# Patient Record
Sex: MALE | Race: WHITE | NOT HISPANIC OR LATINO | Employment: OTHER | URBAN - METROPOLITAN AREA
[De-identification: names, ages, dates, MRNs, and addresses within clinical notes are randomized per-mention and may not be internally consistent; named-entity substitution may affect disease eponyms.]

---

## 2017-01-03 ENCOUNTER — ALLSCRIPTS OFFICE VISIT (OUTPATIENT)
Dept: WOUND CARE | Facility: HOSPITAL | Age: 62
End: 2017-01-03
Payer: COMMERCIAL

## 2017-01-03 PROCEDURE — 99212 OFFICE O/P EST SF 10 MIN: CPT | Performed by: PODIATRIST

## 2017-01-17 ENCOUNTER — GENERIC CONVERSION - ENCOUNTER (OUTPATIENT)
Dept: OTHER | Facility: OTHER | Age: 62
End: 2017-01-17

## 2017-01-24 ENCOUNTER — ALLSCRIPTS OFFICE VISIT (OUTPATIENT)
Dept: WOUND CARE | Facility: HOSPITAL | Age: 62
End: 2017-01-24
Payer: COMMERCIAL

## 2017-01-24 PROCEDURE — 99212 OFFICE O/P EST SF 10 MIN: CPT | Performed by: PODIATRIST

## 2017-08-18 ENCOUNTER — APPOINTMENT (EMERGENCY)
Dept: ULTRASOUND IMAGING | Facility: HOSPITAL | Age: 62
End: 2017-08-18
Payer: COMMERCIAL

## 2017-08-18 ENCOUNTER — HOSPITAL ENCOUNTER (OUTPATIENT)
Facility: HOSPITAL | Age: 62
Setting detail: OBSERVATION
Discharge: LEFT BEFORE DISCHARGE | End: 2017-08-19
Attending: EMERGENCY MEDICINE | Admitting: HOSPITALIST
Payer: COMMERCIAL

## 2017-08-18 ENCOUNTER — APPOINTMENT (EMERGENCY)
Dept: RADIOLOGY | Facility: HOSPITAL | Age: 62
End: 2017-08-18
Payer: COMMERCIAL

## 2017-08-18 VITALS
RESPIRATION RATE: 22 BRPM | TEMPERATURE: 97.8 F | HEART RATE: 71 BPM | SYSTOLIC BLOOD PRESSURE: 161 MMHG | OXYGEN SATURATION: 97 % | DIASTOLIC BLOOD PRESSURE: 76 MMHG

## 2017-08-18 DIAGNOSIS — N17.9 AKI (ACUTE KIDNEY INJURY) (HCC): Primary | ICD-10-CM

## 2017-08-18 DIAGNOSIS — L03.90 CELLULITIS: ICD-10-CM

## 2017-08-18 LAB
ANION GAP SERPL CALCULATED.3IONS-SCNC: 9 MMOL/L (ref 4–13)
APTT PPP: 33 SECONDS (ref 23–35)
BASOPHILS # BLD AUTO: 0.03 THOUSANDS/ΜL (ref 0–0.1)
BASOPHILS NFR BLD AUTO: 1 % (ref 0–1)
BUN SERPL-MCNC: 35 MG/DL (ref 5–25)
CALCIUM SERPL-MCNC: 9.2 MG/DL (ref 8.3–10.1)
CHLORIDE SERPL-SCNC: 106 MMOL/L (ref 100–108)
CO2 SERPL-SCNC: 26 MMOL/L (ref 21–32)
CREAT SERPL-MCNC: 2.07 MG/DL (ref 0.6–1.3)
EOSINOPHIL # BLD AUTO: 0.15 THOUSAND/ΜL (ref 0–0.61)
EOSINOPHIL NFR BLD AUTO: 3 % (ref 0–6)
ERYTHROCYTE [DISTWIDTH] IN BLOOD BY AUTOMATED COUNT: 18.1 % (ref 11.6–15.1)
GFR SERPL CREATININE-BSD FRML MDRD: 33 ML/MIN/1.73SQ M
GLUCOSE SERPL-MCNC: 315 MG/DL (ref 65–140)
HCT VFR BLD AUTO: 35.4 % (ref 36.5–49.3)
HGB BLD-MCNC: 10.1 G/DL (ref 12–17)
INR PPP: 2.22 (ref 0.86–1.16)
LACTATE SERPL-SCNC: 1.3 MMOL/L (ref 0.5–2)
LYMPHOCYTES # BLD AUTO: 1.3 THOUSANDS/ΜL (ref 0.6–4.47)
LYMPHOCYTES NFR BLD AUTO: 25 % (ref 14–44)
MCH RBC QN AUTO: 22.6 PG (ref 26.8–34.3)
MCHC RBC AUTO-ENTMCNC: 28.5 G/DL (ref 31.4–37.4)
MCV RBC AUTO: 79 FL (ref 82–98)
MONOCYTES # BLD AUTO: 0.39 THOUSAND/ΜL (ref 0.17–1.22)
MONOCYTES NFR BLD AUTO: 7 % (ref 4–12)
NEUTROPHILS # BLD AUTO: 3.38 THOUSANDS/ΜL (ref 1.85–7.62)
NEUTS SEG NFR BLD AUTO: 64 % (ref 43–75)
PLATELET # BLD AUTO: 261 THOUSANDS/UL (ref 149–390)
PMV BLD AUTO: 9.2 FL (ref 8.9–12.7)
POTASSIUM SERPL-SCNC: 5.3 MMOL/L (ref 3.5–5.3)
PROTHROMBIN TIME: 25.4 SECONDS (ref 12.1–14.4)
RBC # BLD AUTO: 4.47 MILLION/UL (ref 3.88–5.62)
SODIUM SERPL-SCNC: 141 MMOL/L (ref 136–145)
WBC # BLD AUTO: 5.25 THOUSAND/UL (ref 4.31–10.16)

## 2017-08-18 PROCEDURE — 85025 COMPLETE CBC W/AUTO DIFF WBC: CPT | Performed by: EMERGENCY MEDICINE

## 2017-08-18 PROCEDURE — 36415 COLL VENOUS BLD VENIPUNCTURE: CPT | Performed by: EMERGENCY MEDICINE

## 2017-08-18 PROCEDURE — 73590 X-RAY EXAM OF LOWER LEG: CPT

## 2017-08-18 PROCEDURE — 83605 ASSAY OF LACTIC ACID: CPT | Performed by: EMERGENCY MEDICINE

## 2017-08-18 PROCEDURE — 93971 EXTREMITY STUDY: CPT

## 2017-08-18 PROCEDURE — 85730 THROMBOPLASTIN TIME PARTIAL: CPT | Performed by: EMERGENCY MEDICINE

## 2017-08-18 PROCEDURE — 80048 BASIC METABOLIC PNL TOTAL CA: CPT | Performed by: EMERGENCY MEDICINE

## 2017-08-18 PROCEDURE — 85610 PROTHROMBIN TIME: CPT | Performed by: EMERGENCY MEDICINE

## 2017-08-18 RX ORDER — PANTOPRAZOLE SODIUM 40 MG/1
40 TABLET, DELAYED RELEASE ORAL DAILY
COMMUNITY
End: 2022-01-01 | Stop reason: HOSPADM

## 2017-08-18 RX ORDER — CLINDAMYCIN HYDROCHLORIDE 150 MG/1
450 CAPSULE ORAL ONCE
Status: COMPLETED | OUTPATIENT
Start: 2017-08-18 | End: 2017-08-18

## 2017-08-18 RX ADMIN — CLINDAMYCIN HYDROCHLORIDE 450 MG: 150 CAPSULE ORAL at 22:08

## 2017-08-19 PROCEDURE — 99284 EMERGENCY DEPT VISIT MOD MDM: CPT

## 2017-08-19 RX ORDER — CLINDAMYCIN HYDROCHLORIDE 150 MG/1
300 CAPSULE ORAL 4 TIMES DAILY
Qty: 56 CAPSULE | Refills: 0 | Status: SHIPPED | OUTPATIENT
Start: 2017-08-19 | End: 2017-08-26

## 2017-08-19 RX ORDER — CLINDAMYCIN HYDROCHLORIDE 150 MG/1
300 CAPSULE ORAL ONCE
Status: DISCONTINUED | OUTPATIENT
Start: 2017-08-19 | End: 2017-08-19 | Stop reason: HOSPADM

## 2018-01-09 NOTE — MISCELLANEOUS
Physical Exam    Wound #1 Assessment wound #1 Location:, R medial Ankle, Care for this wound started on 10/25/16  Wound Status: not healed  Length: 3cm x Width: 1cm x Depth: 0 2cm   Total: 3sq cm   Wound Volume: 0 6cm3           Tissue type: Subcutaneous, Granulation and Slough   Color of Wound: Red - ~U%, Yellow - 5% and Pink - 95%   Exudate Amount: Minimal   Exudate Type: Serosangiunous   Odor: None   Exudate Color: Yellow and pink   Wound Edges: Intact, Rolled (epibolized) and Epidermal Migration   Periwound Skin Condition: Intact           Wound Drsg  Orders/Instructions  Wound Identification Dressing Orders--Instructions:   Wound Identification and Instructions   Wound #1: R medial Ankle    Wound Care Instructions  Discussed with Patient/Caregiver  Taryn Donohue  Dressing Type: Silver Alginate (Maxsorb AG, Silvercell, Aquacel Silver)  Wash with mild soap and water, normal saline, wound cleanser  As specified, use: Do Not Get Wet  Apply 4% Topical Lidocaine anesthetic solution PRN to wound/ulcer prior to debridement for pain control  Apply specified dressing to wound base/bed  To periwound apply: zinc oxide  Secondary dressing apply: 4x4, econopaste, ABD bandage  Secure with: coban  Dressing change frequency: Twice per week, F & tues  Unna boot -- Change every 3 days and as needed for increased drainage, discomfort or excessive swelling of toes  Comments/Other:   Alginate Ag on wound bed, Unna's Boot to be changed Tuesday 12/6/16   Apply compression using: Tubifast blue  Future Appointments    Date/Time Provider Specialty Site   12/06/2016 09:30 AM Rod Arevalo:    Wound Nursing Care Plan   Problem: open wound L medial ankle   Impaired Tissue Integrity related to: open wound   Risk for Infection related to open wound:   Pain related to disease process and/or wound treatment:   Imbalanced Nutrition, less than body requirements related to inadequate intake and/or disease process:   Impaired Mobility related to: tired when walking and needs walker   Risk for Unstable Blood Glucose related to increased metabolic demand required for wound healing and/or immune response to wound infection:   Risk for Fall related to criteria noted on Fall Risk Assessment:   Disturbed Sensory Perception related to neuropathy:   Goals   Patient will achieve 100% epithelialization:  Patient will maintain skin integrity:  Patient will verbalize signs and symptoms of infection and when to notify physician or [facility]:    Patient will verbalize knowledge of and demonstrate adherence to interventions to achieve optimal nutrition required for wound healing:  Patient will not experience a fall:  Patient will display appropriate coping mechanisms:  Wound Nursing Care Interventions:   Provide moist wound healing:  Evaluate current medication regime for medications which may slow/impede wound healing:  Assess pain and patients pain tolerance level:  Evaluate effectiveness of pain management interventions:  Assess patient's nutrition on each wound care visit (including protein and fluid intake): Gisele Lynn Educate on diet and hydration required to enhance wound healing:  Assess mobility and how it may affect wound healing:  Assess patient's knowledge of diabetes management:  Complete Fall Risk Assessment upon admission to wound program and with change in condition/implement identified interventions:  Teach patient on edema reducing measures:     Assess patient's coping skills (is the wound interrupting their lifestyle, social interactions, etc ):    Assess for patient compliance to established plan of care:  care plan initiated 10/25/16, updated 11/15/16      Signatures   Electronically signed by : Diana Lopez RN; Dec  2 2016  2:08PM EST                       (Author)

## 2018-01-10 NOTE — RESULT NOTES
Verified Results  (1) TISSUE EXAM 25Oct2016 11:50AM Freddy Del Valle Order Number: [id number]     Test Name Result Flag Reference   LAB AP CASE REPORT (Report)     Surgical Pathology Report             Case: [id number]                   Authorizing Provider: Alec Nayg DPM  Collected:      10/25/2016 1150        Ordering Location:   [facility]    Received:      10/25/2016 [facility]                              Pathologist:      Ghazal Alston MD                                Specimen:  Ulcer, Right medial ankle ulcer   LAB AP FINAL DIAGNOSIS (Report)     A  Skin Ulcer, Right medial ankle ulcer, punch biopsy:  - Ulcer with granulation tissue/superficial small capillary proliferation,   regenerative    squamous atypia and scar  See Note  - Special stains for bacteria (B&H) and fungus (PAS) negative  - No vasculitis identified    - Negative for malignancy on multiple examined levels  Interpretation performed at [facility], [address] [place] [address]  Electronically signed by Ghazal Alston MD on 10/28/2016 at 10:47 PM   LAB AP NOTE (Report)     Multiple levels are examined revealing a fragmented biopsy suboptimal for   evaluation  Ulceration with fibrinopurulent material, granulation tissue   and necrosis is present  The granulation tissue is focally associated with   scar, regenerative squamous atypia and marked superficial small capillary   proliferation suggestive of stasis dermatitis/acroangiodermatitis,   although prominent siderosis is not appreciated  Epidermis is detached   from the specimen; the undermined epidermal borders by neutrophils,   typical of pyoderma gangrenosum, cannot be determined  Special stains for   organisms are negative, correlating with prior negative culture results  No vasculitis is appreciated  No malignancy is identified   Given the   fragmented biopsy and clinical history, additional biopsy from the edge of   the ulcer may be helpful, if clinically indicated for more definitive   diagnosis  LAB AP SURGICAL ADDITIONAL INFORMATION (Report)     These tests were developed and their performance characteristics   determined by [facility] or [facility]  They may not be cleared or approved by the [place]  Food and   Drug Administration  The FDA has determined that such clearance or   approval is not necessary  These tests are used for clinical purposes  They should not be regarded as investigational or for research  This   laboratory has been approved by [place] [id number], designated as a high-complexity   laboratory and is qualified to perform these tests  LAB AP GROSS DESCRIPTION (Report)     A  The specimen is received in formalin, labeled with the patient's name   and hospital number, and is designated right medial ankle, are five   irregularly shaped fragments of tan-brown soft tissue measuring 0 9 x 0 7   x 0 1 cm in aggregate  Entirely submitted  One cassette  Note: The estimated total formalin fixation time based upon information   provided by the submitting clinician and the standard processing schedule   is 46 75 hours   RLR   LAB AP CLINICAL INFORMATION       Order Number: [id number]  Chronic right medial ankle ulcer with rolled borders not responding to standard care

## 2018-01-10 NOTE — PROGRESS NOTES
Chief Complaint  Chief Complaint Free Text Note Form: Nurse visit to [facility] for R Medial Ankle  History of Present Illness  Wound Identification HPI:   Wound Identification HPI   Wound #1: R Medial ankle        Visit Information:   The patient came to Wound Care and was ambulatory  The patient is being seen for follow-up with RN at the [address] [place] [address]  The patient's identification was verified  A secondary verification process was completed  Orientation: oriented to person, oriented to place and oriented to time  Blood Glucose:   89 mg/dL as reported by patient  10/25/16 Pt arrived ambulatory for R Medial Ankle  Pt also has a wound on Left pre tib, measures 1 5 cm x 1 cm x 0 1 cm  and states he aquired it from scratching, but does not want it treated  Pt states he had a pocket of fluid/blood blister removed 9/22/16 from R Medial Ankle and the wound has not healed since the removal Pt has visiting nurses from [facility] that currently visit him for dressing changes  The visiting nurses apply santyl to the wound  Pt was prescribed Cipro on 10/21/16 and started his prescription on Saturday 10/22/16  Pt stated that Cipro gave him a bloody nose, upset stomach, heart palpitations, pain knees and diarrhea and has stopped taking it  Pt has bilateral pedal pulses palpable  Pt states his last doppler test was two months ago  Pt tires when walking long distances and uses a walker  Pt did not bring his walker for [facility] visit  11/1/16 Pt arrived ambulatory wearing sneakers bilaterally  Dressing intact to R medial ankle  Pt states that his wife changes his dressing when the visiting nurse doesn't come and the dressing was changed yesterday  11/4/16 Pt arrived ambulatroy wearing sneakers bilaterally with R unna boot intact  Strike through drainage on [employer]   Pt reports he " pushed the back on the unna boot down slightly on the back of his leg, behind his knee " Pt also reports he's been having R leg twitching, and gets worse when he elevates his leg  Pt takes a pain reliever before bed to help him sleep with the leg twitching  Mcdowell Fall Scale:     History of Falling: Yes = 25   Secondary Diagnosis: Yes = 15   Ambulatory Aid None, Bedrest, Nurse Assist = 0   No = 0   Gait: Weak = 10 -- Short steps (may shuffle), stooped but able to lift head while walking, may seek support from furniture while walking, but with light touch (for reassurance)  Mental Status: Oriented to own ability = 0   Total Score:    25 - 45 = Moderate Risk        Erasto Scale:   Erasto Scale:   Sensory Perception: Slightly limited = 3   Moisture: Rarely moist = 4   Activity: Walks occasionally = 3   Mobility: Slightly limited = 3   Nutrition: Adequate = 3   Friction and Shear: No apparent problem = 3   Total Erasto Score: 19       Fall, Nutrition, Mobility, Neuropsychological Assessment: The most recent fall occurred 3/16 and Tripped on steps in back of home, no injury  Denies falls since last visit 11/4/16  Number of falls in the last year were 1  The fall resulted from tripping  The fall was preceded by no known event  The fall resulted in reports none  Nutrition Assessment Screening: Food intake over the last 3 months due to the loss of appetite, digestive problems, chewing or swallowing difficulties is graded as: 2 = no decrease in food intake   Weight loss during the last 3 months: 3 = no weight loss   Mobility scored as: 2 = goes out  Psychological Stress and Acute Disease Scored as: 2 = The patient has not experienced psychological stress or acute disease in the last 3 months  Neuropsychological problems scored as: 2 = no psychological problems  Body Mass Index (BMI) scored as: 3 = BMI 23 or greater  Nutritional Assessment Screening Score: 12 - 14 points - Normal nutritional status  Hospital Based Practices Required Assessment:   Pain Assessment   the patient states they have pain   (on a scale of 0 to 10, the patient rates the pain at 4 )   Abuse And Domestic Violence Screen   Domestic violence screen not done today  Reason DV Screen not done: Spouse in room    Depression And Suicide Screen  Suicide screen not done today  Reason suicide screen not done: Spouse in room  Prefered Language is  [place]  Primary Language is  English  Readiness To Learn: Receptive  Barriers To Learning: none  Preferred Learning: verbal   Education Completed: further treatment/follow-up and treatment/procedure   Teaching Method: verbal   Person Taught: patient and family member    Evaluation Of Learning: verbalized/demonstrated understanding      Active Problems    1  Abnormality of gait (781 2) (R26 9)   2  Chronic kidney disease (585 9) (N18 9)   3  Chronic kidney disease, stage 3 (585 3) (N18 3)   4  Chronic ulcer of ankle, right, with fat layer exposed (707 13) (L97 312)   5  Coronary artery disease (414 00) (I25 10)   6  Diabetes mellitus (250 00) (E11 9)   7  Diabetes mellitus type 2 with neurological manifestations (250 60) (E11 49)   8  Diabetes mellitus with ulcer of ankle (250 80,707 13) (E11 622,L97 309)   9  History of cellulitis (V13 3) (Z87 2)   10  Hypercholesteremia (272 0) (E78 00)   11  Hypertension (401 9) (I10)   12  Nephrolithiasis (592 0) (N20 0)   13  Sleep apnea (780 57) (G47 30)   14  Status post incision and drainage (V45 89) (J06 476)    Past Medical History    1  History of Bell's palsy (V12 49) (Z86 69)   2  History of chest pain (V13 89) (Z87 898)   3  History of deep venous thrombosis (V12 51) (Z86 718)   4  History of stroke (V12 54) (Z86 73)    Surgical History    1  History of Cath Stent Placement Number Of Stents Placed:   2  History of Complete Colonoscopy    Family History    1  Family history of CABG   2  Family history of cerebrovascular accident (V17 1) (Z82 3)   3  Family history of coronary artery disease (V17 3) (Z82 49)   4  Family history of diabetes mellitus (V18 0) (Z83 3)   5   Family history of hypercholesterolemia (V18 19) (Z83 42)   6  Family history of hypertension (V17 49) (Z82 49)    7  Family history of hypertension (V17 49) (Z82 49)    8  Family history of No Significant Family History    Social History    · Former smoker (W03 66) (Q16 002)   ·    · No alcohol use   · No drug use    Current Meds   1  Aspirin 81 MG TABS; Take 1 tablet daily Recorded   2  Fenofibrate 145 MG Oral Tablet; Take 1 tablet daily Recorded   3  HumuLIN R U-500 (CONCENTRATED) 500 UNIT/ML Subcutaneous Solution; Therapy: (Recorded:25Oct2016) to Recorded   4  Lidocaine HCl - 4 % External Solution; Apply to wound/s prior to debridement at the   wound management for pain; Therapy: (Recorded:25Oct2016) to Recorded   5  Lidocaine HCl - 4 % External Solution; Therapy: (Recorded:25Oct2016) to Recorded   6  Metoprolol Tartrate 50 MG Oral Tablet; Take 1 tablet twice daily Recorded   7  Oxycodone-Acetaminophen 7 5-325 MG Oral Tablet; Therapy: (Recorded:25Oct2016) to Recorded   8  Pantoprazole Sodium 40 MG Oral Tablet Delayed Release; Take 1 tablet daily Recorded   9  Santyl 250 UNIT/GM External Ointment; APPLY TO AFFECTED AREA(S) ONCE DAILY AS   DIRECTED; Therapy: 10OTM3255 to (Fabio See)  Requested for: [id number]; Last   Rx:06Oct2016 Ordered   10  Vytorin 10-40 MG Oral Tablet; Take 1 tablet daily Recorded   11  Warfarin Sodium TABS; Take as directed Recorded    Allergies    1  Ciprofloxacin HCl TABS   2  Morphine Derivatives   3  Penicillins    Vitals  Signs   Recorded: 21EXG6285 21:82AD   Systolic: 854  Diastolic: 80  Heart Rate: 80  Respiration: 18  Temperature: 98 4 F  Pain Scale: 4  Height: 5 ft 7 in  Weight: 255 lb   BMI Calculated: 39 94  BSA Calculated: 2 24    Physical Exam    Wound #1 Assessment wound #1 Location:, R medial Ankle, Care for this wound started on 10/25/16  Wound Status: not healed     Length: 3 5cm x Width: 1 5cm x Depth: 0 3cm   Total: 5 25sq cm   Wound Volume: 1 575cm3 Tissue type: Granulation and Slough   Color of Wound: Red - 80%, Yellow - 20% and Pink - 25%   Exudate Amount: Moderate   Exudate Type: Serosangiunous   Odor: None   Exudate Color: Tan   Wound Edges: Intact, Rolled (epibolized), Epidermal Migration and epitheilium   Periwound Skin Condition: Intact, Macerated           Procedure      Wound #1: R med ankle     Nurse Dressing Change:   Wound #1 The wound located on the R med ankle  Wound care rendered as per Physician/Advanced Practitioner order/plan  Return to [address] [place] [address] 11/8/16  Comments:,    Procedure: Unna Boot Application (Two or Three Layers):   Prior to the procedure, the patient was identified using two identifiers, the general consent was signed, the proper site of procedure was identified, and a time out was taken  Unna boot application for the compression of right leg was performed  The wound was cleansed and dressed with alginate Ag  With the foot in a dorsiflexed position, a zinc oxide paste bandage was applied from the base of the toes to the patellar notch  A second layer of econopaste was applied in a similar fashion  A final layer of coban was applied  The patient was instructed to notify the [facility] for strike-through drainage, pain, edema or change in color of toes not relieved with routine elevation  CPT Code(s) [id number] [facility]  Wound Drsg  Orders/Instructions  Wound Identification Dressing Orders--Instructions:   Wound Identification and Instructions   Wound #1: R medial Ankle    Wound Care Instructions  Discussed with Patient/Caregiver  Dressing Type: Alginate  Wash with mild soap and water, normal saline, wound cleanser  Apply specified dressing to wound base/bed  Dressing change frequency: Twice per week  Unna boot -- Change every 3 days and as needed for increased drainage, discomfort or excessive swelling of toes  Comments/Other:   silver alginate on wound base and Unna boot applied   Pt directed to remove if unna boot becomes too tight, tingling or redness and go to emergency room  Wound Goals  Wound Goals:   Healing Goals:   Fair healing potential secondary to moderate comorbid conditions  Wound base will be 25%    Patient will achieve full wound closure and edema management for wound prevention       Impression  Wound [address]: Wound Nursing Care Plan   Problem: open wound L medial ankle   Impaired Tissue Integrity related to: open wound   Risk for Infection related to open wound:   Pain related to disease process and/or wound treatment:   Imbalanced Nutrition, less than body requirements related to inadequate intake and/or disease process:   Impaired Mobility related to: tired when walking and needs walker   Risk for Unstable Blood Glucose related to increased metabolic demand required for wound healing and/or immune response to wound infection:   Risk for Fall related to criteria noted on Fall Risk Assessment:   Disturbed Sensory Perception related to neuropathy:   Goals   Patient will achieve 100% epithelialization:  Patient will maintain skin integrity:  Patient will verbalize signs and symptoms of infection and when to notify physician or [facility]:    Patient will verbalize knowledge of and demonstrate adherence to interventions to achieve optimal nutrition required for wound healing:  Patient will not experience a fall:  Wound Nursing Care Interventions:   Provide moist wound healing:  Evaluate current medication regime for medications which may slow/impede wound healing:  Assess pain and patients pain tolerance level:  Evaluate effectiveness of pain management interventions:  Assess patient's nutrition on each wound care visit (including protein and fluid intake): Elton Ryder Educate on diet and hydration required to enhance wound healing:  Assess mobility and how it may affect wound healing:  Assess patient's knowledge of diabetes management:     Complete Fall Risk Assessment upon admission to wound program and with change in condition/implement identified interventions:  Teach patient on edema reducing measures:     Assess for patient compliance to established plan of care:  care plan initiated 10/25/16      Future Appointments    Date/Time Provider Specialty Site   11/08/2016 09:00 AM Aneta Monge DPM Podiatry [facility]   Electronically signed by : Sue Turpin RN; Nov 4 2016  1:16PM EST                       (Author)

## 2018-01-10 NOTE — MISCELLANEOUS
Message  Nurse telephone call providing doctor ill  Voice mail left to reschedule next week appointment to call Wound management center with any problems for an earlier appointment  Active Problems    1  Abnormality of gait (781 2) (R26 9)   2  Chronic kidney disease (585 9) (N18 9)   3  Chronic kidney disease, stage 3 (585 3) (N18 3)   4  Chronic ulcer of ankle, right, with fat layer exposed (707 13) (L97 312)   5  Chronic venous hypertension (idiopathic) with ulcer of right lower extremity (459 31)   (I87 311)   6  Coronary artery disease (414 00) (I25 10)   7  Diabetes mellitus (250 00) (E11 9)   8  Diabetes mellitus type 2 with neurological manifestations (250 60) (E11 49)   9  Diabetes mellitus with ulcer of ankle (250 80,707 13) (E11 622,L97 309)   10  History of cellulitis (V13 3) (Z87 2)   11  Hypercholesteremia (272 0) (E78 00)   12  Hypertension (401 9) (I10)   13  Nephrolithiasis (592 0) (N20 0)   14  Plantar fasciitis of right foot (728 71) (M72 2)   15  Sleep apnea (780 57) (G47 30)   16  Status post incision and drainage (V45 89) (Z98 890)    Current Meds   1  Aspirin 81 MG TABS; Take 1 tablet daily Recorded   2  Fenofibrate 145 MG Oral Tablet; Take 1 tablet daily Recorded   3  HumuLIN R U-500 (CONCENTRATED) 500 UNIT/ML Subcutaneous Solution; Therapy: (Recorded:25Oct2016) to Recorded   4  Lidocaine HCl - 2 % External Gel; apply prior to debridement for pain control; Therapy: (Recorded:05Jan2017) to Recorded   5  Lidocaine HCl - 4 % External Solution; Apply to wound/s prior to debridement at the   wound management for pain; Therapy: (Recorded:25Oct2016) to Recorded   6  Metoprolol Tartrate 50 MG Oral Tablet; Take 1 tablet twice daily Recorded   7  Oxycodone-Acetaminophen 7 5-325 MG Oral Tablet; Therapy: (Recorded:25Oct2016) to Recorded   8  Pantoprazole Sodium 40 MG Oral Tablet Delayed Release; Take 1 tablet daily Recorded   9   Santyl 250 UNIT/GM External Ointment; APPLY TO AFFECTED AREA(S) ONCE DAILY   AS DIRECTED; Therapy: [id number] to (Nolon Gains)  Requested for: 84QYS4733; Last   Rx:06Oct2016 Ordered   10  Vytorin 10-40 MG Oral Tablet; Take 1 tablet daily Recorded   11  Warfarin Sodium TABS; Take as directed Recorded    Allergies    1  Ciprofloxacin HCl TABS   2  Morphine Derivatives   3   Penicillins    Signatures   Electronically signed by : Tai Ignacio RN; Jan 17 2017  8:44AM EST                       (Author)

## 2018-01-10 NOTE — MISCELLANEOUS
Physical Exam    Wound #1 Assessment wound #1 Location:, R medial Ankle, Care for this wound started on 10/25/16  Wound Status: not healed  Length: 3 5cm x Width: 1 5cm x Depth: 0 3cm   Total: 5 25sq cm   Wound Volume: 1 575cm3           Tissue type: Granulation and Slough   Color of Wound: Red - 80%, Yellow - 20% and Pink - 25%   Exudate Amount: Moderate   Exudate Type: Serosangiunous   Odor: None   Exudate Color: Tan   Wound Edges: Intact, Rolled (epibolized), Epidermal Migration and epitheilium   Periwound Skin Condition: Intact, Macerated           Wound Drsg  Orders/Instructions  Wound Identification Dressing Orders--Instructions:   Wound Identification and Instructions   Wound #1: R medial Ankle    Wound Care Instructions  Discussed with Patient/Caregiver  Dressing Type: Alginate  Wash with mild soap and water, normal saline, wound cleanser  Apply specified dressing to wound base/bed  Dressing change frequency: Twice per week  Unna boot -- Change every 3 days and as needed for increased drainage, discomfort or excessive swelling of toes  Comments/Other:   silver alginate on wound base and Unna boot applied  Pt directed to remove if unna boot becomes too tight, tingling or redness and go to emergency room  Future Appointments    Date/Time Provider Specialty Site   11/08/2016 09:00 AM Remedios Escobedo DPM [address] [place] [address]:    Wound Nursing Care Plan   Problem: open wound L medial ankle   Impaired Tissue Integrity related to: open wound   Risk for Infection related to open wound:   Pain related to disease process and/or wound treatment:   Imbalanced Nutrition, less than body requirements related to inadequate intake and/or disease process:   Impaired Mobility related to: tired when walking and needs walker   Risk for Unstable Blood Glucose related to increased metabolic demand required for wound healing and/or immune response to wound infection:   Risk for Fall related to criteria noted on Fall Risk Assessment:   Disturbed Sensory Perception related to neuropathy:   Goals   Patient will achieve 100% epithelialization:  Patient will maintain skin integrity:  Patient will verbalize signs and symptoms of infection and when to notify physician or [facility]:    Patient will verbalize knowledge of and demonstrate adherence to interventions to achieve optimal nutrition required for wound healing:  Patient will not experience a fall:  Wound Nursing Care Interventions:   Provide moist wound healing:  Evaluate current medication regime for medications which may slow/impede wound healing:  Assess pain and patients pain tolerance level:  Evaluate effectiveness of pain management interventions:  Assess patient's nutrition on each wound care visit (including protein and fluid intake): Santos Mobley Educate on diet and hydration required to enhance wound healing:  Assess mobility and how it may affect wound healing:  Assess patient's knowledge of diabetes management:  Complete Fall Risk Assessment upon admission to wound program and with change in condition/implement identified interventions:  Teach patient on edema reducing measures:     Assess for patient compliance to established plan of care:  care plan initiated 10/25/16      Signatures   Electronically signed by : Nadia Bethea RN; Nov 4 2016  1:17PM EST                       (Author)

## 2018-01-10 NOTE — MISCELLANEOUS
Physical Exam    Wound #1 Assessment wound #1 Location:, R medial Ankle, Care for this wound started on 10/25/16  Wound Status: not healed  Length: 0cm x Width: 0 cm x Depth: 0 cm   Total: 0 sq cm   Wound Volume: 0 0cm3           Tissue type: Epidermis and fragile epidermis   Color of Wound: Pink - 70%   Exudate Amount: None   Exudate Type: Serosangiunous   Odor: None   Exudate Color: Yellow   Comments: healed with a defect  Wound Drsg  Orders/Instructions  Wound Identification Dressing Orders--Instructions:   Wound Identification and Instructions   Wound #1: R medial Ankle    Wound Care Instructions  Discussed with Patient/Caregiver  Taryn Donohue  Dressing Type: Collagen (Puracol Plus, Promogran)  Wash with mild soap and water, normal saline, wound cleanser  As specified, use: NSS, wound cleanser  Apply 4% Topical Lidocaine anesthetic solution PRN to wound/ulcer prior to debridement for pain control  Apply specified dressing to wound base/bed  Secondary dressing apply: Gauze, 4x4  Secure with: Coban  Dressing change frequency: Weekly  Unna boot -- Change every 7 days and as needed for increased drainage, discomfort or excessive swelling of toes  Comments/Other:   puracol and hydrogel on wound base  Apply compression using: Tubifast blue  Future Appointments    Date/Time Provider Specialty Site   01/03/2017 09:30 AM Rod Ballard:    Wound Nursing Care Plan   Problem: open wound L medial ankle   Impaired Tissue Integrity related to: open wound   Risk for Infection related to open wound:   Pain related to disease process and/or wound treatment:   Imbalanced Nutrition, less than body requirements related to inadequate intake and/or disease process:   Impaired Mobility related to: tired when walking and needs walker   Risk for Unstable Blood Glucose related to increased metabolic demand required for wound healing and/or immune response to wound infection:   Risk for Fall related to criteria noted on Fall Risk Assessment:   Disturbed Sensory Perception related to neuropathy:   Goals   Patient will achieve 100% epithelialization:  Patient will maintain skin integrity:  Patient will verbalize signs and symptoms of infection and when to notify physician or [facility]:    Patient will verbalize knowledge of and demonstrate adherence to interventions to achieve optimal nutrition required for wound healing:  Patient will not experience a fall:  Patient will display appropriate coping mechanisms:  Wound Nursing Care Interventions:   Provide moist wound healing:  Evaluate current medication regime for medications which may slow/impede wound healing:  Assess pain and patients pain tolerance level:  Evaluate effectiveness of pain management interventions:  Assess patient's nutrition on each wound care visit (including protein and fluid intake): Nas Carrero Educate on diet and hydration required to enhance wound healing:  Assess mobility and how it may affect wound healing:  Assess patient's knowledge of diabetes management:  Complete Fall Risk Assessment upon admission to wound program and with change in condition/implement identified interventions:  Teach patient on edema reducing measures:     Assess patient's coping skills (is the wound interrupting their lifestyle, social interactions, etc ):    Assess for patient compliance to established plan of care:  care plan initiated 10/25/16, updated 11/15/16,12/13/16      Signatures   Electronically signed by : Bhavesh Lynn RN; Dec 27 2016 12:06PM EST                       (Author)

## 2018-01-10 NOTE — MISCELLANEOUS
Physical Exam    Wound #1 Assessment wound #1 Location:, R medial Ankle, Care for this wound started on 10/25/16  Wound Status: not healed  Length: 2 5cm x Width: 1 4cm x Depth: 0 3cm   Total: 3 5sq cm   Wound Volume: 1 05cm3           Tissue type: Subcutaneous, Granulation and Slough   Color of Wound: Red - ~U%, Yellow - 20% and Pink - 80%   Exudate Amount: Moderate   Exudate Type: Serosangiunous   Odor: None   Exudate Color: Tan   Wound Edges: Intact, Rolled (epibolized) and Epidermal Migration   Periwound Skin Condition: Intact         Wound Drsg  Orders/Instructions  Wound Identification Dressing Orders--Instructions:   Wound Identification and Instructions   Wound #1: R medial Ankle    Wound Care Instructions  Discussed with Patient/Caregiver  Taryn Donohue  Dressing Type: Alginate  Wash with mild soap and water, normal saline, wound cleanser  As specified, use: Do Not Get Wet  Apply 4% Topical Lidocaine anesthetic solution PRN to wound/ulcer prior to debridement for pain control  Apply specified dressing to wound base/bed  To periwound apply: zinc oxide  Secondary dressing apply: econopaste bandage  Secure with: coban  Dressing change frequency: Twice per week, F & tues  Unna boot -- Change every 3 days and as needed for increased drainage, discomfort or excessive swelling of toes  Comments/Other:   Alginate Ag on wound bed, Unna's Boot to be changed Fri and Tues  VNA services discontinued   Apply compression using: Tubifast blue  Future Appointments    Date/Time Provider Specialty Site   11/29/2016 08:45 AM Michel Sumner DPM Podiatry [address] Plan  Wound Nursing Care Plan Jennifer Huynh:    Wound Nursing Care Plan   Problem: open wound L medial ankle   Impaired Tissue Integrity related to: open wound   Risk for Infection related to open wound:   Pain related to disease process and/or wound treatment:   Imbalanced Nutrition, less than body requirements related to inadequate intake and/or disease process:   Impaired Mobility related to: tired when walking and needs walker   Risk for Unstable Blood Glucose related to increased metabolic demand required for wound healing and/or immune response to wound infection:   Risk for Fall related to criteria noted on Fall Risk Assessment:   Disturbed Sensory Perception related to neuropathy:   Goals   Patient will achieve 100% epithelialization:  Patient will maintain skin integrity:  Patient will verbalize signs and symptoms of infection and when to notify physician or [facility]:    Patient will verbalize knowledge of and demonstrate adherence to interventions to achieve optimal nutrition required for wound healing:  Patient will not experience a fall:  Patient will display appropriate coping mechanisms:  Wound Nursing Care Interventions:   Provide moist wound healing:  Evaluate current medication regime for medications which may slow/impede wound healing:  Assess pain and patients pain tolerance level:  Evaluate effectiveness of pain management interventions:  Assess patient's nutrition on each wound care visit (including protein and fluid intake): Priscilla Warren Educate on diet and hydration required to enhance wound healing:  Assess mobility and how it may affect wound healing:  Assess patient's knowledge of diabetes management:  Complete Fall Risk Assessment upon admission to wound program and with change in condition/implement identified interventions:  Teach patient on edema reducing measures:     Assess patient's coping skills (is the wound interrupting their lifestyle, social interactions, etc ):    Assess for patient compliance to established plan of care:  care plan initiated 10/25/16, updated 11/15/16      Signatures   Electronically signed by : Berenice Wells RN; Nov 25 2016 12:08PM EST                       (Author)

## 2018-01-12 NOTE — PROCEDURES
Procedures by Isabella Monroy RN at 8/31/2016  5:19 PM      Author:  Isabella Monroy RN Service:  Interventional Radiology  Author Type:  Registered Nurse     Filed:  8/31/2016  5:20 PM Date of Service:  8/31/2016  5:19 PM Status:  Signed     :  Isabella Monroy RN (Registered Nurse)            Midline- PICC Line Insertion Procedure Note    Procedure: Insertion of #5 PICC - this is a midline    Indications:  Poor Access    Procedure Details   Informed consent was obtained for the procedure, including sedation  Risks of lung perforation, hemorrhage, and adverse drug reaction were discussed  Maximum sterile technique was used including antiseptics, cap, gloves, gown, hand hygiene, mask and sheet  #5 PICC inserted to the R Basilic vein per hospital protocol  Blood return:  yes     Findings:  Catheter inserted to 30 cm, with 0 cm  Exposed  Mid upper arm circumference is 39 cm  There were no changes to vital signs  Catheter was flushed with 20 cc NS  Patient did tolerate procedure well  Recommendations:  Fluoro ordered to verify placement  PICC Brochure given to patient with teaching instruction             Received for:Provider  EPIC   Aug 31 2016  5:21PM [facility] Standard Time

## 2018-01-13 VITALS
HEART RATE: 76 BPM | HEIGHT: 67 IN | RESPIRATION RATE: 18 BRPM | BODY MASS INDEX: 40.02 KG/M2 | WEIGHT: 255 LBS | DIASTOLIC BLOOD PRESSURE: 70 MMHG | SYSTOLIC BLOOD PRESSURE: 120 MMHG | TEMPERATURE: 97.8 F

## 2018-01-13 NOTE — MISCELLANEOUS
Physical Exam    Wound #1 Assessment wound #1 Location:, R medial Ankle, Care for this wound started on 10/25/16  Wound Status: not healed  Length: 3 5cm x Width: 1 5cm x Depth: 0 3cm   Total: 5 25sq cm   Wound Volume: 1 575cm3           Tissue type: Granulation and Slough   Color of Wound: Red - 90%, Yellow - 10% and Pink - 25%   Exudate Amount: Moderate   Exudate Type: Serosangiunous   Odor: None   Exudate Color: Tan   Wound Edges: Intact, Rolled (epibolized), Epidermal Migration and epitheilium   Periwound Skin Condition: Intact, Macerated           Wound Drsg  Orders/Instructions  Wound Identification Dressing Orders--Instructions:   Wound Identification and Instructions   Wound #1: R medial Ankle    Wound Care Instructions  Discussed with Patient/Caregiver  Dressing Type: Alginate  Wash with mild soap and water, normal saline, wound cleanser  Apply specified dressing to wound base/bed  Dressing change frequency: Twice per week  Unna boot -- Change every 3 days and as needed for increased drainage, discomfort or excessive swelling of toes  Comments/Other:   silver alginate on wound base and Unna boot applied  Pt directed to remove if unna boot becomes too tight, tingling or redness and go to emergency room  Future Appointments    Date/Time Provider Specialty Site   11/15/2016 10:30 AM Rod Manley:    Wound Nursing Care Plan   Problem: open wound L medial ankle   Impaired Tissue Integrity related to: open wound   Risk for Infection related to open wound:   Pain related to disease process and/or wound treatment:   Imbalanced Nutrition, less than body requirements related to inadequate intake and/or disease process:   Impaired Mobility related to: tired when walking and needs walker   Risk for Unstable Blood Glucose related to increased metabolic demand required for wound healing and/or immune response to wound infection:   Risk for Fall related to criteria noted on Fall Risk Assessment:   Disturbed Sensory Perception related to neuropathy:   Goals   Patient will achieve 100% epithelialization:  Patient will maintain skin integrity:  Patient will verbalize signs and symptoms of infection and when to notify physician or [facility]:    Patient will verbalize knowledge of and demonstrate adherence to interventions to achieve optimal nutrition required for wound healing:  Patient will not experience a fall:  Wound Nursing Care Interventions:   Provide moist wound healing:  Evaluate current medication regime for medications which may slow/impede wound healing:  Assess pain and patients pain tolerance level:  Evaluate effectiveness of pain management interventions:  Assess patient's nutrition on each wound care visit (including protein and fluid intake): Dara Soulier Educate on diet and hydration required to enhance wound healing:  Assess mobility and how it may affect wound healing:  Assess patient's knowledge of diabetes management:  Complete Fall Risk Assessment upon admission to wound program and with change in condition/implement identified interventions:  Teach patient on edema reducing measures:     Assess for patient compliance to established plan of care:  care plan initiated 10/25/16      Signatures   Electronically signed by : Cindy Amanda RN; Nov 11 2016 11:37AM EST                       (Author)

## 2018-01-14 VITALS
HEIGHT: 67 IN | BODY MASS INDEX: 40.02 KG/M2 | RESPIRATION RATE: 18 BRPM | TEMPERATURE: 97.1 F | DIASTOLIC BLOOD PRESSURE: 72 MMHG | WEIGHT: 255 LBS | HEART RATE: 76 BPM | SYSTOLIC BLOOD PRESSURE: 126 MMHG

## 2018-01-15 NOTE — PROGRESS NOTES
Chief Complaint  Chief Complaint Free Text Note Form: Follow up visit for R Medial Ankle  History of Present Illness  Wound Identification HPI:   Wound Identification HPI   Wound #1: R Medial ankle        Visit Information:   The patient came to Wound Care and was ambulatory  The patient is being seen for a follow-up with MD at the [address] [place] [address]  The patient's identification was verified  A secondary verification process was completed  Orientation: oriented to person, oriented to place and oriented to time  Blood Glucose:   177 mg/dL as reported by patient  10/25/16 Pt arrived ambulatory for R Medial Ankle  Pt also has a wound on Left pre tib, measures 1 5 cm x 1 cm x 0 1 cm  and states he aquired it from scratching, but does not want it treated  Pt states he had a pocket of fluid/blood blister removed 9/22/16 from R Medial Ankle and the wound has not healed since the removal Pt has visiting nurses from [facility] that currently visit him for dressing changes  The visiting nurses apply santyl to the wound  Pt was prescribed Cipro on 10/21/16  11/25/16 Patient arrives for Nurse dressing change visit  Reports less pain with [facility]   12/2/16 Pt arrived ambulatory with unnas boot intact to R LE  Pt reports less pain, but an occasional shooting pain up his leg starting from the R medial Ankle area  Pt report that his grandson lives with him and that his grandson fell into his R medial Ankle area and caused pain at the time it happened  No other complaints offered  12/6/2016 Patient arrived with Unna boot intact on RLE  Patient states that last night he experienced shooting pain in his RLE and right foot  Wounds presents with less drainage  12/13/16 Patient arrives with Solomon Prior boot intact on right lower extremity  Wound measurement jill with decreased drainage  12/20/16 Patient arrives with dressings intact on RLE  Unna Boot intact for 1 week without complaint  wound appears healed  12/27/16 Patient arrives with [employer] intact on Right lower extremity    Wound with loose eschar  Patient did not obtained compression stockings and has no compression at home  discussed that he needs to have some form of compression by next visit  Fasting blood sugar elevated post holiday  Mcdowell Fall Scale:     History of Falling: Yes = 25   Secondary Diagnosis: Yes = 15   Ambulatory Aid None, Bedrest, Nurse Assist = 0   No = 0   Gait: Weak = 10 -- Short steps (may shuffle), stooped but able to lift head while walking, may seek support from furniture while walking, but with light touch (for reassurance)  Mental Status: Oriented to own ability = 0   Total Score:    25 - 45 = Moderate Risk        Fall, Nutrition, Mobility, Neuropsychological Assessment: The most recent fall occurred Denies any recent falls since last visit, 12/6/2016  Number of falls in the last year were 1  The fall resulted in reports none  Nutrition Assessment Screening: Food intake over the last 3 months due to the loss of appetite, digestive problems, chewing or swallowing difficulties is graded as: 2 = no decrease in food intake   Weight loss during the last 3 months: 3 = no weight loss   Mobility scored as: 2 = goes out  Psychological Stress and Acute Disease Scored as: 2 = The patient has not experienced psychological stress or acute disease in the last 3 months  Neuropsychological problems scored as: 2 = no psychological problems  Body Mass Index (BMI) scored as: 3 = BMI 23 or greater  Nutritional Assessment Screening Score: 12 - 14 points - Normal nutritional status  Hospital Based Practices Required Assessment:   Pain Assessment   the patient states they have pain  The pain is located in the RLE, right foot  The pain radiates to the denies  The patient describes the pain as aching  (on a scale of 0 to 10, the patient rates the pain at 3 )   Abuse And Domestic Violence Screen   Domestic violence screen not done today  Reason DV Screen not done: Spouse in room    Depression And Suicide Screen  Suicide screen not done today  Reason suicide screen not done: Spouse in room  Prefered Language is  [place]  Primary Language is  English  Readiness To Learn: Receptive  Barriers To Learning: none  Preferred Learning: verbal   Education Completed: further treatment/follow-up and treatment/procedure   Teaching Method: verbal   Person Taught: patient and family member    Evaluation Of Learning: verbalized/demonstrated understanding      Active Problems    1  Abnormality of gait (781 2) (R26 9)   2  Chronic kidney disease (585 9) (N18 9)   3  Chronic kidney disease, stage 3 (585 3) (N18 3)   4  Chronic ulcer of ankle, right, with fat layer exposed (707 13) (L97 312)   5  Chronic venous hypertension (idiopathic) with ulcer of right lower extremity (459 31)   (I87 311)   6  Coronary artery disease (414 00) (I25 10)   7  Diabetes mellitus (250 00) (E11 9)   8  Diabetes mellitus type 2 with neurological manifestations (250 60) (E11 49)   9  Diabetes mellitus with ulcer of ankle (250 80,707 13) (E11 622,L97 309)   10  History of cellulitis (V13 3) (Z87 2)   11  Hypercholesteremia (272 0) (E78 00)   12  Hypertension (401 9) (I10)   13  Nephrolithiasis (592 0) (N20 0)   14  Plantar fasciitis of right foot (728 71) (M72 2)   15  Sleep apnea (780 57) (G47 30)   16  Status post incision and drainage (V45 89) (L44 489)    Past Medical History    1  History of Bell's palsy (V12 49) (Z86 69)   2  History of chest pain (V13 89) (Z87 898)   3  History of deep venous thrombosis (V12 51) (Z86 718)   4  History of stroke (V12 54) (Z86 73)    Surgical History    1  History of Cath Stent Placement Number Of Stents Placed:   2  History of Complete Colonoscopy    Family History    1  Family history of CABG   2  Family history of cerebrovascular accident (V17 1) (Z82 3)   3  Family history of coronary artery disease (V17 3) (Z82 49)   4   Family history of diabetes mellitus (V18 0) (Z83 3)   5  Family history of hypercholesterolemia (V18 19) (Z83 42)   6  Family history of hypertension (V17 49) (Z82 49)    7  Family history of hypertension (V17 49) (Z82 49)    8  Family history of No Significant Family History    Social History    · Former smoker (L48 30) (V06 359)   ·    · No alcohol use   · No drug use    Current Meds   1  Aspirin 81 MG TABS; Take 1 tablet daily Recorded   2  Fenofibrate 145 MG Oral Tablet; Take 1 tablet daily Recorded   3  HumuLIN R U-500 (CONCENTRATED) 500 UNIT/ML Subcutaneous Solution; Therapy: (Recorded:25Oct2016) to Recorded   4  Lidocaine HCl - 4 % External Solution; Apply to wound/s prior to debridement at the   wound management for pain; Therapy: (Recorded:25Oct2016) to Recorded   5  Lidocaine HCl - 4 % External Solution; Therapy: (Recorded:25Oct2016) to Recorded   6  Metoprolol Tartrate 50 MG Oral Tablet; Take 1 tablet twice daily Recorded   7  Oxycodone-Acetaminophen 7 5-325 MG Oral Tablet; Therapy: (Recorded:25Oct2016) to Recorded   8  Pantoprazole Sodium 40 MG Oral Tablet Delayed Release; Take 1 tablet daily Recorded   9  Santyl 250 UNIT/GM External Ointment; APPLY TO AFFECTED AREA(S) ONCE DAILY AS   DIRECTED; Therapy: [id number] to (Barbara Estes)  Requested for: [id number]; Last   Rx:06Oct2016 Ordered   10  Vytorin 10-40 MG Oral Tablet; Take 1 tablet daily Recorded   11  Warfarin Sodium TABS; Take as directed Recorded    Allergies    1  Ciprofloxacin HCl TABS   2  Morphine Derivatives   3  Penicillins    Vitals  Signs   Recorded: [id number] 09:50AM   Temperature: 97 1 F  Heart Rate: 80  Respiration: 18  Systolic: 361  Diastolic: 82  Height: 5 ft 7 in  Weight: 255 lb   BMI Calculated: 39 94  BSA Calculated: 2 24  Pain Scale: 0    Physical Exam    Wound #1 Assessment wound #1 Location:, R medial Ankle, Care for this wound started on 10/25/16  Wound Status: not healed     Length: 0cm x Width: 0 cm x Depth: 0 cm Total: 0 sq cm   Wound Volume: 0 0cm3           Tissue type: Epidermis and fragile epidermis   Color of Wound: Pink - 70%   Exudate Amount: None   Exudate Type: Serosangiunous   Odor: None   Exudate Color: Yellow   Comments: healed with a defect  Procedure      Wound #1: right medial ankle     Nurse Dressing Change:   Wound #wound one  The wound located on the right medial ankle   Applied 4% topical Lidocaine solution to wound/ulcer prior to debridement for pain control  Wound care rendered as per Physician/Advanced Practitioner order/plan  Return to Wound Management Center F/u in 1 week with Dr Priscilla Milner     Comments:, Instructed patient in Signs and symptoms of infection  [facility] precautions  Elevation of extremity when sitting ,    Procedure: Ozie Ortiz Boot Application (Two or Three Layers):   Prior to the procedure, the patient was identified using two identifiers, the general consent was signed, the proper site of procedure was identified, and a time out was taken  Unna boot application for the compression of right leg was performed  The wound was cleansed and dressed with NSS, wound cleanser  Sliver Alginate  With the foot in a dorsiflexed position, a zinc oxide paste bandage was applied from the base of the toes to the patellar notch  A second layer of [place] paste was applied in a similar fashion  A final layer of Coban was applied  The patient was instructed to notify the [facility] for strike-through drainage, pain, edema or change in color of toes not relieved with routine elevation  CPT Code(s) [id number] RT [facility] Appl  Wound Drsg  Orders/Instructions  Wound Identification Dressing Orders--Instructions:   Wound Identification and Instructions   Wound #1: R medial Ankle    Wound Care Instructions  Discussed with Patient/Caregiver  Taryn Donohue  Dressing Type: Collagen (Puracol Plus, Promogran)  Wash with mild soap and water, normal saline, wound cleanser   As specified, use: NSS, wound cleanser  Apply 4% Topical Lidocaine anesthetic solution PRN to wound/ulcer prior to debridement for pain control  Apply specified dressing to wound base/bed  Secondary dressing apply: Gauze, 4x4  Secure with: Coban  Dressing change frequency: Weekly  Unna boot -- Change every 7 days and as needed for increased drainage, discomfort or excessive swelling of toes  Comments/Other:   puracol and hydrogel on wound base  Apply compression using: Tubifast blue  Wound Goals  Wound Goals:   Healing Goals:   Fair healing potential secondary to moderate comorbid conditions  Wound base will be 25%    Patient will achieve full wound closure and edema management for wound prevention       Impression  Wound [address]: Wound Nursing Care Plan   Problem: open wound L medial ankle   Impaired Tissue Integrity related to: open wound   Risk for Infection related to open wound:   Pain related to disease process and/or wound treatment:   Imbalanced Nutrition, less than body requirements related to inadequate intake and/or disease process:   Impaired Mobility related to: tired when walking and needs walker   Risk for Unstable Blood Glucose related to increased metabolic demand required for wound healing and/or immune response to wound infection:   Risk for Fall related to criteria noted on Fall Risk Assessment:   Disturbed Sensory Perception related to neuropathy:   Goals   Patient will achieve 100% epithelialization:  Patient will maintain skin integrity:  Patient will verbalize signs and symptoms of infection and when to notify physician or [employer]:    Patient will verbalize knowledge of and demonstrate adherence to interventions to achieve optimal nutrition required for wound healing:  Patient will not experience a fall:  Patient will display appropriate coping mechanisms:  Wound Nursing Care Interventions:   Provide moist wound healing:     Evaluate current medication regime for medications which may slow/impede wound healing:  Assess pain and patients pain tolerance level:  Evaluate effectiveness of pain management interventions:  Assess patient's nutrition on each wound care visit (including protein and fluid intake): Luis Alberto Benitez Educate on diet and hydration required to enhance wound healing:  Assess mobility and how it may affect wound healing:  Assess patient's knowledge of diabetes management:  Complete Fall Risk Assessment upon admission to wound program and with change in condition/implement identified interventions:  Teach patient on edema reducing measures:     Assess patient's coping skills (is the wound interrupting their lifestyle, social interactions, etc ):    Assess for patient compliance to established plan of care:  care plan initiated 10/25/16, updated 11/15/16,12/13/16      Future Appointments    Date/Time Provider Specialty Site   01/03/2017 09:30 AM Marjan Jimenez DPM Podiatry [place] [id number]   Electronically signed by : Veronica Davis RN; Dec 27 2016 12:06PM EST                       (Author)

## 2018-01-16 NOTE — PROGRESS NOTES
Chief Complaint  Chief Complaint Free Text Note Form: Follow up visit for R Medial Ankle  History of Present Illness  Wound Identification HPI:   Wound Identification HPI   Wound #1: R Medial ankle        Visit Information:   The patient came to Wound Care and was ambulatory  The patient is being seen for a follow-up with MD at the [address] [place] [address]  The patient's identification was verified  A secondary verification process was completed  Orientation: oriented to person, oriented to place and oriented to time  Blood Glucose:  123 mg/dL as reported by patient  10/25/16 Pt arrived ambulatory for R Medial Ankle  Pt also has a wound on Left pre tib, measures 1 5 cm x 1 cm x 0 1 cm  and states he aquired it from scratching, but does not want it treated  Pt states he had a pocket of fluid/blood blister removed 9/22/16 from R Medial Ankle and the wound has not healed since the removal Pt has visiting nurses from [facility] that currently visit him for dressing changes  The visiting nurses apply santyl to the wound  Pt was prescribed Cipro on 10/21/16  11/25/16 Patient arrives for Nurse dressing change visit  Reports less pain with [facility]  Mcdowell Fall Scale:     History of Falling: Yes = 25   Secondary Diagnosis: Yes = 15   Ambulatory Aid None, Bedrest, Nurse Assist = 0   No = 0   Gait: Weak = 10 -- Short steps (may shuffle), stooped but able to lift head while walking, may seek support from furniture while walking, but with light touch (for reassurance)     Mental Status: Oriented to own ability = 0   Total Score:    25 - 45 = Moderate Risk        Erasto Scale:   Erasto Scale:   Sensory Perception: Slightly limited = 3   Moisture: Rarely moist = 4   Activity: Walks occasionally = 3   Mobility: Slightly limited = 3   Nutrition: Adequate = 3   Friction and Shear: No apparent problem = 3   Total Erasto Score: 19       Fall, Nutrition, Mobility, Neuropsychological Assessment: The most recent fall occurred 3/16 and Tripped on steps in back of home, no injury  Denies falls since last visit 11/22/16  Number of falls in the last year were 1  The fall resulted from tripping  The fall was preceded by no known event  The fall resulted in reports none  Nutrition Assessment Screening: Food intake over the last 3 months due to the loss of appetite, digestive problems, chewing or swallowing difficulties is graded as: 2 = no decrease in food intake   Weight loss during the last 3 months: 3 = no weight loss   Mobility scored as: 2 = goes out  Psychological Stress and Acute Disease Scored as: 2 = The patient has not experienced psychological stress or acute disease in the last 3 months  Neuropsychological problems scored as: 2 = no psychological problems  Body Mass Index (BMI) scored as: 3 = BMI 23 or greater  Nutritional Assessment Screening Score: 12 - 14 points - Normal nutritional status  Hospital Based Practices Required Assessment:   Pain Assessment   the patient states they have pain  The pain is located in the R foot arch  (on a scale of 0 to 10, the patient rates the pain at 4 )   Abuse And Domestic Violence Screen   Domestic violence screen not done today  Reason DV Screen not done: Spouse in room    Depression And Suicide Screen  Suicide screen not done today  Reason suicide screen not done: Spouse in room  Prefered Language is  [place]  Primary Language is  English  Readiness To Learn: Receptive  Barriers To Learning: none  Preferred Learning: verbal   Education Completed: further treatment/follow-up and treatment/procedure   Teaching Method: verbal   Person Taught: patient and family member    Evaluation Of Learning: verbalized/demonstrated understanding      Review of Systems  Focused-Male Wound Care:   Constitutional: no fever or chills, feels well, no tiredness, no recent weight loss or weight gain  Musculoskeletal: as noted in HPI  Integumentary: skin wound  Psychiatric: no depression, no mood disorders, no anxiety  Active Problems    1  Abnormality of gait (781 2) (R26 9)   2  Chronic kidney disease (585 9) (N18 9)   3  Chronic kidney disease, stage 3 (585 3) (N18 3)   4  Chronic ulcer of ankle, right, with fat layer exposed (707 13) (L97 312)   5  Coronary artery disease (414 00) (I25 10)   6  Diabetes mellitus (250 00) (E11 9)   7  Diabetes mellitus type 2 with neurological manifestations (250 60) (E11 49)   8  Diabetes mellitus with ulcer of ankle (250 80,707 13) (E11 622,L97 309)   9  History of cellulitis (V13 3) (Z87 2)   10  Hypercholesteremia (272 0) (E78 00)   11  Hypertension (401 9) (I10)   12  Nephrolithiasis (592 0) (N20 0)   13  Plantar fasciitis of right foot (728 71) (M72 2)   14  Sleep apnea (780 57) (G47 30)   15  Status post incision and drainage (V45 89) (K58 867)    Past Medical History    1  History of Bell's palsy (V12 49) (Z86 69)   2  History of chest pain (V13 89) (Z87 898)   3  History of deep venous thrombosis (V12 51) (Z86 718)   4  History of stroke (V12 54) (Z86 73)    Surgical History    1  History of Cath Stent Placement Number Of Stents Placed:   2  History of Complete Colonoscopy    Family History    1  Family history of CABG   2  Family history of cerebrovascular accident (V17 1) (Z82 3)   3  Family history of coronary artery disease (V17 3) (Z82 49)   4  Family history of diabetes mellitus (V18 0) (Z83 3)   5  Family history of hypercholesterolemia (V18 19) (Z83 42)   6  Family history of hypertension (V17 49) (Z82 49)    7  Family history of hypertension (V17 49) (Z82 49)    8  Family history of No Significant Family History    Social History    · Former smoker (S20 44) (N72 535)   ·    · No alcohol use   · No drug use    Current Meds   1  Aspirin 81 MG TABS; Take 1 tablet daily Recorded   2  Fenofibrate 145 MG Oral Tablet; Take 1 tablet daily Recorded   3   HumuLIN R U-500 (CONCENTRATED) 500 UNIT/ML Subcutaneous Solution; Therapy: (Recorded:25Oct2016) to Recorded   4  Lidocaine HCl - 4 % External Solution; Apply to wound/s prior to debridement at the   wound management for pain; Therapy: (Recorded:25Oct2016) to Recorded   5  Lidocaine HCl - 4 % External Solution; Therapy: (Recorded:25Oct2016) to Recorded   6  Metoprolol Tartrate 50 MG Oral Tablet; Take 1 tablet twice daily Recorded   7  Oxycodone-Acetaminophen 7 5-325 MG Oral Tablet; Therapy: (Recorded:25Oct2016) to Recorded   8  Pantoprazole Sodium 40 MG Oral Tablet Delayed Release; Take 1 tablet daily Recorded   9  Santyl 250 UNIT/GM External Ointment; APPLY TO AFFECTED AREA(S) ONCE DAILY AS   DIRECTED; Therapy: [id number] to ([facility])  Requested for: [id number]; Last   Rx:06Oct2016 Ordered   10  Vytorin 10-40 MG Oral Tablet; Take 1 tablet daily Recorded   11  Warfarin Sodium TABS; Take as directed Recorded    Allergies    1  Ciprofloxacin HCl TABS   2  Morphine Derivatives   3  Penicillins    Vitals  Signs   Recorded: 25Nov2016 12:00PM   Temperature: 98 4 F  Heart Rate: 88  Respiration: 18  Systolic: 114  Diastolic: 88  Height: 5 ft 7 in  Weight: 255 lb   BMI Calculated: 39 94  BSA Calculated: 2 24  Pain Scale: 1    Physical Exam    Wound #1 Assessment wound #1 Location:, R medial Ankle, Care for this wound started on 10/25/16  Wound Status: not healed  Length: 2 5cm x Width: 1 4cm x Depth: 0 3cm   Total: 3 5sq cm   Wound Volume: 1 05cm3           Tissue type: Subcutaneous, Granulation and Slough   Color of Wound: Red - ~U%, Yellow - 20% and Pink - 80%   Exudate Amount: Moderate   Exudate Type: Serosangiunous   Odor: None   Exudate Color: Tan   Wound Edges: Intact, Rolled (epibolized) and Epidermal Migration   Periwound Skin Condition: Intact         Procedure      Wound #1: R Med ankle     Nurse Dressing Change:   Wound #1 The wound located on the R med ankle     Applied 4% topical Lidocaine solution to wound/ulcer prior to debridement for pain control  Wound care rendered as per Physician/Advanced Practitioner order/plan  Return to Wound Management Center Return tuesday with Dr Viveros  Comments:, Unna Boot precautions instruction given ,    Procedure: Unna Boot Application (Two or Three Layers):   Prior to the procedure, the patient was identified using two identifiers, the general consent was signed, the proper site of procedure was identified, and a time out was taken  Unna boot application for the compression of right leg was performed  The wound was cleansed and dressed with Cleansed leg with soap and water, cleansed wound with NSS and Alginate Ag applied to wound bed  With the foot in a dorsiflexed position, a zinc oxide paste bandage was applied from the base of the toes to the patellar notch  A second layer of Econopaste was applied in a similar fashion  A final layer of coban was applied  The patient was instructed to notify the [facility] for strike-through drainage, pain, edema or change in color of toes not relieved with routine elevation  CPT Code(s) [id number] [facility]  Wound Drsg  Orders/Instructions  Wound Identification Dressing Orders--Instructions:   Wound Identification and Instructions   Wound #1: R medial Ankle    Wound Care Instructions  Discussed with Patient/Caregiver  Taryn Donohue  Dressing Type: Alginate  Wash with mild soap and water, normal saline, wound cleanser  As specified, use: Do Not Get Wet  Apply 4% Topical Lidocaine anesthetic solution PRN to wound/ulcer prior to debridement for pain control  Apply specified dressing to wound base/bed  To periwound apply: zinc oxide  Secondary dressing apply: econopaste bandage  Secure with: coban  Dressing change frequency: Twice per week, F & tues  Unna boot -- Change every 3 days and as needed for increased drainage, discomfort or excessive swelling of toes  Comments/Other:   Alginate Ag on wound bed, Unna's Boot to be changed Fri and Tues   VNA services discontinued   Apply compression using: Tubifast blue  Wound Goals  Wound Goals:   Healing Goals:   Fair healing potential secondary to moderate comorbid conditions  Wound base will be 25%    Patient will achieve full wound closure and edema management for wound prevention       Impression  Wound [address]: Wound Nursing Care Plan   Problem: open wound L medial ankle   Impaired Tissue Integrity related to: open wound   Risk for Infection related to open wound:   Pain related to disease process and/or wound treatment:   Imbalanced Nutrition, less than body requirements related to inadequate intake and/or disease process:   Impaired Mobility related to: tired when walking and needs walker   Risk for Unstable Blood Glucose related to increased metabolic demand required for wound healing and/or immune response to wound infection:   Risk for Fall related to criteria noted on Fall Risk Assessment:   Disturbed Sensory Perception related to neuropathy:   Goals   Patient will achieve 100% epithelialization:  Patient will maintain skin integrity:  Patient will verbalize signs and symptoms of infection and when to notify physician or [facility]:    Patient will verbalize knowledge of and demonstrate adherence to interventions to achieve optimal nutrition required for wound healing:  Patient will not experience a fall:  Patient will display appropriate coping mechanisms:  Wound Nursing Care Interventions:   Provide moist wound healing:  Evaluate current medication regime for medications which may slow/impede wound healing:  Assess pain and patients pain tolerance level:  Evaluate effectiveness of pain management interventions:  Assess patient's nutrition on each wound care visit (including protein and fluid intake): Racquel Mejia Educate on diet and hydration required to enhance wound healing:  Assess mobility and how it may affect wound healing:     Assess patient's knowledge of diabetes management:  Complete Fall Risk Assessment upon admission to wound program and with change in condition/implement identified interventions:  Teach patient on edema reducing measures:     Assess patient's coping skills (is the wound interrupting their lifestyle, social interactions, etc ):    Assess for patient compliance to established plan of care:  care plan initiated 10/25/16, updated 11/15/16      Future Appointments    Date/Time Provider Specialty Site   11/29/2016 08:45 AM Michel Sumner DPM Podiatry [place] [id number]   Electronically signed by : Hernan Kohli RN; Nov 25 2016 12:08PM EST                       (Author)

## 2018-01-16 NOTE — PROGRESS NOTES
Chief Complaint  Chief Complaint Free Text Note Form: Nurse visit to [facility] for R Medial Ankle  History of Present Illness  Wound Identification HPI:   Wound Identification HPI   Wound #1: R Medial ankle        Visit Information:   The patient came to Wound Care and was ambulatory  The patient is being seen for follow-up with RN at the [address] [place] [address]  The patient's identification was verified  A secondary verification process was completed  Orientation: oriented to person, oriented to place and oriented to time  Blood Glucose:  123 mg/dL as reported by patient  10/25/16 Pt arrived ambulatory for R Medial Ankle  Pt also has a wound on Left pre tib, measures 1 5 cm x 1 cm x 0 1 cm  and states he aquired it from scratching, but does not want it treated  Pt states he had a pocket of fluid/blood blister removed 9/22/16 from R Medial Ankle and the wound has not healed since the removal Pt has visiting nurses from [facility] that currently visit him for dressing changes  The visiting nurses apply santyl to the wound  Pt was prescribed Cipro on 10/21/16 and started his prescription on Saturday 10/22/16  Pt stated that Cipro gave him a bloody nose, upset stomach, heart palpitations, pain knees and diarrhea and has stopped taking it  Pt has bilateral pedal pulses palpable  Pt states his last doppler test was two months ago  Pt tires when walking long distances and uses a walker  Pt did not bring his walker for [facility] visit  11/1/16 Pt arrived ambulatory wearing sneakers bilaterally  Dressing intact to R medial ankle  Pt states that his wife changes his dressing when the visiting nurse doesn't come and the dressing was changed yesterday  11/4/16 Pt arrived ambulatroy wearing sneakers bilaterally with R unna boot intact  Strike through drainage on [employer]   Pt reports he " pushed the back on the unna boot down slightly on the back of his leg, behind his knee " Pt also reports he's been having R leg twitching, and gets worse when he elevates his leg  Pt takes a pain reliever before bed to help him sleep with the leg twitching  11/11/16 Pt arrived ambulatory with Unnas boot intact to R med LE  Pt reports his leg has stopped twitching, but the occasional pain in his R med ankle wakes him at night  Mcdowell Fall Scale:     History of Falling: Yes = 25   Secondary Diagnosis: Yes = 15   Ambulatory Aid None, Bedrest, Nurse Assist = 0   No = 0   Gait: Weak = 10 -- Short steps (may shuffle), stooped but able to lift head while walking, may seek support from furniture while walking, but with light touch (for reassurance)  Mental Status: Oriented to own ability = 0   Total Score:    25 - 45 = Moderate Risk        Erasto Scale:   Erasto Scale:   Sensory Perception: Slightly limited = 3   Moisture: Rarely moist = 4   Activity: Walks occasionally = 3   Mobility: Slightly limited = 3   Nutrition: Adequate = 3   Friction and Shear: No apparent problem = 3   Total Erasto Score: 19       Fall, Nutrition, Mobility, Neuropsychological Assessment: The most recent fall occurred 3/16 and Tripped on steps in back of home, no injury  Denies falls since last visit 11/11/16  Number of falls in the last year were 1  The fall resulted from tripping  The fall was preceded by no known event  The fall resulted in reports none  Nutrition Assessment Screening: Food intake over the last 3 months due to the loss of appetite, digestive problems, chewing or swallowing difficulties is graded as: 2 = no decrease in food intake   Weight loss during the last 3 months: 3 = no weight loss   Mobility scored as: 2 = goes out  Psychological Stress and Acute Disease Scored as: 2 = The patient has not experienced psychological stress or acute disease in the last 3 months  Neuropsychological problems scored as: 2 = no psychological problems  Body Mass Index (BMI) scored as: 3 = BMI 23 or greater     Nutritional Assessment Screening Score: 12 - 14 points - Normal nutritional status  Hospital Based Practices Required Assessment:   Pain Assessment   the patient states they have pain  (on a scale of 0 to 10, the patient rates the pain at 4 )   Abuse And Domestic Violence Screen   Domestic violence screen not done today  Reason DV Screen not done: Spouse in room    Depression And Suicide Screen  Suicide screen not done today  Reason suicide screen not done: Spouse in room  Prefered Language is  [place]  Primary Language is  English  Readiness To Learn: Receptive  Barriers To Learning: none  Preferred Learning: verbal   Education Completed: further treatment/follow-up and treatment/procedure   Teaching Method: verbal   Person Taught: patient and family member    Evaluation Of Learning: verbalized/demonstrated understanding      Active Problems    1  Abnormality of gait (781 2) (R26 9)   2  Chronic kidney disease (585 9) (N18 9)   3  Chronic kidney disease, stage 3 (585 3) (N18 3)   4  Chronic ulcer of ankle, right, with fat layer exposed (707 13) (L97 312)   5  Coronary artery disease (414 00) (I25 10)   6  Diabetes mellitus (250 00) (E11 9)   7  Diabetes mellitus type 2 with neurological manifestations (250 60) (E11 49)   8  Diabetes mellitus with ulcer of ankle (250 80,707 13) (E11 622,L97 309)   9  History of cellulitis (V13 3) (Z87 2)   10  Hypercholesteremia (272 0) (E78 00)   11  Hypertension (401 9) (I10)   12  Nephrolithiasis (592 0) (N20 0)   13  Sleep apnea (780 57) (G47 30)   14  Status post incision and drainage (V45 89) (B70 297)    Past Medical History    1  History of Bell's palsy (V12 49) (Z86 69)   2  History of chest pain (V13 89) (Z87 898)   3  History of deep venous thrombosis (V12 51) (Z86 718)   4  History of stroke (V12 54) (Z86 73)    Surgical History    1  History of Cath Stent Placement Number Of Stents Placed:   2  History of Complete Colonoscopy    Family History    1  Family history of CABG   2   Family history of cerebrovascular accident (V17 1) (Z82 3)   3  Family history of coronary artery disease (V17 3) (Z82 49)   4  Family history of diabetes mellitus (V18 0) (Z83 3)   5  Family history of hypercholesterolemia (V18 19) (Z83 42)   6  Family history of hypertension (V17 49) (Z82 49)    7  Family history of hypertension (V17 49) (Z82 49)    8  Family history of No Significant Family History    Social History    · Former smoker (A56 51) (J37 391)   ·    · No alcohol use   · No drug use    Current Meds   1  Aspirin 81 MG TABS; Take 1 tablet daily Recorded   2  Fenofibrate 145 MG Oral Tablet; Take 1 tablet daily Recorded   3  HumuLIN R U-500 (CONCENTRATED) 500 UNIT/ML Subcutaneous Solution; Therapy: (Recorded:25Oct2016) to Recorded   4  Lidocaine HCl - 4 % External Solution; Apply to wound/s prior to debridement at the   wound management for pain; Therapy: (Recorded:25Oct2016) to Recorded   5  Lidocaine HCl - 4 % External Solution; Therapy: (Recorded:25Oct2016) to Recorded   6  Metoprolol Tartrate 50 MG Oral Tablet; Take 1 tablet twice daily Recorded   7  Oxycodone-Acetaminophen 7 5-325 MG Oral Tablet; Therapy: (Recorded:25Oct2016) to Recorded   8  Pantoprazole Sodium 40 MG Oral Tablet Delayed Release; Take 1 tablet daily Recorded   9  Santyl 250 UNIT/GM External Ointment; APPLY TO AFFECTED AREA(S) ONCE DAILY AS   DIRECTED; Therapy: [id number] to ([phone])  Requested for: [id number]; Last   Rx:06Oct2016 Ordered   10  Vytorin 10-40 MG Oral Tablet; Take 1 tablet daily Recorded   11  Warfarin Sodium TABS; Take as directed Recorded    Allergies    1  Ciprofloxacin HCl TABS   2  Morphine Derivatives   3   Penicillins    Vitals  Signs   Recorded: [id number] 36:96VR   Systolic: 370  Diastolic: 72  Heart Rate: 80  Respiration: 22  Temperature: 97 6 F  Pain Scale: 2  Height: 5 ft 7 in  Weight: 255 lb   BMI Calculated: 39 94  BSA Calculated: 2 24    Physical Exam    Wound #1 Assessment wound #1 Location:, R medial Ankle, Care for this wound started on 10/25/16  Wound Status: not healed  Length: 3 5cm x Width: 1 5cm x Depth: 0 3cm   Total: 5 25sq cm   Wound Volume: 1 575cm3           Tissue type: Granulation and Slough   Color of Wound: Red - 90%, Yellow - 10% and Pink - 25%   Exudate Amount: Moderate   Exudate Type: Serosangiunous   Odor: None   Exudate Color: Tan   Wound Edges: Intact, Rolled (epibolized), Epidermal Migration and epitheilium   Periwound Skin Condition: Intact, Macerated           Procedure      Wound #1: R medial Ankle     Nurse Dressing Change:   Wound #1 The wound located on the R med ankle  Wound care rendered as per Physician/Advanced Practitioner order/plan  Return to Wound Management Center Tuesday 11/15/16  Comments:,    Procedure: Unna Boot Application (Two or Three Layers):   Prior to the procedure, the patient was identified using two identifiers, the general consent was signed, the proper site of procedure was identified, and a time out was taken  Unna boot application for the compression of right leg was performed  The wound was cleansed and dressed with econopaste  With the foot in a dorsiflexed position, a zinc oxide paste bandage was applied from the base of the toes to the patellar notch  A second layer of coban was applied in a similar fashion  A final layer of tubi blue was applied  The patient was instructed to notify the [facility] for strike-through drainage, pain, edema or change in color of toes not relieved with routine elevation  CPT Code(s) [id number] RT [employer]  Wound Drsg  Orders/Instructions  Wound Identification Dressing Orders--Instructions:   Wound Identification and Instructions   Wound #1: R medial Ankle    Wound Care Instructions  Discussed with Patient/Caregiver  Dressing Type: Alginate  Wash with mild soap and water, normal saline, wound cleanser  Apply specified dressing to wound base/bed     Dressing change frequency: Twice per week  Unna boot -- Change every 3 days and as needed for increased drainage, discomfort or excessive swelling of toes  Comments/Other:   silver alginate on wound base and Unna boot applied  Pt directed to remove if unna boot becomes too tight, tingling or redness and go to emergency room  Wound Goals  Wound Goals:   Healing Goals:   Fair healing potential secondary to moderate comorbid conditions  Wound base will be 25%    Patient will achieve full wound closure and edema management for wound prevention       Impression  Wound [address]: Wound Nursing Care Plan   Problem: open wound L medial ankle   Impaired Tissue Integrity related to: open wound   Risk for Infection related to open wound:   Pain related to disease process and/or wound treatment:   Imbalanced Nutrition, less than body requirements related to inadequate intake and/or disease process:   Impaired Mobility related to: tired when walking and needs walker   Risk for Unstable Blood Glucose related to increased metabolic demand required for wound healing and/or immune response to wound infection:   Risk for Fall related to criteria noted on Fall Risk Assessment:   Disturbed Sensory Perception related to neuropathy:   Goals   Patient will achieve 100% epithelialization:  Patient will maintain skin integrity:  Patient will verbalize signs and symptoms of infection and when to notify physician or [facility]:    Patient will verbalize knowledge of and demonstrate adherence to interventions to achieve optimal nutrition required for wound healing:  Patient will not experience a fall:  Wound Nursing Care Interventions:   Provide moist wound healing:  Evaluate current medication regime for medications which may slow/impede wound healing:  Assess pain and patients pain tolerance level:  Evaluate effectiveness of pain management interventions:  Assess patient's nutrition on each wound care visit (including protein and fluid intake): Khushbu [place] Educate on diet and hydration required to enhance wound healing:  Assess mobility and how it may affect wound healing:  Assess patient's knowledge of diabetes management:  Complete Fall Risk Assessment upon admission to wound program and with change in condition/implement identified interventions:  Teach patient on edema reducing measures:     Assess for patient compliance to established plan of care:  care plan initiated 10/25/16      Future Appointments    Date/Time Provider Specialty Site   11/15/2016 10:30 AM Yessi Grissom DPM Podiatry [place] [address]   Electronically signed by : Nadia Bethea RN; Nov 11 2016 11:37AM EST                       (Author)

## 2018-01-23 NOTE — PROGRESS NOTES
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 13) (E11 622,L97 309)   3  Plantar fasciitis of right foot (728 71) (M72 2)    Plan  Chronic ulcer of ankle, right, with fat layer exposed, Diabetes mellitus with ulcer of ankle    · Debride Subcutaneous Tissue first 20 sq cm or less; Status:Complete;   Done:  42WKU3222   Perform:[facility]; XGD:[id number];ZMITUEK; For:Chronic ulcer of ankle, right, with fat layer exposed, Diabetes mellitus with ulcer of ankle; Ordered By:Bronfenbrenner, Severa Muff;   · Strapping Unna Boot - [facility]; Status:Active - Perform Order; Requested for:22Nov2016;    Perform: In Office; YWB:[id number];MARTINE; For:Chronic ulcer of ankle, right, with fat layer exposed, Diabetes mellitus with ulcer of ankle; Ordered By:Sandoval Willoughby;  frequency: : 2x per week   · Follow-up visit in 1 week Evaluation and Treatment  Follow-up  Status: Hold For -  Scheduling  Requested for: 86ADG6714   Ordered; For: Chronic ulcer of ankle, right, with fat layer exposed, Diabetes mellitus with ulcer of ankle; Ordered By: Faye Ashby Performed:  Due: 36EGV2827   · Keep compression wrap clean and dry ; Status:Complete;   Done: 96JRQ9276   Ordered; For:Chronic ulcer of ankle, right, with fat layer exposed, Diabetes mellitus with ulcer of ankle; Ordered By:Sandoval Willoughby;   · Keep your leg elevated whenever you can to decrease swelling and increase circulation ;  Status:Complete;   Done: 91RIV9753   Ordered; For:Chronic ulcer of ankle, right, with fat layer exposed, Diabetes mellitus with ulcer of ankle; Ordered By:Sandoval Willoughby;   · Weight bearing as tolerated ; Status:Complete;   Done: 88TKQ3941   Ordered;   For:Chronic ulcer of ankle, right, with fat layer exposed, Diabetes mellitus with ulcer of ankle; Ordered By:Sandoval Willoughby;    Wound Care Orders/Instructions    Wound Identification and Instructions   Wound #1: R medial Ankle    Wound Care Instructions  Discussed with Patient/Caregiver  Taryn Donohue  Dressing Type: Alginate  Wash with mild soap and water, normal saline, wound cleanser  As specified, use: Do Not Get Wet  Apply 4% Topical Lidocaine anesthetic solution PRN to wound/ulcer prior to debridement for pain control  Apply specified dressing to wound base/bed  To periwound apply: zinc oxide  Secondary dressing apply: econopaste bandage  Secure with: coban  Dressing change frequency: Twice per week, F & tues  Unna boot -- Change every 3 days and as needed for increased drainage, discomfort or excessive swelling of toes  Comments/Other:   Alginate Ag on wound bed, Unna's Boot to be changed Fri and Tues  VNA services discontinued   Apply compression using: Tubifast blue  Wound Goals  Wound Goals:   Healing Goals:   Fair healing potential secondary to moderate comorbid conditions  Wound base will be 25%    Patient will achieve full wound closure and edema management for wound prevention       Discussion/Summary    Patient wound improving well with serial debridement and unna boot  He will come here for Unna boot changes as the last VNA nurse did not know how to properly apply one  He also has plantar fascitis of his right heel discussed conservative therapies today and will try cortisone injection at next visit if pain persists  Possible side effects of new medications were reviewed with the patient/guardian today  The treatment plan was reviewed with the patient/guardian  The patient/guardian understands and agrees with the treatment plan      Chief Complaint  Follow up visit for R Medial Ankle  History of Present Illness    Wound Identification HPI   Wound #1: R Medial ankle          The patient came to Wound Care and was ambulatory  The patient is being seen for a follow-up with MD at the [address] [place] [address]   The patient's identification was verified  A secondary verification process was completed  Orientation: oriented to person, oriented to place and oriented to time  Blood Glucose:  123 mg/dL as reported by patient  10/25/16 Pt arrived ambulatory for R Medial Ankle  Pt also has a wound on Left pre tib, measures 1 5 cm x 1 cm x 0 1 cm  and states he aquired it from scratching, but does not want it treated  Pt states he had a pocket of fluid/blood blister removed 9/22/16 from R Medial Ankle and the wound has not healed since the removal Pt has visiting nurses from [facility] that currently visit him for dressing changes  The visiting nurses apply santyl to the wound  Pt was prescribed Cipro on 10/21/16 and started his prescription on Saturday 10/22/16  Pt stated that Cipro gave him a bloody nose, upset stomach, heart palpitations, pain knees and diarrhea and has stopped taking it  Pt has bilateral pedal pulses palpable  Pt states his last doppler test was two months ago  Pt tires when walking long distances and uses a walker  Pt did not bring his walker for [facility]  11/1/16 Pt arrived ambulatory wearing sneakers bilaterally  Dressing intact to R medial ankle  Pt states that his wife changes his dressing when the visiting nurse doesn't come and the dressing was changed yesterday  11/4/16 Pt arrived ambulatroy wearing sneakers bilaterally with R unna boot intact  Strike through drainage on Se[facility]itting[facility]  Pt reports he " pushed the back on the unna boot down slightly on the back of his leg, behind his knee " Pt also reports he's been having R leg twitching, and gets worse when he elevates his leg  Pt takes a pain reliever before bed to help him sleep with the leg twitching  11/11/16 Pt arrived ambulatory with Unnas boot intact to R med LE  Pt reports his leg has stopped twitching, but the occasional pain in his R med ankle wakes him at night  11/15/16 arrived today with Unna's Boot and tubifast intact to R LE   Patient c/o increased pain (3/10) for "the last 3 days"  He is slightly SOB on arrival, which he says he has "off and on"  He reports the A nurse reported to him that "the doctor said would not be coming to the [facility] for much longer" and is questioning whether this is true  Patient assured that this is not correct and that he would continue with the Wound center if he wishes  11/22/*16 Pt arrived ambulatory wearing surgical shoe and unnas boot to R LE  Pt complained of [facility] not apply unnas boot properly " Pt reports "[address] did not come to my home with the supplies needed to apply the Unnas boot and it was put on wrong "  "The nurse reused the coban and forgot to apply the abd for the drainage and taped it to the outside of the coban and reused the tubifast"  Pt requested coming to the wound care center for nurse dressing changes for the balance of his care  History of Falling: Yes = 25   Secondary Diagnosis: Yes = 15   Ambulatory Aid None, Bedrest, Nurse Assist = 0   No = 0   Gait: Weak = 10 -- Short steps (may shuffle), stooped but able to lift head while walking, may seek support from furniture while walking, but with light touch (for reassurance)  Mental Status: Oriented to own ability = 0   Total Score:    25 - 45 = Moderate Risk          Erasto Scale:   Sensory Perception: Slightly limited = 3   Moisture: Rarely moist = 4   Activity: Walks occasionally = 3   Mobility: Slightly limited = 3   Nutrition: Adequate = 3   Friction and Shear: No apparent problem = 3   Total Erasto Score: 19       The most recent fall occurred 3/16 and Tripped on steps in back of home, no injury  Denies falls since last visit 11/22/16  Number of falls in the last year were 1  The fall resulted from tripping  The fall was preceded by no known event  The fall resulted in reports none     Nutrition Assessment Screening: Food intake over the last 3 months due to the loss of appetite, digestive problems, chewing or swallowing difficulties is graded as: 2 = no decrease in food intake   Weight loss during the last 3 months: 3 = no weight loss   Mobility scored as: 2 = goes out  Psychological Stress and Acute Disease Scored as: 2 = The patient has not experienced psychological stress or acute disease in the last 3 months  Neuropsychological problems scored as: 2 = no psychological problems  Body Mass Index (BMI) scored as: 3 = BMI 23 or greater  Nutritional Assessment Screening Score: 12 - 14 points - Normal nutritional status  Provider Wound Care HPI: Return for right ankle wound  Unna boot intact but not applied correctly by VNA  He reports new pain on his right heel, especially in the morning  It gets a little better throughout the day but as soon as he sits it comes back  Denies trauma  Pain Assessment   the patient states they have pain  The pain is located in the R foot arch  (on a scale of 0 to 10, the patient rates the pain at 4 )   Abuse And Domestic Violence Screen   Domestic violence screen not done today  Reason DV Screen not done: Spouse in room    Depression And Suicide Screen  Suicide screen not done today  Reason suicide screen not done: Spouse in room  Prefered Language is  [place]  Primary Language is  English  Readiness To Learn: Receptive  Barriers To Learning: none  Preferred Learning: verbal   Education Completed: further treatment/follow-up and treatment/procedure   Teaching Method: verbal   Person Taught: patient and family member    Evaluation Of Learning: verbalized/demonstrated understanding      Review of Systems    Constitutional: no fever or chills, feels well, no tiredness, no recent weight loss or weight gain  Musculoskeletal: as noted in HPI  Integumentary: skin wound  Psychiatric: no depression, no mood disorders, no anxiety  Active Problems    1  Abnormality of gait (781 2) (R26 9)   2  Chronic kidney disease (585 9) (N18 9)   3   Chronic kidney disease, stage 3 (585 3) (N18 3)   4  Chronic ulcer of ankle, right, with fat layer exposed (707 13) (L97 312)   5  Coronary artery disease (414 00) (I25 10)   6  Diabetes mellitus (250 00) (E11 9)   7  Diabetes mellitus type 2 with neurological manifestations (250 60) (E11 49)   8  Diabetes mellitus with ulcer of ankle (250 80,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) (E11 622,L97 309)   9  History of cellulitis (V13 3) (Z87 2)   10  Hypercholesteremia (272 0) (E78 00)   11  Hypertension (401 9) (I10)   12  Nephrolithiasis (592 0) (N20 0)   13  Sleep apnea (780 57) (G47 30)   14  Status post incision and drainage (V45 89) (G36 907)    Past Medical History    1  History of Bell's palsy (V12 49) (Z86 69)   2  History of chest pain (V13 89) (Z87 898)   3  History of deep venous thrombosis (V12 51) (Z86 718)   4  History of stroke (V12 54) (Z86 73)    Surgical History    1  History of Cath Stent Placement Number Of Stents Placed:   2  History of Complete Colonoscopy    Family History    1  Family history of CABG   2  Family history of cerebrovascular accident (V17 1) (Z82 3)   3  Family history of coronary artery disease (V17 3) (Z82 49)   4  Family history of diabetes mellitus (V18 0) (Z83 3)   5  Family history of hypercholesterolemia (V18 19) (Z83 42)   6  Family history of hypertension (V17 49) (Z82 49)    7  Family history of hypertension (V17 49) (Z82 49)    8  Family history of No Significant Family History    Social History    · Former smoker (K85 28) (S30 821)   ·    · No alcohol use   · No drug use    Current Meds   1  Aspirin 81 MG TABS; Take 1 tablet daily Recorded   2  Fenofibrate 145 MG Oral Tablet; Take 1 tablet daily Recorded   3  HumuLIN R U-500 (CONCENTRATED) 500 UNIT/ML Subcutaneous Solution; Therapy: (Recorded:25Oct2016) to Recorded   4  Lidocaine HCl - 4 % External Solution; Apply to wound/s prior to debridement at the   wound management for pain; Therapy: (Recorded:25Oct2016) to Recorded   5   Lidocaine HCl - 4 % External Solution; Therapy: (Recorded:25Oct2016) to Recorded   6  Metoprolol Tartrate 50 MG Oral Tablet; Take 1 tablet twice daily Recorded   7  Oxycodone-Acetaminophen 7 5-325 MG Oral Tablet; Therapy: (Recorded:25Oct2016) to Recorded   8  Pantoprazole Sodium 40 MG Oral Tablet Delayed Release; Take 1 tablet daily Recorded   9  Santyl 250 UNIT/GM External Ointment; APPLY TO AFFECTED AREA(S) ONCE DAILY AS   DIRECTED; Therapy: 66MAH4345 to (Lisa Elkins)  Requested for: 04IQG7127; Last   Rx:06Oct2016 Ordered   10  Vytorin 10-40 MG Oral Tablet; Take 1 tablet daily Recorded   11  Warfarin Sodium TABS; Take as directed Recorded    Allergies    1  Ciprofloxacin HCl TABS   2  Morphine Derivatives   3  Penicillins    Vitals  Vital Signs    Recorded: 22Nov2016 09:56AM   Temperature 96 7 F   Heart Rate 80   Respiration 20   Systolic 467   Diastolic 78   Height 5 ft 7 in   Weight 255 lb    BMI Calculated 39 94   BSA Calculated 2 24     Physical Exam    Pulse Exam   Posterior tibialis: right 2+ and left 2+  Dorsalis pedis: right 2+ and left 2+  Normal Capillary Refill  Right heel pain at medial plantar fascial insertion  MMT 5/5  Wound #1 Assessment wound #1 Location:, R medial Ankle, Care for this wound started on 10/25/16  Wound Status: not healed  Length: 2 5cm x Width: 1 4cm x Depth: 0 3cm   Total: 3 5sq cm   Wound Volume: 1 05cm3           Tissue type: Subcutaneous, Granulation and Slough   Color of Wound: Red -, Yellow - 20% and Pink - 80%   Exudate Amount: Moderate   Exudate Type: Serosangiunous   Odor: None   Exudate Color: Tan   Wound Edges: Intact, Rolled (epibolized) and Epidermal Migration   Periwound Skin Condition: Intact            Physician/Provider Wound #1 Exam   I agree with the nursing assessment and documentation  Rico Benjamin 1:    Wound Nursing Care Plan   Problem: open wound L medial ankle   Impaired Tissue Integrity related to: open wound Risk for Infection related to open wound:   Pain related to disease process and/or wound treatment:   Imbalanced Nutrition, less than body requirements related to inadequate intake and/or disease process:   Impaired Mobility related to: tired when walking and needs walker   Risk for Unstable Blood Glucose related to increased metabolic demand required for wound healing and/or immune response to wound infection:   Risk for Fall related to criteria noted on Fall Risk Assessment:   Disturbed Sensory Perception related to neuropathy:   Goals   Patient will achieve 100% epithelialization:  Patient will maintain skin integrity:  Patient will verbalize signs and symptoms of infection and when to notify physician or [facility]:    Patient will verbalize knowledge of and demonstrate adherence to interventions to achieve optimal nutrition required for wound healing:  Patient will not experience a fall:  Patient will display appropriate coping mechanisms:  Wound Nursing Care Interventions:   Provide moist wound healing:  Evaluate current medication regime for medications which may slow/impede wound healing:  Assess pain and patients pain tolerance level:  Evaluate effectiveness of pain management interventions:  Assess patient's nutrition on each wound care visit (including protein and fluid intake): Nagi Foster Educate on diet and hydration required to enhance wound healing:  Assess mobility and how it may affect wound healing:  Assess patient's knowledge of diabetes management:  Complete Fall Risk Assessment upon admission to wound program and with change in condition/implement identified interventions:  Teach patient on edema reducing measures:     Assess patient's coping skills (is the wound interrupting their lifestyle, social interactions, etc ):    Assess for patient compliance to established plan of care:  care plan initiated 10/25/16, updated 11/15/16      Procedure      Wound #1: R Med ankle     Nurse Dressing Change:   Wound #1 The wound located on the R med ankle  Applied 4% topical Lidocaine solution to wound/ulcer prior to debridement for pain control  Wound care rendered as per Physician/Advanced Practitioner order/plan  Return to Wound Management Center Return Friday for a nurse visit     Comments:,    Procedure: Meera Latch Application (Two or Three Layers):   Prior to the procedure, the patient was identified using two identifiers, the general consent was signed, the proper site of procedure was identified, and a time out was taken  Unna boot application for the compression of right leg was performed  The wound was cleansed and dressed with Cleansed leg with soap and water, cleansed wound with NSS and Alginate Ag applied to wound bed  With the foot in a dorsiflexed position, a zinc oxide paste bandage was applied from the base of the toes to the patellar notch  A second layer of Econopaste was applied in a similar fashion  A final layer of coban was applied  The patient was instructed to notify the [facility] for strike-through drainage, pain, edema or change in color of toes not relieved with routine elevation  CPT Code(s) R9248152 RT Meera Latch Appl  Excisional Debridement Subcutaneous Tissue:   Wound was excisionally debrided to subcutaneous tissue as follows  Prior to the procedure, the patient was identified using two identifiers, the general consent was signed, the proper site of procedure was identified, and a time out was taken  Anesthesia: Local anesthesia with 4% topical lidocaine was utilized prior to the procedure for pain control  #10 surgical blade was utilized to surgically excise devitalized tissue and/or slough through epidermis, dermis and into the subcutaneous tissue  The total sq cm excised was 3 5sq cm  There was minimal bleeding controlled with gentle pressure  the patient tolerated the procedure well without complication    CPT Code(s)   [id number] - Excisional DebridementTo Subcutaneous Tissue; first 20 sq cm        Future Appointments    Date/Time Provider Specialty Site   11/29/2016 08:45 AM Michel Sumner DPM Podiatry [facility]   11/25/2016 09:30 AM Wound Care [place], Nurse Schedule  [facility]     Signatures   Electronically signed by : Ronal Ga DPM; Nov 23 2016  8:41AM EST                       (Author)

## 2018-01-23 NOTE — PROGRESS NOTES
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 13) (E11 622,L97 309)    Plan  Chronic ulcer of ankle, right, with fat layer exposed, Diabetes mellitus with ulcer of ankle    · Debride Subcutaneous Tissue first 20 sq cm or less; Status:Complete;   Done:  09RJQ4324   Perform:[facility]; OBW:[id number];BDIUTJB; For:Chronic ulcer of ankle, right, with fat layer exposed, Diabetes mellitus with ulcer of ankle; Ordered By:Sandoval Willoughby;   · Strapping [facility]; Side::Right; Status:Active - Perform Order; Requested  for:01Nov2016;    Perform: In Office; SRZ:[id number];QMVCQMW; For:Chronic ulcer of ankle, right, with fat layer exposed, Diabetes mellitus with ulcer of ankle; Ordered By:Sandoval Willoughby;  frequency: : Every 3-4 days   · Follow-up visit in 1 week Evaluation and Treatment  Follow-up  Status: Hold For -  Scheduling  Requested for: [id number]   Ordered; For: Chronic ulcer of ankle, right, with fat layer exposed, Diabetes mellitus with ulcer of ankle; Ordered By: Sharon Tanner Performed:  Due: 64KUN1330   · Alginate with other dressing instructions given; Status:Complete;   Done: 16TQQ9687   Ordered; For:Chronic ulcer of ankle, right, with fat layer exposed, Diabetes mellitus with ulcer of ankle; Ordered By:Sandoval Willoughby;   · Collagen instructions given; Status:Complete;   Done: 65ZUP1927   Ordered; For:Chronic ulcer of ankle, right, with fat layer exposed, Diabetes mellitus with ulcer of ankle; Ordered By:Sandoval Willoughby;   · Keep your leg elevated whenever you can to decrease swelling and increase circulation ;  Status:Complete;   Done: 44NXQ7990   Ordered; For:Chronic ulcer of ankle, right, with fat layer exposed, Diabetes mellitus with ulcer of ankle; Ordered By:Sandoval Willoughby;   · Partial weight bearing ; Status:Complete;   Done: 87BTU1683   Ordered;   For:Chronic ulcer of ankle, right, with fat layer exposed, Diabetes mellitus with ulcer of ankle; Ordered By:Sandoval Willoughby;    Wound Care Orders/Instructions    Wound Identification and Instructions   Wound #1: R medial Ankle    Dressing Type: Silver Alginate (Maxsorb AG, Silvercell, Aquacel Silver)  Wash with mild soap and water, normal saline, wound cleanser  Apply 4% Topical Lidocaine anesthetic solution PRN to wound/ulcer prior to debridement for pain control  Apply specified dressing to wound base/bed  Dressing change frequency: Twice per week  Unna boot -- Change every 3 days and as needed for increased drainage, discomfort or excessive swelling of toes      Wound Goals  Wound Goals:   Healing Goals:   Fair healing potential secondary to moderate comorbid conditions  Wound base will be 25%    Patient will achieve full wound closure and edema management for wound prevention       Discussion/Summary    Biopsy results reviewed with patient  Appears venous in nature  Reviewed arterial doppler exam from 9/9/216, [facility]CHELL WNL for Unna boot  Explained reason for the soft cast  He will return on Friday for dressing change and see me in 1 week  Possible side effects of new medications were reviewed with the patient/guardian today  The treatment plan was reviewed with the patient/guardian  The patient/guardian understands and agrees with the treatment plan      Chief Complaint  Follow up visit to [facility] for R Medial Ankle  History of Present Illness    Wound Identification HPI   Wound #1: R Medial ankle          The patient came to Wound Care and was ambulatory  The patient is being seen for a follow-up with MD at the [address] [place] [address]  The patient's identification was verified  A secondary verification process was completed  Orientation: oriented to person, oriented to place and oriented to time  Blood Glucose:  132 mg/dL as reported by patient  10/25/16 Pt arrived ambulatory for R Medial Ankle   Pt also has a wound on Left pre tib, measures 1 5 cm x 1 cm x 0 1 cm  and states he aquired it from scratching, but does not want it treated  Pt states he had a pocket of fluid/blood blister removed 9/22/16 from R Medial Ankle and the wound has not healed since the removal Pt has visiting nurses from [facility] that currently visit him for dressing changes  The visiting nurses apply santyl to the wound  Pt was prescribed Cipro on 10/21/16 and started his prescription on Saturday 10/22/16  Pt stated that Cipro gave him a bloody nose, upset stomach, heart palpitations, pain knees and diarrhea and has stopped taking it  Pt has bilateral pedal pulses palpable  Pt states his last doppler test was two months ago  Pt tires when walking long distances and uses a walker  Pt did not bring his walker for [facility] visit  11/1/16 Pt arrived ambulatory wearing sneakers bilaterally  Dressing intact to R medial ankle  Pt states that his wife changes his dressing when the visiting nurse doesn't come and the dressing was changed yesterday  History of Falling: Yes = 25   Secondary Diagnosis: Yes = 15   Ambulatory Aid None, Bedrest, Nurse Assist = 0   No = 0   Gait: Weak = 10 -- Short steps (may shuffle), stooped but able to lift head while walking, may seek support from furniture while walking, but with light touch (for reassurance)  Mental Status: Oriented to own ability = 0   Total Score:    25 - 45 = Moderate Risk          Erasto Scale:   Sensory Perception: Slightly limited = 3   Moisture: Rarely moist = 4   Activity: Walks occasionally = 3   Mobility: Slightly limited = 3   Nutrition: Adequate = 3   Friction and Shear: No apparent problem = 3   Total Erasto Score: 19       The most recent fall occurred 3/16 and Tripped on steps in back of home, no injury  Denies falls since last visit 11/1/16  Number of falls in the last year were 1  The fall resulted from tripping  The fall was preceded by no known event   The fall resulted in reports none  Nutrition Assessment Screening: Food intake over the last 3 months due to the loss of appetite, digestive problems, chewing or swallowing difficulties is graded as: 2 = no decrease in food intake   Weight loss during the last 3 months: 3 = no weight loss   Mobility scored as: 2 = goes out  Psychological Stress and Acute Disease Scored as: 2 = The patient has not experienced psychological stress or acute disease in the last 3 months  Neuropsychological problems scored as: 2 = no psychological problems  Body Mass Index (BMI) scored as: 3 = BMI 23 or greater  Nutritional Assessment Screening Score: 12 - 14 points - Normal nutritional status  Provider Wound Care HPI: Right medial malleolus wound  Biopsy last week  Pain Assessment   the patient states they have pain  (on a scale of 0 to 10, the patient rates the pain at 2 )   Abuse And Domestic Violence Screen   Domestic violence screen not done today  Reason DV Screen not done: Spouse in room    Depression And Suicide Screen  Suicide screen not done today  Reason suicide screen not done: Spouse in room  Prefered Language is  [place]  Primary Language is  English  Readiness To Learn: Receptive  Barriers To Learning: none  Preferred Learning: verbal   Education Completed: further treatment/follow-up and treatment/procedure   Teaching Method: verbal   Person Taught: patient and family member    Evaluation Of Learning: verbalized/demonstrated understanding      Review of Systems    Constitutional: no fever or chills, feels well, no tiredness, no recent weight loss or weight gain  Active Problems    1  Abnormality of gait (781 2) (R26 9)   2  Chronic kidney disease (585 9) (N18 9)   3  Chronic kidney disease, stage 3 (585 3) (N18 3)   4  Chronic ulcer of ankle, right, with fat layer exposed (707 13) (L97 312)   5  Coronary artery disease (414 00) (I25 10)   6  Diabetes mellitus (250 00) (E11 9)   7   Diabetes mellitus type 2 with neurological manifestations (250 60) (E11 49)   8  Diabetes mellitus with ulcer of ankle (250 80,707 13) (E11 622,L97 309)   9  History of cellulitis (V13 3) (Z87 2)   10  Hypercholesteremia (272 0) (E78 00)   11  Hypertension (401 9) (I10)   12  Nephrolithiasis (592 0) (N20 0)   13  Sleep apnea (780 57) (G47 30)   14  Status post incision and drainage (V45 89) (M29 895)    Past Medical History    1  History of Bell's palsy (V12 49) (Z86 69)   2  History of chest pain (V13 89) (Z87 898)   3  History of deep venous thrombosis (V12 51) (Z86 718)   4  History of stroke (V12 54) (Z86 73)    Surgical History    1  History of Cath Stent Placement Number Of Stents Placed:   2  History of Complete Colonoscopy    Family History    1  Family history of CABG   2  Family history of cerebrovascular accident (V17 1) (Z82 3)   3  Family history of coronary artery disease (V17 3) (Z82 49)   4  Family history of diabetes mellitus (V18 0) (Z83 3)   5  Family history of hypercholesterolemia (V18 19) (Z83 42)   6  Family history of hypertension (V17 49) (Z82 49)    7  Family history of hypertension (V17 49) (Z82 49)    8  Family history of No Significant Family History    Social History    · Former smoker (S23 62) (R74 326)   ·    · No alcohol use   · No drug use    Current Meds   1  Aspirin 81 MG TABS; Take 1 tablet daily Recorded   2  Fenofibrate 145 MG Oral Tablet; Take 1 tablet daily Recorded   3  HumuLIN R U-500 (CONCENTRATED) 500 UNIT/ML Subcutaneous Solution; Therapy: (Recorded:25Oct2016) to Recorded   4  Lidocaine HCl - 4 % External Solution; Apply to wound/s prior to debridement at the   wound management for pain; Therapy: (Recorded:25Oct2016) to Recorded   5  Lidocaine HCl - 4 % External Solution; Therapy: (Recorded:25Oct2016) to Recorded   6  Metoprolol Tartrate 50 MG Oral Tablet; Take 1 tablet twice daily Recorded   7  Oxycodone-Acetaminophen 7 5-325 MG Oral Tablet;    Therapy: (Milly Pope) to Recorded   8  Pantoprazole Sodium 40 MG Oral Tablet Delayed Release; Take 1 tablet daily Recorded   9  Santyl 250 UNIT/GM External Ointment; APPLY TO AFFECTED AREA(S) ONCE DAILY AS   DIRECTED; Therapy: 29NSE0417 to (Valdez Sandoval)  Requested for: 15TRO6958; Last   Rx:06Oct2016 Ordered   10  Vytorin 10-40 MG Oral Tablet; Take 1 tablet daily Recorded   11  Warfarin Sodium TABS; Take as directed Recorded    Allergies    1  Ciprofloxacin HCl TABS   2  Morphine Derivatives   3  Penicillins    Vitals  Vital Signs    Recorded: 33HPR0321 99:70LB   Systolic 098   Diastolic 72   Heart Rate 84   Respiration 18   Temperature 96 4 F   Pain Scale 2   Height 5 ft 7 in   Weight 255 lb    BMI Calculated 39 94   BSA Calculated 2 24     Physical Exam    Wound #1 Assessment wound #1 Location:, R medial Ankle, Care for this wound started on 10/25/16  Wound Status: not healed  Length: 3 5cm x Width: 2 6cm x Depth: 0 3cm   Total: 9 1sq cm   Wound Volume: 2 73cm3           Tissue type: Granulation and Slough   Color of Wound: Red -, Yellow - 75% and Pink - 25%   Exudate Amount: Moderate   Exudate Type: Serosangiunous   Odor: None   Exudate Color: Yellow   Wound Edges: Intact, Rolled (epibolized), Epidermal Migration and epitheilium   Periwound Skin Condition: Intact            Physician/Provider Wound #1 Exam   I agree with the nursing assessment and documentation  Results/Data  I personally reviewed the films/images/results in the office today  My interpretation follows  Laboratory Results Skin biopsy reviewed  No sign of malignancy or vasculitis  Likely venous in nature  Rico Benjamin 1:    Wound Nursing Care Plan   Problem: open wound L medial ankle   Impaired Tissue Integrity related to: open wound   Risk for Infection related to open wound:   Pain related to disease process and/or wound treatment:   Imbalanced Nutrition, less than body requirements related to inadequate intake and/or disease process:   Impaired Mobility related to: tired when walking and needs walker   Risk for Unstable Blood Glucose related to increased metabolic demand required for wound healing and/or immune response to wound infection:   Risk for Fall related to criteria noted on Fall Risk Assessment:   Disturbed Sensory Perception related to neuropathy:   Goals   Patient will achieve 100% epithelialization:  Patient will maintain skin integrity:  Patient will verbalize signs and symptoms of infection and when to notify physician or [facility]:    Patient will verbalize knowledge of and demonstrate adherence to interventions to achieve optimal nutrition required for wound healing:  Patient will not experience a fall:  Wound Nursing Care Interventions:   Provide moist wound healing:  Evaluate current medication regime for medications which may slow/impede wound healing:  Assess pain and patients pain tolerance level:  Evaluate effectiveness of pain management interventions:  Assess patient's nutrition on each wound care visit (including protein and fluid intake): Shana Osorio Educate on diet and hydration required to enhance wound healing:  Assess mobility and how it may affect wound healing:  Assess patient's knowledge of diabetes management:  Complete Fall Risk Assessment upon admission to wound program and with change in condition/implement identified interventions:  Teach patient on edema reducing measures:  Assess for patient compliance to established plan of care:  care plan initiated 10/25/16      Procedure      Wound #1: R medial Ankle     Nurse Dressing Change:   Wound #1 The wound located on the R medial ankle  Applied 4% topical Lidocaine solution to wound/ulcer prior to debridement for pain control  Wound care rendered as per Physician/Advanced Practitioner order/plan  Order sent to Home Care      Return to [facility] for RN and 1 week for Dr Frank  Comments:, Instructed in [place] precautions  Edema management,ambulation and reduction of sodium in the diet  Return friday to [facility] for removal of [place] and reapplication  ,    Procedure: Unna Boot Application (Two or Three Layers):   Prior to the procedure, the patient was identified using two identifiers, the general consent was signed, the proper site of procedure was identified, and a time out was taken  Unna boot application for the compression of right leg was performed  The wound was cleansed and dressed with silver alginate  With the foot in a dorsiflexed position, a zinc oxide paste bandage was applied from the base of the toes to the patellar notch  A second layer of econopaste was applied in a similar fashion  A final layer of coban was applied  The patient was instructed to notify the [facility] for strike-through drainage, pain, edema or change in color of toes not relieved with routine elevation  Tubifast blue   CPT Code(s) 15216 RT [place] Appl  Excisional Debridement Subcutaneous Tissue:   Wound was excisionally debrided to subcutaneous tissue as follows  Prior to the procedure, the patient was identified using two identifiers, the general consent was signed, the proper site of procedure was identified, and a time out was taken  Anesthesia: Local anesthesia with 4% topical lidocaine was utilized prior to the procedure for pain control  A curette was utilized to surgically excise devitalized tissue and/or slough through epidermis, dermis and into the subcutaneous tissue  The total sq cm excised was 9 0sq cm  There was moderate bleeding controlled with gentle pressure  the patient tolerated the procedure well without complication  CPT Code(s)   [id number] - Excisional DebridementTo Subcutaneous Tissue; first 20 sq cm        Future Appointments    Date/Time Provider Specialty Site   11/08/2016 09:00 AM Yessi Grissom, [address] [place] [address]   11/04/2016 01:00 PM Wound Care [place], Nurse Schedule  [facility]     Signatures   Electronically signed by : Saroj Chen DPM; Nov  3 2016  4:17PM EST                       (Author)

## 2018-01-29 ENCOUNTER — APPOINTMENT (EMERGENCY)
Dept: RADIOLOGY | Facility: HOSPITAL | Age: 63
DRG: 603 | End: 2018-01-29
Payer: COMMERCIAL

## 2018-01-29 ENCOUNTER — HOSPITAL ENCOUNTER (INPATIENT)
Facility: HOSPITAL | Age: 63
LOS: 4 days | Discharge: HOME | DRG: 603 | End: 2018-02-02
Attending: EMERGENCY MEDICINE | Admitting: FAMILY MEDICINE
Payer: COMMERCIAL

## 2018-01-29 DIAGNOSIS — I87.2 VENOUS STASIS ULCER OF LEFT CALF LIMITED TO BREAKDOWN OF SKIN WITHOUT VARICOSE VEINS (HCC): ICD-10-CM

## 2018-01-29 DIAGNOSIS — E11.65 TYPE 2 DIABETES MELLITUS WITH HYPERGLYCEMIA, WITH LONG-TERM CURRENT USE OF INSULIN (HCC): ICD-10-CM

## 2018-01-29 DIAGNOSIS — Z79.4 TYPE 2 DIABETES MELLITUS WITH HYPERGLYCEMIA, WITH LONG-TERM CURRENT USE OF INSULIN (HCC): ICD-10-CM

## 2018-01-29 DIAGNOSIS — T38.3X5A HYPOGLYCEMIA DUE TO INSULIN: ICD-10-CM

## 2018-01-29 DIAGNOSIS — L03.90 CELLULITIS: Primary | ICD-10-CM

## 2018-01-29 DIAGNOSIS — L03.116 CELLULITIS OF LEFT LOWER EXTREMITY: ICD-10-CM

## 2018-01-29 DIAGNOSIS — L97.221 VENOUS STASIS ULCER OF LEFT CALF LIMITED TO BREAKDOWN OF SKIN WITHOUT VARICOSE VEINS (HCC): ICD-10-CM

## 2018-01-29 DIAGNOSIS — E16.0 HYPOGLYCEMIA DUE TO INSULIN: ICD-10-CM

## 2018-01-29 LAB
ALBUMIN SERPL BCP-MCNC: 2.8 G/DL (ref 3.5–5)
ALP SERPL-CCNC: 48 U/L (ref 46–116)
ALT SERPL W P-5'-P-CCNC: 22 U/L (ref 12–78)
ANION GAP SERPL CALCULATED.3IONS-SCNC: 11 MMOL/L (ref 4–13)
APTT PPP: 77 SECONDS (ref 23–35)
AST SERPL W P-5'-P-CCNC: 16 U/L (ref 5–45)
BACTERIA UR QL AUTO: ABNORMAL /HPF
BASOPHILS # BLD AUTO: 0.02 THOUSANDS/ΜL (ref 0–0.1)
BASOPHILS NFR BLD AUTO: 0 % (ref 0–1)
BILIRUB SERPL-MCNC: 0.3 MG/DL (ref 0.2–1)
BILIRUB UR QL STRIP: ABNORMAL
BUN SERPL-MCNC: 21 MG/DL (ref 5–25)
CALCIUM SERPL-MCNC: 8.7 MG/DL (ref 8.3–10.1)
CHLORIDE SERPL-SCNC: 101 MMOL/L (ref 100–108)
CLARITY UR: CLEAR
CO2 SERPL-SCNC: 27 MMOL/L (ref 21–32)
COLOR UR: YELLOW
CREAT SERPL-MCNC: 1.83 MG/DL (ref 0.6–1.3)
EOSINOPHIL # BLD AUTO: 0.07 THOUSAND/ΜL (ref 0–0.61)
EOSINOPHIL NFR BLD AUTO: 1 % (ref 0–6)
ERYTHROCYTE [DISTWIDTH] IN BLOOD BY AUTOMATED COUNT: 17.3 % (ref 11.6–15.1)
GFR SERPL CREATININE-BSD FRML MDRD: 38 ML/MIN/1.73SQ M
GLUCOSE SERPL-MCNC: 214 MG/DL (ref 65–140)
GLUCOSE SERPL-MCNC: 390 MG/DL (ref 65–140)
GLUCOSE UR STRIP-MCNC: ABNORMAL MG/DL
HCT VFR BLD AUTO: 39.2 % (ref 36.5–49.3)
HGB BLD-MCNC: 12.1 G/DL (ref 12–17)
HGB UR QL STRIP.AUTO: ABNORMAL
INR PPP: 2.59 (ref 0.86–1.16)
KETONES UR STRIP-MCNC: NEGATIVE MG/DL
LACTATE SERPL-SCNC: 1.1 MMOL/L (ref 0.5–2)
LACTATE SERPL-SCNC: 1.6 MMOL/L (ref 0.5–2)
LEUKOCYTE ESTERASE UR QL STRIP: NEGATIVE
LYMPHOCYTES # BLD AUTO: 1.16 THOUSANDS/ΜL (ref 0.6–4.47)
LYMPHOCYTES NFR BLD AUTO: 15 % (ref 14–44)
MCH RBC QN AUTO: 25.5 PG (ref 26.8–34.3)
MCHC RBC AUTO-ENTMCNC: 30.9 G/DL (ref 31.4–37.4)
MCV RBC AUTO: 83 FL (ref 82–98)
MONOCYTES # BLD AUTO: 0.59 THOUSAND/ΜL (ref 0.17–1.22)
MONOCYTES NFR BLD AUTO: 8 % (ref 4–12)
NEUTROPHILS # BLD AUTO: 5.74 THOUSANDS/ΜL (ref 1.85–7.62)
NEUTS SEG NFR BLD AUTO: 76 % (ref 43–75)
NITRITE UR QL STRIP: NEGATIVE
NON-SQ EPI CELLS URNS QL MICRO: ABNORMAL /HPF
PH UR STRIP.AUTO: 5 [PH] (ref 4.5–8)
PLATELET # BLD AUTO: 240 THOUSANDS/UL (ref 149–390)
PMV BLD AUTO: 8.9 FL (ref 8.9–12.7)
POTASSIUM SERPL-SCNC: 4.6 MMOL/L (ref 3.5–5.3)
PROT SERPL-MCNC: 7.7 G/DL (ref 6.4–8.2)
PROT UR STRIP-MCNC: >=300 MG/DL
PROTHROMBIN TIME: 28.7 SECONDS (ref 12.1–14.4)
RBC # BLD AUTO: 4.75 MILLION/UL (ref 3.88–5.62)
RBC #/AREA URNS AUTO: ABNORMAL /HPF
SODIUM SERPL-SCNC: 139 MMOL/L (ref 136–145)
SP GR UR STRIP.AUTO: >=1.03 (ref 1–1.03)
UROBILINOGEN UR QL STRIP.AUTO: 0.2 E.U./DL
WBC # BLD AUTO: 7.58 THOUSAND/UL (ref 4.31–10.16)
WBC #/AREA URNS AUTO: ABNORMAL /HPF

## 2018-01-29 PROCEDURE — 80053 COMPREHEN METABOLIC PANEL: CPT | Performed by: EMERGENCY MEDICINE

## 2018-01-29 PROCEDURE — 82948 REAGENT STRIP/BLOOD GLUCOSE: CPT

## 2018-01-29 PROCEDURE — 99223 1ST HOSP IP/OBS HIGH 75: CPT | Performed by: PHYSICIAN ASSISTANT

## 2018-01-29 PROCEDURE — 93005 ELECTROCARDIOGRAM TRACING: CPT

## 2018-01-29 PROCEDURE — 81001 URINALYSIS AUTO W/SCOPE: CPT | Performed by: EMERGENCY MEDICINE

## 2018-01-29 PROCEDURE — 87040 BLOOD CULTURE FOR BACTERIA: CPT | Performed by: EMERGENCY MEDICINE

## 2018-01-29 PROCEDURE — 83605 ASSAY OF LACTIC ACID: CPT | Performed by: EMERGENCY MEDICINE

## 2018-01-29 PROCEDURE — 85730 THROMBOPLASTIN TIME PARTIAL: CPT | Performed by: EMERGENCY MEDICINE

## 2018-01-29 PROCEDURE — 73610 X-RAY EXAM OF ANKLE: CPT

## 2018-01-29 PROCEDURE — 36415 COLL VENOUS BLD VENIPUNCTURE: CPT

## 2018-01-29 PROCEDURE — 85610 PROTHROMBIN TIME: CPT | Performed by: EMERGENCY MEDICINE

## 2018-01-29 PROCEDURE — 73590 X-RAY EXAM OF LOWER LEG: CPT

## 2018-01-29 PROCEDURE — 85025 COMPLETE CBC W/AUTO DIFF WBC: CPT | Performed by: EMERGENCY MEDICINE

## 2018-01-29 RX ORDER — EZETIMIBE AND SIMVASTATIN 10; 40 MG/1; MG/1
1 TABLET ORAL EVERY EVENING
COMMUNITY
Start: 2017-09-11 | End: 2020-12-01 | Stop reason: HOSPADM

## 2018-01-29 RX ORDER — SODIUM CHLORIDE 9 MG/ML
75 INJECTION, SOLUTION INTRAVENOUS CONTINUOUS
Status: DISCONTINUED | OUTPATIENT
Start: 2018-01-29 | End: 2018-01-31

## 2018-01-29 RX ORDER — PRAVASTATIN SODIUM 80 MG/1
80 TABLET ORAL
Status: DISCONTINUED | OUTPATIENT
Start: 2018-01-30 | End: 2018-01-29

## 2018-01-29 RX ORDER — OXYCODONE HYDROCHLORIDE 10 MG/1
10 TABLET ORAL EVERY 4 HOURS PRN
Status: DISCONTINUED | OUTPATIENT
Start: 2018-01-29 | End: 2018-02-02 | Stop reason: HOSPADM

## 2018-01-29 RX ORDER — FENOFIBRATE 145 MG/1
145 TABLET, COATED ORAL DAILY
Status: DISCONTINUED | OUTPATIENT
Start: 2018-01-30 | End: 2018-02-02 | Stop reason: HOSPADM

## 2018-01-29 RX ORDER — DIPHENHYDRAMINE HYDROCHLORIDE 50 MG/ML
25 INJECTION INTRAMUSCULAR; INTRAVENOUS EVERY 6 HOURS PRN
Status: DISCONTINUED | OUTPATIENT
Start: 2018-01-29 | End: 2018-02-02 | Stop reason: HOSPADM

## 2018-01-29 RX ORDER — ACETAMINOPHEN 325 MG/1
650 TABLET ORAL EVERY 6 HOURS PRN
Status: DISCONTINUED | OUTPATIENT
Start: 2018-01-29 | End: 2018-02-02 | Stop reason: HOSPADM

## 2018-01-29 RX ORDER — SENNOSIDES 8.6 MG
1 TABLET ORAL DAILY
Status: DISCONTINUED | OUTPATIENT
Start: 2018-01-30 | End: 2018-02-02 | Stop reason: HOSPADM

## 2018-01-29 RX ORDER — PANTOPRAZOLE SODIUM 40 MG/1
40 TABLET, DELAYED RELEASE ORAL DAILY
Status: DISCONTINUED | OUTPATIENT
Start: 2018-01-30 | End: 2018-02-02 | Stop reason: HOSPADM

## 2018-01-29 RX ORDER — CALCIUM CARBONATE 200(500)MG
1000 TABLET,CHEWABLE ORAL DAILY PRN
Status: DISCONTINUED | OUTPATIENT
Start: 2018-01-29 | End: 2018-02-02 | Stop reason: HOSPADM

## 2018-01-29 RX ORDER — GABAPENTIN 100 MG/1
100 CAPSULE ORAL
Status: DISCONTINUED | OUTPATIENT
Start: 2018-01-29 | End: 2018-02-02

## 2018-01-29 RX ORDER — ONDANSETRON 2 MG/ML
4 INJECTION INTRAMUSCULAR; INTRAVENOUS EVERY 6 HOURS PRN
Status: DISCONTINUED | OUTPATIENT
Start: 2018-01-29 | End: 2018-02-02 | Stop reason: HOSPADM

## 2018-01-29 RX ORDER — WARFARIN SODIUM 2.5 MG/1
2.5 TABLET ORAL
Status: DISCONTINUED | OUTPATIENT
Start: 2018-01-29 | End: 2018-02-02 | Stop reason: HOSPADM

## 2018-01-29 RX ORDER — OXYCODONE HYDROCHLORIDE AND ACETAMINOPHEN 5; 325 MG/1; MG/1
1 TABLET ORAL EVERY 4 HOURS PRN
Status: DISCONTINUED | OUTPATIENT
Start: 2018-01-29 | End: 2018-02-02 | Stop reason: HOSPADM

## 2018-01-29 RX ORDER — ASPIRIN 81 MG/1
81 TABLET, CHEWABLE ORAL DAILY
Status: DISCONTINUED | OUTPATIENT
Start: 2018-01-30 | End: 2018-02-02 | Stop reason: HOSPADM

## 2018-01-29 RX ORDER — METOPROLOL TARTRATE 50 MG/1
50 TABLET, FILM COATED ORAL 2 TIMES DAILY
Status: DISCONTINUED | OUTPATIENT
Start: 2018-01-29 | End: 2018-02-02 | Stop reason: HOSPADM

## 2018-01-29 RX ADMIN — INSULIN LISPRO 10 UNITS: 100 INJECTION, SOLUTION INTRAVENOUS; SUBCUTANEOUS at 22:35

## 2018-01-29 RX ADMIN — EZETIMIBE: 10 TABLET ORAL at 22:45

## 2018-01-29 RX ADMIN — SODIUM CHLORIDE 75 ML/HR: 0.9 INJECTION, SOLUTION INTRAVENOUS at 21:36

## 2018-01-29 RX ADMIN — SODIUM CHLORIDE 1000 ML: 0.9 INJECTION, SOLUTION INTRAVENOUS at 20:46

## 2018-01-29 RX ADMIN — GABAPENTIN 100 MG: 100 CAPSULE ORAL at 22:28

## 2018-01-29 RX ADMIN — VANCOMYCIN HYDROCHLORIDE 2000 MG: 1 INJECTION, POWDER, LYOPHILIZED, FOR SOLUTION INTRAVENOUS at 21:33

## 2018-01-29 RX ADMIN — WARFARIN SODIUM 2.5 MG: 2.5 TABLET ORAL at 22:28

## 2018-01-29 RX ADMIN — OXYCODONE HYDROCHLORIDE 10 MG: 10 TABLET ORAL at 22:28

## 2018-01-29 RX ADMIN — CEFEPIME HYDROCHLORIDE 2000 MG: 2 INJECTION, POWDER, FOR SOLUTION INTRAVENOUS at 20:50

## 2018-01-29 RX ADMIN — METOPROLOL TARTRATE 50 MG: 50 TABLET ORAL at 22:27

## 2018-01-30 PROBLEM — I87.2 VENOUS STASIS ULCER OF LEFT CALF LIMITED TO BREAKDOWN OF SKIN WITHOUT VARICOSE VEINS (HCC): Status: ACTIVE | Noted: 2018-01-30

## 2018-01-30 PROBLEM — I87.8 VENOUS STASIS OF LOWER EXTREMITY: Status: ACTIVE | Noted: 2018-01-30

## 2018-01-30 PROBLEM — L97.221 VENOUS STASIS ULCER OF LEFT CALF LIMITED TO BREAKDOWN OF SKIN WITHOUT VARICOSE VEINS (HCC): Status: ACTIVE | Noted: 2018-01-30

## 2018-01-30 PROBLEM — I82.532 CHRONIC DEEP VEIN THROMBOSIS (DVT) OF POPLITEAL VEIN OF LEFT LOWER EXTREMITY (HCC): Chronic | Status: ACTIVE | Noted: 2018-01-30

## 2018-01-30 LAB
ANION GAP SERPL CALCULATED.3IONS-SCNC: 8 MMOL/L (ref 4–13)
ATRIAL RATE: 75 BPM
BUN SERPL-MCNC: 26 MG/DL (ref 5–25)
CALCIUM SERPL-MCNC: 8.1 MG/DL (ref 8.3–10.1)
CHLORIDE SERPL-SCNC: 104 MMOL/L (ref 100–108)
CO2 SERPL-SCNC: 25 MMOL/L (ref 21–32)
CREAT SERPL-MCNC: 1.82 MG/DL (ref 0.6–1.3)
ERYTHROCYTE [DISTWIDTH] IN BLOOD BY AUTOMATED COUNT: 17.2 % (ref 11.6–15.1)
GFR SERPL CREATININE-BSD FRML MDRD: 39 ML/MIN/1.73SQ M
GLUCOSE SERPL-MCNC: 204 MG/DL (ref 65–140)
GLUCOSE SERPL-MCNC: 240 MG/DL (ref 65–140)
GLUCOSE SERPL-MCNC: 279 MG/DL (ref 65–140)
GLUCOSE SERPL-MCNC: 325 MG/DL (ref 65–140)
GLUCOSE SERPL-MCNC: 334 MG/DL (ref 65–140)
GLUCOSE SERPL-MCNC: 499 MG/DL (ref 65–140)
HCT VFR BLD AUTO: 36.1 % (ref 36.5–49.3)
HGB BLD-MCNC: 10.8 G/DL (ref 12–17)
MCH RBC QN AUTO: 25 PG (ref 26.8–34.3)
MCHC RBC AUTO-ENTMCNC: 29.9 G/DL (ref 31.4–37.4)
MCV RBC AUTO: 84 FL (ref 82–98)
P AXIS: 42 DEGREES
PLATELET # BLD AUTO: 241 THOUSANDS/UL (ref 149–390)
PMV BLD AUTO: 9.3 FL (ref 8.9–12.7)
POTASSIUM SERPL-SCNC: 4.8 MMOL/L (ref 3.5–5.3)
PR INTERVAL: 150 MS
QRS AXIS: 2 DEGREES
QRSD INTERVAL: 84 MS
QT INTERVAL: 362 MS
QTC INTERVAL: 404 MS
RBC # BLD AUTO: 4.32 MILLION/UL (ref 3.88–5.62)
SODIUM SERPL-SCNC: 137 MMOL/L (ref 136–145)
T WAVE AXIS: 81 DEGREES
VENTRICULAR RATE: 75 BPM
WBC # BLD AUTO: 5.74 THOUSAND/UL (ref 4.31–10.16)

## 2018-01-30 PROCEDURE — 99285 EMERGENCY DEPT VISIT HI MDM: CPT

## 2018-01-30 PROCEDURE — 85027 COMPLETE CBC AUTOMATED: CPT | Performed by: PHYSICIAN ASSISTANT

## 2018-01-30 PROCEDURE — 80048 BASIC METABOLIC PNL TOTAL CA: CPT | Performed by: PHYSICIAN ASSISTANT

## 2018-01-30 PROCEDURE — 36415 COLL VENOUS BLD VENIPUNCTURE: CPT | Performed by: PHYSICIAN ASSISTANT

## 2018-01-30 PROCEDURE — 82948 REAGENT STRIP/BLOOD GLUCOSE: CPT

## 2018-01-30 PROCEDURE — 99222 1ST HOSP IP/OBS MODERATE 55: CPT | Performed by: INTERNAL MEDICINE

## 2018-01-30 PROCEDURE — 99232 SBSQ HOSP IP/OBS MODERATE 35: CPT | Performed by: INTERNAL MEDICINE

## 2018-01-30 RX ADMIN — INSULIN HUMAN 75 UNITS: 500 INJECTION, SOLUTION SUBCUTANEOUS at 08:20

## 2018-01-30 RX ADMIN — GABAPENTIN 100 MG: 100 CAPSULE ORAL at 21:25

## 2018-01-30 RX ADMIN — PANTOPRAZOLE SODIUM 40 MG: 40 TABLET, DELAYED RELEASE ORAL at 08:20

## 2018-01-30 RX ADMIN — CEFAZOLIN SODIUM 2000 MG: 2 SOLUTION INTRAVENOUS at 21:25

## 2018-01-30 RX ADMIN — CEFAZOLIN SODIUM 2000 MG: 2 SOLUTION INTRAVENOUS at 13:45

## 2018-01-30 RX ADMIN — INSULIN LISPRO 4 UNITS: 100 INJECTION, SOLUTION INTRAVENOUS; SUBCUTANEOUS at 21:30

## 2018-01-30 RX ADMIN — CEFAZOLIN SODIUM 2000 MG: 2 SOLUTION INTRAVENOUS at 01:36

## 2018-01-30 RX ADMIN — INSULIN LISPRO 6 UNITS: 100 INJECTION, SOLUTION INTRAVENOUS; SUBCUTANEOUS at 08:15

## 2018-01-30 RX ADMIN — FENOFIBRATE 145 MG: 145 TABLET, FILM COATED ORAL at 08:20

## 2018-01-30 RX ADMIN — EZETIMIBE: 10 TABLET ORAL at 21:25

## 2018-01-30 RX ADMIN — METOPROLOL TARTRATE 50 MG: 50 TABLET ORAL at 18:11

## 2018-01-30 RX ADMIN — WARFARIN SODIUM 2.5 MG: 2.5 TABLET ORAL at 18:11

## 2018-01-30 RX ADMIN — INSULIN HUMAN 75 UNITS: 500 INJECTION, SOLUTION SUBCUTANEOUS at 11:45

## 2018-01-30 RX ADMIN — INSULIN HUMAN 75 UNITS: 500 INJECTION, SOLUTION SUBCUTANEOUS at 18:11

## 2018-01-30 RX ADMIN — METOPROLOL TARTRATE 50 MG: 50 TABLET ORAL at 08:20

## 2018-01-30 RX ADMIN — ASPIRIN 81 MG 81 MG: 81 TABLET ORAL at 08:15

## 2018-01-30 RX ADMIN — SENNOSIDES 8.6 MG: 8.6 TABLET, FILM COATED ORAL at 08:20

## 2018-01-30 RX ADMIN — SODIUM CHLORIDE 75 ML/HR: 0.9 INJECTION, SOLUTION INTRAVENOUS at 08:10

## 2018-01-30 RX ADMIN — INSULIN LISPRO 4 UNITS: 100 INJECTION, SOLUTION INTRAVENOUS; SUBCUTANEOUS at 17:05

## 2018-01-30 RX ADMIN — OXYCODONE HYDROCHLORIDE 10 MG: 10 TABLET ORAL at 21:25

## 2018-01-30 RX ADMIN — INSULIN LISPRO 8 UNITS: 100 INJECTION, SOLUTION INTRAVENOUS; SUBCUTANEOUS at 11:40

## 2018-01-30 NOTE — H&P
H&P- Nyla Mueller 1955, 60 y o  male MRN: [id number]    Unit/Bed#: ED 24 Encounter: [id number]    Primary Care Provider: Lucy Larson MD   Date and time admitted to hospital: 1/29/2018  6:43 PM    * Cellulitis of left lower extremity   Assessment & Plan    · LLE erythema, swelling, tenderness, weeping; no SIRS criteria met; no non-healing diabetic foot wounds; no hx of MRSA  · Admit  · IV Ancef   · IVF  · Pain control   · ID consult-- appreciate input        Type 2 diabetes mellitus with hyperglycemia (HCC)   Assessment & Plan    ·   Last A1c 9 0 in 2016  · Repeat hemoglobin A1c  · Continue home insulin schedule: humulin 500 75U QID  · SSI for correction  · Consider endocrinology consultation        CKD (chronic kidney disease) stage 3, GFR 30-59 ml/min   Assessment & Plan    · Cr previously 2 0, today 1 83  · Gentle IVF  · Repeat BMP in AM        Acute deep vein thrombosis (DVT) of popliteal vein of left lower extremity (HCC)   Assessment & Plan    · On coumadin 2 5mg QD        Coronary artery disease involving native heart   Assessment & Plan    · Continue aspirin, statin         Essential hypertension   Assessment & Plan    · Continue metoprolol         Hyperlipidemia   Assessment & Plan    · Continue fenofibrate and pravastatin           VTE Prophylaxis: Warfarin (Coumadin)  / sequential compression device   Code Status: Level 1-- Full Code  POLST: POLST form is not discussed and not completed at this time  Discussion with family: wife at bedside     Anticipated Length of Stay:  Patient will be admitted on an Inpatient basis with an anticipated length of stay of  > 2 midnights  Justification for Hospital Stay: IV abx    Total Time for Visit, including Counseling / Coordination of Care: 30 minutes  Greater than 50% of this total time spent on direct patient counseling and coordination of care      Chief Complaint:   Left leg pain    History of Present Illness:    Nyla Mueller is a 60 y o  male with multiple medical conditions including type 2 diabetes on insulin uncontrolled, CKD stage 3, hypertension, hyperlipidemia presents to the ED for evaluation of left leg pain x1 week  Patient reports he had 1st noticed that his leg was becoming more red and swollen about 1 week ago  Patient had a visiting nurse come and evaluate his leg who stated that the leg was not hot to touch or painful that he should just continue to keep an eye on it  Over the course of the week leg had progressed with swelling and erythema  Today, he started noticing shooting pains up his left leg which were read him from putting weight on his foot  Patient also noticed weeping from his legs  He states his legs are weeping clear fluid  Patient states his leg is hot and red and it extends from his ankle up to his knee  He states that he started to feel symptoms in his right leg to  Denies any fevers or chills  Denies any abdominal pain, nausea, vomiting  Patient does not have any nonhealing diabetic foot wounds  Patient does not have a personal history of MRSA  Review of Systems:    Review of Systems   Constitutional: Negative  HENT: Negative  Eyes: Negative  Respiratory: Negative  Cardiovascular: Positive for leg swelling  Gastrointestinal: Negative  Genitourinary: Negative  Musculoskeletal: Positive for gait problem  Skin: Positive for color change and wound  Hematological: Negative  Psychiatric/Behavioral: Negative          Past Medical and Surgical History:     Past Medical History:   Diagnosis Date    Bell's palsy     Cardiac disease     [facility]     Diabetes mellitus [facility]     Hyperlipidemia     Hypertension     Renal disorder     [facility] )     2013       Past Surgical History:   Procedure Laterality Date    CORONARY ANGIOPLASTY WITH STENT PLACEMENT      FRACTURE SURGERY      INCISION AND DRAINAGE OF WOUND Right 9/13/2016    Procedure: INCISION AND DRAINAGE (I&D) EXTREMITY, right lateral ankle;  Surgeon: Art Katz DPM;  [facility];  Service:     WOUND DEBRIDEMENT Right 9/15/2016    Procedure: DEBRIDEMENT WOUND [facility] OUT) ankle wound with closure and FRANCY drain placement;  Surgeon: Art Katz DPM;  [facility];  Service:        Meds/Allergies:    Prior to Admission medications    Medication Sig Start Date End Date Taking? Authorizing Provider   aspirin 81 mg chewable tablet Chew 81 mg daily  Historical Provider, MD   fenofibrate (TRICOR) 145 mg tablet Take 145 mg by mouth daily  Historical Provider, MD   insulin regular (HUMULIN R) 500 units/mL CONCENTRATED injection Inject 75 Units under the skin 4 (four) times a day Indications: Dose varies according to patients sliding scale  Frederic Grijalva Historical Provider, MD   metoprolol tartrate (LOPRESSOR) 50 mg tablet Take 50 mg by mouth 2 (two) times a day  Historical Provider, MD   pantoprazole (PROTONIX) 40 mg tablet Take 40 mg by mouth daily    Historical Provider, MD   pravastatin (PRAVACHOL) 80 mg tablet Take 1 tablet by mouth daily with dinner for 30 days  9/19/16 8/18/17  Lady Jayro MD   warfarin (COUMADIN) 2 5 mg tablet Take 2 5 mg by mouth daily  Historical Provider, MD     I have reviewed home medications with patient personally  Allergies:    Allergies   Allergen Reactions    Penicillins Hives    Morphine And Related Anxiety       Social History:     Marital Status: /Civil Union   Occupation: retired  Patient Pre-hospital Living Situation: home with wife  Patient Pre-hospital Level of Mobility: independent  Patient Pre-hospital Diet Restrictions: none  Substance Use History:   History   Alcohol Use No     History   Smoking Status    Former Smoker    Packs/day: 0 00    Types: Cigarettes    Quit date: 9/3/2006   Smokeless Tobacco    Never Used     History   Drug Use No       Family History:    non-contributory    Physical Exam:     Vitals:   Blood Pressure: 161/76 (01/29/18 1531)  Pulse: 77 (01/29/18 1531)  Temperature: 98 3 °F (36 8 °C) (01/29/18 1531)  Temp Source: Oral (01/29/18 1531)  Respirations: 18 (01/29/18 1531)  Height: 5' 7" (170 2 cm) (01/29/18 1531)  Weight - Scale: 126 kg (277 lb) (01/29/18 1531)  SpO2: 94 % (01/29/18 1531)    Physical Exam   Constitutional: He is oriented to person, place, and time  He appears distressed (appears uncomfortable)  obese   HENT:   Head: Normocephalic and atraumatic  Mouth/Throat: No oropharyngeal exudate  Eyes: Conjunctivae and EOM are normal  Pupils are equal, round, and reactive to light  No scleral icterus  Neck: No JVD present  Cardiovascular: Normal rate and regular rhythm  Exam reveals no gallop and no friction rub  No murmur heard  Pulmonary/Chest: Effort normal and breath sounds normal  No respiratory distress  He has no wheezes  He has no rales  Abdominal: Soft  Bowel sounds are normal  He exhibits no distension  There is no tenderness  There is no rebound and no guarding  Musculoskeletal: He exhibits edema (b/l LE, L>R) and tenderness (LLE)  Neurological: He is alert and oriented to person, place, and time  He displays normal reflexes  No cranial nerve deficit  He exhibits normal muscle tone  Skin: There is erythema (circumferential, starting at left ankle and extending proximally to left knee)  Superficial excoriations b/l shins  Foul smelling   Psychiatric: He has a normal mood and affect  His behavior is normal        Additional Data:     Lab Results: I have personally reviewed pertinent reports          Results from last 7 days  Lab Units 01/29/18  1554   WBC Thousand/uL 7 58   HEMOGLOBIN g/dL 12 1   HEMATOCRIT % 39 2   PLATELETS Thousands/uL 240   NEUTROS PCT % 76*   LYMPHS PCT % 15   MONOS PCT % 8   EOS PCT % 1       Results from last 7 days  Lab Units 01/29/18  1554   SODIUM mmol/L 139   POTASSIUM mmol/L 4 6   CHLORIDE mmol/L 101   CO2 mmol/L 27   BUN mg/dL 21   CREATININE mg/dL 1 83*   CALCIUM mg/dL 8 7   TOTAL PROTEIN g/dL 7 7   BILIRUBIN TOTAL mg/dL 0 30   ALK PHOS U/L 48   ALT U/L 22   AST U/L 16   GLUCOSE RANDOM mg/dL 214*       Results from last 7 days  Lab Units 01/29/18  1554   INR  2 59*       Imaging: I have personally reviewed pertinent reports  XR ankle 3+ views LEFT   Final Result by Jay Candelaria MD (01/29 2046)      No acute osseous abnormality  Workstation performed: [id number]         XR tibia fibula 2 views LEFT   Final Result by Jay Candelaria MD (01/29 2044)      No acute osseous abnormality  Workstation performed: [id number]             EKG, Pathology, and Other Studies Reviewed on Admission:   · EKG: NSR    Allscripts / Epic Records Reviewed: Yes     ** Please Note: This note has been constructed using a voice recognition system   **

## 2018-01-30 NOTE — ASSESSMENT & PLAN NOTE
·    Last A1c 9 0 in 2016  · Repeat hemoglobin A1c  · Continue home insulin schedule: humulin 500 75U QID  · SSI for correction  · Consider endocrinology consultation

## 2018-01-30 NOTE — ASSESSMENT & PLAN NOTE
· Previously creatinine in August of 2017 was 2 0; however before this, in 2016, patient's creatinine had been normal   · Presently, patient's creatinine had stabilized at 1 8 level  Possibly disease new baseline for the patient  · Gentle IVF  · Repeat BMP in AM  · Monitor input and output

## 2018-01-30 NOTE — CONSULTS
Consultation - Telma Camacho 60 y o  male MRN: [id number]    Unit/Bed#: -01 Encounter: [id number]      Assessment/Plan     Assessment: This is a 60y o -year-old male with diabetes with hyperglycemia  With CKD stage 3, on intensive insulin regimen  With uncontrolled blood sugars  Plan:  - continue Humulin U 500 regular insulin, increase dose to 80 units 3 times a day  -hold if patient is going to be NPO  -call endocrinology patient is anticipating surgery for any procedure, for adjustment in dose of insulin  -continue Humalog scale with algorithm 4 with meals and at bedtime  -hypoglycemia protocol is in place    Thank you for consulting Endocrinology, please do not hesitate to call if you have any questions    CC: Diabetes Consult    History of Present Illness     HPI: Telma Camacho is a 60y o  year old male with type 2 diabetes, uncontrolled with CKD stage 3 was admitted for left leg cellulitis was found to have elevated blood sugars, in 300-400 range  He has longstanding type 2 diabetes and takes U 500 regular insulin 75 units with breakfast, 75 units with lunch and 75 units at dinner, he denies missing doses at home  Admits not compliance to diet  He does not exercise, and not ambulatory at home  He has neuropathy, and complains of tingling and numbness in his extremities  He denies eye complications  He has not seen ophthalmologist because of insurance problem for past couple of years  He denies history of stroke, heart disease        Lab Results   Component Value Date    CREATININE 1 82 (H) 01/30/2018       Lab Results   Component Value Date    HGBA1C 9 9 (H) 08/30/2016       Inpatient consult to Endocrinology  Consult performed by: [facility], [facility]  ordered by: Donell Diamond          Review of Systems   Constitutional: Positive for activity change, fatigue and unexpected weight change (Gaining weight)  Negative for diaphoresis and fever  HENT: Negative      Eyes: Negative for visual disturbance  Respiratory: Negative for cough, chest tightness and shortness of breath  Cardiovascular: Positive for leg swelling  Negative for chest pain and palpitations  Gastrointestinal: Negative for abdominal pain, blood in stool, constipation, diarrhea, nausea and vomiting  Endocrine: Positive for polydipsia, polyphagia and polyuria  Negative for cold intolerance and heat intolerance  Genitourinary: Positive for frequency  Negative for dysuria, enuresis and urgency  Musculoskeletal: Positive for myalgias  Negative for arthralgias  Skin: Negative for pallor, rash and wound  Allergic/Immunologic: Negative  Neurological: Positive for weakness and numbness  Negative for dizziness and tremors  Hematological: Negative  Psychiatric/Behavioral: Negative  Historical Information   Past Medical History:   Diagnosis Date    Bell's palsy     Cardiac disease     [facility] ([facility] )     Diabetes mellitus ([facility] )     Hyperlipidemia     Hypertension     Renal disorder     Stroke ([facility] )     2013     Past Surgical History:   Procedure Laterality Date    CORONARY ANGIOPLASTY WITH STENT PLACEMENT      FRACTURE SURGERY      INCISION AND DRAINAGE OF WOUND Right 9/13/2016    Procedure: INCISION AND DRAINAGE (I&D) EXTREMITY, right lateral ankle;  Surgeon: Dannielle Martinez DPM;  [facility];  Service:     WOUND DEBRIDEMENT Right 9/15/2016    Procedure: DEBRIDEMENT WOUND ([address]) ankle wound with closure and FRANCY drain placement;  Surgeon: Dannielle Martinez DPM;  [facility];  Service:      Social History   History   Alcohol Use No     History   Drug Use No     History   Smoking Status    Former Smoker    Packs/day: 0 00    Types: Cigarettes    Quit date: 9/3/2006   Smokeless Tobacco    Never Used     Family History: History reviewed  No pertinent family history      Meds/Allergies   Current Facility-Administered Medications   Medication Dose Route Frequency Provider Last Rate Last Dose    acetaminophen (TYLENOL) tablet 650 mg  650 mg Oral Q6H PRN Maraim Spencer PA-C        aspirin chewable tablet 81 mg  81 mg Oral Daily Mariam Spencer PA-C   81 mg at 01/30/18 0815    calcium carbonate (TUMS) chewable tablet 1,000 mg  1,000 mg Oral Daily PRN Mariam Spencer PA-C        ceFAZolin (ANCEF) IVPB (premix) 2,000 mg  2,000 mg Intravenous Q8H Mariam Spencer PA-C   Stopped at 01/30/18 1430    diphenhydrAMINE (BENADRYL) injection 25 mg  25 mg Intravenous Q6H PRN Mariam Spencer PA-C        ezetimibe 10 mg-pravastatin 80 mg combo dose   Oral HS Mariam Spencer PA-C        fenofibrate (TRICOR) tablet 145 mg  145 mg Oral Daily Mariam Spencer PA-C   145 mg at 01/30/18 0820    gabapentin (NEURONTIN) capsule 100 mg  100 mg Oral  Mariam Spencer PA-C   100 mg at 01/29/18 2228    insulin lispro (HumaLOG) 100 units/mL subcutaneous injection 2-12 Units  2-12 Units Subcutaneous TID  Mariam Spencer PA-C   4 Units at 01/30/18 1705    insulin lispro (HumaLOG) 100 units/mL subcutaneous injection 2-12 Units  2-12 Units Subcutaneous  Mariam Spencer PA-C   10 Units at 01/29/18 2235    insulin regular (HumuLIN R U-500) 500 units/mL CONCENTRATED injection 75 Units  75 Units Subcutaneous TID  Mariam Spencer PA-C   75 Units at 01/30/18 1145    metoprolol tartrate (LOPRESSOR) tablet 50 mg  50 mg Oral BID Mariam Spencer PA-C   50 mg at 01/30/18 0820    ondansetron (ZOFRAN) injection 4 mg  4 mg Intravenous Q6H PRN Mariam Spencer PA-C        oxyCODONE (ROXICODONE) immediate release tablet 10 mg  10 mg Oral Q4H PRN Mariam Spencer PA-C   10 mg at 01/29/18 2228    oxyCODONE-acetaminophen (PERCOCET) 5-325 mg per tablet 1 tablet  1 tablet Oral Q4H PRN Mariam Spencer PA-C        pantoprazole (PROTONIX) EC tablet 40 mg  40 mg Oral Daily Mariam Spencer PA-C   40 mg at 01/30/18 0820    senna (SENOKOT) tablet 8 6 mg  1 tablet Oral Daily Mariam Spencer PA-C   8 6 mg at 01/30/18 0820    sodium chloride 0 9 % infusion  75 mL/hr Intravenous Continuous Mariam Spencer PA-C 75 mL/hr at 01/30/18 0810 75 mL/hr at 01/30/18 0810    warfarin (COUMADIN) tablet 2 5 mg  2 5 mg Oral Daily (warfarin) Mariam Spencer PA-C   2 5 mg at 01/29/18 2228     Allergies   Allergen Reactions    Penicillins Hives    Morphine And Related Anxiety       Objective   Vitals: Blood pressure 137/84, pulse 72, temperature 98 4 °F (36 9 °C), temperature source Oral, resp  rate 18, height 5' 7" (1 702 m), weight 124 kg (274 lb 7 6 oz), SpO2 96 %  Intake/Output Summary (Last 24 hours) at 01/30/18 1723  Last data filed at 01/30/18 1430   Gross per 24 hour   Intake             1100 ml   Output              820 ml   Net              280 ml     Invasive Devices     Peripheral Intravenous Line            Peripheral IV 01/29/18 Left Arm less than 1 day                Physical Exam   Constitutional: He is oriented to person, place, and time  He appears well-developed and well-nourished  No distress  Obese   HENT:   Head: Normocephalic and atraumatic  Eyes: Conjunctivae and EOM are normal  Pupils are equal, round, and reactive to light  Right eye exhibits no discharge  Left eye exhibits no discharge  Neck: Normal range of motion  Neck supple  No tracheal deviation present  No thyromegaly present  Cardiovascular: Normal rate, regular rhythm, normal heart sounds and intact distal pulses  Exam reveals no friction rub  No murmur heard  Pulmonary/Chest: Effort normal and breath sounds normal  No respiratory distress  He has no wheezes  He has no rales  He exhibits no tenderness  Abdominal: Soft  Bowel sounds are normal  He exhibits no distension  There is no tenderness  Musculoskeletal: Normal range of motion  He exhibits edema  He exhibits no tenderness or deformity     Lymphadenopathy:     He has no cervical adenopathy  Neurological: He is alert and oriented to person, place, and time  He has normal reflexes  He exhibits normal muscle tone  Coordination normal    Skin: Skin is warm and dry  No rash noted  He is not diaphoretic  There is erythema (Lower extremities, more on left side)  No pallor  Psychiatric: He has a normal mood and affect  His behavior is normal    Vitals reviewed  The history was obtained from the review of the chart, patient  Lab Results:       Lab Results   Component Value Date    WBC 5 74 01/30/2018    HGB 10 8 (L) 01/30/2018    HCT 36 1 (L) 01/30/2018    MCV 84 01/30/2018     01/30/2018     Lab Results   Component Value Date/Time    BUN 26 (H) 01/30/2018 04:49 AM    BUN 17 09/24/2015 07:54 AM     01/30/2018 04:49 AM     (L) 09/24/2015 07:54 AM    K 4 8 01/30/2018 04:49 AM    K Unable to karissa 09/24/2015 07:54 AM     01/30/2018 04:49 AM     09/24/2015 07:54 AM    CO2 25 01/30/2018 04:49 AM    CO2 26 4 09/24/2015 07:54 AM    CREATININE 1 82 (H) 01/30/2018 04:49 AM    CREATININE 1 30 09/24/2015 07:54 AM    AST 16 01/29/2018 03:54 PM    AST 20 09/22/2015 10:32 AM    ALT 22 01/29/2018 03:54 PM    ALT 30 09/22/2015 10:32 AM    ALB 2 8 (L) 01/29/2018 03:54 PM    ALB 3 3 (L) 09/22/2015 10:32 AM     No results for input(s): CHOL, HDL, LDL, TRIG, VLDL in the last 72 hours  No results found for: Vicci Moment  POC Glucose (mg/dl)   Date Value   01/30/2018 240 (H)   01/30/2018 499 (H)   01/30/2018 334 (H)   01/30/2018 279 (H)   01/29/2018 390 (H)   09/19/2016 291 (H)   09/19/2016 280 (H)   09/19/2016 225 (H)   09/18/2016 218 (H)   09/18/2016 184 (H)       Imaging Studies: I have personally reviewed pertinent reports  Tibia and fibula x-ray      FINDINGS:     There is no acute fracture or dislocation      There is arthritis of the left knee      No lytic or blastic lesions are seen      There is diffuse soft tissue swelling and vascular calcifications  No soft tissue air or radiopaque foreign body     IMPRESSION:     No acute osseous abnormality      Portions of the record may have been created with voice recognition software

## 2018-01-30 NOTE — ASSESSMENT & PLAN NOTE
· LLE erythema, swelling, tenderness, weeping; no SIRS criteria met; no non-healing diabetic foot wounds; no hx of MRSA  · Admit  · IV Ancef   · IVF  · Pain control   · ID consult-- appreciate input

## 2018-01-30 NOTE — ASSESSMENT & PLAN NOTE
Creatinine near baseline    · Follow creatinine closely and dose-adjust antibiotics as indicated  · Check BMP in a m

## 2018-01-30 NOTE — ED NOTES
Dr Smitha Stewart, [facility] attending at bedside to evaluate pt       Deepthi Conrad, RN  01/30/18 [id number]

## 2018-01-30 NOTE — ASSESSMENT & PLAN NOTE
Due to poorly controlled leg edema  Previously used compression stockings but none for 1 month  Spends most of the day with legs dependent    · Continue with leg elevation  · Request Podiatry consultation as known to Dr Yesy Del Valle  · May benefit from compression wraps versus stockings again in the long-term

## 2018-01-30 NOTE — ED NOTES
Lunch tray provided  Wife at bedside  No complaints at present time  Will continue to monitor       Vern Gray RN  01/30/18 [id number]

## 2018-01-30 NOTE — ED NOTES
Pt sleeping on hospital bed in negative distress  Respirations regular and non-labored  Will continue to monitor       Vern Gray RN  01/30/18 0827

## 2018-01-30 NOTE — CASE MANAGEMENT
Initial Clinical Review    Admission: Date/Time/Statement: 1/29/18 @ 2040     Orders Placed This Encounter   Procedures    Inpatient Admission (expected length of stay for this patient is greater than two midnights)     Standing Status:   Standing     Number of Occurrences:   1     Order Specific Question:   Admitting Physician     Answer:   Jessie Becerril [[id number]]     Order Specific Question:   Level of Care     Answer:   Med Surg [16]     Order Specific Question:   Estimated length of stay     Answer:   More than 2 Midnights     Order Specific Question:   Certification     Answer:   I certify that inpatient services are medically necessary for this patient for a duration of greater than two midnights  See H&P and MD Progress Notes for additional information about the patient's course of treatment  ED: Date/Time/Mode of Arrival:   ED Arrival Information     Expected Arrival Acuity Means of Arrival Escorted By Service Admission Type    - 1/29/2018 13:30 Urgent Wheelchair Family Member General Medicine Urgent    Arrival Complaint    Infected leg "diabetes"          Chief Complaint:   Chief Complaint   Patient presents with    Wound Infection     Pt presents to ED due to LLE infection starting 1 month ago  History of Illness: 60 y o  male with multiple medical conditions including type 2 diabetes on insulin uncontrolled, CKD stage 3, hypertension, hyperlipidemia presents to the ED for evaluation of left leg pain x1 week  Patient reports he had 1st noticed that his leg was becoming more red and swollen about 1 week ago  Patient had a visiting nurse come and evaluate his leg who stated that the leg was not hot to touch or painful that he should just continue to keep an eye on it  Over the course of the week leg had progressed with swelling and erythema  Today, he started noticing shooting pains up his left leg which were read him from putting weight on his foot    Patient also noticed weeping from his legs  He states his legs are weeping clear fluid  Patient states his leg is hot and red and it extends from his ankle up to his knee  He states that he started to feel symptoms in his right leg to  Denies any fevers or chills  Denies any abdominal pain, nausea, vomiting  Patient does not have any nonhealing diabetic foot wounds  Patient does not have a personal history of MRSA    ED Vital Signs:   ED Triage Vitals [01/29/18 1531]   Temperature Pulse Respirations Blood Pressure SpO2   98 3 °F (36 8 °C) 77 18 161/76 94 %      Temp Source Heart Rate Source Patient Position - Orthostatic VS BP Location FiO2 (%)   Oral Monitor Sitting Left arm --      Pain Score       5        Wt Readings from Last 1 Encounters:   01/29/18 126 kg (277 lb)       Vital Signs (abnormal):  Maximum /77  Skin: Skin is warm  Capillary refill takes less than 2 seconds  He is not diaphoretic  No erythema  No pallor  Erythema and weeping wounds around the left ankle and left leg, no crepitus       Abnormal Labs/Diagnostic Test Results:   UA >=300 protein  1/10% glucose  Small bilirubin  Small blood  INR 2 59  Bun 21  Creatinine 1 83  Glucose 214  Albumin 2 8  Xray left fibula - No acute osseous abnormality  Xray left ankle - No acute osseous abnormality    2231 glucose 390    Labs 1/30 am - bun 26  Creatinine 1 82  Calcium 8  1    hgb 10 8, hct 36 1  ED Treatment:  Blood cultures     Medication Administration from 01/29/2018 1330 to 01/30/2018 1130       Date/Time Order Dose Route Action Action by Comments     01/29/2018 2136 sodium chloride 0 9 % bolus 1,000 mL 0 mL Intravenous Stopped Felipe Genao RN      01/29/2018 2046 sodium chloride 0 9 % bolus 1,000 mL 1,000 mL Intravenous New Bag Felipe Genao RN      01/29/2018 2129 cefepime (MAXIPIME) 2 g/50 mL dextrose IVPB 0 mg Intravenous Stopped Felipe Genao RN      01/29/2018 2050 cefepime (MAXIPIME) 2 g/50 mL dextrose IVPB 2,000 mg Intravenous New Bag Melanie Hebert RN      01/30/2018 0044 vancomycin (VANCOCIN) 2,000 mg in sodium chloride 0 9 % 500 mL IVPB 0 mg/kg Intravenous Stopped Margaret Jara RN      01/29/2018 2133 vancomycin (VANCOCIN) 2,000 mg in sodium chloride 0 9 % 500 mL IVPB 2,000 mg Intravenous New [facility] Melanie Hebert RN      01/30/2018 8115 aspirin chewable tablet 81 mg 81 mg Oral Given Sirisha Altman RN      01/30/2018 0820 fenofibrate (TRICOR) tablet 145 mg 145 mg Oral Given Sirisha Altman RN      01/30/2018 0820 metoprolol tartrate (LOPRESSOR) tablet 50 mg 50 mg Oral Given Sirisha Altman RN      01/29/2018 2227 metoprolol tartrate (LOPRESSOR) tablet 50 mg 50 mg Oral Given Melanie Hebert RN      01/30/2018 0820 pantoprazole (PROTONIX) EC tablet 40 mg 40 mg Oral Given Sirisha Altman RN      01/29/2018 2228 warfarin (COUMADIN) tablet 2 5 mg 2 5 mg Oral Given Melanie Hebert RN      01/30/2018 0810 sodium chloride 0 9 % infusion 75 mL/hr Intravenous New Bag Marcia Chun RN      01/29/2018 2136 sodium chloride 0 9 % infusion 75 mL/hr Intravenous New [facility] Melanie Hebert RN      01/30/2018 0820 senna (SENOKOT) tablet 8 6 mg 8 6 mg Oral Given Marcia Chun RN      01/30/2018 0206 ceFAZolin (ANCEF) IVPB (premix) 2,000 mg 0 mg Intravenous Stopped Margaret Jara RN      01/30/2018 0136 ceFAZolin (ANCEF) IVPB (premix) 2,000 mg 2,000 mg Intravenous [address] Margaret Jara, [facility]      01/30/2018 0815 insulin lispro (HumaLOG) 100 units/mL subcutaneous injection 2-12 Units 6 Units Subcutaneous Given Sirisha Altman RN      01/29/2018 2235 insulin lispro (HumaLOG) 100 units/mL subcutaneous injection 2-12 Units 10 Units Subcutaneous Given Melanie Hebert RN      01/29/2018 2228 gabapentin (NEURONTIN) capsule 100 mg 100 mg Oral Given Melanie Hebert RN      01/29/2018 2228 oxyCODONE (ROXICODONE) immediate release tablet 10 mg 10 mg Oral Given Melanie Hebert RN      01/30/2018 0820 insulin regular (HumuLIN R U-500) 500 units/mL CONCENTRATED injection 75 Units 75 Units Subcutaneous Given Reinaldo Pink RN      01/29/2018 2245 ezetimibe 10 mg-pravastatin 80 mg combo dose   Oral Given Jeni Phelps RN           Past Medical/Surgical History: Active Ambulatory Problems     Diagnosis Date Noted    Diplopia 05/04/2016    Type 2 diabetes mellitus with hyperglycemia ([facility] ) 05/04/2016    CKD (chronic kidney disease) stage 3, GFR 30-59 ml/min 05/04/2016    Obesity (BMI 30-39 9) 05/04/2016    Essential hypertension 05/04/2016    MELINDA (obstructive sleep apnea) 05/04/2016    Cellulitis of left lower extremity 08/29/2016    History of cerebrovascular accident (CVA) involving cerebellum 08/29/2016    Coronary artery disease involving native heart 08/29/2016    Hypoalbuminemia 09/03/2016    Hyperkalemia 09/07/2016    MULUGETA (acute kidney injury) ([facility] ) 09/07/2016    Hypertensive CKD (chronic kidney disease) 09/08/2016    Ankle pain, right 09/09/2016    Anemia 09/15/2016    Traumatic hematoma of right ankle 09/15/2016    Non compliance w medication regimen 09/19/2016    History of DVT of lower extremity 10/05/2016    Stroke ([facility] )     Hyperlipidemia     Diabetes mellitus (HCC)     Cerebellar stroke ([facility] )     Bell's palsy      Resolved Ambulatory Problems     Diagnosis Date Noted    Cerebrovascular accident (CVA) due to thrombosis ([facility] ) 05/04/2016    Dizzy 05/04/2016    Abdominal pain 08/29/2016    Gout of foot 09/02/2016    Hypertension      Past Medical History:   Diagnosis Date    Bell's palsy     Cardiac disease     [facility]     Diabetes mellitus [facility]     Hyperlipidemia     Hypertension     Renal disorder     Stroke [facility]        Admitting Diagnosis: Wound infection [T14  8XXA, L08 9]    Age/Sex: 60 y o  male    Assessment/Plan:   Cellulitis of left lower extremity   Assessment & Plan     · LLE erythema, swelling, tenderness, weeping; no SIRS criteria met; no non-healing diabetic foot wounds; no hx of MRSA  · Admit  · IV Ancef   · IVF  · Pain control   · ID consult-- appreciate input          Type 2 diabetes mellitus with hyperglycemia (HCC)   Assessment & Plan     ·    Last A1c 9 0 in 2016  · Repeat hemoglobin A1c  · Continue home insulin schedule: humulin 500 75U QID  · SSI for correction  · Consider endocrinology consultation          CKD (chronic kidney disease) stage 3, GFR 30-59 ml/min   Assessment & Plan     · Cr previously 2 0, today 1 83  · Gentle IVF  · Repeat BMP in AM          Acute deep vein thrombosis (DVT) of popliteal vein of left lower extremity (HCC)   Assessment & Plan     · On coumadin 2 5mg QD          Coronary artery disease involving native heart   Assessment & Plan     · Continue aspirin, statin           Essential hypertension   Assessment & Plan     · Continue metoprolol           Hyperlipidemia   Assessment & Plan     · Continue fenofibrate and pravastatin          Admission Orders:  1/29/2018 2041 INPATIENT   Scheduled Meds:   Current Facility-Administered Medications:  acetaminophen 650 mg Oral Q6H PRN Mariam Spencer PA-C    aspirin 81 mg Oral Daily Mariam Spencer PA-C    calcium carbonate 1,000 mg Oral Daily PRN Mariam Spencer PA-C    cefazolin 2,000 mg Intravenous Q8H Mariam Spencer PA-C Last Rate: Stopped (01/30/18 0206)   diphenhydrAMINE 25 mg Intravenous Q6H PRN Mariam Spencer PA-C    ezetimibe 10 mg-pravastatin 80 mg combo dose  Oral HS Mariam Spencer PA-C    fenofibrate 145 mg Oral Daily Mariam Spencer PA-C    gabapentin 100 mg Oral HS Mariam Spencer PA-C    insulin lispro 2-12 Units Subcutaneous TID AC Mariam Spencer PA-C    insulin lispro 2-12 Units Subcutaneous HS Mariam Spencer PA-C    insulin regular 75 Units Subcutaneous TID AC Mariam Spencer PA-C    metoprolol tartrate 50 mg Oral BID Mariam Spencer PA-C    ondansetron 4 mg Intravenous Q6H PRN Mariam Spencer PA-C    oxyCODONE 10 mg Oral Q4H PRN Mariam Spencer PA-C    oxyCODONE-acetaminophen 1 tablet Oral Q4H PRN Mariam Spencer PA-C    pantoprazole 40 mg Oral Daily Mariam Spencer PA-C    senna 1 tablet Oral Daily Mariam Spencer PA-C    sodium chloride 75 mL/hr Intravenous Continuous Mariam Spencer PA-C Last Rate: 75 mL/hr (01/30/18 0810)   warfarin 2 5 mg Oral Daily (warfarin) Mariam Spencer PA-C      Continuous Infusions:   sodium chloride 75 mL/hr Last Rate: 75 mL/hr (01/30/18 0810)     PRN Meds:   acetaminophen    calcium carbonate    diphenhydrAMINE    ondansetron    oxyCODONE - used x 1      oxyCODONE-acetaminophen    OTHER ORDERS:  Fingerstick glucose qid    scds  Consult endocrine; ID    Per ID:    Cellulitis of left lower extremity   Assessment & Plan     Due to venous stasis with ulceration  Difficult to determine if erythema due to stasis versus actual infection  Clinically stable without sepsis     · Continue cefazolin for now  · Continue with serial exams  · Follow-up blood cultures from admission  · Follow temperatures closely  · Check CBC in a m           Venous stasis ulcer of left calf limited to breakdown of skin without varicose veins (HCC)   Assessment & Plan     Due to poorly controlled leg edema  Previously used compression stockings but none for 1 month  Spends most of the day with legs dependent     · Continue with leg elevation  · Request Podiatry consultation as known to Dr Yesy Del Valle  · May benefit from compression wraps versus stockings again in the long-term          Chronic deep vein thrombosis (DVT) of popliteal vein of left lower extremity (HCC)   Assessment & Plan     On chronic anticoagulation     · Needs better long-term edema control          Type 2 diabetes mellitus with hyperglycemia (HCC)   Assessment & Plan     Risk factor for infection     · Continue management as per the primary service          CKD (chronic kidney disease) stage 3, GFR 30-59 ml/min   Assessment & Plan     Creatinine near baseline     · Follow creatinine closely and dose-adjust antibiotics as indicated  · Check BMP in a m               Thank you,  [address] [facility] in the [facility] by [address] for 2017  Network Utilization Review Department  Phone: [phone]; Fax [phone]  ATTENTION: The Network Utilization Review Department is now centralized for our 7 Facilities  Make a note that we have a new phone and fax numbers for our Department  Please call with any questions or concerns to [phone] and carefully follow the prompts so that you are directed to the right person  All voicemails are confidential  Fax any determinations, approvals, denials, and requests for initial or continue stay review clinical to [phone]  Due to HIGH CALL volume, it would be easier if you could please send faxed requests to expedite your requests and in part, help us provide discharge notifications faster

## 2018-01-30 NOTE — ASSESSMENT & PLAN NOTE
· LLE erythema, swelling, tenderness, weeping; no SIRS criteria met; no non-healing diabetic foot wounds (but has chronic superficial wounds on the right lower extremity; he told me he had bout of infection there in the past; he told me that he follows up at the [facility] for this) ; no hx of MRSA  · Continue IV Ancef   · IVF  · Pain control   · ID consult  · Follow culture results  · Outpatient follow-up with the [facility]

## 2018-01-30 NOTE — ASSESSMENT & PLAN NOTE
· Based on my review of patient's previous CBCs, patient has chronic anemia; likely anemia of chronic kidney disease  However, patient's hemoglobin was normal on admission and now it is 10 8  Possible hemodilution from IV fluids; possible error in the 1st lab  · Monitor patient's CBC  · No active bleeding  · For further workup and management if this worsens significantly

## 2018-01-30 NOTE — ED NOTES
Breakfast tray ordered  Pt laying on hospital bed watching TV in negative distress  LLE dressing intact with yellow drainage noted on posterior bandage  +3 pitting edema noted to RLE, +2 pitting edema of LLE  Abdomen obese, rounded, soft and non-tender  Lungs CTA  IVF's infusing at 75ml/hr with negative complications  Left Forearm 20g IV site patent/infusing with negative complications  Awaiting admission bed availability  VS  Will continue to monitor       Sirisha Altman RN  01/30/18 8204

## 2018-01-30 NOTE — ED NOTES
Dr Patrizia Bradshaw paged regarding pt now c/o "my uvula is swollen"  +patent airway  Pt denies difficulty swallowing, tongue/throat swelling  Uvula noted to be enlarged  Wife stated "Is it because of the antibiotic that he got last night"  No hives or other complaints noted at  Present time  VS  Will continue to monitor       Cruz Lund RN  01/30/18 8015

## 2018-01-30 NOTE — ASSESSMENT & PLAN NOTE
Due to venous stasis with ulceration  Difficult to determine if erythema due to stasis versus actual infection  Clinically stable without sepsis    · Continue cefazolin for now  · Continue with serial exams  · Follow-up blood cultures from admission  · Follow temperatures closely  · Check CBC in a m

## 2018-01-30 NOTE — PROGRESS NOTES
Progress Note - Hector Reyes 1955, 60 y o  male MRN: [id number]    Unit/Bed#: ED 24 Encounter: [id number]    Primary Care Provider: Dominguez Carcamo MD   Date and time admitted to hospital: 1/29/2018  6:43 PM        * Cellulitis of left lower extremity   Assessment & Plan    · LLE erythema, swelling, tenderness, weeping; no SIRS criteria met; no non-healing diabetic foot wounds (but has chronic superficial wounds on the right lower extremity; he told me he had bout of infection there in the past; he told me that he follows up at the [facility] for this) ; no hx of MRSA  · Continue IV Ancef   · IVF  · Pain control   · ID consult  · Follow culture results  · Outpatient follow-up with the [facility]  Chronic deep vein thrombosis (DVT) of popliteal vein of left lower extremity (HCC)   Assessment & Plan    · Patient was found to have DVT last year  · Continue Coumadin  · INR is therapeutic  Type 2 diabetes mellitus with hyperglycemia ([facility])   Assessment & Plan    ·   Last A1c 9 0 in 2016  · Repeat hemoglobin A1c  · Continue home insulin schedule: humulin 500 75U QID  · SSI for correction  · Patient told me that he has brittle diabetes    He even told me "at 1 point his blood sugar was 1100 and his triglycerides was 3000 and he was admitted to the hospital and stayed in the hospital for a while"  He told me that his blood sugars are not well controlled  He told me that it is his primary care physician who has been managing his diabetes  He told me that his insurance would not cover for a diabetes doctor  With patient's blood sugars poorly controlled, will have Endocrinology to evaluate this patient  Having good control of patient's blood sugar will help with patient's infection and promote good wound healing          Hyperlipidemia   Assessment & Plan    · Continue fenofibrate and pravastatin         Anemia   Assessment & Plan    · Based on my review of patient's previous CBCs, patient has chronic anemia; likely anemia of chronic kidney disease  However, patient's hemoglobin was normal on admission and now it is 10 8  Possible hemodilution from IV fluids; possible error in the 1st lab  · Monitor patient's CBC  · No active bleeding  · For further workup and management if this worsens significantly  Coronary artery disease involving native heart   Assessment & Plan    · Continue aspirin, statin         Essential hypertension   Assessment & Plan    · Continue metoprolol   · Will adjust treatment accordingly based on his blood pressures  CKD (chronic kidney disease) stage 3, GFR 30-59 ml/min   Assessment & Plan    · Previously creatinine in August of 2017 was 2 0; however before this, in 2016, patient's creatinine had been normal   · Presently, patient's creatinine had stabilized at 1 8 level  Possibly disease new baseline for the patient  · Gentle IVF  · Repeat BMP in AM  · Monitor input and output  VTE Pharmacologic Prophylaxis:   Pharmacologic: Warfarin (Coumadin)  Mechanical VTE Prophylaxis in Place: Yes    Patient Centered Rounds: I have performed bedside rounds with nursing staff today  Discussions with Specialists or Other Care Team Provider:  Will talk to case management about this case later today  Education and Discussions with Family / Patient:  Patient  I offered to call patient's family but patient declined  Time Spent for Care: 30 minutes  More than 50% of total time spent on counseling and coordination of care as described above  Current Length of Stay: 1 day(s)    Current Patient Status: Inpatient   Certification Statement: The patient will continue to require additional inpatient hospital stay due to Above findings and plans  Discharge Plan:  None yet  Code Status: Level 1 - Full Code      Subjective:   Presently, patient feels fine  Patient admits that he had significant pains yesterday    However, today, no pains so far   From my discussion with the patient, he told me that his diabetes is poorly controlled (please see above notes)  He also told me that he had blood clot in the left leg that was diagnosed last year and had to be on Coumadin  He also told me that he had an infection of the right leg in the past   He told me that his under the care of wound care staff for his wounds on the right leg  No other complaints  No shortness of breath  No other pains  Objective:     Vitals:   Temp (24hrs), Av 2 °F (36 8 °C), Min:98 1 °F (36 7 °C), Max:98 3 °F (36 8 °C)    HR:  [77-88] 77  Resp:  [18-20] 20  BP: (154-172)/(76-77) 154/76  SpO2:  [94 %-95 %] 95 %  Body mass index is 43 38 kg/m²  Input and Output Summary (last 24 hours): Intake/Output Summary (Last 24 hours) at 18 0905  Last data filed at 18 0830   Gross per 24 hour   Intake             1050 ml   Output              500 ml   Net              550 ml       Physical Exam:     Physical Exam   Constitutional: He is oriented to person, place, and time  No distress  Obese  HENT:   Head: Normocephalic and atraumatic  Eyes: Right eye exhibits no discharge  Left eye exhibits no discharge  No scleral icterus  Neck: No JVD present  No tracheal deviation present  Cardiovascular: Normal rate, regular rhythm and normal heart sounds  Exam reveals no gallop and no friction rub  No murmur heard  Pulmonary/Chest: Effort normal and breath sounds normal  No stridor  No respiratory distress  He has no wheezes  He has no rales  Abdominal: Soft  Bowel sounds are normal  He exhibits no distension  There is no tenderness  There is no rebound and no guarding  Musculoskeletal: He exhibits edema  He exhibits no tenderness or deformity  Positive for bilateral lower extremity edema  Neurological: He is alert and oriented to person, place, and time  No cranial nerve deficit  Skin: Skin is warm  No rash noted  He is not diaphoretic   There is erythema  No pallor  Positive for erythema and significant warmth on the left lower extremity/leg with some weeping noted  Positive for healed/healing superficial wounds on the right lower extremity without any significant surrounding erythema or abnormal warmth  Both legs are nontender at this point  Psychiatric: He has a normal mood and affect  His behavior is normal  Thought content normal        Additional Data:     Labs:      Results from last 7 days  Lab Units 01/30/18  0449 01/29/18  1554   WBC Thousand/uL 5 74 7 58   HEMOGLOBIN g/dL 10 8* 12 1   HEMATOCRIT % 36 1* 39 2   PLATELETS Thousands/uL 241 240   NEUTROS PCT %  --  76*   LYMPHS PCT %  --  15   MONOS PCT %  --  8   EOS PCT %  --  1       Results from last 7 days  Lab Units 01/30/18  0449 01/29/18  1554   SODIUM mmol/L 137 139   POTASSIUM mmol/L 4 8 4 6   CHLORIDE mmol/L 104 101   CO2 mmol/L 25 27   BUN mg/dL 26* 21   CREATININE mg/dL 1 82* 1 83*   CALCIUM mg/dL 8 1* 8 7   TOTAL PROTEIN g/dL  --  7 7   BILIRUBIN TOTAL mg/dL  --  0 30   ALK PHOS U/L  --  48   ALT U/L  --  22   AST U/L  --  16   GLUCOSE RANDOM mg/dL 325* 214*       Results from last 7 days  Lab Units 01/29/18  1554   INR  2 59*       * I Have Reviewed All Lab Data Listed Above  * Additional Pertinent Lab Tests [facility]ed: [facility]    Imaging:    Imaging Reports Reviewed Today Include:  Diagnostic imaging studies that were done on this admission  Imaging Personally Reviewed by Myself Includes:  None      Recent Cultures (last 7 days):           Last 24 Hours Medication List:     Current Facility-Administered Medications:  acetaminophen 650 mg Oral Q6H PRN Mariam Spencer PA-C    aspirin 81 mg Oral Daily Mariam Spencer PA-C    calcium carbonate 1,000 mg Oral Daily PRN Mariam Spencer PA-C    cefazolin 2,000 mg Intravenous Q8H Mariam Spencer PA-C Last Rate: Stopped (01/30/18 0206)   diphenhydrAMINE 25 mg Intravenous Q6H PRN Mariam Spencer PA-C    ezetimibe 10 mg-pravastatin 80 mg combo dose  Oral HS Mariam Spencer, HUGO    fenofibrate 145 mg Oral Daily Mariam Spencer, HUGO    gabapentin 100 mg Oral HS Mariam Spencer, HUGO    insulin lispro 2-12 Units Subcutaneous TID AC Mariam Spencer, HUGO    insulin lispro 2-12 Units Subcutaneous HS Mariam Spencer, HUGO    insulin regular 75 Units Subcutaneous TID AC Mariam Spencer, HUGO    metoprolol tartrate 50 mg Oral BID Mariam Spencer PA-C    ondansetron 4 mg Intravenous Q6H PRN Mariam Spencer PA-C    oxyCODONE 10 mg Oral Q4H PRN Mariam Spencer PA-C    oxyCODONE-acetaminophen 1 tablet Oral Q4H PRN Mariam Spencer PA-C    pantoprazole 40 mg Oral Daily Mariam Spencer PA-C    senna 1 tablet Oral Daily Mariam Spencer PA-C    sodium chloride 75 mL/hr Intravenous Continuous Mariam Spencer PA-C Last Rate: 75 mL/hr (01/29/18 2136)   warfarin 2 5 mg Oral Daily (warfarin) Mariam Spencer PA-C         Today, Patient Was Seen By: Matilde Castleman, MD    ** Please Note: Dragon 360 Dictation voice to text software may have been used in the creation of this document   **

## 2018-01-30 NOTE — ASSESSMENT & PLAN NOTE
·   Last A1c 9 0 in 2016  · Repeat hemoglobin A1c  · Continue home insulin schedule: humulin 500 75U QID  · SSI for correction  · Patient told me that he has brittle diabetes    He even told me "at 1 point his blood sugar was 1100 and his triglycerides was 3000 and he was admitted to the hospital and stayed in the hospital for a while"  He told me that his blood sugars are not well controlled  He told me that it is his primary care physician who has been managing his diabetes  He told me that his insurance would not cover for a diabetes doctor  With patient's blood sugars poorly controlled, will have Endocrinology to evaluate this patient  Having good control of patient's blood sugar will help with patient's infection and promote good wound healing

## 2018-01-30 NOTE — ED PROVIDER NOTES
History  Chief Complaint   Patient presents with    Wound Infection     Pt presents to ED due to LLE infection starting 1 month ago  History provided by:  Patient   used: No      Patient is a 58-year-old male presenting to emergency department with wound of the left lower extremity  Getting worse  Draining  No fevers or chills  No nausea or vomiting  No chest pain shortness of breath  No lightheadedness or dizziness  Patient is diabetic  States been present for months but getting acutely worse  Painful  MDM cellulitis, will do septic workup, antibiotics, based on its size and patient's risk factors will admit for IV antibiotics        Prior to Admission Medications   Prescriptions Last Dose Informant Patient Reported? Taking?   aspirin 81 mg chewable tablet  Self Yes Yes   Sig: Chew 81 mg daily  ezetimibe-simvastatin (VYTORIN) 10-40 mg per tablet  Self Yes Yes   Sig: Take 1 tablet by mouth every evening   fenofibrate (TRICOR) 145 mg tablet  Self Yes Yes   Sig: Take 145 mg by mouth daily  insulin regular (HUMULIN R) 500 units/mL CONCENTRATED injection  Self Yes Yes   Sig: Inject 75 Units under the skin 3 (three) times a day before meals     metoprolol tartrate (LOPRESSOR) 50 mg tablet  Self Yes Yes   Sig: Take 50 mg by mouth 2 (two) times a day  pantoprazole (PROTONIX) 40 mg tablet  Self Yes Yes   Sig: Take 40 mg by mouth daily   warfarin (COUMADIN) 2 5 mg tablet  Self Yes Yes   Sig: Take 2 5 mg by mouth daily        Facility-Administered Medications: None       Past Medical History:   Diagnosis Date    Bell's palsy     Cardiac disease     [facility]     [facility]     Hyperlipidemia     Hypertension     Renal disorder     Stroke ([facility] )     2013       Past Surgical History:   Procedure Laterality Date    CORONARY ANGIOPLASTY WITH STENT PLACEMENT      FRACTURE SURGERY      INCISION AND DRAINAGE OF WOUND Right 9/13/2016    Procedure: INCISION AND DRAINAGE (I&D) EXTREMITY, right lateral ankle;  Surgeon: Nneka Umana DPM;  [facility];  Service:     WOUND DEBRIDEMENT Right 9/15/2016    Procedure: DEBRIDEMENT WOUND [facility] OUT) ankle wound with closure and FRANCY drain placement;  Surgeon: Nneka Umana DPM;  [facility];  Service:        History reviewed  No pertinent family history  I have reviewed and agree with the history as documented  Social History   Substance Use Topics    Smoking status: Former Smoker     Packs/day: 0 00     Types: Cigarettes     Quit date: 9/3/2006    Smokeless tobacco: Never Used    Alcohol use No        Review of Systems   Constitutional: Negative for chills, diaphoresis and fever  HENT: Negative for congestion and sore throat  Respiratory: Negative for cough, shortness of breath, wheezing and stridor  Cardiovascular: Negative for chest pain, palpitations and leg swelling  Gastrointestinal: Negative for abdominal pain, blood in stool, diarrhea, nausea and vomiting  Genitourinary: Negative for dysuria, frequency and urgency  Musculoskeletal: Negative for neck pain and neck stiffness  Skin: Positive for color change and wound  Negative for pallor and rash  Neurological: Negative for dizziness, syncope, weakness, light-headedness and headaches  All other systems reviewed and are negative  Physical Exam  ED Triage Vitals [01/29/18 1531]   Temperature Pulse Respirations Blood Pressure SpO2   98 3 °F (36 8 °C) 77 18 161/76 94 %      Temp Source Heart Rate Source Patient Position - Orthostatic VS BP Location FiO2 (%)   Oral Monitor Sitting Left arm --      Pain Score       5           Orthostatic Vital Signs  Vitals:    01/29/18 1531 01/29/18 2145 01/29/18 2245   BP: 161/76  (!) 172/77   Pulse: 77 86 88   Patient Position - Orthostatic VS: Sitting         Physical Exam   Constitutional: He is oriented to person, place, and time   He appears well-developed and well-nourished  No distress  HENT:   Head: Normocephalic and atraumatic  Eyes: EOM are normal  Pupils are equal, round, and reactive to light  Neck: Neck supple  Cardiovascular: Normal rate  Pulmonary/Chest: Effort normal and breath sounds normal    Musculoskeletal: Normal range of motion  He exhibits tenderness  He exhibits no deformity  Neurological: He is alert and oriented to person, place, and time  No cranial nerve deficit or sensory deficit  Coordination normal    Skin: Skin is warm  Capillary refill takes less than 2 seconds  He is not diaphoretic  No erythema  No pallor     Erythema and weeping wounds around the left ankle and left leg, no crepitus       ED Medications  Medications   aspirin chewable tablet 81 mg (not administered)   fenofibrate (TRICOR) tablet 145 mg (not administered)   metoprolol tartrate (LOPRESSOR) tablet 50 mg (50 mg Oral Given 1/29/18 2227)   pantoprazole (PROTONIX) EC tablet 40 mg (not administered)   warfarin (COUMADIN) tablet 2 5 mg (2 5 mg Oral Given 1/29/18 2228)   sodium chloride 0 9 % infusion (75 mL/hr Intravenous New Bag 1/29/18 2136)   senna (SENOKOT) tablet 8 6 mg (not administered)   ondansetron (ZOFRAN) injection 4 mg (not administered)   calcium carbonate (TUMS) chewable tablet 1,000 mg (not administered)   acetaminophen (TYLENOL) tablet 650 mg (not administered)   ceFAZolin (ANCEF) IVPB (premix) 2,000 mg (2,000 mg Intravenous New Bag 1/30/18 0136)   diphenhydrAMINE (BENADRYL) injection 25 mg (not administered)   insulin lispro (HumaLOG) 100 units/mL subcutaneous injection 2-12 Units (not administered)   insulin lispro (HumaLOG) 100 units/mL subcutaneous injection 2-12 Units (10 Units Subcutaneous Given 1/29/18 2235)   gabapentin (NEURONTIN) capsule 100 mg (100 mg Oral Given 1/29/18 2228)   oxyCODONE-acetaminophen (PERCOCET) 5-325 mg per tablet 1 tablet (not administered)   oxyCODONE (ROXICODONE) immediate release tablet 10 mg (10 mg Oral Given 1/29/18 2228) insulin regular (HumuLIN R U-500) 500 units/mL CONCENTRATED injection 75 Units (not administered)   ezetimibe 10 mg-pravastatin 80 mg combo dose ( Oral Given 1/29/18 2245)   sodium chloride 0 9 % bolus 1,000 mL (0 mL Intravenous Stopped 1/29/18 2136)   cefepime (MAXIPIME) 2 g/50 mL dextrose IVPB (0 mg Intravenous Stopped 1/29/18 2129)   vancomycin (VANCOCIN) 2,000 mg in sodium chloride 0 9 % 500 mL IVPB (0 mg/kg × 126 kg Intravenous Stopped 1/30/18 0044)       Diagnostic Studies  Results Reviewed     Procedure Component Value Units Date/Time    Fingerstick Glucose (POCT) [[id number]]  (Abnormal) Collected:  01/29/18 2231    Lab Status:  Final result Updated:  01/29/18 2232     POC Glucose 390 (H) mg/dl     Lactic Acid x2 [[id number]]  (Normal) Collected:  01/29/18 1936    Lab Status:  Final result Specimen:  Blood from Arm, Left Updated:  01/29/18 2007     LACTIC ACID 1 1 mmol/L     Narrative:         Result may be elevated if tourniquet was used during collection  Urine Microscopic [[id number]]  (Abnormal) Collected:  01/29/18 1917    Lab Status:  Final result Specimen:  Urine from Urine, Clean Catch Updated:  01/29/18 2004     RBC, UA 0-1 (A) /hpf      WBC, UA 1-2 (A) /hpf      Epithelial Cells Occasional /hpf      Bacteria, UA Occasional /hpf     UA w Reflex to Microscopic w Reflex to Culture [[id number]]  (Abnormal) Collected:  01/29/18 1917    Lab Status:  Final result Specimen:  Urine from Urine, Clean Catch Updated:  01/29/18 1953     Color, UA Yellow     Clarity, UA Clear     Specific Gravity, UA >=1 030     pH, UA 5 0     Leukocytes, UA Negative     Nitrite, UA Negative     Protein, UA >=300 (A) mg/dl      Glucose,  (1/10%) (A) mg/dl      Ketones, UA Negative mg/dl      Urobilinogen, UA 0 2 E U /dl      Bilirubin, UA Small (A)     Blood, UA Small (A)    Blood culture #1 [[id number]] Collected:  01/29/18 1936    Lab Status:   In process Specimen:  Blood from Arm, Left Updated:  01/29/18 1947    Protime-INR [[id number]]  (Abnormal) Collected:  01/29/18 1554    Lab Status:  Final result Specimen:  Blood from Arm, Left Updated:  01/29/18 1638     Protime 28 7 (H) seconds      INR 2 59 (H)    APTT [[id number]]  (Abnormal) Collected:  01/29/18 1554    Lab Status:  Final result Specimen:  Blood from Arm, Left Updated:  01/29/18 1638     PTT 77 (H) seconds     Narrative: Therapeutic Heparin Range = 60-90 seconds    Lactic Acid x2 [[id number]]  (Normal) Collected:  01/29/18 1554    Lab Status:  Final result Specimen:  Blood from Arm, Left Updated:  01/29/18 1630     LACTIC ACID 1 6 mmol/L     Narrative:         Result may be elevated if tourniquet was used during collection  Comprehensive metabolic panel [[id number]]  (Abnormal) Collected:  01/29/18 1554    Lab Status:  Final result Specimen:  Blood from Arm, Left Updated:  01/29/18 1629     Sodium 139 mmol/L      Potassium 4 6 mmol/L      Chloride 101 mmol/L      CO2 27 mmol/L      Anion Gap 11 mmol/L      BUN 21 mg/dL      Creatinine 1 83 (H) mg/dL      Glucose 214 (H) mg/dL      Calcium 8 7 mg/dL      AST 16 U/L      ALT 22 U/L      Alkaline Phosphatase 48 U/L      Total Protein 7 7 g/dL      Albumin 2 8 (L) g/dL      Total Bilirubin 0 30 mg/dL      eGFR 38 ml/min/1 73sq m     Narrative:         National Kidney Disease Education Program recommendations are as follows:  GFR calculation is accurate only with a steady state creatinine  Chronic Kidney disease less than 60 ml/min/1 73 sq  meters  Kidney failure less than 15 ml/min/1 73 sq  meters      CBC and differential [[id number]]  (Abnormal) Collected:  01/29/18 1554    Lab Status:  Final result Specimen:  Blood from Arm, Left Updated:  01/29/18 1607     WBC 7 58 Thousand/uL      RBC 4 75 Million/uL      Hemoglobin 12 1 g/dL      Hematocrit 39 2 %      MCV 83 fL      MCH 25 5 (L) pg      MCHC 30 9 (L) g/dL      RDW 17 3 (H) %      MPV 8 9 fL      Platelets 218 Thousands/uL      Neutrophils Relative 76 (H) % Lymphocytes Relative 15 %      Monocytes Relative 8 %      Eosinophils Relative 1 %      Basophils Relative 0 %      Neutrophils Absolute 5 74 Thousands/µL      Lymphocytes Absolute 1 16 Thousands/µL      Monocytes Absolute 0 59 Thousand/µL      Eosinophils Absolute 0 07 Thousand/µL      Basophils Absolute 0 02 Thousands/µL     Blood culture #2 [[id number]] Collected:  01/29/18 1554    Lab Status: In process Specimen:  Blood from Arm, Left Updated:  01/29/18 1602                 XR ankle 3+ views LEFT   Final Result by Yunier Morataya MD (01/29 2046)      No acute osseous abnormality  Workstation performed: [id number]         XR tibia fibula 2 views LEFT   Final Result by Yunier Morataya MD (01/29 2044)      No acute osseous abnormality  Workstation performed: [id number]                    Procedures  Procedures       Phone Contacts  ED Phone Contact    ED Course  ED Course as of Jan 30 0140 Mon Jan 29, 2018 1943 ECG shows rate of 75, sinus, normal QRS, normal intervals, no significant ST or T-wave changes, independently by me                                MDM  CritCare Time    Disposition  Final diagnoses:   Cellulitis     Time reflects when diagnosis was documented in both MDM as applicable and the Disposition within this note     Time User Action Codes Description Comment    1/29/2018  8:40 PM Natalie Mcfarland Add [L03 90] Cellulitis       ED Disposition     ED Disposition Condition Comment    Admit  Case was discussed with medicine and the patient's admission status was agreed to be Admission Status: inpatient status to the service of Dr Giovanna Martinez   Follow-up Information    None       Patient's Medications   Discharge Prescriptions    No medications on file     No discharge procedures on file      ED Provider  Electronically Signed by           Aida Barajas MD  01/30/18 0148

## 2018-01-30 NOTE — ED NOTES
Lunch tray ordered  Pharmacy notified of Insulin order  Will send insulin to KUN Justin RN  01/30/18 8005

## 2018-01-30 NOTE — CONSULTS
Consultation - Infectious Disease   Sebastian Graham 60 y o  male MRN: [id number]  Unit/Bed#: ISMAEL Encounter: [id number]      Inpatient consult to Infectious Diseases  Consult performed by: Yosef Tim ordered by: Jessie Becerril      IMPRESSION AND RECOMMENDATIONS:  * Cellulitis of left lower extremity   Assessment & Plan    Due to venous stasis with ulceration  Difficult to determine if erythema due to stasis versus actual infection  Clinically stable without sepsis    · Continue cefazolin for now  · Continue with serial exams  · Follow-up blood cultures from admission  · Follow temperatures closely  · Check CBC in a m  Venous stasis ulcer of left calf limited to breakdown of skin without varicose veins (HCC)   Assessment & Plan    Due to poorly controlled leg edema  Previously used compression stockings but none for 1 month  Spends most of the day with legs dependent    · Continue with leg elevation  · Request Podiatry consultation as known to Dr Sharon Allen  · May benefit from compression wraps versus stockings again in the long-term        Chronic deep vein thrombosis (DVT) of popliteal vein of left lower extremity ([address] )   Assessment & Plan    On chronic anticoagulation    · Needs better long-term edema control        Type 2 diabetes mellitus with hyperglycemia (HCC)   Assessment & Plan    Risk factor for infection    · Continue management as per the primary service        CKD (chronic kidney disease) stage 3, GFR 30-59 ml/min   Assessment & Plan    Creatinine near baseline    · Follow creatinine closely and dose-adjust antibiotics as indicated  · Check BMP in a m  Antibiotics:  Cefazolin # 2    Thank you for this consultation  We will follow along with you  HISTORY OF PRESENT ILLNESS:  Reason for Consult: Cellulitis    HPI: Sebastian Graham is a 60 y o  male history of poorly controlled diabetes and chronic of left lower extremity DVT with venous stasis ulcers    His wife is at the bedside and helps to provide a history  He has been followed by the wound Center in the past by Dr Sushil Stanley but was discharged after resolution of his ulcerations  He was previously wearing compression stockings although discontinued these approximately 1 month ago due to itching and irritation  Since that time he has noted worsening swelling and wounds on his legs  He admittedly spends most of his day seated in a chair with his legs dependent  He presented to the emergency department yesterday with a 1 week of worsening pain, swelling, and erythema of his left lower extremity  The swelling worsened and he started to have weeping of clear fluid  Upon presentation he was noted to be afebrile with a normal heart rate and WBC  He underwent a left lower extremity x-ray which showed no osseous abnormality  He was given doses of vancomycin and cefepime and started on cefazolin  Since admission he has remained afebrile with a normal WBC  REVIEW OF SYSTEMS:  Denies fevers, chills, sweats, nausea, vomiting, or diarrhea  A complete system-based review of systems is otherwise negative      PAST MEDICAL HISTORY:  Past Medical History:   Diagnosis Date    Bell's palsy     Cardiac disease     [facility]     [facility]     Hyperlipidemia     Hypertension     Renal disorder     St[facility] ([facility] )     2013     Past Surgical History:   Procedure Laterality Date    CORONARY ANGIOPLASTY WITH STENT PLACEMENT      FRACTURE SURGERY      INCISION AND DRAINAGE OF WOUND Right 9/13/2016    Procedure: INCISION AND DRAINAGE (I&D) EXTREMITY, right lateral ankle;  Surgeon: Clara Bowman DPM;  [facility];  Service:     WOUND DEBRIDEMENT Right 9/15/2016    Procedure: DEBRIDEMENT WOUND ([address]) ankle wound with closure and FRANCY drain placement;  Surgeon: Clara Bowman DPM;  [facility];  Service:        FAMILY HISTORY:  Non-contributory    SOCIAL HISTORY:  History   Alcohol Use No     History   Drug Use No     History   Smoking Status    Former Smoker    Packs/day: 0 00    Types: Cigarettes    Quit date: 9/3/2006   Smokeless Tobacco    Never Used       ALLERGIES:  Allergies   Allergen Reactions    Penicillins Hives    Morphine And Related Anxiety       MEDICATIONS:  All current active medications have been reviewed  PHYSICAL EXAM:  Vitals:  HR:  [69-88] 69  Resp:  [17-20] 18  BP: (144-172)/(67-77) 147/67  SpO2:  [94 %-96 %] 96 %  Temp (24hrs), Av 1 °F (36 7 °C), Min:98 1 °F (36 7 °C), Max:98 1 °F (36 7 °C)  Current: Temperature: 98 1 °F (36 7 °C)     Physical Exam:  General:  Well-nourished, well-developed, in no acute distress  Eyes:  Conjunctive clear with no hemorrhages or effusions  Oropharynx:  No ulcers, no lesions  Neck:  Supple, no lymphadenopathy  Lungs:  Clear to auscultation bilaterally, no accessory muscle use  Cardiac:  Regular rate and rhythm, no murmurs  Abdomen:  Soft, non-tender, non-distended  Extremities:  No peripheral cyanosis, clubbing  Nonpitting edema bilateral lower extremities  Skin:  No rashes, scattered scabs and superficial wounds left greater than right  There is some mild reactive erythema of the left calf with faint blanching erythema  There is yellow weeping on the gauze  No purulence or fluctuance  Neurological:  Moves all four extremities spontaneously, sensation grossly intact    LABS, IMAGING, & OTHER STUDIES:  Lab Results:  I have personally reviewed pertinent labs      Results from last 7 days  Lab Units 18  0449 18  1554   SODIUM mmol/L 137 139   POTASSIUM mmol/L 4 8 4 6   CHLORIDE mmol/L 104 101   CO2 mmol/L 25 27   ANION GAP mmol/L 8 11   BUN mg/dL 26* 21   CREATININE mg/dL 1 82* 1 83*   EGFR ml/min/1 73sq m 39 38   GLUCOSE RANDOM mg/dL 325* 214*   CALCIUM mg/dL 8 1* 8 7   AST U/L  --  16   ALT U/L  --  22   ALK PHOS U/L  --  48   TOTAL PROTEIN g/dL  --  7 7   BILIRUBIN TOTAL mg/dL  -- 0 30       Results from last 7 days  Lab Units 01/30/18  0449 01/29/18  1554   WBC Thousand/uL 5 74 7 58   HEMOGLOBIN g/dL 10 8* 12 1   PLATELETS Thousands/uL 241 240           Imaging Studies:   I have personally reviewed pertinent imaging study reports and images in PACS  X-ray left tibia/fibula 1/29 no osseous abnormality    EKG, Pathology, and Other Studies:   I have personally reviewed pertinent reports

## 2018-01-31 LAB
ANION GAP SERPL CALCULATED.3IONS-SCNC: 8 MMOL/L (ref 4–13)
BUN SERPL-MCNC: 22 MG/DL (ref 5–25)
CALCIUM SERPL-MCNC: 8.3 MG/DL (ref 8.3–10.1)
CHLORIDE SERPL-SCNC: 107 MMOL/L (ref 100–108)
CO2 SERPL-SCNC: 26 MMOL/L (ref 21–32)
CREAT SERPL-MCNC: 1.46 MG/DL (ref 0.6–1.3)
ERYTHROCYTE [DISTWIDTH] IN BLOOD BY AUTOMATED COUNT: 16.8 % (ref 11.6–15.1)
EST. AVERAGE GLUCOSE BLD GHB EST-MCNC: 235 MG/DL
GFR SERPL CREATININE-BSD FRML MDRD: 50 ML/MIN/1.73SQ M
GLUCOSE SERPL-MCNC: 111 MG/DL (ref 65–140)
GLUCOSE SERPL-MCNC: 122 MG/DL (ref 65–140)
GLUCOSE SERPL-MCNC: 186 MG/DL (ref 65–140)
GLUCOSE SERPL-MCNC: 230 MG/DL (ref 65–140)
GLUCOSE SERPL-MCNC: 234 MG/DL (ref 65–140)
HBA1C MFR BLD: 9.8 % (ref 4.2–6.3)
HCT VFR BLD AUTO: 35.6 % (ref 36.5–49.3)
HGB BLD-MCNC: 10.6 G/DL (ref 12–17)
MCH RBC QN AUTO: 24.8 PG (ref 26.8–34.3)
MCHC RBC AUTO-ENTMCNC: 29.8 G/DL (ref 31.4–37.4)
MCV RBC AUTO: 83 FL (ref 82–98)
PLATELET # BLD AUTO: 248 THOUSANDS/UL (ref 149–390)
PMV BLD AUTO: 8.8 FL (ref 8.9–12.7)
POTASSIUM SERPL-SCNC: 4 MMOL/L (ref 3.5–5.3)
RBC # BLD AUTO: 4.27 MILLION/UL (ref 3.88–5.62)
SODIUM SERPL-SCNC: 141 MMOL/L (ref 136–145)
WBC # BLD AUTO: 4.31 THOUSAND/UL (ref 4.31–10.16)

## 2018-01-31 PROCEDURE — 99232 SBSQ HOSP IP/OBS MODERATE 35: CPT | Performed by: PHYSICIAN ASSISTANT

## 2018-01-31 PROCEDURE — 80048 BASIC METABOLIC PNL TOTAL CA: CPT | Performed by: INTERNAL MEDICINE

## 2018-01-31 PROCEDURE — 83036 HEMOGLOBIN GLYCOSYLATED A1C: CPT | Performed by: INTERNAL MEDICINE

## 2018-01-31 PROCEDURE — 99232 SBSQ HOSP IP/OBS MODERATE 35: CPT | Performed by: INTERNAL MEDICINE

## 2018-01-31 PROCEDURE — 82948 REAGENT STRIP/BLOOD GLUCOSE: CPT

## 2018-01-31 PROCEDURE — 85027 COMPLETE CBC AUTOMATED: CPT | Performed by: INTERNAL MEDICINE

## 2018-01-31 RX ORDER — CEPHALEXIN 500 MG/1
500 CAPSULE ORAL EVERY 6 HOURS SCHEDULED
Status: DISCONTINUED | OUTPATIENT
Start: 2018-01-31 | End: 2018-02-02 | Stop reason: HOSPADM

## 2018-01-31 RX ADMIN — CEFAZOLIN SODIUM 2000 MG: 2 SOLUTION INTRAVENOUS at 05:24

## 2018-01-31 RX ADMIN — FENOFIBRATE 145 MG: 145 TABLET, FILM COATED ORAL at 08:57

## 2018-01-31 RX ADMIN — GABAPENTIN 100 MG: 100 CAPSULE ORAL at 21:40

## 2018-01-31 RX ADMIN — CEPHALEXIN 500 MG: 500 CAPSULE ORAL at 23:41

## 2018-01-31 RX ADMIN — OXYCODONE HYDROCHLORIDE AND ACETAMINOPHEN 1 TABLET: 5; 325 TABLET ORAL at 23:41

## 2018-01-31 RX ADMIN — ASPIRIN 81 MG 81 MG: 81 TABLET ORAL at 08:57

## 2018-01-31 RX ADMIN — OXYCODONE HYDROCHLORIDE 10 MG: 10 TABLET ORAL at 04:06

## 2018-01-31 RX ADMIN — INSULIN HUMAN 80 UNITS: 500 INJECTION, SOLUTION SUBCUTANEOUS at 12:06

## 2018-01-31 RX ADMIN — METOPROLOL TARTRATE 50 MG: 50 TABLET ORAL at 08:57

## 2018-01-31 RX ADMIN — CEPHALEXIN 500 MG: 500 CAPSULE ORAL at 13:56

## 2018-01-31 RX ADMIN — PANTOPRAZOLE SODIUM 40 MG: 40 TABLET, DELAYED RELEASE ORAL at 08:57

## 2018-01-31 RX ADMIN — CEPHALEXIN 500 MG: 500 CAPSULE ORAL at 17:11

## 2018-01-31 RX ADMIN — WARFARIN SODIUM 2.5 MG: 2.5 TABLET ORAL at 17:11

## 2018-01-31 RX ADMIN — EZETIMIBE: 10 TABLET ORAL at 21:40

## 2018-01-31 RX ADMIN — INSULIN LISPRO 2 UNITS: 100 INJECTION, SOLUTION INTRAVENOUS; SUBCUTANEOUS at 17:11

## 2018-01-31 RX ADMIN — INSULIN LISPRO 4 UNITS: 100 INJECTION, SOLUTION INTRAVENOUS; SUBCUTANEOUS at 11:58

## 2018-01-31 RX ADMIN — INSULIN HUMAN 80 UNITS: 500 INJECTION, SOLUTION SUBCUTANEOUS at 17:14

## 2018-01-31 RX ADMIN — INSULIN HUMAN 80 UNITS: 500 INJECTION, SOLUTION SUBCUTANEOUS at 08:40

## 2018-01-31 RX ADMIN — INSULIN LISPRO 2 UNITS: 100 INJECTION, SOLUTION INTRAVENOUS; SUBCUTANEOUS at 21:40

## 2018-01-31 RX ADMIN — METOPROLOL TARTRATE 50 MG: 50 TABLET ORAL at 17:12

## 2018-01-31 RX ADMIN — SENNOSIDES 8.6 MG: 8.6 TABLET, FILM COATED ORAL at 08:57

## 2018-01-31 NOTE — ASSESSMENT & PLAN NOTE
Poorly controlled with A1C 9 8  Risk factor for infection    · Continue management as per the primary service

## 2018-01-31 NOTE — PROGRESS NOTES
Progress Note - Bismark Arnold 60 y o  male MRN: [id number]    Unit/Bed#: -01 Encounter: [id number]      CC: diabetes f/u    Subjective:   Bismark Arnold is a 60y o  year old male with type 2 diabetes  Feels well  No complaints  No hypoglycemia  Fasting blood sugar tightly controlled  Appetite good, no nausea, no vomiting    Objective:     Vitals: Blood pressure 170/72, pulse 73, temperature 98 5 °F (36 9 °C), temperature source Oral, resp  rate 18, height 5' 7" (1 702 m), weight 124 kg (274 lb 7 6 oz), SpO2 95 %  ,Body mass index is 42 99 kg/m²  Intake/Output Summary (Last 24 hours) at 01/31/18 1645  Last data filed at 01/31/18 1300   Gross per 24 hour   Intake              240 ml   Output              950 ml   Net             -710 ml       Physical Exam:  General Appearance: awake, appears stated age and cooperative  Head: Normocephalic, without obvious abnormality, atraumatic  Extremities: moves all extremities  Skin: Skin color and temperature normal    Pulm: no labored breathing    Lab, Imaging and other studies: I have personally reviewed pertinent reports  POC Glucose (mg/dl)   Date Value   01/31/2018 186 (H)   01/31/2018 230 (H)   01/31/2018 122   01/30/2018 204 (H)   01/30/2018 240 (H)   01/30/2018 499 (H)   01/30/2018 334 (H)   01/30/2018 279 (H)   01/29/2018 390 (H)   09/19/2016 291 (H)       Assessment:  diabetes with hyperglycemia    Plan:  -will change sliding scale at bedtime to algorithm 2  -continue of Humulin R, U 500,  80 units with breakfast, lunch and dinner  -continue to monitor blood sugars with meals and at bedtime as well as at 2:00 a m , make further adjustment if necessary  -hypoglycemia protocol in place      Portions of the record may have been created with voice recognition software

## 2018-01-31 NOTE — ASSESSMENT & PLAN NOTE
· Acute kidney injury, present on admission, resolved  · On admission creatinine 1 8 a now 1 46  · Stop IVF

## 2018-01-31 NOTE — MALNUTRITION/BMI
This medical record reflects one or more clinical indicators suggestive of malnutrition and/or morbid obesity  BMI Findings:  BMI Classifications: Morbid Obesity 40-44 9     Body mass index is 42 99 kg/m²  See Nutrition note dated 1/31/2018 for additional details  Completed nutrition assessment is viewable in the nutrition documentation      Morbid obesity

## 2018-01-31 NOTE — ASSESSMENT & PLAN NOTE
· A1C 9 8  · Endocrinology evaluated, increased to Humulin U500 80 units TID with meals  · SSI for correction

## 2018-01-31 NOTE — ASSESSMENT & PLAN NOTE
· Continue metoprolol   · Will adjust treatment accordingly based on his blood pressures    · Blood pressure 150 to 170 at this time  · Continue monitor blood pressure and if needed will add additional agent

## 2018-01-31 NOTE — ASSESSMENT & PLAN NOTE
Due to venous stasis with ulceration  Difficult to determine if erythema due to stasis versus actual infection  Blood cultures negative  Clinically stable without sepsis  Improved on dressing change by podiatry today    · Continue Keflex for 3 more days  · Follow temperatures closely

## 2018-01-31 NOTE — PROGRESS NOTES
Progress Note - Infectious Disease   Breann Frederick 60 y o  male MRN: [id number]  Unit/Bed#: -01 Encounter: [id number]      Impression/Recommendations:  * Cellulitis of left lower extremity   Assessment & Plan    Due to venous stasis with ulceration  Difficult to determine if erythema due to stasis versus actual infection  Blood cultures negative  Clinically stable without sepsis    · Change antibiotics to Keflex to continue for 4 more days  · Follow temperatures closely        Venous stasis ulcer of left calf limited to breakdown of skin without varicose veins (HCC)   Assessment & Plan    Due to poorly controlled leg edema  Previously used compression stockings but none for 1 month  Spends most of the day with legs dependent    · Continue with leg elevation and compression  · Outpatient podiatry follow-up        Chronic deep vein thrombosis (DVT) of popliteal vein of left lower extremity (HCC)   Assessment & Plan    On chronic anticoagulation    · Needs better long-term edema control        Type 2 diabetes mellitus with hyperglycemia, with long-term current use of insulin (HCC)   Assessment & Plan    Poorly controlled with A1C 9 8  Risk factor for infection    · Continue management as per the primary service        CKD (chronic kidney disease) stage 3, GFR 30-59 ml/min   Assessment & Plan    Creatinine near baseline    · Follow creatinine closely and dose-adjust antibiotics as indicated          Discussed with patient, his wife, and SLIM  The patient is stable from an ID standpoint for D/C at any time  Antibiotics:  Cefazolin #3    Subjective:  Patient seen on AM rounds  Denies fevers, chills, or sweats  Denies nausea, vomiting, or diarrhea  24 Hour Events:  Seen by podiatry and had ACE wrap applied      Objective:  Vitals:  HR:  [68-75] 75  Resp:  [17-18] 18  BP: (137-178)/(67-91) 178/91  SpO2:  [95 %-96 %] 96 %  Temp (24hrs), Av 3 °F (36 8 °C), Min:98 2 °F (36 8 °C), Max:98 4 °F (36 9 °C)  Current: Temperature: 98 2 °F (36 8 °C)    Physical Exam:   General:  No acute distress  Psychiatric:  Awake and alert  Pulmonary:  Normal respiratory excursion without accessory muscle use  Abdomen:  Soft, nontender  Extremities:  ACE wrap to LLE  Skin:  No rashes    Lab Results:  I have personally reviewed pertinent labs  Results from last 7 days  Lab Units 01/31/18  0457 01/30/18  0449 01/29/18  1554   SODIUM mmol/L 141 137 139   POTASSIUM mmol/L 4 0 4 8 4 6   CHLORIDE mmol/L 107 104 101   CO2 mmol/L 26 25 27   ANION GAP mmol/L 8 8 11   BUN mg/dL 22 26* 21   CREATININE mg/dL 1 46* 1 82* 1 83*   EGFR ml/min/1 73sq m 50 39 38   GLUCOSE RANDOM mg/dL 111 325* 214*   CALCIUM mg/dL 8 3 8 1* 8 7   AST U/L  --   --  16   ALT U/L  --   --  22   ALK PHOS U/L  --   --  48   TOTAL PROTEIN g/dL  --   --  7 7   BILIRUBIN TOTAL mg/dL  --   --  0 30       Results from last 7 days  Lab Units 01/31/18  0457 01/30/18  0449 01/29/18  1554   WBC Thousand/uL 4 31 5 74 7 58   HEMOGLOBIN g/dL 10 6* 10 8* 12 1   PLATELETS Thousands/uL 248 241 240       Results from last 7 days  Lab Units 01/29/18  1936 01/29/18  1554   BLOOD CULTURE  No Growth at 24 hrs  No Growth at 24 hrs  Imaging Studies:   I have personally reviewed pertinent imaging study reports and images in PACS  EKG, Pathology, and Other Studies:   I have personally reviewed pertinent reports

## 2018-01-31 NOTE — ASSESSMENT & PLAN NOTE
· Patient was found to have DVT last year  · Continue Coumadin    · INR was therapeutic on 01/29, repeat INR in the morning

## 2018-01-31 NOTE — CASE MANAGEMENT
Continued Stay Review    Date:  2018    Vital Signs: Temp (24hrs), Av 2 °F (36 8 °C), Min:98 1 °F (36 7 °C), Max:98 3 °F (36 8 °C)     HR:  [77-88] 77  Resp:  [18-20] 20  BP: (154-172)/(76-77) 154/76  SpO2:  [94 %-95 %] 95 %  Body mass index is 43 38 kg/m²  Medications:   Scheduled Meds:   Current Facility-Administered Medications:  acetaminophen 650 mg Oral Q6H PRN Mariam Spencer, PA-C    aspirin 81 mg Oral Daily Mariam Spencer, PA-C    calcium carbonate 1,000 mg Oral Daily PRN Mariam Spencer, PA-C    cephalexin 500 mg Oral Q6H [address] Carmela Stevenson MD    diphenhydrAMINE 25 mg Intravenous Q6H PRN Mariam Spencer, PA-C    ezetimibe 10 mg-pravastatin 80 mg combo dose  Oral HS Mariam Spencer, PA-C    fenofibrate 145 mg Oral Daily Mariam Spencer, PA-C    gabapentin 100 mg Oral HS Mariam Spencer, PA-C    insulin lispro 2-12 Units Subcutaneous TID AC Mariam Spencer, PA-C    insulin lispro 2-12 Units Subcutaneous HS Mariam Spencer, PA-C    insulin regular 80 Units Subcutaneous TID AC Mary Mena MD    metoprolol tartrate 50 mg Oral BID Mariam Spencer, PA-C    ondansetron 4 mg Intravenous Q6H PRN Mariam Spencer, PA-C    oxyCODONE 10 mg Oral Q4H PRN Mariam Spencer, PA-C    oxyCODONE-acetaminophen 1 tablet Oral Q4H PRN Mariam Spencer, PA-C    pantoprazole 40 mg Oral Daily Mariam Spencer, PA-C    senna 1 tablet Oral Daily Mariam Spencer, PA-C    sodium chloride 75 mL/hr Intravenous Continuous Mariam Spencer, HUGO Last Rate: Stopped (18 1157)   warfarin 2 5 mg Oral Daily (warfarin) Mariam Spencer PA-C      Continuous Infusions:   sodium chloride 75 mL/hr Last Rate: Stopped (18 1157)     PRN Meds:   acetaminophen    calcium carbonate    diphenhydrAMINE    ondansetron    oxyCODONE - used x 1      oxyCODONE-acetaminophen    Abnormal Labs/Diagnostic Results:   hgb 10 8, hct 36 1  Bun 26  Creatinine 1 82  Glucose 325  Calcium 8 1  Serial glucose 279;  334;  499;  240    Labs 1/31- hgb a1c  9  8    hgb 10 6, hct 35 6  Glucose 122; 230    Age/Sex: 60 y o  male     Assessment/Plan:   Cellulitis of left lower extremity   Assessment & Plan     · LLE erythema, swelling, tenderness, weeping; no SIRS criteria met; no non-healing diabetic foot wounds (but has chronic superficial wounds on the right lower extremity; he told me he had bout of infection there in the past; he told me that he follows up at the wound [facility] for this) ; no hx of MRSA  · Continue IV Ancef   · IVF  · Pain control   · ID consult  · Follow culture results  · [facility] the [facility]           Chronic deep vein thrombosis (DVT) of popliteal vein of left lower extremity ([address] )   Assessment & Plan     · Patient was found to have DVT last year  · Continue Coumadin  · INR is therapeutic           Type 2 diabetes mellitus with hyperglycemia (HCC)   Assessment & Plan     ·   Last A1c 9 0 in 2016  · Repeat hemoglobin A1c  · Continue home insulin schedule: humulin 500 75U QID  · SSI for correction  · Patient told me that he has brittle diabetes    He even told me "at 1 point his blood sugar was 1100 and his triglycerides was 3000 and he was admitted to the hospital and stayed in the hospital for a while"  He told me that his blood sugars are not well controlled  He told me that it is his primary care physician who has been managing his diabetes  He told me that his insurance would not cover for a diabetes doctor  With patient's blood sugars poorly controlled, will have Endocrinology to evaluate this patient    Having good control of patient's blood sugar will help with patient's infection and promote good wound healing           Hyperlipidemia   Assessment & Plan     · Continue fenofibrate and pravastatin           Anemia   Assessment & Plan     · Based on my review of patient's previous CBCs, patient has chronic anemia; likely anemia of chronic kidney disease  However, patient's hemoglobin was normal on admission and now it is 10 8  Possible hemodilution from IV fluids; possible error in the 1st lab  · Monitor patient's CBC  · No active bleeding  · For further workup and management if this worsens significantly           Coronary artery disease involving native heart   Assessment & Plan     · Continue aspirin, statin           Essential hypertension   Assessment & Plan     · Continue metoprolol   · Will adjust treatment accordingly based on his blood pressures           CKD (chronic kidney disease) stage 3, GFR 30-59 ml/min   Assessment & Plan     · Previously creatinine in August of 2017 was 2 0; however before this, in 2016, patient's creatinine had been normal   · Presently, patient's creatinine had stabilized at 1 8 level  Possibly disease new baseline for the patient  · Gentle IVF  · Repeat BMP in AM  · Monitor input and output             Discharge Plan: To be determined         Jocelyn Jones MD Physician Signed Infectious Disease  Consults Date of Service: 1/30/2018  2:49 PM   Consult Orders:   Inpatient consult to Infectious Diseases [[id number]] ordered by Sathya Young PA-C at 01/29/18 2118      Expand All Collapse All    []Hide copied text  []Hover for attribution information  Consultation - Infectious Disease   Rosa Maria Frye 60 y o  male MRN: [id number]  Unit/Bed#: ISMAEL Encounter: [id number]        Inpatient consult to Infectious Diseases  Consult performed by: Laura Brown ordered by: Felisa Rivas     IMPRESSION AND RECOMMENDATIONS:      * Cellulitis of left lower extremity   Assessment & Plan     Due to venous stasis with ulceration  Difficult to determine if erythema due to stasis versus actual infection  Clinically stable without sepsis     · Continue cefazolin for now  · Continue with serial exams  · Follow-up blood cultures from admission  · Follow temperatures closely  · Check CBC in a m           Venous stasis ulcer of left calf limited to breakdown of skin without varicose veins (HCC)   Assessment & Plan     Due to poorly controlled leg edema  Previously used compression stockings but none for 1 month  Spends most of the day with legs dependent     · Continue with leg elevation  · Request Podiatry consultation as known to Dr Celena Vang  · May benefit from compression wraps versus stockings again in the long-term          Chronic deep vein thrombosis (DVT) of popliteal vein of left lower extremity (HCC)   Assessment & Plan     On chronic anticoagulation     · Needs better long-term edema control          Type 2 diabetes mellitus with hyperglycemia (HCC)   Assessment & Plan     Risk factor for infection     · Continue management as per the primary service          CKD (chronic kidney disease) stage 3, GFR 30-59 ml/min   Assessment & Plan     Creatinine near baseline     · Follow creatinine closely and dose-adjust antibiotics as indicated  · Check BMP in a m              Antibiotics:  Cefazolin # 2     Thank you for this consultation  We will follow along with you      HISTORY OF PRESENT ILLNESS:  Reason for Consult: Cellulitis     HPI: Zoe Ernandez is a 60 y o  male history of poorly controlled diabetes and chronic of left lower extremity DVT with venous stasis ulcers  His wife is at the bedside and helps to provide a history  He has been followed by the wound Center in the past by Dr Celena Vang but was discharged after resolution of his ulcerations  He was previously wearing compression stockings although discontinued these approximately 1 month ago due to itching and irritation  Since that time he has noted worsening swelling and wounds on his legs  He admittedly spends most of his day seated in a chair with his legs dependent    He presented to the emergency department yesterday with a 1 week of worsening pain, swelling, and erythema of his left lower extremity  The swelling worsened and he started to have weeping of clear fluid  Upon presentation he was noted to be afebrile with a normal heart rate and WBC  He underwent a left lower extremity x-ray which showed no osseous abnormality  He was given doses of vancomycin and cefepime and started on cefazolin  Since admission he has remained afebrile with a normal WBC      REVIEW OF SYSTEMS:  Denies fevers, chills, sweats, nausea, vomiting, or diarrhea    A complete system-based review of systems is otherwise negative      PAST MEDICAL HISTORY:  Medical History        Past Medical History:   Diagnosis Date    Bell's palsy      Cardiac disease      Cerebellar [address] [address] )      Diabetes mellitus ([facility] )      Hyperlipidemia      Hypertension      Renal disorder      Stroke [facility])       2013         Surgical History         Past Surgical History:   Procedure Laterality Date    CORONARY ANGIOPLASTY WITH STENT PLACEMENT        FRACTURE SURGERY        INCISION AND DRAINAGE OF WOUND Right 9/13/2016     Procedure: INCISION AND DRAINAGE (I&D) EXTREMITY, right lateral ankle;  Surgeon: Mee Huizar DPM;  [facility];  Service:     WOUND DEBRIDEMENT Right 9/15/2016     Procedure: DEBRIDEMENT WOUND [facility] OUT) ankle wound with closure and FRANCY drain placement;  Surgeon: Mee Huizar DPM;  [facility];  Service:             FAMILY HISTORY:  Non-contributory     SOCIAL HISTORY:      History   Alcohol Use No          History   Drug Use No           History   Smoking Status    Former Smoker    Packs/day: 0 00    Types: Cigarettes    Quit date: 9/3/2006   Smokeless Tobacco    Never Used         ALLERGIES:       Allergies   Allergen Reactions    Penicillins Hives    Morphine And Related Anxiety         MEDICATIONS:  All current active medications have been reviewed      PHYSICAL EXAM:  Vitals:  HR:  [69-88] 69  Resp:  [17-20] 18  BP: (144-172)/(67-77) 147/67  SpO2:  [94 %-96 %] 96 %  Temp (24hrs), Av 1 °F (36 7 °C), Min:98 1 °F (36 7 °C), Max:98 1 °F (36 7 °C)  Current: Temperature: 98 1 °F (36 7 °C)     Physical Exam:  General:  Well-nourished, well-developed, in no acute distress  Eyes:  Conjunctive clear with no hemorrhages or effusions  Oropharynx:  No ulcers, no lesions  Neck:  Supple, no lymphadenopathy  Lungs:  Clear to auscultation bilaterally, no accessory muscle use  Cardiac:  Regular rate and rhythm, no murmurs  Abdomen:  Soft, non-tender, non-distended  Extremities:  No peripheral cyanosis, clubbing  Nonpitting edema bilateral lower extremities  Skin:  No rashes, scattered scabs and superficial wounds left greater than right  There is some mild reactive erythema of the left calf with faint blanching erythema  There is yellow weeping on the gauze  No purulence or fluctuance  Neurological:  Moves all four extremities spontaneously, sensation grossly intact     LABS, IMAGING, & OTHER STUDIES:  Lab Results:  I have personally reviewed pertinent labs      Results from last 7 days  Lab Units 18  04418  1554   SODIUM mmol/L 137 139   POTASSIUM mmol/L 4 8 4 6   CHLORIDE mmol/L 104 101   CO2 mmol/L 25 27   ANION GAP mmol/L 8 11   BUN mg/dL 26* 21   CREATININE mg/dL 1 82* 1 83*   EGFR ml/min/1 73sq m 39 38   GLUCOSE RANDOM mg/dL 325* 214*   CALCIUM mg/dL 8 1* 8 7   AST U/L  --  16   ALT U/L  --  22   ALK PHOS U/L  --  48   TOTAL PROTEIN g/dL  --  7 7   BILIRUBIN TOTAL mg/dL  --  0 30         Results from last 7 days  Lab Units 18  0449 18  1554   WBC Thousand/uL 5 74 7 58   HEMOGLOBIN g/dL 10 8* 12 1   PLATELETS Thousands/uL 241 240             Imaging Studies:   I have personally reviewed pertinent imaging study reports and images in PACS  X-ray left tibia/fibula  no osseous abnormality     EKG, Pathology, and Other Studies:   I have personally reviewed pertinent reports                          Thank you,  [address] [facility]  [facility] by [employer] for 2017  Network Utilization Review Department  Phone: [phone]; Fax [phone]  ATTENTION: The Network Utilization Review Department is now centralized for our 7 Facilities  Make a note that we have a new phone and fax numbers for our Department  Please call with any questions or concerns to [phone] and carefully follow the prompts so that you are directed to the right person  All voicemails are confidential  Fax any determinations, approvals, denials, and requests for initial or continue stay review clinical to [phone]  Due to HIGH CALL volume, it would be easier if you could please send faxed requests to expedite your requests and in part, help us provide discharge notifications faster

## 2018-01-31 NOTE — ASSESSMENT & PLAN NOTE
Poorly controlled edema  Outpatient follow-up with Podiatry for potential Unna boot once infection resolves  Continue local wound care as directed by [employer]

## 2018-01-31 NOTE — ASSESSMENT & PLAN NOTE
Due to poorly controlled leg edema  Previously used compression stockings but none for 1 month  Spends most of the day with legs dependent    · Continue with leg elevation and compression  · Outpatient podiatry follow-up

## 2018-01-31 NOTE — CONSULTS
Consult - Podiatry   Laura Lee 60 y o  male MRN: [id number]  Unit/Bed#: -01 Encounter: [id number]    Assessment/Plan     Assessment:  1  Left leg cellulitis secondary to 2   2   Bilateral venous insufficiency with ulceration, inflammation, and edema  3  Diabetic neuropathy poorly controlled  4  Chronic kidney disease stage 3  5  Chronic DVT    Plan:  1  Patient's left leg does have multiple areas of serous draining fluid  No culture was taken as there is no active purulence  Most likely streptococcal infection  Patient would benefit from local wound care with compressive dressing  He has done well in the past with these wounds using Unna boot therapy however over the next few days he will need close monitoring  I will do Adaptic and Ace bandage with bed elevation for now  Unna boot therapy can begun as outpatient once the acute infection has resolved  IV antibiotics per Infectious Disease  See picture of leg below  2  Patient is poorly controlled diabetic with neuropathy  He has not been seeing a podiatrist over the last year  He states his previous podiatrist stop taking his insurance and he did not try to find anyone  We talked about the necessity of him given his history of needing a regular visit with a podiatrist   I did provide my card as well as the names of many physicians closer to his house  History of Present Illness     HPI:  Laura Lee is a 60 y o  male who presents with cellulitis of his left leg  Patient is known to me from an infection of his right lower extremity approximately 1 year ago  He was discharged from the [address] [place] [address] last spring with follow-up of his regular podiatrist   Evidently he only saw the podiatrist 1 more time as the podiatrist up taking his insurance  He was not following anyone for his diabetic neuropathy or lower extremity swelling since then  He also states he has been having trouble seeing his family doctor for diabetic control    His last A1c was 10  He did not get compression stockings until approximately 1 month ago  He states he was told that the store that they relate techs free  He had been wearing them for about 2 weeks when his leg started to become itchy  The left leg blew up like a balloon 2 weeks ago  Over the last 4-5 days leg became red and swollen with multiple areas that began to drain  When he arrived to the emergency room he reported significant lower extremity pain and drainage from his leg  He is feeling better today although the pain remains  Consults  Review of Systems   Constitutional: Negative  HENT: Negative  Eyes: Negative  Respiratory: Negative  Cardiovascular: Negative  Gastrointestinal: Negative  Musculoskeletal:  Leg pain and redness   Skin:  Multiple lower extremity wounds   Neurological:  Painful neuropathy  Psych: negative      Historical Information   Past Medical History:   Diagnosis Date    Bell's palsy     Cardiac disease     Cerebellar stroke ([facility] )     Diabetes mellitus ([facility] )     Hyperlipidemia     Hypertension     Renal disorder     Stroke ([facility] )     2013     Past Surgical History:   Procedure Laterality Date    CORONARY ANGIOPLASTY WITH STENT PLACEMENT      FRACTURE SURGERY      INCISION AND DRAINAGE OF WOUND Right 9/13/2016    Procedure: INCISION AND DRAINAGE (I&D) EXTREMITY, right lateral ankle;  Surgeon: Francesca Flores DPM;  [facility];  Service:     WOUND DEBRIDEMENT Right 9/15/2016    Procedure: DEBRIDEMENT WOUND ([address]) ankle wound with closure and FRANCY drain placement;  Surgeon: Francesca Flores DPM;  [facility];  Service:      Social History   History   Alcohol Use No     History   Drug Use No     History   Smoking Status    Former Smoker    Packs/day: 0 00    Types: Cigarettes    Quit date: 9/3/2006   Smokeless Tobacco    Never Used     Family History: History reviewed  No pertinent family history      Meds/Allergies Prescriptions Prior to Admission   Medication    aspirin 81 mg chewable tablet    ezetimibe-simvastatin (VYTORIN) 10-40 mg per tablet    fenofibrate (TRICOR) 145 mg tablet    insulin regular (HUMULIN R) 500 units/mL CONCENTRATED injection    metoprolol tartrate (LOPRESSOR) 50 mg tablet    pantoprazole (PROTONIX) 40 mg tablet    warfarin (COUMADIN) 2 5 mg tablet     Allergies   Allergen Reactions    Penicillins Hives    Morphine And Related Anxiety       Objective   First Vitals:   Blood Pressure: 161/76 (01/29/18 1531)  Pulse: 77 (01/29/18 1531)  Temperature: 98 3 °F (36 8 °C) (01/29/18 1531)  Temp Source: Oral (01/29/18 1531)  Respirations: 18 (01/29/18 1531)  Height: 5' 7" (170 2 cm) (01/29/18 1531)  Weight - Scale: 126 kg (277 lb) (01/29/18 1531)  SpO2: 94 % (01/29/18 1531)    Current Vitals:   Blood Pressure: (!) 178/91 (01/31/18 0700)  Pulse: 75 (01/31/18 0700)  Temperature: 98 2 °F (36 8 °C) (01/31/18 0700)  Temp Source: Oral (01/31/18 0700)  Respirations: 18 (01/31/18 0700)  Height: 5' 7" (170 2 cm) (01/30/18 1617)  Weight - Scale: 124 kg (274 lb 7 6 oz) (01/30/18 1617)  SpO2: 96 % (01/31/18 0700)        BP (!) 178/91 (BP Location: Left arm)   Pulse 75   Temp 98 2 °F (36 8 °C) (Oral)   Resp 18   Ht 5' 7" (1 702 m)   Wt 124 kg (274 lb 7 6 oz)   SpO2 96%   BMI 42 99 kg/m²   Physical Exam:  General:    Alert, cooperative, no distress    Patient is obese   Head:    Normocephalic, without obvious abnormality, atraumatic   Eyes:    PERRL, conjunctiva/corneas clear, EOM's intact            Nose:   Moist mucous membranes, no drainage or sinus tenderness   Throat:   No tenderness, no exudates   Neck:   Supple, symmetrical, trachea midline, no JVD   Back:     Symmetric, no CVA tenderness   Lungs:     Clear to auscultation bilaterally, respirations unlabored   Chest wall:    No tenderness or deformity   Heart:    Regular rate and rhythm, S1 and S2 normal   Abdomen:     Soft, non-tender, bowel sounds active all four quadrants           Extremities:   Palpable pedal pulses  +2 pitting edema, L>>R  Diminished protective sensation  Limited left ankle ROM secondary to pain and swelling  Small areas of serous ulceration to the right leg without pain or erythema  The left leg has multiple areas of serous draining superficial wounds  There are multiple scabs as well  The skin is inflamed  There is mild rimma wound cellulitis surrounding all of these areas  There is no lymphangitic streaking  The lower leg is painful to touch  The skin is tense  No knee pain  Neurologic:   CNII-XII intact  Normal strength, sensation and reflexes       throughout     Lab Results:   Admission on 01/29/2018   Component Date Value    PTT 01/29/2018 77*    Protime 01/29/2018 28 7*    INR 01/29/2018 2 59*    Blood Culture 01/29/2018 No Growth at 24 hrs   Blood Culture 01/29/2018 No Growth at 24 hrs       WBC 01/29/2018 7 58     RBC 01/29/2018 4 75     Hemoglobin 01/29/2018 12 1     Hematocrit 01/29/2018 39 2     MCV 01/29/2018 83     MCH 01/29/2018 25 5*    MCHC 01/29/2018 30 9*    RDW 01/29/2018 17 3*    MPV 01/29/2018 8 9     Platelets 70/35/6929 240     Neutrophils Relative 01/29/2018 76*    Lymphocytes Relative 01/29/2018 15     Monocytes Relative 01/29/2018 8     Eosinophils Relative 01/29/2018 1     Basophils Relative 01/29/2018 0     Neutrophils Absolute 01/29/2018 5 74     Lymphocytes Absolute 01/29/2018 1 16     Monocytes Absolute 01/29/2018 0 59     Eosinophils Absolute 01/29/2018 0 07     Basophils Absolute 01/29/2018 0 02     Sodium 01/29/2018 139     Potassium 01/29/2018 4 6     Chloride 01/29/2018 101     CO2 01/29/2018 27     Anion Gap 01/29/2018 11     BUN 01/29/2018 21     Creatinine 01/29/2018 1 83*    Glucose 01/29/2018 214*    Calcium 01/29/2018 8 7     AST 01/29/2018 16     ALT 01/29/2018 22     Alkaline Phosphatase 01/29/2018 48     Total Protein 01/29/2018 7 7     Albumin 01/29/2018 2 8*    Total Bilirubin 01/29/2018 0 30     eGFR 01/29/2018 38     LACTIC ACID 01/29/2018 1 1     LACTIC ACID 01/29/2018 1 6     Color, UA 01/29/2018 Yellow     Clarity, UA 01/29/2018 Clear     Specific Gravity, UA 01/29/2018 >=1 030     pH, UA 01/29/2018 5 0     Leukocytes, UA 01/29/2018 Negative     Nitrite, UA 01/29/2018 Negative     Protein, UA 01/29/2018 >=300*    Glucose, UA 01/29/2018 100 (1/10%)*    Ketones, UA 01/29/2018 Negative     Urobilinogen, UA 01/29/2018 0 2     Bilirubin, UA 01/29/2018 Small*    Blood, UA 01/29/2018 Small*    RBC, UA 01/29/2018 0-1*    WBC, UA 01/29/2018 1-2*    Epithelial Cells 01/29/2018 Occasional     Bacteria, UA 01/29/2018 Occasional     POC Glucose 01/29/2018 390*    Sodium 01/30/2018 137     Potassium 01/30/2018 4 8     Chloride 01/30/2018 104     CO2 01/30/2018 25     Anion Gap 01/30/2018 8     BUN 01/30/2018 26*    Creatinine 01/30/2018 1 82*    Glucose 01/30/2018 325*    Calcium 01/30/2018 8 1*    eGFR 01/30/2018 39     WBC 01/30/2018 5 74     RBC 01/30/2018 4 32     Hemoglobin 01/30/2018 10 8*    Hematocrit 01/30/2018 36 1*    MCV 01/30/2018 84     MCH 01/30/2018 25 0*    MCHC 01/30/2018 29 9*    RDW 01/30/2018 17 2*    Platelets 43/33/5156 241     MPV 01/30/2018 9 3     POC Glucose 01/30/2018 279*    Ventricular Rate 01/29/2018 75     Atrial Rate 01/29/2018 75     [place] Interval 01/29/2018 150     QRSD Interval 01/29/2018 84     QT Interval 01/29/2018 362     QTC Interval 01/29/2018 404     P Axis 01/29/2018 42     QRS Axis 01/29/2018 2     T Wave Axis 01/29/2018 81     POC Glucose 01/30/2018 334*    POC Glucose 01/30/2018 499*    POC Glucose 01/30/2018 240*    POC Glucose 01/30/2018 204*    WBC 01/31/2018 4 31     RBC 01/31/2018 4 27     Hemoglobin 01/31/2018 10 6*    Hematocrit 01/31/2018 35 6*    MCV 01/31/2018 83     MCH 01/31/2018 24 8*    MCHC 01/31/2018 29 8*    RDW 01/31/2018 16 8*    Platelets 76/33/1214 248     MPV 01/31/2018 8 8*    Sodium 01/31/2018 141     Potassium 01/31/2018 4 0     Chloride 01/31/2018 107     CO2 01/31/2018 26     Anion Gap 01/31/2018 8     BUN 01/31/2018 22     Creatinine 01/31/2018 1 46*    Glucose 01/31/2018 111     Calcium 01/31/2018 8 3     eGFR 01/31/2018 50      Imaging: I have personally reviewed pertinent films in PACS  EKG, Pathology, and Other Studies: I have personally reviewed pertinent reports  Code Status: Level 1 - Full Code  Advance Directive and Living Will:      Power of :    POLST:        Portions of the record may have been created with voice recognition software  Occasional wrong word or "sound a like" substitutions may have occurred due to the inherent limitations of voice recognition software  Read the chart carefully and recognize, using context, where substitutions have occurred

## 2018-01-31 NOTE — PROGRESS NOTES
[facility] Internal Medicine Progress Note  Patient: Yuki Curtis 60 y o  male   MRN: [id number]  PCP: Josh Zendejas MD  Unit/Bed#: -[place] Encounter: [id number]  Date Of Visit: 01/31/18    Assessment/Plan:    * Cellulitis of left lower extremity   Assessment & Plan    · Was transitioned from [address] to Keflex today to complete for 4 additional days  · Plan for to discharge tomorrow on oral antibiotics as long as leg remained stable  · Discussed with ID and Podiatry  · Question true infection versus venous stasis  · Outpatient follow-up with the wound [place]  Venous stasis ulcer of left calf limited to breakdown of skin without varicose veins (HCC)   Assessment & Plan    Poorly controlled edema  Outpatient follow-up with Podiatry for potential Unna boot once infection resolves  Continue local wound care as directed by Podiatry        Chronic deep vein thrombosis (DVT) of popliteal vein of left lower extremity ([address] )   Assessment & Plan    · Patient was found to have DVT last year  · Continue Coumadin  · INR was therapeutic on 01/29, repeat INR in the morning        Anemia   Assessment & Plan    · Hemoglobin 10 6 and remained stable  · Monitor patient's CBC  · No active bleeding  Essential hypertension   Assessment & Plan    · Continue metoprolol   · Will adjust treatment accordingly based on his blood pressures    · Blood pressure 150 to 170 at this time  · Continue monitor blood pressure and if needed will add additional agent        CKD (chronic kidney disease) stage 3, GFR 30-59 ml/min   Assessment & Plan    · Acute kidney injury, present on admission, resolved  · On admission creatinine 1 8 a now 1 46  · Stop IVF        Type 2 diabetes mellitus with hyperglycemia, with long-term current use of insulin ([facility])   Assessment & Plan    · A1C 9 8  · Endocrinology evaluated, increased to Humulin U500 80 units TID with meals  · SSI for correction        Coronary artery disease involving native heart Assessment & Plan    · Continue aspirin, statin           Morbid obesity:  Counseled on weight loss    VTE Pharmacologic Prophylaxis:   Pharmacologic: Warfarin (Coumadin)  Mechanical VTE Prophylaxis in Place: Yes    Patient Centered Rounds: I have performed bedside rounds with nursing staff today  Aretha Martini    Discussions with Specialists or Other Care Team Provider: nursing, ID, podiatry    Education and Discussions with Family / Patient: patient, wife at bedside    Time Spent for Care: 30 minutes  More than 50% of total time spent on counseling and coordination of care as described above  Current Length of Stay: 2 day(s)    Current Patient Status: Inpatient   Certification Statement: The patient will continue to require additional inpatient hospital stay due to Likely discharge in the morning pending podiatry evaluation    Discharge Plan / Estimated Discharge Date:  If leg stable, discharge tomorrow on oral antibiotics    Code Status: Level 1 - Full Code      Subjective:   Feeling well, no nausea, vomiting, diarrhea, abdominal pain  Feels improved since admission  No dizziness, chest pain or shortness of breath    Objective:     Vitals:   Temp (24hrs), Av 3 °F (36 8 °C), Min:98 2 °F (36 8 °C), Max:98 5 °F (36 9 °C)    HR:  [68-75] 73  Resp:  [18] 18  BP: (150-178)/(72-91) 170/72  SpO2:  [95 %-96 %] 95 %  Body mass index is 42 99 kg/m²  Input and Output Summary (last 24 hours): Intake/Output Summary (Last 24 hours) at 18 1626  Last data filed at 18 1300   Gross per 24 hour   Intake              240 ml   Output              950 ml   Net             -710 ml       Physical Exam:     Physical Exam   Constitutional: No distress  HENT:   Head: Normocephalic and atraumatic  Eyes: Conjunctivae are normal    Neck: No JVD present  Cardiovascular: Normal rate, regular rhythm, normal heart sounds and intact distal pulses      Pulmonary/Chest: Effort normal and breath sounds normal  No respiratory distress  He has no wheezes  He has no rales  Abdominal: Soft  Bowel sounds are normal  He exhibits no distension  There is no tenderness  There is no rebound and no guarding  Obese   Musculoskeletal: He exhibits edema (BLE)  Neurological: He is alert  Skin: Skin is warm  No rash noted  He is not diaphoretic  There is erythema  Left leg with Ace wrap and dressing, mild erythema noted  Right leg with scattered abrasions, areas of ecchymoses without erythema   Psychiatric: He has a normal mood and affect  His behavior is normal    Nursing note and vitals reviewed  Additional Data:     Labs:      Results from last 7 days  Lab Units 01/31/18  0457  01/29/18  1554   WBC Thousand/uL 4 31  < > 7 58   HEMOGLOBIN g/dL 10 6*  < > 12 1   HEMATOCRIT % 35 6*  < > 39 2   PLATELETS Thousands/uL 248  < > 240   NEUTROS PCT %  --   --  76*   LYMPHS PCT %  --   --  15   MONOS PCT %  --   --  8   EOS PCT %  --   --  1   < > = values in this interval not displayed  Results from last 7 days  Lab Units 01/31/18  0457 01/29/18  1554   SODIUM mmol/L 141  < > 139   POTASSIUM mmol/L 4 0  < > 4 6   CHLORIDE mmol/L 107  < > 101   CO2 mmol/L 26  < > 27   BUN mg/dL 22  < > 21   CREATININE mg/dL 1 46*  < > 1 83*   CALCIUM mg/dL 8 3  < > 8 7   TOTAL PROTEIN g/dL  --   --  7 7   BILIRUBIN TOTAL mg/dL  --   --  0 30   ALK PHOS U/L  --   --  48   ALT U/L  --   --  22   AST U/L  --   --  16   GLUCOSE RANDOM mg/dL 111  < > 214*   < > = values in this interval not displayed  Results from last 7 days  Lab Units 01/29/18  1554   INR  2 59*       * I Have Reviewed All Lab Data Listed Above  * Additional Pertinent Lab Tests Reviewed:  All Labs Within Last 24 Hours Reviewed    Imaging:    Imaging Reports Reviewed Today Include: none  Imaging Personally Reviewed by Myself Includes:  none    Recent Cultures (last 7 days):       Results from last 7 days  Lab Units 01/29/18  1936 01/29/18  1554   BLOOD CULTURE  No Growth at 24 hrs  No Growth at 24 hrs  Last 24 Hours Medication List:     Current Facility-Administered Medications:  acetaminophen 650 mg Oral Q6H PRN Mariam Spencer, HUGO   aspirin 81 mg Oral Daily Mraiam Spencer, HUGO   calcium carbonate 1,000 mg Oral Daily PRN Mariam Spencer, HUGO   cephalexin 500 mg Oral Q6H [facility] [place] Jamey Shipley MD   diphenhydrAMINE 25 mg Intravenous Q6H PRN Mariam Spencer, HUGO   ezetimibe 10 mg-pravastatin 80 mg combo dose  Oral HS Mariam KATHERINE Spencer, HUGO   fenofibrate 145 mg Oral Daily Mariam Spencer, HUGO   gabapentin 100 mg Oral HS Mariam Spencer, HUGO   insulin lispro 2-12 Units Subcutaneous TID  Mariam Spencer, UHGO   insulin lispro 2-12 Units Subcutaneous HS Mariam KATHERINE Spencer, HUGO   insulin regular 80 Units Subcutaneous TID  Mary Mena MD   metoprolol tartrate 50 mg Oral BID Mariam Spencer, HUGO   ondansetron 4 mg Intravenous Q6H PRN Mariam Spencer, HUGO   oxyCODONE 10 mg Oral Q4H PRN Mariam Spencer, HUGO   oxyCODONE-acetaminophen 1 tablet Oral Q4H PRN Mariam Spencer, HUGO   pantoprazole 40 mg Oral Daily Mariam Spencer, HUGO   senna 1 tablet Oral Daily Mariam Spencer, HUGO   warfarin 2 5 mg Oral Daily (warfarin) Mariam Spencer PA-C        Today, Patient Was Seen By: Yodit Naranjo PA-C    ** Please Note: This note has been constructed using a voice recognition system   **

## 2018-01-31 NOTE — ASSESSMENT & PLAN NOTE
· Was transitioned from [address] to Keflex today to complete for 4 additional days  · Plan for to discharge tomorrow on oral antibiotics as long as leg remained stable  · Discussed with ID and Podiatry  · Question true infection versus venous stasis  · Outpatient follow-up with the wound [place]

## 2018-02-01 LAB
ANION GAP SERPL CALCULATED.3IONS-SCNC: 8 MMOL/L (ref 4–13)
BUN SERPL-MCNC: 19 MG/DL (ref 5–25)
CALCIUM SERPL-MCNC: 9 MG/DL (ref 8.3–10.1)
CHLORIDE SERPL-SCNC: 108 MMOL/L (ref 100–108)
CO2 SERPL-SCNC: 28 MMOL/L (ref 21–32)
CREAT SERPL-MCNC: 1.52 MG/DL (ref 0.6–1.3)
ERYTHROCYTE [DISTWIDTH] IN BLOOD BY AUTOMATED COUNT: 16.7 % (ref 11.6–15.1)
GFR SERPL CREATININE-BSD FRML MDRD: 48 ML/MIN/1.73SQ M
GLUCOSE SERPL-MCNC: 111 MG/DL (ref 65–140)
GLUCOSE SERPL-MCNC: 216 MG/DL (ref 65–140)
GLUCOSE SERPL-MCNC: 292 MG/DL (ref 65–140)
GLUCOSE SERPL-MCNC: 310 MG/DL (ref 65–140)
GLUCOSE SERPL-MCNC: 69 MG/DL (ref 65–140)
HCT VFR BLD AUTO: 38.5 % (ref 36.5–49.3)
HGB BLD-MCNC: 11.3 G/DL (ref 12–17)
INR PPP: 2.62 (ref 0.86–1.16)
MCH RBC QN AUTO: 24.5 PG (ref 26.8–34.3)
MCHC RBC AUTO-ENTMCNC: 29.4 G/DL (ref 31.4–37.4)
MCV RBC AUTO: 84 FL (ref 82–98)
PLATELET # BLD AUTO: 265 THOUSANDS/UL (ref 149–390)
PMV BLD AUTO: 8.4 FL (ref 8.9–12.7)
POTASSIUM SERPL-SCNC: 4.8 MMOL/L (ref 3.5–5.3)
PROTHROMBIN TIME: 29 SECONDS (ref 12.1–14.4)
RBC # BLD AUTO: 4.61 MILLION/UL (ref 3.88–5.62)
SODIUM SERPL-SCNC: 144 MMOL/L (ref 136–145)
WBC # BLD AUTO: 4.48 THOUSAND/UL (ref 4.31–10.16)

## 2018-02-01 PROCEDURE — 99232 SBSQ HOSP IP/OBS MODERATE 35: CPT | Performed by: INTERNAL MEDICINE

## 2018-02-01 PROCEDURE — 82948 REAGENT STRIP/BLOOD GLUCOSE: CPT

## 2018-02-01 PROCEDURE — 99232 SBSQ HOSP IP/OBS MODERATE 35: CPT | Performed by: PHYSICIAN ASSISTANT

## 2018-02-01 PROCEDURE — 85027 COMPLETE CBC AUTOMATED: CPT | Performed by: PHYSICIAN ASSISTANT

## 2018-02-01 PROCEDURE — 85610 PROTHROMBIN TIME: CPT | Performed by: PHYSICIAN ASSISTANT

## 2018-02-01 PROCEDURE — 80048 BASIC METABOLIC PNL TOTAL CA: CPT | Performed by: PHYSICIAN ASSISTANT

## 2018-02-01 RX ADMIN — WARFARIN SODIUM 2.5 MG: 2.5 TABLET ORAL at 17:01

## 2018-02-01 RX ADMIN — INSULIN LISPRO 4 UNITS: 100 INJECTION, SOLUTION INTRAVENOUS; SUBCUTANEOUS at 11:46

## 2018-02-01 RX ADMIN — PANTOPRAZOLE SODIUM 40 MG: 40 TABLET, DELAYED RELEASE ORAL at 07:46

## 2018-02-01 RX ADMIN — SENNOSIDES 8.6 MG: 8.6 TABLET, FILM COATED ORAL at 07:46

## 2018-02-01 RX ADMIN — METOPROLOL TARTRATE 50 MG: 50 TABLET ORAL at 07:46

## 2018-02-01 RX ADMIN — INSULIN HUMAN 60 UNITS: 500 INJECTION, SOLUTION SUBCUTANEOUS at 17:06

## 2018-02-01 RX ADMIN — CEPHALEXIN 500 MG: 500 CAPSULE ORAL at 23:08

## 2018-02-01 RX ADMIN — INSULIN LISPRO 8 UNITS: 100 INJECTION, SOLUTION INTRAVENOUS; SUBCUTANEOUS at 17:01

## 2018-02-01 RX ADMIN — INSULIN LISPRO 3 UNITS: 100 INJECTION, SOLUTION INTRAVENOUS; SUBCUTANEOUS at 20:52

## 2018-02-01 RX ADMIN — METOPROLOL TARTRATE 50 MG: 50 TABLET ORAL at 17:00

## 2018-02-01 RX ADMIN — CEPHALEXIN 500 MG: 500 CAPSULE ORAL at 05:32

## 2018-02-01 RX ADMIN — OXYCODONE HYDROCHLORIDE 10 MG: 10 TABLET ORAL at 20:49

## 2018-02-01 RX ADMIN — FENOFIBRATE 145 MG: 145 TABLET, FILM COATED ORAL at 07:47

## 2018-02-01 RX ADMIN — INSULIN HUMAN 80 UNITS: 500 INJECTION, SOLUTION SUBCUTANEOUS at 11:46

## 2018-02-01 RX ADMIN — INSULIN HUMAN 80 UNITS: 500 INJECTION, SOLUTION SUBCUTANEOUS at 07:47

## 2018-02-01 RX ADMIN — EZETIMIBE: 10 TABLET ORAL at 23:08

## 2018-02-01 RX ADMIN — CEPHALEXIN 500 MG: 500 CAPSULE ORAL at 11:46

## 2018-02-01 RX ADMIN — ASPIRIN 81 MG 81 MG: 81 TABLET ORAL at 07:47

## 2018-02-01 RX ADMIN — CEPHALEXIN 500 MG: 500 CAPSULE ORAL at 17:00

## 2018-02-01 NOTE — ASSESSMENT & PLAN NOTE
· A1C 9 8  · Endocrinology evaluated, increased to Humulin U500 80 units TID with meals  · SSI for correction  · Blood sugars:  216, 111, 69, 234

## 2018-02-01 NOTE — ASSESSMENT & PLAN NOTE
· Poorly controlled edema  · Outpatient follow-up with Podiatry for potential Unna boot once infection resolves  · Continue local wound care as directed by [facility]

## 2018-02-01 NOTE — ASSESSMENT & PLAN NOTE
· Was transitioned from Ancef to Keflex yesterday to complete for 3 additional days per ID recommendations  · Appreciate podiatry input  · Question true infection versus venous stasis  · Outpatient follow-up with the wound [place]  · Placement of Brissa boot as an out-patient

## 2018-02-01 NOTE — PROGRESS NOTES
Progress Note - Tressa Sutton 60 y o  male MRN: [id number]    Unit/Bed#: -01 Encounter: [id number]      CC: diabetes f/u    Subjective:   Tressa Sutton is a 60y o  year old male with type 2 diabetes  Feels well  No complaints  +ve  Hypoglycemia at 3-4 AM   Appetite - good   No nausea or vomiting     Objective:     Vitals: Blood pressure 170/80, pulse 74, temperature 97 7 °F (36 5 °C), temperature source Oral, resp  rate 18, height 5' 7" (1 702 m), weight 124 kg (274 lb 7 6 oz), SpO2 96 %  ,Body mass index is 42 99 kg/m²  Intake/Output Summary (Last 24 hours) at 02/01/18 1521  Last data filed at 02/01/18 1332   Gross per 24 hour   Intake              360 ml   Output             1350 ml   Net             -990 ml       Physical Exam:  General Appearance: awake, appears stated age and cooperative  Head: Normocephalic, without obvious abnormality, atraumatic  Extremities: moves all extremities, left lower extremity in dressing   Skin: Skin color and temperature normal    Pulm: no labored breathing    Lab, Imaging and other studies: I have personally reviewed pertinent reports  POC Glucose (mg/dl)   Date Value   02/01/2018 216 (H)   02/01/2018 111   01/31/2018 234 (H)   01/31/2018 186 (H)   01/31/2018 230 (H)   01/31/2018 122   01/30/2018 204 (H)   01/30/2018 240 (H)   01/30/2018 499 (H)   01/30/2018 334 (H)       Assessment:  diabetes with hyperglycemia and hypoglycemia    Plan:  Reduce Humulin U 500, regular insulin at dinnertime to 60 units  -continue Humulin U 500 regular insulin at 80 units with breakfast and lunch  -continue Humalog sliding scale( see orders in epic)  -hypoglycemia protocol is in place      Portions of the record may have been created with voice recognition software

## 2018-02-01 NOTE — PROGRESS NOTES
Progress Note - Podiatry  Michelle Lua 60 y o  male MRN: [id number]  Unit/Bed#: -01 Encounter: [id number]    Assessment  1  Left leg cellulitis secondary to 2   2   Bilateral venous insufficiency with ulceration, inflammation, and edema  3  Diabetic neuropathy poorly controlled  4  Chronic kidney disease stage 3  5  Chronic DVT    Plan:  1  The left leg is significantly improved from yesterday  There is very minimal evidence of any cellulitis at this point  The leg is more inflammatory  Is responding well to local wound care compression and elevation  The patient was transitioned oral Keflex yesterday and in my opinion is stable to discharge at any point  I will write wound care instructions in the patient's chart  Subjective/Objective   Chief Complaint:   Chief Complaint   Patient presents with    Wound Infection     Pt presents to ED due to LLE infection starting 1 month ago  Subjective: 60 y o  y/o male seen and evaluated at bedside  No acute events overnight  Denies N/F/V/SOB/CP/cough/diarrhea/constipation  The pain is better than yesterday  He feels well but is worried about going home  Blood pressure 170/80, pulse 74, temperature 97 7 °F (36 5 °C), temperature source Oral, resp  rate 18, height 5' 7" (1 702 m), weight 124 kg (274 lb 7 6 oz), SpO2 96 %  ,Body mass index is 42 99 kg/m²  Lab Results   Component Value Date    WBC 4 48 02/01/2018    HGB 11 3 (L) 02/01/2018    HCT 38 5 02/01/2018    MCV 84 02/01/2018     02/01/2018     Lab Results   Component Value Date    GLUCOSE 69 02/01/2018    CALCIUM 9 0 02/01/2018     02/01/2018    K 4 8 02/01/2018    CO2 28 02/01/2018     02/01/2018    BUN 19 02/01/2018    CREATININE 1 52 (H) 02/01/2018         Invasive Devices     Peripheral Intravenous Line            Peripheral IV 01/29/18 Left Arm 2 days                Physical Exam:   General: alert, cooperative and no distress   Obese  Lungs: Normal respiratory effort, LCTABL  Heart: regular rate and rhythm   Abdomen: soft, non-tender  Extremity: Dramatic improvement left leg erythema and drainage from yesterday  Pain is improved and isolated to the open area to the back of the leg  Serous drainage is minimal  No deep wound, all areas are partial thickness  No hallie cellulitis  Lab, Imaging and other studies:     Imaging: I have personally reviewed pertinent films in PACS  EKG, Pathology, and Other Studies: I have personally reviewed pertinent reports  Portions of the record may have been created with voice recognition software  Occasional wrong word or "sound a like" substitutions may have occurred due to the inherent limitations of voice recognition software  Read the chart carefully and recognize, using context, where substitutions have occurred

## 2018-02-01 NOTE — DISCHARGE INSTRUCTIONS
Wound Care  Please change dressing to left leg 3 times per week  Gently wash any open area with saline and dry well  Cover with adaptic ABD kerlex  Wrap left leg with ace bandage from toes to knee  Encourage elevation as much as possible

## 2018-02-01 NOTE — PROGRESS NOTES
Progress Note - Infectious Disease   Altamese Rm 60 y o  male MRN: [id number]  Unit/Bed#: -01 Encounter: [id number]      Impression/Recommendations:  * Cellulitis of left lower extremity   Assessment & Plan    Due to venous stasis with ulceration  Difficult to determine if erythema due to stasis versus actual infection  Blood cultures negative  Clinically stable without sepsis  Improved on dressing change by podiatry today    · Continue Keflex for 3 more days  · Follow temperatures closely        Venous stasis ulcer of left calf limited to breakdown of skin without varicose veins (HCC)   Assessment & Plan    Due to poorly controlled leg edema  Previously used compression stockings but none for 1 month  Spends most of the day with legs dependent    · Continue with leg elevation and compression  · Outpatient podiatry follow-up        Chronic deep vein thrombosis (DVT) of popliteal vein of left lower extremity (HCC)   Assessment & Plan    On chronic anticoagulation    · Needs better long-term edema control        Type 2 diabetes mellitus with hyperglycemia, with long-term current use of insulin (HCC)   Assessment & Plan    Poorly controlled with A1C 9 8  Risk factor for infection    · Continue management as per the primary service        CKD (chronic kidney disease) stage 3, GFR 30-59 ml/min   Assessment & Plan    Creatinine near baseline    · Follow creatinine closely and dose-adjust antibiotics as indicated        The patient is stable from an ID standpoint for D/C  We will sign off for now  Please call with new questions  Antibiotics:  Keflex #2  Antibiotics #4    Subjective:  Patient seen on AM rounds  Denies fevers, chills, or sweats  Denies nausea, vomiting, or diarrhea  24 Hour Events:  Seen by podiatry for compression wrap change  Leg appeared markedly improved  Patient cleared for D/C but patient refused      Objective:  Vitals:  HR:  [68-74] 74  Resp:  [18] 18  BP: (143-170)/(65-80) 170/80  SpO2:  [95 %-96 %] 96 %  Temp (24hrs), Av 3 °F (36 8 °C), Min:97 7 °F (36 5 °C), Max:98 6 °F (37 °C)  Current: Temperature: 97 7 °F (36 5 °C)    Physical Exam:   General:  No acute distress  Psychiatric:  Awake and alert  Pulmonary:  Normal respiratory excursion without accessory muscle use  Abdomen:  Soft, nontender  Extremities:  Non pitting edema  Skin:  No rashes  Left leg:  Compression wrap to calf, no cellulitis left calf    Lab Results:  I have personally reviewed pertinent labs  Results from last 7 days  Lab Units 18  04518  0457 18  0449 18  1554   SODIUM mmol/L 144 141 137 139   POTASSIUM mmol/L 4 8 4 0 4 8 4 6   CHLORIDE mmol/L 108 107 104 101   CO2 mmol/L 28 26 25 27   ANION GAP mmol/L 8 8 8 11   BUN mg/dL 19 22 26* 21   CREATININE mg/dL 1 52* 1 46* 1 82* 1 83*   EGFR ml/min/1 73sq m 48 50 39 38   GLUCOSE RANDOM mg/dL 69 111 325* 214*   CALCIUM mg/dL 9 0 8 3 8 1* 8 7   AST U/L  --   --   --  16   ALT U/L  --   --   --  22   ALK PHOS U/L  --   --   --  48   TOTAL PROTEIN g/dL  --   --   --  7 7   BILIRUBIN TOTAL mg/dL  --   --   --  0 30       Results from last 7 days  Lab Units 187 18  0449   WBC Thousand/uL 4 48 4 31 5 74   HEMOGLOBIN g/dL 11 3* 10 6* 10 8*   PLATELETS Thousands/uL 265 248 241       Results from last 7 days  Lab Units 18  1936 18  1554   BLOOD CULTURE  No Growth at 48 hrs  No Growth at 48 hrs  Imaging Studies:   I have personally reviewed pertinent imaging study reports and images in PACS  EKG, Pathology, and Other Studies:   I have personally reviewed pertinent reports

## 2018-02-01 NOTE — PROGRESS NOTES
Progress Note - Maritza Gray 1955, 60 y o  male MRN: [id number]  Unit/Bed#: -01 Encounter: [id number]  Primary Care Provider: Yumi Whitaker MD   Date and time admitted to hospital: 1/29/2018  6:43 PM    * Cellulitis of left lower extremity   Assessment & Plan    · Was transitioned from Ancef to Keflex yesterday to complete for 3 additional days per ID recommendations  · Appreciate podiatry input  · Question true infection versus venous stasis  · Outpatient follow-up with the wound [place]  · Placement of Brissa boot as an out-patient  Venous stasis ulcer of left calf limited to breakdown of skin without varicose veins (HCC)   Assessment & Plan    · Poorly controlled edema  · Outpatient follow-up with Podiatry for potential [facility] boot once infection resolves  · Continue local wound care as directed by Podiatry        Chronic deep vein thrombosis (DVT) of popliteal vein of left lower extremity ([address] )   Assessment & Plan    · Patient was found to have DVT last year  · INR therapeutic: 2 62  · Continue Coumadin  Hyperlipidemia   Assessment & Plan    · Continue fenofibrate and pravastatin         Anemia in stage 3 chronic kidney disease   Assessment & Plan    · Stable  Continue to monitor  Coronary artery disease involving native heart   Assessment & Plan    · Continue aspirin, statin         Essential hypertension   Assessment & Plan    · Continue metoprolol for now  CKD (chronic kidney disease) stage 3, GFR 30-59 ml/min   Assessment & Plan    · Baseline creatinine: 1 4-1 6        Type 2 diabetes mellitus with hyperglycemia, with long-term current use of insulin (HCC)   Assessment & Plan    · A1C 9 8  · Endocrinology evaluated, increased to Humulin U500 80 units TID with meals  · SSI for correction  · Blood sugars:  216, 111, 69, 234          VTE Pharmacologic Prophylaxis:   Pharmacologic: Warfarin (Coumadin)  Mechanical: Mechanical VTE prophylaxis in place      Patient Centered Rounds: I have performed bedside rounds with nursing staff today  Discussions with Specialists or Other Care Team Provider: None  Education and Discussions with Family / Patient: Patient's wife at the bedside  All questions answered to the best of my ability  Time Spent for Care: 20 minutes  More than 50% of total time spent on counseling and coordination of care as described above  Current Length of Stay: 3 day(s)  Current Patient Status: Inpatient   Certification Statement: The patient will continue to require additional inpatient hospital stay due to patient refusal to be discharged today and requesting another midnight for observation  Discharge Plan: Patient is medically stable for discharge however he nor his wife are comfortable with him being discharged today and are requesting 1 more day of observation  Code Status: Level 1 - Full Code    Subjective:   Patient states he is doing much better today  He is able to bear weight on his left lower extremity without any discomfort  He states his pain is very well controlled  When I spoke to him discharge planning for today, he became very anxious with that idea  His wife is at bedside and is just as concerned with a discharge plan for today  Objective:   Vitals:   Temp (24hrs), Av 3 °F (36 8 °C), Min:97 7 °F (36 5 °C), Max:98 6 °F (37 °C)    HR:  [68-74] 74  Resp:  [18] 18  BP: (143-170)/(65-80) 170/80  SpO2:  [95 %-96 %] 96 %  Body mass index is 42 99 kg/m²  Input and Output Summary (last 24 hours): Intake/Output Summary (Last 24 hours) at 18 1149  Last data filed at 18 0854   Gross per 24 hour   Intake              600 ml   Output             1175 ml   Net             -575 ml       Physical Exam:     Physical Exam   HENT:   Head: Normocephalic and atraumatic  Mouth/Throat: Oropharynx is clear and moist and mucous membranes are normal    Eyes: No scleral icterus     Cardiovascular: Normal rate and regular rhythm  No murmur heard  Pulmonary/Chest: Breath sounds normal  He has no wheezes  He has no rales  He exhibits no tenderness  Abdominal: Soft  Bowel sounds are normal  He exhibits no distension  There is no tenderness  Musculoskeletal: Normal range of motion  He exhibits edema (Lower extremities  )  Left lower leg ACE wrapped  Skin: Skin is warm and dry  No rash noted  Psychiatric: He has a normal mood and affect  Vitals reviewed  Additional Data:   Labs:    Results from last 7 days  Lab Units 02/01/18  0455  01/29/18  1554   WBC Thousand/uL 4 48  < > 7 58   HEMOGLOBIN g/dL 11 3*  < > 12 1   HEMATOCRIT % 38 5  < > 39 2   PLATELETS Thousands/uL 265  < > 240   NEUTROS PCT %  --   --  76*   LYMPHS PCT %  --   --  15   MONOS PCT %  --   --  8   EOS PCT %  --   --  1   < > = values in this interval not displayed  Results from last 7 days  Lab Units 02/01/18 0455  01/29/18  1554   SODIUM mmol/L 144  < > 139   POTASSIUM mmol/L 4 8  < > 4 6   CHLORIDE mmol/L 108  < > 101   CO2 mmol/L 28  < > 27   BUN mg/dL 19  < > 21   CREATININE mg/dL 1 52*  < > 1 83*   CALCIUM mg/dL 9 0  < > 8 7   TOTAL PROTEIN g/dL  --   --  7 7   BILIRUBIN TOTAL mg/dL  --   --  0 30   ALK PHOS U/L  --   --  48   ALT U/L  --   --  22   AST U/L  --   --  16   GLUCOSE RANDOM mg/dL 69  < > 214*   < > = values in this interval not displayed  Results from last 7 days  Lab Units 02/01/18  0455   INR  2 62*       * I Have Reviewed All Lab Data Listed Above  * Additional Pertinent Lab Tests Reviewed: All Labs Within Last 24 Hours Reviewed    Imaging:    Imaging Reports Reviewed Today Include: None new    Cultures:   Blood Culture:   Lab Results   Component Value Date    BLOODCX No Growth at 48 hrs  01/29/2018    BLOODCX No Growth at 48 hrs  01/29/2018    BLOODCX No Growth After 5 Days  08/29/2016    BLOODCX No Growth After 5 Days   08/29/2016     Urine Culture: No results found for: URINECX  Sputum Culture: No components found for: SPUTUMCX  Wound Culture:   Lab Results   Component Value Date    WOUNDCULT 1+ Growth of Streptococcus Group A beta hemolytic 08/30/2016       Last 24 Hours Medication List:     Current Facility-Administered Medications:  acetaminophen 650 mg Oral Q6H PRN Mariam Spencer PA-C   aspirin 81 mg Oral Daily Mariam Spencer, HUGO   calcium carbonate 1,000 mg Oral Daily PRN Mariam Spencer, HUGO   cephalexin 500 mg Oral Q6H [address] Ankush Prince MD   diphenhydrAMINE 25 mg Intravenous Q6H PRN Mariam Spencer PA-C   ezetimibe 10 mg-pravastatin 80 mg combo dose  Oral HS Mariam Spencer PA-C   fenofibrate 145 mg Oral Daily Mariam Spencer, HUGO   gabapentin 100 mg Oral HS Mariam Spencer PA-C   insulin lispro 1-5 Units Subcutaneous HS Mary Mena MD   insulin lispro 2-12 Units Subcutaneous TID AC Mariam Spencer, HUGO   insulin regular 80 Units Subcutaneous TID  Mary Mena MD   metoprolol tartrate 50 mg Oral BID Mariam Spencer PA-C   ondansetron 4 mg Intravenous Q6H PRN Mariam Spencer PA-C   oxyCODONE 10 mg Oral Q4H PRN Mariam Spencer PA-C   oxyCODONE-acetaminophen 1 tablet Oral Q4H PRN Mariam Spencer PA-C   pantoprazole 40 mg Oral Daily Mariam Spencer PA-C   senna 1 tablet Oral Daily Mariam Spencer PA-C   warfarin 2 5 mg Oral Daily (warfarin) Mariam Spencer PA-C        Today, Patient Was Seen By: Florence Chen PA-C    ** Please Note: Dragon 360 Dictation voice to text software may have been used in the creation of this document   **

## 2018-02-02 VITALS
HEIGHT: 67 IN | TEMPERATURE: 98.2 F | WEIGHT: 274.47 LBS | BODY MASS INDEX: 43.08 KG/M2 | RESPIRATION RATE: 18 BRPM | OXYGEN SATURATION: 94 % | HEART RATE: 78 BPM | DIASTOLIC BLOOD PRESSURE: 80 MMHG | SYSTOLIC BLOOD PRESSURE: 180 MMHG

## 2018-02-02 LAB
GLUCOSE SERPL-MCNC: 146 MG/DL (ref 65–140)
GLUCOSE SERPL-MCNC: 204 MG/DL (ref 65–140)

## 2018-02-02 PROCEDURE — 99239 HOSP IP/OBS DSCHRG MGMT >30: CPT | Performed by: PHYSICIAN ASSISTANT

## 2018-02-02 PROCEDURE — 82948 REAGENT STRIP/BLOOD GLUCOSE: CPT

## 2018-02-02 RX ORDER — ACETAMINOPHEN 325 MG/1
975 TABLET ORAL EVERY 8 HOURS PRN
Qty: 30 TABLET | Refills: 0 | Status: ON HOLD
Start: 2018-02-02 | End: 2020-12-01 | Stop reason: SDUPTHER

## 2018-02-02 RX ORDER — CEPHALEXIN 500 MG/1
500 CAPSULE ORAL EVERY 6 HOURS SCHEDULED
Qty: 10 CAPSULE | Refills: 0 | Status: SHIPPED | OUTPATIENT
Start: 2018-02-02 | End: 2018-02-05

## 2018-02-02 RX ORDER — OXYCODONE HYDROCHLORIDE 10 MG/1
10 TABLET ORAL 2 TIMES DAILY PRN
Qty: 10 TABLET | Refills: 0 | Status: SHIPPED | OUTPATIENT
Start: 2018-02-02 | End: 2018-02-12

## 2018-02-02 RX ADMIN — CEPHALEXIN 500 MG: 500 CAPSULE ORAL at 05:05

## 2018-02-02 RX ADMIN — FENOFIBRATE 145 MG: 145 TABLET, FILM COATED ORAL at 08:02

## 2018-02-02 RX ADMIN — ASPIRIN 81 MG 81 MG: 81 TABLET ORAL at 08:02

## 2018-02-02 RX ADMIN — METOPROLOL TARTRATE 50 MG: 50 TABLET ORAL at 08:02

## 2018-02-02 RX ADMIN — INSULIN HUMAN 80 UNITS: 500 INJECTION, SOLUTION SUBCUTANEOUS at 08:01

## 2018-02-02 RX ADMIN — SENNOSIDES 8.6 MG: 8.6 TABLET, FILM COATED ORAL at 08:02

## 2018-02-02 RX ADMIN — PANTOPRAZOLE SODIUM 40 MG: 40 TABLET, DELAYED RELEASE ORAL at 08:02

## 2018-02-02 RX ADMIN — OXYCODONE HYDROCHLORIDE 10 MG: 10 TABLET ORAL at 05:05

## 2018-02-02 RX ADMIN — CEPHALEXIN 500 MG: 500 CAPSULE ORAL at 11:26

## 2018-02-02 NOTE — DISCHARGE SUMMARY
Discharge Summary - [facility] Internal Medicine    Patient Information: Portia Soler 60 y o  male MRN: [id number]  Unit/Bed#: -01 Encounter: [id number]    Discharging Physician / Practitioner: Any Heck PA-C  PCP: Rafita Mars MD  Admission Date: 1/29/2018  Discharge Date: 02/02/18    Reason for Admission: leg erythema    Discharge Diagnoses:     Principal Problem:    Cellulitis of left lower extremity  Active Problems:    Venous stasis ulcer of left calf limited to breakdown of skin without varicose veins (HCC)    Chronic deep vein thrombosis (DVT) of popliteal vein of left lower extremity (HCC)    Anemia in stage 3 chronic kidney disease    Essential hypertension    Type 2 diabetes mellitus with hyperglycemia, with long-term current use of insulin (HCC)    CKD (chronic kidney disease) stage 3, GFR 30-59 ml/min    Coronary artery disease involving native heart    Hyperlipidemia  Resolved Problems:    * No resolved hospital problems  *      Consultations During Hospital Stay:  · Infectious Disease  · Podiatry    Procedures Performed:     XR ankle 3+ views LEFT   Final Result by Matt Sullivan MD (01/29 2046)      No acute osseous abnormality  Workstation performed: [id number]         XR tibia fibula 2 views LEFT   Final Result by Matt Sullivan MD (01/29 2044)      No acute osseous abnormality  Workstation performed: [id number]             Significant Findings / Test Results:     · Cellulitis of left lower extremity  · Venous stasis ulceration of left leg  · Acute kidney injury, present on admission, resolved  · Uncontrolled diabetes with A1c 9 8    Incidental Findings:   · None     Test Results Pending at Discharge (will require follow up):    · Final blood cultures     Outpatient Tests Requested:  · Follow-up with Endocrinology  · Follow-up with Podiatry  · Follow-up with [address] [place] [address] in family practice    Complications:  Hypoglycemia x1    Hospital Course:     Portia Soler is a 60 y o  male patient who originally presented to the hospital on 1/29/2018 due to lower extremity cellulitis  The patient was noted to be admitted on 1/29/2018 secondary to left lower extremity erythema, edema, tenderness and weeping  The patient has a history of uncontrolled diabetes with diabetic foot wounds  He was noted to be started on IV Ancef and ID was consulted as well as Podiatry  Patient has prior history of an A1c of 9 0 on A1c was noted to return at 9 8  Patient uses U 500 insulin  Endocrinology was consulted for control diabetes  He also has past medical history of DVT in the popliteal vein in the left lower extremity was continued on his Coumadin  Patient was seen in consultation by Infectious Disease who felt that this could be mild infection versus venous stasis changes  The patient was noted be treated with Ancef with significant improvement  The patient was transitioned to oral Keflex to complete a seven day course  He will have an additional 2 amps days of Keflex upon discharge  The patient was seen by Podiatry, it is recommended follow-up as an outpatient for edema control and wound care  He is recommended to change the dressing to the left leg 3 times a week, gently wash any open area with saline and dry, cover with Adaptic, ABD and Kerlix  Then wrap the area with Ace bandage in encourage elevation  The patient was noted to be seen by Endocrinology and insulin was transitioned to U 500 insulin 80 units in the morning with breakfast, 80 units with lunch and 60 units with dinner  He is advised to follow up with Endocrinology as an outpatient  He was noted to be afebrile with no leukocytosis  Condition at Discharge: stable     Discharge Day Visit / Exam:     Subjective:  Reports that he feels weak and was having pain overnight which was keeping him from sleeping  He denies nausea or vomiting    Vitals: Blood Pressure: (!) 180/80 (02/02/18 0700)  Pulse: 78 (02/02/18 0700)  Temperature: 98 2 °F (36 8 °C) (02/02/18 0700)  Temp Source: Oral (02/02/18 0700)  Respirations: 18 (02/02/18 0700)  Height: 5' 7" (170 2 cm) (01/30/18 1617)  Weight - Scale: 124 kg (274 lb 7 6 oz) (01/30/18 1617)  SpO2: 94 % (02/02/18 0700)  Exam:   Physical Exam   Constitutional: He is oriented to person, place, and time  No distress  HENT:   Head: Normocephalic and atraumatic  Eyes: Conjunctivae are normal    Neck: No JVD present  Cardiovascular: Normal rate, regular rhythm, normal heart sounds and intact distal pulses  No murmur heard  Pulmonary/Chest: Effort normal and breath sounds normal  No respiratory distress  He has no rales  Abdominal: Soft  Bowel sounds are normal  He exhibits no distension  There is no tenderness  There is no rebound and no guarding  Musculoskeletal: He exhibits edema (trace BLE) and tenderness (left leg)  Neurological: He is alert and oriented to person, place, and time  Skin: No rash noted  He is not diaphoretic  No erythema  Left lower extremity with ACE wrap and gauze dressing  Erythema of foot/leg improving  Edema improving  Right leg with scattered abrasions   Psychiatric: He has a normal mood and affect  His behavior is normal    Nursing note and vitals reviewed  Discussion with Family:  Decline call to wife    Discharge instructions/Information to patient and family:   See after visit summary for information provided to patient and family  Provisions for Follow-Up Care:  See after visit summary for information related to follow-up care and any pertinent home health orders  Disposition:     Home    For Discharges to [facility] SNF:   · Not Applicable to this Patient - Not Applicable to this Patient    Planned Readmission: no     Discharge Statement:  I spent 40 minutes discharging the patient  This time was spent on the day of discharge  I had direct contact with the patient on the day of discharge   Greater than 50% of the total time was spent examining patient, answering all patient questions, arranging and discussing plan of care with patient as well as directly providing post-discharge instructions  Additional time then spent on discharge activities  Discharge Medications:  See after visit summary for reconciled discharge medications provided to patient and family        ** Please Note: This note has been constructed using a voice recognition system **

## 2018-02-03 LAB
BACTERIA BLD CULT: NORMAL
BACTERIA BLD CULT: NORMAL

## 2018-10-28 ENCOUNTER — HOSPITAL ENCOUNTER (EMERGENCY)
Facility: HOSPITAL | Age: 63
Discharge: HOME | End: 2018-10-28
Attending: EMERGENCY MEDICINE | Admitting: EMERGENCY MEDICINE
Payer: COMMERCIAL

## 2018-10-28 VITALS
HEART RATE: 78 BPM | OXYGEN SATURATION: 97 % | TEMPERATURE: 98.2 F | WEIGHT: 265 LBS | SYSTOLIC BLOOD PRESSURE: 190 MMHG | DIASTOLIC BLOOD PRESSURE: 91 MMHG | BODY MASS INDEX: 41.5 KG/M2 | RESPIRATION RATE: 18 BRPM

## 2018-10-28 DIAGNOSIS — S81.802A OPEN WOUND OF LEFT LOWER EXTREMITY WITHOUT COMPLICATION, INITIAL ENCOUNTER: Primary | ICD-10-CM

## 2018-10-28 DIAGNOSIS — I87.2 VENOUS STASIS DERMATITIS: ICD-10-CM

## 2018-10-28 LAB
ANION GAP SERPL CALCULATED.3IONS-SCNC: 7 MMOL/L (ref 4–13)
BASOPHILS # BLD MANUAL: 0 THOUSAND/UL (ref 0–0.1)
BASOPHILS NFR MAR MANUAL: 0 % (ref 0–1)
BUN SERPL-MCNC: 27 MG/DL (ref 5–25)
CALCIUM SERPL-MCNC: 9 MG/DL (ref 8.3–10.1)
CHLORIDE SERPL-SCNC: 104 MMOL/L (ref 100–108)
CO2 SERPL-SCNC: 27 MMOL/L (ref 21–32)
CREAT SERPL-MCNC: 1.51 MG/DL (ref 0.6–1.3)
EOSINOPHIL # BLD MANUAL: 0.12 THOUSAND/UL (ref 0–0.4)
EOSINOPHIL NFR BLD MANUAL: 3 % (ref 0–6)
ERYTHROCYTE [DISTWIDTH] IN BLOOD BY AUTOMATED COUNT: 14.2 % (ref 11.6–15.1)
GFR SERPL CREATININE-BSD FRML MDRD: 48 ML/MIN/1.73SQ M
GLUCOSE SERPL-MCNC: 279 MG/DL (ref 65–140)
HCT VFR BLD AUTO: 39.5 % (ref 36.5–49.3)
HGB BLD-MCNC: 12.7 G/DL (ref 12–17)
HOLD SPECIMEN: NORMAL
HOLD SPECIMEN: YES
HYPERCHROMIA BLD QL SMEAR: PRESENT
LYMPHOCYTES # BLD AUTO: 1.14 THOUSAND/UL (ref 0.6–4.47)
LYMPHOCYTES # BLD AUTO: 28 % (ref 14–44)
MCH RBC QN AUTO: 28.4 PG (ref 26.8–34.3)
MCHC RBC AUTO-ENTMCNC: 32.2 G/DL (ref 31.4–37.4)
MCV RBC AUTO: 88 FL (ref 82–98)
MONOCYTES # BLD AUTO: 0.08 THOUSAND/UL (ref 0–1.22)
MONOCYTES NFR BLD: 2 % (ref 4–12)
NEUTROPHILS # BLD MANUAL: 2.6 THOUSAND/UL (ref 1.85–7.62)
NEUTS BAND NFR BLD MANUAL: 3 % (ref 0–8)
NEUTS SEG NFR BLD AUTO: 61 % (ref 43–75)
NRBC BLD AUTO-RTO: 0 /100 WBCS
PLATELET # BLD AUTO: 227 THOUSANDS/UL (ref 149–390)
PLATELET BLD QL SMEAR: ADEQUATE
PMV BLD AUTO: 9.1 FL (ref 8.9–12.7)
POLYCHROMASIA BLD QL SMEAR: PRESENT
POTASSIUM SERPL-SCNC: 4.1 MMOL/L (ref 3.5–5.3)
RBC # BLD AUTO: 4.47 MILLION/UL (ref 3.88–5.62)
SODIUM SERPL-SCNC: 138 MMOL/L (ref 136–145)
TOTAL CELLS COUNTED SPEC: 100
VARIANT LYMPHS # BLD AUTO: 3 %
WBC # BLD AUTO: 4.07 THOUSAND/UL (ref 4.31–10.16)

## 2018-10-28 PROCEDURE — 96365 THER/PROPH/DIAG IV INF INIT: CPT

## 2018-10-28 PROCEDURE — 85027 COMPLETE CBC AUTOMATED: CPT | Performed by: EMERGENCY MEDICINE

## 2018-10-28 PROCEDURE — 85007 BL SMEAR W/DIFF WBC COUNT: CPT | Performed by: EMERGENCY MEDICINE

## 2018-10-28 PROCEDURE — 99283 EMERGENCY DEPT VISIT LOW MDM: CPT

## 2018-10-28 PROCEDURE — 36415 COLL VENOUS BLD VENIPUNCTURE: CPT | Performed by: EMERGENCY MEDICINE

## 2018-10-28 PROCEDURE — 80048 BASIC METABOLIC PNL TOTAL CA: CPT | Performed by: EMERGENCY MEDICINE

## 2018-10-28 RX ORDER — CEPHALEXIN 500 MG/1
500 CAPSULE ORAL 3 TIMES DAILY
Qty: 30 CAPSULE | Refills: 0 | Status: SHIPPED | OUTPATIENT
Start: 2018-10-28 | End: 2018-11-09 | Stop reason: HOSPADM

## 2018-10-28 RX ADMIN — CEFAZOLIN SODIUM 1000 MG: 1 SOLUTION INTRAVENOUS at 16:11

## 2018-10-28 NOTE — DISCHARGE INSTRUCTIONS
Skin Avulsion   WHAT YOU NEED TO KNOW:   Skin avulsion is a wound that happens when skin is torn from your body during an accident or other injury  The torn skin may be lost or too damaged to be repaired, and it must be removed  A wound of this type cannot be stitched closed because there is tissue missing  Avulsion wounds are usually bigger and have more scars because of the missing tissue  DISCHARGE INSTRUCTIONS:   Medicines:   · Antibiotic ointment:  Your healthcare provider may tell you to gently rub a topical antibiotic ointment on your wound  This will help prevent an infection and help your wound heal faster  · Pain medicine: You may be given medicine to take away or decrease pain  Do not wait until the pain is severe before you take your medicine  · NSAIDs , such as ibuprofen, help decrease swelling, pain, and fever  This medicine is available with or without a doctor's order  NSAIDs can cause stomach bleeding or kidney problems in certain people  If you take blood thinner medicine, always ask if NSAIDs are safe for you  Always read the medicine label and follow directions  Do not give these medicines to children under 10months of age without direction from your child's healthcare provider  · Take your medicine as directed  Contact your healthcare provider if you think your medicine is not helping or if you have side effects  Tell him of her if you are allergic to any medicine  Keep a list of the medicines, vitamins, and herbs you take  Include the amounts, and when and why you take them  Bring the list or the pill bottles to follow-up visits  Carry your medicine list with you in case of an emergency  Care for your wound:  Avulsion wounds may take longer to heal because they cannot be closed with tape or stitches  Keep your wound clean and protected to prevent infection and speed healing  · Clean your wound:  Wash your hands with soap and water before and after you care for your wound   You may be able to use a soft cloth to gently clean the wound after the first 24 to 48 hours  After that, gently clean the wound once or twice a day with cool water  Do not soak your wound  Use soap to clean around the wound, but try not to get any on the wound itself  Do not use alcohol or hydrogen peroxide to clean your wound unless you are directed to  Gently pat the area dry and reapply the bandage as directed  · Elevate your wound:  Prop your injured area on pillows to raise it above the level of your heart  This will help reduce pain and swelling  Do this for 30 minutes at a time, as often as you can  · Bandage your wound:  Bandages keep your wound clean, dry, and protected from infection  They may also prevent swelling  Use a bandage that does not stick to your wound, and has a spongy layer to absorb fluids  Leave your bandage on as long as directed  Ask your healthcare provider when and how to change your bandage  Do not wrap the bandage too tightly  This could cut off blood flow and cause more injury  · Use cool compresses:  Wet a washcloth or towel with cool water and hold it on your wound as directed  Ask how often to apply the compress and for how long each time  · Reduce scarring:  Avoid direct sunlight on your wound  Sunlight may burn or change the color of the new skin over your wound  Use sunscreen (SPF 30 or higher) on the new skin for at least 1 year after it heals  Support for leg and arm wounds: You may need to use crutches if the wound is on your leg  You may need to use a sling if the wound is on your arm  Crutches and slings help protect the injured area, prevent further injury, and heal the area in the right position  Follow up with your healthcare provider within 2 days or as directed: If you have stitches, ask when to return to have them removed  Write down your questions so you remember to ask them during your visits     Contact your healthcare provider if:   · You have new pain, or it gets worse  · You have trouble moving the injured body area  · Your wound splits open or does not seem to be healing  Return to the emergency department if:   · You have a fever  · You have painful swelling, redness, or warmth around your wound  · Your wound is red and there are red streaks on your skin starting at your wound and moving upward  · Your wound is draining pus  · You have heavy bleeding or bleeding that does not stop after 10 minutes of holding firm, direct pressure over the wound  · You feel like there is an object stuck in your wound  © 2017 [employer] Information is for End User's use only and may not be sold, redistributed or otherwise used for commercial purposes  All illustrations and images included in CareNotes® are the copyrighted property of [employer]  or [employer]  The above information is an  only  It is not intended as medical advice for individual conditions or treatments  Talk to your doctor, nurse or pharmacist before following any medical regimen to see if it is safe and effective for you

## 2018-10-28 NOTE — ED PROVIDER NOTES
History  Chief Complaint   Patient presents with    Wound Infection     bilateral wounds both lower exts  X 1 1/2 weeks , worse over past few days with draining     67-year-old male presents with chronic wounds on his lower legs, left lower extremity has significantly worsened prompting ED visit today  Patient states he frequently gets water blisters on his legs and he cannot help himself but states that he needs to pop them when he gets them  , states he popped a large 1 about a week ago, in process of peeling off the skin, peeled off a large chunk of adjacent skin as well  Since then the areas been chronically using  Mild associated pain over the area, no more swelling , has erythema but only over the area of exposed skin, chronically has erythema around the area which is slightly worse than baseline but no severe worsening  Describes pain over the area as mild, 2/10, worse with palpation better when left alone no other modifying or alleviating factors  No fevers no chills no nausea no vomiting patient is a diabetic and sugars have been running higher than normal   But does admit this is due to a very noncompliant diabetic diet        History provided by:  Patient and spouse      Prior to Admission Medications   Prescriptions Last Dose Informant Patient Reported? Taking?   acetaminophen (TYLENOL) 325 mg tablet   No No   Sig: Take 3 tablets (975 mg total) by mouth every 8 (eight) hours as needed for fever   aspirin 81 mg chewable tablet  Self Yes No   Sig: Chew 81 mg daily  ezetimibe-simvastatin (VYTORIN) 10-40 mg per tablet  Self Yes No   Sig: Take 1 tablet by mouth every evening   fenofibrate (TRICOR) 145 mg tablet  Self Yes No   Sig: Take 145 mg by mouth daily     insulin regular (HUMULIN R) 500 units/mL CONCENTRATED injection   No No   Si  Units SUBQ with breakfast and lunch, 60 units SUBQ with dinner   metoprolol tartrate (LOPRESSOR) 50 mg tablet  Self Yes No   Sig: Take 50 mg by mouth 2 (two) times a day  pantoprazole (PROTONIX) 40 mg tablet  Self Yes No   Sig: Take 40 mg by mouth daily   warfarin (COUMADIN) 2 5 mg tablet  Self Yes No   Sig: Take 2 5 mg by mouth daily  Facility-Administered Medications: None       Past Medical History:   Diagnosis Date    Bell's palsy     Cardiac disease     [facility] ([facility] )     Diabetes mellitus ([place] [address] )     Hyperlipidemia     Hypertension     Renal disorder     Stroke ([place] [address] )     2013       Past Surgical History:   Procedure Laterality Date    CORONARY ANGIOPLASTY WITH STENT PLACEMENT      FRACTURE SURGERY      INCISION AND DRAINAGE OF WOUND Right 9/13/2016    Procedure: INCISION AND DRAINAGE (I&D) EXTREMITY, right lateral ankle;  Surgeon: Zainab Ashley DPM;  [facility];  Service:     WOUND DEBRIDEMENT Right 9/15/2016    Procedure: DEBRIDEMENT WOUND ([address]) ankle wound with closure and FRANCY drain placement;  Surgeon: Zainab Ashley DPM;  [facility];  Service:        History reviewed  No pertinent family history  I have reviewed and agree with the history as documented  Social History   Substance Use Topics    Smoking status: Former Smoker     Packs/day: 0 00     Types: Cigarettes     Quit date: 9/3/2006    Smokeless tobacco: Never Used    Alcohol use No        Review of Systems   Constitutional: Negative for activity change, chills, diaphoresis and fever  HENT: Negative for congestion, sinus pressure and sore throat  Eyes: Negative for pain and visual disturbance  Respiratory: Negative for cough, chest tightness, shortness of breath, wheezing and stridor  Cardiovascular: Negative for chest pain and palpitations  Gastrointestinal: Negative for abdominal distention, abdominal pain, constipation, diarrhea, nausea and vomiting  Genitourinary: Negative for dysuria and frequency  Musculoskeletal: Negative for neck pain and neck stiffness  Skin: Negative for rash     Neurological: Negative for dizziness, speech difficulty, light-headedness, numbness and headaches  Physical Exam  Physical Exam   Constitutional: He is oriented to person, place, and time  He appears well-developed  No distress  HENT:   Head: Normocephalic and atraumatic  Eyes: Pupils are equal, round, and reactive to light  Neck: Normal range of motion  Neck supple  No tracheal deviation present  Cardiovascular: Normal rate, regular rhythm, normal heart sounds and intact distal pulses  No murmur heard  Pulmonary/Chest: Effort normal and breath sounds normal  No stridor  No respiratory distress  Abdominal: Soft  He exhibits no distension  There is no tenderness  There is no rebound and no guarding  Musculoskeletal: Normal range of motion  He exhibits edema  Bilateral lower extremity edema, multiple blisters over legs consistent with chronic edema lymphedema and chronic venous stasis  There is a degree of chronic erythema bilaterally  , left lower extremity has multiple areas of open excoriated skin from what appear to be peeled back blisters  Locally tender over the exposed area of skin surrounding skin appears to be a degree of chronic erythema  No proximal streaking  There are patches of normal skin in between areas of erythema and exposed skin   Neurological: He is alert and oriented to person, place, and time  Skin: Skin is warm and dry  He is not diaphoretic  No erythema  No pallor  Psychiatric: He has a normal mood and affect  Vitals reviewed        Vital Signs  ED Triage Vitals   Temperature Pulse Respirations Blood Pressure SpO2   10/28/18 1348 10/28/18 1348 10/28/18 1348 10/28/18 1348 10/28/18 1348   98 2 °F (36 8 °C) 72 20 (!) 193/91 96 %      Temp src Heart Rate Source Patient Position - Orthostatic VS BP Location FiO2 (%)   -- 10/28/18 1430 10/28/18 1430 10/28/18 1430 --    Monitor Sitting Left arm       Pain Score       10/28/18 1348       3           Vitals:    10/28/18 1348 10/28/18 1430   BP: (!) 193/91 (!) 190/91   Pulse: 72 78   Patient Position - Orthostatic VS:  Sitting       Visual Acuity      ED Medications  Medications   ceFAZolin (ANCEF) IVPB (premix) 1,000 mg (not administered)       Diagnostic Studies  Results Reviewed     Procedure Component Value Units Date/Time    Basic metabolic panel [[id number]]  (Abnormal) Collected:  10/28/18 1513    Lab Status:  Final result Specimen:  Blood from Arm, Right Updated:  10/28/18 1539     Sodium 138 mmol/L      Potassium 4 1 mmol/L      Chloride 104 mmol/L      CO2 27 mmol/L      ANION GAP 7 mmol/L      BUN 27 (H) mg/dL      Creatinine 1 51 (H) mg/dL      Glucose 279 (H) mg/dL      Calcium 9 0 mg/dL      eGFR 48 ml/min/1 73sq m     Narrative:         National Kidney Disease Education Program recommendations are as follows:  GFR calculation is accurate only with a steady state creatinine  Chronic Kidney disease less than 60 ml/min/1 73 sq  meters  Kidney failure less than 15 ml/min/1 73 sq  meters  CBC and differential [[id number]]  (Abnormal) Collected:  10/28/18 1513    Lab Status:  Preliminary result Specimen:  Blood from Arm, Right Updated:  10/28/18 1524     WBC 4 07 (L) Thousand/uL      RBC 4 47 Million/uL      Hemoglobin 12 7 g/dL      Hematocrit 39 5 %      MCV 88 fL      MCH 28 4 pg      MCHC 32 2 g/dL      RDW 14 2 %      MPV 9 1 fL      Platelets 311 Thousands/uL     Trauma tubes on hold [[id number]] Collected:  10/28/18 1513    Lab Status: In process Specimen:  Blood Updated:  10/28/18 1521    Narrative: The following orders were created for panel order Trauma tubes on hold    Procedure                               Abnormality         Status                     ---------                               -----------         ------                     Seth Rayo Top on TVHK[[id number]]                            In process                 Gold top on SSVE[[id number]]                                  In process                 Green / Black tube on QSEA[[id number]]                        In process                 Gosia Coats top tube on OVPP[[id number]]                             In process                   Please view results for these tests on the individual orders  No orders to display              Procedures  Procedures       Phone Contacts  ED Phone Contact    ED Course  ED Course as of Oct 28 1607   Sun Oct 28, 2018   1540 Patient hyperglycemic, discussed this with patient who admits he is very much been eating the wrong things  As this is likely dietary  , creatinine at baseline  MDM  Number of Diagnoses or Management Options  Open wound of left lower extremity without complication, initial encounter: new and requires workup  Venous stasis dermatitis: new and requires workup  Diagnosis management comments: Chronic open wounds in the setting of chronic venous stasis dermatitis I do not appreciate a true cellulitis but as a slight pain associated with that, will start on Keflex and have patient follow up with wound [place]  , patient has a listed allergy to penicillin reviewing old notes patient has been on Keflex prior without any adverse reaction       Amount and/or Complexity of Data Reviewed  Clinical lab tests: ordered and reviewed  Decide to obtain previous medical records or to obtain history from someone other than the patient: yes  Obtain history from someone other than the patient: yes  Review and summarize past medical records: yes      CritCare Time    Disposition  Final diagnoses:   Open wound of left lower extremity without complication, initial encounter   Venous stasis dermatitis     Time reflects when diagnosis was documented in both MDM as applicable and the Disposition within this note     Time User Action Codes Description Comment    10/28/2018  4:05 PM Sonia Garcia Add [Z83 859R] Open wound of left lower extremity without complication, initial encounter     10/28/2018  4:05 PM [address] [place] [address] Charanjit LOONEY Add [I87 2] Venous stasis dermatitis       ED Disposition     ED Disposition Condition Comment    Discharge  Hector Reyes discharge to home/self care  Condition at discharge: Good        Follow-up Information     Follow up With Specialties Details Why Contact Info Additional [address] [facility] Call today To arrange for the next available appointment [address] [facility]  [address], [facility]          Patient's Medications   Discharge Prescriptions    CEPHALEXIN (KEFLEX) 500 MG CAPSULE    Take 1 capsule (500 mg total) by mouth 3 (three) times a day for 10 days       Start Date: 10/28/2018End Date: 11/7/2018       Order Dose: 500 mg       Quantity: 30 capsule    Refills: 0     No discharge procedures on file      ED Provider  Electronically Signed by           Ludmila Aguayo DO  10/28/18 4113

## 2018-11-02 ENCOUNTER — HOSPITAL ENCOUNTER (EMERGENCY)
Facility: HOSPITAL | Age: 63
Discharge: HOME | End: 2018-11-02
Attending: EMERGENCY MEDICINE | Admitting: EMERGENCY MEDICINE
Payer: COMMERCIAL

## 2018-11-02 VITALS
TEMPERATURE: 98.1 F | DIASTOLIC BLOOD PRESSURE: 90 MMHG | HEART RATE: 74 BPM | SYSTOLIC BLOOD PRESSURE: 186 MMHG | RESPIRATION RATE: 18 BRPM | OXYGEN SATURATION: 97 %

## 2018-11-02 DIAGNOSIS — M79.605 ACUTE LEG PAIN, LEFT: ICD-10-CM

## 2018-11-02 DIAGNOSIS — L97.221 VENOUS STASIS ULCER OF LEFT CALF LIMITED TO BREAKDOWN OF SKIN WITHOUT VARICOSE VEINS (HCC): Primary | ICD-10-CM

## 2018-11-02 DIAGNOSIS — I87.2 VENOUS STASIS ULCER OF LEFT CALF LIMITED TO BREAKDOWN OF SKIN WITHOUT VARICOSE VEINS (HCC): Primary | ICD-10-CM

## 2018-11-02 LAB
ALBUMIN SERPL BCP-MCNC: 2.5 G/DL (ref 3.5–5)
ALP SERPL-CCNC: 50 U/L (ref 46–116)
ALT SERPL W P-5'-P-CCNC: 42 U/L (ref 12–78)
ANION GAP SERPL CALCULATED.3IONS-SCNC: 8 MMOL/L (ref 4–13)
APAP SERPL-MCNC: <2 UG/ML (ref 10–30)
APTT PPP: 46 SECONDS (ref 24–36)
AST SERPL W P-5'-P-CCNC: 54 U/L (ref 5–45)
BASOPHILS # BLD MANUAL: 0 THOUSAND/UL (ref 0–0.1)
BASOPHILS NFR MAR MANUAL: 0 % (ref 0–1)
BILIRUB DIRECT SERPL-MCNC: 0.1 MG/DL (ref 0–0.2)
BILIRUB SERPL-MCNC: 0.2 MG/DL (ref 0.2–1)
BUN SERPL-MCNC: 25 MG/DL (ref 5–25)
CALCIUM SERPL-MCNC: 8.9 MG/DL (ref 8.3–10.1)
CHLORIDE SERPL-SCNC: 105 MMOL/L (ref 100–108)
CO2 SERPL-SCNC: 25 MMOL/L (ref 21–32)
CREAT SERPL-MCNC: 1.42 MG/DL (ref 0.6–1.3)
EOSINOPHIL # BLD MANUAL: 0.16 THOUSAND/UL (ref 0–0.4)
EOSINOPHIL NFR BLD MANUAL: 3 % (ref 0–6)
ERYTHROCYTE [DISTWIDTH] IN BLOOD BY AUTOMATED COUNT: 14.4 % (ref 11.6–15.1)
GFR SERPL CREATININE-BSD FRML MDRD: 52 ML/MIN/1.73SQ M
GLUCOSE SERPL-MCNC: 267 MG/DL (ref 65–140)
HCT VFR BLD AUTO: 39.5 % (ref 36.5–49.3)
HGB BLD-MCNC: 12.7 G/DL (ref 12–17)
INR PPP: 3.14 (ref 0.86–1.17)
LIPASE SERPL-CCNC: 304 U/L (ref 73–393)
LYMPHOCYTES # BLD AUTO: 1.04 THOUSAND/UL (ref 0.6–4.47)
LYMPHOCYTES # BLD AUTO: 20 % (ref 14–44)
MCH RBC QN AUTO: 29.1 PG (ref 26.8–34.3)
MCHC RBC AUTO-ENTMCNC: 32.2 G/DL (ref 31.4–37.4)
MCV RBC AUTO: 91 FL (ref 82–98)
MONOCYTES # BLD AUTO: 0.31 THOUSAND/UL (ref 0–1.22)
MONOCYTES NFR BLD: 6 % (ref 4–12)
NEUTROPHILS # BLD MANUAL: 3.71 THOUSAND/UL (ref 1.85–7.62)
NEUTS SEG NFR BLD AUTO: 71 % (ref 43–75)
NRBC BLD AUTO-RTO: 0 /100 WBCS
PLATELET # BLD AUTO: 240 THOUSANDS/UL (ref 149–390)
PLATELET BLD QL SMEAR: ADEQUATE
PMV BLD AUTO: 8.8 FL (ref 8.9–12.7)
POLYCHROMASIA BLD QL SMEAR: PRESENT
POTASSIUM SERPL-SCNC: 5 MMOL/L (ref 3.5–5.3)
PROT SERPL-MCNC: 7.1 G/DL (ref 6.4–8.2)
PROTHROMBIN TIME: 31.3 SECONDS (ref 11.8–14.2)
RBC # BLD AUTO: 4.36 MILLION/UL (ref 3.88–5.62)
SODIUM SERPL-SCNC: 138 MMOL/L (ref 136–145)
TOTAL CELLS COUNTED SPEC: 100
WBC # BLD AUTO: 5.22 THOUSAND/UL (ref 4.31–10.16)

## 2018-11-02 PROCEDURE — 85027 COMPLETE CBC AUTOMATED: CPT | Performed by: EMERGENCY MEDICINE

## 2018-11-02 PROCEDURE — 83690 ASSAY OF LIPASE: CPT | Performed by: EMERGENCY MEDICINE

## 2018-11-02 PROCEDURE — 85610 PROTHROMBIN TIME: CPT | Performed by: EMERGENCY MEDICINE

## 2018-11-02 PROCEDURE — 99283 EMERGENCY DEPT VISIT LOW MDM: CPT

## 2018-11-02 PROCEDURE — 80329 ANALGESICS NON-OPIOID 1 OR 2: CPT | Performed by: EMERGENCY MEDICINE

## 2018-11-02 PROCEDURE — 80076 HEPATIC FUNCTION PANEL: CPT | Performed by: EMERGENCY MEDICINE

## 2018-11-02 PROCEDURE — 36415 COLL VENOUS BLD VENIPUNCTURE: CPT | Performed by: EMERGENCY MEDICINE

## 2018-11-02 PROCEDURE — 85007 BL SMEAR W/DIFF WBC COUNT: CPT | Performed by: EMERGENCY MEDICINE

## 2018-11-02 PROCEDURE — 85730 THROMBOPLASTIN TIME PARTIAL: CPT | Performed by: EMERGENCY MEDICINE

## 2018-11-02 PROCEDURE — 80048 BASIC METABOLIC PNL TOTAL CA: CPT | Performed by: EMERGENCY MEDICINE

## 2018-11-02 RX ORDER — TRAMADOL HYDROCHLORIDE 50 MG/1
50 TABLET ORAL EVERY 6 HOURS PRN
Qty: 15 TABLET | Refills: 0 | Status: SHIPPED | OUTPATIENT
Start: 2018-11-02 | End: 2018-11-12

## 2018-11-02 RX ORDER — TRAMADOL HYDROCHLORIDE 50 MG/1
50 TABLET ORAL ONCE
Status: COMPLETED | OUTPATIENT
Start: 2018-11-02 | End: 2018-11-02

## 2018-11-02 RX ADMIN — TRAMADOL HYDROCHLORIDE 50 MG: 50 TABLET ORAL at 14:59

## 2018-11-02 NOTE — ED PROVIDER NOTES
History  Chief Complaint   Patient presents with    Leg Pain     Pt c/o left leg pain that is shooting up and down his leg, he was seen here recently for leg wounds and called to make F/U with wound care and cant go until   Pt c/o severe pain in left leg       History provided by:  Patient and spouse  Leg Pain   Location:  Leg  Time since incident:  2 weeks  Injury: no    Leg location:  L leg  Pain details:     Quality:  Aching    Radiates to:  Does not radiate    Severity:  Moderate    Onset quality:  Gradual    Duration:  2 days    Timing:  Constant    Progression:  Unchanged  Chronicity:  New  Foreign body present:  No foreign bodies  Prior injury to area: Multiple areas of excoriation due to blisters that her popped and being scratched  Relieved by:  Nothing  Worsened by:  Nothing  Ineffective treatments:  Acetaminophen (Patient states he has been taking on average 10-20 pills of extra-strength Tylenol a day over the past week )  Associated symptoms: no fever and no neck pain        Prior to Admission Medications   Prescriptions Last Dose Informant Patient Reported? Taking?   acetaminophen (TYLENOL) 325 mg tablet   No No   Sig: Take 3 tablets (975 mg total) by mouth every 8 (eight) hours as needed for fever   aspirin 81 mg chewable tablet  Self Yes No   Sig: Chew 81 mg daily  cephalexin (KEFLEX) 500 mg capsule   No No   Sig: Take 1 capsule (500 mg total) by mouth 3 (three) times a day for 10 days   ezetimibe-simvastatin (VYTORIN) 10-40 mg per tablet  Self Yes No   Sig: Take 1 tablet by mouth every evening   fenofibrate (TRICOR) 145 mg tablet  Self Yes No   Sig: Take 145 mg by mouth daily  insulin regular (HUMULIN R) 500 units/mL CONCENTRATED injection   No No   Si  Units SUBQ with breakfast and lunch, 60 units SUBQ with dinner   metoprolol tartrate (LOPRESSOR) 50 mg tablet  Self Yes No   Sig: Take 50 mg by mouth 2 (two) times a day     pantoprazole (PROTONIX) 40 mg tablet  Self Yes No Sig: Take 40 mg by mouth daily   warfarin (COUMADIN) 2 5 mg tablet  Self Yes No   Sig: Take 2 5 mg by mouth daily  Facility-Administered Medications: None       Past Medical History:   Diagnosis Date    Bell's palsy     Cardiac disease     [facility] stroke ([facility] )     Diabetes mellitus ([facility] )     Hyperlipidemia     Hypertension     Renal disorder     Stroke ([facility] )     2013       Past Surgical History:   Procedure Laterality Date    CORONARY ANGIOPLASTY WITH STENT PLACEMENT      FRACTURE SURGERY      INCISION AND DRAINAGE OF WOUND Right 9/13/2016    Procedure: INCISION AND DRAINAGE (I&D) EXTREMITY, right lateral ankle;  Surgeon: Clara Bowman DPM;  [facility];  Service:     WOUND DEBRIDEMENT Right 9/15/2016    Procedure: DEBRIDEMENT WOUND ([address]) ankle wound with closure and FRANCY drain placement;  Surgeon: Clara Bowman DPM;  [facility];  Service:        History reviewed  No pertinent family history  I have reviewed and agree with the history as documented  Social History   Substance Use Topics    Smoking status: Former Smoker     Packs/day: 0 00     Types: Cigarettes     Quit date: 9/3/2006    Smokeless tobacco: Never Used    Alcohol use No        Review of Systems   Constitutional: Negative for activity change, chills, diaphoresis and fever  HENT: Negative for congestion, sinus pressure and sore throat  Eyes: Negative for pain and visual disturbance  Respiratory: Negative for cough, chest tightness, shortness of breath, wheezing and stridor  Cardiovascular: Negative for chest pain and palpitations  Gastrointestinal: Negative for abdominal distention, abdominal pain, constipation, diarrhea, nausea and vomiting  Genitourinary: Negative for dysuria and frequency  Musculoskeletal: Negative for neck pain and neck stiffness  Skin: Negative for rash     Neurological: Negative for dizziness, speech difficulty, light-headedness, numbness and headaches  Physical Exam  Physical Exam   Constitutional: He is oriented to person, place, and time  He appears well-developed  No distress  HENT:   Head: Normocephalic and atraumatic  Eyes: Pupils are equal, round, and reactive to light  Neck: Normal range of motion  Neck supple  No tracheal deviation present  Cardiovascular: Normal rate, regular rhythm, normal heart sounds and intact distal pulses  No murmur heard  Pulmonary/Chest: Effort normal and breath sounds normal  No stridor  No respiratory distress  Abdominal: Soft  He exhibits no distension  There is no tenderness  There is no rebound and no guarding  Musculoskeletal: Normal range of motion  Bilateral lower extremities multiple excoriations, left greater than right actually improved from a previous examination   Neurological: He is alert and oriented to person, place, and time  Skin: Skin is warm and dry  He is not diaphoretic  No erythema  No pallor  Psychiatric: He has a normal mood and affect  Vitals reviewed        Vital Signs  ED Triage Vitals [11/02/18 1309]   Temperature Pulse Respirations Blood Pressure SpO2   98 1 °F (36 7 °C) 89 22 (!) 204/88 98 %      Temp Source Heart Rate Source Patient Position - Orthostatic VS BP Location FiO2 (%)   Oral Monitor Sitting Left arm --      Pain Score       7           Vitals:    11/02/18 1309 11/02/18 1500   BP: (!) 204/88 (!) 186/90   Pulse: 89 74   Patient Position - Orthostatic VS: Sitting        Visual Acuity      ED Medications  Medications   traMADol (ULTRAM) tablet 50 mg (50 mg Oral Given 11/2/18 1459)       Diagnostic Studies  Results Reviewed     Procedure Component Value Units Date/Time    CBC and differential [[id number]]  (Abnormal) Collected:  11/02/18 1339    Lab Status:  Final result Specimen:  Blood from Arm, Right Updated:  11/02/18 1459     WBC 5 22 Thousand/uL      RBC 4 36 Million/uL      Hemoglobin 12 7 g/dL      Hematocrit 39 5 %      MCV 91 fL      MCH 29 1 pg MCHC 32 2 g/dL      RDW 14 4 %      MPV 8 8 (L) fL      Platelets 564 Thousands/uL      nRBC 0 /100 WBCs     Narrative: This is an appended report  These results have been appended to a previously verified report  Basic metabolic panel [[id number]]  (Abnormal) Collected:  11/02/18 1339    Lab Status:  Final result Specimen:  Blood from Arm, Right Updated:  11/02/18 1438     Sodium 138 mmol/L      Potassium 5 0 mmol/L      Chloride 105 mmol/L      CO2 25 mmol/L      ANION GAP 8 mmol/L      BUN 25 mg/dL      Creatinine 1 42 (H) mg/dL      Glucose 267 (H) mg/dL      Calcium 8 9 mg/dL      eGFR 52 ml/min/1 73sq m     Narrative:         National Kidney Disease Education Program recommendations are as follows:  GFR calculation is accurate only with a steady state creatinine  Chronic Kidney disease less than 60 ml/min/1 73 sq  meters  Kidney failure less than 15 ml/min/1 73 sq  meters      Hepatic function panel [[id number]]  (Abnormal) Collected:  11/02/18 1339    Lab Status:  Final result Specimen:  Blood from Arm, Right Updated:  11/02/18 1438     Total Bilirubin 0 20 mg/dL      Bilirubin, Direct 0 10 mg/dL      Alkaline Phosphatase 50 U/L      AST 54 (H) U/L      ALT 42 U/L      Total Protein 7 1 g/dL      Albumin 2 5 (L) g/dL     Protime-INR [[id number]]  (Abnormal) Collected:  11/02/18 1339    Lab Status:  Final result Specimen:  Blood from Arm, Right Updated:  11/02/18 1409     Protime 31 3 (H) seconds      INR 3 14 (H)    APTT [[id number]]  (Abnormal) Collected:  11/02/18 1339    Lab Status:  Final result Specimen:  Blood from Arm, Right Updated:  11/02/18 1409     PTT 46 (H) seconds     Lipase [[id number]]  (Normal) Collected:  11/02/18 1339    Lab Status:  Final result Specimen:  Blood from Arm, Right Updated:  11/02/18 1400     Lipase 304 u/L     Acetaminophen level [[id number]]  (Abnormal) Collected:  11/02/18 1339    Lab Status:  Final result Specimen:  Blood from Arm, Right Updated:  11/02/18 1400 Acetaminophen Level <2 (L) ug/mL                  No orders to display              Procedures  Procedures       Phone Contacts  ED Phone Contact    ED Course                               MDM  Number of Diagnoses or Management Options  Acute leg pain, left: new and requires workup  Venous stasis ulcer of left calf limited to breakdown of skin without varicose veins ([address] ): new and requires workup  Diagnosis management comments:       Initial ED assessment:  78-year-old male presents with bilateral lower leg pain left greater than right    Initial DDx includes but is not limited to:   Legs actually appear improved from previous examination, no evidence of cellulitis, concern for Tylenol toxicity due to patient taking large quantities Tylenol for pain relief  , will check a Tylenol level and liver function tests  Initial ED plan:   Disposition pending ED workup if non to Tylenol toxic abnormal LFTs will DC with stronger pain controlling medications, could ultimately patient really just needs to follow with wound care, and does not have an appointment for another 9 days  Final ED summary/disposition:   After evaluation and workup in the emergency department, no evidence of Tylenol toxicity  , will discharge on tramadol strict instructions to take only as prescribed and to stop ago back to nonnarcotic agents as quickly as possible patient to follow with wound care         Amount and/or Complexity of Data Reviewed  Clinical lab tests: ordered and reviewed  Decide to obtain previous medical records or to obtain history from someone other than the patient: yes  Obtain history from someone other than the patient: yes  Review and summarize past medical records: yes      CritCare Time    Disposition  Final diagnoses:   Venous stasis ulcer of left calf limited to breakdown of skin without varicose veins (HCC)   Acute leg pain, left     Time reflects when diagnosis was documented in both MDM as applicable and the Disposition within this note     Time User Action Codes Description Comment    11/2/2018  2:51 PM Santhosh Briggs Add [I87 2,  W57 041] Venous stasis ulcer of left calf limited to breakdown of skin without varicose veins ([address] )     11/2/2018  2:51 PM Santhosh Briggs Add [M79 605] Acute leg pain, left       ED Disposition     ED Disposition Condition Comment    Discharge  Roberto Oliver discharge to home/self care  Condition at discharge: Good        Follow-up Information     Follow up With Specialties Details Why Contact Info    Ana Monae MD Internal Medicine Call in 1 day To arrange for the next available appointment  You will need your INR monitored more closely on the tramadol pain medication [place]  [phone]            Discharge Medication List as of 11/2/2018  2:54 PM      START taking these medications    Details   traMADol (ULTRAM) 50 mg tablet Take 1 tablet (50 mg total) by mouth every 6 (six) hours as needed for moderate pain for up to 10 days Label No driving, Starting Fri 11/2/2018, Until Mon 11/12/2018, Print         CONTINUE these medications which have NOT CHANGED    Details   acetaminophen (TYLENOL) 325 mg tablet Take 3 tablets (975 mg total) by mouth every 8 (eight) hours as needed for fever, Starting Fri 2/2/2018, No Print      aspirin 81 mg chewable tablet Chew 81 mg daily  , Historical Med      cephalexin (KEFLEX) 500 mg capsule Take 1 capsule (500 mg total) by mouth 3 (three) times a day for 10 days, Starting Sun 10/28/2018, Until Wed 11/7/2018, Print      ezetimibe-simvastatin (VYTORIN) 10-40 mg per tablet Take 1 tablet by mouth every evening, Starting Mon 9/11/2017, Historical Med      fenofibrate (TRICOR) 145 mg tablet Take 145 mg by mouth daily  , Historical Med      insulin regular (HUMULIN R) 500 units/mL CONCENTRATED injection 80  Units SUBQ with breakfast and lunch, 60 units SUBQ with dinner, No Print      metoprolol tartrate (LOPRESSOR) 50 mg tablet Take 50 mg by mouth 2 (two) times a day , Historical Med      pantoprazole (PROTONIX) 40 mg tablet Take 40 mg by mouth daily, Historical Med      warfarin (COUMADIN) 2 5 mg tablet Take 2 5 mg by mouth daily  , Historical Med           No discharge procedures on file      ED Provider  Electronically Signed by           Delphine Reyes DO  11/02/18 2637

## 2018-11-02 NOTE — ED NOTES
Legs rewrapped, pt ambulatory off unit without assistance  Wife driving home        Derrick Gonzalez, ADITI  11/02/18 7908

## 2018-11-07 ENCOUNTER — HOSPITAL ENCOUNTER (INPATIENT)
Facility: HOSPITAL | Age: 63
LOS: 2 days | Discharge: HOME HEALTH | DRG: 300 | End: 2018-11-09
Attending: EMERGENCY MEDICINE | Admitting: INTERNAL MEDICINE
Payer: COMMERCIAL

## 2018-11-07 DIAGNOSIS — L97.221 VENOUS STASIS ULCER OF LEFT CALF LIMITED TO BREAKDOWN OF SKIN WITHOUT VARICOSE VEINS (HCC): ICD-10-CM

## 2018-11-07 DIAGNOSIS — I87.2 VENOUS STASIS ULCER OF LEFT CALF LIMITED TO BREAKDOWN OF SKIN WITHOUT VARICOSE VEINS (HCC): ICD-10-CM

## 2018-11-07 DIAGNOSIS — N18.30 CKD (CHRONIC KIDNEY DISEASE) STAGE 3, GFR 30-59 ML/MIN (HCC): ICD-10-CM

## 2018-11-07 DIAGNOSIS — G47.33 OSA (OBSTRUCTIVE SLEEP APNEA): ICD-10-CM

## 2018-11-07 DIAGNOSIS — I10 ESSENTIAL HYPERTENSION: ICD-10-CM

## 2018-11-07 DIAGNOSIS — E66.9 OBESITY (BMI 30-39.9): ICD-10-CM

## 2018-11-07 DIAGNOSIS — I87.2 CHRONIC VENOUS STASIS DERMATITIS OF BOTH LOWER EXTREMITIES: ICD-10-CM

## 2018-11-07 DIAGNOSIS — Z86.718 HISTORY OF DVT OF LOWER EXTREMITY: ICD-10-CM

## 2018-11-07 DIAGNOSIS — S81.802A OPEN WOUND OF LEFT LOWER EXTREMITY, INITIAL ENCOUNTER: ICD-10-CM

## 2018-11-07 DIAGNOSIS — L03.90 CELLULITIS: Primary | ICD-10-CM

## 2018-11-07 DIAGNOSIS — Z79.4 TYPE 2 DIABETES MELLITUS WITH HYPERGLYCEMIA, WITH LONG-TERM CURRENT USE OF INSULIN (HCC): ICD-10-CM

## 2018-11-07 DIAGNOSIS — E11.65 TYPE 2 DIABETES MELLITUS WITH HYPERGLYCEMIA, WITH LONG-TERM CURRENT USE OF INSULIN (HCC): ICD-10-CM

## 2018-11-07 LAB
ALBUMIN SERPL BCP-MCNC: 2.7 G/DL (ref 3.5–5)
ALP SERPL-CCNC: 46 U/L (ref 46–116)
ALT SERPL W P-5'-P-CCNC: 33 U/L (ref 12–78)
ANION GAP SERPL CALCULATED.3IONS-SCNC: 7 MMOL/L (ref 4–13)
AST SERPL W P-5'-P-CCNC: 30 U/L (ref 5–45)
BASOPHILS # BLD AUTO: 0.02 THOUSANDS/ΜL (ref 0–0.1)
BASOPHILS NFR BLD AUTO: 0 % (ref 0–1)
BILIRUB SERPL-MCNC: 0.2 MG/DL (ref 0.2–1)
BUN SERPL-MCNC: 37 MG/DL (ref 5–25)
CALCIUM SERPL-MCNC: 8.9 MG/DL (ref 8.3–10.1)
CHLORIDE SERPL-SCNC: 105 MMOL/L (ref 100–108)
CO2 SERPL-SCNC: 26 MMOL/L (ref 21–32)
CREAT SERPL-MCNC: 1.78 MG/DL (ref 0.6–1.3)
EOSINOPHIL # BLD AUTO: 0.14 THOUSAND/ΜL (ref 0–0.61)
EOSINOPHIL NFR BLD AUTO: 2 % (ref 0–6)
ERYTHROCYTE [DISTWIDTH] IN BLOOD BY AUTOMATED COUNT: 14.6 % (ref 11.6–15.1)
GFR SERPL CREATININE-BSD FRML MDRD: 39 ML/MIN/1.73SQ M
GLUCOSE SERPL-MCNC: 282 MG/DL (ref 65–140)
GLUCOSE SERPL-MCNC: 292 MG/DL (ref 65–140)
HCT VFR BLD AUTO: 39.1 % (ref 36.5–49.3)
HGB BLD-MCNC: 12.2 G/DL (ref 12–17)
IMM GRANULOCYTES # BLD AUTO: 0.09 THOUSAND/UL (ref 0–0.2)
IMM GRANULOCYTES NFR BLD AUTO: 2 % (ref 0–2)
INR PPP: 3.35 (ref 0.86–1.17)
LYMPHOCYTES # BLD AUTO: 0.98 THOUSANDS/ΜL (ref 0.6–4.47)
LYMPHOCYTES NFR BLD AUTO: 17 % (ref 14–44)
MCH RBC QN AUTO: 28.6 PG (ref 26.8–34.3)
MCHC RBC AUTO-ENTMCNC: 31.2 G/DL (ref 31.4–37.4)
MCV RBC AUTO: 92 FL (ref 82–98)
MONOCYTES # BLD AUTO: 0.44 THOUSAND/ΜL (ref 0.17–1.22)
MONOCYTES NFR BLD AUTO: 8 % (ref 4–12)
NEUTROPHILS # BLD AUTO: 4.23 THOUSANDS/ΜL (ref 1.85–7.62)
NEUTS SEG NFR BLD AUTO: 71 % (ref 43–75)
NRBC BLD AUTO-RTO: 0 /100 WBCS
PLATELET # BLD AUTO: 252 THOUSANDS/UL (ref 149–390)
PMV BLD AUTO: 8.8 FL (ref 8.9–12.7)
POTASSIUM SERPL-SCNC: 4.8 MMOL/L (ref 3.5–5.3)
PROT SERPL-MCNC: 7.4 G/DL (ref 6.4–8.2)
PROTHROMBIN TIME: 32.9 SECONDS (ref 11.8–14.2)
RBC # BLD AUTO: 4.26 MILLION/UL (ref 3.88–5.62)
SODIUM SERPL-SCNC: 138 MMOL/L (ref 136–145)
WBC # BLD AUTO: 5.9 THOUSAND/UL (ref 4.31–10.16)

## 2018-11-07 PROCEDURE — 96375 TX/PRO/DX INJ NEW DRUG ADDON: CPT

## 2018-11-07 PROCEDURE — 99284 EMERGENCY DEPT VISIT MOD MDM: CPT

## 2018-11-07 PROCEDURE — 87040 BLOOD CULTURE FOR BACTERIA: CPT | Performed by: EMERGENCY MEDICINE

## 2018-11-07 PROCEDURE — 87070 CULTURE OTHR SPECIMN AEROBIC: CPT | Performed by: EMERGENCY MEDICINE

## 2018-11-07 PROCEDURE — 99223 1ST HOSP IP/OBS HIGH 75: CPT | Performed by: INTERNAL MEDICINE

## 2018-11-07 PROCEDURE — 87205 SMEAR GRAM STAIN: CPT | Performed by: EMERGENCY MEDICINE

## 2018-11-07 PROCEDURE — 87186 SC STD MICRODIL/AGAR DIL: CPT | Performed by: EMERGENCY MEDICINE

## 2018-11-07 PROCEDURE — 80053 COMPREHEN METABOLIC PANEL: CPT | Performed by: EMERGENCY MEDICINE

## 2018-11-07 PROCEDURE — 36415 COLL VENOUS BLD VENIPUNCTURE: CPT | Performed by: EMERGENCY MEDICINE

## 2018-11-07 PROCEDURE — 96374 THER/PROPH/DIAG INJ IV PUSH: CPT

## 2018-11-07 PROCEDURE — 82948 REAGENT STRIP/BLOOD GLUCOSE: CPT

## 2018-11-07 PROCEDURE — 87077 CULTURE AEROBIC IDENTIFY: CPT | Performed by: EMERGENCY MEDICINE

## 2018-11-07 PROCEDURE — 85025 COMPLETE CBC W/AUTO DIFF WBC: CPT | Performed by: EMERGENCY MEDICINE

## 2018-11-07 PROCEDURE — 85610 PROTHROMBIN TIME: CPT | Performed by: PHYSICIAN ASSISTANT

## 2018-11-07 PROCEDURE — 87147 CULTURE TYPE IMMUNOLOGIC: CPT | Performed by: EMERGENCY MEDICINE

## 2018-11-07 RX ORDER — CLINDAMYCIN PHOSPHATE 600 MG/50ML
600 INJECTION INTRAVENOUS EVERY 8 HOURS
Status: DISCONTINUED | OUTPATIENT
Start: 2018-11-08 | End: 2018-11-08

## 2018-11-07 RX ORDER — WARFARIN SODIUM 2.5 MG/1
2.5 TABLET ORAL
Status: DISCONTINUED | OUTPATIENT
Start: 2018-11-07 | End: 2018-11-08

## 2018-11-07 RX ORDER — SACCHAROMYCES BOULARDII 250 MG
250 CAPSULE ORAL 2 TIMES DAILY
Status: DISCONTINUED | OUTPATIENT
Start: 2018-11-08 | End: 2018-11-09 | Stop reason: HOSPADM

## 2018-11-07 RX ORDER — HYDROMORPHONE HCL/PF 1 MG/ML
1 SYRINGE (ML) INJECTION ONCE
Status: COMPLETED | OUTPATIENT
Start: 2018-11-07 | End: 2018-11-07

## 2018-11-07 RX ORDER — ONDANSETRON 2 MG/ML
4 INJECTION INTRAMUSCULAR; INTRAVENOUS EVERY 6 HOURS PRN
Status: DISCONTINUED | OUTPATIENT
Start: 2018-11-07 | End: 2018-11-09 | Stop reason: HOSPADM

## 2018-11-07 RX ORDER — ASPIRIN 81 MG/1
81 TABLET, CHEWABLE ORAL DAILY
Status: DISCONTINUED | OUTPATIENT
Start: 2018-11-08 | End: 2018-11-09 | Stop reason: HOSPADM

## 2018-11-07 RX ORDER — OXYCODONE HYDROCHLORIDE 5 MG/1
5 TABLET ORAL EVERY 4 HOURS PRN
Status: DISCONTINUED | OUTPATIENT
Start: 2018-11-07 | End: 2018-11-09 | Stop reason: HOSPADM

## 2018-11-07 RX ORDER — PANTOPRAZOLE SODIUM 40 MG/1
40 TABLET, DELAYED RELEASE ORAL
Status: DISCONTINUED | OUTPATIENT
Start: 2018-11-08 | End: 2018-11-09 | Stop reason: HOSPADM

## 2018-11-07 RX ORDER — ACETAMINOPHEN 325 MG/1
975 TABLET ORAL EVERY 8 HOURS PRN
Status: DISCONTINUED | OUTPATIENT
Start: 2018-11-07 | End: 2018-11-09 | Stop reason: HOSPADM

## 2018-11-07 RX ORDER — FENOFIBRATE 145 MG/1
145 TABLET, COATED ORAL DAILY
Status: DISCONTINUED | OUTPATIENT
Start: 2018-11-08 | End: 2018-11-09 | Stop reason: HOSPADM

## 2018-11-07 RX ORDER — OXYCODONE HYDROCHLORIDE 10 MG/1
10 TABLET ORAL EVERY 4 HOURS PRN
Status: DISCONTINUED | OUTPATIENT
Start: 2018-11-07 | End: 2018-11-09 | Stop reason: HOSPADM

## 2018-11-07 RX ORDER — METOPROLOL TARTRATE 50 MG/1
50 TABLET, FILM COATED ORAL 2 TIMES DAILY
Status: DISCONTINUED | OUTPATIENT
Start: 2018-11-07 | End: 2018-11-09 | Stop reason: HOSPADM

## 2018-11-07 RX ORDER — HYDROMORPHONE HCL/PF 1 MG/ML
0.5 SYRINGE (ML) INJECTION EVERY 4 HOURS PRN
Status: DISCONTINUED | OUTPATIENT
Start: 2018-11-07 | End: 2018-11-09 | Stop reason: HOSPADM

## 2018-11-07 RX ORDER — CLINDAMYCIN PHOSPHATE 600 MG/50ML
600 INJECTION INTRAVENOUS ONCE
Status: COMPLETED | OUTPATIENT
Start: 2018-11-07 | End: 2018-11-08

## 2018-11-07 RX ADMIN — OXYCODONE HYDROCHLORIDE 10 MG: 10 TABLET ORAL at 23:56

## 2018-11-07 RX ADMIN — METOPROLOL TARTRATE 50 MG: 50 TABLET ORAL at 22:27

## 2018-11-07 RX ADMIN — CLINDAMYCIN PHOSPHATE 600 MG: 12 INJECTION, SOLUTION INTRAMUSCULAR; INTRAVENOUS at 20:47

## 2018-11-07 RX ADMIN — INSULIN HUMAN 80 UNITS: 500 INJECTION, SOLUTION SUBCUTANEOUS at 22:27

## 2018-11-07 RX ADMIN — INSULIN LISPRO 2 UNITS: 100 INJECTION, SOLUTION INTRAVENOUS; SUBCUTANEOUS at 22:27

## 2018-11-07 RX ADMIN — HYDROMORPHONE HYDROCHLORIDE 1 MG: 1 INJECTION, SOLUTION INTRAMUSCULAR; INTRAVENOUS; SUBCUTANEOUS at 20:09

## 2018-11-07 NOTE — ED PROVIDER NOTES
History  Chief Complaint   Patient presents with    Wound Infection     pt presents via  hospital wheelchair with severely weeping red wounds to bilateral lower extremities  has not seen wound care yet, has been eval in ER twice for same      78-year-old gentleman comes in for his 3rd visit for bilateral leg infections  Patient has chronic venous stasis changes and some nonhealing ulcers that he needs to go to wound care for he is set up to see them on November 13th however over the last 24-48 hours his left lower extremity has gotten much more red and angry looking according to the family  Both legs have excoriations with some redness however the left lower extremity now has foul smelling drainage from the wounds on his leg and appear to be more severely infected than the had on previous visits  Patient denies any fever nausea vomiting chest pain shortness of breath        History provided by:  Patient   used: No    Rash   Location:  Leg  Leg rash location:  R lower leg  Quality: blistering, painful, redness, swelling and weeping    Pain details:     Quality:  Pressure, shooting and throbbing    Severity:  Severe    Onset quality:  Gradual    Timing:  Constant    Progression:  Worsening  Severity:  Severe  Onset quality:  Gradual  Timing:  Constant  Progression:  Worsening  Chronicity:  Recurrent  Context: not animal contact, not medications and not sun exposure    Ineffective treatments:  None tried  Associated symptoms: no abdominal pain, no headaches, no joint pain, no myalgias, not vomiting and not wheezing        Prior to Admission Medications   Prescriptions Last Dose Informant Patient Reported? Taking?   acetaminophen (TYLENOL) 325 mg tablet   No Yes   Sig: Take 3 tablets (975 mg total) by mouth every 8 (eight) hours as needed for fever   aspirin 81 mg chewable tablet  Self Yes Yes   Sig: Chew 81 mg daily     cephalexin (KEFLEX) 500 mg capsule   No Yes   Sig: Take 1 capsule (500 mg total) by mouth 3 (three) times a day for 10 days   ezetimibe-simvastatin (VYTORIN) 10-40 mg per tablet  Self Yes Yes   Sig: Take 1 tablet by mouth every evening   fenofibrate (TRICOR) 145 mg tablet  Self Yes Yes   Sig: Take 145 mg by mouth daily  insulin regular (HUMULIN R) 500 units/mL CONCENTRATED injection   No Yes   Si  Units SUBQ with breakfast and lunch, 60 units SUBQ with dinner   metoprolol tartrate (LOPRESSOR) 50 mg tablet  Self Yes Yes   Sig: Take 50 mg by mouth 2 (two) times a day  pantoprazole (PROTONIX) 40 mg tablet  Self Yes Yes   Sig: Take 40 mg by mouth daily   traMADol (ULTRAM) 50 mg tablet   No Yes   Sig: Take 1 tablet (50 mg total) by mouth every 6 (six) hours as needed for moderate pain for up to 10 days Label No driving   warfarin (COUMADIN) 2 5 mg tablet  Self Yes Yes   Sig: Take 2 5 mg by mouth daily  Facility-Administered Medications: None       Past Medical History:   Diagnosis Date    Bell's palsy     Cardiac disease     [facility]     Diabetes mellitus ([facility] )     Hyperlipidemia     Hypertension     Renal disorder     Stroke ([facility] )            Past Surgical History:   Procedure Laterality Date    CORONARY ANGIOPLASTY WITH STENT PLACEMENT      FRACTURE SURGERY      INCISION AND DRAINAGE OF WOUND Right 2016    Procedure: INCISION AND DRAINAGE (I&D) EXTREMITY, right lateral ankle;  Surgeon: Garland Bence, DPM;  [facility];  Service:     WOUND DEBRIDEMENT Right 9/15/2016    Procedure: DEBRIDEMENT WOUND ([address]) ankle wound with closure and FRANCY drain placement;  Surgeon: Garland Bence, DPM;  [facility];  Service:        History reviewed  No pertinent family history  I have reviewed and agree with the history as documented      Social History   Substance Use Topics    Smoking status: Former Smoker     Packs/day: 0 00     Types: Cigarettes     Quit date: 9/3/2006    Smokeless tobacco: Never Used    Alcohol use No Review of Systems   Constitutional: Negative for activity change and appetite change  HENT: Negative for congestion and facial swelling  Eyes: Negative for discharge and redness  Respiratory: Negative for cough and wheezing  Cardiovascular: Negative for chest pain and leg swelling  Gastrointestinal: Negative for abdominal distention, abdominal pain, blood in stool and vomiting  Endocrine: Negative for cold intolerance and polydipsia  Genitourinary: Negative for difficulty urinating and hematuria  Musculoskeletal: Negative for arthralgias, gait problem and myalgias  Skin: Positive for rash  Negative for color change  Allergic/Immunologic: Negative for food allergies and immunocompromised state  Neurological: Negative for dizziness, seizures and headaches  Hematological: Negative for adenopathy  Does not bruise/bleed easily  Psychiatric/Behavioral: Negative for agitation, confusion and decreased concentration  All other systems reviewed and are negative  Physical Exam  Physical Exam   Constitutional: He is oriented to person, place, and time  He appears well-developed and well-nourished  Non-toxic appearance  HENT:   Head: Normocephalic and atraumatic  Right Ear: Tympanic membrane normal    Left Ear: Tympanic membrane normal    Nose: Nose normal    Mouth/Throat: Oropharynx is clear and moist    Eyes: Pupils are equal, round, and reactive to light  Conjunctivae, EOM and lids are normal    Neck: Trachea normal and normal range of motion  Neck supple  No JVD present  Carotid bruit is not present  Cardiovascular: Normal rate, regular rhythm, normal heart sounds and intact distal pulses  No extrasystoles are present  Pulmonary/Chest: Effort normal  He has no decreased breath sounds  He has no wheezes  He has no rhonchi  He has no rales  He exhibits no tenderness and no deformity  Abdominal: Soft  Bowel sounds are normal  There is no tenderness   There is no rebound, no guarding and no CVA tenderness  Musculoskeletal:        Right shoulder: He exhibits normal range of motion, no tenderness, no swelling and no deformity  Cervical back: Normal  He exhibits normal range of motion, no tenderness, no bony tenderness and no deformity  Lymphadenopathy:     He has no cervical adenopathy  He has no axillary adenopathy  Neurological: He is alert and oriented to person, place, and time  He has normal strength and normal reflexes  No cranial nerve deficit or sensory deficit  He displays a negative Romberg sign  Skin: Skin is warm and dry  Rash noted  Psychiatric: He has a normal mood and affect  His speech is normal and behavior is normal  Judgment and thought content normal  Cognition and memory are normal    Nursing note and vitals reviewed        Vital Signs  ED Triage Vitals   Temperature Pulse Respirations Blood Pressure SpO2   11/07/18 1806 11/07/18 1806 11/07/18 1806 11/07/18 1806 11/07/18 1806   (!) 97 4 °F (36 3 °C) 83 20 (!) 204/93 95 %      Temp Source Heart Rate Source Patient Position - Orthostatic VS BP Location FiO2 (%)   11/07/18 1806 11/07/18 1806 11/07/18 2023 11/07/18 2023 --   Oral Monitor Lying Right arm       Pain Score       11/07/18 2009       5           Vitals:    11/08/18 1753 11/08/18 2239 11/08/18 2241 11/09/18 0700   BP:  (!) 176/79 168/70 136/78   Pulse: 84 79  75   Patient Position - Orthostatic VS:  Lying Lying Lying       Visual Acuity      ED Medications  Medications   acetaminophen (TYLENOL) tablet 975 mg (975 mg Oral Given 11/8/18 2249)   aspirin chewable tablet 81 mg (81 mg Oral Given 11/9/18 0821)   fenofibrate (TRICOR) tablet 145 mg (145 mg Oral Given 11/9/18 0821)   metoprolol tartrate (LOPRESSOR) tablet 50 mg (50 mg Oral Given 11/9/18 0821)   pantoprazole (PROTONIX) EC tablet 40 mg (40 mg Oral Given 11/9/18 0528)   ondansetron (ZOFRAN) injection 4 mg (not administered)   insulin regular (HumuLIN R U-500) 500 units/mL CONCENTRATED injection 75 Units (75 Units Subcutaneous Given 11/9/18 0904)   insulin regular (HumuLIN R U-500) 500 units/mL CONCENTRATED injection 80 Units (80 Units Subcutaneous Given 11/9/18 1232)   insulin regular (HumuLIN R U-500) 500 units/mL CONCENTRATED injection 75 Units (75 Units Subcutaneous Given 11/8/18 1757)   insulin regular (HumuLIN R U-500) 500 units/mL CONCENTRATED injection 80 Units (80 Units Subcutaneous Given 11/8/18 2129)   insulin lispro (HumaLOG) 100 units/mL subcutaneous injection 1-6 Units (3 Units Subcutaneous Given 11/9/18 1113)   insulin lispro (HumaLOG) 100 units/mL subcutaneous injection 1-5 Units (2 Units Subcutaneous Given 11/8/18 2129)   oxyCODONE (ROXICODONE) IR tablet 5 mg (5 mg Oral Given 11/8/18 1129)   oxyCODONE (ROXICODONE) immediate release tablet 10 mg (10 mg Oral Given 11/9/18 1111)   HYDROmorphone (DILAUDID) injection 0 5 mg (0 5 mg Intravenous Given 11/8/18 2006)   saccharomyces boulardii (FLORASTOR) capsule 250 mg (250 mg Oral Given 11/9/18 0821)   clindamycin (CLEOCIN) IVPB (premix) 600 mg (0 mg Intravenous Stopped 11/8/18 1516)   HYDROmorphone (DILAUDID) injection 1 mg (1 mg Intravenous Given 11/7/18 2009)   influenza vaccine, recombinant, quadrivalent (FLUBLOK) IM injection 0 5 mL (0 5 mL Intramuscular Given 11/8/18 0607)       Diagnostic Studies  Results Reviewed     Procedure Component Value Units Date/Time    Blood culture #1 [[id number]] Collected:  11/07/18 2022    Lab Status:  Preliminary result Specimen:  Blood from Arm, Right Updated:  11/08/18 2302     Blood Culture No Growth at 24 hrs  Blood culture #2 [[id number]] Collected:  11/07/18 1959    Lab Status:  Preliminary result Specimen:  Blood from Arm, Left Updated:  11/08/18 2302     Blood Culture No Growth at 24 hrs      Wound culture and Gram stain [[id number]]  (Abnormal) Collected:  11/07/18 2040    Lab Status:  Preliminary result Specimen:  Wound from Leg, Left Updated:  11/08/18 1324     Wound Culture 1+ Growth of Gram Negative Davis (A)     Gram Stain Result 2+ Polys      1+ Gram negative rods      1+ Gram positive cocci in pairs    Procalcitonin [[id number]]  (Normal) Collected:  11/08/18 0550    Lab Status:  Final result Specimen:  Blood from Arm, Right Updated:  11/08/18 1036     Procalcitonin 0 09 ng/ml     Basic metabolic panel [[id number]]  (Abnormal) Collected:  11/08/18 0550    Lab Status:  Final result Specimen:  Blood from Arm, Right Updated:  11/08/18 2411     Sodium 140 mmol/L      Potassium 4 3 mmol/L      Chloride 107 mmol/L      CO2 23 mmol/L      ANION GAP 10 mmol/L      BUN 30 (H) mg/dL      Creatinine 1 47 (H) mg/dL      Glucose 95 mg/dL      Calcium 8 9 mg/dL      eGFR 50 ml/min/1 73sq m     Narrative:         National Kidney Disease Education Program recommendations are as follows:  GFR calculation is accurate only with a steady state creatinine  Chronic Kidney disease less than 60 ml/min/1 73 sq  meters  Kidney failure less than 15 ml/min/1 73 sq  meters      CBC and differential [[id number]]  (Abnormal) Collected:  11/08/18 0550    Lab Status:  Final result Specimen:  Blood from Arm, Right Updated:  11/08/18 0606     WBC 6 26 Thousand/uL      RBC 4 16 Million/uL      Hemoglobin 11 7 (L) g/dL      Hematocrit 36 8 %      MCV 89 fL      MCH 28 1 pg      MCHC 31 8 g/dL      RDW 14 3 %      MPV 8 7 (L) fL      Platelets 574 Thousands/uL      nRBC 0 /100 WBCs      Neutrophils Relative 73 %      Immat GRANS % 1 %      Lymphocytes Relative 14 %      Monocytes Relative 10 %      Eosinophils Relative 2 %      Basophils Relative 0 %      Neutrophils Absolute 4 55 Thousands/µL      Immature Grans Absolute 0 08 Thousand/uL      Lymphocytes Absolute 0 86 Thousands/µL      Monocytes Absolute 0 60 Thousand/µL      Eosinophils Absolute 0 15 Thousand/µL      Basophils Absolute 0 02 Thousands/µL     Comprehensive metabolic panel [[id number]]  (Abnormal) Collected:  11/07/18 2022    Lab Status:  Final result Specimen: Blood from Arm, Right Updated:  11/07/18 2052     Sodium 138 mmol/L      Potassium 4 8 mmol/L      Chloride 105 mmol/L      CO2 26 mmol/L      ANION GAP 7 mmol/L      BUN 37 (H) mg/dL      Creatinine 1 78 (H) mg/dL      Glucose 282 (H) mg/dL      Calcium 8 9 mg/dL      AST 30 U/L      ALT 33 U/L      Alkaline Phosphatase 46 U/L      Total Protein 7 4 g/dL      Albumin 2 7 (L) g/dL      Total Bilirubin 0 20 mg/dL      eGFR 39 ml/min/1 73sq m     Narrative:         National Kidney Disease Education Program recommendations are as follows:  GFR calculation is accurate only with a steady state creatinine  Chronic Kidney disease less than 60 ml/min/1 73 sq  meters  Kidney failure less than 15 ml/min/1 73 sq  meters      CBC and differential [[id number]]  (Abnormal) Collected:  11/07/18 1959    Lab Status:  Final result Specimen:  Blood from Arm, Left Updated:  11/07/18 2008     WBC 5 90 Thousand/uL      RBC 4 26 Million/uL      Hemoglobin 12 2 g/dL      Hematocrit 39 1 %      MCV 92 fL      MCH 28 6 pg      MCHC 31 2 (L) g/dL      RDW 14 6 %      MPV 8 8 (L) fL      Platelets 782 Thousands/uL      nRBC 0 /100 WBCs      Neutrophils Relative 71 %      Immat GRANS % 2 %      Lymphocytes Relative 17 %      Monocytes Relative 8 %      Eosinophils Relative 2 %      Basophils Relative 0 %      Neutrophils Absolute 4 23 Thousands/µL      Immature Grans Absolute 0 09 Thousand/uL      Lymphocytes Absolute 0 98 Thousands/µL      Monocytes Absolute 0 44 Thousand/µL      Eosinophils Absolute 0 14 Thousand/µL      Basophils Absolute 0 02 Thousands/µL                  No orders to display              Procedures  Procedures       Phone Contacts  ED Phone Contact    ED Course                               MDM  Number of Diagnoses or Management Options  Cellulitis: new and requires workup  Venous stasis ulcer of left calf limited to breakdown of skin without varicose veins ([facility] ): new and requires workup     Amount and/or Complexity of Data Reviewed  Clinical lab tests: ordered and reviewed  Tests in the radiology section of CPT®: ordered and reviewed  Tests in the medicine section of CPT®: ordered and reviewed  Review and summarize past medical records: yes  Discuss the patient with other providers: yes  Independent visualization of images, tracings, or specimens: yes    Patient Progress  Patient progress: stable    CritCare Time    Disposition  Final diagnoses:   Cellulitis   Venous stasis ulcer of left calf limited to breakdown of skin without varicose veins ([facility] )     Time reflects when diagnosis was documented in both MDM as applicable and the Disposition within this note     Time User Action Codes Description Comment    11/7/2018  8:47 PM Polamar REA Add [L03 90] Cellulitis     11/7/2018  8:47 PM Otilio Qiu Add [I87 2,  L96 221] Venous stasis ulcer of left calf limited to breakdown of skin without varicose veins ([facility] )     11/8/2018  3:03 PM Zhou Otoniel MURPHY Add [I87 2] Chronic venous stasis dermatitis of both lower extremities       ED Disposition     ED Disposition Condition Comment    Admit  Case was discussed with shreya dunn pa-[facility] and the patient's admission status was agreed to be Admission Status: inpatient status to the service of Dr Maya Barba           Follow-up Information     Follow up With Specialties Details Why Contact Info Additional Information    [facility]  Follow up  [address] [place] [address] Follow up in 1 week(s) dr Pauline Kirby [facility] [address] [phone] [address] [facility], [address] [place] [address], [id number]          Current Discharge Medication List      CONTINUE these medications which have NOT CHANGED    Details   acetaminophen (TYLENOL) 325 mg tablet Take 3 tablets (975 mg total) by mouth every 8 (eight) hours as needed for fever  Qty: 30 tablet, Refills: 0    Associated Diagnoses: Cellulitis of left lower extremity      aspirin 81 mg chewable tablet Chew 81 mg daily  ezetimibe-simvastatin (VYTORIN) 10-40 mg per tablet Take 1 tablet by mouth every evening      fenofibrate (TRICOR) 145 mg tablet Take 145 mg by mouth daily  insulin regular (HUMULIN R) 500 units/mL CONCENTRATED injection 80  Units SUBQ with breakfast and lunch, 60 units SUBQ with dinner  Refills: 0    Associated Diagnoses: Type 2 diabetes mellitus with hyperglycemia, with long-term current use of insulin (HCC)      metoprolol tartrate (LOPRESSOR) 50 mg tablet Take 50 mg by mouth 2 (two) times a day  pantoprazole (PROTONIX) 40 mg tablet Take 40 mg by mouth daily      traMADol (ULTRAM) 50 mg tablet Take 1 tablet (50 mg total) by mouth every 6 (six) hours as needed for moderate pain for up to 10 days Label No driving  Qty: 15 tablet, Refills: 0    Associated Diagnoses: Acute leg pain, left      warfarin (COUMADIN) 2 5 mg tablet Take 2 5 mg by mouth daily  STOP taking these medications       cephalexin (KEFLEX) 500 mg capsule Comments:   Reason for Stopping:             No discharge procedures on file      ED Provider  Electronically Signed by           Linard Leventhal, DO  11/09/18 7790

## 2018-11-08 LAB
ANION GAP SERPL CALCULATED.3IONS-SCNC: 10 MMOL/L (ref 4–13)
BASOPHILS # BLD AUTO: 0.02 THOUSANDS/ΜL (ref 0–0.1)
BASOPHILS NFR BLD AUTO: 0 % (ref 0–1)
BUN SERPL-MCNC: 30 MG/DL (ref 5–25)
CALCIUM SERPL-MCNC: 8.9 MG/DL (ref 8.3–10.1)
CHLORIDE SERPL-SCNC: 107 MMOL/L (ref 100–108)
CO2 SERPL-SCNC: 23 MMOL/L (ref 21–32)
CREAT SERPL-MCNC: 1.47 MG/DL (ref 0.6–1.3)
EOSINOPHIL # BLD AUTO: 0.15 THOUSAND/ΜL (ref 0–0.61)
EOSINOPHIL NFR BLD AUTO: 2 % (ref 0–6)
ERYTHROCYTE [DISTWIDTH] IN BLOOD BY AUTOMATED COUNT: 14.3 % (ref 11.6–15.1)
EST. AVERAGE GLUCOSE BLD GHB EST-MCNC: 280 MG/DL
GFR SERPL CREATININE-BSD FRML MDRD: 50 ML/MIN/1.73SQ M
GLUCOSE SERPL-MCNC: 151 MG/DL (ref 65–140)
GLUCOSE SERPL-MCNC: 237 MG/DL (ref 65–140)
GLUCOSE SERPL-MCNC: 300 MG/DL (ref 65–140)
GLUCOSE SERPL-MCNC: 95 MG/DL (ref 65–140)
GLUCOSE SERPL-MCNC: 95 MG/DL (ref 65–140)
HBA1C MFR BLD: 11.4 % (ref 4.2–6.3)
HCT VFR BLD AUTO: 36.8 % (ref 36.5–49.3)
HGB BLD-MCNC: 11.7 G/DL (ref 12–17)
IMM GRANULOCYTES # BLD AUTO: 0.08 THOUSAND/UL (ref 0–0.2)
IMM GRANULOCYTES NFR BLD AUTO: 1 % (ref 0–2)
INR PPP: 3.24 (ref 0.86–1.17)
LYMPHOCYTES # BLD AUTO: 0.86 THOUSANDS/ΜL (ref 0.6–4.47)
LYMPHOCYTES NFR BLD AUTO: 14 % (ref 14–44)
MCH RBC QN AUTO: 28.1 PG (ref 26.8–34.3)
MCHC RBC AUTO-ENTMCNC: 31.8 G/DL (ref 31.4–37.4)
MCV RBC AUTO: 89 FL (ref 82–98)
MONOCYTES # BLD AUTO: 0.6 THOUSAND/ΜL (ref 0.17–1.22)
MONOCYTES NFR BLD AUTO: 10 % (ref 4–12)
NEUTROPHILS # BLD AUTO: 4.55 THOUSANDS/ΜL (ref 1.85–7.62)
NEUTS SEG NFR BLD AUTO: 73 % (ref 43–75)
NRBC BLD AUTO-RTO: 0 /100 WBCS
PLATELET # BLD AUTO: 249 THOUSANDS/UL (ref 149–390)
PMV BLD AUTO: 8.7 FL (ref 8.9–12.7)
POTASSIUM SERPL-SCNC: 4.3 MMOL/L (ref 3.5–5.3)
PROCALCITONIN SERPL-MCNC: 0.09 NG/ML
PROTHROMBIN TIME: 32.1 SECONDS (ref 11.8–14.2)
RBC # BLD AUTO: 4.16 MILLION/UL (ref 3.88–5.62)
SODIUM SERPL-SCNC: 140 MMOL/L (ref 136–145)
WBC # BLD AUTO: 6.26 THOUSAND/UL (ref 4.31–10.16)

## 2018-11-08 PROCEDURE — 85610 PROTHROMBIN TIME: CPT | Performed by: PHYSICIAN ASSISTANT

## 2018-11-08 PROCEDURE — 83036 HEMOGLOBIN GLYCOSYLATED A1C: CPT | Performed by: INTERNAL MEDICINE

## 2018-11-08 PROCEDURE — 99223 1ST HOSP IP/OBS HIGH 75: CPT | Performed by: INTERNAL MEDICINE

## 2018-11-08 PROCEDURE — 82948 REAGENT STRIP/BLOOD GLUCOSE: CPT

## 2018-11-08 PROCEDURE — 84145 PROCALCITONIN (PCT): CPT | Performed by: INTERNAL MEDICINE

## 2018-11-08 PROCEDURE — 99232 SBSQ HOSP IP/OBS MODERATE 35: CPT | Performed by: INTERNAL MEDICINE

## 2018-11-08 PROCEDURE — 85025 COMPLETE CBC W/AUTO DIFF WBC: CPT | Performed by: INTERNAL MEDICINE

## 2018-11-08 PROCEDURE — 90682 RIV4 VACC RECOMBINANT DNA IM: CPT | Performed by: PHYSICIAN ASSISTANT

## 2018-11-08 PROCEDURE — 80048 BASIC METABOLIC PNL TOTAL CA: CPT | Performed by: INTERNAL MEDICINE

## 2018-11-08 RX ADMIN — INSULIN LISPRO 4 UNITS: 100 INJECTION, SOLUTION INTRAVENOUS; SUBCUTANEOUS at 17:56

## 2018-11-08 RX ADMIN — OXYCODONE HYDROCHLORIDE 10 MG: 10 TABLET ORAL at 04:16

## 2018-11-08 RX ADMIN — Medication 250 MG: at 08:30

## 2018-11-08 RX ADMIN — Medication 250 MG: at 17:53

## 2018-11-08 RX ADMIN — ASPIRIN 81 MG 81 MG: 81 TABLET ORAL at 08:30

## 2018-11-08 RX ADMIN — OXYCODONE HYDROCHLORIDE 10 MG: 10 TABLET ORAL at 17:25

## 2018-11-08 RX ADMIN — INSULIN LISPRO 1 UNITS: 100 INJECTION, SOLUTION INTRAVENOUS; SUBCUTANEOUS at 11:30

## 2018-11-08 RX ADMIN — ACETAMINOPHEN 975 MG: 325 TABLET, FILM COATED ORAL at 22:49

## 2018-11-08 RX ADMIN — HYDROMORPHONE HYDROCHLORIDE 0.5 MG: 1 INJECTION, SOLUTION INTRAMUSCULAR; INTRAVENOUS; SUBCUTANEOUS at 20:06

## 2018-11-08 RX ADMIN — PANTOPRAZOLE SODIUM 40 MG: 40 TABLET, DELAYED RELEASE ORAL at 06:06

## 2018-11-08 RX ADMIN — INSULIN HUMAN 80 UNITS: 500 INJECTION, SOLUTION SUBCUTANEOUS at 21:29

## 2018-11-08 RX ADMIN — CLINDAMYCIN PHOSPHATE 600 MG: 12 INJECTION, SOLUTION INTRAMUSCULAR; INTRAVENOUS at 04:16

## 2018-11-08 RX ADMIN — INSULIN HUMAN 80 UNITS: 500 INJECTION, SOLUTION SUBCUTANEOUS at 11:59

## 2018-11-08 RX ADMIN — INFLUENZA A VIRUS A/MICHIGAN/45/2015 (H1N1) RECOMBINANT HEMAGGLUTININ ANTIGEN, INFLUENZA A VIRUS A/SINGAPORE/INFIMH-16-0019/2016 (H3N2) RECOMBINANT HEMAGGLUTININ ANTIGEN, INFLUENZA B VIRUS B/MARYLAND/15/2016 RECOMBINANT HEMAGGLUTININ ANTIGEN, AND INFLUENZA B VIRUS B/PHUKET/3073/2013 RECOMBINANT HEMAGGLUTININ ANTIGEN 0.5 ML: 45; 45; 45; 45 INJECTION INTRAMUSCULAR at 06:07

## 2018-11-08 RX ADMIN — INSULIN HUMAN 75 UNITS: 500 INJECTION, SOLUTION SUBCUTANEOUS at 17:57

## 2018-11-08 RX ADMIN — FENOFIBRATE 145 MG: 145 TABLET, FILM COATED ORAL at 08:30

## 2018-11-08 RX ADMIN — METOPROLOL TARTRATE 50 MG: 50 TABLET ORAL at 08:30

## 2018-11-08 RX ADMIN — OXYCODONE HYDROCHLORIDE 5 MG: 5 TABLET ORAL at 11:29

## 2018-11-08 RX ADMIN — METOPROLOL TARTRATE 50 MG: 50 TABLET ORAL at 17:53

## 2018-11-08 RX ADMIN — INSULIN LISPRO 2 UNITS: 100 INJECTION, SOLUTION INTRAVENOUS; SUBCUTANEOUS at 21:29

## 2018-11-08 NOTE — SOCIAL WORK
LOS 1  Patient is not a bundle or a readmission  CM spoke with patient and wife at bedside  Patient lives in [place] with his wife in a row home  There are 0 LILIAN  Patient uses a rollator and cane at home  Patient has a showerchair  Patient is independent with his ADLs  Patient has a VNA history for wound care that per patient was arranged by podiatry  Denies history of inpatient rehab  Patient uses [facility] on [address] in [place]  Patient has a prescription plan and can afford his medication  Patient does not have an advance directive/living will  Declined information at this time  Patient's emergency contact is his wife Lise Runner  Her number is [phone]  Patient is retired and currently drives  Patient denies history of alcohol/drug abuse and mental illness history  Patient's wife will transport at time of discharge  CM discussed with patient and wife at bedside re:DCP  Patient has no current needs  CM contact information provided at the bedside  All questions/concerns answered at bedside  CM will f/u with patient during hospital stay  CM reviewed discharge planning process including the following: identifying caregivers at home, preference for d/c planning needs, [facility] Meds to [facility] program, availability of treatment team to discuss questions or concerns patient and/or family may have regarding diagnosis, plan of care, old or new medications and discharge planning   CM will continue to follow for care coordination and update assessment as necessary  CM name and role reviewed  Discharge Checklist reviewed and CM will continue to monitor for progress toward discharge goals in nursing and provider rounds

## 2018-11-08 NOTE — CONSULTS
Consult - Podiatry   Rui Cobb 58 y o  male MRN: [id number]  Unit/Bed#: -01 Encounter: [id number]    Assessment/Plan     Assessment:  1  B/L severe venous insufficiency with inflammation and ulceration  2  DM neuropathy, poorly controlled (A1C 11 4)  3  CKD    Plan:  1  There appears to be much more inflammation rather than infection on the patient's legs  Any cellulitis is minimal   I applied an Unna boot wrap to the patient's left leg today given the severe ulcerations and heavy serous drainage  He has responded well to these in the past   Encouraged ambulation 2-3 times per day with elevation in between ambulation  Patient has appointment with me on Tuesday at the [address] [place] [address]  Request visiting nurses for Unna boot change once in between these visits and he will likely need them for a few weeks at minimum  2  Wound cultures were taken in the emergency department  These are somewhat unreliable given the unkempt wounds  Patient has no leukocytosis or fever  Patient's procalcitonin level is normal      History of Present Illness     HPI:  Rui Cobb is a 58 y o  male who presents with large wounds is left leg  Patient is known to me having numerous hospitalizations for lower extremity ulcerations and cellulitis  He has poor follow-up as an outpatient  He has refused to wear compression stockings  He does not exercise  He does not elevate his legs  He has poor control of his diabetes as well  Patient states he has been managing his legs at home with Band-Aids and rest   He does not see a podiatrist regularly  Over the past week or so the left leg became much more swollen and numerous wounds opened up and began to drain through his socks rapidly  He came to emergency room over fear of infection as well as increased pain in the legs  He is resting comfortably in his room right now currently eating delacruz and a hamburger      Consults  Review of Systems   Constitutional: Obese     HENT: Negative  Eyes: Negative  Respiratory: Negative  Cardiovascular: Negative  Gastrointestinal: Negative  Musculoskeletal:  Leg pain   Skin:  Lower extremity ulcerations   Neurological:  Burning tingling in feet  Psych: negative      Historical Information   Past Medical History:   Diagnosis Date    Bell's palsy     Cardiac disease     [facility] )     Diabetes mellitus ([facility] )     Hyperlipidemia     Hypertension     Renal disorder     Stroke ([facility] )          Past Surgical History:   Procedure Laterality Date    CORONARY ANGIOPLASTY WITH STENT PLACEMENT      FRACTURE SURGERY      INCISION AND DRAINAGE OF WOUND Right 2016    Procedure: INCISION AND DRAINAGE (I&D) EXTREMITY, right lateral ankle;  Surgeon: Carli Shirley DPM;  [facility];  Service:     WOUND DEBRIDEMENT Right 9/15/2016    Procedure: DEBRIDEMENT WOUND ([address]) ankle wound with closure and FRANCY drain placement;  Surgeon: Carli Shirley DPM;  [facility];  Service:      Social History   History   Alcohol Use No     History   Drug Use No     History   Smoking Status    Former Smoker    Packs/day: 0 00    Types: Cigarettes    Quit date: 9/3/2006   Smokeless Tobacco    Never Used     Family History: History reviewed  No pertinent family history      Meds/Allergies   Prescriptions Prior to Admission   Medication    acetaminophen (TYLENOL) 325 mg tablet    aspirin 81 mg chewable tablet    [] cephalexin (KEFLEX) 500 mg capsule    ezetimibe-simvastatin (VYTORIN) 10-40 mg per tablet    fenofibrate (TRICOR) 145 mg tablet    insulin regular (HUMULIN R) 500 units/mL CONCENTRATED injection    metoprolol tartrate (LOPRESSOR) 50 mg tablet    pantoprazole (PROTONIX) 40 mg tablet    traMADol (ULTRAM) 50 mg tablet    warfarin (COUMADIN) 2 5 mg tablet     Allergies   Allergen Reactions    Penicillins Hives    Morphine And Related Anxiety       Objective   First Vitals:   Blood Pressure: (!) 204/93 (11/07/18 1806)  Pulse: 83 (11/07/18 1806)  Temperature: (!) 97 4 °F (36 3 °C) (11/07/18 1806)  Temp Source: Oral (11/07/18 1806)  Respirations: 20 (11/07/18 1806)  SpO2: 95 % (11/07/18 1806)    Current Vitals:   Blood Pressure: 170/75 (11/08/18 0628)  Pulse: 88 (11/08/18 0626)  Temperature: 98 9 °F (37 2 °C) (11/08/18 0626)  Temp Source: Oral (11/08/18 0626)  Respirations: 20 (11/08/18 0626)  SpO2: 92 % (11/08/18 0626)        /75 (BP Location: Right arm)   Pulse 88   Temp 98 9 °F (37 2 °C) (Oral)   Resp 20   SpO2 92%   Physical Exam:  General:    Alert, cooperative, no distress  Patient is obese   Head:    Normocephalic, without obvious abnormality, atraumatic   Eyes:    PERRL, conjunctiva/corneas clear, EOM's intact            Nose:   Moist mucous membranes, no drainage or sinus tenderness   Throat:   No tenderness, no exudates   Neck:   Supple, symmetrical, trachea midline, no JVD   Back:     Symmetric, no CVA tenderness   Lungs:     Clear to auscultation bilaterally, respirations unlabored   Chest wall:    No tenderness or deformity   Heart:    Regular rate and rhythm, S1 and S2 normal   Abdomen:     Soft, non-tender, bowel sounds active all four quadrants           Extremities:   Palpable pedal pulses  Manual muscle strength is appropriate  Diminished protective sensation bilateral lower extremity  Moderate to severe lower extremity edema bilateral   There is hemosiderin deposition and stasis changes to both legs  There is a small serous draining wound on the anterior aspect of the right leg  This is not painful  The entire left mid leg almost circumferentially has multiple serous draining wounds which are tender to palpation  There is no hallie purulence  There is rimma wound inflammation but there is no hallie cellulitis today  Neurologic:   CNII-XII intact        Lab Results:   Admission on 11/07/2018   Component Date Value    WBC 11/07/2018 5 90     RBC 11/07/2018 4 26     Hemoglobin 11/07/2018 12 2     Hematocrit 11/07/2018 39 1     MCV 11/07/2018 92     MCH 11/07/2018 28 6     MCHC 11/07/2018 31 2*    RDW 11/07/2018 14 6     MPV 11/07/2018 8 8*    Platelets 81/23/1568 252     nRBC 11/07/2018 0     Neutrophils Relative 11/07/2018 71     Immat GRANS % 11/07/2018 2     Lymphocytes Relative 11/07/2018 17     Monocytes Relative 11/07/2018 8     Eosinophils Relative 11/07/2018 2     Basophils Relative 11/07/2018 0     Neutrophils Absolute 11/07/2018 4 23     Immature Grans Absolute 11/07/2018 0 09     Lymphocytes Absolute 11/07/2018 0 98     Monocytes Absolute 11/07/2018 0 44     Eosinophils Absolute 11/07/2018 0 14     Basophils Absolute 11/07/2018 0 02     Sodium 11/07/2018 138     Potassium 11/07/2018 4 8     Chloride 11/07/2018 105     CO2 11/07/2018 26     ANION GAP 11/07/2018 7     BUN 11/07/2018 37*    Creatinine 11/07/2018 1 78*    Glucose 11/07/2018 282*    Calcium 11/07/2018 8 9     AST 11/07/2018 30     ALT 11/07/2018 33     Alkaline Phosphatase 11/07/2018 46     Total Protein 11/07/2018 7 4     Albumin 11/07/2018 2 7*    Total Bilirubin 11/07/2018 0 20     eGFR 11/07/2018 39     Gram Stain Result 11/07/2018 2+ Polys     Gram Stain Result 11/07/2018 1+ Gram negative rods     Gram Stain Result 11/07/2018 1+ Gram positive cocci in pairs     Protime 11/07/2018 32 9*    INR 11/07/2018 3 35*    POC Glucose 11/07/2018 292*    WBC 11/08/2018 6 26     RBC 11/08/2018 4 16     Hemoglobin 11/08/2018 11 7*    Hematocrit 11/08/2018 36 8     MCV 11/08/2018 89     MCH 11/08/2018 28 1     MCHC 11/08/2018 31 8     RDW 11/08/2018 14 3     MPV 11/08/2018 8 7*    Platelets 92/15/0269 249     nRBC 11/08/2018 0     Neutrophils Relative 11/08/2018 73     Immat GRANS % 11/08/2018 1     Lymphocytes Relative 11/08/2018 14     Monocytes Relative 11/08/2018 10     Eosinophils Relative 11/08/2018 2     Basophils Relative 11/08/2018 0     Neutrophils Absolute 11/08/2018 4 55     Immature Grans Absolute 11/08/2018 0 08     Lymphocytes Absolute 11/08/2018 0 86     Monocytes Absolute 11/08/2018 0 60     Eosinophils Absolute 11/08/2018 0 15     Basophils Absolute 11/08/2018 0 02     Procalcitonin 11/08/2018 0 09     Sodium 11/08/2018 140     Potassium 11/08/2018 4 3     Chloride 11/08/2018 107     CO2 11/08/2018 23     ANION GAP 11/08/2018 10     BUN 11/08/2018 30*    Creatinine 11/08/2018 1 47*    Glucose 11/08/2018 95     Calcium 11/08/2018 8 9     eGFR 11/08/2018 50     Hemoglobin A1C 11/08/2018 11 4*    EAG 11/08/2018 280     Protime 11/08/2018 32 1*    INR 11/08/2018 3 24*    POC Glucose 11/08/2018 95     POC Glucose 11/08/2018 151*       Results from last 7 days  Lab Units 11/07/18  2040   GRAM STAIN RESULT  2+ Polys  1+ Gram negative rods  1+ Gram positive cocci in pairs             Invalid input(s): LABAEARO              Imaging: I have personally reviewed pertinent films in PACS  EKG, Pathology, and Other Studies: I have personally reviewed pertinent reports  Code Status: Level 1 - Full Code  Advance Directive and Living Will:      Power of :    POLST:        Portions of the record may have been created with voice recognition software  Occasional wrong word or "sound a like" substitutions may have occurred due to the inherent limitations of voice recognition software  Read the chart carefully and recognize, using context, where substitutions have occurred

## 2018-11-08 NOTE — PLAN OF CARE
Problem: DISCHARGE PLANNING - CARE MANAGEMENT  Goal: Discharge to post-acute care or home with appropriate resources  INTERVENTIONS:  - Conduct assessment to determine patient/family and health care team treatment goals, and need for post-acute services based on payer coverage, community resources, and patient preferences, and barriers to discharge  - Address psychosocial, clinical, and financial barriers to discharge as identified in assessment in conjunction with the patient/family and health care team  - Arrange appropriate level of post-acute services according to patients   needs and preference and payer coverage in collaboration with the physician and health care team  - Communicate with and update the patient/family, physician, and health care team regarding progress on the discharge plan  - Arrange appropriate transportation to post-acute venues  Outcome: Progressing  LOS 1  Patient is not a bundle or a readmission  CM spoke with patient and wife at bedside  Patient lives in [place] with his wife in a row home  There are 0 LILIAN  Patient uses a rollator and cane at home  Patient has a showerchair  Patient is independent with his ADLs  Patient has a VNA history for wound care that per patient was arranged by podiatry  Denies history of inpatient rehab  Patient uses [facility] on [id number] in [place]  Patient has a prescription plan and can afford his medication  Patient does not have an advance directive/living will  Declined information at this time  Patient's emergency contact is his wife Brittanie Ambriz  Her number is [phone]  Patient is retired and currently drives  Patient denies history of alcohol/drug abuse and mental illness history  Patient's wife will transport at time of discharge  CM discussed with patient and wife at bedside re:DCP  Patient has no current needs  CM contact information provided at the bedside  All questions/concerns answered at bedside   CM will f/u with patient during hospital stay  CM reviewed discharge planning process including the following: identifying caregivers at home, preference for d/c planning needs, [facility] Meds to Bed program, availability of treatment team to discuss questions or concerns patient and/or family may have regarding diagnosis, plan of care, old or new medications and discharge planning   CM will continue to follow for care coordination and update assessment as necessary  CM name and role reviewed  Discharge Checklist reviewed and CM will continue to monitor for progress toward discharge goals in nursing and provider rounds

## 2018-11-08 NOTE — CONSULTS
Consultation - Infectious Disease   Sebastian Graham 58 y o  male MRN: [id number]  Unit/Bed#: -01 Encounter: [id number]      IMPRESSION & RECOMMENDATIONS:   1  Left Lower extremity wounds and swelling: This is recurrence of the patient's previous left lower extremity wounds  This is likely secondary to chronic venous stasis along with neuropathy and poor diabetes control  Patient is status post course of Keflex and he currently remains afebrile and hemodynamically stable and without leukocytosis  -would hold further antibiotics  -continue to monitor CBC and chemistry  -can continue monitor fever curve  -will follow up pending blood cultures  -podiatry follow-up ongoing  -continue supportive care as per primary service    2  Chronic venous stasis:   Based on patient's history it sounds like this has been a chronic a progressive process and he has been poorly compliant with measures to reduce swelling in his lower extremities  -continue to hold antibiotics as above  -recommended continue leg elevation and close follow-up with wound care  -will continue to monitor the patient clinically otherwise  3  Chronic kidney disease:   Patient's creatinine currently stable  Likely secondary to poorly controlled diabetes  -will continue to monitor off antibiotics  -continue to monitor chemistry  -should the patient require antibiotics will appropriately dose adjust     4  Diabetes:   Patient's hemoglobin A1c noted to be 11 4  Based on history from his wife his blood sugars are erratic and can range from 100 to 400s  Discussed in detail with the patient and his wife that this is an additional risk factor subsequent infection in his lower extremity wounds  -suggested close follow-up primary care and potentially endocrinology  -recommended diet adjustment given his risk of infection      5  Obesity:   Again as above discussed with patient and his wife about diet changes given his progressive venous stasis and risk of infection  Above plan discussed in detail with patient, his wife and primary service  ID consult service will continue to follow  HISTORY OF PRESENT ILLNESS:  Reason for Consult:  Left lower extremity cellulitis with drainage  HPI: Catrina Watt is a 58y o  year old male with past medical history significant for obesity, diabetes, GERD and chronic lower extremity venous stasis, CKD and history of DVT on Coumadin  He presented to the emergency department yesterday with left lower extremity pain, redness and drainage  Patient is chronically followed by Podiatry as an outpatient and he has been admitted for the same issue in the past   Patient on initial evaluation reported that he had been noting increasing redness and excoriation blisters on his legs for the past few months and then started picking at them  He was seen in the ER recently at the end of October and given a 10 day course of Keflex but did not have significant improvement  He was seen again in the ER November 2nd and given pain medication and it was felt that his legs have actually improved  Patient was set up with wound care however his pain progressed so he returned to the ER  Patient remains afebrile at this time and without leukocytosis  He received 1 dose of clindamycin in the emergency department  Blood cultures are pending x2 and wound culture was collected yesterday which is pending  No images on this admission  Patient's other vitals remained stable  No other prior significant cultures  The patient has a clinical image from January 2018 which was reviewed  We are consulted at this time given the patient's lower extremity wounds and progressive pain to assist in management of antibiotic needs/selection  On evaluation, patient reports that he has had progressive swelling in his lower extremities for the past 5-6 months  Started developing blistering and weeping from his leg over the past month    They started to notice large blister on his left lower extremity which she started picking at it eventually popped open  At that point he went to the emergency department for evaluation and was given antibiotics  He continued to return as the swelling persisted along with the weeping and pain in his leg  Patient attempted to keep his leg elevated at home whenever he would put it down at would conclude become swollen again  Denies any progression of the redness of his leg  He has a history of neuropathy from his diabetes and by history his glucose control is erratic and poor  He denied having any fevers, nausea, vomiting, shortness of breath or chest pain after the development of these lesions on his legs  He denies having any bed bugs at home, ticks, please for potential spider bites  His wife also reports no lesions or blistering on her skin  He denies having any prosthetic material in his body including prosthetic joints or implantable device  REVIEW OF SYSTEMS:  A complete 12 point system-based review of systems is negative other than that noted in the HPI      PAST MEDICAL HISTORY:  Past Medical History:   Diagnosis Date    Bell's palsy     Cardiac disease     [facility]ellar [facility] )     Diabetes mellitus ([facility] )     Hyperlipidemia     Hypertension     Renal disorder     Stroke ([facility] )     2013     Past Surgical History:   Procedure Laterality Date    CORONARY ANGIOPLASTY WITH STENT PLACEMENT      FRACTURE SURGERY      INCISION AND DRAINAGE OF WOUND Right 9/13/2016    Procedure: INCISION AND DRAINAGE (I&D) EXTREMITY, right lateral ankle;  Surgeon: Eduardo Freeman DPM;  [facility];  Service:     WOUND DEBRIDEMENT Right 9/15/2016    Procedure: DEBRIDEMENT WOUND ([address]) ankle wound with closure and FRANCY drain placement;  Surgeon: Eduardo Freeman DPM;  [facility];  Service:        FAMILY HISTORY:  Non-contributory    SOCIAL HISTORY:  Social History   History   Alcohol Use No     History Drug Use No     History   Smoking Status    Former Smoker    Packs/day: 0 00    Types: Cigarettes    Quit date: 9/3/2006   Smokeless Tobacco    Never Used       ALLERGIES:  Allergies   Allergen Reactions    Penicillins Hives    Morphine And Related Anxiety       MEDICATIONS:  All current active medications have been reviewed  PHYSICAL EXAM:  Temp:  [97 4 °F (36 3 °C)-98 9 °F (37 2 °C)] 98 9 °F (37 2 °C)  HR:  [80-90] 88  Resp:  [20] 20  BP: (154-204)/(75-93) 170/75  SpO2:  [92 %-98 %] 92 %  Temp (24hrs), Av 1 °F (36 7 °C), Min:97 4 °F (36 3 °C), Max:98 9 °F (37 2 °C)  Current: Temperature: 98 9 °F (37 2 °C)    Intake/Output Summary (Last 24 hours) at 18 1140  Last data filed at 18 1100   Gross per 24 hour   Intake                0 ml   Output              700 ml   Net             -700 ml       General Appearance:  Appearing well, nontoxic, and in no distress   Head:  Normocephalic, without obvious abnormality, atraumatic   Eyes:  Conjunctiva pink and sclera anicteric, both eyes   Nose: Nares normal, mucosa normal, no drainage   Throat: Oropharynx moist without lesions   Neck: Supple, symmetrical, no adenopathy, no tenderness/mass/nodules   Back:   Symmetric, no curvature, ROM normal, no CVA tenderness   Lungs:   Clear to auscultation bilaterally, respirations unlabored   Chest Wall:  No tenderness or deformity   Heart:  RRR; no murmur, rub or gallop   Abdomen:   Soft, non-tender, non-distended, positive bowel sounds    Extremities: No cyanosis, clubbing  Patient has ongoing lower extremity edema which is worse on the left leg than the right  Skin: Patient has dried and small blisters on his right lower extremity  He has a significant number of cracks in between the skin on his toes and on the dorsal aspect of his foot  He has long and ingrown nails which are poorly care for  He also has large ulcerations along the anterior aspect of the left lower extremity    The tissue appears pink and is bleeding on exam there is no foul smell or significant purulent drainage  There is darkened erythema around the border along the ulcers consistent with chronic venous stasis  Lymph nodes: Cervical, supraclavicular nodes normal   Neurologic: Alert and oriented times 3, extremity strength 5/5 and symmetric       LABS, IMAGING, & OTHER STUDIES:  Lab Results:  I have personally reviewed pertinent labs  Results from last 7 days  Lab Units 11/08/18  0550 11/07/18 1959 11/02/18  1339   WBC Thousand/uL 6 26 5 90 5 22   HEMOGLOBIN g/dL 11 7* 12 2 12 7   PLATELETS Thousands/uL 249 252 240       Results from last 7 days  Lab Units 11/08/18  0550 11/07/18 2022 11/02/18  1339   POTASSIUM mmol/L 4 3 4 8 5 0   CHLORIDE mmol/L 107 105 105   CO2 mmol/L 23 26 25   BUN mg/dL 30* 37* 25   CREATININE mg/dL 1 47* 1 78* 1 42*   EGFR ml/min/1 73sq m 50 39 52   CALCIUM mg/dL 8 9 8 9 8 9   AST U/L  --  30 54*   ALT U/L  --  33 42   ALK PHOS U/L  --  46 50         Imaging Studies:   I have personally reviewed pertinent imaging study reports and images in PACS  Other Studies:   I have personally reviewed pertinent reports

## 2018-11-08 NOTE — UTILIZATION REVIEW
Initial Clinical Review    Admission: Date/Time/Statement: 11/7/18 @ 2049     Orders Placed This Encounter   Procedures    Inpatient Admission (expected length of stay for this patient is greater than two midnights)     Standing Status:   Standing     Number of Occurrences:   1     Order Specific Question:   Admitting Physician     Answer:   Iban Benitez     Order Specific Question:   Level of Care     Answer:   Med Surg [16]     Order Specific Question:   Estimated length of stay     Answer:   More than 2 Midnights     Order Specific Question:   Certification     Answer:   I certify that inpatient services are medically necessary for this patient for a duration of greater than two midnights  See H&P and MD Progress Notes for additional information about the patient's course of treatment  ED: Date/Time/Mode of Arrival:   ED Arrival Information     Expected Arrival Acuity Means of Arrival Escorted By Service Admission Type    - 11/7/2018 17:49 Urgent Wheelchair Self Hospitalist Urgent    Arrival Complaint    left leg pain          Chief Complaint:   Chief Complaint   Patient presents with    Wound Infection     pt presents via  hospital wheelchair with severely weeping red wounds to bilateral lower extremities  has not seen wound care yet, has been eval in ER twice for same        History of Illness: A 57 y/o male presented to the ED with the c/o left lower leg pain, redness and increase drainage      ED Vital Signs:   ED Triage Vitals   Temperature Pulse Respirations Blood Pressure SpO2   11/07/18 1806 11/07/18 1806 11/07/18 1806 11/07/18 1806 11/07/18 1806   (!) 97 4 °F (36 3 °C) 83 20 (!) 204/93 95 %      Temp Source Heart Rate Source Patient Position - Orthostatic VS BP Location FiO2 (%)   11/07/18 1806 11/07/18 1806 11/07/18 2023 11/07/18 2023 --   Oral Monitor Lying Right arm       Pain Score       11/07/18 2009       5        Wt Readings from Last 1 Encounters:   10/28/18 120 kg (265 lb) Vital Signs (abnormal):   Date and Time Temp Pulse SpO2 Resp BP   11/07/18 2100 98 °F (36 7 °C) 90 98 % 20  186/84   11/07/18 2023 -- 81 98 % 20 157/75     Abnormal Labs/Diagnostic Test Results: Bun 37, Creat 1 78, Glucose 282    ED Treatment:   Medication Administration from 11/07/2018 1749 to 11/07/2018 2114       Date/Time Order Dose Route Action Action by Comments     11/07/2018 2047 clindamycin (CLEOCIN) IVPB (premix) 600 mg 600 mg Intravenous New [facility] Nichole Dinh RN      11/07/2018 2009 HYDROmorphone (DILAUDID) injection 1 mg 1 mg Intravenous Given April Piyush Lorenzana RN           Past Medical/Surgical History:   Diagnosis    Bell's palsy    Cardiac disease    C[facility]bellar [facility]    Diabetes mellitus ([facility] )    Hyperlipidemia    Hypertension    Renal disorder    Stroke [facility])       Admitting Diagnosis: Cellulitis [L03 90]  Left leg pain [M79 605]  Venous stasis ulcer of left calf limited to breakdown of skin without varicose veins ([facility] ) [I87 2, L97 221]    Age/Sex: 58 y o  male    Assessment/Plan:     * Cellulitis of left lower extremity   Assessment & Plan     · Due to venous stasis with ulceration  · Presented with increasing redness, pain and drainage despite 10 days of oral Keflex  · Given IV clindamycin in the ER  Will continue this for now, add probiotic  · Follow up blood cultures  Currently stable without any signs of sepsis  Check procalcitonin  · Consult Podiatry and Infectious Disease       Chronic venous stasis dermatitis of both lower extremities   Assessment & Plan     · Secondary to poorly controlled leg edema  · Recommend leg elevation  · Appreciate Podiatry input  · Treating as per primary problem for cellulitis     VTE Prophylaxis: Warfarin (Coumadin)  / reason for no mechanical VTE prophylaxis b/l LE wounds    Code Status: FULL  POLST: POLST form is not discussed and not completed at this time    Discussion with family:  Family at bedside     Anticipated Length of Stay: Patient will be admitted on an Inpatient basis with an anticipated length of stay of  > 2 midnights     Justification for Hospital Stay:  IV antibiotics, Podiatry and ID eval        Admission Orders:  Inpatient/MedSurg  Consult Podiatry r/e cellulitis, venous ulcer of left calf   Bilateral Sequential Compression Device  SSI    Scheduled Meds:   Current Facility-Administered Medications:  aspirin 81 mg Oral Daily   clindamycin 600 mg Intravenous Q8H   fenofibrate 145 mg Oral Daily   insulin lispro 1-5 Units Subcutaneous HS   insulin lispro 1-6 Units Subcutaneous TID AC   insulin regular 75 Units Subcutaneous Daily With Breakfast   insulin regular 75 Units Subcutaneous Daily With Dinner   insulin regular 80 Units Subcutaneous Daily With Lunch   insulin regular 80 Units Subcutaneous HS   metoprolol tartrate 50 mg Oral BID   pantoprazole 40 mg Oral Early Morning   saccharomyces boulardii 250 mg Oral BID     Oxycodone 5 mg po x 1 thus far  Oxycodone 10 mg po x 2 thus far

## 2018-11-08 NOTE — ASSESSMENT & PLAN NOTE
· Presents with left lower extremity wound with swelling in the setting of chronic venous stasis to bilateral lower extremity  · Suspect this is mostly chronic stasis dermatitis with ulcerations and less likely cellulitis  · He is status post recent completion of 10 day course of cephalexin    He is systemically well and nontoxic appearing, without leukocytosis and procalcitonin levels 0 09  · He is status post IV clindamycin in the ED, holding off on further antibiotics at this time  · Wound management per Podiatry

## 2018-11-08 NOTE — ASSESSMENT & PLAN NOTE
· Secondary to poorly controlled leg edema  · Recommend leg elevation  · Appreciate Podiatry input  · Treating as per primary problem for cellulitis

## 2018-11-08 NOTE — H&P
H&P- [facility] 1955, 58 y o  male MRN: [id number]    Unit/Bed#: -01 Encounter: [id number]    Primary Care Provider: Annette Thacker MD   Date and time admitted to hospital: 11/7/2018  6:37 PM    * Cellulitis of left lower extremity   Assessment & Plan    · Due to venous stasis with ulceration  · Presented with increasing redness, pain and drainage despite 10 days of oral Keflex  · Given IV clindamycin in the ER  Will continue this for now, add probiotic  · Follow up blood cultures  Currently stable without any signs of sepsis  Check procalcitonin  · Consult Podiatry and Infectious Disease  Chronic venous stasis dermatitis of both lower extremities   Assessment & Plan    · Secondary to poorly controlled leg edema  · Recommend leg elevation  · Appreciate Podiatry input  · Treating as per primary problem for cellulitis     CKD (chronic kidney disease) stage 3, GFR 30-59 ml/min ([facility])   Assessment & Plan    · Baseline creatinine appears anywhere from 1 5-1 8  · Monitor BMP     History of DVT of lower extremity   Assessment & Plan    · Continue warfarin  Check INR in morning     Essential hypertension   Assessment & Plan    · Continue Lopressor 50 mg b i d  Obesity (BMI 30-39  9)   Assessment & Plan    · Recommend weight loss     Type 2 diabetes mellitus with hyperglycemia, with long-term current use of insulin ([facility])   Assessment & Plan    Lab Results   Component Value Date    HGBA1C 9 8 (H) 01/31/2018       No results for input(s): POCGLU in the last 72 hours  Blood Sugar Average: Last 72 hrs:  ·  recheck A1c in a m  · Continue U 500    Clarify doses with patient he takes 75 units with breakfast, 80 units with lunch, 75 units with dinner and 80 units at bedtime  · Follows with [facility]       VTE Prophylaxis: Warfarin (Coumadin)  / reason for no mechanical VTE prophylaxis b/l LE wounds    Code Status: FULL  POLST: POLST form is not discussed and not completed at this time   Discussion with family:  Family at bedside    Anticipated Length of Stay:  Patient will be admitted on an Inpatient basis with an anticipated length of stay of  > 2 midnights  Justification for Hospital Stay:  IV antibiotics, Podiatry and ID eval    Total Time for Visit, including Counseling / Coordination of Care: 1 hour  Greater than 50% of this total time spent on direct patient counseling and coordination of care  Chief Complaint:   Left lower extremity pain, redness and increased drainage    History of Present Illness:    Santiago Rucker is a 58 y o  male with past medical history of obesity, brittle type 2 diabetes, GERD, chronic lower extremity venous stasis, CKD, history of DVT on warfarin and history of CVA who presents with increasing left lower extremity pain redness and drainage  Patient has had difficulty with chronic lower extremity wounds for quite some time and has seen Podiatry in the past   He was admitted for similar in January of this past year  Patient noted about 2 months ago his legs becoming increasingly red, excoriated and blister filled  Patient was picking at blisters and peeling skin off which gradually resulted in increased erythema  He initially was seen in the ER 10/28 and prescribed Keflex which she has been taking without any improvement  He was seen again in the ER on 11/02 and given pain control medication and legs actually looked improved at that point per ER staff  Patient was set up with wound care on November 13th however today could not take the pain  He denies any recent car trips  He takes warfarin for history of DVT  Patient states that he has been leaving his wounds open to air  Denies any fevers or chills  No chest pain or shortness of breath  Review of Systems:    Review of Systems   Constitutional: Negative for appetite change, chills, fatigue and fever  Respiratory: Negative  Cardiovascular: Negative  Gastrointestinal: Negative  Endocrine:        Increased glucose levels at home   Genitourinary: Negative  Musculoskeletal:        Pain in bilateral lower extremities, left greater than right   Skin: Positive for color change and wound  Neurological: Negative  All other systems reviewed and are negative  Past Medical and Surgical History:     Past Medical History:   Diagnosis Date    Bell's palsy     Cardiac disease     [facility]     Diabetes mellitus ([facility] )     Hyperlipidemia     Hypertension     Renal disorder     Stroke ([facility] )     2013       Past Surgical History:   Procedure Laterality Date    CORONARY ANGIOPLASTY WITH STENT PLACEMENT      FRACTURE SURGERY      INCISION AND DRAINAGE OF WOUND Right 9/13/2016    Procedure: INCISION AND DRAINAGE (I&D) EXTREMITY, right lateral ankle;  Surgeon: Deanne Segal DPM;  Location: [facility];  Service:     WOUND DEBRIDEMENT Right 9/15/2016    Procedure: DEBRIDEMENT WOUND ([address]) ankle wound with closure and FRANCY drain placement;  Surgeon: Deanne Segal DPM;  [facility];  Service:        Meds/Allergies:    Prior to Admission medications    Medication Sig Start Date End Date Taking? Authorizing Provider   acetaminophen (TYLENOL) 325 mg tablet Take 3 tablets (975 mg total) by mouth every 8 (eight) hours as needed for fever 2/2/18  Yes Ryland Tyler MD   aspirin 81 mg chewable tablet Chew 81 mg daily  Yes Historical Provider, MD   cephalexin (KEFLEX) 500 mg capsule Take 1 capsule (500 mg total) by mouth 3 (three) times a day for 10 days 10/28/18 11/7/18 Yes Charanjit Lundberg DO   ezetimibe-simvastatin (VYTORIN) 10-40 mg per tablet Take 1 tablet by mouth every evening 9/11/17  Yes Historical Provider, MD   fenofibrate (TRICOR) 145 mg tablet Take 145 mg by mouth daily     Yes Historical Provider, MD   insulin regular (HUMULIN R) 500 units/mL CONCENTRATED injection 80  Units SUBQ with breakfast and lunch, 60 units SUBQ with dinner 2/2/18  Yes Nicholas Maza MD   metoprolol tartrate (LOPRESSOR) 50 mg tablet Take 50 mg by mouth 2 (two) times a day  Yes Historical Provider, MD   pantoprazole (PROTONIX) 40 mg tablet Take 40 mg by mouth daily   Yes Historical Provider, MD   traMADol (ULTRAM) 50 mg tablet Take 1 tablet (50 mg total) by mouth every 6 (six) hours as needed for moderate pain for up to 10 days Label No driving 39/5/50 66/57/24 Yes Charanjit Trujillo DO   warfarin (COUMADIN) 2 5 mg tablet Take 2 5 mg by mouth daily  Yes Historical Provider, MD     I have reviewed home medications with patient personally  Allergies: Allergies   Allergen Reactions    Penicillins Hives    Morphine And Related Anxiety       Social History:     Marital Status: /Civil Union   Occupation:  Unclear  Patient Pre-hospital Living Situation:  At home with wife  Patient Pre-hospital Level of Mobility:  Independent  Patient Pre-hospital Diet Restrictions:  Diabetic  Substance Use History:   History   Alcohol Use No     History   Smoking Status    Former Smoker    Packs/day: 0 00    Types: Cigarettes    Quit date: 9/3/2006   Smokeless Tobacco    Never Used     History   Drug Use No       Family History:    non-contributory    Physical Exam:     Vitals:   Blood Pressure: 157/75 (11/07/18 2023)  Pulse: 81 (11/07/18 2023)  Temperature: (!) 97 4 °F (36 3 °C) (11/07/18 1806)  Temp Source: Oral (11/07/18 1806)  Respirations: 20 (11/07/18 2023)  SpO2: 98 % (11/07/18 2023)    Physical Exam   Constitutional: He is oriented to person, place, and time  No distress  Cardiovascular: Normal rate and regular rhythm  Pulmonary/Chest: Effort normal and breath sounds normal  No respiratory distress  He has no wheezes  Abdominal: Soft  Bowel sounds are normal  He exhibits distension  There is no tenderness  Morbidly obese   Musculoskeletal: He exhibits edema  Neurological: He is alert and oriented to person, place, and time  Skin: He is not diaphoretic   There is erythema  Excoriations noted to b/l LE, weeping clear fluid, erythema streaking up to mid calf, L > R  Occasional areas of purulence noted    Nursing note and vitals reviewed  Additional Data:     Lab Results: I have personally reviewed pertinent reports  Results from last 7 days  Lab Units 11/07/18  1959   WBC Thousand/uL 5 90   HEMOGLOBIN g/dL 12 2   HEMATOCRIT % 39 1   PLATELETS Thousands/uL 252   NEUTROS ABS Thousands/µL 4 23   NEUTROS PCT % 71   LYMPHS PCT % 17   MONOS PCT % 8   EOS PCT % 2       Results from last 7 days  Lab Units 11/07/18  2022   POTASSIUM mmol/L 4 8   CHLORIDE mmol/L 105   CO2 mmol/L 26   BUN mg/dL 37*   CREATININE mg/dL 1 78*   ANION GAP mmol/L 7   CALCIUM mg/dL 8 9   ALBUMIN g/dL 2 7*   TOTAL BILIRUBIN mg/dL 0 20   ALK PHOS U/L 46   ALT U/L 33   AST U/L 30       Results from last 7 days  Lab Units 11/02/18  1339   INR  3 14*                   Imaging: I have personally reviewed pertinent reports  No orders to display       EKG, Pathology, and Other Studies Reviewed on Admission:   · EKG: none    Allscripts / Epic Records Reviewed: Yes     ** Please Note: This note has been constructed using a voice recognition system   **

## 2018-11-08 NOTE — PROGRESS NOTES
Progress Note - Telma [place] 1955, 58 y o  male MRN: [id number]    Unit/Bed#: -01 Encounter: [id number]    Primary Care Provider: Hoang Solis MD   Date and time admitted to hospital: 11/7/2018  6:37 PM    * Open wound of left lower extremity   Assessment & Plan    · Presents with left lower extremity wound with swelling in the setting of chronic venous stasis to bilateral lower extremity  · Suspect this is mostly chronic stasis dermatitis with ulcerations and less likely cellulitis  · He is status post recent completion of 10 day course of cephalexin  He is systemically well and nontoxic appearing, without leukocytosis and procalcitonin levels 0 09  · He is status post IV clindamycin in the ED, holding off on further antibiotics at this time  · Wound management per Podiatry     Type 2 diabetes mellitus with hyperglycemia, with long-term current use of insulin [facility])   Assessment & Plan    Lab Results   Component Value Date    HGBA1C 11 4 (H) 11/08/2018       Recent Labs      11/07/18   2204  11/08/18   0629  11/08/18   1059  11/08/18   1606   POCGLU  292*  95  151*  300*       Blood Sugar Average: Last 72 hrs:  · (P) 209 5 recheck A1c in a m  · Continue U 500  Clarified doses with patient he takes 75 units with breakfast, 80 units with lunch, 75 units with dinner and 80 units at bedtime  · Continue insulin at home dose     CKD (chronic kidney disease) stage 3, GFR 30-59 ml/min ([facility])   Assessment & Plan    · Creatinine at baseline which appears to be between 1 5-1 8  · Continue to monitor     Obesity (BMI 30-39  9)   Assessment & Plan    · Recommend therapeutic lifestyle changes to promote weight loss     Essential hypertension   Assessment & Plan    · Continue Lopressor     History of DVT of lower extremity   Assessment & Plan    · Hold warfarin today secondary to supratherapeutic INR         VTE Pharmacologic Prophylaxis:   Pharmacologic: Warfarin (Coumadin)  Mechanical VTE Prophylaxis in Place: No    Patient Centered Rounds: I have performed bedside rounds with nursing staff today  Discussions with Specialists or Other Care Team Provider: Nursing    Education and Discussions with Family / Patient: Patient    Current Length of Stay: 1 day(s)    Current Patient Status: Inpatient   Certification Statement: The patient will continue to require additional inpatient hospital stay due to Above diagnosis and care plan    Discharge Plan:  Anticipate discharge next 24 hours with outpatient wound management  Has an appointment with Podiatry next Tuesday    Code Status: Level 1 - Full Code      Subjective:   Seen and evaluated  Reports bilateral lower extremity pain with serous discharge and swelling  No fever or chills    Objective:     Vitals:   Temp (24hrs), Av 4 °F (36 9 °C), Min:97 4 °F (36 3 °C), Max:99 6 °F (37 6 °C)    Temp:  [97 4 °F (36 3 °C)-99 6 °F (37 6 °C)] 99 6 °F (37 6 °C)  HR:  [80-90] 82  Resp:  [18-20] 18  BP: (154-204)/(75-93) 158/84  SpO2:  [92 %-98 %] 97 %  There is no height or weight on file to calculate BMI  Input and Output Summary (last 24 hours):        Intake/Output Summary (Last 24 hours) at 18 1623  Last data filed at 18 1100   Gross per 24 hour   Intake                0 ml   Output              700 ml   Net             -700 ml       Physical Exam:  General Appearance:    Alert, cooperative, no distress, appropriately responsive    Head:    Normocephalic, without obvious abnormality, atraumatic, mucous membranes moist    Eyes:    Conjunctiva/corneas clear, EOM's intact   Neck:   Supple   Lungs:     Clear to auscultation bilaterally, respirations unlabored, no crackles or wheeze     Heart:    Regular rate and rhythm, S1 and S2    Abdomen:     Soft, obese, non-tender, bowel sounds active all four quadrants, no masses, no organomegaly   Extremities:   Bilateral lower extremity chronic venous stasis changes, ulcerations noted to anti left lower extremity shin with serous drainage, inflammation, tenderness   Neurologic:  nonfocal         Additional Data:     Labs:      Results from last 7 days  Lab Units 11/08/18  0550   WBC Thousand/uL 6 26   HEMOGLOBIN g/dL 11 7*   HEMATOCRIT % 36 8   PLATELETS Thousands/uL 249   NEUTROS PCT % 73   LYMPHS PCT % 14   MONOS PCT % 10   EOS PCT % 2       Results from last 7 days  Lab Units 11/08/18  0550 11/07/18  2022   POTASSIUM mmol/L 4 3 4 8   CHLORIDE mmol/L 107 105   CO2 mmol/L 23 26   BUN mg/dL 30* 37*   CREATININE mg/dL 1 47* 1 78*   CALCIUM mg/dL 8 9 8 9   ALK PHOS U/L  --  46   ALT U/L  --  33   AST U/L  --  30       Results from last 7 days  Lab Units 11/08/18  0550   INR  3 24*       * I Have Reviewed All Lab Data Listed Above  * Additional Pertinent Lab Tests Reviewed: [address] Admission Reviewed    Cultures:   Blood Culture:   Lab Results   Component Value Date    BLOODCX No Growth After 5 Days  01/29/2018    BLOODCX No Growth After 5 Days  01/29/2018    BLOODCX No Growth After 5 Days  08/29/2016    BLOODCX No Growth After 5 Days   08/29/2016     Urine Culture: No results found for: URINECX  Sputum Culture: No components found for: SPUTUMCX  Wound Culture:   Lab Results   Component Value Date    WOUNDCULT 1+ Growth of Gram Negative Davis (A) 11/07/2018    WOUNDCULT 1+ Growth of Streptococcus Group A beta hemolytic 08/30/2016       Last 24 Hours Medication List:     Current Facility-Administered Medications:  acetaminophen 975 mg Oral Q8H PRN Be Nunez PA-C   aspirin 81 mg Oral Daily Magda Blancas PA-C   fenofibrate 145 mg Oral Daily Magda Blancas PA-C   HYDROmorphone 0 5 mg Intravenous Q4H PRN Magda Blancas PA-C   insulin lispro 1-5 Units Subcutaneous HS Magda Blnacas PA-C   insulin lispro 1-6 Units Subcutaneous TID AC Magda Blancas PA-C   insulin regular 75 Units Subcutaneous Daily With Breakfast Magda Blancas PA-C   insulin regular 75 Units Subcutaneous Daily With wiMANHUGO insulin regular 80 Units Subcutaneous Daily With Lunch Magda Blancas PA-C   insulin regular 80 Units Subcutaneous HS Magda Blancas PA-C   metoprolol tartrate 50 mg Oral BID Magda Blancas PA-C   ondansetron 4 mg Intravenous Q6H PRN Duayne LogesHUGO   oxyCODONE 10 mg Oral Q4H PRN Duayne LogesHUGO   oxyCODONE 5 mg Oral Q4H PRN Magda Blancas PA-C   pantoprazole 40 mg Oral Early Morning Magda Blancas PA-C   saccharomyces boulardii 250 mg Oral BID Duayne LogHUGO yee        Today, Patient Was Seen By: Colleen Vásquez MD    ** Please Note: Dragon 360 Dictation voice to text software may have been used in the creation of this document   **

## 2018-11-08 NOTE — ASSESSMENT & PLAN NOTE
Lab Results   Component Value Date    HGBA1C 11 4 (H) 11/08/2018       Recent Labs      11/07/18   2204  11/08/18   0629  11/08/18   1059  11/08/18   1606   POCGLU  292*  95  151*  300*       Blood Sugar Average: Last 72 hrs:  · (P) 209 5 recheck A1c in a m  · Continue U 500    Clarified doses with patient he takes 75 units with breakfast, 80 units with lunch, 75 units with dinner and 80 units at bedtime  · Continue insulin at home dose

## 2018-11-08 NOTE — ASSESSMENT & PLAN NOTE
Lab Results   Component Value Date    HGBA1C 9 8 (H) 01/31/2018       No results for input(s): POCGLU in the last 72 hours  Blood Sugar Average: Last 72 hrs:  ·  recheck A1c in a m  · Continue U 500    Clarify doses with patient he takes 75 units with breakfast, 80 units with lunch, 75 units with dinner and 80 units at bedtime  · Follows with [facility] End[facility]

## 2018-11-08 NOTE — ED NOTES
Unable to obtain IV access or blood work x2 attempts  ED tech attempted to obtain blood work and unsuccessful   Charge RN aware and to attempt with Ultrasound guidance      King Cedrick RN  11/07/18 4305

## 2018-11-08 NOTE — ASSESSMENT & PLAN NOTE
· Due to venous stasis with ulceration  · Presented with increasing redness, pain and drainage despite 10 days of oral Keflex  · Given IV clindamycin in the ER  Will continue this for now, add probiotic  · Follow up blood cultures  Currently stable without any signs of sepsis  Check procalcitonin  · Consult Podiatry and Infectious Disease

## 2018-11-09 VITALS
OXYGEN SATURATION: 97 % | HEART RATE: 75 BPM | SYSTOLIC BLOOD PRESSURE: 136 MMHG | RESPIRATION RATE: 20 BRPM | TEMPERATURE: 97.9 F | DIASTOLIC BLOOD PRESSURE: 78 MMHG

## 2018-11-09 LAB
GLUCOSE SERPL-MCNC: 235 MG/DL (ref 65–140)
GLUCOSE SERPL-MCNC: 87 MG/DL (ref 65–140)
INR PPP: 2.1 (ref 0.86–1.17)
PROTHROMBIN TIME: 23 SECONDS (ref 11.8–14.2)

## 2018-11-09 PROCEDURE — 82948 REAGENT STRIP/BLOOD GLUCOSE: CPT

## 2018-11-09 PROCEDURE — 85610 PROTHROMBIN TIME: CPT | Performed by: INTERNAL MEDICINE

## 2018-11-09 PROCEDURE — 99232 SBSQ HOSP IP/OBS MODERATE 35: CPT | Performed by: INTERNAL MEDICINE

## 2018-11-09 PROCEDURE — 99239 HOSP IP/OBS DSCHRG MGMT >30: CPT | Performed by: INTERNAL MEDICINE

## 2018-11-09 RX ORDER — OXYCODONE HYDROCHLORIDE 5 MG/1
5 TABLET ORAL EVERY 4 HOURS PRN
Qty: 20 TABLET | Refills: 0 | Status: SHIPPED | OUTPATIENT
Start: 2018-11-09 | End: 2020-10-01 | Stop reason: HOSPADM

## 2018-11-09 RX ORDER — OXYCODONE HYDROCHLORIDE 5 MG/1
5 TABLET ORAL EVERY 4 HOURS PRN
Qty: 30 TABLET | Refills: 0 | Status: SHIPPED | OUTPATIENT
Start: 2018-11-09 | End: 2018-11-09

## 2018-11-09 RX ADMIN — Medication 250 MG: at 08:21

## 2018-11-09 RX ADMIN — INSULIN LISPRO 3 UNITS: 100 INJECTION, SOLUTION INTRAVENOUS; SUBCUTANEOUS at 11:13

## 2018-11-09 RX ADMIN — HYDROMORPHONE HYDROCHLORIDE 0.5 MG: 1 INJECTION, SOLUTION INTRAMUSCULAR; INTRAVENOUS; SUBCUTANEOUS at 14:55

## 2018-11-09 RX ADMIN — INSULIN HUMAN 80 UNITS: 500 INJECTION, SOLUTION SUBCUTANEOUS at 12:32

## 2018-11-09 RX ADMIN — PANTOPRAZOLE SODIUM 40 MG: 40 TABLET, DELAYED RELEASE ORAL at 05:28

## 2018-11-09 RX ADMIN — OXYCODONE HYDROCHLORIDE 10 MG: 10 TABLET ORAL at 11:11

## 2018-11-09 RX ADMIN — FENOFIBRATE 145 MG: 145 TABLET, FILM COATED ORAL at 08:21

## 2018-11-09 RX ADMIN — OXYCODONE HYDROCHLORIDE 10 MG: 10 TABLET ORAL at 05:22

## 2018-11-09 RX ADMIN — INSULIN HUMAN 75 UNITS: 500 INJECTION, SOLUTION SUBCUTANEOUS at 09:04

## 2018-11-09 RX ADMIN — ASPIRIN 81 MG 81 MG: 81 TABLET ORAL at 08:21

## 2018-11-09 RX ADMIN — METOPROLOL TARTRATE 50 MG: 50 TABLET ORAL at 08:21

## 2018-11-09 NOTE — PROGRESS NOTES
Progress Note - Infectious Disease   Rosa Maria Frye 58 y o  male MRN: [id number]  Unit/Bed#: -01 Encounter: [id number]      Impression/Plan:  1  Left Lower extremity wounds and swelling: This is recurrence of the patient's previous left lower extremity wounds  This is likely secondary to chronic venous stasis along with neuropathy and poor diabetes control  Patient is status post course of Keflex and he currently remains afebrile and hemodynamically stable and without leukocytosis  -continue to hold any antibiotics unless there is a clinical change  -continue to monitor CBC and chemistry  -can continue monitor fever curve  -blood cultures remain without growth  -wound cultures noted, the largely unreliable that were collected from on debrided/unkept wound  -podiatry follow-up ongoing  -continue supportive care as per primary service     2  Chronic venous stasis:   Based on patient's history it sounds like this has been a chronic and progressive process and he has been poorly compliant with measures to reduce swelling in his lower extremities  -continue to hold antibiotics as above  -recommended continue leg elevation and close follow-up with wound care  -will continue to monitor the patient clinically otherwise      3  Chronic kidney disease:   Patient's creatinine currently stable  Likely secondary to poorly controlled diabetes  -will continue to monitor off antibiotics  -continue to monitor chemistry  -should the patient require antibiotics will appropriately dose adjust      4  Diabetes:   Patient's hemoglobin A1c noted to be 11 4  Based on history from his wife his blood sugars are erratic and can range from 100 to 400s  Discussed in detail with the patient and his wife that this is an additional risk factor subsequent infection in his lower extremity wounds  -suggested close follow-up primary care and potentially endocrinology  -recommended diet adjustment given his risk of infection      5  Obesity:   Again as above discussed with patient and his wife about diet changes given his progressive venous stasis and risk of infection  Discussed the above plan with the patient and primary service      ID consult service will sign off at this time, please call if any additional questions or concerns  Antibiotics:  None    24 hour events:  No acute events noted overnight  Patient's T-max overnight was 100 1  Blood cultures remain without growth  Wound culture with multiple organisms on Gram stain  All other vitals remained stable  No labs collected today    Subjective:  Patient reports feeling well  He denies any nausea, vomiting, chest pain, shortness of breath or fever  He notes ongoing pain in his leg with significant amount of the wetness present around his dressings  Objective:  Vitals:  Temp:  [98 4 °F (36 9 °C)-100 1 °F (37 8 °C)] 98 4 °F (36 9 °C)  HR:  [79-84] 79  Resp:  [18-20] 20  BP: (158-180)/(70-84) 168/70  SpO2:  [93 %-97 %] 93 %  Temp (24hrs), Av 4 °F (37 4 °C), Min:98 4 °F (36 9 °C), Max:100 1 °F (37 8 °C)  Current: Temperature: 98 4 °F (36 9 °C)    Physical Exam:   General Appearance:  Alert, interactive, nontoxic, no acute distress  Throat: Oropharynx moist without lesions  Lungs:   Clear to auscultation bilaterally; no wheezes, rhonchi or rales; respirations unlabored   Heart:  RRR; no murmur, rub or gallop   Abdomen:   Soft, non-tender, non-distended, positive bowel sounds  Extremities: No clubbing, cyanosis  Ongoing edema in the lower extremities bilaterally  Skin: No new rashes or lesions  No draining wounds noted  Unable to evaluate left lower extremity fully as the patient has Unna boot dressing in place with plan for change tomorrow         Labs, Imaging, & Other studies:   All pertinent labs and imaging studies were personally reviewed    Results from last 7 days  Lab Units 18  0550 18  1339   WBC Thousand/uL 6 26 5 90 5 22 HEMOGLOBIN g/dL 11 7* 12 2 12 7   PLATELETS Thousands/uL 249 252 240       Results from last 7 days  Lab Units 11/08/18  0550 11/07/18 2022 11/02/18  1339   POTASSIUM mmol/L 4 3 4 8 5 0   CHLORIDE mmol/L 107 105 105   CO2 mmol/L 23 26 25   BUN mg/dL 30* 37* 25   CREATININE mg/dL 1 47* 1 78* 1 42*   EGFR ml/min/1 73sq m 50 39 52   CALCIUM mg/dL 8 9 8 9 8 9   AST U/L  --  30 54*   ALT U/L  --  33 42   ALK PHOS U/L  --  46 50       Results from last 7 days  Lab Units 11/07/18 2040 11/07/18 2022 11/07/18 1959   BLOOD CULTURE   --  No Growth at 24 hrs  No Growth at 24 hrs     GRAM STAIN RESULT  2+ Polys  1+ Gram negative rods  1+ Gram positive cocci in pairs  --   --    WOUND CULTURE  1+ Growth of Gram Negative Davis*  --   --

## 2018-11-09 NOTE — PLAN OF CARE
Problem: DISCHARGE PLANNING - CARE MANAGEMENT  Goal: Discharge to post-acute care or home with appropriate resources  INTERVENTIONS:  - Conduct assessment to determine patient/family and health care team treatment goals, and need for post-acute services based on payer coverage, community resources, and patient preferences, and barriers to discharge  - Address psychosocial, clinical, and financial barriers to discharge as identified in assessment in conjunction with the patient/family and health care team  - Arrange appropriate level of post-acute services according to patients   needs and preference and payer coverage in collaboration with the physician and health care team  - Communicate with and update the patient/family, physician, and health care team regarding progress on the discharge plan  - Arrange appropriate transportation to post-acute venues   Outcome: Completed Date Met: 11/09/18  CM spoke with patient at bedside  Patient is agreeable to [facility] at discharge for wound care dressings  Patient aware that he is to keep his appointment with podiatry for 11/13/2018  Patient's wife will transport him home at time of discharge

## 2018-11-09 NOTE — DISCHARGE SUMMARY
Discharge Summary - [facility]    Patient Information: Ayde Mcdonald 58 y o  male MRN: [id number]  Unit/Bed#: -01 Encounter: [id number]    Discharging Physician / Practitioner: Maddison Gonzalez MD  PCP: Alyssa Vann MD  Admission Date: 11/7/2018  Discharge Date: 11/09/18    Reason for Admission:  Left lower extremity wound     Discharge Diagnoses:     Principal Problem:    Open wound of left lower extremity  Active Problems:    Type 2 diabetes mellitus with hyperglycemia, with long-term current use of insulin ([facility])    CKD (chronic kidney disease) stage 3, GFR 30-59 ml/min ([facility])    Obesity (BMI 30-39  9)    Essential hypertension    History of DVT of lower extremity    Chronic venous stasis dermatitis of both lower extremities      Consultations During Hospital Stay:  · Podiatry  · Infectious Disease    Procedures Performed:   · None    Significant findings:  · None    Hospital Course:   Ayde Mcdonald is a 58 y o  male patient who originally presented to the hospital on 11/7/2018 due to left lower extremity pain with swelling and redness  The patient was recently discharged from the hospital following treatment for cellulitis  He was discharged on a 10 day course of oral cephalexin  Was seen in the ED on 11/2/18 due to continued lower extremity pain and was felt at that time that cellulitis had improved  He presented again on 11/7/18 with suspected recurrent cellulitis  On further evaluation however it was determined that findings more likely represented stasis dermatitis/inflammation  No further antibiotics recommended  He was seen by Podiatry and recommended for continued wound care  He has an appointment with Dr Yesy Del Valle next Tuesday at [facility]  He has been advised to keep this appointment  He was set up with [facility] at discharge for continued wound care      Condition at Discharge: stable     Discharge Day Visit / Exam:     Subjective:  No new complaints or acute overnight events  He remains afebrile, systemically well and nontoxic appearing  Vitals: Blood Pressure: 136/78 (11/09/18 0700)  Pulse: 75 (11/09/18 0700)  Temperature: 97 9 °F (36 6 °C) (11/09/18 0700)  Temp Source: Oral (11/09/18 0700)  Respirations: 20 (11/08/18 2239)  SpO2: 97 % (11/09/18 0700)    General Appearance:    Alert, cooperative, no distress, appropriately responsive    Head:    Normocephalic, without obvious abnormality, atraumatic, mucous membranes moist    Eyes:    Conjunctiva/corneas clear, EOM's intact   Neck:   Supple   Lungs:     Clear to auscultation bilaterally, respirations unlabored, no crackles or wheeze     Heart:    Regular rate and rhythm, S1 and S2    Abdomen:     Soft, non-tender, bowel sounds active all four quadrants,     no masses, no organomegaly   Extremities:   Left lower extremity dressings in place with soaked through serous discharge, chronic venous stasis changes to bilateral lower extremities with weeping lesions   Neurologic:  nonfocal      Discharge instructions/Information to patient and family:   See after visit summary for information provided to patient and family  Provisions for Follow-Up Care:  See after visit summary for information related to follow-up care and any pertinent home health orders  Disposition: Home with [facility]      Discharge Statement:  I spent >30 minutes discharging the patient  This time was spent on the day of discharge  I had direct contact with the patient on the day of discharge  Greater than 50% of the total time was spent examining patient, answering all patient questions, arranging and discussing plan of care with patient as well as directly providing post-discharge instructions  Additional time then spent on discharge activities  Discharge Medications:  See after visit summary for reconciled discharge medications provided to patient and family        ** Please Note: Dragon 360 Dictation voice to text software may have been used in the creation of this document   **

## 2018-11-09 NOTE — SOCIAL WORK
CM spoke with patient at bedside  Patient is agreeable to [facility] at discharge for wound care dressings  Patient aware that he is to keep his appointment with podiatry for 11/13/2018  Patient's wife will transport him home at time of discharge

## 2018-11-09 NOTE — DISCHARGE INSTRUCTIONS
[facility] for legs  1  Apply adaptic over any open area to both legs  2  Cover with maxsorb, ABD pads, and kerlex  3  Wrap ACE bandage from toes to knees on both limbs  4  Encourage elevation  5  Gently clean legs during dressing change with NSS  6   Change QOD

## 2018-11-11 LAB
BACTERIA WND AEROBE CULT: ABNORMAL
GRAM STN SPEC: ABNORMAL

## 2018-11-12 LAB
BACTERIA BLD CULT: NORMAL
BACTERIA BLD CULT: NORMAL

## 2018-11-13 ENCOUNTER — APPOINTMENT (OUTPATIENT)
Dept: WOUND CARE | Facility: HOSPITAL | Age: 63
End: 2018-11-13
Payer: COMMERCIAL

## 2018-11-13 PROCEDURE — 99213 OFFICE O/P EST LOW 20 MIN: CPT | Performed by: PODIATRIST

## 2018-11-20 ENCOUNTER — APPOINTMENT (OUTPATIENT)
Dept: WOUND CARE | Facility: HOSPITAL | Age: 63
End: 2018-11-20
Payer: COMMERCIAL

## 2018-11-20 PROCEDURE — 99212 OFFICE O/P EST SF 10 MIN: CPT | Performed by: PODIATRIST

## 2018-11-27 ENCOUNTER — APPOINTMENT (OUTPATIENT)
Dept: WOUND CARE | Facility: HOSPITAL | Age: 63
End: 2018-11-27
Payer: COMMERCIAL

## 2018-11-27 PROCEDURE — 29581 APPL MULTLAYER CMPRN SYS LEG: CPT | Performed by: PODIATRIST

## 2018-12-04 ENCOUNTER — APPOINTMENT (OUTPATIENT)
Dept: WOUND CARE | Facility: HOSPITAL | Age: 63
End: 2018-12-04
Payer: COMMERCIAL

## 2018-12-04 PROCEDURE — 99212 OFFICE O/P EST SF 10 MIN: CPT | Performed by: PODIATRIST

## 2020-02-09 ENCOUNTER — HOSPITAL ENCOUNTER (EMERGENCY)
Facility: HOSPITAL | Age: 65
Discharge: HOME | End: 2020-02-09
Attending: EMERGENCY MEDICINE | Admitting: EMERGENCY MEDICINE
Payer: COMMERCIAL

## 2020-02-09 ENCOUNTER — APPOINTMENT (EMERGENCY)
Dept: CT IMAGING | Facility: HOSPITAL | Age: 65
End: 2020-02-09
Payer: COMMERCIAL

## 2020-02-09 VITALS
WEIGHT: 257.94 LBS | BODY MASS INDEX: 40.4 KG/M2 | RESPIRATION RATE: 20 BRPM | SYSTOLIC BLOOD PRESSURE: 137 MMHG | DIASTOLIC BLOOD PRESSURE: 64 MMHG | OXYGEN SATURATION: 95 % | HEART RATE: 59 BPM

## 2020-02-09 DIAGNOSIS — R42 DIZZINESS: Primary | ICD-10-CM

## 2020-02-09 DIAGNOSIS — I10 HIGH BLOOD PRESSURE: ICD-10-CM

## 2020-02-09 DIAGNOSIS — R73.9 HYPERGLYCEMIA: ICD-10-CM

## 2020-02-09 LAB
ANION GAP SERPL CALCULATED.3IONS-SCNC: 10 MMOL/L (ref 4–13)
APTT PPP: 37 SECONDS (ref 23–37)
BASOPHILS # BLD AUTO: 0.03 THOUSANDS/ΜL (ref 0–0.1)
BASOPHILS NFR BLD AUTO: 1 % (ref 0–1)
BUN SERPL-MCNC: 25 MG/DL (ref 5–25)
CALCIUM SERPL-MCNC: 9.1 MG/DL (ref 8.3–10.1)
CHLORIDE SERPL-SCNC: 104 MMOL/L (ref 100–108)
CO2 SERPL-SCNC: 24 MMOL/L (ref 21–32)
CREAT SERPL-MCNC: 1.79 MG/DL (ref 0.6–1.3)
EOSINOPHIL # BLD AUTO: 0.11 THOUSAND/ΜL (ref 0–0.61)
EOSINOPHIL NFR BLD AUTO: 2 % (ref 0–6)
ERYTHROCYTE [DISTWIDTH] IN BLOOD BY AUTOMATED COUNT: 14.3 % (ref 11.6–15.1)
GFR SERPL CREATININE-BSD FRML MDRD: 39 ML/MIN/1.73SQ M
GLUCOSE SERPL-MCNC: 345 MG/DL (ref 65–140)
GLUCOSE SERPL-MCNC: 349 MG/DL (ref 65–140)
HCT VFR BLD AUTO: 46.1 % (ref 36.5–49.3)
HGB BLD-MCNC: 13.3 G/DL (ref 12–17)
IMM GRANULOCYTES # BLD AUTO: 0.08 THOUSAND/UL (ref 0–0.2)
IMM GRANULOCYTES NFR BLD AUTO: 2 % (ref 0–2)
INR PPP: 2.9 (ref 0.84–1.19)
LYMPHOCYTES # BLD AUTO: 0.8 THOUSANDS/ΜL (ref 0.6–4.47)
LYMPHOCYTES NFR BLD AUTO: 17 % (ref 14–44)
MCH RBC QN AUTO: 27.9 PG (ref 26.8–34.3)
MCHC RBC AUTO-ENTMCNC: 28.9 G/DL (ref 31.4–37.4)
MCV RBC AUTO: 97 FL (ref 82–98)
MONOCYTES # BLD AUTO: 0.25 THOUSAND/ΜL (ref 0.17–1.22)
MONOCYTES NFR BLD AUTO: 5 % (ref 4–12)
NEUTROPHILS # BLD AUTO: 3.46 THOUSANDS/ΜL (ref 1.85–7.62)
NEUTS SEG NFR BLD AUTO: 73 % (ref 43–75)
NRBC BLD AUTO-RTO: 0 /100 WBCS
PLATELET # BLD AUTO: 168 THOUSANDS/UL (ref 149–390)
PMV BLD AUTO: 9.2 FL (ref 8.9–12.7)
POTASSIUM SERPL-SCNC: 4.3 MMOL/L (ref 3.5–5.3)
PROTHROMBIN TIME: 29.3 SECONDS (ref 11.6–14.5)
RBC # BLD AUTO: 4.77 MILLION/UL (ref 3.88–5.62)
SODIUM SERPL-SCNC: 138 MMOL/L (ref 136–145)
TROPONIN I SERPL-MCNC: 0.02 NG/ML
WBC # BLD AUTO: 4.73 THOUSAND/UL (ref 4.31–10.16)

## 2020-02-09 PROCEDURE — 80048 BASIC METABOLIC PNL TOTAL CA: CPT | Performed by: EMERGENCY MEDICINE

## 2020-02-09 PROCEDURE — 96360 HYDRATION IV INFUSION INIT: CPT

## 2020-02-09 PROCEDURE — 82948 REAGENT STRIP/BLOOD GLUCOSE: CPT

## 2020-02-09 PROCEDURE — 85610 PROTHROMBIN TIME: CPT | Performed by: EMERGENCY MEDICINE

## 2020-02-09 PROCEDURE — 84484 ASSAY OF TROPONIN QUANT: CPT | Performed by: EMERGENCY MEDICINE

## 2020-02-09 PROCEDURE — 99284 EMERGENCY DEPT VISIT MOD MDM: CPT | Performed by: EMERGENCY MEDICINE

## 2020-02-09 PROCEDURE — 36415 COLL VENOUS BLD VENIPUNCTURE: CPT | Performed by: EMERGENCY MEDICINE

## 2020-02-09 PROCEDURE — 70450 CT HEAD/BRAIN W/O DYE: CPT

## 2020-02-09 PROCEDURE — 85730 THROMBOPLASTIN TIME PARTIAL: CPT | Performed by: EMERGENCY MEDICINE

## 2020-02-09 PROCEDURE — 99284 EMERGENCY DEPT VISIT MOD MDM: CPT

## 2020-02-09 PROCEDURE — 93005 ELECTROCARDIOGRAM TRACING: CPT

## 2020-02-09 PROCEDURE — 85025 COMPLETE CBC W/AUTO DIFF WBC: CPT | Performed by: EMERGENCY MEDICINE

## 2020-02-09 RX ORDER — MECLIZINE HCL 12.5 MG/1
25 TABLET ORAL ONCE
Status: COMPLETED | OUTPATIENT
Start: 2020-02-09 | End: 2020-02-09

## 2020-02-09 RX ADMIN — MECLIZINE 25 MG: 12.5 TABLET ORAL at 12:38

## 2020-02-09 RX ADMIN — SODIUM CHLORIDE 1000 ML: 0.9 INJECTION, SOLUTION INTRAVENOUS at 11:59

## 2020-02-09 NOTE — ED PROVIDER NOTES
History  Chief Complaint   Patient presents with    Dizziness     per pt "he has been having some dizzy spells since yesterday with some nausea, but states this started when he started a new BP med "     Patient presents to the emergency department for evaluation of dizziness  Patient believes is dizziness comes on after he takes his new blood pressure medication which is carvedilol  He was switched on Friday from metoprolol to carvedilol  Patient denies headache chest pain sob or extremity numbness tingling or weakness  He states an hour after he takes his carvedilol he gets dizzy  Denies URI symptoms I pain ear pain or sore throat  Denies leg or calf pain  Denies sob or diaphoresis  Denies fever chills rash  Denies trauma fall or injury  Prior to Admission Medications   Prescriptions Last Dose Informant Patient Reported? Taking?   acetaminophen (TYLENOL) 325 mg tablet   No No   Sig: Take 3 tablets (975 mg total) by mouth every 8 (eight) hours as needed for fever   aspirin 81 mg chewable tablet  Self Yes No   Sig: Chew 81 mg daily  ezetimibe-simvastatin (VYTORIN) 10-40 mg per tablet  Self Yes No   Sig: Take 1 tablet by mouth every evening   fenofibrate (TRICOR) 145 mg tablet  Self Yes No   Sig: Take 145 mg by mouth daily  insulin regular (HUMULIN R) 500 units/mL CONCENTRATED injection   No No   Si units with breakfast and dinner, 80 units with lunch and at bedtime   metoprolol tartrate (LOPRESSOR) 50 mg tablet  Self Yes No   Sig: Take 50 mg by mouth 2 (two) times a day  oxyCODONE (ROXICODONE) 5 mg immediate release tablet   No No   Sig: Take 1 tablet (5 mg total) by mouth every 4 (four) hours as needed for moderate pain or severe pain Max Daily Amount: 30 mg   pantoprazole (PROTONIX) 40 mg tablet  Self Yes No   Sig: Take 40 mg by mouth daily   warfarin (COUMADIN) 2 5 mg tablet  Self Yes No   Sig: Take 2 5 mg by mouth daily        Facility-Administered Medications: None       Past Medical History:   Diagnosis Date    Bell's palsy     Cardiac disease     [facility]     [facility]     Hyperlipidemia     Hypertension     Renal disorder     [facility]            Past Surgical History:   Procedure Laterality Date    CORONARY ANGIOPLASTY WITH STENT PLACEMENT      FRACTURE SURGERY      INCISION AND DRAINAGE OF WOUND Right 2016    Procedure: INCISION AND DRAINAGE (I&D) EXTREMITY, right lateral ankle;  Surgeon: Mamie Duncan DPM;  Location: [facility];  Service:     WOUND DEBRIDEMENT Right 9/15/2016    Procedure: DEBRIDEMENT WOUND ([address]) ankle wound with closure and FRANCY drain placement;  Surgeon: Mamie Duncan DPM;  [facility];  Service:        History reviewed  No pertinent family history  I have reviewed and agree with the history as documented  Social History     Tobacco Use    Smoking status: Former Smoker     Packs/day: 0 00     Types: Cigarettes     Last attempt to quit: 9/3/2006     Years since quittin 4    Smokeless tobacco: Never Used   Substance Use Topics    Alcohol use: No    Drug use: No        Review of Systems   Constitutional: Negative for activity change, appetite change, chills, diaphoresis, fatigue and fever  HENT: Negative for congestion, ear discharge, ear pain, sinus pressure, sinus pain, tinnitus, trouble swallowing and voice change  Eyes: Negative for photophobia and visual disturbance  Respiratory: Negative for cough, chest tightness, shortness of breath, wheezing and stridor  Cardiovascular: Negative for chest pain, palpitations and leg swelling  Gastrointestinal: Negative for abdominal distention, abdominal pain, constipation, diarrhea, nausea and vomiting  Genitourinary: Negative for dysuria, flank pain, hematuria, penile pain, penile swelling, testicular pain and urgency  Musculoskeletal: Negative for back pain, neck pain and neck stiffness     Skin: Negative for rash and wound  Neurological: Positive for dizziness  Negative for seizures, syncope, weakness and headaches  Hematological: Does not bruise/bleed easily  Psychiatric/Behavioral: Negative for confusion  Physical Exam  Physical Exam   Constitutional: He is oriented to person, place, and time  He appears well-developed and well-nourished  No distress  HENT:   Head: Normocephalic and atraumatic  Right Ear: External ear normal    Left Ear: External ear normal    Nose: Nose normal    Mouth/Throat: Oropharynx is clear and moist    Eyes: Pupils are equal, round, and reactive to light  Conjunctivae and EOM are normal    Neck: Normal range of motion  Neck supple  Cardiovascular: Normal rate, regular rhythm, normal heart sounds and intact distal pulses  Pulmonary/Chest: Effort normal and breath sounds normal  No stridor  No respiratory distress  He has no wheezes  He has no rales  He exhibits no tenderness  Abdominal: Soft  Bowel sounds are normal  He exhibits no distension and no mass  There is no tenderness  There is no rebound and no guarding  No hernia  Musculoskeletal: Normal range of motion  He exhibits no edema, tenderness or deformity  Neurological: He is alert and oriented to person, place, and time  He has normal reflexes  He displays normal reflexes  No cranial nerve deficit or sensory deficit  He exhibits normal muscle tone  Coordination normal    Skin: Skin is warm and dry  No rash noted  He is not diaphoretic  No pallor  Psychiatric: He has a normal mood and affect  His behavior is normal  Judgment and thought content normal    Nursing note and vitals reviewed        Vital Signs  ED Triage Vitals [02/09/20 1105]   Temp Pulse Respirations Blood Pressure SpO2   -- 58 22 139/63 95 %      Temp Source Heart Rate Source Patient Position - Orthostatic VS BP Location FiO2 (%)   Oral Monitor Lying Right arm --      Pain Score       4           Vitals:    02/09/20 1137 02/09/20 1139 02/09/20 1140 02/09/20 1142   BP: 133/65 136/71 128/62 137/64   Pulse: 55 59 59 59   Patient Position - Orthostatic VS: Lying - Orthostatic VS Sitting - Orthostatic VS Standing - Orthostatic VS Standing for 3 minutes - Orthostatic VS         Visual Acuity      ED Medications  Medications   sodium chloride 0 9 % bolus 1,000 mL (1,000 mL Intravenous New Bag 2/9/20 1159)   meclizine (ANTIVERT) tablet 25 mg (25 mg Oral Given 2/9/20 1238)       Diagnostic Studies  Results Reviewed     Procedure Component Value Units Date/Time    APTT [[id number]]  (Normal) Collected:  02/09/20 1158    Lab Status:  Final result Specimen:  Blood from Arm, Right Updated:  02/09/20 1222     PTT 37 seconds     Protime-INR [[id number]]  (Abnormal) Collected:  02/09/20 1158    Lab Status:  Final result Specimen:  Blood from Arm, Right Updated:  02/09/20 1222     Protime 29 3 seconds      INR 2 90    Troponin I [[id number]]  (Normal) Collected:  02/09/20 1131    Lab Status:  Final result Specimen:  Blood from Arm, Right Updated:  02/09/20 1154     Troponin I 0 02 ng/mL     Basic metabolic panel [[id number]]  (Abnormal) Collected:  02/09/20 1120    Lab Status:  Final result Specimen:  Blood from Arm, Left Updated:  02/09/20 1137     Sodium 138 mmol/L      Potassium 4 3 mmol/L      Chloride 104 mmol/L      CO2 24 mmol/L      ANION GAP 10 mmol/L      BUN 25 mg/dL      Creatinine 1 79 mg/dL      Glucose 345 mg/dL      Calcium 9 1 mg/dL      eGFR 39 ml/min/1 73sq m     Narrative:       Kelin guidelines for Chronic Kidney Disease (CKD):     Stage 1 with normal or high GFR (GFR > 90 mL/min/1 73 square meters)    Stage 2 Mild CKD (GFR = 60-89 mL/min/1 73 square meters)    Stage 3A Moderate CKD (GFR = 45-59 mL/min/1 73 square meters)    Stage 3B Moderate CKD (GFR = 30-44 mL/min/1 73 square meters)    Stage 4 Severe CKD (GFR = 15-29 mL/min/1 73 square meters)    Stage 5 End Stage CKD (GFR <15 mL/min/1 73 square meters)  Note: GFR calculation is accurate only with a steady state creatinine    CBC and differential [[id number]]  (Abnormal) Collected:  02/09/20 1120    Lab Status:  Final result Specimen:  Blood from Arm, Left Updated:  02/09/20 1127     WBC 4 73 Thousand/uL      RBC 4 77 Million/uL      Hemoglobin 13 3 g/dL      Hematocrit 46 1 %      MCV 97 fL      MCH 27 9 pg      MCHC 28 9 g/dL      RDW 14 3 %      MPV 9 2 fL      Platelets 936 Thousands/uL      nRBC 0 /100 WBCs      Neutrophils Relative 73 %      Immat GRANS % 2 %      Lymphocytes Relative 17 %      Monocytes Relative 5 %      Eosinophils Relative 2 %      Basophils Relative 1 %      Neutrophils Absolute 3 46 Thousands/µL      Immature Grans Absolute 0 08 Thousand/uL      Lymphocytes Absolute 0 80 Thousands/µL      Monocytes Absolute 0 25 Thousand/µL      Eosinophils Absolute 0 11 Thousand/µL      Basophils Absolute 0 03 Thousands/µL     Fingerstick Glucose (POCT) [[id number]]  (Abnormal) Collected:  02/09/20 1104    Lab Status:  Final result Updated:  02/09/20 1105     POC Glucose 349 mg/dl                  CT head without contrast   Final Result by Laci Singleton DO (02/09 1310)   Old encephalomalacia caudal aspect of the right cerebellar hemisphere  Tiny lacunar infarct in the left external capsule, similar to prior exam    No acute intracranial abnormality  Trace amounts of fluid in the left middle ear  Incomplete effusion within bilateral mastoid air cells  This appears improved                 Workstation performed: [id number]                    Procedures  ECG 12 Lead Documentation Only  Date/Time: 2/9/2020 12:11 PM  Performed by: Cindy Merrill MD  Authorized by: Cindy Merrill MD     ECG reviewed by me, the ED Provider: yes    Patient location:  ED  Previous ECG:     Previous ECG:  Compared to current    Similarity:  No change  Interpretation:     Interpretation: abnormal    Rate:     ECG rate:  58    ECG rate assessment: bradycardic    Rhythm:     Rhythm: sinus bradycardia    Ectopy:     Ectopy: none    QRS:     QRS axis:  Normal    QRS intervals:  Normal  ST segments:     ST segments:  Non-specific  T waves:     T waves: non-specific               ED Course  ED Course as of Feb 09 1344   Sun Feb 09, 2020   1341 Patient is stable for discharge  He has no symptoms at this time  His lab work is baseline other than hyperglycemia  His CT scan without anything acute  He is advised to follow-up with his private physician who made the blood pressure medication change to discuss moving forward  Discussed signs and symptoms requiring return to the emergency department  MDM      Disposition  Final diagnoses:   Dizziness   Hyperglycemia   High blood pressure     Time reflects when diagnosis was documented in both MDM as applicable and the Disposition within this note     Time User Action Codes Description Comment    2/9/2020  1:42 PM Rhae Dock Add [R42] Dizziness     2/9/2020  1:42 PM Estrada, Dusty Fill Add [R73 9] Hyperglycemia     2/9/2020  1:43 PM Estrada, [place] Fill Add [I10] High blood pressure       ED Disposition     ED Disposition Condition Date/Time Comment    Discharge Stable [place] Feb 9, 2020  1:43 PM Shamar Yung discharge to home/self care  Follow-up Information     Follow up With Specialties Details Why Afshin Guerrero MD Internal Medicine Schedule an appointment as soon as possible for a visit  Schedule appointment to read discuss blood pressure management medication  [facility]  [phone]            Patient's Medications   Discharge Prescriptions    No medications on file     No discharge procedures on file      ED Provider  Electronically Signed by           Joseph Betancourt MD  02/09/20 3176

## 2020-02-11 LAB
ATRIAL RATE: 58 BPM
P AXIS: 50 DEGREES
PR INTERVAL: 160 MS
QRS AXIS: 13 DEGREES
QRSD INTERVAL: 90 MS
QT INTERVAL: 426 MS
QTC INTERVAL: 418 MS
T WAVE AXIS: 101 DEGREES
VENTRICULAR RATE: 58 BPM

## 2020-02-11 PROCEDURE — 93010 ELECTROCARDIOGRAM REPORT: CPT | Performed by: INTERNAL MEDICINE

## 2020-05-26 ENCOUNTER — APPOINTMENT (EMERGENCY)
Dept: ULTRASOUND IMAGING | Facility: HOSPITAL | Age: 65
End: 2020-05-26
Payer: COMMERCIAL

## 2020-05-26 ENCOUNTER — APPOINTMENT (EMERGENCY)
Dept: CT IMAGING | Facility: HOSPITAL | Age: 65
End: 2020-05-26
Payer: COMMERCIAL

## 2020-05-26 ENCOUNTER — APPOINTMENT (EMERGENCY)
Dept: RADIOLOGY | Facility: HOSPITAL | Age: 65
End: 2020-05-26
Payer: COMMERCIAL

## 2020-05-26 ENCOUNTER — HOSPITAL ENCOUNTER (EMERGENCY)
Facility: HOSPITAL | Age: 65
Discharge: HOME | End: 2020-05-26
Attending: EMERGENCY MEDICINE | Admitting: EMERGENCY MEDICINE
Payer: COMMERCIAL

## 2020-05-26 VITALS
RESPIRATION RATE: 16 BRPM | OXYGEN SATURATION: 96 % | TEMPERATURE: 98 F | WEIGHT: 257.94 LBS | HEIGHT: 67 IN | SYSTOLIC BLOOD PRESSURE: 189 MMHG | DIASTOLIC BLOOD PRESSURE: 88 MMHG | HEART RATE: 65 BPM | BODY MASS INDEX: 40.48 KG/M2

## 2020-05-26 DIAGNOSIS — M25.559 HIP PAIN: Primary | ICD-10-CM

## 2020-05-26 PROCEDURE — 99284 EMERGENCY DEPT VISIT MOD MDM: CPT | Performed by: EMERGENCY MEDICINE

## 2020-05-26 PROCEDURE — 73700 CT LOWER EXTREMITY W/O DYE: CPT

## 2020-05-26 PROCEDURE — 73502 X-RAY EXAM HIP UNI 2-3 VIEWS: CPT

## 2020-05-26 PROCEDURE — 93971 EXTREMITY STUDY: CPT

## 2020-05-26 PROCEDURE — 72100 X-RAY EXAM L-S SPINE 2/3 VWS: CPT

## 2020-05-26 PROCEDURE — 99284 EMERGENCY DEPT VISIT MOD MDM: CPT

## 2020-05-26 RX ORDER — OXYCODONE HYDROCHLORIDE 5 MG/1
5 TABLET ORAL ONCE
Status: COMPLETED | OUTPATIENT
Start: 2020-05-26 | End: 2020-05-26

## 2020-05-26 RX ORDER — OXYCODONE HYDROCHLORIDE 5 MG/1
5 TABLET ORAL EVERY 4 HOURS PRN
Qty: 30 TABLET | Refills: 0 | Status: SHIPPED | OUTPATIENT
Start: 2020-05-26 | End: 2020-06-05

## 2020-05-26 RX ADMIN — OXYCODONE HYDROCHLORIDE 5 MG: 5 TABLET ORAL at 21:03

## 2020-05-27 PROCEDURE — 93971 EXTREMITY STUDY: CPT | Performed by: SURGERY

## 2020-09-28 ENCOUNTER — HOSPITAL ENCOUNTER (INPATIENT)
Facility: HOSPITAL | Age: 65
LOS: 3 days | Discharge: HOME | DRG: 603 | End: 2020-10-01
Attending: EMERGENCY MEDICINE | Admitting: HOSPITALIST
Payer: COMMERCIAL

## 2020-09-28 DIAGNOSIS — E11.65 TYPE 2 DIABETES MELLITUS WITH HYPERGLYCEMIA, WITH LONG-TERM CURRENT USE OF INSULIN (HCC): ICD-10-CM

## 2020-09-28 DIAGNOSIS — E11.622 DIABETIC LEG ULCER (HCC): ICD-10-CM

## 2020-09-28 DIAGNOSIS — L03.115 BILATERAL LOWER LEG CELLULITIS: Primary | ICD-10-CM

## 2020-09-28 DIAGNOSIS — I10 ESSENTIAL HYPERTENSION: ICD-10-CM

## 2020-09-28 DIAGNOSIS — L97.909 DIABETIC LEG ULCER (HCC): ICD-10-CM

## 2020-09-28 DIAGNOSIS — Z79.4 TYPE 2 DIABETES MELLITUS WITH HYPERGLYCEMIA, WITH LONG-TERM CURRENT USE OF INSULIN (HCC): ICD-10-CM

## 2020-09-28 DIAGNOSIS — L03.116 BILATERAL LOWER LEG CELLULITIS: Primary | ICD-10-CM

## 2020-09-28 PROBLEM — Z51.89 VISIT FOR WOUND CHECK: Status: ACTIVE | Noted: 2020-09-28

## 2020-09-28 PROBLEM — L03.119 CELLULITIS OF LOWER EXTREMITY: Status: ACTIVE | Noted: 2020-09-28

## 2020-09-28 LAB
ALBUMIN SERPL BCP-MCNC: 2.3 G/DL (ref 3.5–5)
ALP SERPL-CCNC: 50 U/L (ref 46–116)
ALT SERPL W P-5'-P-CCNC: 23 U/L (ref 12–78)
ANION GAP SERPL CALCULATED.3IONS-SCNC: 9 MMOL/L (ref 4–13)
APTT PPP: 70 SECONDS (ref 23–37)
AST SERPL W P-5'-P-CCNC: 31 U/L (ref 5–45)
BASOPHILS # BLD AUTO: 0.04 THOUSANDS/ΜL (ref 0–0.1)
BASOPHILS NFR BLD AUTO: 1 % (ref 0–1)
BILIRUB SERPL-MCNC: 0.33 MG/DL (ref 0.2–1)
BUN SERPL-MCNC: 21 MG/DL (ref 5–25)
CALCIUM ALBUM COR SERPL-MCNC: 9.9 MG/DL (ref 8.3–10.1)
CALCIUM SERPL-MCNC: 8.5 MG/DL (ref 8.3–10.1)
CHLORIDE SERPL-SCNC: 104 MMOL/L (ref 100–108)
CO2 SERPL-SCNC: 25 MMOL/L (ref 21–32)
CREAT SERPL-MCNC: 1.73 MG/DL (ref 0.6–1.3)
EOSINOPHIL # BLD AUTO: 0.1 THOUSAND/ΜL (ref 0–0.61)
EOSINOPHIL NFR BLD AUTO: 1 % (ref 0–6)
ERYTHROCYTE [DISTWIDTH] IN BLOOD BY AUTOMATED COUNT: 14.9 % (ref 11.6–15.1)
GFR SERPL CREATININE-BSD FRML MDRD: 40 ML/MIN/1.73SQ M
GLUCOSE SERPL-MCNC: 335 MG/DL (ref 65–140)
GLUCOSE SERPL-MCNC: 343 MG/DL (ref 65–140)
HCT VFR BLD AUTO: 40.8 % (ref 36.5–49.3)
HGB BLD-MCNC: 12.7 G/DL (ref 12–17)
IMM GRANULOCYTES # BLD AUTO: 0.1 THOUSAND/UL (ref 0–0.2)
IMM GRANULOCYTES NFR BLD AUTO: 1 % (ref 0–2)
INR PPP: 2.78 (ref 0.84–1.19)
LACTATE SERPL-SCNC: 0.6 MMOL/L (ref 0.5–2)
LYMPHOCYTES # BLD AUTO: 0.79 THOUSANDS/ΜL (ref 0.6–4.47)
LYMPHOCYTES NFR BLD AUTO: 11 % (ref 14–44)
MCH RBC QN AUTO: 28.3 PG (ref 26.8–34.3)
MCHC RBC AUTO-ENTMCNC: 31.1 G/DL (ref 31.4–37.4)
MCV RBC AUTO: 91 FL (ref 82–98)
MONOCYTES # BLD AUTO: 0.4 THOUSAND/ΜL (ref 0.17–1.22)
MONOCYTES NFR BLD AUTO: 5 % (ref 4–12)
NEUTROPHILS # BLD AUTO: 5.91 THOUSANDS/ΜL (ref 1.85–7.62)
NEUTS SEG NFR BLD AUTO: 81 % (ref 43–75)
NRBC BLD AUTO-RTO: 0 /100 WBCS
PLATELET # BLD AUTO: 233 THOUSANDS/UL (ref 149–390)
PMV BLD AUTO: 9.3 FL (ref 8.9–12.7)
POTASSIUM SERPL-SCNC: 5 MMOL/L (ref 3.5–5.3)
PROT SERPL-MCNC: 7.3 G/DL (ref 6.4–8.2)
PROTHROMBIN TIME: 29.4 SECONDS (ref 11.6–14.5)
RBC # BLD AUTO: 4.48 MILLION/UL (ref 3.88–5.62)
SODIUM SERPL-SCNC: 138 MMOL/L (ref 136–145)
WBC # BLD AUTO: 7.34 THOUSAND/UL (ref 4.31–10.16)

## 2020-09-28 PROCEDURE — 36415 COLL VENOUS BLD VENIPUNCTURE: CPT | Performed by: EMERGENCY MEDICINE

## 2020-09-28 PROCEDURE — 85730 THROMBOPLASTIN TIME PARTIAL: CPT | Performed by: EMERGENCY MEDICINE

## 2020-09-28 PROCEDURE — 83605 ASSAY OF LACTIC ACID: CPT | Performed by: EMERGENCY MEDICINE

## 2020-09-28 PROCEDURE — 82948 REAGENT STRIP/BLOOD GLUCOSE: CPT

## 2020-09-28 PROCEDURE — 99223 1ST HOSP IP/OBS HIGH 75: CPT | Performed by: PHYSICIAN ASSISTANT

## 2020-09-28 PROCEDURE — 99284 EMERGENCY DEPT VISIT MOD MDM: CPT

## 2020-09-28 PROCEDURE — 85025 COMPLETE CBC W/AUTO DIFF WBC: CPT | Performed by: EMERGENCY MEDICINE

## 2020-09-28 PROCEDURE — 99285 EMERGENCY DEPT VISIT HI MDM: CPT | Performed by: EMERGENCY MEDICINE

## 2020-09-28 PROCEDURE — 85610 PROTHROMBIN TIME: CPT | Performed by: EMERGENCY MEDICINE

## 2020-09-28 PROCEDURE — 96375 TX/PRO/DX INJ NEW DRUG ADDON: CPT

## 2020-09-28 PROCEDURE — 80053 COMPREHEN METABOLIC PANEL: CPT | Performed by: EMERGENCY MEDICINE

## 2020-09-28 PROCEDURE — 87040 BLOOD CULTURE FOR BACTERIA: CPT | Performed by: EMERGENCY MEDICINE

## 2020-09-28 PROCEDURE — 96365 THER/PROPH/DIAG IV INF INIT: CPT

## 2020-09-28 RX ORDER — WARFARIN SODIUM 2.5 MG/1
2.5 TABLET ORAL
Status: DISCONTINUED | OUTPATIENT
Start: 2020-09-28 | End: 2020-10-01 | Stop reason: HOSPADM

## 2020-09-28 RX ORDER — FENOFIBRATE 145 MG/1
145 TABLET, COATED ORAL DAILY
Status: DISCONTINUED | OUTPATIENT
Start: 2020-09-29 | End: 2020-10-01 | Stop reason: HOSPADM

## 2020-09-28 RX ORDER — PANTOPRAZOLE SODIUM 40 MG/1
40 TABLET, DELAYED RELEASE ORAL
Status: DISCONTINUED | OUTPATIENT
Start: 2020-09-29 | End: 2020-10-01 | Stop reason: HOSPADM

## 2020-09-28 RX ORDER — ASPIRIN 81 MG/1
81 TABLET, CHEWABLE ORAL DAILY
Status: DISCONTINUED | OUTPATIENT
Start: 2020-09-29 | End: 2020-10-01 | Stop reason: HOSPADM

## 2020-09-28 RX ORDER — CEFAZOLIN SODIUM 2 G/50ML
2000 SOLUTION INTRAVENOUS EVERY 8 HOURS
Status: DISCONTINUED | OUTPATIENT
Start: 2020-09-29 | End: 2020-10-01 | Stop reason: HOSPADM

## 2020-09-28 RX ORDER — ACETAMINOPHEN 325 MG/1
650 TABLET ORAL EVERY 6 HOURS PRN
Status: DISCONTINUED | OUTPATIENT
Start: 2020-09-28 | End: 2020-09-30

## 2020-09-28 RX ORDER — METOPROLOL TARTRATE 50 MG/1
50 TABLET, FILM COATED ORAL 2 TIMES DAILY
Status: DISCONTINUED | OUTPATIENT
Start: 2020-09-29 | End: 2020-10-01 | Stop reason: HOSPADM

## 2020-09-28 RX ORDER — HYDROMORPHONE HCL/PF 1 MG/ML
0.5 SYRINGE (ML) INJECTION ONCE
Status: COMPLETED | OUTPATIENT
Start: 2020-09-28 | End: 2020-09-28

## 2020-09-28 RX ORDER — WARFARIN SODIUM 2.5 MG/1
2.5 TABLET ORAL
Status: DISCONTINUED | OUTPATIENT
Start: 2020-09-29 | End: 2020-09-28

## 2020-09-28 RX ADMIN — EZETIMIBE: 10 TABLET ORAL at 23:56

## 2020-09-28 RX ADMIN — HYDROMORPHONE HYDROCHLORIDE 0.5 MG: 1 INJECTION, SOLUTION INTRAMUSCULAR; INTRAVENOUS; SUBCUTANEOUS at 20:31

## 2020-09-28 RX ADMIN — CEFEPIME HYDROCHLORIDE 2000 MG: 2 INJECTION, POWDER, FOR SOLUTION INTRAVENOUS at 20:13

## 2020-09-28 RX ADMIN — SODIUM CHLORIDE 1000 ML: 0.9 INJECTION, SOLUTION INTRAVENOUS at 20:33

## 2020-09-28 RX ADMIN — WARFARIN SODIUM 2.5 MG: 2.5 TABLET ORAL at 23:57

## 2020-09-29 ENCOUNTER — APPOINTMENT (INPATIENT)
Dept: VASCULAR ULTRASOUND | Facility: HOSPITAL | Age: 65
DRG: 603 | End: 2020-09-29
Payer: COMMERCIAL

## 2020-09-29 LAB
ANION GAP SERPL CALCULATED.3IONS-SCNC: 12 MMOL/L (ref 4–13)
BUN SERPL-MCNC: 21 MG/DL (ref 5–25)
CALCIUM SERPL-MCNC: 8.2 MG/DL (ref 8.3–10.1)
CHLORIDE SERPL-SCNC: 105 MMOL/L (ref 100–108)
CO2 SERPL-SCNC: 21 MMOL/L (ref 21–32)
CREAT SERPL-MCNC: 1.57 MG/DL (ref 0.6–1.3)
ERYTHROCYTE [DISTWIDTH] IN BLOOD BY AUTOMATED COUNT: 14.6 % (ref 11.6–15.1)
GFR SERPL CREATININE-BSD FRML MDRD: 45 ML/MIN/1.73SQ M
GLUCOSE SERPL-MCNC: 266 MG/DL (ref 65–140)
GLUCOSE SERPL-MCNC: 336 MG/DL (ref 65–140)
GLUCOSE SERPL-MCNC: 366 MG/DL (ref 65–140)
GLUCOSE SERPL-MCNC: 392 MG/DL (ref 65–140)
GLUCOSE SERPL-MCNC: 439 MG/DL (ref 65–140)
GLUCOSE SERPL-MCNC: 444 MG/DL (ref 65–140)
HCT VFR BLD AUTO: 37.1 % (ref 36.5–49.3)
HGB BLD-MCNC: 11.3 G/DL (ref 12–17)
MCH RBC QN AUTO: 28.1 PG (ref 26.8–34.3)
MCHC RBC AUTO-ENTMCNC: 30.5 G/DL (ref 31.4–37.4)
MCV RBC AUTO: 92 FL (ref 82–98)
PLATELET # BLD AUTO: 214 THOUSANDS/UL (ref 149–390)
PMV BLD AUTO: 9.3 FL (ref 8.9–12.7)
POTASSIUM SERPL-SCNC: 4 MMOL/L (ref 3.5–5.3)
RBC # BLD AUTO: 4.02 MILLION/UL (ref 3.88–5.62)
SODIUM SERPL-SCNC: 138 MMOL/L (ref 136–145)
WBC # BLD AUTO: 5.74 THOUSAND/UL (ref 4.31–10.16)

## 2020-09-29 PROCEDURE — 80048 BASIC METABOLIC PNL TOTAL CA: CPT | Performed by: PHYSICIAN ASSISTANT

## 2020-09-29 PROCEDURE — 85027 COMPLETE CBC AUTOMATED: CPT | Performed by: PHYSICIAN ASSISTANT

## 2020-09-29 PROCEDURE — 82948 REAGENT STRIP/BLOOD GLUCOSE: CPT

## 2020-09-29 PROCEDURE — 99232 SBSQ HOSP IP/OBS MODERATE 35: CPT | Performed by: INTERNAL MEDICINE

## 2020-09-29 PROCEDURE — 93923 UPR/LXTR ART STDY 3+ LVLS: CPT

## 2020-09-29 PROCEDURE — 93925 LOWER EXTREMITY STUDY: CPT | Performed by: SURGERY

## 2020-09-29 PROCEDURE — 93922 UPR/L XTREMITY ART 2 LEVELS: CPT | Performed by: SURGERY

## 2020-09-29 PROCEDURE — 93925 LOWER EXTREMITY STUDY: CPT

## 2020-09-29 PROCEDURE — 99223 1ST HOSP IP/OBS HIGH 75: CPT | Performed by: INTERNAL MEDICINE

## 2020-09-29 RX ORDER — LISINOPRIL 10 MG/1
10 TABLET ORAL DAILY
Status: DISCONTINUED | OUTPATIENT
Start: 2020-09-29 | End: 2020-10-01 | Stop reason: HOSPADM

## 2020-09-29 RX ADMIN — PANTOPRAZOLE SODIUM 40 MG: 40 TABLET, DELAYED RELEASE ORAL at 05:52

## 2020-09-29 RX ADMIN — ACETAMINOPHEN 650 MG: 325 TABLET, FILM COATED ORAL at 08:12

## 2020-09-29 RX ADMIN — WARFARIN SODIUM 2.5 MG: 2.5 TABLET ORAL at 17:31

## 2020-09-29 RX ADMIN — INSULIN LISPRO 4 UNITS: 100 INJECTION, SOLUTION INTRAVENOUS; SUBCUTANEOUS at 00:33

## 2020-09-29 RX ADMIN — CEFAZOLIN SODIUM 2000 MG: 2 SOLUTION INTRAVENOUS at 08:12

## 2020-09-29 RX ADMIN — INSULIN HUMAN 85 UNITS: 500 INJECTION, SOLUTION SUBCUTANEOUS at 08:13

## 2020-09-29 RX ADMIN — LISINOPRIL 10 MG: 10 TABLET ORAL at 14:15

## 2020-09-29 RX ADMIN — INSULIN LISPRO 10 UNITS: 100 INJECTION, SOLUTION INTRAVENOUS; SUBCUTANEOUS at 08:13

## 2020-09-29 RX ADMIN — ACETAMINOPHEN 650 MG: 325 TABLET, FILM COATED ORAL at 17:31

## 2020-09-29 RX ADMIN — ASPIRIN 81 MG CHEWABLE TABLET 81 MG: 81 TABLET CHEWABLE at 08:12

## 2020-09-29 RX ADMIN — INSULIN LISPRO 12 UNITS: 100 INJECTION, SOLUTION INTRAVENOUS; SUBCUTANEOUS at 17:37

## 2020-09-29 RX ADMIN — CEFAZOLIN SODIUM 2000 MG: 2 SOLUTION INTRAVENOUS at 17:31

## 2020-09-29 RX ADMIN — METOPROLOL TARTRATE 50 MG: 50 TABLET, FILM COATED ORAL at 08:12

## 2020-09-29 RX ADMIN — ACETAMINOPHEN 650 MG: 325 TABLET, FILM COATED ORAL at 23:33

## 2020-09-29 RX ADMIN — INSULIN HUMAN 85 UNITS: 500 INJECTION, SOLUTION SUBCUTANEOUS at 12:10

## 2020-09-29 RX ADMIN — INSULIN HUMAN 85 UNITS: 500 INJECTION, SOLUTION SUBCUTANEOUS at 00:04

## 2020-09-29 RX ADMIN — INSULIN LISPRO 4 UNITS: 100 INJECTION, SOLUTION INTRAVENOUS; SUBCUTANEOUS at 21:27

## 2020-09-29 RX ADMIN — FENOFIBRATE 145 MG: 145 TABLET, COATED ORAL at 08:13

## 2020-09-29 RX ADMIN — EZETIMIBE: 10 TABLET ORAL at 21:25

## 2020-09-29 RX ADMIN — INSULIN HUMAN 100 UNITS: 500 INJECTION, SOLUTION SUBCUTANEOUS at 21:27

## 2020-09-29 RX ADMIN — INSULIN LISPRO 8 UNITS: 100 INJECTION, SOLUTION INTRAVENOUS; SUBCUTANEOUS at 12:10

## 2020-09-29 RX ADMIN — METOPROLOL TARTRATE 50 MG: 50 TABLET, FILM COATED ORAL at 17:31

## 2020-09-30 PROBLEM — E66.01 CLASS 3 SEVERE OBESITY IN ADULT (HCC): Status: ACTIVE | Noted: 2020-09-30

## 2020-09-30 LAB
ANION GAP SERPL CALCULATED.3IONS-SCNC: 9 MMOL/L (ref 4–13)
BUN SERPL-MCNC: 24 MG/DL (ref 5–25)
CALCIUM SERPL-MCNC: 9.1 MG/DL (ref 8.3–10.1)
CHLORIDE SERPL-SCNC: 108 MMOL/L (ref 100–108)
CO2 SERPL-SCNC: 24 MMOL/L (ref 21–32)
CREAT SERPL-MCNC: 1.51 MG/DL (ref 0.6–1.3)
GFR SERPL CREATININE-BSD FRML MDRD: 47 ML/MIN/1.73SQ M
GLUCOSE SERPL-MCNC: 111 MG/DL (ref 65–140)
GLUCOSE SERPL-MCNC: 126 MG/DL (ref 65–140)
GLUCOSE SERPL-MCNC: 173 MG/DL (ref 65–140)
GLUCOSE SERPL-MCNC: 253 MG/DL (ref 65–140)
GLUCOSE SERPL-MCNC: 255 MG/DL (ref 65–140)
GLUCOSE SERPL-MCNC: 277 MG/DL (ref 65–140)
GLUCOSE SERPL-MCNC: 345 MG/DL (ref 65–140)
GLUCOSE SERPL-MCNC: 66 MG/DL (ref 65–140)
GLUCOSE SERPL-MCNC: 91 MG/DL (ref 65–140)
GLUCOSE SERPL-MCNC: 95 MG/DL (ref 65–140)
INR PPP: 2.99 (ref 0.84–1.19)
POTASSIUM SERPL-SCNC: 3.5 MMOL/L (ref 3.5–5.3)
PROTHROMBIN TIME: 31.1 SECONDS (ref 11.6–14.5)
SODIUM SERPL-SCNC: 141 MMOL/L (ref 136–145)

## 2020-09-30 PROCEDURE — 85610 PROTHROMBIN TIME: CPT | Performed by: INTERNAL MEDICINE

## 2020-09-30 PROCEDURE — 80048 BASIC METABOLIC PNL TOTAL CA: CPT | Performed by: INTERNAL MEDICINE

## 2020-09-30 PROCEDURE — 82948 REAGENT STRIP/BLOOD GLUCOSE: CPT

## 2020-09-30 PROCEDURE — 99222 1ST HOSP IP/OBS MODERATE 55: CPT | Performed by: PODIATRIST

## 2020-09-30 PROCEDURE — 99232 SBSQ HOSP IP/OBS MODERATE 35: CPT | Performed by: PHYSICIAN ASSISTANT

## 2020-09-30 RX ORDER — OXYCODONE HYDROCHLORIDE 5 MG/1
5 TABLET ORAL EVERY 4 HOURS PRN
Status: DISCONTINUED | OUTPATIENT
Start: 2020-09-30 | End: 2020-10-01 | Stop reason: HOSPADM

## 2020-09-30 RX ORDER — ACETAMINOPHEN 325 MG/1
975 TABLET ORAL EVERY 6 HOURS PRN
Status: DISCONTINUED | OUTPATIENT
Start: 2020-09-30 | End: 2020-10-01 | Stop reason: HOSPADM

## 2020-09-30 RX ORDER — DOCUSATE SODIUM 100 MG/1
100 CAPSULE, LIQUID FILLED ORAL 2 TIMES DAILY PRN
Status: DISCONTINUED | OUTPATIENT
Start: 2020-09-30 | End: 2020-10-01 | Stop reason: HOSPADM

## 2020-09-30 RX ORDER — OXYCODONE HYDROCHLORIDE 5 MG/1
2.5 TABLET ORAL EVERY 4 HOURS PRN
Status: DISCONTINUED | OUTPATIENT
Start: 2020-09-30 | End: 2020-10-01 | Stop reason: HOSPADM

## 2020-09-30 RX ADMIN — CEFAZOLIN SODIUM 2000 MG: 2 SOLUTION INTRAVENOUS at 01:50

## 2020-09-30 RX ADMIN — INSULIN LISPRO 6 UNITS: 100 INJECTION, SOLUTION INTRAVENOUS; SUBCUTANEOUS at 16:10

## 2020-09-30 RX ADMIN — OXYCODONE HYDROCHLORIDE 2.5 MG: 5 TABLET ORAL at 20:03

## 2020-09-30 RX ADMIN — ASPIRIN 81 MG CHEWABLE TABLET 81 MG: 81 TABLET CHEWABLE at 08:27

## 2020-09-30 RX ADMIN — ACETAMINOPHEN 650 MG: 325 TABLET, FILM COATED ORAL at 05:02

## 2020-09-30 RX ADMIN — CEFAZOLIN SODIUM 2000 MG: 2 SOLUTION INTRAVENOUS at 08:28

## 2020-09-30 RX ADMIN — INSULIN HUMAN 85 UNITS: 500 INJECTION, SOLUTION SUBCUTANEOUS at 11:16

## 2020-09-30 RX ADMIN — FENOFIBRATE 145 MG: 145 TABLET, COATED ORAL at 08:23

## 2020-09-30 RX ADMIN — INSULIN LISPRO 6 UNITS: 100 INJECTION, SOLUTION INTRAVENOUS; SUBCUTANEOUS at 21:25

## 2020-09-30 RX ADMIN — INSULIN HUMAN 85 UNITS: 500 INJECTION, SOLUTION SUBCUTANEOUS at 21:46

## 2020-09-30 RX ADMIN — ACETAMINOPHEN 975 MG: 325 TABLET, FILM COATED ORAL at 13:23

## 2020-09-30 RX ADMIN — INSULIN LISPRO 8 UNITS: 100 INJECTION, SOLUTION INTRAVENOUS; SUBCUTANEOUS at 11:15

## 2020-09-30 RX ADMIN — INSULIN LISPRO 2 UNITS: 100 INJECTION, SOLUTION INTRAVENOUS; SUBCUTANEOUS at 01:55

## 2020-09-30 RX ADMIN — INSULIN HUMAN 85 UNITS: 500 INJECTION, SOLUTION SUBCUTANEOUS at 16:10

## 2020-09-30 RX ADMIN — METOPROLOL TARTRATE 50 MG: 50 TABLET, FILM COATED ORAL at 08:23

## 2020-09-30 RX ADMIN — OXYCODONE HYDROCHLORIDE 2.5 MG: 5 TABLET ORAL at 16:15

## 2020-09-30 RX ADMIN — PANTOPRAZOLE SODIUM 40 MG: 40 TABLET, DELAYED RELEASE ORAL at 05:02

## 2020-09-30 RX ADMIN — WARFARIN SODIUM 2.5 MG: 2.5 TABLET ORAL at 18:07

## 2020-09-30 RX ADMIN — METOPROLOL TARTRATE 50 MG: 50 TABLET, FILM COATED ORAL at 18:07

## 2020-09-30 RX ADMIN — OXYCODONE HYDROCHLORIDE 5 MG: 5 TABLET ORAL at 10:40

## 2020-09-30 RX ADMIN — CEFAZOLIN SODIUM 2000 MG: 2 SOLUTION INTRAVENOUS at 16:09

## 2020-09-30 RX ADMIN — LISINOPRIL 10 MG: 10 TABLET ORAL at 08:23

## 2020-09-30 RX ADMIN — EZETIMIBE: 10 TABLET ORAL at 21:25

## 2020-10-01 VITALS
SYSTOLIC BLOOD PRESSURE: 170 MMHG | BODY MASS INDEX: 40.65 KG/M2 | DIASTOLIC BLOOD PRESSURE: 80 MMHG | TEMPERATURE: 97.5 F | WEIGHT: 259 LBS | HEART RATE: 69 BPM | RESPIRATION RATE: 19 BRPM | HEIGHT: 67 IN | OXYGEN SATURATION: 94 %

## 2020-10-01 LAB
ANION GAP SERPL CALCULATED.3IONS-SCNC: 8 MMOL/L (ref 4–13)
BASOPHILS # BLD MANUAL: 0 THOUSAND/UL (ref 0–0.1)
BASOPHILS NFR MAR MANUAL: 0 % (ref 0–1)
BUN SERPL-MCNC: 22 MG/DL (ref 5–25)
CALCIUM SERPL-MCNC: 9.1 MG/DL (ref 8.3–10.1)
CHLORIDE SERPL-SCNC: 106 MMOL/L (ref 100–108)
CO2 SERPL-SCNC: 25 MMOL/L (ref 21–32)
CREAT SERPL-MCNC: 1.5 MG/DL (ref 0.6–1.3)
EOSINOPHIL # BLD MANUAL: 0.28 THOUSAND/UL (ref 0–0.4)
EOSINOPHIL NFR BLD MANUAL: 6 % (ref 0–6)
ERYTHROCYTE [DISTWIDTH] IN BLOOD BY AUTOMATED COUNT: 14.3 % (ref 11.6–15.1)
GFR SERPL CREATININE-BSD FRML MDRD: 48 ML/MIN/1.73SQ M
GLUCOSE SERPL-MCNC: 104 MG/DL (ref 65–140)
GLUCOSE SERPL-MCNC: 108 MG/DL (ref 65–140)
GLUCOSE SERPL-MCNC: 127 MG/DL (ref 65–140)
GLUCOSE SERPL-MCNC: 131 MG/DL (ref 65–140)
GLUCOSE SERPL-MCNC: 133 MG/DL (ref 65–140)
GLUCOSE SERPL-MCNC: 135 MG/DL (ref 65–140)
GLUCOSE SERPL-MCNC: 59 MG/DL (ref 65–140)
HCT VFR BLD AUTO: 39.5 % (ref 36.5–49.3)
HGB BLD-MCNC: 12.1 G/DL (ref 12–17)
LYMPHOCYTES # BLD AUTO: 0.75 THOUSAND/UL (ref 0.6–4.47)
LYMPHOCYTES # BLD AUTO: 16 % (ref 14–44)
MCH RBC QN AUTO: 27.4 PG (ref 26.8–34.3)
MCHC RBC AUTO-ENTMCNC: 30.6 G/DL (ref 31.4–37.4)
MCV RBC AUTO: 90 FL (ref 82–98)
METAMYELOCYTES NFR BLD MANUAL: 3 % (ref 0–1)
MONOCYTES # BLD AUTO: 0.09 THOUSAND/UL (ref 0–1.22)
MONOCYTES NFR BLD: 2 % (ref 4–12)
MYELOCYTES NFR BLD MANUAL: 1 % (ref 0–1)
NEUTROPHILS # BLD MANUAL: 3.37 THOUSAND/UL (ref 1.85–7.62)
NEUTS BAND NFR BLD MANUAL: 1 % (ref 0–8)
NEUTS SEG NFR BLD AUTO: 71 % (ref 43–75)
NRBC BLD AUTO-RTO: 0 /100 WBCS
PLATELET # BLD AUTO: 271 THOUSANDS/UL (ref 149–390)
PLATELET BLD QL SMEAR: ADEQUATE
PMV BLD AUTO: 9 FL (ref 8.9–12.7)
POTASSIUM SERPL-SCNC: 3.7 MMOL/L (ref 3.5–5.3)
RBC # BLD AUTO: 4.41 MILLION/UL (ref 3.88–5.62)
RBC MORPH BLD: NORMAL
SODIUM SERPL-SCNC: 139 MMOL/L (ref 136–145)
TOTAL CELLS COUNTED SPEC: 100
WBC # BLD AUTO: 4.68 THOUSAND/UL (ref 4.31–10.16)

## 2020-10-01 PROCEDURE — 85007 BL SMEAR W/DIFF WBC COUNT: CPT | Performed by: PHYSICIAN ASSISTANT

## 2020-10-01 PROCEDURE — 99239 HOSP IP/OBS DSCHRG MGMT >30: CPT | Performed by: PHYSICIAN ASSISTANT

## 2020-10-01 PROCEDURE — 85027 COMPLETE CBC AUTOMATED: CPT | Performed by: PHYSICIAN ASSISTANT

## 2020-10-01 PROCEDURE — 82948 REAGENT STRIP/BLOOD GLUCOSE: CPT

## 2020-10-01 PROCEDURE — 80048 BASIC METABOLIC PNL TOTAL CA: CPT | Performed by: PHYSICIAN ASSISTANT

## 2020-10-01 RX ORDER — DOCUSATE SODIUM 100 MG/1
100 CAPSULE, LIQUID FILLED ORAL 2 TIMES DAILY PRN
Qty: 10 CAPSULE | Refills: 0
Start: 2020-10-01 | End: 2022-01-01 | Stop reason: HOSPADM

## 2020-10-01 RX ORDER — DEXTROSE MONOHYDRATE 25 G/50ML
INJECTION, SOLUTION INTRAVENOUS
Status: COMPLETED
Start: 2020-10-01 | End: 2020-10-01

## 2020-10-01 RX ORDER — LISINOPRIL 10 MG/1
10 TABLET ORAL DAILY
Qty: 30 TABLET | Refills: 0 | Status: SHIPPED | OUTPATIENT
Start: 2020-10-02 | End: 2020-12-01 | Stop reason: HOSPADM

## 2020-10-01 RX ORDER — OXYCODONE HYDROCHLORIDE 5 MG/1
2.5 TABLET ORAL EVERY 4 HOURS PRN
Qty: 10 TABLET | Refills: 0 | Status: SHIPPED | OUTPATIENT
Start: 2020-10-01 | End: 2020-10-06

## 2020-10-01 RX ORDER — CEPHALEXIN 500 MG/1
500 CAPSULE ORAL EVERY 6 HOURS SCHEDULED
Qty: 16 CAPSULE | Refills: 0 | Status: SHIPPED | OUTPATIENT
Start: 2020-10-01 | End: 2020-10-05

## 2020-10-01 RX ADMIN — DEXTROSE MONOHYDRATE 50 ML: 25 INJECTION, SOLUTION INTRAVENOUS at 04:33

## 2020-10-01 RX ADMIN — INSULIN HUMAN 85 UNITS: 500 INJECTION, SOLUTION SUBCUTANEOUS at 09:14

## 2020-10-01 RX ADMIN — OXYCODONE HYDROCHLORIDE 2.5 MG: 5 TABLET ORAL at 08:07

## 2020-10-01 RX ADMIN — CEFAZOLIN SODIUM 2000 MG: 2 SOLUTION INTRAVENOUS at 01:05

## 2020-10-01 RX ADMIN — LISINOPRIL 10 MG: 10 TABLET ORAL at 08:04

## 2020-10-01 RX ADMIN — CEFAZOLIN SODIUM 2000 MG: 2 SOLUTION INTRAVENOUS at 08:05

## 2020-10-01 RX ADMIN — METOPROLOL TARTRATE 50 MG: 50 TABLET, FILM COATED ORAL at 08:04

## 2020-10-01 RX ADMIN — PANTOPRAZOLE SODIUM 40 MG: 40 TABLET, DELAYED RELEASE ORAL at 05:15

## 2020-10-01 RX ADMIN — ASPIRIN 81 MG CHEWABLE TABLET 81 MG: 81 TABLET CHEWABLE at 08:04

## 2020-10-01 RX ADMIN — FENOFIBRATE 145 MG: 145 TABLET, COATED ORAL at 08:04

## 2020-10-04 LAB
BACTERIA BLD CULT: NORMAL
BACTERIA BLD CULT: NORMAL

## 2020-10-20 ENCOUNTER — OFFICE VISIT (OUTPATIENT)
Dept: WOUND CARE | Facility: HOSPITAL | Age: 65
End: 2020-10-20
Payer: COMMERCIAL

## 2020-10-20 VITALS
DIASTOLIC BLOOD PRESSURE: 98 MMHG | RESPIRATION RATE: 20 BRPM | HEART RATE: 68 BPM | HEIGHT: 67 IN | BODY MASS INDEX: 40.52 KG/M2 | WEIGHT: 258.16 LBS | SYSTOLIC BLOOD PRESSURE: 144 MMHG | TEMPERATURE: 97.8 F

## 2020-10-20 DIAGNOSIS — L97.929 VENOUS ULCER OF LEFT LEG (HCC): Primary | ICD-10-CM

## 2020-10-20 DIAGNOSIS — R60.0 EDEMA OF BOTH LOWER LEGS: ICD-10-CM

## 2020-10-20 DIAGNOSIS — S81.802A WOUND OF LEFT LEG, INITIAL ENCOUNTER: ICD-10-CM

## 2020-10-20 DIAGNOSIS — I83.029 VENOUS ULCER OF LEFT LEG (HCC): Primary | ICD-10-CM

## 2020-10-20 PROCEDURE — 99213 OFFICE O/P EST LOW 20 MIN: CPT | Performed by: PODIATRIST

## 2020-10-20 PROCEDURE — 99205 OFFICE O/P NEW HI 60 MIN: CPT | Performed by: PODIATRIST

## 2020-10-20 RX ORDER — WARFARIN SODIUM 5 MG/1
2.5 TABLET ORAL
Status: ON HOLD | COMMUNITY
End: 2020-12-01 | Stop reason: SDUPTHER

## 2020-10-20 RX ORDER — LIDOCAINE 40 MG/G
CREAM TOPICAL ONCE
Status: COMPLETED | OUTPATIENT
Start: 2020-10-20 | End: 2020-10-20

## 2020-10-20 RX ADMIN — LIDOCAINE: 40 CREAM TOPICAL at 10:13

## 2020-11-03 ENCOUNTER — OFFICE VISIT (OUTPATIENT)
Dept: WOUND CARE | Facility: HOSPITAL | Age: 65
End: 2020-11-03
Payer: COMMERCIAL

## 2020-11-03 VITALS
SYSTOLIC BLOOD PRESSURE: 152 MMHG | DIASTOLIC BLOOD PRESSURE: 80 MMHG | RESPIRATION RATE: 18 BRPM | TEMPERATURE: 98.6 F | HEART RATE: 66 BPM

## 2020-11-03 DIAGNOSIS — L97.929 VENOUS ULCER OF LEFT LEG (HCC): Primary | ICD-10-CM

## 2020-11-03 DIAGNOSIS — I83.029 VENOUS ULCER OF LEFT LEG (HCC): Primary | ICD-10-CM

## 2020-11-03 DIAGNOSIS — E11.42 DIABETIC POLYNEUROPATHY ASSOCIATED WITH TYPE 2 DIABETES MELLITUS (HCC): ICD-10-CM

## 2020-11-03 PROCEDURE — 99212 OFFICE O/P EST SF 10 MIN: CPT | Performed by: PODIATRIST

## 2020-11-03 PROCEDURE — 99213 OFFICE O/P EST LOW 20 MIN: CPT | Performed by: PODIATRIST

## 2020-11-28 ENCOUNTER — APPOINTMENT (EMERGENCY)
Dept: CT IMAGING | Facility: HOSPITAL | Age: 65
DRG: 149 | End: 2020-11-28
Payer: COMMERCIAL

## 2020-11-28 ENCOUNTER — HOSPITAL ENCOUNTER (INPATIENT)
Facility: HOSPITAL | Age: 65
LOS: 2 days | Discharge: HOME | DRG: 149 | End: 2020-12-01
Attending: EMERGENCY MEDICINE | Admitting: INTERNAL MEDICINE
Payer: COMMERCIAL

## 2020-11-28 DIAGNOSIS — E78.2 MIXED HYPERLIPIDEMIA: ICD-10-CM

## 2020-11-28 DIAGNOSIS — Z79.4 TYPE 2 DIABETES MELLITUS WITH HYPERGLYCEMIA, WITH LONG-TERM CURRENT USE OF INSULIN (HCC): ICD-10-CM

## 2020-11-28 DIAGNOSIS — E11.65 TYPE 2 DIABETES MELLITUS WITH HYPERGLYCEMIA, WITH LONG-TERM CURRENT USE OF INSULIN (HCC): ICD-10-CM

## 2020-11-28 DIAGNOSIS — L03.116 CELLULITIS OF LEFT LOWER EXTREMITY: ICD-10-CM

## 2020-11-28 DIAGNOSIS — R26.81 GAIT INSTABILITY: ICD-10-CM

## 2020-11-28 DIAGNOSIS — I10 ESSENTIAL HYPERTENSION: ICD-10-CM

## 2020-11-28 DIAGNOSIS — R42 DIZZINESS: ICD-10-CM

## 2020-11-28 DIAGNOSIS — R26.89 BALANCE PROBLEM: Primary | ICD-10-CM

## 2020-11-28 DIAGNOSIS — E10.65 HYPERGLYCEMIA DUE TO TYPE 1 DIABETES MELLITUS (HCC): ICD-10-CM

## 2020-11-28 DIAGNOSIS — Z86.73 HISTORY OF CEREBROVASCULAR ACCIDENT (CVA) INVOLVING CEREBELLUM: ICD-10-CM

## 2020-11-28 PROBLEM — L97.929 STASIS ULCER OF LEFT LOWER EXTREMITY (HCC): Status: ACTIVE | Noted: 2020-11-28

## 2020-11-28 PROBLEM — I83.029 STASIS ULCER OF LEFT LOWER EXTREMITY (HCC): Status: ACTIVE | Noted: 2020-11-28

## 2020-11-28 LAB
ALBUMIN SERPL BCP-MCNC: 2.6 G/DL (ref 3.5–5)
ALP SERPL-CCNC: 42 U/L (ref 46–116)
ALT SERPL W P-5'-P-CCNC: 22 U/L (ref 12–78)
ANION GAP SERPL CALCULATED.3IONS-SCNC: 4 MMOL/L (ref 4–13)
AST SERPL W P-5'-P-CCNC: 20 U/L (ref 5–45)
BASOPHILS # BLD MANUAL: 0 THOUSAND/UL (ref 0–0.1)
BASOPHILS NFR MAR MANUAL: 0 % (ref 0–1)
BILIRUB SERPL-MCNC: 0.12 MG/DL (ref 0.2–1)
BUN SERPL-MCNC: 22 MG/DL (ref 5–25)
CALCIUM ALBUM COR SERPL-MCNC: 9.8 MG/DL (ref 8.3–10.1)
CALCIUM SERPL-MCNC: 8.7 MG/DL (ref 8.3–10.1)
CHLORIDE SERPL-SCNC: 105 MMOL/L (ref 100–108)
CO2 SERPL-SCNC: 28 MMOL/L (ref 21–32)
CREAT SERPL-MCNC: 1.73 MG/DL (ref 0.6–1.3)
CRP SERPL QL: 5.9 MG/L
EOSINOPHIL # BLD MANUAL: 0.1 THOUSAND/UL (ref 0–0.4)
EOSINOPHIL NFR BLD MANUAL: 2 % (ref 0–6)
ERYTHROCYTE [DISTWIDTH] IN BLOOD BY AUTOMATED COUNT: 14.7 % (ref 11.6–15.1)
GFR SERPL CREATININE-BSD FRML MDRD: 40 ML/MIN/1.73SQ M
GLUCOSE SERPL-MCNC: 334 MG/DL (ref 65–140)
GLUCOSE SERPL-MCNC: 346 MG/DL (ref 65–140)
HCT VFR BLD AUTO: 42.1 % (ref 36.5–49.3)
HGB BLD-MCNC: 12.7 G/DL (ref 12–17)
INR PPP: 3.03 (ref 0.84–1.19)
LYMPHOCYTES # BLD AUTO: 0.89 THOUSAND/UL (ref 0.6–4.47)
LYMPHOCYTES # BLD AUTO: 18 % (ref 14–44)
MCH RBC QN AUTO: 27.9 PG (ref 26.8–34.3)
MCHC RBC AUTO-ENTMCNC: 30.2 G/DL (ref 31.4–37.4)
MCV RBC AUTO: 92 FL (ref 82–98)
METAMYELOCYTES NFR BLD MANUAL: 1 % (ref 0–1)
MONOCYTES # BLD AUTO: 0.35 THOUSAND/UL (ref 0–1.22)
MONOCYTES NFR BLD: 7 % (ref 4–12)
NEUTROPHILS # BLD MANUAL: 3.56 THOUSAND/UL (ref 1.85–7.62)
NEUTS SEG NFR BLD AUTO: 72 % (ref 43–75)
NRBC BLD AUTO-RTO: 0 /100 WBCS
PLATELET # BLD AUTO: 240 THOUSANDS/UL (ref 149–390)
PLATELET BLD QL SMEAR: ADEQUATE
PMV BLD AUTO: 9.7 FL (ref 8.9–12.7)
POTASSIUM SERPL-SCNC: 4.3 MMOL/L (ref 3.5–5.3)
PROT SERPL-MCNC: 6.7 G/DL (ref 6.4–8.2)
PROTHROMBIN TIME: 31.4 SECONDS (ref 11.6–14.5)
RBC # BLD AUTO: 4.56 MILLION/UL (ref 3.88–5.62)
SODIUM SERPL-SCNC: 137 MMOL/L (ref 136–145)
TOTAL CELLS COUNTED SPEC: 100
WBC # BLD AUTO: 4.95 THOUSAND/UL (ref 4.31–10.16)

## 2020-11-28 PROCEDURE — G1004 CDSM NDSC: HCPCS

## 2020-11-28 PROCEDURE — 85610 PROTHROMBIN TIME: CPT | Performed by: PHYSICIAN ASSISTANT

## 2020-11-28 PROCEDURE — 86140 C-REACTIVE PROTEIN: CPT | Performed by: NURSE PRACTITIONER

## 2020-11-28 PROCEDURE — 36415 COLL VENOUS BLD VENIPUNCTURE: CPT | Performed by: PHYSICIAN ASSISTANT

## 2020-11-28 PROCEDURE — 99285 EMERGENCY DEPT VISIT HI MDM: CPT | Performed by: PHYSICIAN ASSISTANT

## 2020-11-28 PROCEDURE — 85007 BL SMEAR W/DIFF WBC COUNT: CPT | Performed by: PHYSICIAN ASSISTANT

## 2020-11-28 PROCEDURE — 99285 EMERGENCY DEPT VISIT HI MDM: CPT

## 2020-11-28 PROCEDURE — 70450 CT HEAD/BRAIN W/O DYE: CPT

## 2020-11-28 PROCEDURE — 99220 PR INITIAL OBSERVATION CARE/DAY 70 MINUTES: CPT | Performed by: NURSE PRACTITIONER

## 2020-11-28 PROCEDURE — 93005 ELECTROCARDIOGRAM TRACING: CPT

## 2020-11-28 PROCEDURE — 83036 HEMOGLOBIN GLYCOSYLATED A1C: CPT | Performed by: NURSE PRACTITIONER

## 2020-11-28 PROCEDURE — 80053 COMPREHEN METABOLIC PANEL: CPT | Performed by: PHYSICIAN ASSISTANT

## 2020-11-28 PROCEDURE — 85027 COMPLETE CBC AUTOMATED: CPT | Performed by: PHYSICIAN ASSISTANT

## 2020-11-28 PROCEDURE — 82948 REAGENT STRIP/BLOOD GLUCOSE: CPT

## 2020-11-28 RX ORDER — ACETAMINOPHEN 325 MG/1
975 TABLET ORAL EVERY 8 HOURS PRN
Status: DISCONTINUED | OUTPATIENT
Start: 2020-11-28 | End: 2020-12-01

## 2020-11-28 RX ORDER — ASPIRIN 81 MG/1
81 TABLET, CHEWABLE ORAL DAILY
Status: DISCONTINUED | OUTPATIENT
Start: 2020-11-29 | End: 2020-12-01 | Stop reason: HOSPADM

## 2020-11-28 RX ORDER — HYDRALAZINE HYDROCHLORIDE 20 MG/ML
5 INJECTION INTRAMUSCULAR; INTRAVENOUS ONCE
Status: COMPLETED | OUTPATIENT
Start: 2020-11-28 | End: 2020-11-28

## 2020-11-28 RX ORDER — METOPROLOL TARTRATE 50 MG/1
50 TABLET, FILM COATED ORAL 2 TIMES DAILY
Status: DISCONTINUED | OUTPATIENT
Start: 2020-11-28 | End: 2020-12-01 | Stop reason: HOSPADM

## 2020-11-28 RX ORDER — PANTOPRAZOLE SODIUM 40 MG/1
40 TABLET, DELAYED RELEASE ORAL
Status: DISCONTINUED | OUTPATIENT
Start: 2020-11-29 | End: 2020-12-01 | Stop reason: HOSPADM

## 2020-11-28 RX ORDER — EZETIMIBE 10 MG/1
10 TABLET ORAL
Status: DISCONTINUED | OUTPATIENT
Start: 2020-11-28 | End: 2020-12-01 | Stop reason: HOSPADM

## 2020-11-28 RX ORDER — FENOFIBRATE 145 MG/1
145 TABLET, COATED ORAL DAILY
Status: DISCONTINUED | OUTPATIENT
Start: 2020-11-29 | End: 2020-12-01 | Stop reason: HOSPADM

## 2020-11-28 RX ORDER — WARFARIN SODIUM 2.5 MG/1
2.5 TABLET ORAL
Status: DISCONTINUED | OUTPATIENT
Start: 2020-11-28 | End: 2020-12-01

## 2020-11-28 RX ORDER — ATORVASTATIN CALCIUM 40 MG/1
40 TABLET, FILM COATED ORAL EVERY EVENING
Status: DISCONTINUED | OUTPATIENT
Start: 2020-11-28 | End: 2020-12-01 | Stop reason: HOSPADM

## 2020-11-28 RX ORDER — WARFARIN SODIUM 2.5 MG/1
5 TABLET ORAL
Status: DISCONTINUED | OUTPATIENT
Start: 2020-11-28 | End: 2020-11-28

## 2020-11-28 RX ORDER — WARFARIN SODIUM 2.5 MG/1
5 TABLET ORAL
Status: DISCONTINUED | OUTPATIENT
Start: 2020-11-28 | End: 2020-11-28 | Stop reason: SDUPTHER

## 2020-11-28 RX ORDER — DOCUSATE SODIUM 100 MG/1
100 CAPSULE, LIQUID FILLED ORAL 2 TIMES DAILY PRN
Status: DISCONTINUED | OUTPATIENT
Start: 2020-11-28 | End: 2020-12-01 | Stop reason: HOSPADM

## 2020-11-28 RX ADMIN — WARFARIN SODIUM 2.5 MG: 2.5 TABLET ORAL at 22:48

## 2020-11-28 RX ADMIN — ATORVASTATIN CALCIUM 40 MG: 40 TABLET, FILM COATED ORAL at 22:48

## 2020-11-28 RX ADMIN — METOPROLOL TARTRATE 50 MG: 50 TABLET, FILM COATED ORAL at 22:33

## 2020-11-28 RX ADMIN — INSULIN LISPRO 2 UNITS: 100 INJECTION, SOLUTION INTRAVENOUS; SUBCUTANEOUS at 22:32

## 2020-11-28 RX ADMIN — EZETIMIBE 10 MG: 10 TABLET ORAL at 22:48

## 2020-11-28 RX ADMIN — HYDRALAZINE HYDROCHLORIDE 5 MG: 20 INJECTION INTRAMUSCULAR; INTRAVENOUS at 22:32

## 2020-11-29 LAB
ANION GAP SERPL CALCULATED.3IONS-SCNC: 7 MMOL/L (ref 4–13)
ATRIAL RATE: 73 BPM
BASOPHILS # BLD AUTO: 0.03 THOUSANDS/ΜL (ref 0–0.1)
BASOPHILS NFR BLD AUTO: 0 % (ref 0–1)
BUN SERPL-MCNC: 21 MG/DL (ref 5–25)
CALCIUM SERPL-MCNC: 8.6 MG/DL (ref 8.3–10.1)
CHLORIDE SERPL-SCNC: 106 MMOL/L (ref 100–108)
CHOLEST SERPL-MCNC: 227 MG/DL (ref 50–200)
CO2 SERPL-SCNC: 25 MMOL/L (ref 21–32)
CREAT SERPL-MCNC: 1.49 MG/DL (ref 0.6–1.3)
EOSINOPHIL # BLD AUTO: 0.16 THOUSAND/ΜL (ref 0–0.61)
EOSINOPHIL NFR BLD AUTO: 2 % (ref 0–6)
ERYTHROCYTE [DISTWIDTH] IN BLOOD BY AUTOMATED COUNT: 14.7 % (ref 11.6–15.1)
EST. AVERAGE GLUCOSE BLD GHB EST-MCNC: 260 MG/DL
GFR SERPL CREATININE-BSD FRML MDRD: 48 ML/MIN/1.73SQ M
GLUCOSE SERPL-MCNC: 117 MG/DL (ref 65–140)
GLUCOSE SERPL-MCNC: 185 MG/DL (ref 65–140)
GLUCOSE SERPL-MCNC: 185 MG/DL (ref 65–140)
GLUCOSE SERPL-MCNC: 268 MG/DL (ref 65–140)
GLUCOSE SERPL-MCNC: 290 MG/DL (ref 65–140)
HBA1C MFR BLD: 10.7 %
HCT VFR BLD AUTO: 38.1 % (ref 36.5–49.3)
HDLC SERPL-MCNC: 36 MG/DL
HGB BLD-MCNC: 12 G/DL (ref 12–17)
IMM GRANULOCYTES # BLD AUTO: 0.14 THOUSAND/UL (ref 0–0.2)
IMM GRANULOCYTES NFR BLD AUTO: 2 % (ref 0–2)
INR PPP: 2.79 (ref 0.84–1.19)
LDLC SERPL CALC-MCNC: 114 MG/DL (ref 0–100)
LYMPHOCYTES # BLD AUTO: 1 THOUSANDS/ΜL (ref 0.6–4.47)
LYMPHOCYTES NFR BLD AUTO: 14 % (ref 14–44)
MCH RBC QN AUTO: 28.6 PG (ref 26.8–34.3)
MCHC RBC AUTO-ENTMCNC: 31.5 G/DL (ref 31.4–37.4)
MCV RBC AUTO: 91 FL (ref 82–98)
MONOCYTES # BLD AUTO: 0.52 THOUSAND/ΜL (ref 0.17–1.22)
MONOCYTES NFR BLD AUTO: 7 % (ref 4–12)
NEUTROPHILS # BLD AUTO: 5.19 THOUSANDS/ΜL (ref 1.85–7.62)
NEUTS SEG NFR BLD AUTO: 75 % (ref 43–75)
NRBC BLD AUTO-RTO: 0 /100 WBCS
P AXIS: 42 DEGREES
PLATELET # BLD AUTO: 210 THOUSANDS/UL (ref 149–390)
PMV BLD AUTO: 9.4 FL (ref 8.9–12.7)
POTASSIUM SERPL-SCNC: 4.1 MMOL/L (ref 3.5–5.3)
PR INTERVAL: 150 MS
PROTHROMBIN TIME: 29.4 SECONDS (ref 11.6–14.5)
QRS AXIS: -8 DEGREES
QRSD INTERVAL: 86 MS
QT INTERVAL: 386 MS
QTC INTERVAL: 417 MS
RBC # BLD AUTO: 4.2 MILLION/UL (ref 3.88–5.62)
SODIUM SERPL-SCNC: 138 MMOL/L (ref 136–145)
T WAVE AXIS: 93 DEGREES
TRIGL SERPL-MCNC: 387 MG/DL
VENTRICULAR RATE: 70 BPM
WBC # BLD AUTO: 7.04 THOUSAND/UL (ref 4.31–10.16)

## 2020-11-29 PROCEDURE — 85025 COMPLETE CBC W/AUTO DIFF WBC: CPT | Performed by: NURSE PRACTITIONER

## 2020-11-29 PROCEDURE — 82948 REAGENT STRIP/BLOOD GLUCOSE: CPT

## 2020-11-29 PROCEDURE — 80048 BASIC METABOLIC PNL TOTAL CA: CPT | Performed by: NURSE PRACTITIONER

## 2020-11-29 PROCEDURE — 97163 PT EVAL HIGH COMPLEX 45 MIN: CPT

## 2020-11-29 PROCEDURE — 99223 1ST HOSP IP/OBS HIGH 75: CPT | Performed by: PSYCHIATRY & NEUROLOGY

## 2020-11-29 PROCEDURE — 36415 COLL VENOUS BLD VENIPUNCTURE: CPT | Performed by: NURSE PRACTITIONER

## 2020-11-29 PROCEDURE — 80061 LIPID PANEL: CPT | Performed by: NURSE PRACTITIONER

## 2020-11-29 PROCEDURE — 99232 SBSQ HOSP IP/OBS MODERATE 35: CPT | Performed by: INTERNAL MEDICINE

## 2020-11-29 PROCEDURE — 85610 PROTHROMBIN TIME: CPT | Performed by: NURSE PRACTITIONER

## 2020-11-29 PROCEDURE — 93010 ELECTROCARDIOGRAM REPORT: CPT | Performed by: INTERNAL MEDICINE

## 2020-11-29 PROCEDURE — 92610 EVALUATE SWALLOWING FUNCTION: CPT

## 2020-11-29 RX ADMIN — INSULIN HUMAN 85 UNITS: 500 INJECTION, SOLUTION SUBCUTANEOUS at 17:54

## 2020-11-29 RX ADMIN — PANTOPRAZOLE SODIUM 40 MG: 40 TABLET, DELAYED RELEASE ORAL at 05:13

## 2020-11-29 RX ADMIN — INSULIN HUMAN 85 UNITS: 500 INJECTION, SOLUTION SUBCUTANEOUS at 12:36

## 2020-11-29 RX ADMIN — FENOFIBRATE 145 MG: 145 TABLET ORAL at 08:10

## 2020-11-29 RX ADMIN — METOPROLOL TARTRATE 50 MG: 50 TABLET, FILM COATED ORAL at 08:10

## 2020-11-29 RX ADMIN — EZETIMIBE 10 MG: 10 TABLET ORAL at 21:14

## 2020-11-29 RX ADMIN — INSULIN LISPRO 1 UNITS: 100 INJECTION, SOLUTION INTRAVENOUS; SUBCUTANEOUS at 21:14

## 2020-11-29 RX ADMIN — INSULIN LISPRO 2 UNITS: 100 INJECTION, SOLUTION INTRAVENOUS; SUBCUTANEOUS at 12:37

## 2020-11-29 RX ADMIN — INSULIN LISPRO 6 UNITS: 100 INJECTION, SOLUTION INTRAVENOUS; SUBCUTANEOUS at 06:49

## 2020-11-29 RX ADMIN — ASPIRIN 81 MG CHEWABLE TABLET 81 MG: 81 TABLET CHEWABLE at 08:10

## 2020-11-29 RX ADMIN — INSULIN HUMAN 85 UNITS: 500 INJECTION, SOLUTION SUBCUTANEOUS at 08:10

## 2020-11-29 RX ADMIN — METOPROLOL TARTRATE 50 MG: 50 TABLET, FILM COATED ORAL at 18:01

## 2020-11-29 RX ADMIN — WARFARIN SODIUM 2.5 MG: 2.5 TABLET ORAL at 18:01

## 2020-11-29 RX ADMIN — ACETAMINOPHEN 975 MG: 325 TABLET, FILM COATED ORAL at 05:13

## 2020-11-29 RX ADMIN — ATORVASTATIN CALCIUM 40 MG: 40 TABLET, FILM COATED ORAL at 18:01

## 2020-11-30 ENCOUNTER — APPOINTMENT (INPATIENT)
Dept: NON INVASIVE DIAGNOSTICS | Facility: HOSPITAL | Age: 65
DRG: 149 | End: 2020-11-30
Payer: COMMERCIAL

## 2020-11-30 LAB
ANION GAP SERPL CALCULATED.3IONS-SCNC: 8 MMOL/L (ref 4–13)
BUN SERPL-MCNC: 23 MG/DL (ref 5–25)
CALCIUM SERPL-MCNC: 8.8 MG/DL (ref 8.3–10.1)
CHLORIDE SERPL-SCNC: 108 MMOL/L (ref 100–108)
CO2 SERPL-SCNC: 24 MMOL/L (ref 21–32)
CREAT SERPL-MCNC: 1.59 MG/DL (ref 0.6–1.3)
GFR SERPL CREATININE-BSD FRML MDRD: 45 ML/MIN/1.73SQ M
GLUCOSE SERPL-MCNC: 102 MG/DL (ref 65–140)
GLUCOSE SERPL-MCNC: 106 MG/DL (ref 65–140)
GLUCOSE SERPL-MCNC: 121 MG/DL (ref 65–140)
GLUCOSE SERPL-MCNC: 146 MG/DL (ref 65–140)
GLUCOSE SERPL-MCNC: 183 MG/DL (ref 65–140)
GLUCOSE SERPL-MCNC: 320 MG/DL (ref 65–140)
GLUCOSE SERPL-MCNC: 48 MG/DL (ref 65–140)
GLUCOSE SERPL-MCNC: 93 MG/DL (ref 65–140)
GLUCOSE SERPL-MCNC: 97 MG/DL (ref 65–140)
GLUCOSE SERPL-MCNC: 99 MG/DL (ref 65–140)
INR PPP: 3.2 (ref 0.84–1.19)
POTASSIUM SERPL-SCNC: 4 MMOL/L (ref 3.5–5.3)
PROTHROMBIN TIME: 32.8 SECONDS (ref 11.6–14.5)
SODIUM SERPL-SCNC: 140 MMOL/L (ref 136–145)
TSH SERPL DL<=0.05 MIU/L-ACNC: 1.8 UIU/ML (ref 0.36–3.74)
VIT B12 SERPL-MCNC: 242 PG/ML (ref 100–900)

## 2020-11-30 PROCEDURE — 85610 PROTHROMBIN TIME: CPT | Performed by: NURSE PRACTITIONER

## 2020-11-30 PROCEDURE — 93306 TTE W/DOPPLER COMPLETE: CPT | Performed by: INTERNAL MEDICINE

## 2020-11-30 PROCEDURE — 84443 ASSAY THYROID STIM HORMONE: CPT | Performed by: NURSE PRACTITIONER

## 2020-11-30 PROCEDURE — 80048 BASIC METABOLIC PNL TOTAL CA: CPT | Performed by: INTERNAL MEDICINE

## 2020-11-30 PROCEDURE — 82607 VITAMIN B-12: CPT | Performed by: NURSE PRACTITIONER

## 2020-11-30 PROCEDURE — 93306 TTE W/DOPPLER COMPLETE: CPT

## 2020-11-30 PROCEDURE — 99232 SBSQ HOSP IP/OBS MODERATE 35: CPT | Performed by: PHYSICIAN ASSISTANT

## 2020-11-30 PROCEDURE — 82948 REAGENT STRIP/BLOOD GLUCOSE: CPT

## 2020-11-30 PROCEDURE — 99233 SBSQ HOSP IP/OBS HIGH 50: CPT | Performed by: PSYCHIATRY & NEUROLOGY

## 2020-11-30 RX ORDER — DEXTROSE MONOHYDRATE 25 G/50ML
INJECTION, SOLUTION INTRAVENOUS
Status: DISPENSED
Start: 2020-11-30 | End: 2020-11-30

## 2020-11-30 RX ADMIN — INSULIN HUMAN 85 UNITS: 500 INJECTION, SOLUTION SUBCUTANEOUS at 12:51

## 2020-11-30 RX ADMIN — FENOFIBRATE 145 MG: 145 TABLET ORAL at 08:59

## 2020-11-30 RX ADMIN — METOPROLOL TARTRATE 50 MG: 50 TABLET, FILM COATED ORAL at 08:59

## 2020-11-30 RX ADMIN — INSULIN HUMAN 85 UNITS: 500 INJECTION, SOLUTION SUBCUTANEOUS at 17:35

## 2020-11-30 RX ADMIN — INSULIN HUMAN 85 UNITS: 500 INJECTION, SOLUTION SUBCUTANEOUS at 08:32

## 2020-11-30 RX ADMIN — WARFARIN SODIUM 2.5 MG: 2.5 TABLET ORAL at 17:36

## 2020-11-30 RX ADMIN — METOPROLOL TARTRATE 50 MG: 50 TABLET, FILM COATED ORAL at 17:36

## 2020-11-30 RX ADMIN — INSULIN LISPRO 3 UNITS: 100 INJECTION, SOLUTION INTRAVENOUS; SUBCUTANEOUS at 21:56

## 2020-11-30 RX ADMIN — INSULIN LISPRO 2 UNITS: 100 INJECTION, SOLUTION INTRAVENOUS; SUBCUTANEOUS at 17:34

## 2020-11-30 RX ADMIN — EZETIMIBE 10 MG: 10 TABLET ORAL at 21:58

## 2020-11-30 RX ADMIN — ASPIRIN 81 MG CHEWABLE TABLET 81 MG: 81 TABLET CHEWABLE at 08:59

## 2020-11-30 RX ADMIN — ATORVASTATIN CALCIUM 40 MG: 40 TABLET, FILM COATED ORAL at 17:36

## 2020-11-30 RX ADMIN — PANTOPRAZOLE SODIUM 40 MG: 40 TABLET, DELAYED RELEASE ORAL at 05:21

## 2020-12-01 ENCOUNTER — APPOINTMENT (INPATIENT)
Dept: MRI IMAGING | Facility: HOSPITAL | Age: 65
DRG: 149 | End: 2020-12-01
Payer: COMMERCIAL

## 2020-12-01 VITALS
WEIGHT: 263.67 LBS | TEMPERATURE: 98.2 F | SYSTOLIC BLOOD PRESSURE: 160 MMHG | RESPIRATION RATE: 18 BRPM | DIASTOLIC BLOOD PRESSURE: 74 MMHG | BODY MASS INDEX: 41.38 KG/M2 | HEIGHT: 67 IN | OXYGEN SATURATION: 94 % | HEART RATE: 76 BPM

## 2020-12-01 LAB
ANION GAP SERPL CALCULATED.3IONS-SCNC: 9 MMOL/L (ref 4–13)
BUN SERPL-MCNC: 22 MG/DL (ref 5–25)
CALCIUM SERPL-MCNC: 8.5 MG/DL (ref 8.3–10.1)
CHLORIDE SERPL-SCNC: 108 MMOL/L (ref 100–108)
CO2 SERPL-SCNC: 24 MMOL/L (ref 21–32)
CREAT SERPL-MCNC: 1.63 MG/DL (ref 0.6–1.3)
ERYTHROCYTE [DISTWIDTH] IN BLOOD BY AUTOMATED COUNT: 14.8 % (ref 11.6–15.1)
GFR SERPL CREATININE-BSD FRML MDRD: 43 ML/MIN/1.73SQ M
GLUCOSE SERPL-MCNC: 204 MG/DL (ref 65–140)
GLUCOSE SERPL-MCNC: 255 MG/DL (ref 65–140)
GLUCOSE SERPL-MCNC: 70 MG/DL (ref 65–140)
GLUCOSE SERPL-MCNC: 78 MG/DL (ref 65–140)
HCT VFR BLD AUTO: 38.4 % (ref 36.5–49.3)
HGB BLD-MCNC: 11.5 G/DL (ref 12–17)
INR PPP: 3.47 (ref 0.84–1.19)
MCH RBC QN AUTO: 28.3 PG (ref 26.8–34.3)
MCHC RBC AUTO-ENTMCNC: 29.9 G/DL (ref 31.4–37.4)
MCV RBC AUTO: 94 FL (ref 82–98)
PLATELET # BLD AUTO: 220 THOUSANDS/UL (ref 149–390)
PMV BLD AUTO: 9.7 FL (ref 8.9–12.7)
POTASSIUM SERPL-SCNC: 3.9 MMOL/L (ref 3.5–5.3)
PROTHROMBIN TIME: 34.9 SECONDS (ref 11.6–14.5)
RBC # BLD AUTO: 4.07 MILLION/UL (ref 3.88–5.62)
SODIUM SERPL-SCNC: 141 MMOL/L (ref 136–145)
WBC # BLD AUTO: 5.01 THOUSAND/UL (ref 4.31–10.16)

## 2020-12-01 PROCEDURE — 70549 MR ANGIOGRAPH NECK W/O&W/DYE: CPT

## 2020-12-01 PROCEDURE — 85610 PROTHROMBIN TIME: CPT | Performed by: PHYSICIAN ASSISTANT

## 2020-12-01 PROCEDURE — 82948 REAGENT STRIP/BLOOD GLUCOSE: CPT

## 2020-12-01 PROCEDURE — 99232 SBSQ HOSP IP/OBS MODERATE 35: CPT | Performed by: NURSE PRACTITIONER

## 2020-12-01 PROCEDURE — 70544 MR ANGIOGRAPHY HEAD W/O DYE: CPT

## 2020-12-01 PROCEDURE — 70553 MRI BRAIN STEM W/O & W/DYE: CPT

## 2020-12-01 PROCEDURE — 80048 BASIC METABOLIC PNL TOTAL CA: CPT | Performed by: PHYSICIAN ASSISTANT

## 2020-12-01 PROCEDURE — G1004 CDSM NDSC: HCPCS

## 2020-12-01 PROCEDURE — 99239 HOSP IP/OBS DSCHRG MGMT >30: CPT | Performed by: PHYSICIAN ASSISTANT

## 2020-12-01 PROCEDURE — 85027 COMPLETE CBC AUTOMATED: CPT | Performed by: PHYSICIAN ASSISTANT

## 2020-12-01 PROCEDURE — A9585 GADOBUTROL INJECTION: HCPCS | Performed by: NURSE PRACTITIONER

## 2020-12-01 RX ORDER — ACETAMINOPHEN 325 MG/1
975 TABLET ORAL EVERY 8 HOURS PRN
Status: DISCONTINUED | OUTPATIENT
Start: 2020-12-01 | End: 2020-12-01 | Stop reason: HOSPADM

## 2020-12-01 RX ORDER — ATORVASTATIN CALCIUM 40 MG/1
40 TABLET, FILM COATED ORAL EVERY EVENING
Qty: 30 TABLET | Refills: 0 | Status: SHIPPED | OUTPATIENT
Start: 2020-12-01 | End: 2022-01-01 | Stop reason: HOSPADM

## 2020-12-01 RX ORDER — CYANOCOBALAMIN 1000 UG/ML
1000 INJECTION INTRAMUSCULAR; SUBCUTANEOUS
Status: DISCONTINUED | OUTPATIENT
Start: 2020-12-01 | End: 2020-12-01 | Stop reason: HOSPADM

## 2020-12-01 RX ORDER — WARFARIN SODIUM 5 MG/1
2.5 TABLET ORAL
Refills: 0
Start: 2020-12-02 | End: 2021-01-01 | Stop reason: HOSPADM

## 2020-12-01 RX ORDER — EZETIMIBE 10 MG/1
10 TABLET ORAL
Qty: 30 TABLET | Refills: 0 | Status: SHIPPED | OUTPATIENT
Start: 2020-12-01 | End: 2022-01-01 | Stop reason: HOSPADM

## 2020-12-01 RX ORDER — ACETAMINOPHEN 325 MG/1
975 TABLET ORAL EVERY 8 HOURS PRN
Qty: 30 TABLET | Refills: 0
Start: 2020-12-01 | End: 2022-01-01 | Stop reason: HOSPADM

## 2020-12-01 RX ORDER — INSULIN HUMAN 500 [IU]/ML
85 INJECTION, SOLUTION SUBCUTANEOUS
Refills: 0
Start: 2020-12-01 | End: 2021-01-01 | Stop reason: HOSPADM

## 2020-12-01 RX ORDER — AMLODIPINE BESYLATE 5 MG/1
5 TABLET ORAL DAILY
Qty: 30 TABLET | Refills: 0 | Status: SHIPPED | OUTPATIENT
Start: 2020-12-01 | End: 2022-01-01 | Stop reason: HOSPADM

## 2020-12-01 RX ADMIN — ACETAMINOPHEN 975 MG: 325 TABLET, FILM COATED ORAL at 02:59

## 2020-12-01 RX ADMIN — PANTOPRAZOLE SODIUM 40 MG: 40 TABLET, DELAYED RELEASE ORAL at 05:15

## 2020-12-01 RX ADMIN — ASPIRIN 81 MG CHEWABLE TABLET 81 MG: 81 TABLET CHEWABLE at 08:41

## 2020-12-01 RX ADMIN — METOPROLOL TARTRATE 50 MG: 50 TABLET, FILM COATED ORAL at 08:41

## 2020-12-01 RX ADMIN — INSULIN HUMAN 85 UNITS: 500 INJECTION, SOLUTION SUBCUTANEOUS at 12:29

## 2020-12-01 RX ADMIN — FENOFIBRATE 145 MG: 145 TABLET ORAL at 08:41

## 2020-12-01 RX ADMIN — CYANOCOBALAMIN 1000 MCG: 1000 INJECTION, SOLUTION INTRAMUSCULAR; SUBCUTANEOUS at 08:41

## 2020-12-01 RX ADMIN — GADOBUTROL 10 ML: 604.72 INJECTION INTRAVENOUS at 09:46

## 2020-12-01 RX ADMIN — INSULIN LISPRO 6 UNITS: 100 INJECTION, SOLUTION INTRAVENOUS; SUBCUTANEOUS at 12:23

## 2020-12-02 ENCOUNTER — TELEPHONE (OUTPATIENT)
Dept: NEUROLOGY | Facility: CLINIC | Age: 65
End: 2020-12-02

## 2021-01-01 ENCOUNTER — APPOINTMENT (INPATIENT)
Dept: CT IMAGING | Facility: HOSPITAL | Age: 66
DRG: 871 | End: 2021-01-01
Payer: COMMERCIAL

## 2021-01-01 ENCOUNTER — APPOINTMENT (EMERGENCY)
Dept: RADIOLOGY | Facility: HOSPITAL | Age: 66
DRG: 871 | End: 2021-01-01
Payer: COMMERCIAL

## 2021-01-01 ENCOUNTER — APPOINTMENT (EMERGENCY)
Dept: ULTRASOUND IMAGING | Facility: HOSPITAL | Age: 66
End: 2021-01-01
Payer: COMMERCIAL

## 2021-01-01 ENCOUNTER — APPOINTMENT (EMERGENCY)
Dept: CT IMAGING | Facility: HOSPITAL | Age: 66
End: 2021-01-01
Payer: COMMERCIAL

## 2021-01-01 ENCOUNTER — APPOINTMENT (INPATIENT)
Dept: RADIOLOGY | Facility: HOSPITAL | Age: 66
DRG: 871 | End: 2021-01-01
Payer: COMMERCIAL

## 2021-01-01 ENCOUNTER — APPOINTMENT (INPATIENT)
Dept: RADIOLOGY | Facility: HOSPITAL | Age: 66
DRG: 871 | End: 2021-01-01
Attending: RADIOLOGY
Payer: COMMERCIAL

## 2021-01-01 ENCOUNTER — TELEPHONE (OUTPATIENT)
Dept: INFECTIOUS DISEASES | Facility: CLINIC | Age: 66
End: 2021-01-01

## 2021-01-01 ENCOUNTER — HOSPITAL ENCOUNTER (INPATIENT)
Dept: VASCULAR ULTRASOUND | Facility: HOSPITAL | Age: 66
Discharge: HOME | DRG: 871 | End: 2021-12-24
Payer: COMMERCIAL

## 2021-01-01 ENCOUNTER — APPOINTMENT (INPATIENT)
Dept: ULTRASOUND IMAGING | Facility: HOSPITAL | Age: 66
DRG: 871 | End: 2021-01-01
Payer: COMMERCIAL

## 2021-01-01 ENCOUNTER — TELEPHONE (OUTPATIENT)
Dept: OTHER | Facility: OTHER | Age: 66
End: 2021-01-01

## 2021-01-01 ENCOUNTER — HOSPITAL ENCOUNTER (EMERGENCY)
Facility: HOSPITAL | Age: 66
Discharge: HOME | End: 2021-02-11
Attending: EMERGENCY MEDICINE | Admitting: EMERGENCY MEDICINE
Payer: COMMERCIAL

## 2021-01-01 ENCOUNTER — IMMUNIZATIONS (OUTPATIENT)
Dept: FAMILY MEDICINE CLINIC | Facility: HOSPITAL | Age: 66
End: 2021-01-01

## 2021-01-01 ENCOUNTER — HOSPITAL ENCOUNTER (INPATIENT)
Facility: HOSPITAL | Age: 66
LOS: 12 days | Discharge: NURSING, REHAB OR LONG-TERM CARE | DRG: 871 | End: 2021-12-29
Attending: EMERGENCY MEDICINE | Admitting: INTERNAL MEDICINE
Payer: COMMERCIAL

## 2021-01-01 ENCOUNTER — TELEPHONE (OUTPATIENT)
Dept: NEPHROLOGY | Facility: CLINIC | Age: 66
End: 2021-01-01

## 2021-01-01 ENCOUNTER — APPOINTMENT (INPATIENT)
Dept: NON INVASIVE DIAGNOSTICS | Facility: HOSPITAL | Age: 66
DRG: 871 | End: 2021-01-01
Payer: COMMERCIAL

## 2021-01-01 VITALS
OXYGEN SATURATION: 96 % | HEIGHT: 67 IN | SYSTOLIC BLOOD PRESSURE: 145 MMHG | BODY MASS INDEX: 37.2 KG/M2 | WEIGHT: 236.99 LBS | HEART RATE: 65 BPM | DIASTOLIC BLOOD PRESSURE: 54 MMHG | RESPIRATION RATE: 19 BRPM | TEMPERATURE: 98.2 F

## 2021-01-01 VITALS
TEMPERATURE: 99.5 F | OXYGEN SATURATION: 95 % | DIASTOLIC BLOOD PRESSURE: 88 MMHG | RESPIRATION RATE: 20 BRPM | SYSTOLIC BLOOD PRESSURE: 178 MMHG | HEART RATE: 66 BPM

## 2021-01-01 DIAGNOSIS — I15.9 SECONDARY HYPERTENSION: ICD-10-CM

## 2021-01-01 DIAGNOSIS — B95.61 STAPHYLOCOCCUS AUREUS BACTEREMIA: Primary | ICD-10-CM

## 2021-01-01 DIAGNOSIS — E11.9 DIABETES MELLITUS (HCC): ICD-10-CM

## 2021-01-01 DIAGNOSIS — R78.81 STAPHYLOCOCCUS AUREUS BACTEREMIA: Primary | ICD-10-CM

## 2021-01-01 DIAGNOSIS — D64.9 ANEMIA: ICD-10-CM

## 2021-01-01 DIAGNOSIS — R78.81 BACTEREMIA: ICD-10-CM

## 2021-01-01 DIAGNOSIS — R10.32 LEFT LOWER QUADRANT ABDOMINAL PAIN: ICD-10-CM

## 2021-01-01 DIAGNOSIS — D64.9 ACUTE ANEMIA: ICD-10-CM

## 2021-01-01 DIAGNOSIS — E87.20 NORMAL ANION GAP METABOLIC ACIDOSIS: ICD-10-CM

## 2021-01-01 DIAGNOSIS — N18.9 ACUTE KIDNEY INJURY SUPERIMPOSED ON CHRONIC KIDNEY DISEASE (HCC): ICD-10-CM

## 2021-01-01 DIAGNOSIS — N18.31 STAGE 3A CHRONIC KIDNEY DISEASE (HCC): ICD-10-CM

## 2021-01-01 DIAGNOSIS — R79.1 ELEVATED INR: ICD-10-CM

## 2021-01-01 DIAGNOSIS — B35.4 TINEA CORPORIS: ICD-10-CM

## 2021-01-01 DIAGNOSIS — R10.10 UPPER ABDOMINAL PAIN: Primary | ICD-10-CM

## 2021-01-01 DIAGNOSIS — Z23 ENCOUNTER FOR IMMUNIZATION: Primary | ICD-10-CM

## 2021-01-01 DIAGNOSIS — E87.5 HYPERKALEMIA: ICD-10-CM

## 2021-01-01 DIAGNOSIS — I82.432 ACUTE DEEP VEIN THROMBOSIS (DVT) OF POPLITEAL VEIN OF LEFT LOWER EXTREMITY (HCC): ICD-10-CM

## 2021-01-01 DIAGNOSIS — E11.10 DKA, TYPE 2 (HCC): Primary | ICD-10-CM

## 2021-01-01 DIAGNOSIS — N17.9 ACUTE KIDNEY INJURY SUPERIMPOSED ON CHRONIC KIDNEY DISEASE (HCC): ICD-10-CM

## 2021-01-01 DIAGNOSIS — S81.802A OPEN WOUND OF LEFT LOWER EXTREMITY, INITIAL ENCOUNTER: ICD-10-CM

## 2021-01-01 DIAGNOSIS — R16.0 LIVER MASS: ICD-10-CM

## 2021-01-01 DIAGNOSIS — I10 ESSENTIAL HYPERTENSION: ICD-10-CM

## 2021-01-01 DIAGNOSIS — L03.116 CELLULITIS OF LEFT LOWER EXTREMITY: ICD-10-CM

## 2021-01-01 DIAGNOSIS — N19 UREMIA: ICD-10-CM

## 2021-01-01 LAB
AAMA (MAX AMPLITUDE AA): 75.7 MM (ref 51–71)
ABO GROUP BLD BPU: NORMAL
ABO GROUP BLD: NORMAL
ABO GROUP BLD: NORMAL
ACTFMA(MAX AMPLITUDE ACTF): >30 MM (ref 2–19)
ADPMA(MAX AMPLITUDE ADP): >72 MM (ref 45–69)
AFP-TM SERPL-MCNC: 0.6 NG/ML (ref 0.5–8)
ALBUMIN SERPL BCP-MCNC: 1.3 G/DL (ref 3.5–5)
ALBUMIN SERPL BCP-MCNC: 1.4 G/DL (ref 3.5–5)
ALBUMIN SERPL BCP-MCNC: 1.7 G/DL (ref 3.5–5)
ALBUMIN SERPL BCP-MCNC: 1.7 G/DL (ref 3.5–5)
ALBUMIN SERPL BCP-MCNC: 2.1 G/DL (ref 3.5–5)
ALBUMIN SERPL BCP-MCNC: 2.7 G/DL (ref 3.5–5)
ALP SERPL-CCNC: 30 U/L (ref 46–116)
ALP SERPL-CCNC: 36 U/L (ref 46–116)
ALP SERPL-CCNC: 37 U/L (ref 46–116)
ALP SERPL-CCNC: 41 U/L (ref 46–116)
ALP SERPL-CCNC: 42 U/L (ref 46–116)
ALP SERPL-CCNC: 74 U/L (ref 46–116)
ALP SERPL-CCNC: 78 U/L (ref 46–116)
ALP SERPL-CCNC: 79 U/L (ref 46–116)
ALP SERPL-CCNC: 81 U/L (ref 46–116)
ALP SERPL-CCNC: 82 U/L (ref 46–116)
ALT SERPL W P-5'-P-CCNC: 10 U/L (ref 12–78)
ALT SERPL W P-5'-P-CCNC: 12 U/L (ref 12–78)
ALT SERPL W P-5'-P-CCNC: 158 U/L (ref 12–78)
ALT SERPL W P-5'-P-CCNC: 161 U/L (ref 12–78)
ALT SERPL W P-5'-P-CCNC: 17 U/L (ref 12–78)
ALT SERPL W P-5'-P-CCNC: 285 U/L (ref 12–78)
ALT SERPL W P-5'-P-CCNC: 62 U/L (ref 12–78)
ALT SERPL W P-5'-P-CCNC: 69 U/L (ref 12–78)
ALT SERPL W P-5'-P-CCNC: 7 U/L (ref 12–78)
ALT SERPL W P-5'-P-CCNC: 797 U/L (ref 12–78)
ALT SERPL W P-5'-P-CCNC: 85 U/L (ref 12–78)
ALT SERPL W P-5'-P-CCNC: 95 U/L (ref 12–78)
AMORPH URATE CRY URNS QL MICRO: ABNORMAL /HPF
ANION GAP SERPL CALCULATED.3IONS-SCNC: 10 MMOL/L (ref 4–13)
ANION GAP SERPL CALCULATED.3IONS-SCNC: 10 MMOL/L (ref 4–13)
ANION GAP SERPL CALCULATED.3IONS-SCNC: 11 MMOL/L (ref 4–13)
ANION GAP SERPL CALCULATED.3IONS-SCNC: 12 MMOL/L (ref 4–13)
ANION GAP SERPL CALCULATED.3IONS-SCNC: 13 MMOL/L (ref 4–13)
ANION GAP SERPL CALCULATED.3IONS-SCNC: 14 MMOL/L (ref 4–13)
ANION GAP SERPL CALCULATED.3IONS-SCNC: 16 MMOL/L (ref 4–13)
ANION GAP SERPL CALCULATED.3IONS-SCNC: 6 MMOL/L (ref 4–13)
ANION GAP SERPL CALCULATED.3IONS-SCNC: 8 MMOL/L (ref 4–13)
ANION GAP SERPL CALCULATED.3IONS-SCNC: 9 MMOL/L (ref 4–13)
ANISOCYTOSIS BLD QL SMEAR: PRESENT
AORTIC ROOT: 2.9 CM
APAP SERPL-MCNC: 6 UG/ML (ref 10–20)
APICAL FOUR CHAMBER EJECTION FRACTION: 62 %
APTT PPP: 108 SECONDS (ref 23–37)
APTT PPP: 126 SECONDS (ref 23–37)
APTT PPP: 133 SECONDS (ref 23–37)
APTT PPP: 36 SECONDS (ref 23–37)
APTT PPP: 46 SECONDS (ref 23–37)
APTT PPP: 64 SECONDS (ref 23–37)
APTT PPP: 86 SECONDS (ref 23–37)
APTT PPP: 87 SECONDS (ref 23–37)
AST SERPL W P-5'-P-CCNC: 11 U/L (ref 5–45)
AST SERPL W P-5'-P-CCNC: 123 U/L (ref 5–45)
AST SERPL W P-5'-P-CCNC: 14 U/L (ref 5–45)
AST SERPL W P-5'-P-CCNC: 185 U/L (ref 5–45)
AST SERPL W P-5'-P-CCNC: 25 U/L (ref 5–45)
AST SERPL W P-5'-P-CCNC: 31 U/L (ref 5–45)
AST SERPL W P-5'-P-CCNC: 34 U/L (ref 5–45)
AST SERPL W P-5'-P-CCNC: 387 U/L (ref 5–45)
AST SERPL W P-5'-P-CCNC: 467 U/L (ref 5–45)
AST SERPL W P-5'-P-CCNC: 4787 U/L (ref 5–45)
AST SERPL W P-5'-P-CCNC: 51 U/L (ref 5–45)
AST SERPL W P-5'-P-CCNC: 888 U/L (ref 5–45)
ATRIAL RATE: 65 BPM
ATRIAL RATE: 82 BPM
BACTERIA BLD CULT: ABNORMAL
BACTERIA BLD CULT: ABNORMAL
BACTERIA BLD CULT: NORMAL
BACTERIA BLD CULT: NORMAL
BACTERIA UR QL AUTO: ABNORMAL /HPF
BACTERIA UR QL AUTO: ABNORMAL /HPF
BACTERIA WND AEROBE CULT: ABNORMAL
BASE EX.OXY STD BLDV CALC-SCNC: 46 % (ref 60–80)
BASE EX.OXY STD BLDV CALC-SCNC: 71 % (ref 60–80)
BASE EXCESS BLDV CALC-SCNC: -6.9 MMOL/L
BASE EXCESS BLDV CALC-SCNC: -9.1 MMOL/L
BASOPHILS # BLD AUTO: 0.02 THOUSANDS/ΜL (ref 0–0.1)
BASOPHILS # BLD MANUAL: 0 THOUSAND/UL (ref 0–0.1)
BASOPHILS # BLD MANUAL: 0.1 THOUSAND/UL (ref 0–0.1)
BASOPHILS NFR BLD AUTO: 0 % (ref 0–1)
BASOPHILS NFR MAR MANUAL: 0 % (ref 0–1)
BASOPHILS NFR MAR MANUAL: 1 % (ref 0–1)
BETA-HYDROXYBUTYRATE: 1.1 MMOL/L
BILIRUB DIRECT SERPL-MCNC: 0.07 MG/DL (ref 0–0.2)
BILIRUB DIRECT SERPL-MCNC: 0.13 MG/DL (ref 0–0.2)
BILIRUB SERPL-MCNC: 0.11 MG/DL (ref 0.2–1)
BILIRUB SERPL-MCNC: 0.12 MG/DL (ref 0.2–1)
BILIRUB SERPL-MCNC: 0.12 MG/DL (ref 0.2–1)
BILIRUB SERPL-MCNC: 0.14 MG/DL (ref 0.2–1)
BILIRUB SERPL-MCNC: 0.19 MG/DL (ref 0.2–1)
BILIRUB SERPL-MCNC: 0.2 MG/DL (ref 0.2–1)
BILIRUB SERPL-MCNC: 0.23 MG/DL (ref 0.2–1)
BILIRUB SERPL-MCNC: 0.24 MG/DL (ref 0.2–1)
BILIRUB SERPL-MCNC: 0.25 MG/DL (ref 0.2–1)
BILIRUB SERPL-MCNC: 0.27 MG/DL (ref 0.2–1)
BILIRUB SERPL-MCNC: 0.28 MG/DL (ref 0.2–1)
BILIRUB SERPL-MCNC: 0.43 MG/DL (ref 0.2–1)
BILIRUB UR QL STRIP: ABNORMAL
BILIRUB UR QL STRIP: NEGATIVE
BLD GP AB SCN SERPL QL: NEGATIVE
BLD GP AB SCN SERPL QL: POSITIVE
BLOOD GROUP ANTIBODIES SERPL: NORMAL
BPU ID: NORMAL
BUN SERPL-MCNC: 101 MG/DL (ref 5–25)
BUN SERPL-MCNC: 106 MG/DL (ref 5–25)
BUN SERPL-MCNC: 28 MG/DL (ref 5–25)
BUN SERPL-MCNC: 67 MG/DL (ref 5–25)
BUN SERPL-MCNC: 69 MG/DL (ref 5–25)
BUN SERPL-MCNC: 70 MG/DL (ref 5–25)
BUN SERPL-MCNC: 72 MG/DL (ref 5–25)
BUN SERPL-MCNC: 79 MG/DL (ref 5–25)
BUN SERPL-MCNC: 79 MG/DL (ref 5–25)
BUN SERPL-MCNC: 80 MG/DL (ref 5–25)
BUN SERPL-MCNC: 82 MG/DL (ref 5–25)
BUN SERPL-MCNC: 87 MG/DL (ref 5–25)
BUN SERPL-MCNC: 88 MG/DL (ref 5–25)
BUN SERPL-MCNC: 90 MG/DL (ref 5–25)
BUN SERPL-MCNC: 90 MG/DL (ref 5–25)
BUN SERPL-MCNC: 91 MG/DL (ref 5–25)
BUN SERPL-MCNC: 93 MG/DL (ref 5–25)
BUN SERPL-MCNC: 94 MG/DL (ref 5–25)
BUN SERPL-MCNC: 94 MG/DL (ref 5–25)
BUN SERPL-MCNC: 95 MG/DL (ref 5–25)
C-ANCA TITR SER IF: NORMAL TITER
CA-I BLD-SCNC: 1.07 MMOL/L (ref 1.12–1.32)
CA-I BLD-SCNC: 1.08 MMOL/L (ref 1.12–1.32)
CA-I BLD-SCNC: 1.1 MMOL/L (ref 1.12–1.32)
CA-I BLD-SCNC: 1.11 MMOL/L (ref 1.12–1.32)
CA-I BLD-SCNC: 1.12 MMOL/L (ref 1.12–1.32)
CA-I BLD-SCNC: 1.12 MMOL/L (ref 1.12–1.32)
CA-I BLD-SCNC: 1.13 MMOL/L (ref 1.12–1.32)
CA-I BLD-SCNC: 1.13 MMOL/L (ref 1.12–1.32)
CA-I BLD-SCNC: 1.14 MMOL/L (ref 1.12–1.32)
CA-I BLD-SCNC: 1.14 MMOL/L (ref 1.12–1.32)
CA-I BLD-SCNC: 1.15 MMOL/L (ref 1.12–1.32)
CA-I BLD-SCNC: 1.16 MMOL/L (ref 1.12–1.32)
CA-I BLD-SCNC: 1.18 MMOL/L (ref 1.12–1.32)
CALCIUM ALBUM COR SERPL-MCNC: 10.2 MG/DL (ref 8.3–10.1)
CALCIUM ALBUM COR SERPL-MCNC: 10.3 MG/DL (ref 8.3–10.1)
CALCIUM ALBUM COR SERPL-MCNC: 10.4 MG/DL (ref 8.3–10.1)
CALCIUM ALBUM COR SERPL-MCNC: 10.4 MG/DL (ref 8.3–10.1)
CALCIUM ALBUM COR SERPL-MCNC: 10.6 MG/DL (ref 8.3–10.1)
CALCIUM ALBUM COR SERPL-MCNC: 10.6 MG/DL (ref 8.3–10.1)
CALCIUM ALBUM COR SERPL-MCNC: 10.8 MG/DL (ref 8.3–10.1)
CALCIUM ALBUM COR SERPL-MCNC: 9.7 MG/DL (ref 8.3–10.1)
CALCIUM SERPL-MCNC: 7.7 MG/DL (ref 8.3–10.1)
CALCIUM SERPL-MCNC: 7.8 MG/DL (ref 8.3–10.1)
CALCIUM SERPL-MCNC: 8 MG/DL (ref 8.3–10.1)
CALCIUM SERPL-MCNC: 8.1 MG/DL (ref 8.3–10.1)
CALCIUM SERPL-MCNC: 8.2 MG/DL (ref 8.3–10.1)
CALCIUM SERPL-MCNC: 8.3 MG/DL (ref 8.3–10.1)
CALCIUM SERPL-MCNC: 8.4 MG/DL (ref 8.3–10.1)
CALCIUM SERPL-MCNC: 8.5 MG/DL (ref 8.3–10.1)
CALCIUM SERPL-MCNC: 8.6 MG/DL (ref 8.3–10.1)
CALCIUM SERPL-MCNC: 8.7 MG/DL (ref 8.3–10.1)
CALCIUM SERPL-MCNC: 8.7 MG/DL (ref 8.3–10.1)
CARDIOLIPIN IGA SER IA-ACNC: 3.7
CARDIOLIPIN IGG SER IA-ACNC: 2.4
CARDIOLIPIN IGM SER IA-ACNC: 1
CFFMA (FUNCTIONAL FIBRINOGEN MAX AMPLITUDE): >52 MM (ref 15–32)
CHLORIDE SERPL-SCNC: 100 MMOL/L (ref 100–108)
CHLORIDE SERPL-SCNC: 106 MMOL/L (ref 100–108)
CHLORIDE SERPL-SCNC: 108 MMOL/L (ref 100–108)
CHLORIDE SERPL-SCNC: 108 MMOL/L (ref 100–108)
CHLORIDE SERPL-SCNC: 109 MMOL/L (ref 100–108)
CHLORIDE SERPL-SCNC: 110 MMOL/L (ref 100–108)
CHLORIDE SERPL-SCNC: 111 MMOL/L (ref 100–108)
CHLORIDE SERPL-SCNC: 112 MMOL/L (ref 100–108)
CHLORIDE SERPL-SCNC: 113 MMOL/L (ref 100–108)
CHLORIDE SERPL-SCNC: 113 MMOL/L (ref 100–108)
CHLORIDE SERPL-SCNC: 93 MMOL/L (ref 100–108)
CK SERPL-CCNC: 95 U/L (ref 39–308)
CKLY30: 0 % (ref 0–2.6)
CKR(REACTION TIME): 7.5 MIN (ref 4.6–9.1)
CLARITY UR: ABNORMAL
CLARITY UR: CLEAR
CO2 SERPL-SCNC: 17 MMOL/L (ref 21–32)
CO2 SERPL-SCNC: 18 MMOL/L (ref 21–32)
CO2 SERPL-SCNC: 19 MMOL/L (ref 21–32)
CO2 SERPL-SCNC: 20 MMOL/L (ref 21–32)
CO2 SERPL-SCNC: 21 MMOL/L (ref 21–32)
CO2 SERPL-SCNC: 22 MMOL/L (ref 21–32)
CO2 SERPL-SCNC: 24 MMOL/L (ref 21–32)
COARSE GRAN CASTS URNS QL MICRO: ABNORMAL /LPF
COLOR UR: ABNORMAL
COLOR UR: YELLOW
CREAT SERPL-MCNC: 1.94 MG/DL (ref 0.6–1.3)
CREAT SERPL-MCNC: 2.82 MG/DL (ref 0.6–1.3)
CREAT SERPL-MCNC: 3.03 MG/DL (ref 0.6–1.3)
CREAT SERPL-MCNC: 3.16 MG/DL (ref 0.6–1.3)
CREAT SERPL-MCNC: 3.48 MG/DL (ref 0.6–1.3)
CREAT SERPL-MCNC: 3.6 MG/DL (ref 0.6–1.3)
CREAT SERPL-MCNC: 3.66 MG/DL (ref 0.6–1.3)
CREAT SERPL-MCNC: 3.71 MG/DL (ref 0.6–1.3)
CREAT SERPL-MCNC: 3.72 MG/DL (ref 0.6–1.3)
CREAT SERPL-MCNC: 3.89 MG/DL (ref 0.6–1.3)
CREAT SERPL-MCNC: 3.99 MG/DL (ref 0.6–1.3)
CREAT SERPL-MCNC: 4.06 MG/DL (ref 0.6–1.3)
CREAT SERPL-MCNC: 4.06 MG/DL (ref 0.6–1.3)
CREAT SERPL-MCNC: 4.17 MG/DL (ref 0.6–1.3)
CREAT SERPL-MCNC: 4.2 MG/DL (ref 0.6–1.3)
CREAT SERPL-MCNC: 4.26 MG/DL (ref 0.6–1.3)
CREAT SERPL-MCNC: 4.26 MG/DL (ref 0.6–1.3)
CREAT SERPL-MCNC: 4.32 MG/DL (ref 0.6–1.3)
CREAT SERPL-MCNC: 4.34 MG/DL (ref 0.6–1.3)
CREAT SERPL-MCNC: 4.36 MG/DL (ref 0.6–1.3)
CREAT SERPL-MCNC: 4.37 MG/DL (ref 0.6–1.3)
CREAT SERPL-MCNC: 4.39 MG/DL (ref 0.6–1.3)
CREAT SERPL-MCNC: 4.39 MG/DL (ref 0.6–1.3)
CREAT SERPL-MCNC: 4.43 MG/DL (ref 0.6–1.3)
CREAT SERPL-MCNC: 4.52 MG/DL (ref 0.6–1.3)
CREAT SERPL-MCNC: 4.54 MG/DL (ref 0.6–1.3)
CREAT SERPL-MCNC: 4.56 MG/DL (ref 0.6–1.3)
CREAT SERPL-MCNC: 4.57 MG/DL (ref 0.6–1.3)
CREAT SERPL-MCNC: 4.62 MG/DL (ref 0.6–1.3)
CROSSMATCH: NORMAL
CRP SERPL QL: 421.8 MG/L
CRTMA(RAPIDTEG MAX AMPLITUDE): 73.8 MM (ref 52–70)
E WAVE DECELERATION TIME: 223 MS
EOSINOPHIL # BLD AUTO: 0.17 THOUSAND/ΜL (ref 0–0.61)
EOSINOPHIL # BLD MANUAL: 0 THOUSAND/UL (ref 0–0.4)
EOSINOPHIL # BLD MANUAL: 0.22 THOUSAND/UL (ref 0–0.4)
EOSINOPHIL # BLD MANUAL: 0.31 THOUSAND/UL (ref 0–0.4)
EOSINOPHIL # BLD MANUAL: 0.39 THOUSAND/UL (ref 0–0.4)
EOSINOPHIL # BLD MANUAL: 0.65 THOUSAND/UL (ref 0–0.4)
EOSINOPHIL # BLD MANUAL: 0.65 THOUSAND/UL (ref 0–0.4)
EOSINOPHIL NFR BLD AUTO: 3 % (ref 0–6)
EOSINOPHIL NFR BLD MANUAL: 0 % (ref 0–6)
EOSINOPHIL NFR BLD MANUAL: 2 % (ref 0–6)
EOSINOPHIL NFR BLD MANUAL: 3 % (ref 0–6)
EOSINOPHIL NFR BLD MANUAL: 4 % (ref 0–6)
EOSINOPHIL NFR BLD MANUAL: 6 % (ref 0–6)
EOSINOPHIL NFR BLD MANUAL: 6 % (ref 0–6)
ERYTHROCYTE [DISTWIDTH] IN BLOOD BY AUTOMATED COUNT: 14 % (ref 11.6–15.1)
ERYTHROCYTE [DISTWIDTH] IN BLOOD BY AUTOMATED COUNT: 14.1 % (ref 11.6–15.1)
ERYTHROCYTE [DISTWIDTH] IN BLOOD BY AUTOMATED COUNT: 14.2 % (ref 11.6–15.1)
ERYTHROCYTE [DISTWIDTH] IN BLOOD BY AUTOMATED COUNT: 14.2 % (ref 11.6–15.1)
ERYTHROCYTE [DISTWIDTH] IN BLOOD BY AUTOMATED COUNT: 14.4 % (ref 11.6–15.1)
ERYTHROCYTE [DISTWIDTH] IN BLOOD BY AUTOMATED COUNT: 14.5 % (ref 11.6–15.1)
ERYTHROCYTE [DISTWIDTH] IN BLOOD BY AUTOMATED COUNT: 14.7 % (ref 11.6–15.1)
ERYTHROCYTE [DISTWIDTH] IN BLOOD BY AUTOMATED COUNT: 14.9 % (ref 11.6–15.1)
ERYTHROCYTE [DISTWIDTH] IN BLOOD BY AUTOMATED COUNT: 15.3 % (ref 11.6–15.1)
ERYTHROCYTE [DISTWIDTH] IN BLOOD BY AUTOMATED COUNT: 15.6 % (ref 11.6–15.1)
ERYTHROCYTE [DISTWIDTH] IN BLOOD BY AUTOMATED COUNT: 15.6 % (ref 11.6–15.1)
ERYTHROCYTE [DISTWIDTH] IN BLOOD BY AUTOMATED COUNT: 15.8 % (ref 11.6–15.1)
ERYTHROCYTE [DISTWIDTH] IN BLOOD BY AUTOMATED COUNT: 15.9 % (ref 11.6–15.1)
ERYTHROCYTE [DISTWIDTH] IN BLOOD BY AUTOMATED COUNT: 16.1 % (ref 11.6–15.1)
ERYTHROCYTE [DISTWIDTH] IN BLOOD BY AUTOMATED COUNT: 16.3 % (ref 11.6–15.1)
ERYTHROCYTE [SEDIMENTATION RATE] IN BLOOD: 95 MM/HOUR (ref 0–19)
EST. AVERAGE GLUCOSE BLD GHB EST-MCNC: 303 MG/DL
FERRITIN SERPL-MCNC: 747 NG/ML (ref 8–388)
FINE GRAN CASTS URNS QL MICRO: ABNORMAL /LPF
FLUAV RNA RESP QL NAA+PROBE: NEGATIVE
FLUBV RNA RESP QL NAA+PROBE: NEGATIVE
FRACTIONAL SHORTENING: 33 % (ref 28–44)
GFR SERPL CREATININE-BSD FRML MDRD: 12 ML/MIN/1.73SQ M
GFR SERPL CREATININE-BSD FRML MDRD: 13 ML/MIN/1.73SQ M
GFR SERPL CREATININE-BSD FRML MDRD: 14 ML/MIN/1.73SQ M
GFR SERPL CREATININE-BSD FRML MDRD: 15 ML/MIN/1.73SQ M
GFR SERPL CREATININE-BSD FRML MDRD: 16 ML/MIN/1.73SQ M
GFR SERPL CREATININE-BSD FRML MDRD: 16 ML/MIN/1.73SQ M
GFR SERPL CREATININE-BSD FRML MDRD: 17 ML/MIN/1.73SQ M
GFR SERPL CREATININE-BSD FRML MDRD: 19 ML/MIN/1.73SQ M
GFR SERPL CREATININE-BSD FRML MDRD: 20 ML/MIN/1.73SQ M
GFR SERPL CREATININE-BSD FRML MDRD: 22 ML/MIN/1.73SQ M
GFR SERPL CREATININE-BSD FRML MDRD: 35 ML/MIN/1.73SQ M
GIANT PLATELETS BLD QL SMEAR: PRESENT
GLUCOSE SERPL-MCNC: 104 MG/DL (ref 65–140)
GLUCOSE SERPL-MCNC: 107 MG/DL (ref 65–140)
GLUCOSE SERPL-MCNC: 109 MG/DL (ref 65–140)
GLUCOSE SERPL-MCNC: 114 MG/DL (ref 65–140)
GLUCOSE SERPL-MCNC: 117 MG/DL (ref 65–140)
GLUCOSE SERPL-MCNC: 117 MG/DL (ref 65–140)
GLUCOSE SERPL-MCNC: 118 MG/DL (ref 65–140)
GLUCOSE SERPL-MCNC: 120 MG/DL (ref 65–140)
GLUCOSE SERPL-MCNC: 122 MG/DL (ref 65–140)
GLUCOSE SERPL-MCNC: 123 MG/DL (ref 65–140)
GLUCOSE SERPL-MCNC: 123 MG/DL (ref 65–140)
GLUCOSE SERPL-MCNC: 124 MG/DL (ref 65–140)
GLUCOSE SERPL-MCNC: 125 MG/DL (ref 65–140)
GLUCOSE SERPL-MCNC: 126 MG/DL (ref 65–140)
GLUCOSE SERPL-MCNC: 126 MG/DL (ref 65–140)
GLUCOSE SERPL-MCNC: 128 MG/DL (ref 65–140)
GLUCOSE SERPL-MCNC: 129 MG/DL (ref 65–140)
GLUCOSE SERPL-MCNC: 129 MG/DL (ref 65–140)
GLUCOSE SERPL-MCNC: 130 MG/DL (ref 65–140)
GLUCOSE SERPL-MCNC: 132 MG/DL (ref 65–140)
GLUCOSE SERPL-MCNC: 133 MG/DL (ref 65–140)
GLUCOSE SERPL-MCNC: 134 MG/DL (ref 65–140)
GLUCOSE SERPL-MCNC: 134 MG/DL (ref 65–140)
GLUCOSE SERPL-MCNC: 135 MG/DL (ref 65–140)
GLUCOSE SERPL-MCNC: 137 MG/DL (ref 65–140)
GLUCOSE SERPL-MCNC: 138 MG/DL (ref 65–140)
GLUCOSE SERPL-MCNC: 139 MG/DL (ref 65–140)
GLUCOSE SERPL-MCNC: 140 MG/DL (ref 65–140)
GLUCOSE SERPL-MCNC: 141 MG/DL (ref 65–140)
GLUCOSE SERPL-MCNC: 144 MG/DL (ref 65–140)
GLUCOSE SERPL-MCNC: 145 MG/DL (ref 65–140)
GLUCOSE SERPL-MCNC: 146 MG/DL (ref 65–140)
GLUCOSE SERPL-MCNC: 149 MG/DL (ref 65–140)
GLUCOSE SERPL-MCNC: 150 MG/DL (ref 65–140)
GLUCOSE SERPL-MCNC: 156 MG/DL (ref 65–140)
GLUCOSE SERPL-MCNC: 157 MG/DL (ref 65–140)
GLUCOSE SERPL-MCNC: 157 MG/DL (ref 65–140)
GLUCOSE SERPL-MCNC: 159 MG/DL (ref 65–140)
GLUCOSE SERPL-MCNC: 162 MG/DL (ref 65–140)
GLUCOSE SERPL-MCNC: 164 MG/DL (ref 65–140)
GLUCOSE SERPL-MCNC: 165 MG/DL (ref 65–140)
GLUCOSE SERPL-MCNC: 168 MG/DL (ref 65–140)
GLUCOSE SERPL-MCNC: 169 MG/DL (ref 65–140)
GLUCOSE SERPL-MCNC: 172 MG/DL (ref 65–140)
GLUCOSE SERPL-MCNC: 172 MG/DL (ref 65–140)
GLUCOSE SERPL-MCNC: 174 MG/DL (ref 65–140)
GLUCOSE SERPL-MCNC: 176 MG/DL (ref 65–140)
GLUCOSE SERPL-MCNC: 177 MG/DL (ref 65–140)
GLUCOSE SERPL-MCNC: 178 MG/DL (ref 65–140)
GLUCOSE SERPL-MCNC: 179 MG/DL (ref 65–140)
GLUCOSE SERPL-MCNC: 182 MG/DL (ref 65–140)
GLUCOSE SERPL-MCNC: 184 MG/DL (ref 65–140)
GLUCOSE SERPL-MCNC: 186 MG/DL (ref 65–140)
GLUCOSE SERPL-MCNC: 186 MG/DL (ref 65–140)
GLUCOSE SERPL-MCNC: 191 MG/DL (ref 65–140)
GLUCOSE SERPL-MCNC: 192 MG/DL (ref 65–140)
GLUCOSE SERPL-MCNC: 193 MG/DL (ref 65–140)
GLUCOSE SERPL-MCNC: 196 MG/DL (ref 65–140)
GLUCOSE SERPL-MCNC: 197 MG/DL (ref 65–140)
GLUCOSE SERPL-MCNC: 197 MG/DL (ref 65–140)
GLUCOSE SERPL-MCNC: 200 MG/DL (ref 65–140)
GLUCOSE SERPL-MCNC: 203 MG/DL (ref 65–140)
GLUCOSE SERPL-MCNC: 204 MG/DL (ref 65–140)
GLUCOSE SERPL-MCNC: 206 MG/DL (ref 65–140)
GLUCOSE SERPL-MCNC: 209 MG/DL (ref 65–140)
GLUCOSE SERPL-MCNC: 209 MG/DL (ref 65–140)
GLUCOSE SERPL-MCNC: 214 MG/DL (ref 65–140)
GLUCOSE SERPL-MCNC: 218 MG/DL (ref 65–140)
GLUCOSE SERPL-MCNC: 220 MG/DL (ref 65–140)
GLUCOSE SERPL-MCNC: 228 MG/DL (ref 65–140)
GLUCOSE SERPL-MCNC: 230 MG/DL (ref 65–140)
GLUCOSE SERPL-MCNC: 232 MG/DL (ref 65–140)
GLUCOSE SERPL-MCNC: 234 MG/DL (ref 65–140)
GLUCOSE SERPL-MCNC: 235 MG/DL (ref 65–140)
GLUCOSE SERPL-MCNC: 236 MG/DL (ref 65–140)
GLUCOSE SERPL-MCNC: 238 MG/DL (ref 65–140)
GLUCOSE SERPL-MCNC: 241 MG/DL (ref 65–140)
GLUCOSE SERPL-MCNC: 244 MG/DL (ref 65–140)
GLUCOSE SERPL-MCNC: 245 MG/DL (ref 65–140)
GLUCOSE SERPL-MCNC: 258 MG/DL (ref 65–140)
GLUCOSE SERPL-MCNC: 262 MG/DL (ref 65–140)
GLUCOSE SERPL-MCNC: 278 MG/DL (ref 65–140)
GLUCOSE SERPL-MCNC: 278 MG/DL (ref 65–140)
GLUCOSE SERPL-MCNC: 282 MG/DL (ref 65–140)
GLUCOSE SERPL-MCNC: 284 MG/DL (ref 65–140)
GLUCOSE SERPL-MCNC: 286 MG/DL (ref 65–140)
GLUCOSE SERPL-MCNC: 289 MG/DL (ref 65–140)
GLUCOSE SERPL-MCNC: 303 MG/DL (ref 65–140)
GLUCOSE SERPL-MCNC: 319 MG/DL (ref 65–140)
GLUCOSE SERPL-MCNC: 334 MG/DL (ref 65–140)
GLUCOSE SERPL-MCNC: 347 MG/DL (ref 65–140)
GLUCOSE SERPL-MCNC: 347 MG/DL (ref 65–140)
GLUCOSE SERPL-MCNC: 369 MG/DL (ref 65–140)
GLUCOSE SERPL-MCNC: 382 MG/DL (ref 65–140)
GLUCOSE SERPL-MCNC: 383 MG/DL (ref 65–140)
GLUCOSE SERPL-MCNC: 388 MG/DL (ref 65–140)
GLUCOSE SERPL-MCNC: 405 MG/DL (ref 65–140)
GLUCOSE SERPL-MCNC: 453 MG/DL (ref 65–140)
GLUCOSE SERPL-MCNC: 524 MG/DL (ref 65–140)
GLUCOSE SERPL-MCNC: 62 MG/DL (ref 65–140)
GLUCOSE SERPL-MCNC: 692 MG/DL (ref 65–140)
GLUCOSE SERPL-MCNC: 73 MG/DL (ref 65–140)
GLUCOSE SERPL-MCNC: 793 MG/DL (ref 65–140)
GLUCOSE SERPL-MCNC: 94 MG/DL (ref 65–140)
GLUCOSE SERPL-MCNC: 95 MG/DL (ref 65–140)
GLUCOSE SERPL-MCNC: 97 MG/DL (ref 65–140)
GLUCOSE SERPL-MCNC: >500 MG/DL (ref 65–140)
GLUCOSE UR STRIP-MCNC: ABNORMAL MG/DL
GLUCOSE UR STRIP-MCNC: NEGATIVE MG/DL
GRAM STN SPEC: ABNORMAL
HAV IGM SER QL: NORMAL
HBA1C MFR BLD: 12.2 %
HBV CORE IGM SER QL: NORMAL
HBV SURFACE AG SER QL: NORMAL
HCO3 BLDV-SCNC: 17.5 MMOL/L (ref 24–30)
HCO3 BLDV-SCNC: 18.2 MMOL/L (ref 24–30)
HCT VFR BLD AUTO: 16.7 % (ref 36.5–49.3)
HCT VFR BLD AUTO: 18 % (ref 36.5–49.3)
HCT VFR BLD AUTO: 20.1 % (ref 36.5–49.3)
HCT VFR BLD AUTO: 21.8 % (ref 36.5–49.3)
HCT VFR BLD AUTO: 22.2 % (ref 36.5–49.3)
HCT VFR BLD AUTO: 22.3 % (ref 36.5–49.3)
HCT VFR BLD AUTO: 22.6 % (ref 36.5–49.3)
HCT VFR BLD AUTO: 22.8 % (ref 36.5–49.3)
HCT VFR BLD AUTO: 23.4 % (ref 36.5–49.3)
HCT VFR BLD AUTO: 23.8 % (ref 36.5–49.3)
HCT VFR BLD AUTO: 24 % (ref 36.5–49.3)
HCT VFR BLD AUTO: 24.5 % (ref 36.5–49.3)
HCT VFR BLD AUTO: 24.6 % (ref 36.5–49.3)
HCT VFR BLD AUTO: 25 % (ref 36.5–49.3)
HCT VFR BLD AUTO: 25.2 % (ref 36.5–49.3)
HCT VFR BLD AUTO: 25.2 % (ref 36.5–49.3)
HCT VFR BLD AUTO: 25.3 % (ref 36.5–49.3)
HCT VFR BLD AUTO: 28.4 % (ref 36.5–49.3)
HCT VFR BLD AUTO: 39.1 % (ref 36.5–49.3)
HCV AB SER QL: NORMAL
HEMOCCULT STL QL: POSITIVE
HGB BLD-MCNC: 11.8 G/DL (ref 12–17)
HGB BLD-MCNC: 5.2 G/DL (ref 12–17)
HGB BLD-MCNC: 5.7 G/DL (ref 12–17)
HGB BLD-MCNC: 6.8 G/DL (ref 12–17)
HGB BLD-MCNC: 6.9 G/DL (ref 12–17)
HGB BLD-MCNC: 7 G/DL (ref 12–17)
HGB BLD-MCNC: 7.1 G/DL (ref 12–17)
HGB BLD-MCNC: 7.1 G/DL (ref 12–17)
HGB BLD-MCNC: 7.2 G/DL (ref 12–17)
HGB BLD-MCNC: 7.2 G/DL (ref 12–17)
HGB BLD-MCNC: 7.3 G/DL (ref 12–17)
HGB BLD-MCNC: 7.4 G/DL (ref 12–17)
HGB BLD-MCNC: 7.5 G/DL (ref 12–17)
HGB BLD-MCNC: 7.6 G/DL (ref 12–17)
HGB BLD-MCNC: 7.6 G/DL (ref 12–17)
HGB BLD-MCNC: 7.7 G/DL (ref 12–17)
HGB BLD-MCNC: 7.8 G/DL (ref 12–17)
HGB BLD-MCNC: 7.9 G/DL (ref 12–17)
HGB BLD-MCNC: 7.9 G/DL (ref 12–17)
HGB BLD-MCNC: 8 G/DL (ref 12–17)
HGB BLD-MCNC: 8.6 G/DL (ref 12–17)
HGB BLD-MCNC: 8.6 G/DL (ref 12–17)
HGB BLD-MCNC: 8.7 G/DL (ref 12–17)
HGB UR QL STRIP.AUTO: ABNORMAL
HGB UR QL STRIP.AUTO: ABNORMAL
HKHMA(MAX AMPLITUDE KAOLIN): >71 MM (ref 53–68)
HYPERCHROMIA BLD QL SMEAR: PRESENT
IMM GRANULOCYTES # BLD AUTO: 0.09 THOUSAND/UL (ref 0–0.2)
IMM GRANULOCYTES NFR BLD AUTO: 2 % (ref 0–2)
INR PPP: 1.38 (ref 0.84–1.19)
INR PPP: 1.43 (ref 0.84–1.19)
INR PPP: 1.45 (ref 0.84–1.19)
INR PPP: 1.45 (ref 0.84–1.19)
INR PPP: 1.9 (ref 0.84–1.19)
INR PPP: 2.24 (ref 0.84–1.19)
INR PPP: 7.44 (ref 0.84–1.19)
INR PPP: 7.59 (ref 0.84–1.19)
INTERVENTRICULAR SEPTUM IN DIASTOLE (PARASTERNAL SHORT AXIS VIEW): 1.1 CM
IRON SATN MFR SERPL: 31 % (ref 20–50)
IRON SERPL-MCNC: 53 UG/DL (ref 65–175)
KETONES UR STRIP-MCNC: ABNORMAL MG/DL
KETONES UR STRIP-MCNC: NEGATIVE MG/DL
LACTATE SERPL-SCNC: 0.6 MMOL/L (ref 0.5–2)
LACTATE SERPL-SCNC: 1.7 MMOL/L (ref 0.5–2)
LACTATE SERPL-SCNC: 2 MMOL/L (ref 0.5–2)
LACTATE SERPL-SCNC: 3.1 MMOL/L (ref 0.5–2)
LEFT ATRIUM AREA SYSTOLE SINGLE PLANE A4C: 20.3 CM2
LEFT INTERNAL DIMENSION IN SYSTOLE: 3.2 CM (ref 2.1–4)
LEFT VENTRICULAR INTERNAL DIMENSION IN DIASTOLE: 4.8 CM (ref 7.04–10.49)
LEFT VENTRICULAR POSTERIOR WALL IN END DIASTOLE: 1.2 CM
LEFT VENTRICULAR STROKE VOLUME: 65 ML
LEUKOCYTE ESTERASE UR QL STRIP: ABNORMAL
LEUKOCYTE ESTERASE UR QL STRIP: ABNORMAL
LG PLATELETS BLD QL SMEAR: PRESENT
LIPASE SERPL-CCNC: 141 U/L (ref 73–393)
LIPASE SERPL-CCNC: 564 U/L (ref 73–393)
LYMPHOCYTES # BLD AUTO: 0.23 THOUSAND/UL (ref 0.6–4.47)
LYMPHOCYTES # BLD AUTO: 0.34 THOUSAND/UL (ref 0.6–4.47)
LYMPHOCYTES # BLD AUTO: 0.52 THOUSAND/UL (ref 0.6–4.47)
LYMPHOCYTES # BLD AUTO: 0.65 THOUSAND/UL (ref 0.6–4.47)
LYMPHOCYTES # BLD AUTO: 0.65 THOUSAND/UL (ref 0.6–4.47)
LYMPHOCYTES # BLD AUTO: 0.68 THOUSAND/UL (ref 0.6–4.47)
LYMPHOCYTES # BLD AUTO: 0.82 THOUSANDS/ΜL (ref 0.6–4.47)
LYMPHOCYTES # BLD AUTO: 0.87 THOUSAND/UL (ref 0.6–4.47)
LYMPHOCYTES # BLD AUTO: 1.07 THOUSAND/UL (ref 0.6–4.47)
LYMPHOCYTES # BLD AUTO: 1.51 THOUSAND/UL (ref 0.6–4.47)
LYMPHOCYTES # BLD AUTO: 10 % (ref 14–44)
LYMPHOCYTES # BLD AUTO: 14 % (ref 14–44)
LYMPHOCYTES # BLD AUTO: 2 % (ref 14–44)
LYMPHOCYTES # BLD AUTO: 3 % (ref 14–44)
LYMPHOCYTES # BLD AUTO: 5 % (ref 14–44)
LYMPHOCYTES # BLD AUTO: 6 % (ref 14–44)
LYMPHOCYTES # BLD AUTO: 6 % (ref 14–44)
LYMPHOCYTES # BLD AUTO: 7 % (ref 14–44)
LYMPHOCYTES # BLD AUTO: 8 % (ref 14–44)
LYMPHOCYTES NFR BLD AUTO: 14 % (ref 14–44)
MAGNESIUM SERPL-MCNC: 1.5 MG/DL (ref 1.6–2.6)
MAGNESIUM SERPL-MCNC: 1.8 MG/DL (ref 1.6–2.6)
MAGNESIUM SERPL-MCNC: 1.9 MG/DL (ref 1.6–2.6)
MAGNESIUM SERPL-MCNC: 1.9 MG/DL (ref 1.6–2.6)
MAGNESIUM SERPL-MCNC: 2 MG/DL (ref 1.6–2.6)
MAGNESIUM SERPL-MCNC: 2.1 MG/DL (ref 1.6–2.6)
MAGNESIUM SERPL-MCNC: 2.1 MG/DL (ref 1.6–2.6)
MAGNESIUM SERPL-MCNC: 2.2 MG/DL (ref 1.6–2.6)
MAGNESIUM SERPL-MCNC: 2.3 MG/DL (ref 1.6–2.6)
MCH RBC QN AUTO: 26.6 PG (ref 26.8–34.3)
MCH RBC QN AUTO: 27.5 PG (ref 26.8–34.3)
MCH RBC QN AUTO: 27.6 PG (ref 26.8–34.3)
MCH RBC QN AUTO: 27.8 PG (ref 26.8–34.3)
MCH RBC QN AUTO: 28.1 PG (ref 26.8–34.3)
MCH RBC QN AUTO: 28.1 PG (ref 26.8–34.3)
MCH RBC QN AUTO: 28.7 PG (ref 26.8–34.3)
MCH RBC QN AUTO: 28.9 PG (ref 26.8–34.3)
MCH RBC QN AUTO: 29.1 PG (ref 26.8–34.3)
MCH RBC QN AUTO: 29.4 PG (ref 26.8–34.3)
MCH RBC QN AUTO: 29.5 PG (ref 26.8–34.3)
MCH RBC QN AUTO: 29.6 PG (ref 26.8–34.3)
MCH RBC QN AUTO: 29.7 PG (ref 26.8–34.3)
MCH RBC QN AUTO: 29.9 PG (ref 26.8–34.3)
MCH RBC QN AUTO: 30.1 PG (ref 26.8–34.3)
MCHC RBC AUTO-ENTMCNC: 30.1 G/DL (ref 31.4–37.4)
MCHC RBC AUTO-ENTMCNC: 30.2 G/DL (ref 31.4–37.4)
MCHC RBC AUTO-ENTMCNC: 30.2 G/DL (ref 31.4–37.4)
MCHC RBC AUTO-ENTMCNC: 30.5 G/DL (ref 31.4–37.4)
MCHC RBC AUTO-ENTMCNC: 30.8 G/DL (ref 31.4–37.4)
MCHC RBC AUTO-ENTMCNC: 31 G/DL (ref 31.4–37.4)
MCHC RBC AUTO-ENTMCNC: 31.1 G/DL (ref 31.4–37.4)
MCHC RBC AUTO-ENTMCNC: 31.2 G/DL (ref 31.4–37.4)
MCHC RBC AUTO-ENTMCNC: 31.2 G/DL (ref 31.4–37.4)
MCHC RBC AUTO-ENTMCNC: 31.4 G/DL (ref 31.4–37.4)
MCHC RBC AUTO-ENTMCNC: 31.7 G/DL (ref 31.4–37.4)
MCHC RBC AUTO-ENTMCNC: 31.8 G/DL (ref 31.4–37.4)
MCHC RBC AUTO-ENTMCNC: 32.3 G/DL (ref 31.4–37.4)
MCHC RBC AUTO-ENTMCNC: 32.4 G/DL (ref 31.4–37.4)
MCHC RBC AUTO-ENTMCNC: 33.8 G/DL (ref 31.4–37.4)
MCV RBC AUTO: 88 FL (ref 82–98)
MCV RBC AUTO: 89 FL (ref 82–98)
MCV RBC AUTO: 89 FL (ref 82–98)
MCV RBC AUTO: 90 FL (ref 82–98)
MCV RBC AUTO: 92 FL (ref 82–98)
MCV RBC AUTO: 92 FL (ref 82–98)
MCV RBC AUTO: 93 FL (ref 82–98)
MCV RBC AUTO: 93 FL (ref 82–98)
MCV RBC AUTO: 94 FL (ref 82–98)
MCV RBC AUTO: 94 FL (ref 82–98)
MCV RBC AUTO: 95 FL (ref 82–98)
MCV RBC AUTO: 95 FL (ref 82–98)
MCV RBC AUTO: 96 FL (ref 82–98)
METAMYELOCYTES NFR BLD MANUAL: 1 % (ref 0–1)
METAMYELOCYTES NFR BLD MANUAL: 2 % (ref 0–1)
METAMYELOCYTES NFR BLD MANUAL: 2 % (ref 0–1)
METAMYELOCYTES NFR BLD MANUAL: 3 % (ref 0–1)
METAMYELOCYTES NFR BLD MANUAL: 4 % (ref 0–1)
MONOCYTES # BLD AUTO: 0.31 THOUSAND/UL (ref 0–1.22)
MONOCYTES # BLD AUTO: 0.39 THOUSAND/UL (ref 0–1.22)
MONOCYTES # BLD AUTO: 0.39 THOUSAND/ΜL (ref 0.17–1.22)
MONOCYTES # BLD AUTO: 0.43 THOUSAND/UL (ref 0–1.22)
MONOCYTES # BLD AUTO: 0.43 THOUSAND/UL (ref 0–1.22)
MONOCYTES # BLD AUTO: 0.61 THOUSAND/UL (ref 0–1.22)
MONOCYTES # BLD AUTO: 0.67 THOUSAND/UL (ref 0–1.22)
MONOCYTES # BLD AUTO: 0.68 THOUSAND/UL (ref 0–1.22)
MONOCYTES # BLD AUTO: 0.76 THOUSAND/UL (ref 0–1.22)
MONOCYTES # BLD AUTO: 1.01 THOUSAND/UL (ref 0–1.22)
MONOCYTES NFR BLD AUTO: 7 % (ref 4–12)
MONOCYTES NFR BLD: 3 % (ref 4–12)
MONOCYTES NFR BLD: 4 % (ref 4–12)
MONOCYTES NFR BLD: 5 % (ref 4–12)
MONOCYTES NFR BLD: 6 % (ref 4–12)
MONOCYTES NFR BLD: 7 % (ref 4–12)
MONOCYTES NFR BLD: 7 % (ref 4–12)
MONOCYTES NFR BLD: 9 % (ref 4–12)
MRSA NOSE QL CULT: NORMAL
MUCOUS THREADS UR QL AUTO: ABNORMAL
MV E'TISSUE VEL-SEP: 10 CM/S
MV PEAK A VEL: 0.9 M/S
MV PEAK E VEL: 109 CM/S
MV STENOSIS PRESSURE HALF TIME: 0 MS
MYELOCYTES NFR BLD MANUAL: 1 % (ref 0–1)
MYELOCYTES NFR BLD MANUAL: 2 % (ref 0–1)
MYELOCYTES NFR BLD MANUAL: 2 % (ref 0–1)
MYELOCYTES NFR BLD MANUAL: 3 % (ref 0–1)
MYELOCYTES NFR BLD MANUAL: 4 % (ref 0–1)
MYELOCYTES NFR BLD MANUAL: 4 % (ref 0–1)
MYELOCYTES NFR BLD MANUAL: 6 % (ref 0–1)
MYELOPEROXIDASE AB SER IA-ACNC: <9 U/ML (ref 0–9)
NEUTROPHILS # BLD AUTO: 4.43 THOUSANDS/ΜL (ref 1.85–7.62)
NEUTROPHILS # BLD MANUAL: 10.13 THOUSAND/UL (ref 1.85–7.62)
NEUTROPHILS # BLD MANUAL: 11.21 THOUSAND/UL (ref 1.85–7.62)
NEUTROPHILS # BLD MANUAL: 7.06 THOUSAND/UL (ref 1.85–7.62)
NEUTROPHILS # BLD MANUAL: 7.67 THOUSAND/UL (ref 1.85–7.62)
NEUTROPHILS # BLD MANUAL: 8 THOUSAND/UL (ref 1.85–7.62)
NEUTROPHILS # BLD MANUAL: 8.32 THOUSAND/UL (ref 1.85–7.62)
NEUTROPHILS # BLD MANUAL: 8.38 THOUSAND/UL (ref 1.85–7.62)
NEUTROPHILS # BLD MANUAL: 8.82 THOUSAND/UL (ref 1.85–7.62)
NEUTROPHILS # BLD MANUAL: 9.3 THOUSAND/UL (ref 1.85–7.62)
NEUTS BAND NFR BLD MANUAL: 1 % (ref 0–8)
NEUTS BAND NFR BLD MANUAL: 1 % (ref 0–8)
NEUTS BAND NFR BLD MANUAL: 11 % (ref 0–8)
NEUTS BAND NFR BLD MANUAL: 3 % (ref 0–8)
NEUTS BAND NFR BLD MANUAL: 4 % (ref 0–8)
NEUTS BAND NFR BLD MANUAL: 6 % (ref 0–8)
NEUTS BAND NFR BLD MANUAL: 8 % (ref 0–8)
NEUTS BAND NFR BLD MANUAL: 9 % (ref 0–8)
NEUTS SEG NFR BLD AUTO: 65 % (ref 43–75)
NEUTS SEG NFR BLD AUTO: 70 % (ref 43–75)
NEUTS SEG NFR BLD AUTO: 74 % (ref 43–75)
NEUTS SEG NFR BLD AUTO: 75 % (ref 43–75)
NEUTS SEG NFR BLD AUTO: 78 % (ref 43–75)
NEUTS SEG NFR BLD AUTO: 81 % (ref 43–75)
NEUTS SEG NFR BLD AUTO: 82 % (ref 43–75)
NEUTS SEG NFR BLD AUTO: 88 % (ref 43–75)
NITRITE UR QL STRIP: ABNORMAL
NITRITE UR QL STRIP: NEGATIVE
NON-SQ EPI CELLS URNS QL MICRO: ABNORMAL /HPF
NON-SQ EPI CELLS URNS QL MICRO: ABNORMAL /HPF
NRBC BLD AUTO-RTO: 0 /100 WBCS
NRBC BLD AUTO-RTO: 1 /100 WBCS
O2 CT BLDV-SCNC: 5.1 ML/DL
O2 CT BLDV-SCNC: 7.6 ML/DL
OSMOLALITY UR/SERPL-RTO: 362 MMOL/KG (ref 282–298)
P AXIS: 43 DEGREES
P AXIS: 54 DEGREES
P-ANCA ATYPICAL TITR SER IF: NORMAL TITER
P-ANCA TITR SER IF: NORMAL TITER
PCO2 BLDV: 34.7 MM HG (ref 42–50)
PCO2 BLDV: 41.2 MM HG (ref 42–50)
PH BLDV: 7.25 [PH] (ref 7.3–7.4)
PH BLDV: 7.34 [PH] (ref 7.3–7.4)
PH UR STRIP.AUTO: 5.5 [PH]
PH UR STRIP.AUTO: 5.5 [PH]
PHOSPHATE SERPL-MCNC: 2.6 MG/DL (ref 2.3–4.1)
PHOSPHATE SERPL-MCNC: 3 MG/DL (ref 2.3–4.1)
PHOSPHATE SERPL-MCNC: 3.4 MG/DL (ref 2.3–4.1)
PHOSPHATE SERPL-MCNC: 3.5 MG/DL (ref 2.3–4.1)
PHOSPHATE SERPL-MCNC: 3.6 MG/DL (ref 2.3–4.1)
PHOSPHATE SERPL-MCNC: 3.6 MG/DL (ref 2.3–4.1)
PHOSPHATE SERPL-MCNC: 4.1 MG/DL (ref 2.3–4.1)
PHOSPHATE SERPL-MCNC: 4.1 MG/DL (ref 2.3–4.1)
PHOSPHATE SERPL-MCNC: 4.2 MG/DL (ref 2.3–4.1)
PHOSPHATE SERPL-MCNC: 4.3 MG/DL (ref 2.3–4.1)
PHOSPHATE SERPL-MCNC: 4.4 MG/DL (ref 2.3–4.1)
PHOSPHATE SERPL-MCNC: 4.7 MG/DL (ref 2.3–4.1)
PHOSPHATE SERPL-MCNC: 4.7 MG/DL (ref 2.3–4.1)
PHOSPHATE SERPL-MCNC: 5.1 MG/DL (ref 2.3–4.1)
PLATELET # BLD AUTO: 258 THOUSANDS/UL (ref 149–390)
PLATELET # BLD AUTO: 268 THOUSANDS/UL (ref 149–390)
PLATELET # BLD AUTO: 271 THOUSANDS/UL (ref 149–390)
PLATELET # BLD AUTO: 287 THOUSANDS/UL (ref 149–390)
PLATELET # BLD AUTO: 292 THOUSANDS/UL (ref 149–390)
PLATELET # BLD AUTO: 332 THOUSANDS/UL (ref 149–390)
PLATELET # BLD AUTO: 384 THOUSANDS/UL (ref 149–390)
PLATELET # BLD AUTO: 404 THOUSANDS/UL (ref 149–390)
PLATELET # BLD AUTO: 411 THOUSANDS/UL (ref 149–390)
PLATELET # BLD AUTO: 433 THOUSANDS/UL (ref 149–390)
PLATELET # BLD AUTO: 458 THOUSANDS/UL (ref 149–390)
PLATELET # BLD AUTO: 464 THOUSANDS/UL (ref 149–390)
PLATELET # BLD AUTO: 493 THOUSANDS/UL (ref 149–390)
PLATELET # BLD AUTO: 502 THOUSANDS/UL (ref 149–390)
PLATELET # BLD AUTO: 506 THOUSANDS/UL (ref 149–390)
PLATELET BLD QL SMEAR: ABNORMAL
PLATELET BLD QL SMEAR: ADEQUATE
PMV BLD AUTO: 10 FL (ref 8.9–12.7)
PMV BLD AUTO: 10.2 FL (ref 8.9–12.7)
PMV BLD AUTO: 9.1 FL (ref 8.9–12.7)
PMV BLD AUTO: 9.3 FL (ref 8.9–12.7)
PMV BLD AUTO: 9.4 FL (ref 8.9–12.7)
PMV BLD AUTO: 9.5 FL (ref 8.9–12.7)
PMV BLD AUTO: 9.6 FL (ref 8.9–12.7)
PMV BLD AUTO: 9.7 FL (ref 8.9–12.7)
PMV BLD AUTO: 9.7 FL (ref 8.9–12.7)
PMV BLD AUTO: 9.8 FL (ref 8.9–12.7)
PMV BLD AUTO: 9.9 FL (ref 8.9–12.7)
PO2 BLDV: 30.9 MM HG (ref 35–45)
PO2 BLDV: 38.5 MM HG (ref 35–45)
POIKILOCYTOSIS BLD QL SMEAR: PRESENT
POLYCHROMASIA BLD QL SMEAR: PRESENT
POTASSIUM SERPL-SCNC: 4.1 MMOL/L (ref 3.5–5.3)
POTASSIUM SERPL-SCNC: 4.2 MMOL/L (ref 3.5–5.3)
POTASSIUM SERPL-SCNC: 4.3 MMOL/L (ref 3.5–5.3)
POTASSIUM SERPL-SCNC: 4.4 MMOL/L (ref 3.5–5.3)
POTASSIUM SERPL-SCNC: 4.5 MMOL/L (ref 3.5–5.3)
POTASSIUM SERPL-SCNC: 4.6 MMOL/L (ref 3.5–5.3)
POTASSIUM SERPL-SCNC: 4.8 MMOL/L (ref 3.5–5.3)
POTASSIUM SERPL-SCNC: 4.9 MMOL/L (ref 3.5–5.3)
POTASSIUM SERPL-SCNC: 5 MMOL/L (ref 3.5–5.3)
POTASSIUM SERPL-SCNC: 5.1 MMOL/L (ref 3.5–5.3)
POTASSIUM SERPL-SCNC: 5.2 MMOL/L (ref 3.5–5.3)
POTASSIUM SERPL-SCNC: 5.3 MMOL/L (ref 3.5–5.3)
POTASSIUM SERPL-SCNC: 5.5 MMOL/L (ref 3.5–5.3)
POTASSIUM SERPL-SCNC: 5.6 MMOL/L (ref 3.5–5.3)
POTASSIUM SERPL-SCNC: 6.5 MMOL/L (ref 3.5–5.3)
PR INTERVAL: 154 MS
PR INTERVAL: 160 MS
PROCALCITONIN SERPL-MCNC: 1.06 NG/ML
PROT SERPL-MCNC: 5.2 G/DL (ref 6.4–8.2)
PROT SERPL-MCNC: 5.5 G/DL (ref 6.4–8.2)
PROT SERPL-MCNC: 5.7 G/DL (ref 6.4–8.2)
PROT SERPL-MCNC: 5.8 G/DL (ref 6.4–8.2)
PROT SERPL-MCNC: 5.8 G/DL (ref 6.4–8.2)
PROT SERPL-MCNC: 6.3 G/DL (ref 6.4–8.2)
PROT SERPL-MCNC: 6.4 G/DL (ref 6.4–8.2)
PROT SERPL-MCNC: 6.5 G/DL (ref 6.4–8.2)
PROT SERPL-MCNC: 6.6 G/DL (ref 6.4–8.2)
PROT SERPL-MCNC: 6.8 G/DL (ref 6.4–8.2)
PROT SERPL-MCNC: 6.9 G/DL (ref 6.4–8.2)
PROT SERPL-MCNC: 7.8 G/DL (ref 6.4–8.2)
PROT UR STRIP-MCNC: ABNORMAL MG/DL
PROT UR STRIP-MCNC: ABNORMAL MG/DL
PROTEINASE3 AB SER IA-ACNC: <3.5 U/ML (ref 0–3.5)
PROTHROMBIN TIME: 16.8 SECONDS (ref 11.6–14.5)
PROTHROMBIN TIME: 17.3 SECONDS (ref 11.6–14.5)
PROTHROMBIN TIME: 17.5 SECONDS (ref 11.6–14.5)
PROTHROMBIN TIME: 17.5 SECONDS (ref 11.6–14.5)
PROTHROMBIN TIME: 21.6 SECONDS (ref 11.6–14.5)
PROTHROMBIN TIME: 24.8 SECONDS (ref 11.6–14.5)
PROTHROMBIN TIME: 61.1 SECONDS (ref 11.6–14.5)
PROTHROMBIN TIME: 62 SECONDS (ref 11.6–14.5)
QRS AXIS: -14 DEGREES
QRS AXIS: -6 DEGREES
QRSD INTERVAL: 102 MS
QRSD INTERVAL: 86 MS
QT INTERVAL: 372 MS
QT INTERVAL: 422 MS
QTC INTERVAL: 427 MS
QTC INTERVAL: 438 MS
RBC # BLD AUTO: 1.85 MILLION/UL (ref 3.88–5.62)
RBC # BLD AUTO: 2.03 MILLION/UL (ref 3.88–5.62)
RBC # BLD AUTO: 2.26 MILLION/UL (ref 3.88–5.62)
RBC # BLD AUTO: 2.34 MILLION/UL (ref 3.88–5.62)
RBC # BLD AUTO: 2.4 MILLION/UL (ref 3.88–5.62)
RBC # BLD AUTO: 2.44 MILLION/UL (ref 3.88–5.62)
RBC # BLD AUTO: 2.47 MILLION/UL (ref 3.88–5.62)
RBC # BLD AUTO: 2.52 MILLION/UL (ref 3.88–5.62)
RBC # BLD AUTO: 2.54 MILLION/UL (ref 3.88–5.62)
RBC # BLD AUTO: 2.58 MILLION/UL (ref 3.88–5.62)
RBC # BLD AUTO: 2.59 MILLION/UL (ref 3.88–5.62)
RBC # BLD AUTO: 2.64 MILLION/UL (ref 3.88–5.62)
RBC # BLD AUTO: 2.66 MILLION/UL (ref 3.88–5.62)
RBC # BLD AUTO: 2.69 MILLION/UL (ref 3.88–5.62)
RBC # BLD AUTO: 4.24 MILLION/UL (ref 3.88–5.62)
RBC #/AREA URNS AUTO: ABNORMAL /HPF
RBC #/AREA URNS AUTO: ABNORMAL /HPF
RBC MORPH BLD: PRESENT
RH BLD: POSITIVE
RH BLD: POSITIVE
RIGHT ATRIUM AREA SYSTOLE A4C: 13.3 CM2
RIGHT VENTRICLE ID DIMENSION: 3.7 CM
RSV RNA RESP QL NAA+PROBE: NEGATIVE
SARS-COV-2 RNA RESP QL NAA+PROBE: NEGATIVE
SL CV LV EF: 60
SL CV PED ECHO LEFT VENTRICLE DIASTOLIC VOLUME (MOD BIPLANE) 2D: 107 ML
SL CV PED ECHO LEFT VENTRICLE SYSTOLIC VOLUME (MOD BIPLANE) 2D: 42 ML
SODIUM SERPL-SCNC: 125 MMOL/L (ref 136–145)
SODIUM SERPL-SCNC: 131 MMOL/L (ref 136–145)
SODIUM SERPL-SCNC: 138 MMOL/L (ref 136–145)
SODIUM SERPL-SCNC: 138 MMOL/L (ref 136–145)
SODIUM SERPL-SCNC: 139 MMOL/L (ref 136–145)
SODIUM SERPL-SCNC: 140 MMOL/L (ref 136–145)
SODIUM SERPL-SCNC: 141 MMOL/L (ref 136–145)
SODIUM SERPL-SCNC: 141 MMOL/L (ref 136–145)
SODIUM SERPL-SCNC: 142 MMOL/L (ref 136–145)
SODIUM SERPL-SCNC: 143 MMOL/L (ref 136–145)
SODIUM SERPL-SCNC: 144 MMOL/L (ref 136–145)
SODIUM SERPL-SCNC: 145 MMOL/L (ref 136–145)
SP GR UR STRIP.AUTO: 1.02 (ref 1–1.03)
SP GR UR STRIP.AUTO: >=1.03 (ref 1–1.03)
SPECIMEN EXPIRATION DATE: NORMAL
SPECIMEN EXPIRATION DATE: NORMAL
T WAVE AXIS: 78 DEGREES
T WAVE AXIS: 91 DEGREES
TIBC SERPL-MCNC: 171 UG/DL (ref 250–450)
TRICUSPID VALVE S': 1.1 CM/S
TROPONIN I SERPL-MCNC: <0.02 NG/ML
UNIT DISPENSE STATUS: NORMAL
UNIT PRODUCT CODE: NORMAL
UNIT PRODUCT VOLUME: 195 ML
UNIT PRODUCT VOLUME: 200 ML
UNIT PRODUCT VOLUME: 300 ML
UNIT PRODUCT VOLUME: 350 ML
UNIT RH: NORMAL
UROBILINOGEN UR QL STRIP.AUTO: 0.2 E.U./DL
UROBILINOGEN UR QL STRIP.AUTO: ABNORMAL E.U./DL
VANCOMYCIN SERPL-MCNC: 16.8 UG/ML
VANCOMYCIN SERPL-MCNC: 21.8 UG/ML
VARIANT LYMPHS # BLD AUTO: 1 %
VENTRICULAR RATE: 65 BPM
VENTRICULAR RATE: 79 BPM
WBC # BLD AUTO: 10.1 THOUSAND/UL (ref 4.31–10.16)
WBC # BLD AUTO: 10.35 THOUSAND/UL (ref 4.31–10.16)
WBC # BLD AUTO: 10.38 THOUSAND/UL (ref 4.31–10.16)
WBC # BLD AUTO: 10.44 THOUSAND/UL (ref 4.31–10.16)
WBC # BLD AUTO: 10.75 THOUSAND/UL (ref 4.31–10.16)
WBC # BLD AUTO: 10.8 THOUSAND/UL (ref 4.31–10.16)
WBC # BLD AUTO: 10.81 THOUSAND/UL (ref 4.31–10.16)
WBC # BLD AUTO: 11.21 THOUSAND/UL (ref 4.31–10.16)
WBC # BLD AUTO: 11.38 THOUSAND/UL (ref 4.31–10.16)
WBC # BLD AUTO: 13.35 THOUSAND/UL (ref 4.31–10.16)
WBC # BLD AUTO: 14.18 THOUSAND/UL (ref 4.31–10.16)
WBC # BLD AUTO: 15.91 THOUSAND/UL (ref 4.31–10.16)
WBC # BLD AUTO: 5.92 THOUSAND/UL (ref 4.31–10.16)
WBC # BLD AUTO: 8.72 THOUSAND/UL (ref 4.31–10.16)
WBC # BLD AUTO: 9.71 THOUSAND/UL (ref 4.31–10.16)
WBC #/AREA URNS AUTO: ABNORMAL /HPF
WBC #/AREA URNS AUTO: ABNORMAL /HPF
Z-SCORE OF LEFT VENTRICULAR DIMENSION IN END SYSTOLE: -5.82

## 2021-01-01 PROCEDURE — 74176 CT ABD & PELVIS W/O CONTRAST: CPT

## 2021-01-01 PROCEDURE — 73590 X-RAY EXAM OF LOWER LEG: CPT

## 2021-01-01 PROCEDURE — 85018 HEMOGLOBIN: CPT | Performed by: PHYSICIAN ASSISTANT

## 2021-01-01 PROCEDURE — 87070 CULTURE OTHR SPECIMN AEROBIC: CPT | Performed by: INTERNAL MEDICINE

## 2021-01-01 PROCEDURE — 85018 HEMOGLOBIN: CPT | Performed by: FAMILY MEDICINE

## 2021-01-01 PROCEDURE — 83605 ASSAY OF LACTIC ACID: CPT | Performed by: PHYSICIAN ASSISTANT

## 2021-01-01 PROCEDURE — 93306 TTE W/DOPPLER COMPLETE: CPT

## 2021-01-01 PROCEDURE — 80048 BASIC METABOLIC PNL TOTAL CA: CPT | Performed by: FAMILY MEDICINE

## 2021-01-01 PROCEDURE — 83735 ASSAY OF MAGNESIUM: CPT | Performed by: INTERNAL MEDICINE

## 2021-01-01 PROCEDURE — 80202 ASSAY OF VANCOMYCIN: CPT | Performed by: PHYSICIAN ASSISTANT

## 2021-01-01 PROCEDURE — 99232 SBSQ HOSP IP/OBS MODERATE 35: CPT | Performed by: STUDENT IN AN ORGANIZED HEALTH CARE EDUCATION/TRAINING PROGRAM

## 2021-01-01 PROCEDURE — 87186 SC STD MICRODIL/AGAR DIL: CPT

## 2021-01-01 PROCEDURE — 99285 EMERGENCY DEPT VISIT HI MDM: CPT | Performed by: EMERGENCY MEDICINE

## 2021-01-01 PROCEDURE — 85027 COMPLETE CBC AUTOMATED: CPT | Performed by: PHYSICIAN ASSISTANT

## 2021-01-01 PROCEDURE — 86900 BLOOD TYPING SEROLOGIC ABO: CPT

## 2021-01-01 PROCEDURE — 82805 BLOOD GASES W/O2 SATURATION: CPT

## 2021-01-01 PROCEDURE — 81001 URINALYSIS AUTO W/SCOPE: CPT | Performed by: NURSE PRACTITIONER

## 2021-01-01 PROCEDURE — 84100 ASSAY OF PHOSPHORUS: CPT | Performed by: INTERNAL MEDICINE

## 2021-01-01 PROCEDURE — 87205 SMEAR GRAM STAIN: CPT | Performed by: INTERNAL MEDICINE

## 2021-01-01 PROCEDURE — 36415 COLL VENOUS BLD VENIPUNCTURE: CPT | Performed by: EMERGENCY MEDICINE

## 2021-01-01 PROCEDURE — 99232 SBSQ HOSP IP/OBS MODERATE 35: CPT | Performed by: INTERNAL MEDICINE

## 2021-01-01 PROCEDURE — 82272 OCCULT BLD FECES 1-3 TESTS: CPT | Performed by: PHYSICIAN ASSISTANT

## 2021-01-01 PROCEDURE — 85025 COMPLETE CBC W/AUTO DIFF WBC: CPT | Performed by: EMERGENCY MEDICINE

## 2021-01-01 PROCEDURE — P9016 RBC LEUKOCYTES REDUCED: HCPCS

## 2021-01-01 PROCEDURE — 85610 PROTHROMBIN TIME: CPT | Performed by: PHYSICIAN ASSISTANT

## 2021-01-01 PROCEDURE — 85610 PROTHROMBIN TIME: CPT | Performed by: INTERNAL MEDICINE

## 2021-01-01 PROCEDURE — 85610 PROTHROMBIN TIME: CPT | Performed by: EMERGENCY MEDICINE

## 2021-01-01 PROCEDURE — 86922 COMPATIBILITY TEST ANTIGLOB: CPT

## 2021-01-01 PROCEDURE — 83036 HEMOGLOBIN GLYCOSYLATED A1C: CPT | Performed by: INTERNAL MEDICINE

## 2021-01-01 PROCEDURE — 99232 SBSQ HOSP IP/OBS MODERATE 35: CPT | Performed by: NURSE PRACTITIONER

## 2021-01-01 PROCEDURE — 84484 ASSAY OF TROPONIN QUANT: CPT | Performed by: EMERGENCY MEDICINE

## 2021-01-01 PROCEDURE — 82550 ASSAY OF CK (CPK): CPT | Performed by: NURSE PRACTITIONER

## 2021-01-01 PROCEDURE — 82948 REAGENT STRIP/BLOOD GLUCOSE: CPT

## 2021-01-01 PROCEDURE — 99223 1ST HOSP IP/OBS HIGH 75: CPT | Performed by: INTERNAL MEDICINE

## 2021-01-01 PROCEDURE — C1760 CLOSURE DEV, VASC: HCPCS

## 2021-01-01 PROCEDURE — 85007 BL SMEAR W/DIFF WBC COUNT: CPT | Performed by: FAMILY MEDICINE

## 2021-01-01 PROCEDURE — 91300 SARS-COV-2 / COVID-19 MRNA VACCINE (PFIZER-BIONTECH) 30 MCG: CPT

## 2021-01-01 PROCEDURE — 93010 ELECTROCARDIOGRAM REPORT: CPT | Performed by: INTERNAL MEDICINE

## 2021-01-01 PROCEDURE — 85027 COMPLETE CBC AUTOMATED: CPT | Performed by: INTERNAL MEDICINE

## 2021-01-01 PROCEDURE — 93971 EXTREMITY STUDY: CPT

## 2021-01-01 PROCEDURE — G1004 CDSM NDSC: HCPCS

## 2021-01-01 PROCEDURE — 86901 BLOOD TYPING SEROLOGIC RH(D): CPT | Performed by: INTERNAL MEDICINE

## 2021-01-01 PROCEDURE — 80053 COMPREHEN METABOLIC PANEL: CPT

## 2021-01-01 PROCEDURE — 85730 THROMBOPLASTIN TIME PARTIAL: CPT

## 2021-01-01 PROCEDURE — 87186 SC STD MICRODIL/AGAR DIL: CPT | Performed by: INTERNAL MEDICINE

## 2021-01-01 PROCEDURE — 80053 COMPREHEN METABOLIC PANEL: CPT | Performed by: PHYSICIAN ASSISTANT

## 2021-01-01 PROCEDURE — 97163 PT EVAL HIGH COMPLEX 45 MIN: CPT

## 2021-01-01 PROCEDURE — 82105 ALPHA-FETOPROTEIN SERUM: CPT | Performed by: INTERNAL MEDICINE

## 2021-01-01 PROCEDURE — 99233 SBSQ HOSP IP/OBS HIGH 50: CPT | Performed by: INTERNAL MEDICINE

## 2021-01-01 PROCEDURE — 85014 HEMATOCRIT: CPT | Performed by: FAMILY MEDICINE

## 2021-01-01 PROCEDURE — 99223 1ST HOSP IP/OBS HIGH 75: CPT | Performed by: NURSE PRACTITIONER

## 2021-01-01 PROCEDURE — 86902 BLOOD TYPE ANTIGEN DONOR EA: CPT

## 2021-01-01 PROCEDURE — NC001 PR NO CHARGE: Performed by: INTERNAL MEDICINE

## 2021-01-01 PROCEDURE — 87040 BLOOD CULTURE FOR BACTERIA: CPT

## 2021-01-01 PROCEDURE — 93971 EXTREMITY STUDY: CPT | Performed by: SURGERY

## 2021-01-01 PROCEDURE — 86850 RBC ANTIBODY SCREEN: CPT

## 2021-01-01 PROCEDURE — 85027 COMPLETE CBC AUTOMATED: CPT | Performed by: NURSE PRACTITIONER

## 2021-01-01 PROCEDURE — 93005 ELECTROCARDIOGRAM TRACING: CPT

## 2021-01-01 PROCEDURE — C9113 INJ PANTOPRAZOLE SODIUM, VIA: HCPCS | Performed by: PHYSICIAN ASSISTANT

## 2021-01-01 PROCEDURE — 85730 THROMBOPLASTIN TIME PARTIAL: CPT | Performed by: INTERNAL MEDICINE

## 2021-01-01 PROCEDURE — 80076 HEPATIC FUNCTION PANEL: CPT | Performed by: PHYSICIAN ASSISTANT

## 2021-01-01 PROCEDURE — 82728 ASSAY OF FERRITIN: CPT

## 2021-01-01 PROCEDURE — 83930 ASSAY OF BLOOD OSMOLALITY: CPT | Performed by: INTERNAL MEDICINE

## 2021-01-01 PROCEDURE — 80074 ACUTE HEPATITIS PANEL: CPT | Performed by: NURSE PRACTITIONER

## 2021-01-01 PROCEDURE — 80048 BASIC METABOLIC PNL TOTAL CA: CPT | Performed by: INTERNAL MEDICINE

## 2021-01-01 PROCEDURE — 97110 THERAPEUTIC EXERCISES: CPT

## 2021-01-01 PROCEDURE — 85610 PROTHROMBIN TIME: CPT | Performed by: FAMILY MEDICINE

## 2021-01-01 PROCEDURE — 97167 OT EVAL HIGH COMPLEX 60 MIN: CPT

## 2021-01-01 PROCEDURE — 86921 COMPATIBILITY TEST INCUBATE: CPT

## 2021-01-01 PROCEDURE — 85007 BL SMEAR W/DIFF WBC COUNT: CPT | Performed by: INTERNAL MEDICINE

## 2021-01-01 PROCEDURE — 85576 BLOOD PLATELET AGGREGATION: CPT | Performed by: PHYSICIAN ASSISTANT

## 2021-01-01 PROCEDURE — 84100 ASSAY OF PHOSPHORUS: CPT | Performed by: PHYSICIAN ASSISTANT

## 2021-01-01 PROCEDURE — 74175 CTA ABDOMEN W/CONTRAST: CPT

## 2021-01-01 PROCEDURE — 99231 SBSQ HOSP IP/OBS SF/LOW 25: CPT | Performed by: INTERNAL MEDICINE

## 2021-01-01 PROCEDURE — 85027 COMPLETE CBC AUTOMATED: CPT

## 2021-01-01 PROCEDURE — 36253 INS CATH REN ART 2ND+ UNILAT: CPT | Performed by: RADIOLOGY

## 2021-01-01 PROCEDURE — 82330 ASSAY OF CALCIUM: CPT | Performed by: INTERNAL MEDICINE

## 2021-01-01 PROCEDURE — 82330 ASSAY OF CALCIUM: CPT | Performed by: PHYSICIAN ASSISTANT

## 2021-01-01 PROCEDURE — 85007 BL SMEAR W/DIFF WBC COUNT: CPT | Performed by: PHYSICIAN ASSISTANT

## 2021-01-01 PROCEDURE — 85384 FIBRINOGEN ACTIVITY: CPT | Performed by: PHYSICIAN ASSISTANT

## 2021-01-01 PROCEDURE — 96375 TX/PRO/DX INJ NEW DRUG ADDON: CPT

## 2021-01-01 PROCEDURE — NC001 PR NO CHARGE: Performed by: RADIOLOGY

## 2021-01-01 PROCEDURE — 99232 SBSQ HOSP IP/OBS MODERATE 35: CPT | Performed by: SURGERY

## 2021-01-01 PROCEDURE — 85007 BL SMEAR W/DIFF WBC COUNT: CPT

## 2021-01-01 PROCEDURE — 86147 CARDIOLIPIN ANTIBODY EA IG: CPT | Performed by: NURSE PRACTITIONER

## 2021-01-01 PROCEDURE — 86901 BLOOD TYPING SEROLOGIC RH(D): CPT

## 2021-01-01 PROCEDURE — 85610 PROTHROMBIN TIME: CPT

## 2021-01-01 PROCEDURE — B418YZZ FLUOROSCOPY OF BILATERAL RENAL ARTERIES USING OTHER CONTRAST: ICD-10-PCS | Performed by: RADIOLOGY

## 2021-01-01 PROCEDURE — 87081 CULTURE SCREEN ONLY: CPT | Performed by: INTERNAL MEDICINE

## 2021-01-01 PROCEDURE — 82947 ASSAY GLUCOSE BLOOD QUANT: CPT | Performed by: INTERNAL MEDICINE

## 2021-01-01 PROCEDURE — 96361 HYDRATE IV INFUSION ADD-ON: CPT

## 2021-01-01 PROCEDURE — 76705 ECHO EXAM OF ABDOMEN: CPT

## 2021-01-01 PROCEDURE — 76937 US GUIDE VASCULAR ACCESS: CPT

## 2021-01-01 PROCEDURE — 81001 URINALYSIS AUTO W/SCOPE: CPT

## 2021-01-01 PROCEDURE — 36415 COLL VENOUS BLD VENIPUNCTURE: CPT

## 2021-01-01 PROCEDURE — 70450 CT HEAD/BRAIN W/O DYE: CPT

## 2021-01-01 PROCEDURE — 80048 BASIC METABOLIC PNL TOTAL CA: CPT | Performed by: PHYSICIAN ASSISTANT

## 2021-01-01 PROCEDURE — 99222 1ST HOSP IP/OBS MODERATE 55: CPT | Performed by: INTERNAL MEDICINE

## 2021-01-01 PROCEDURE — 30233N1 TRANSFUSION OF NONAUTOLOGOUS RED BLOOD CELLS INTO PERIPHERAL VEIN, PERCUTANEOUS APPROACH: ICD-10-PCS | Performed by: INTERNAL MEDICINE

## 2021-01-01 PROCEDURE — 83735 ASSAY OF MAGNESIUM: CPT | Performed by: PHYSICIAN ASSISTANT

## 2021-01-01 PROCEDURE — 99221 1ST HOSP IP/OBS SF/LOW 40: CPT | Performed by: SURGERY

## 2021-01-01 PROCEDURE — 85347 COAGULATION TIME ACTIVATED: CPT | Performed by: PHYSICIAN ASSISTANT

## 2021-01-01 PROCEDURE — 05HM33Z INSERTION OF INFUSION DEVICE INTO RIGHT INTERNAL JUGULAR VEIN, PERCUTANEOUS APPROACH: ICD-10-PCS | Performed by: RADIOLOGY

## 2021-01-01 PROCEDURE — C1769 GUIDE WIRE: HCPCS

## 2021-01-01 PROCEDURE — 99291 CRITICAL CARE FIRST HOUR: CPT | Performed by: EMERGENCY MEDICINE

## 2021-01-01 PROCEDURE — 99291 CRITICAL CARE FIRST HOUR: CPT | Performed by: INTERNAL MEDICINE

## 2021-01-01 PROCEDURE — 85018 HEMOGLOBIN: CPT | Performed by: INTERNAL MEDICINE

## 2021-01-01 PROCEDURE — 80053 COMPREHEN METABOLIC PANEL: CPT | Performed by: EMERGENCY MEDICINE

## 2021-01-01 PROCEDURE — 83605 ASSAY OF LACTIC ACID: CPT

## 2021-01-01 PROCEDURE — 86850 RBC ANTIBODY SCREEN: CPT | Performed by: INTERNAL MEDICINE

## 2021-01-01 PROCEDURE — 77001 FLUOROGUIDE FOR VEIN DEVICE: CPT | Performed by: RADIOLOGY

## 2021-01-01 PROCEDURE — 86140 C-REACTIVE PROTEIN: CPT | Performed by: INTERNAL MEDICINE

## 2021-01-01 PROCEDURE — 77001 FLUOROGUIDE FOR VEIN DEVICE: CPT

## 2021-01-01 PROCEDURE — 0002A SARS-COV-2 / COVID-19 MRNA VACCINE (PFIZER-BIONTECH) 30 MCG: CPT

## 2021-01-01 PROCEDURE — 84145 PROCALCITONIN (PCT): CPT | Performed by: PHYSICIAN ASSISTANT

## 2021-01-01 PROCEDURE — 99285 EMERGENCY DEPT VISIT HI MDM: CPT

## 2021-01-01 PROCEDURE — 85652 RBC SED RATE AUTOMATED: CPT | Performed by: INTERNAL MEDICINE

## 2021-01-01 PROCEDURE — 86870 RBC ANTIBODY IDENTIFICATION: CPT | Performed by: INTERNAL MEDICINE

## 2021-01-01 PROCEDURE — 97530 THERAPEUTIC ACTIVITIES: CPT

## 2021-01-01 PROCEDURE — 80143 DRUG ASSAY ACETAMINOPHEN: CPT | Performed by: NURSE PRACTITIONER

## 2021-01-01 PROCEDURE — 30233K1 TRANSFUSION OF NONAUTOLOGOUS FROZEN PLASMA INTO PERIPHERAL VEIN, PERCUTANEOUS APPROACH: ICD-10-PCS | Performed by: INTERNAL MEDICINE

## 2021-01-01 PROCEDURE — 93306 TTE W/DOPPLER COMPLETE: CPT | Performed by: INTERNAL MEDICINE

## 2021-01-01 PROCEDURE — 82805 BLOOD GASES W/O2 SATURATION: CPT | Performed by: PHYSICIAN ASSISTANT

## 2021-01-01 PROCEDURE — 83690 ASSAY OF LIPASE: CPT | Performed by: EMERGENCY MEDICINE

## 2021-01-01 PROCEDURE — 87040 BLOOD CULTURE FOR BACTERIA: CPT | Performed by: NURSE PRACTITIONER

## 2021-01-01 PROCEDURE — 83540 ASSAY OF IRON: CPT

## 2021-01-01 PROCEDURE — 96374 THER/PROPH/DIAG INJ IV PUSH: CPT

## 2021-01-01 PROCEDURE — 96365 THER/PROPH/DIAG IV INF INIT: CPT

## 2021-01-01 PROCEDURE — 36558 INSERT TUNNELED CV CATH: CPT

## 2021-01-01 PROCEDURE — 83605 ASSAY OF LACTIC ACID: CPT | Performed by: INTERNAL MEDICINE

## 2021-01-01 PROCEDURE — 99152 MOD SED SAME PHYS/QHP 5/>YRS: CPT | Performed by: RADIOLOGY

## 2021-01-01 PROCEDURE — 36251 INS CATH REN ART 1ST UNILAT: CPT

## 2021-01-01 PROCEDURE — 80053 COMPREHEN METABOLIC PANEL: CPT | Performed by: INTERNAL MEDICINE

## 2021-01-01 PROCEDURE — 99239 HOSP IP/OBS DSCHRG MGMT >30: CPT | Performed by: INTERNAL MEDICINE

## 2021-01-01 PROCEDURE — 85397 CLOTTING FUNCT ACTIVITY: CPT | Performed by: PHYSICIAN ASSISTANT

## 2021-01-01 PROCEDURE — 83550 IRON BINDING TEST: CPT

## 2021-01-01 PROCEDURE — 80076 HEPATIC FUNCTION PANEL: CPT | Performed by: INTERNAL MEDICINE

## 2021-01-01 PROCEDURE — 86900 BLOOD TYPING SEROLOGIC ABO: CPT | Performed by: INTERNAL MEDICINE

## 2021-01-01 PROCEDURE — 85027 COMPLETE CBC AUTOMATED: CPT | Performed by: FAMILY MEDICINE

## 2021-01-01 PROCEDURE — 36556 INSERT NON-TUNNEL CV CATH: CPT | Performed by: RADIOLOGY

## 2021-01-01 PROCEDURE — 76937 US GUIDE VASCULAR ACCESS: CPT | Performed by: RADIOLOGY

## 2021-01-01 PROCEDURE — 0241U HB NFCT DS VIR RESP RNA 4 TRGT: CPT | Performed by: PHYSICIAN ASSISTANT

## 2021-01-01 PROCEDURE — 71045 X-RAY EXAM CHEST 1 VIEW: CPT

## 2021-01-01 PROCEDURE — 0001A SARS-COV-2 / COVID-19 MRNA VACCINE (PFIZER-BIONTECH) 30 MCG: CPT

## 2021-01-01 PROCEDURE — 82010 KETONE BODYS QUAN: CPT

## 2021-01-01 PROCEDURE — C1752 CATH,HEMODIALYSIS,SHORT-TERM: HCPCS

## 2021-01-01 PROCEDURE — 83690 ASSAY OF LIPASE: CPT

## 2021-01-01 PROCEDURE — 85730 THROMBOPLASTIN TIME PARTIAL: CPT | Performed by: FAMILY MEDICINE

## 2021-01-01 PROCEDURE — 86255 FLUORESCENT ANTIBODY SCREEN: CPT | Performed by: NURSE PRACTITIONER

## 2021-01-01 RX ORDER — POLYETHYLENE GLYCOL 3350 17 G/17G
17 POWDER, FOR SOLUTION ORAL DAILY PRN
Status: DISCONTINUED | OUTPATIENT
Start: 2021-01-01 | End: 2021-01-01 | Stop reason: HOSPADM

## 2021-01-01 RX ORDER — SODIUM CHLORIDE, SODIUM GLUCONATE, SODIUM ACETATE, POTASSIUM CHLORIDE, MAGNESIUM CHLORIDE, SODIUM PHOSPHATE, DIBASIC, AND POTASSIUM PHOSPHATE .53; .5; .37; .037; .03; .012; .00082 G/100ML; G/100ML; G/100ML; G/100ML; G/100ML; G/100ML; G/100ML
100 INJECTION, SOLUTION INTRAVENOUS CONTINUOUS
Status: DISCONTINUED | OUTPATIENT
Start: 2021-01-01 | End: 2021-01-01

## 2021-01-01 RX ORDER — HEPARIN SODIUM 5000 [USP'U]/ML
5000 INJECTION, SOLUTION INTRAVENOUS; SUBCUTANEOUS EVERY 8 HOURS SCHEDULED
Status: DISCONTINUED | OUTPATIENT
Start: 2021-01-01 | End: 2021-01-01

## 2021-01-01 RX ORDER — CALCIUM GLUCONATE 20 MG/ML
1 INJECTION, SOLUTION INTRAVENOUS ONCE
Status: COMPLETED | OUTPATIENT
Start: 2021-01-01 | End: 2021-01-01

## 2021-01-01 RX ORDER — BISACODYL 10 MG
10 SUPPOSITORY, RECTAL RECTAL ONCE
Status: COMPLETED | OUTPATIENT
Start: 2021-01-01 | End: 2021-01-01

## 2021-01-01 RX ORDER — NYSTATIN 100000 [USP'U]/G
POWDER TOPICAL 2 TIMES DAILY
Qty: 60 G | Refills: 3 | Status: SHIPPED | OUTPATIENT
Start: 2021-01-01 | End: 2022-01-01 | Stop reason: HOSPADM

## 2021-01-01 RX ORDER — LABETALOL 20 MG/4 ML (5 MG/ML) INTRAVENOUS SYRINGE
10 ONCE
Status: COMPLETED | OUTPATIENT
Start: 2021-01-01 | End: 2021-01-01

## 2021-01-01 RX ORDER — HEPARIN SODIUM 1000 [USP'U]/ML
8400 INJECTION, SOLUTION INTRAVENOUS; SUBCUTANEOUS ONCE
Status: COMPLETED | OUTPATIENT
Start: 2021-01-01 | End: 2021-01-01

## 2021-01-01 RX ORDER — PHYTONADIONE 5 MG/1
5 TABLET ORAL ONCE
Status: COMPLETED | OUTPATIENT
Start: 2021-01-01 | End: 2021-01-01

## 2021-01-01 RX ORDER — PANTOPRAZOLE SODIUM 40 MG/10ML
40 INJECTION, POWDER, LYOPHILIZED, FOR SOLUTION INTRAVENOUS EVERY 12 HOURS SCHEDULED
Status: DISCONTINUED | OUTPATIENT
Start: 2021-01-01 | End: 2021-01-01 | Stop reason: HOSPADM

## 2021-01-01 RX ORDER — SODIUM POLYSTYRENE SULFONATE 4.1 MEQ/G
15 POWDER, FOR SUSPENSION ORAL; RECTAL ONCE
Status: COMPLETED | OUTPATIENT
Start: 2021-01-01 | End: 2021-01-01

## 2021-01-01 RX ORDER — AMLODIPINE BESYLATE 5 MG/1
5 TABLET ORAL ONCE
Status: COMPLETED | OUTPATIENT
Start: 2021-01-01 | End: 2021-01-01

## 2021-01-01 RX ORDER — INSULIN GLARGINE 100 [IU]/ML
32 INJECTION, SOLUTION SUBCUTANEOUS
Qty: 10 ML | Refills: 0
Start: 2021-01-01 | End: 2022-01-01 | Stop reason: HOSPADM

## 2021-01-01 RX ORDER — AMOXICILLIN 250 MG
1 CAPSULE ORAL
Status: DISCONTINUED | OUTPATIENT
Start: 2021-01-01 | End: 2021-01-01 | Stop reason: HOSPADM

## 2021-01-01 RX ORDER — OXYCODONE HYDROCHLORIDE 5 MG/1
5 TABLET ORAL EVERY 4 HOURS PRN
Status: DISCONTINUED | OUTPATIENT
Start: 2021-01-01 | End: 2021-01-01 | Stop reason: HOSPADM

## 2021-01-01 RX ORDER — LIDOCAINE WITH 8.4% SOD BICARB 0.9%(10ML)
SYRINGE (ML) INJECTION CODE/TRAUMA/SEDATION MEDICATION
Status: COMPLETED | OUTPATIENT
Start: 2021-01-01 | End: 2021-01-01

## 2021-01-01 RX ORDER — HYDROMORPHONE HCL/PF 1 MG/ML
0.2 SYRINGE (ML) INJECTION EVERY 4 HOURS PRN
Status: DISCONTINUED | OUTPATIENT
Start: 2021-01-01 | End: 2021-01-01

## 2021-01-01 RX ORDER — HYDROMORPHONE HCL/PF 1 MG/ML
0.5 SYRINGE (ML) INJECTION
Status: DISCONTINUED | OUTPATIENT
Start: 2021-01-01 | End: 2021-01-01

## 2021-01-01 RX ORDER — ATORVASTATIN CALCIUM 40 MG/1
40 TABLET, FILM COATED ORAL
Status: DISCONTINUED | OUTPATIENT
Start: 2021-01-01 | End: 2021-01-01 | Stop reason: HOSPADM

## 2021-01-01 RX ORDER — SODIUM CHLORIDE 9 MG/ML
500 INJECTION, SOLUTION INTRAVENOUS CONTINUOUS
Status: DISCONTINUED | OUTPATIENT
Start: 2021-01-01 | End: 2021-01-01

## 2021-01-01 RX ORDER — AMLODIPINE BESYLATE 5 MG/1
5 TABLET ORAL DAILY
Status: DISCONTINUED | OUTPATIENT
Start: 2021-01-01 | End: 2021-01-01

## 2021-01-01 RX ORDER — HEPARIN SODIUM 1000 [USP'U]/ML
4200 INJECTION, SOLUTION INTRAVENOUS; SUBCUTANEOUS
Status: DISCONTINUED | OUTPATIENT
Start: 2021-01-01 | End: 2021-01-01

## 2021-01-01 RX ORDER — HYDROMORPHONE HCL/PF 1 MG/ML
0.5 SYRINGE (ML) INJECTION ONCE
Status: COMPLETED | OUTPATIENT
Start: 2021-01-01 | End: 2021-01-01

## 2021-01-01 RX ORDER — ATORVASTATIN CALCIUM 40 MG/1
40 TABLET, FILM COATED ORAL EVERY EVENING
Status: DISCONTINUED | OUTPATIENT
Start: 2021-01-01 | End: 2021-01-01

## 2021-01-01 RX ORDER — HYDRALAZINE HYDROCHLORIDE 20 MG/ML
10 INJECTION INTRAMUSCULAR; INTRAVENOUS ONCE
Status: COMPLETED | OUTPATIENT
Start: 2021-01-01 | End: 2021-01-01

## 2021-01-01 RX ORDER — HYDROMORPHONE HCL/PF 1 MG/ML
0.5 SYRINGE (ML) INJECTION ONCE
Status: DISCONTINUED | OUTPATIENT
Start: 2021-01-01 | End: 2021-01-01

## 2021-01-01 RX ORDER — METOPROLOL TARTRATE 50 MG/1
50 TABLET, FILM COATED ORAL EVERY 12 HOURS SCHEDULED
Status: DISCONTINUED | OUTPATIENT
Start: 2021-01-01 | End: 2021-01-01 | Stop reason: HOSPADM

## 2021-01-01 RX ORDER — CEFAZOLIN SODIUM 2 G/50ML
2000 SOLUTION INTRAVENOUS EVERY 8 HOURS
Status: DISCONTINUED | OUTPATIENT
Start: 2021-01-01 | End: 2021-01-01

## 2021-01-01 RX ORDER — DOCUSATE SODIUM 100 MG/1
100 CAPSULE, LIQUID FILLED ORAL 2 TIMES DAILY
Status: DISCONTINUED | OUTPATIENT
Start: 2021-01-01 | End: 2021-01-01 | Stop reason: HOSPADM

## 2021-01-01 RX ORDER — IODIXANOL 320 MG/ML
65 INJECTION, SOLUTION INTRAVASCULAR
Status: COMPLETED | OUTPATIENT
Start: 2021-01-01 | End: 2021-01-01

## 2021-01-01 RX ORDER — HYDRALAZINE HYDROCHLORIDE 20 MG/ML
5 INJECTION INTRAMUSCULAR; INTRAVENOUS ONCE
Status: COMPLETED | OUTPATIENT
Start: 2021-01-01 | End: 2021-01-01

## 2021-01-01 RX ORDER — HYDROMORPHONE HCL/PF 1 MG/ML
1 SYRINGE (ML) INJECTION ONCE
Status: COMPLETED | OUTPATIENT
Start: 2021-01-01 | End: 2021-01-01

## 2021-01-01 RX ORDER — MAGNESIUM SULFATE HEPTAHYDRATE 40 MG/ML
2 INJECTION, SOLUTION INTRAVENOUS ONCE
Status: COMPLETED | OUTPATIENT
Start: 2021-01-01 | End: 2021-01-01

## 2021-01-01 RX ORDER — CEFAZOLIN SODIUM 1 G/50ML
1000 SOLUTION INTRAVENOUS EVERY 12 HOURS
Status: DISCONTINUED | OUTPATIENT
Start: 2021-01-01 | End: 2021-01-01 | Stop reason: HOSPADM

## 2021-01-01 RX ORDER — DEXTROSE, SODIUM CHLORIDE, SODIUM LACTATE, POTASSIUM CHLORIDE, AND CALCIUM CHLORIDE 5; .6; .31; .03; .02 G/100ML; G/100ML; G/100ML; G/100ML; G/100ML
100 INJECTION, SOLUTION INTRAVENOUS CONTINUOUS
Status: DISCONTINUED | OUTPATIENT
Start: 2021-01-01 | End: 2021-01-01

## 2021-01-01 RX ORDER — ALBUMIN (HUMAN) 12.5 G/50ML
25 SOLUTION INTRAVENOUS ONCE
Status: COMPLETED | OUTPATIENT
Start: 2021-01-01 | End: 2021-01-01

## 2021-01-01 RX ORDER — ACETAMINOPHEN 325 MG/1
650 TABLET ORAL ONCE
Status: COMPLETED | OUTPATIENT
Start: 2021-01-01 | End: 2021-01-01

## 2021-01-01 RX ORDER — METOPROLOL TARTRATE 50 MG/1
50 TABLET, FILM COATED ORAL 2 TIMES DAILY
Status: DISCONTINUED | OUTPATIENT
Start: 2021-01-01 | End: 2021-01-01

## 2021-01-01 RX ORDER — OXYCODONE HYDROCHLORIDE 5 MG/1
2.5 TABLET ORAL EVERY 6 HOURS PRN
Status: DISCONTINUED | OUTPATIENT
Start: 2021-01-01 | End: 2021-01-01 | Stop reason: HOSPADM

## 2021-01-01 RX ORDER — FENTANYL CITRATE 50 UG/ML
25 INJECTION, SOLUTION INTRAMUSCULAR; INTRAVENOUS EVERY 4 HOURS PRN
Status: DISCONTINUED | OUTPATIENT
Start: 2021-01-01 | End: 2021-01-01

## 2021-01-01 RX ORDER — FENOFIBRATE 145 MG/1
145 TABLET, COATED ORAL DAILY
Status: DISCONTINUED | OUTPATIENT
Start: 2021-01-01 | End: 2021-01-01

## 2021-01-01 RX ORDER — SODIUM CHLORIDE 9 MG/ML
250 INJECTION, SOLUTION INTRAVENOUS CONTINUOUS
Status: DISCONTINUED | OUTPATIENT
Start: 2021-01-01 | End: 2021-01-01

## 2021-01-01 RX ORDER — LISINOPRIL 5 MG/1
10 TABLET ORAL DAILY
Status: DISCONTINUED | OUTPATIENT
Start: 2021-01-01 | End: 2021-01-01

## 2021-01-01 RX ORDER — HEPARIN SODIUM 10000 [USP'U]/100ML
3-30 INJECTION, SOLUTION INTRAVENOUS
Status: DISCONTINUED | OUTPATIENT
Start: 2021-01-01 | End: 2021-01-01

## 2021-01-01 RX ORDER — CEFAZOLIN SODIUM 1 G/50ML
1000 SOLUTION INTRAVENOUS EVERY 12 HOURS
Qty: 1800 ML | Refills: 0
Start: 2021-01-01 | End: 2022-01-01 | Stop reason: HOSPADM

## 2021-01-01 RX ORDER — DEXTROSE MONOHYDRATE 25 G/50ML
25 INJECTION, SOLUTION INTRAVENOUS ONCE
Status: COMPLETED | OUTPATIENT
Start: 2021-01-01 | End: 2021-01-01

## 2021-01-01 RX ORDER — ONDANSETRON 2 MG/ML
4 INJECTION INTRAMUSCULAR; INTRAVENOUS EVERY 6 HOURS PRN
Status: DISCONTINUED | OUTPATIENT
Start: 2021-01-01 | End: 2021-01-01 | Stop reason: HOSPADM

## 2021-01-01 RX ORDER — CEFAZOLIN SODIUM 1 G/50ML
1000 SOLUTION INTRAVENOUS EVERY 8 HOURS
Status: DISCONTINUED | OUTPATIENT
Start: 2021-01-01 | End: 2021-01-01

## 2021-01-01 RX ORDER — MIDAZOLAM HYDROCHLORIDE 2 MG/2ML
INJECTION, SOLUTION INTRAMUSCULAR; INTRAVENOUS CODE/TRAUMA/SEDATION MEDICATION
Status: COMPLETED | OUTPATIENT
Start: 2021-01-01 | End: 2021-01-01

## 2021-01-01 RX ORDER — HYDRALAZINE HYDROCHLORIDE 20 MG/ML
10 INJECTION INTRAMUSCULAR; INTRAVENOUS ONCE
Status: DISCONTINUED | OUTPATIENT
Start: 2021-01-01 | End: 2021-01-01

## 2021-01-01 RX ORDER — INSULIN GLARGINE 100 [IU]/ML
45 INJECTION, SOLUTION SUBCUTANEOUS
Status: DISCONTINUED | OUTPATIENT
Start: 2021-01-01 | End: 2021-01-01

## 2021-01-01 RX ORDER — ACETAMINOPHEN 325 MG/1
650 TABLET ORAL EVERY 4 HOURS PRN
Status: DISCONTINUED | OUTPATIENT
Start: 2021-01-01 | End: 2021-01-01 | Stop reason: HOSPADM

## 2021-01-01 RX ORDER — INSULIN GLARGINE 100 [IU]/ML
30 INJECTION, SOLUTION SUBCUTANEOUS
Status: DISCONTINUED | OUTPATIENT
Start: 2021-01-01 | End: 2021-01-01

## 2021-01-01 RX ORDER — HEPARIN SODIUM 1000 [USP'U]/ML
8400 INJECTION, SOLUTION INTRAVENOUS; SUBCUTANEOUS
Status: DISCONTINUED | OUTPATIENT
Start: 2021-01-01 | End: 2021-01-01

## 2021-01-01 RX ORDER — POLYETHYLENE GLYCOL 3350 17 G/17G
17 POWDER, FOR SOLUTION ORAL DAILY
Status: DISCONTINUED | OUTPATIENT
Start: 2021-01-01 | End: 2021-01-01

## 2021-01-01 RX ORDER — PANTOPRAZOLE SODIUM 40 MG/1
40 TABLET, DELAYED RELEASE ORAL
Status: DISCONTINUED | OUTPATIENT
Start: 2021-01-01 | End: 2021-01-01

## 2021-01-01 RX ORDER — INSULIN GLARGINE 100 [IU]/ML
32 INJECTION, SOLUTION SUBCUTANEOUS
Status: DISCONTINUED | OUTPATIENT
Start: 2021-01-01 | End: 2021-01-01 | Stop reason: HOSPADM

## 2021-01-01 RX ORDER — IODIXANOL 320 MG/ML
100 INJECTION, SOLUTION INTRAVASCULAR
Status: COMPLETED | OUTPATIENT
Start: 2021-01-01 | End: 2021-01-01

## 2021-01-01 RX ORDER — AMLODIPINE BESYLATE 10 MG/1
10 TABLET ORAL DAILY
Status: DISCONTINUED | OUTPATIENT
Start: 2021-01-01 | End: 2021-01-01 | Stop reason: HOSPADM

## 2021-01-01 RX ORDER — LIDOCAINE 50 MG/G
1 PATCH TOPICAL DAILY
Status: COMPLETED | OUTPATIENT
Start: 2021-01-01 | End: 2021-01-01

## 2021-01-01 RX ORDER — NYSTATIN 100000 [USP'U]/G
POWDER TOPICAL 2 TIMES DAILY
Status: DISCONTINUED | OUTPATIENT
Start: 2021-01-01 | End: 2021-01-01 | Stop reason: HOSPADM

## 2021-01-01 RX ORDER — SODIUM CHLORIDE, SODIUM LACTATE, POTASSIUM CHLORIDE, CALCIUM CHLORIDE 600; 310; 30; 20 MG/100ML; MG/100ML; MG/100ML; MG/100ML
100 INJECTION, SOLUTION INTRAVENOUS CONTINUOUS
Status: DISCONTINUED | OUTPATIENT
Start: 2021-01-01 | End: 2021-01-01

## 2021-01-01 RX ORDER — CALCIUM GLUCONATE 20 MG/ML
2 INJECTION, SOLUTION INTRAVENOUS ONCE
Status: COMPLETED | OUTPATIENT
Start: 2021-01-01 | End: 2021-01-01

## 2021-01-01 RX ADMIN — CEFAZOLIN SODIUM 1000 MG: 1 SOLUTION INTRAVENOUS at 23:51

## 2021-01-01 RX ADMIN — AMLODIPINE BESYLATE 5 MG: 5 TABLET ORAL at 07:55

## 2021-01-01 RX ADMIN — ACETAMINOPHEN 650 MG: 325 TABLET, FILM COATED ORAL at 20:48

## 2021-01-01 RX ADMIN — CEFAZOLIN SODIUM 1000 MG: 1 SOLUTION INTRAVENOUS at 10:39

## 2021-01-01 RX ADMIN — CEFAZOLIN SODIUM 1000 MG: 1 SOLUTION INTRAVENOUS at 23:28

## 2021-01-01 RX ADMIN — METOPROLOL TARTRATE 50 MG: 50 TABLET, FILM COATED ORAL at 22:17

## 2021-01-01 RX ADMIN — HYDROMORPHONE HYDROCHLORIDE 0.5 MG: 1 INJECTION, SOLUTION INTRAMUSCULAR; INTRAVENOUS; SUBCUTANEOUS at 09:16

## 2021-01-01 RX ADMIN — OXYCODONE HYDROCHLORIDE 5 MG: 5 TABLET ORAL at 14:59

## 2021-01-01 RX ADMIN — DEXTROSE, SODIUM CHLORIDE, SODIUM LACTATE, POTASSIUM CHLORIDE, AND CALCIUM CHLORIDE 100 ML/HR: 5; .6; .31; .03; .02 INJECTION, SOLUTION INTRAVENOUS at 18:31

## 2021-01-01 RX ADMIN — FENTANYL CITRATE 25 MCG: 50 INJECTION INTRAMUSCULAR; INTRAVENOUS at 18:52

## 2021-01-01 RX ADMIN — OXYCODONE HYDROCHLORIDE 5 MG: 5 TABLET ORAL at 08:52

## 2021-01-01 RX ADMIN — SODIUM CHLORIDE 1000 ML: 0.9 INJECTION, SOLUTION INTRAVENOUS at 16:24

## 2021-01-01 RX ADMIN — CALCIUM GLUCONATE 1 G: 20 INJECTION, SOLUTION INTRAVENOUS at 21:46

## 2021-01-01 RX ADMIN — PANTOPRAZOLE SODIUM 40 MG: 40 INJECTION, POWDER, FOR SOLUTION INTRAVENOUS at 22:40

## 2021-01-01 RX ADMIN — INSULIN LISPRO 4 UNITS: 100 INJECTION, SOLUTION INTRAVENOUS; SUBCUTANEOUS at 17:37

## 2021-01-01 RX ADMIN — ATORVASTATIN CALCIUM 40 MG: 40 TABLET, FILM COATED ORAL at 18:04

## 2021-01-01 RX ADMIN — DOCUSATE SODIUM 100 MG: 100 CAPSULE ORAL at 17:00

## 2021-01-01 RX ADMIN — METOPROLOL TARTRATE 50 MG: 50 TABLET, FILM COATED ORAL at 22:42

## 2021-01-01 RX ADMIN — METOPROLOL TARTRATE 50 MG: 50 TABLET, FILM COATED ORAL at 23:29

## 2021-01-01 RX ADMIN — OXYCODONE HYDROCHLORIDE 5 MG: 5 TABLET ORAL at 19:53

## 2021-01-01 RX ADMIN — AMLODIPINE BESYLATE 10 MG: 10 TABLET ORAL at 08:45

## 2021-01-01 RX ADMIN — APIXABAN 5 MG: 5 TABLET, FILM COATED ORAL at 18:49

## 2021-01-01 RX ADMIN — PANTOPRAZOLE SODIUM 40 MG: 40 INJECTION, POWDER, FOR SOLUTION INTRAVENOUS at 09:09

## 2021-01-01 RX ADMIN — NYSTATIN: 100000 POWDER TOPICAL at 09:06

## 2021-01-01 RX ADMIN — SODIUM CHLORIDE 12 UNITS/HR: 9 INJECTION, SOLUTION INTRAVENOUS at 20:38

## 2021-01-01 RX ADMIN — INSULIN LISPRO 4 UNITS: 100 INJECTION, SOLUTION INTRAVENOUS; SUBCUTANEOUS at 13:14

## 2021-01-01 RX ADMIN — SODIUM CHLORIDE, SODIUM GLUCONATE, SODIUM ACETATE, POTASSIUM CHLORIDE, MAGNESIUM CHLORIDE, SODIUM PHOSPHATE, DIBASIC, AND POTASSIUM PHOSPHATE 100 ML/HR: .53; .5; .37; .037; .03; .012; .00082 INJECTION, SOLUTION INTRAVENOUS at 07:27

## 2021-01-01 RX ADMIN — DOCUSATE SODIUM 100 MG: 100 CAPSULE ORAL at 18:27

## 2021-01-01 RX ADMIN — AMLODIPINE BESYLATE 10 MG: 10 TABLET ORAL at 09:04

## 2021-01-01 RX ADMIN — DEXTROSE MONOHYDRATE 25 ML: 25 INJECTION, SOLUTION INTRAVENOUS at 22:27

## 2021-01-01 RX ADMIN — CEFAZOLIN SODIUM 1000 MG: 1 SOLUTION INTRAVENOUS at 06:35

## 2021-01-01 RX ADMIN — CEFAZOLIN SODIUM 1000 MG: 1 SOLUTION INTRAVENOUS at 10:37

## 2021-01-01 RX ADMIN — SODIUM CHLORIDE 2.5 MG/HR: 0.9 INJECTION, SOLUTION INTRAVENOUS at 06:21

## 2021-01-01 RX ADMIN — OXYCODONE HYDROCHLORIDE 5 MG: 5 TABLET ORAL at 00:04

## 2021-01-01 RX ADMIN — DOCUSATE SODIUM 100 MG: 100 CAPSULE ORAL at 17:33

## 2021-01-01 RX ADMIN — METOPROLOL TARTRATE 50 MG: 50 TABLET, FILM COATED ORAL at 20:50

## 2021-01-01 RX ADMIN — CEFAZOLIN SODIUM 1000 MG: 1 SOLUTION INTRAVENOUS at 22:41

## 2021-01-01 RX ADMIN — DOCUSATE SODIUM 100 MG: 100 CAPSULE ORAL at 09:31

## 2021-01-01 RX ADMIN — OXYCODONE HYDROCHLORIDE 5 MG: 5 TABLET ORAL at 00:34

## 2021-01-01 RX ADMIN — NYSTATIN: 100000 POWDER TOPICAL at 17:37

## 2021-01-01 RX ADMIN — CEFAZOLIN SODIUM 1000 MG: 1 SOLUTION INTRAVENOUS at 10:46

## 2021-01-01 RX ADMIN — HYDROMORPHONE HYDROCHLORIDE 0.5 MG: 1 INJECTION, SOLUTION INTRAMUSCULAR; INTRAVENOUS; SUBCUTANEOUS at 10:14

## 2021-01-01 RX ADMIN — OXYCODONE HYDROCHLORIDE 5 MG: 5 TABLET ORAL at 22:12

## 2021-01-01 RX ADMIN — METOPROLOL TARTRATE 50 MG: 50 TABLET, FILM COATED ORAL at 09:04

## 2021-01-01 RX ADMIN — AMLODIPINE BESYLATE 5 MG: 5 TABLET ORAL at 10:39

## 2021-01-01 RX ADMIN — DOCUSATE SODIUM AND SENNOSIDES 1 TABLET: 8.6; 5 TABLET, FILM COATED ORAL at 23:29

## 2021-01-01 RX ADMIN — PHYTONADIONE 5 MG: 5 TABLET ORAL at 13:00

## 2021-01-01 RX ADMIN — CEFAZOLIN SODIUM 1000 MG: 1 SOLUTION INTRAVENOUS at 10:14

## 2021-01-01 RX ADMIN — OXYCODONE HYDROCHLORIDE 5 MG: 5 TABLET ORAL at 01:18

## 2021-01-01 RX ADMIN — ACETAMINOPHEN 650 MG: 325 TABLET, FILM COATED ORAL at 22:11

## 2021-01-01 RX ADMIN — PHYTONADIONE 10 MG: 10 INJECTION, EMULSION INTRAMUSCULAR; INTRAVENOUS; SUBCUTANEOUS at 22:09

## 2021-01-01 RX ADMIN — HYDROMORPHONE HYDROCHLORIDE 0.5 MG: 1 INJECTION, SOLUTION INTRAMUSCULAR; INTRAVENOUS; SUBCUTANEOUS at 12:06

## 2021-01-01 RX ADMIN — PANTOPRAZOLE SODIUM 40 MG: 40 INJECTION, POWDER, FOR SOLUTION INTRAVENOUS at 09:19

## 2021-01-01 RX ADMIN — OXYCODONE HYDROCHLORIDE 5 MG: 5 TABLET ORAL at 07:27

## 2021-01-01 RX ADMIN — METOPROLOL TARTRATE 50 MG: 50 TABLET, FILM COATED ORAL at 22:09

## 2021-01-01 RX ADMIN — INSULIN HUMAN 10 UNITS: 100 INJECTION, SOLUTION PARENTERAL at 21:53

## 2021-01-01 RX ADMIN — PANTOPRAZOLE SODIUM 40 MG: 40 INJECTION, POWDER, FOR SOLUTION INTRAVENOUS at 21:09

## 2021-01-01 RX ADMIN — Medication 5000 UNITS: at 18:36

## 2021-01-01 RX ADMIN — DOCUSATE SODIUM AND SENNOSIDES 1 TABLET: 8.6; 5 TABLET, FILM COATED ORAL at 20:50

## 2021-01-01 RX ADMIN — METOPROLOL TARTRATE 50 MG: 50 TABLET, FILM COATED ORAL at 08:45

## 2021-01-01 RX ADMIN — HYDROMORPHONE HYDROCHLORIDE 0.5 MG: 1 INJECTION, SOLUTION INTRAMUSCULAR; INTRAVENOUS; SUBCUTANEOUS at 12:11

## 2021-01-01 RX ADMIN — SODIUM CHLORIDE 4 UNITS/HR: 9 INJECTION, SOLUTION INTRAVENOUS at 17:18

## 2021-01-01 RX ADMIN — SODIUM CHLORIDE, SODIUM LACTATE, POTASSIUM CHLORIDE, AND CALCIUM CHLORIDE 1000 ML: .6; .31; .03; .02 INJECTION, SOLUTION INTRAVENOUS at 22:24

## 2021-01-01 RX ADMIN — HYDROMORPHONE HYDROCHLORIDE 0.5 MG: 1 INJECTION, SOLUTION INTRAMUSCULAR; INTRAVENOUS; SUBCUTANEOUS at 22:37

## 2021-01-01 RX ADMIN — CEFAZOLIN SODIUM 1000 MG: 1 SOLUTION INTRAVENOUS at 10:27

## 2021-01-01 RX ADMIN — NYSTATIN 1 APPLICATION: 100000 POWDER TOPICAL at 18:18

## 2021-01-01 RX ADMIN — ACETAMINOPHEN 650 MG: 325 TABLET, FILM COATED ORAL at 04:40

## 2021-01-01 RX ADMIN — Medication 8 ML: at 19:35

## 2021-01-01 RX ADMIN — PANTOPRAZOLE SODIUM 40 MG: 40 INJECTION, POWDER, FOR SOLUTION INTRAVENOUS at 08:25

## 2021-01-01 RX ADMIN — INSULIN LISPRO 5 UNITS: 100 INJECTION, SOLUTION INTRAVENOUS; SUBCUTANEOUS at 17:17

## 2021-01-01 RX ADMIN — CEFAZOLIN SODIUM 1000 MG: 1 SOLUTION INTRAVENOUS at 23:02

## 2021-01-01 RX ADMIN — OXYCODONE HYDROCHLORIDE 5 MG: 5 TABLET ORAL at 21:29

## 2021-01-01 RX ADMIN — SODIUM CHLORIDE, SODIUM LACTATE, POTASSIUM CHLORIDE, AND CALCIUM CHLORIDE 100 ML/HR: .6; .31; .03; .02 INJECTION, SOLUTION INTRAVENOUS at 09:38

## 2021-01-01 RX ADMIN — OXYCODONE HYDROCHLORIDE 5 MG: 5 TABLET ORAL at 17:33

## 2021-01-01 RX ADMIN — LIDOCAINE 5% 1 PATCH: 700 PATCH TOPICAL at 13:12

## 2021-01-01 RX ADMIN — OXYCODONE HYDROCHLORIDE 5 MG: 5 TABLET ORAL at 00:15

## 2021-01-01 RX ADMIN — METOPROLOL TARTRATE 50 MG: 50 TABLET, FILM COATED ORAL at 08:24

## 2021-01-01 RX ADMIN — HYDROMORPHONE HYDROCHLORIDE 0.5 MG: 1 INJECTION, SOLUTION INTRAMUSCULAR; INTRAVENOUS; SUBCUTANEOUS at 18:25

## 2021-01-01 RX ADMIN — INSULIN LISPRO 4 UNITS: 100 INJECTION, SOLUTION INTRAVENOUS; SUBCUTANEOUS at 14:29

## 2021-01-01 RX ADMIN — CEFAZOLIN SODIUM 1000 MG: 1 SOLUTION INTRAVENOUS at 23:30

## 2021-01-01 RX ADMIN — APIXABAN 5 MG: 5 TABLET, FILM COATED ORAL at 09:31

## 2021-01-01 RX ADMIN — INSULIN LISPRO 4 UNITS: 100 INJECTION, SOLUTION INTRAVENOUS; SUBCUTANEOUS at 17:17

## 2021-01-01 RX ADMIN — OXYCODONE HYDROCHLORIDE 5 MG: 5 TABLET ORAL at 04:25

## 2021-01-01 RX ADMIN — PANTOPRAZOLE SODIUM 40 MG: 40 INJECTION, POWDER, FOR SOLUTION INTRAVENOUS at 09:04

## 2021-01-01 RX ADMIN — DOCUSATE SODIUM 100 MG: 100 CAPSULE ORAL at 08:52

## 2021-01-01 RX ADMIN — CEFAZOLIN SODIUM 1000 MG: 1 SOLUTION INTRAVENOUS at 22:30

## 2021-01-01 RX ADMIN — ACETAMINOPHEN 650 MG: 325 TABLET, FILM COATED ORAL at 00:03

## 2021-01-01 RX ADMIN — AMLODIPINE BESYLATE 10 MG: 10 TABLET ORAL at 09:54

## 2021-01-01 RX ADMIN — INSULIN GLARGINE 32 UNITS: 100 INJECTION, SOLUTION SUBCUTANEOUS at 22:18

## 2021-01-01 RX ADMIN — CEFAZOLIN SODIUM 1000 MG: 1 SOLUTION INTRAVENOUS at 21:55

## 2021-01-01 RX ADMIN — METOPROLOL TARTRATE 50 MG: 50 TABLET, FILM COATED ORAL at 09:19

## 2021-01-01 RX ADMIN — NYSTATIN: 100000 POWDER TOPICAL at 17:33

## 2021-01-01 RX ADMIN — ACETAMINOPHEN 650 MG: 325 TABLET, FILM COATED ORAL at 09:09

## 2021-01-01 RX ADMIN — CYANOCOBALAMIN TAB 500 MCG 1000 MCG: 500 TAB at 10:39

## 2021-01-01 RX ADMIN — CEFAZOLIN SODIUM 1000 MG: 1 SOLUTION INTRAVENOUS at 12:01

## 2021-01-01 RX ADMIN — OXYCODONE HYDROCHLORIDE 5 MG: 5 TABLET ORAL at 05:27

## 2021-01-01 RX ADMIN — CEFAZOLIN SODIUM 1000 MG: 1 SOLUTION INTRAVENOUS at 22:05

## 2021-01-01 RX ADMIN — HYDROMORPHONE HYDROCHLORIDE 0.5 MG: 1 INJECTION, SOLUTION INTRAMUSCULAR; INTRAVENOUS; SUBCUTANEOUS at 04:51

## 2021-01-01 RX ADMIN — INSULIN LISPRO 6 UNITS: 100 INJECTION, SOLUTION INTRAVENOUS; SUBCUTANEOUS at 18:28

## 2021-01-01 RX ADMIN — APIXABAN 5 MG: 5 TABLET, FILM COATED ORAL at 08:45

## 2021-01-01 RX ADMIN — INSULIN LISPRO 4 UNITS: 100 INJECTION, SOLUTION INTRAVENOUS; SUBCUTANEOUS at 09:21

## 2021-01-01 RX ADMIN — SODIUM CHLORIDE, SODIUM LACTATE, POTASSIUM CHLORIDE, AND CALCIUM CHLORIDE 100 ML/HR: .6; .31; .03; .02 INJECTION, SOLUTION INTRAVENOUS at 19:43

## 2021-01-01 RX ADMIN — DOCUSATE SODIUM 100 MG: 100 CAPSULE ORAL at 13:12

## 2021-01-01 RX ADMIN — SODIUM CHLORIDE 2000 ML: 0.9 INJECTION, SOLUTION INTRAVENOUS at 18:44

## 2021-01-01 RX ADMIN — HYDROMORPHONE HYDROCHLORIDE 0.5 MG: 1 INJECTION, SOLUTION INTRAMUSCULAR; INTRAVENOUS; SUBCUTANEOUS at 12:20

## 2021-01-01 RX ADMIN — HYDROMORPHONE HYDROCHLORIDE 0.5 MG: 1 INJECTION, SOLUTION INTRAMUSCULAR; INTRAVENOUS; SUBCUTANEOUS at 09:07

## 2021-01-01 RX ADMIN — METOPROLOL TARTRATE 50 MG: 50 TABLET, FILM COATED ORAL at 09:31

## 2021-01-01 RX ADMIN — OXYCODONE HYDROCHLORIDE 5 MG: 5 TABLET ORAL at 05:40

## 2021-01-01 RX ADMIN — CEFAZOLIN SODIUM 1000 MG: 1 SOLUTION INTRAVENOUS at 10:12

## 2021-01-01 RX ADMIN — INSULIN GLARGINE 32 UNITS: 100 INJECTION, SOLUTION SUBCUTANEOUS at 23:36

## 2021-01-01 RX ADMIN — Medication 10 ML: at 20:05

## 2021-01-01 RX ADMIN — HYDROMORPHONE HYDROCHLORIDE 0.5 MG: 1 INJECTION, SOLUTION INTRAMUSCULAR; INTRAVENOUS; SUBCUTANEOUS at 04:02

## 2021-01-01 RX ADMIN — PANTOPRAZOLE SODIUM 40 MG: 40 INJECTION, POWDER, FOR SOLUTION INTRAVENOUS at 08:53

## 2021-01-01 RX ADMIN — MAGNESIUM SULFATE HEPTAHYDRATE 2 G: 40 INJECTION, SOLUTION INTRAVENOUS at 22:25

## 2021-01-01 RX ADMIN — NYSTATIN: 100000 POWDER TOPICAL at 08:25

## 2021-01-01 RX ADMIN — CALCIUM GLUCONATE 1 G: 20 INJECTION, SOLUTION INTRAVENOUS at 10:23

## 2021-01-01 RX ADMIN — PANTOPRAZOLE SODIUM 40 MG: 40 INJECTION, POWDER, FOR SOLUTION INTRAVENOUS at 23:29

## 2021-01-01 RX ADMIN — PANTOPRAZOLE SODIUM 40 MG: 40 TABLET, DELAYED RELEASE ORAL at 06:35

## 2021-01-01 RX ADMIN — HYDROMORPHONE HYDROCHLORIDE 0.5 MG: 1 INJECTION, SOLUTION INTRAMUSCULAR; INTRAVENOUS; SUBCUTANEOUS at 00:40

## 2021-01-01 RX ADMIN — AMLODIPINE BESYLATE 5 MG: 5 TABLET ORAL at 10:27

## 2021-01-01 RX ADMIN — CEFAZOLIN SODIUM 1000 MG: 1 SOLUTION INTRAVENOUS at 22:19

## 2021-01-01 RX ADMIN — DOCUSATE SODIUM AND SENNOSIDES 1 TABLET: 8.6; 5 TABLET, FILM COATED ORAL at 22:42

## 2021-01-01 RX ADMIN — HEPARIN SODIUM 8400 UNITS: 1000 INJECTION INTRAVENOUS; SUBCUTANEOUS at 14:25

## 2021-01-01 RX ADMIN — ACETAMINOPHEN 650 MG: 325 TABLET, FILM COATED ORAL at 16:59

## 2021-01-01 RX ADMIN — OXYCODONE HYDROCHLORIDE 5 MG: 5 TABLET ORAL at 19:37

## 2021-01-01 RX ADMIN — ACETAMINOPHEN 650 MG: 325 TABLET, FILM COATED ORAL at 21:57

## 2021-01-01 RX ADMIN — SODIUM CHLORIDE 250 ML/HR: 0.9 INJECTION, SOLUTION INTRAVENOUS at 01:07

## 2021-01-01 RX ADMIN — AMLODIPINE BESYLATE 10 MG: 10 TABLET ORAL at 07:58

## 2021-01-01 RX ADMIN — METHYLNALTREXONE BROMIDE 6 MG: 12 INJECTION, SOLUTION SUBCUTANEOUS at 12:22

## 2021-01-01 RX ADMIN — APIXABAN 5 MG: 5 TABLET, FILM COATED ORAL at 14:15

## 2021-01-01 RX ADMIN — INSULIN GLARGINE 45 UNITS: 100 INJECTION, SOLUTION SUBCUTANEOUS at 22:09

## 2021-01-01 RX ADMIN — CEFAZOLIN SODIUM 1000 MG: 1 SOLUTION INTRAVENOUS at 22:10

## 2021-01-01 RX ADMIN — ACETAMINOPHEN 650 MG: 325 TABLET, FILM COATED ORAL at 16:47

## 2021-01-01 RX ADMIN — DOCUSATE SODIUM 100 MG: 100 CAPSULE ORAL at 18:04

## 2021-01-01 RX ADMIN — NYSTATIN 1 APPLICATION: 100000 POWDER TOPICAL at 08:50

## 2021-01-01 RX ADMIN — SODIUM POLYSTYRENE SULFONATE 15 G: 1 POWDER ORAL; RECTAL at 07:31

## 2021-01-01 RX ADMIN — HYDROMORPHONE HYDROCHLORIDE 0.5 MG: 1 INJECTION, SOLUTION INTRAMUSCULAR; INTRAVENOUS; SUBCUTANEOUS at 17:18

## 2021-01-01 RX ADMIN — CEFAZOLIN SODIUM 1000 MG: 1 SOLUTION INTRAVENOUS at 09:55

## 2021-01-01 RX ADMIN — HYDROMORPHONE HYDROCHLORIDE 0.5 MG: 1 INJECTION, SOLUTION INTRAMUSCULAR; INTRAVENOUS; SUBCUTANEOUS at 22:05

## 2021-01-01 RX ADMIN — SODIUM CHLORIDE 15 UNITS/HR: 9 INJECTION, SOLUTION INTRAVENOUS at 05:18

## 2021-01-01 RX ADMIN — FENTANYL CITRATE 25 MCG: 50 INJECTION INTRAMUSCULAR; INTRAVENOUS at 15:38

## 2021-01-01 RX ADMIN — HYDRALAZINE HYDROCHLORIDE 10 MG: 20 INJECTION, SOLUTION INTRAMUSCULAR; INTRAVENOUS at 00:14

## 2021-01-01 RX ADMIN — HEPARIN SODIUM 18 UNITS/KG/HR: 10000 INJECTION, SOLUTION INTRAVENOUS at 14:22

## 2021-01-01 RX ADMIN — SODIUM CHLORIDE 9 UNITS/HR: 9 INJECTION, SOLUTION INTRAVENOUS at 13:03

## 2021-01-01 RX ADMIN — VANCOMYCIN HYDROCHLORIDE 1750 MG: 5 INJECTION, POWDER, LYOPHILIZED, FOR SOLUTION INTRAVENOUS at 16:25

## 2021-01-01 RX ADMIN — METOPROLOL TARTRATE 50 MG: 50 TABLET, FILM COATED ORAL at 22:40

## 2021-01-01 RX ADMIN — METOPROLOL TARTRATE 50 MG: 50 TABLET, FILM COATED ORAL at 08:52

## 2021-01-01 RX ADMIN — OXYCODONE HYDROCHLORIDE 5 MG: 5 TABLET ORAL at 04:09

## 2021-01-01 RX ADMIN — INSULIN GLARGINE 20 UNITS: 100 INJECTION, SOLUTION SUBCUTANEOUS at 23:28

## 2021-01-01 RX ADMIN — PANTOPRAZOLE SODIUM 40 MG: 40 INJECTION, POWDER, FOR SOLUTION INTRAVENOUS at 22:19

## 2021-01-01 RX ADMIN — SODIUM CHLORIDE 10 UNITS/HR: 9 INJECTION, SOLUTION INTRAVENOUS at 18:14

## 2021-01-01 RX ADMIN — NYSTATIN: 100000 POWDER TOPICAL at 18:49

## 2021-01-01 RX ADMIN — FENTANYL CITRATE 25 MCG: 50 INJECTION INTRAMUSCULAR; INTRAVENOUS at 14:23

## 2021-01-01 RX ADMIN — INSULIN LISPRO 3 UNITS: 100 INJECTION, SOLUTION INTRAVENOUS; SUBCUTANEOUS at 21:55

## 2021-01-01 RX ADMIN — HEPARIN SODIUM 13 UNITS/KG/HR: 10000 INJECTION, SOLUTION INTRAVENOUS at 00:07

## 2021-01-01 RX ADMIN — POLYETHYLENE GLYCOL 3350 17 G: 17 POWDER, FOR SOLUTION ORAL at 13:12

## 2021-01-01 RX ADMIN — OXYCODONE HYDROCHLORIDE 5 MG: 5 TABLET ORAL at 09:54

## 2021-01-01 RX ADMIN — INSULIN GLARGINE 32 UNITS: 100 INJECTION, SOLUTION SUBCUTANEOUS at 21:51

## 2021-01-01 RX ADMIN — PANTOPRAZOLE SODIUM 40 MG: 40 INJECTION, POWDER, FOR SOLUTION INTRAVENOUS at 20:16

## 2021-01-01 RX ADMIN — METOPROLOL TARTRATE 50 MG: 50 TABLET, FILM COATED ORAL at 10:39

## 2021-01-01 RX ADMIN — INSULIN LISPRO 2 UNITS: 100 INJECTION, SOLUTION INTRAVENOUS; SUBCUTANEOUS at 09:05

## 2021-01-01 RX ADMIN — ACETAMINOPHEN 650 MG: 325 TABLET, FILM COATED ORAL at 01:02

## 2021-01-01 RX ADMIN — DEXTROSE, SODIUM CHLORIDE, SODIUM LACTATE, POTASSIUM CHLORIDE, AND CALCIUM CHLORIDE 250 ML/HR: 5; .6; .31; .03; .02 INJECTION, SOLUTION INTRAVENOUS at 15:04

## 2021-01-01 RX ADMIN — DOCUSATE SODIUM 100 MG: 100 CAPSULE ORAL at 22:40

## 2021-01-01 RX ADMIN — OXYCODONE HYDROCHLORIDE 5 MG: 5 TABLET ORAL at 16:32

## 2021-01-01 RX ADMIN — VANCOMYCIN HYDROCHLORIDE 1250 MG: 5 INJECTION, POWDER, LYOPHILIZED, FOR SOLUTION INTRAVENOUS at 14:23

## 2021-01-01 RX ADMIN — OXYCODONE HYDROCHLORIDE 5 MG: 5 TABLET ORAL at 14:15

## 2021-01-01 RX ADMIN — NYSTATIN: 100000 POWDER TOPICAL at 18:05

## 2021-01-01 RX ADMIN — ACETAMINOPHEN 650 MG: 325 TABLET, FILM COATED ORAL at 23:02

## 2021-01-01 RX ADMIN — SODIUM CHLORIDE 15 UNITS/HR: 9 INJECTION, SOLUTION INTRAVENOUS at 23:29

## 2021-01-01 RX ADMIN — DOCUSATE SODIUM 100 MG: 100 CAPSULE ORAL at 08:45

## 2021-01-01 RX ADMIN — IODIXANOL 65 ML: 320 INJECTION, SOLUTION INTRAVASCULAR at 20:52

## 2021-01-01 RX ADMIN — FENTANYL CITRATE 25 MCG: 50 INJECTION INTRAMUSCULAR; INTRAVENOUS at 23:26

## 2021-01-01 RX ADMIN — HYDROMORPHONE HYDROCHLORIDE 0.5 MG: 1 INJECTION, SOLUTION INTRAMUSCULAR; INTRAVENOUS; SUBCUTANEOUS at 15:45

## 2021-01-01 RX ADMIN — HYDROMORPHONE HYDROCHLORIDE 0.5 MG: 1 INJECTION, SOLUTION INTRAMUSCULAR; INTRAVENOUS; SUBCUTANEOUS at 01:42

## 2021-01-01 RX ADMIN — ACETAMINOPHEN 650 MG: 325 TABLET, FILM COATED ORAL at 12:59

## 2021-01-01 RX ADMIN — INSULIN LISPRO 4 UNITS: 100 INJECTION, SOLUTION INTRAVENOUS; SUBCUTANEOUS at 13:54

## 2021-01-01 RX ADMIN — OXYCODONE HYDROCHLORIDE 5 MG: 5 TABLET ORAL at 14:21

## 2021-01-01 RX ADMIN — AMLODIPINE BESYLATE 10 MG: 10 TABLET ORAL at 08:52

## 2021-01-01 RX ADMIN — PANTOPRAZOLE SODIUM 40 MG: 40 INJECTION, POWDER, FOR SOLUTION INTRAVENOUS at 07:55

## 2021-01-01 RX ADMIN — CEFAZOLIN SODIUM 1000 MG: 1 SOLUTION INTRAVENOUS at 10:06

## 2021-01-01 RX ADMIN — HYDROMORPHONE HYDROCHLORIDE 0.5 MG: 1 INJECTION, SOLUTION INTRAMUSCULAR; INTRAVENOUS; SUBCUTANEOUS at 07:55

## 2021-01-01 RX ADMIN — PANTOPRAZOLE SODIUM 40 MG: 40 INJECTION, POWDER, FOR SOLUTION INTRAVENOUS at 22:01

## 2021-01-01 RX ADMIN — DOCUSATE SODIUM 100 MG: 100 CAPSULE ORAL at 09:54

## 2021-01-01 RX ADMIN — PANTOPRAZOLE SODIUM 40 MG: 40 INJECTION, POWDER, FOR SOLUTION INTRAVENOUS at 21:51

## 2021-01-01 RX ADMIN — PANTOPRAZOLE SODIUM 40 MG: 40 INJECTION, POWDER, FOR SOLUTION INTRAVENOUS at 08:45

## 2021-01-01 RX ADMIN — OXYCODONE HYDROCHLORIDE 5 MG: 5 TABLET ORAL at 08:34

## 2021-01-01 RX ADMIN — ATORVASTATIN CALCIUM 40 MG: 40 TABLET, FILM COATED ORAL at 17:33

## 2021-01-01 RX ADMIN — HYDROMORPHONE HYDROCHLORIDE 0.5 MG: 1 INJECTION, SOLUTION INTRAMUSCULAR; INTRAVENOUS; SUBCUTANEOUS at 01:54

## 2021-01-01 RX ADMIN — OXYCODONE HYDROCHLORIDE 5 MG: 5 TABLET ORAL at 01:04

## 2021-01-01 RX ADMIN — CALCIUM GLUCONATE 2 G: 20 INJECTION, SOLUTION INTRAVENOUS at 22:25

## 2021-01-01 RX ADMIN — ACETAMINOPHEN 650 MG: 325 TABLET, FILM COATED ORAL at 04:48

## 2021-01-01 RX ADMIN — SODIUM CHLORIDE, SODIUM LACTATE, POTASSIUM CHLORIDE, AND CALCIUM CHLORIDE 100 ML/HR: .6; .31; .03; .02 INJECTION, SOLUTION INTRAVENOUS at 06:21

## 2021-01-01 RX ADMIN — ACETAMINOPHEN 650 MG: 325 TABLET, FILM COATED ORAL at 07:21

## 2021-01-01 RX ADMIN — AMLODIPINE BESYLATE 10 MG: 10 TABLET ORAL at 08:33

## 2021-01-01 RX ADMIN — OXYCODONE HYDROCHLORIDE 5 MG: 5 TABLET ORAL at 19:40

## 2021-01-01 RX ADMIN — PANTOPRAZOLE SODIUM 40 MG: 40 INJECTION, POWDER, FOR SOLUTION INTRAVENOUS at 07:58

## 2021-01-01 RX ADMIN — APIXABAN 5 MG: 5 TABLET, FILM COATED ORAL at 17:33

## 2021-01-01 RX ADMIN — PANTOPRAZOLE SODIUM 40 MG: 40 INJECTION, POWDER, FOR SOLUTION INTRAVENOUS at 22:33

## 2021-01-01 RX ADMIN — SODIUM CHLORIDE 500 ML/HR: 0.9 INJECTION, SOLUTION INTRAVENOUS at 01:03

## 2021-01-01 RX ADMIN — SODIUM CHLORIDE, SODIUM GLUCONATE, SODIUM ACETATE, POTASSIUM CHLORIDE, MAGNESIUM CHLORIDE, SODIUM PHOSPHATE, DIBASIC, AND POTASSIUM PHOSPHATE 100 ML/HR: .53; .5; .37; .037; .03; .012; .00082 INJECTION, SOLUTION INTRAVENOUS at 10:40

## 2021-01-01 RX ADMIN — SODIUM BICARBONATE 50 MEQ: 84 INJECTION INTRAVENOUS at 18:44

## 2021-01-01 RX ADMIN — INSULIN LISPRO 4 UNITS: 100 INJECTION, SOLUTION INTRAVENOUS; SUBCUTANEOUS at 11:18

## 2021-01-01 RX ADMIN — NYSTATIN: 100000 POWDER TOPICAL at 09:34

## 2021-01-01 RX ADMIN — ATORVASTATIN CALCIUM 40 MG: 40 TABLET, FILM COATED ORAL at 16:59

## 2021-01-01 RX ADMIN — ATORVASTATIN CALCIUM 40 MG: 40 TABLET, FILM COATED ORAL at 18:48

## 2021-01-01 RX ADMIN — PANTOPRAZOLE SODIUM 40 MG: 40 INJECTION, POWDER, FOR SOLUTION INTRAVENOUS at 08:33

## 2021-01-01 RX ADMIN — OXYCODONE HYDROCHLORIDE 5 MG: 5 TABLET ORAL at 13:11

## 2021-01-01 RX ADMIN — OXYCODONE HYDROCHLORIDE 5 MG: 5 TABLET ORAL at 04:51

## 2021-01-01 RX ADMIN — DOCUSATE SODIUM 100 MG: 100 CAPSULE ORAL at 09:04

## 2021-01-01 RX ADMIN — METOPROLOL TARTRATE 50 MG: 50 TABLET, FILM COATED ORAL at 09:54

## 2021-01-01 RX ADMIN — HEPARIN SODIUM 16 UNITS/KG/HR: 10000 INJECTION, SOLUTION INTRAVENOUS at 04:40

## 2021-01-01 RX ADMIN — DOCUSATE SODIUM AND SENNOSIDES 1 TABLET: 8.6; 5 TABLET, FILM COATED ORAL at 22:40

## 2021-01-01 RX ADMIN — AMLODIPINE BESYLATE 10 MG: 10 TABLET ORAL at 09:31

## 2021-01-01 RX ADMIN — INSULIN GLARGINE 32 UNITS: 100 INJECTION, SOLUTION SUBCUTANEOUS at 22:43

## 2021-01-01 RX ADMIN — CEFAZOLIN SODIUM 1000 MG: 1 SOLUTION INTRAVENOUS at 11:09

## 2021-01-01 RX ADMIN — METOPROLOL TARTRATE 50 MG: 50 TABLET, FILM COATED ORAL at 08:33

## 2021-01-01 RX ADMIN — SODIUM CHLORIDE 500 ML/HR: 0.9 INJECTION, SOLUTION INTRAVENOUS at 22:12

## 2021-01-01 RX ADMIN — HYDRALAZINE HYDROCHLORIDE 5 MG: 20 INJECTION, SOLUTION INTRAMUSCULAR; INTRAVENOUS at 23:02

## 2021-01-01 RX ADMIN — AMLODIPINE BESYLATE 10 MG: 10 TABLET ORAL at 09:19

## 2021-01-01 RX ADMIN — INSULIN GLARGINE 32 UNITS: 100 INJECTION, SOLUTION SUBCUTANEOUS at 22:07

## 2021-01-01 RX ADMIN — SODIUM BICARBONATE 50 MEQ: 84 INJECTION, SOLUTION INTRAVENOUS at 22:58

## 2021-01-01 RX ADMIN — AMLODIPINE BESYLATE 5 MG: 5 TABLET ORAL at 23:32

## 2021-01-01 RX ADMIN — NYSTATIN: 100000 POWDER TOPICAL at 08:53

## 2021-01-01 RX ADMIN — DEXTROSE, SODIUM CHLORIDE, SODIUM LACTATE, POTASSIUM CHLORIDE, AND CALCIUM CHLORIDE 250 ML/HR: 5; .6; .31; .03; .02 INJECTION, SOLUTION INTRAVENOUS at 06:42

## 2021-01-01 RX ADMIN — HYDROMORPHONE HYDROCHLORIDE 0.5 MG: 1 INJECTION, SOLUTION INTRAMUSCULAR; INTRAVENOUS; SUBCUTANEOUS at 04:52

## 2021-01-01 RX ADMIN — PANTOPRAZOLE SODIUM 40 MG: 40 INJECTION, POWDER, FOR SOLUTION INTRAVENOUS at 20:50

## 2021-01-01 RX ADMIN — PANTOPRAZOLE SODIUM 40 MG: 40 INJECTION, POWDER, FOR SOLUTION INTRAVENOUS at 22:09

## 2021-01-01 RX ADMIN — INSULIN LISPRO 2 UNITS: 100 INJECTION, SOLUTION INTRAVENOUS; SUBCUTANEOUS at 09:54

## 2021-01-01 RX ADMIN — PANTOPRAZOLE SODIUM 40 MG: 40 INJECTION, POWDER, FOR SOLUTION INTRAVENOUS at 22:55

## 2021-01-01 RX ADMIN — CEFAZOLIN SODIUM 1000 MG: 1 SOLUTION INTRAVENOUS at 11:02

## 2021-01-01 RX ADMIN — SODIUM CHLORIDE, SODIUM GLUCONATE, SODIUM ACETATE, POTASSIUM CHLORIDE, MAGNESIUM CHLORIDE, SODIUM PHOSPHATE, DIBASIC, AND POTASSIUM PHOSPHATE 100 ML/HR: .53; .5; .37; .037; .03; .012; .00082 INJECTION, SOLUTION INTRAVENOUS at 21:34

## 2021-01-01 RX ADMIN — AMLODIPINE BESYLATE 10 MG: 10 TABLET ORAL at 08:24

## 2021-01-01 RX ADMIN — CEFAZOLIN SODIUM 1000 MG: 1 SOLUTION INTRAVENOUS at 23:37

## 2021-01-01 RX ADMIN — METOPROLOL TARTRATE 50 MG: 50 TABLET, FILM COATED ORAL at 07:58

## 2021-01-01 RX ADMIN — INSULIN LISPRO 2 UNITS: 100 INJECTION, SOLUTION INTRAVENOUS; SUBCUTANEOUS at 22:10

## 2021-01-01 RX ADMIN — ACETAMINOPHEN 650 MG: 325 TABLET, FILM COATED ORAL at 05:40

## 2021-01-01 RX ADMIN — ACETAMINOPHEN 650 MG: 325 TABLET, FILM COATED ORAL at 04:09

## 2021-01-01 RX ADMIN — NYSTATIN 1 APPLICATION: 100000 POWDER TOPICAL at 09:20

## 2021-01-01 RX ADMIN — NYSTATIN: 100000 POWDER TOPICAL at 18:28

## 2021-01-01 RX ADMIN — PANTOPRAZOLE SODIUM 40 MG: 40 INJECTION, POWDER, FOR SOLUTION INTRAVENOUS at 09:34

## 2021-01-01 RX ADMIN — CALCIUM GLUCONATE 1 G: 20 INJECTION, SOLUTION INTRAVENOUS at 20:23

## 2021-01-01 RX ADMIN — SODIUM CHLORIDE 4 UNITS/HR: 9 INJECTION, SOLUTION INTRAVENOUS at 10:38

## 2021-01-01 RX ADMIN — ACETAMINOPHEN 650 MG: 325 TABLET, FILM COATED ORAL at 17:49

## 2021-01-01 RX ADMIN — LABETALOL HYDROCHLORIDE 10 MG: 5 INJECTION, SOLUTION INTRAVENOUS at 01:06

## 2021-01-01 RX ADMIN — METOPROLOL TARTRATE 50 MG: 50 TABLET, FILM COATED ORAL at 21:51

## 2021-01-01 RX ADMIN — HYDROMORPHONE HYDROCHLORIDE 1 MG: 1 INJECTION, SOLUTION INTRAMUSCULAR; INTRAVENOUS; SUBCUTANEOUS at 23:02

## 2021-01-01 RX ADMIN — NYSTATIN: 100000 POWDER TOPICAL at 17:00

## 2021-01-01 RX ADMIN — POLYETHYLENE GLYCOL 3350 17 G: 17 POWDER, FOR SOLUTION ORAL at 07:28

## 2021-01-01 RX ADMIN — INSULIN LISPRO 4 UNITS: 100 INJECTION, SOLUTION INTRAVENOUS; SUBCUTANEOUS at 18:03

## 2021-01-01 RX ADMIN — SODIUM CHLORIDE, SODIUM LACTATE, POTASSIUM CHLORIDE, AND CALCIUM CHLORIDE 100 ML/HR: .6; .31; .03; .02 INJECTION, SOLUTION INTRAVENOUS at 22:24

## 2021-01-01 RX ADMIN — DEXTROSE, SODIUM CHLORIDE, SODIUM LACTATE, POTASSIUM CHLORIDE, AND CALCIUM CHLORIDE 250 ML/HR: 5; .6; .31; .03; .02 INJECTION, SOLUTION INTRAVENOUS at 10:44

## 2021-01-01 RX ADMIN — INSULIN LISPRO 2 UNITS: 100 INJECTION, SOLUTION INTRAVENOUS; SUBCUTANEOUS at 17:35

## 2021-01-01 RX ADMIN — INSULIN LISPRO 1 UNITS: 100 INJECTION, SOLUTION INTRAVENOUS; SUBCUTANEOUS at 22:19

## 2021-01-01 RX ADMIN — BISACODYL 10 MG: 10 SUPPOSITORY RECTAL at 11:14

## 2021-01-01 RX ADMIN — OXYCODONE HYDROCHLORIDE 5 MG: 5 TABLET ORAL at 20:13

## 2021-01-01 RX ADMIN — METOPROLOL TARTRATE 50 MG: 50 TABLET, FILM COATED ORAL at 22:03

## 2021-01-01 RX ADMIN — OXYCODONE HYDROCHLORIDE 5 MG: 5 TABLET ORAL at 19:47

## 2021-01-01 RX ADMIN — ATORVASTATIN CALCIUM 40 MG: 40 TABLET, FILM COATED ORAL at 18:27

## 2021-01-01 RX ADMIN — DOCUSATE SODIUM AND SENNOSIDES 1 TABLET: 8.6; 5 TABLET, FILM COATED ORAL at 22:01

## 2021-01-01 RX ADMIN — MIDAZOLAM HYDROCHLORIDE 2 MG: 1 INJECTION, SOLUTION INTRAMUSCULAR; INTRAVENOUS at 19:36

## 2021-01-01 RX ADMIN — ACETAMINOPHEN 650 MG: 325 TABLET, FILM COATED ORAL at 19:37

## 2021-01-01 RX ADMIN — LABETALOL HYDROCHLORIDE 10 MG: 5 INJECTION, SOLUTION INTRAVENOUS at 02:50

## 2021-01-01 RX ADMIN — ALBUMIN (HUMAN) 25 G: 0.25 INJECTION, SOLUTION INTRAVENOUS at 22:59

## 2021-01-01 RX ADMIN — INSULIN GLARGINE 30 UNITS: 100 INJECTION, SOLUTION SUBCUTANEOUS at 22:40

## 2021-01-01 RX ADMIN — ACETAMINOPHEN 650 MG: 325 TABLET, FILM COATED ORAL at 04:51

## 2021-01-01 RX ADMIN — DESMOPRESSIN ACETATE 34.4 MCG: 4 SOLUTION INTRAVENOUS at 21:38

## 2021-01-01 RX ADMIN — INSULIN LISPRO 2 UNITS: 100 INJECTION, SOLUTION INTRAVENOUS; SUBCUTANEOUS at 12:23

## 2021-01-01 RX ADMIN — CEFAZOLIN SODIUM 1000 MG: 1 SOLUTION INTRAVENOUS at 22:50

## 2021-01-01 RX ADMIN — HYDROMORPHONE HYDROCHLORIDE 0.5 MG: 1 INJECTION, SOLUTION INTRAMUSCULAR; INTRAVENOUS; SUBCUTANEOUS at 05:38

## 2021-01-01 RX ADMIN — IODIXANOL 100 ML: 320 INJECTION, SOLUTION INTRAVASCULAR at 19:03

## 2021-01-01 RX ADMIN — PANTOPRAZOLE SODIUM 40 MG: 40 INJECTION, POWDER, FOR SOLUTION INTRAVENOUS at 09:54

## 2021-02-11 NOTE — ED PROVIDER NOTES
History  Chief Complaint   Patient presents with    Chest Pain     reports mid/epigastric chest pain described as burning that radiates into his stomach  reports pain has been intermittent x1 week  reports tonight pain "is not going away" prompting his visit to ED  76 yr  Male - with hx of iddm--  htn-  Pt c/o 1 week of intermitent sharp upper abd pain- with n o radiating-  At times made  Worse with meals- states tonight pain has been constant- no new or increase in gerd- dyspepsia/ no dysphagia/ no melena- no fever/ uri/cough/cpsob/ no other comps- no gu/ comps-  Pt has baselien ble edema - erythema- no change - improved as per wife-- pt denies any intestinal angina type pain --  With v/d- after eating       History provided by:  Patient   used: No        Prior to Admission Medications   Prescriptions Last Dose Informant Patient Reported? Taking?   acetaminophen (TYLENOL) 325 mg tablet   No No   Sig: Take 3 tablets (975 mg total) by mouth every 8 (eight) hours as needed for moderate pain   amLODIPine (NORVASC) 5 mg tablet   No No   Sig: Take 1 tablet (5 mg total) by mouth daily   aspirin 81 mg chewable tablet  Self Yes No   Sig: Chew 81 mg daily  atorvastatin (LIPITOR) 40 mg tablet   No No   Sig: Take 1 tablet (40 mg total) by mouth every evening   cyanocobalamin (VITAMIN B-12) 250 MCG tablet   No No   Sig: Take 4 tablets (1,000 mcg total) by mouth daily   docusate sodium (COLACE) 100 mg capsule   No No   Sig: Take 1 capsule (100 mg total) by mouth 2 (two) times a day as needed for constipation   Patient not taking: Reported on 11/28/2020   ezetimibe (ZETIA) 10 mg tablet   No No   Sig: Take 1 tablet (10 mg total) by mouth daily at bedtime   fenofibrate (TRICOR) 145 mg tablet  Self Yes No   Sig: Take 145 mg by mouth daily     insulin regular (HUMULIN R) 500 units/mL CONCENTRATED injection   No No   Sig: Inject 0 17 mL (85 Units total) under the skin 3 (three) times a day before meals metoprolol tartrate (LOPRESSOR) 50 mg tablet  Self Yes No   Sig: Take 50 mg by mouth 2 (two) times a day  pantoprazole (PROTONIX) 40 mg tablet  Self Yes No   Sig: Take 40 mg by mouth daily   warfarin (COUMADIN) 5 mg tablet   No No   Sig: Take 0 5 tablets (2 5 mg total) by mouth daily      Facility-Administered Medications: None       Past Medical History:   Diagnosis Date    Bell's palsy     Cardiac disease     [facility] )     Diabetes mellitus ([place] [address] )     Fracture     L hip    Hyperlipidemia     Hypertension     Renal disorder     Stroke ([facility] )     2013    Stroke [facility])     [id number]       Past Surgical History:   Procedure Laterality Date    CORONARY ANGIOPLASTY WITH STENT PLACEMENT      FRACTURE SURGERY      L femur    INCISION AND DRAINAGE OF WOUND Right 2016    Procedure: INCISION AND DRAINAGE (I&D) EXTREMITY, right lateral ankle;  Surgeon: Bulmaro Barclay DPM;  [facility];  Service:     WOUND DEBRIDEMENT Right 9/15/2016    Procedure: DEBRIDEMENT WOUND ([address]) ankle wound with closure and FRANCY drain placement;  Surgeon: Bulmaro Barclay DPM;  [facility];  Service:        Family History   Problem Relation Age of Onset    Diabetes Mother     Stroke Mother      I have reviewed and agree with the history as documented  E-Cigarette/Vaping    E-Cigarette Use Never User      E-Cigarette/Vaping Substances     Social History     Tobacco Use    Smoking status: Former Smoker     Packs/day: 0 00     Types: Cigarettes     Quit date: 9/3/2006     Years since quittin 4    Smokeless tobacco: Never Used   Substance Use Topics    Alcohol use: No    Drug use: No       Review of Systems   Constitutional: Negative  HENT: Negative  Eyes: Negative  Respiratory: Negative  Cardiovascular: Negative  Gastrointestinal: Positive for abdominal pain   Negative for abdominal distention, anal bleeding, blood in stool, constipation, diarrhea, nausea, rectal pain and vomiting  Endocrine: Negative  Genitourinary: Negative  Musculoskeletal: Negative  Skin: Negative  Allergic/Immunologic: Negative  Neurological: Negative  Psychiatric/Behavioral: Negative  Physical Exam  Physical Exam  Vitals signs and nursing note reviewed  Constitutional:       General: He is not in acute distress  Appearance: He is well-developed  He is not ill-appearing, toxic-appearing or diaphoretic  Comments: avss-- htnsive- which improved in er -- midl tachypnea- pulse ox 92-96 5 on ra- interpretation is normal- no intervention    HENT:      Head: Normocephalic and atraumatic  Eyes:      Extraocular Movements: Extraocular movements intact  Pupils: Pupils are equal, round, and reactive to light  Comments: Mm pink   Neck:      Musculoskeletal: Normal range of motion and neck supple  Thyroid: No thyromegaly  Vascular: No hepatojugular reflux or JVD  Trachea: No tracheal deviation  Comments: No pmt c/t/l/s spine   Cardiovascular:      Rate and Rhythm: Normal rate and regular rhythm  Extrasystoles are present  Chest Wall: PMI is not displaced  Pulses:           Radial pulses are 3+ on the right side and 3+ on the left side  Dorsalis pedis pulses are 3+ on the right side and 3+ on the left side  Heart sounds: Normal heart sounds  Heart sounds not distant  No murmur  No systolic murmur  No diastolic murmur  No friction rub  No gallop  No S3 or S4 sounds  Pulmonary:      Effort: Tachypnea present  No accessory muscle usage or respiratory distress  Breath sounds: No stridor  No decreased breath sounds, wheezing, rhonchi or rales  Chest:      Chest wall: No mass, deformity, tenderness, crepitus or edema  There is no dullness to percussion  Abdominal:      General: Bowel sounds are normal  There is no abdominal bruit  Palpations: Abdomen is soft   There is no fluid wave, hepatomegaly, splenomegaly or mass  Tenderness: There is abdominal tenderness  There is no guarding or rebound  Comments: Pos epigastric/  Ruq/luq tenderness- pain greatest at ruq--  No peritoneal signs- no cva tenderness - positive reducible umbilical hernia    Musculoskeletal: Normal range of motion  Right lower leg: He exhibits tenderness  Edema present  Left lower leg: He exhibits no tenderness  Edema present  Comments: Equal bilateral radial/dp pulses-    Lymphadenopathy:      Cervical: No cervical adenopathy  Skin:     General: Skin is dry  Capillary Refill: Capillary refill takes less than 2 seconds  Coloration: Skin is not cyanotic or pale  Findings: Erythema present  No ecchymosis or rash  Nails: There is no clubbing  Comments: 1 plus ble pretibial edema-  With bilateral anterior  tibial erythema- with several bilateral areas of excoriation- chronic- improved as per wife     - bilateral axillary tinea appearing rash    Neurological:      General: No focal deficit present  Mental Status: He is alert and oriented to person, place, and time  Cranial Nerves: No cranial nerve deficit  Motor: No weakness  Comments: Normal non focal neuro exam    Psychiatric:         Mood and Affect: Mood normal  Mood is not anxious  Behavior: Behavior normal  Behavior is not agitated           Vital Signs  ED Triage Vitals   Temperature Pulse Respirations Blood Pressure SpO2   02/10/21 2122 02/10/21 2122 02/10/21 2122 02/10/21 2122 02/10/21 2122   99 5 °F (37 5 °C) 79 (!) 24 (!) 242/112 94 %      Temp src Heart Rate Source Patient Position - Orthostatic VS BP Location FiO2 (%)   -- 02/10/21 2245 02/10/21 2245 02/10/21 2245 --    Monitor Lying Right arm       Pain Score       02/10/21 2122       4           Vitals:    02/10/21 2122 02/10/21 2245 02/10/21 2315   BP: (!) 242/112 (!) 214/91 (!) 204/88   Pulse: 79 66 68   Patient Position - Orthostatic VS:  Lying Lying Visual Acuity      ED Medications  Medications   acetaminophen (TYLENOL) tablet 650 mg (650 mg Oral Given 2/10/21 2302)   HYDROmorphone (DILAUDID) injection 1 mg (1 mg Intravenous Given 2/10/21 2302)   amLODIPine (NORVASC) tablet 5 mg (5 mg Oral Given 2/10/21 2332)       Diagnostic Studies  Results Reviewed     Procedure Component Value Units Date/Time    Protime-INR [[id number]]  (Abnormal) Collected: 02/10/21 2236    Lab Status: Final result Specimen: Blood from Arm, Right Updated: 02/10/21 2321     Protime 24 8 seconds      INR 2 24    Comprehensive metabolic panel [[id number]]  (Abnormal) Collected: 02/10/21 2236    Lab Status: Final result Specimen: Blood from Arm, Right Updated: 02/10/21 2307     Sodium 145 mmol/L      Potassium 4 3 mmol/L      Chloride 112 mmol/L      CO2 24 mmol/L      ANION GAP 9 mmol/L      BUN 28 mg/dL      Creatinine 1 94 mg/dL      Glucose 192 mg/dL      Calcium 8 7 mg/dL      Corrected Calcium 9 7 mg/dL      AST 25 U/L      ALT 17 U/L      Alkaline Phosphatase 37 U/L      Total Protein 6 8 g/dL      Albumin 2 7 g/dL      Total Bilirubin 0 11 mg/dL      eGFR 35 ml/min/1 73sq m     Narrative:      [place] guidelines for Chronic Kidney Disease (CKD):     Stage 1 with normal or high GFR (GFR > 90 mL/min/1 73 square meters)    Stage 2 Mild CKD (GFR = 60-89 mL/min/1 73 square meters)    Stage 3A Moderate CKD (GFR = 45-59 mL/min/1 73 square meters)    Stage 3B Moderate CKD (GFR = 30-44 mL/min/1 73 square meters)    Stage 4 Severe CKD (GFR = 15-29 mL/min/1 73 square meters)    Stage 5 End Stage CKD (GFR <15 mL/min/1 73 square meters)  Note: GFR calculation is accurate only with a steady state creatinine    Lipase [[id number]]  (Normal) Collected: 02/10/21 2236    Lab Status: Final result Specimen: Blood from Arm, Right Updated: 02/10/21 2307     Lipase 141 u/L     Troponin I [[id number]]  (Normal) Collected: 02/10/21 2236    Lab Status: Final result Specimen: Blood from Arm, Right Updated: 02/10/21 2304     Troponin I <0 02 ng/mL     CBC and differential [[id number]]  (Abnormal) Collected: 02/10/21 2236    Lab Status: Final result Specimen: Blood from Arm, Right Updated: 02/10/21 2259     WBC 5 92 Thousand/uL      RBC 4 24 Million/uL      Hemoglobin 11 8 g/dL      Hematocrit 39 1 %      MCV 92 fL      MCH 27 8 pg      MCHC 30 2 g/dL      RDW 14 4 %      MPV 10 2 fL      Platelets 000 Thousands/uL      nRBC 0 /100 WBCs      Neutrophils Relative 74 %      Immat GRANS % 2 %      Lymphocytes Relative 14 %      Monocytes Relative 7 %      Eosinophils Relative 3 %      Basophils Relative 0 %      Neutrophils Absolute 4 43 Thousands/µL      Immature Grans Absolute 0 09 Thousand/uL      Lymphocytes Absolute 0 82 Thousands/µL      Monocytes Absolute 0 39 Thousand/µL      Eosinophils Absolute 0 17 Thousand/µL      Basophils Absolute 0 02 Thousands/µL                  US gallbladder   Final Result by Rigoberto Denny MD (02/10 2319)      1  No evidence of cholelithiasis, cholecystitis or biliary dilatation  2   Hepatomegaly and hepatic steatosis  3   Right renal cysts        Workstation performed: [id number]         CT abdomen pelvis wo contrast    (Results Pending)              Procedures  Procedures         ED Course  ED Course as of Feb 11 0055   Wed Feb 10, 2021   2325 Er md note- pt- re-evaluated- states pain has completely resolved- repeat abd exam- soft- nt-  reviewed lab/ and ruq u/s finding with pt and wife --  non contrast ct of abd/pelvis order placed       2328 - er md note- pt has been similarly hypertensive during past er vitis and admits- pt states complaint with all meds       Thu Feb 11, 2021   0041 Er md note- pt- re-evaluATED - AGAIN ALL ABD PAIN HAS RESOLVED-  ON REPEAT ABD EXAM- SOFT NT/ND- -  PT TOLERATED LIQUIDS IN ER WITH NO COMPS- WILL D/C                                SBIRT 20yo+      Most Recent Value   SBIRT (23 yo +)   In order to provide better care to our patients, we are screening all of our patients for alcohol and drug use  Would it be okay to ask you these screening questions? Yes Filed at: 02/10/2021 2259   Initial Alcohol Screen: US AUDIT-C    1  How often do you have a drink containing alcohol? 1 Filed at: 02/10/2021 2259   2  How many drinks containing alcohol do you have on a typical day you are drinking? 0 Filed at: 02/10/2021 2259   3a  Male UNDER 65: How often do you have five or more drinks on one occasion? 0 Filed at: 02/10/2021 2259   3b  FEMALE Any Age, or MALE 65+: How often do you have 4 or more drinks on one occassion? 0 Filed at: 02/10/2021 2259   Audit-C Score  1 Filed at: 02/10/2021 2259   YADIRA: How many times in the past year have you    Used an illegal drug or used a prescription medication for non-medical reasons? Never Filed at: 02/10/2021 2259                    MDM    Disposition  Final diagnoses:   None     ED Disposition     None      Follow-up Information    None         Patient's Medications   Discharge Prescriptions    No medications on file     No discharge procedures on file      PDMP Review     None          ED Provider  Electronically Signed by           Tigist Fregoso MD  [phone]

## 2021-02-11 NOTE — ED PROCEDURE NOTE
PROCEDURE  ECG 12 Lead Documentation Only    Date/Time: 2/10/2021 11:29 PM  Performed by: Yoon Freeman MD  Authorized by: Yoon Freeman MD     Indications / Diagnosis:  UPPER ABD PAIN   ECG reviewed by me, the ED Provider: yes    Patient location:  ED  Previous ECG:     Previous ECG:  Compared to current    Comparison ECG info:  11/28/20- OTHER THAN APC AND PVC  NO SIGN CHNGES    Similarity:  Changes noted    Comparison to cardiac monitor: Yes    Interpretation:     Interpretation: non-specific    Rate:     ECG rate:  79    ECG rate assessment: normal    Rhythm:     Rhythm: sinus rhythm    Ectopy:     Ectopy: PAC and PVCs      PVCs:  Infrequent and unifocal  QRS:     QRS axis:  Normal    QRS intervals:  Normal  Conduction:     Conduction: normal    ST segments:     ST segments:  Normal  T waves:     T waves: flattening      Flattening:  I, aVL and V1  Q waves:     Q waves:  V1  Other findings:     Other findings: poor R wave progression and U wave    Comments:      NOP ECG SIGNS OF ISCHEMIA/ INJURY / Matthew Hess / Thu Manuel MD  02/10/21 0988

## 2021-02-11 NOTE — DISCHARGE INSTRUCTIONS
DIAGNOSIS; UPPER ABDOMINAL PAIN- RESOLVED-- TINEA CORPORIS - FUNGaL RASH  IN BOTH ARMPITS/ HIGH BLOOD PRESSURE- WATCH     - ACTIVITY / DIET AS TOLERATED     - FOR UPPER ABDOMINAL PAIN- OVER THE COUNTER TYLENOL 500 MG EVERY 4 HRS     - PLEASE CALL YOUR PRIMARY DOCTOR TOMORRow TO SCHEDULE AN APPOINTMENT FOR A recheck within 1 week     - please return to  the er for any fevers- temp > 100 4/ / any worsening/ intractable aBDOMINAL PAIN/ ANY PERSISTENT VOMITING/ ANY BLOODY/COFFEE GROUND VOMITUS/ ANY BLOODY /BLACK TARRY  STOOLS  ANY SPREADING REDNESS UP BOTH LEGS OR ANY NEW/ WORSENING/CONCERNING SYMPTOMS TO YOU     - IF YOU CHECK YOUR BLOOD PRESSURE AT HOME IT SHOULD BE PERSISTENTLY UNDER 140/90 -     - FOR RASH UNDER BOTH ARMPITS- NYSTATIN POWDER- APPLY 2 TIMES PER DAY FOR NEXT 2-3 WEEKS OR UNTIL RASH IS GONE

## 2021-12-17 PROBLEM — D64.9 ANEMIA: Status: ACTIVE | Noted: 2021-01-01

## 2021-12-17 PROBLEM — L03.116 CELLULITIS OF LEFT LOWER EXTREMITY: Status: ACTIVE | Noted: 2020-09-28

## 2021-12-17 PROBLEM — N18.9 ACUTE KIDNEY INJURY SUPERIMPOSED ON CHRONIC KIDNEY DISEASE (HCC): Status: ACTIVE | Noted: 2021-01-01

## 2021-12-17 PROBLEM — A41.9 SEPSIS (HCC): Status: ACTIVE | Noted: 2021-01-01

## 2021-12-17 PROBLEM — R79.1 SUPRATHERAPEUTIC INR: Status: ACTIVE | Noted: 2021-01-01

## 2021-12-17 PROBLEM — N17.9 ACUTE KIDNEY INJURY SUPERIMPOSED ON CHRONIC KIDNEY DISEASE (HCC): Status: ACTIVE | Noted: 2021-01-01

## 2021-12-17 PROBLEM — E11.10 DIABETIC KETOACIDOSIS ASSOCIATED WITH TYPE 2 DIABETES MELLITUS (HCC): Status: ACTIVE | Noted: 2021-01-01

## 2021-12-17 NOTE — ED PROVIDER NOTES
History  Chief Complaint   Patient presents with   • Dizziness     states he has been dizzy and having high BGS for a week      Mr Sydni Winslow is a 78 yom with history of IDDM 2 presenting for chief complaint lightheadedness  States he has progressively increasing lightheadedness x 1 week, also states blood glucose levels have been increasing daily, is taking insulin but not at the dose prescribed  Per wife, pt has not been eating for the past 2 days, is excessively thirsty but vomits immediately after drinking, and is lethargic, slowed, and profoundly weak today  Pt also complains of 5/10 temporoparietal headache x 1 week, non-radiating, no alleviating or aggravating features, 5/10  No fevers, chills, blurred vision, URI symptoms, chest pain, shortness of breath, abdominal pain, diarrhea, constipation, dysuria, or peripheral edema  Prior to Admission Medications   Prescriptions Last Dose Informant Patient Reported? Taking?   acetaminophen (TYLENOL) 325 mg tablet   No No   Sig: Take 3 tablets (975 mg total) by mouth every 8 (eight) hours as needed for moderate pain   amLODIPine (NORVASC) 5 mg tablet   No No   Sig: Take 1 tablet (5 mg total) by mouth daily   aspirin 81 mg chewable tablet  Self Yes No   Sig: Chew 81 mg daily  atorvastatin (LIPITOR) 40 mg tablet   No No   Sig: Take 1 tablet (40 mg total) by mouth every evening   cyanocobalamin (VITAMIN B-12) 250 MCG tablet   No No   Sig: Take 4 tablets (1,000 mcg total) by mouth daily   docusate sodium (COLACE) 100 mg capsule   No No   Sig: Take 1 capsule (100 mg total) by mouth 2 (two) times a day as needed for constipation   Patient not taking: Reported on 11/28/2020   ezetimibe (ZETIA) 10 mg tablet   No No   Sig: Take 1 tablet (10 mg total) by mouth daily at bedtime   fenofibrate (TRICOR) 145 mg tablet  Self Yes No   Sig: Take 145 mg by mouth daily     insulin regular (HUMULIN R) 500 units/mL CONCENTRATED injection   No No   Sig: Inject 0 17 mL (85 Units total) under the skin 3 (three) times a day before meals   metoprolol tartrate (LOPRESSOR) 50 mg tablet  Self Yes No   Sig: Take 50 mg by mouth 2 (two) times a day  nystatin (MYCOSTATIN) powder   No No   Sig: Apply topically 2 (two) times a day   pantoprazole (PROTONIX) 40 mg tablet  Self Yes No   Sig: Take 40 mg by mouth daily   warfarin (COUMADIN) 5 mg tablet   No No   Sig: Take 0 5 tablets (2 5 mg total) by mouth daily      Facility-Administered Medications: None       Past Medical History:   Diagnosis Date   • Bell's palsy    • Cardiac disease    • Cerebellar stroke ([facility] [address] )    • Diabetes mellitus ([facility] [address] )    • Fracture     L hip   • Hyperlipidemia    • Hypertension    • Renal disorder    • Stroke [facility])     2013   • Stroke [facility])     [id number]       Past Surgical History:   Procedure Laterality Date   • CORONARY ANGIOPLASTY WITH STENT PLACEMENT     • FRACTURE SURGERY      L femur   • INCISION AND DRAINAGE OF WOUND Right 9/13/2016    Procedure: INCISION AND DRAINAGE (I&D) EXTREMITY, right lateral ankle;  Surgeon: Alesha Barlow DPM;  Location: [facility];  Service:    • WOUND DEBRIDEMENT Right 9/15/2016    Procedure: DEBRIDEMENT WOUND [facility] OUT) ankle wound with closure and FRANCY drain placement;  Surgeon: Alesha Barlow DPM;  Location: [facility];  Service:        Family History   Problem Relation Age of Onset   • Diabetes Mother    • Stroke Mother      I have reviewed and agree with the history as documented  E-Cigarette/Vaping   • E-Cigarette Use Never User      E-Cigarette/Vaping Substances     Social History     Tobacco Use   • Smoking status: Former Smoker     Packs/day: 0 00     Types: Cigarettes     Quit date: 9/3/2006     Years since quitting: 15 2   • Smokeless tobacco: Never Used   Vaping Use   • Vaping Use: Never used   Substance Use Topics   • Alcohol use: No   • Drug use: No        Review of Systems   Constitutional: Positive for activity change, appetite change and fatigue   Negative for chills, diaphoresis, fever and unexpected weight change  HENT: Negative  Negative for congestion, postnasal drip, rhinorrhea, sinus pain, sneezing, trouble swallowing and voice change  Eyes: Negative  Negative for photophobia and visual disturbance  Respiratory: Positive for shortness of breath  Negative for cough and wheezing  Cardiovascular: Negative  Negative for chest pain, palpitations and leg swelling  Gastrointestinal: Positive for nausea and vomiting  Negative for abdominal distention, abdominal pain, constipation and diarrhea  Endocrine: Positive for polydipsia and polyuria  Negative for polyphagia  Genitourinary: Negative  Negative for dysuria and hematuria  Musculoskeletal: Positive for gait problem  Negative for arthralgias, myalgias, neck pain and neck stiffness  Unable to ambulate 2/2 generalized weakness  Skin: Positive for wound  Cuts on lower legs from scratching, chronic  Allergic/Immunologic: Negative  Neurological: Positive for dizziness, weakness, light-headedness and headaches  Negative for syncope, facial asymmetry and numbness  Hematological: Negative  Psychiatric/Behavioral: Negative  Negative for confusion  All other systems reviewed and are negative  Physical Exam  ED Triage Vitals   Temperature Pulse Respirations Blood Pressure SpO2   12/17/21 1501 12/17/21 1501 12/17/21 1501 12/17/21 1501 12/17/21 2029   98 2 °F (36 8 °C) 66 18 113/58 96 %      Temp Source Heart Rate Source Patient Position - Orthostatic VS BP Location FiO2 (%)   12/17/21 2156 12/17/21 2029 12/17/21 2029 12/17/21 2029 --   Oral Monitor Sitting Right arm       Pain Score       --                    Orthostatic Vital Signs  Vitals:    12/17/21 2100 12/17/21 2130 12/17/21 2156 12/17/21 2200   BP: 146/66 147/65 153/72 153/72   Pulse: 72 74 75 74   Patient Position - Orthostatic VS: Lying Lying Lying        Physical Exam  Vitals and nursing note reviewed  Constitutional:       Appearance: He is obese  He is ill-appearing  HENT:      Head: Normocephalic and atraumatic  Right Ear: External ear normal       Left Ear: External ear normal       Nose: Nose normal       Mouth/Throat:      Mouth: Mucous membranes are dry  Pharynx: Oropharynx is clear  No oropharyngeal exudate or posterior oropharyngeal erythema  Eyes:      General: No scleral icterus  Right eye: No discharge  Left eye: No discharge  Extraocular Movements: Extraocular movements intact  Conjunctiva/sclera: Conjunctivae normal    Cardiovascular:      Rate and Rhythm: Normal rate and regular rhythm  Pulses: Normal pulses  Heart sounds: Normal heart sounds  No murmur heard  No gallop  Pulmonary:      Effort: Pulmonary effort is normal  Tachypnea present  No accessory muscle usage  Breath sounds: Normal breath sounds  No decreased breath sounds, wheezing, rhonchi or rales  Abdominal:      General: Abdomen is protuberant  Bowel sounds are normal  There is no distension  Palpations: Abdomen is soft  Tenderness: There is no abdominal tenderness  Musculoskeletal:         General: No swelling or tenderness  Normal range of motion  Cervical back: Normal range of motion and neck supple  No tenderness  Right lower leg: No edema  Left lower leg: No edema  Lymphadenopathy:      Cervical: No cervical adenopathy  Skin:     General: Skin is warm and dry  Capillary Refill: Capillary refill takes less than 2 seconds  Comments: Multiple excoriations on bilateral lower extremities and behind right ear without evidence of bleeding or infection  Neurological:      General: No focal deficit present  Mental Status: He is oriented to person, place, and time and easily aroused  Cranial Nerves: No cranial nerve deficit  Sensory: No sensory deficit  Motor: Weakness present        Coordination: Coordination normal  Comments: Generalized weakness, no focal findings  Psychiatric:         Behavior: Behavior is cooperative  ED Medications  Medications   acetaminophen (TYLENOL) tablet 650 mg (has no administration in time range)   dextrose 5 % in lactated Ringer's infusion (0 mL/hr Intravenous Hold 12/17/21 2203)   sodium chloride 0 9 % infusion (has no administration in time range)     Followed by   sodium chloride 0 9 % infusion (has no administration in time range)   insulin regular (HumuLIN R,NovoLIN R) 1 Units/mL in sodium chloride 0 9 % 100 mL infusion (20 Units/hr Intravenous Rate/Dose Change 12/17/21 2142)   ondansetron (ZOFRAN) injection 4 mg (has no administration in time range)   amLODIPine (NORVASC) tablet 5 mg (has no administration in time range)   atorvastatin (LIPITOR) tablet 40 mg (40 mg Oral Not Given 12/17/21 2202)   cyanocobalamin (VITAMIN B-12) tablet 1,000 mcg (has no administration in time range)   metoprolol tartrate (LOPRESSOR) tablet 50 mg (50 mg Oral Not Given 12/17/21 2203)   pantoprazole (PROTONIX) EC tablet 40 mg (has no administration in time range)   ceFAZolin (ANCEF) IVPB (premix in dextrose) 1,000 mg 50 mL (has no administration in time range)   sodium chloride 0 9 % bolus 1,000 mL (0 mL Intravenous Stopped 12/17/21 1841)   acetaminophen (TYLENOL) tablet 650 mg (650 mg Oral Given 12/17/21 1749)   sodium bicarbonate 8 4 % injection 50 mEq (50 mEq Intravenous Given 12/17/21 1844)   calcium gluconate 1 g in sodium chloride 0 9% 50 mL (premix) (1 g Intravenous New Bag 12/17/21 2023)   sodium chloride 0 9 % bolus 2,000 mL (2,000 mL Intravenous New Bag 12/17/21 1844)       Diagnostic Studies  Results Reviewed     Procedure Component Value Units Date/Time    MRSA culture [[id number]]     Lab Status: No result Specimen: Nares from Nose     Osmolality-"If this is regarding a toxic alcohol, STOP  Test is not routinely indicated   Please consult medical  on call for further guidance " [[id number]] Collected: 12/17/21 1613    Lab Status: In process Specimen: Blood from Arm, Left Updated: 12/17/21 5010    Basic metabolic panel [[id number]]  (Abnormal) Collected: 12/17/21 2006    Lab Status: Final result Specimen: Blood from Arm, Right Updated: 12/17/21 2138     Sodium 131 mmol/L      Potassium 5 3 mmol/L      Chloride 100 mmol/L      CO2 20 mmol/L      ANION GAP 11 mmol/L       mg/dL      Creatinine 3 66 mg/dL      Glucose 692 mg/dL      Calcium 8 2 mg/dL      eGFR 16 ml/min/1 73sq m     Narrative:      [place] guidelines for Chronic Kidney Disease (CKD):   •  Stage 1 with normal or high GFR (GFR > 90 mL/min/1 73 square meters)  •  Stage 2 Mild CKD (GFR = 60-89 mL/min/1 73 square meters)  •  Stage 3A Moderate CKD (GFR = 45-59 mL/min/1 73 square meters)  •  Stage 3B Moderate CKD (GFR = 30-44 mL/min/1 73 square meters)  •  Stage 4 Severe CKD (GFR = 15-29 mL/min/1 73 square meters)  •  Stage 5 End Stage CKD (GFR <15 mL/min/1 73 square meters)  Note: GFR calculation is accurate only with a steady state creatinine    COVID/FLU/RSV [[id number]]  (Normal) Collected: 12/17/21 2035    Lab Status: Final result Specimen: Nares from Nose Updated: 12/17/21 2133     SARS-CoV-2 Negative     INFLUENZA A PCR Negative     INFLUENZA B PCR Negative     RSV PCR Negative    Narrative:      FOR PEDIATRIC PATIENTS - copy/paste COVID Guidelines URL to browser: [email or web address]     This test has been authorized by FDA under an EUA (Emergency Use Assay) for use by authorized laboratories  Clinical caution and judgement should be used with the interpretation of these results with consideration of the clinical impression and other laboratory testing  Testing reported as "Positive" or "Negative" has been proven to be accurate according to standard laboratory validation requirements    All testing is performed with control materials showing appropriate reactivity at standard intervals  Wound culture and Gram stain [[id number]]     Lab Status: No result Specimen: Wound from Leg, Left     Blood culture #1 [[id number]] Collected: 12/17/21 1708    Lab Status: Preliminary result Specimen: Blood from Arm, Right Updated: 12/17/21 2101     Blood Culture Received in Microbiology Lab  Culture in Progress  Blood culture #2 [[id number]] Collected: 12/17/21 1613    Lab Status: Preliminary result Specimen: Blood from Arm, Left Updated: 12/17/21 2101     Blood Culture Received in Microbiology Lab  Culture in Progress  Ethanol, urine [[id number]]     Lab Status: No result Specimen: Urine     UA (URINE) with reflex to Scope [[id number]]     Lab Status: No result Specimen: Urine     Procalcitonin with AM Reflex [[id number]]     Lab Status: No result Specimen: Blood     Fingerstick Glucose (POCT) [[id number]]  (Abnormal) Collected: 12/17/21 1947    Lab Status: Final result Updated: 12/17/21 1952     POC Glucose >500 mg/dl     Hemoglobin A1C [[id number]]     Lab Status: No result Specimen: Blood     Urinalysis with microscopic [[id number]] Collected: 12/17/21 1925    Lab Status:  In process Specimen: Urine, Clean Catch Updated: 12/17/21 1929    APTT [[id number]]  (Abnormal) Collected: 12/17/21 1813    Lab Status: Final result Specimen: Blood from Arm, Right Updated: 12/17/21 1839      seconds     Protime-INR [[id number]]  (Abnormal) Collected: 12/17/21 1813    Lab Status: Final result Specimen: Blood from Arm, Right Updated: 12/17/21 1839     Protime 62 0 seconds      INR 7 59    Fingerstick Glucose (POCT) [[id number]]  (Abnormal) Collected: 12/17/21 1834    Lab Status: Final result Updated: 12/17/21 1835     POC Glucose >500 mg/dl     Manual Differential(PHLEBS Do Not Order) [[id number]]  (Abnormal) Collected: 12/17/21 1613    Lab Status: Final result Specimen: Blood from Arm, Left Updated: 12/17/21 1801     Segmented % 78 %      Bands % 6 % Lymphocytes % 8 %      Monocytes % 5 %      Eosinophils, % 0 %      Basophils % 0 %      Metamyelocytes% 2 %      Myelocytes % 1 %      Absolute Neutrophils 11 21 Thousand/uL      Lymphocytes Absolute 1 07 Thousand/uL      Monocytes Absolute 0 67 Thousand/uL      Eosinophils Absolute 0 00 Thousand/uL      Basophils Absolute 0 00 Thousand/uL      Total Counted --     Anisocytosis Present     Platelet Estimate Increased    CBC and differential [[id number]]  (Abnormal) Collected: 12/17/21 1613    Lab Status: Final result Specimen: Blood from Arm, Left Updated: 12/17/21 1801     WBC 13 35 Thousand/uL      RBC 2 52 Million/uL      Hemoglobin 6 8 g/dL      Hematocrit 22 2 %      MCV 88 fL      MCH 26 6 pg      MCHC 30 2 g/dL      RDW 14 7 %      MPV 10 0 fL      Platelets 186 Thousands/uL     Narrative: This is an appended report  These results have been appended to a previously verified report      Comprehensive metabolic panel [[id number]]  (Abnormal) Collected: 12/17/21 1613    Lab Status: Final result Specimen: Blood from Arm, Left Updated: 12/17/21 1722     Sodium 125 mmol/L      Potassium 6 5 mmol/L      Chloride 93 mmol/L      CO2 19 mmol/L      ANION GAP 13 mmol/L       mg/dL      Creatinine 3 89 mg/dL      Glucose 793 mg/dL      Calcium 8 7 mg/dL      Corrected Calcium 10 2 mg/dL      AST 11 U/L      ALT 10 U/L      Alkaline Phosphatase 41 U/L      Total Protein 7 8 g/dL      Albumin 2 1 g/dL      Total Bilirubin 0 14 mg/dL      eGFR 15 ml/min/1 73sq m     Narrative:      [facility] guidelines for Chronic Kidney Disease (CKD):   •  Stage 1 with normal or high GFR (GFR > 90 mL/min/1 73 square meters)  •  Stage 2 Mild CKD (GFR = 60-89 mL/min/1 73 square meters)  •  Stage 3A Moderate CKD (GFR = 45-59 mL/min/1 73 square meters)  •  Stage 3B Moderate CKD (GFR = 30-44 mL/min/1 73 square meters)  •  Stage 4 Severe CKD (GFR = 15-29 mL/min/1 73 square meters)  •  Stage 5 End Stage CKD (GFR <15 mL/min/1 73 square meters)  Note: GFR calculation is accurate only with a steady state creatinine    Lactic acid, plasma [[id number]]  (Normal) Collected: 12/17/21 1613    Lab Status: Final result Specimen: Blood from Arm, Left Updated: 12/17/21 1716     LACTIC ACID 2 0 mmol/L     Narrative:      Result may be elevated if tourniquet was used during collection  Lipase [[id number]]  (Abnormal) Collected: 12/17/21 1613    Lab Status: Final result Specimen: Blood from Arm, Left Updated: 12/17/21 1703     Lipase 564 u/L     Beta Hydroxybutyrate [[id number]]  (Abnormal) Collected: 12/17/21 1613    Lab Status: Final result Specimen: Blood from Arm, Left Updated: 12/17/21 1659     BETA-HYDROXYBUTYRATE 1 1 mmol/L     Blood gas, venous [[id number]]  (Abnormal) Collected: 12/17/21 1613    Lab Status: Final result Specimen: Blood from Arm, Left Updated: 12/17/21 1657     pH, Waldo 7 245     pCO2, Wal[place] 41 2 mm Hg      pO2, [place] 30 9 mm Hg      HCO3, [place] 17 5 mmol/L      Base Excess, Waldo -9 1 mmol/L      O2 Content, Waldo 5 1 ml/dL      O2 HGB, VENOUS 46 0 %     Fingerstick Glucose (POCT) [[id number]]  (Abnormal) Collected: 12/17/21 1501    Lab Status: Final result Updated: 12/17/21 1505     POC Glucose >500 mg/dl                  XR chest 1 view portable   Final Result by Mehdi Whiteside DO (12/17 4011)      No acute cardiopulmonary disease                    Workstation performed: [id number]               Procedures  ECG 12 Lead Documentation Only    Date/Time: 12/17/2021 4:45 PM  Performed by: Margarito Valiente DO  Authorized by: Margarito Valiente DO     Indications / Diagnosis:  Fatigue, tachypnea  ECG reviewed by me, the ED Provider: yes    Patient location:  ED  Previous ECG:     Previous ECG:  Compared to current    Comparison ECG info:  2/10/2021    Similarity:  No change    Comparison to cardiac monitor: Yes    Interpretation:     Interpretation: normal    Rate:     ECG rate:  65    ECG rate assessment: normal Rhythm:     Rhythm: sinus rhythm    Ectopy:     Ectopy: none    QRS:     QRS axis:  Normal    QRS intervals:  Normal  Conduction:     Conduction: normal    ST segments:     ST segments:  Normal  T waves:     T waves: normal            ED Course  ED Course as of 12/17/21 2206   Fri Dec 17, 2021   1900 POCT INR(!!): 7 59  Will hold coumadin  MDM  Number of Diagnoses or Management Options  Acute anemia  Diabetes mellitus ([address] )  DKA, type 2 (HCC)  Elevated INR  Hyperkalemia  Normal anion gap metabolic acidosis  Uremia  Diagnosis management comments: Mr Scotty Bill is a 69-year-old male with a history of insulin-dependent diabetes mellitus presenting with elevated blood glucose levels for the past week, with weakness, nausea, vomiting, poor appetite, excessive thirst, fatigue, and lightheadedness  ROS as stated  On coumadin for history of DVT  Patient is ill-appearing, tachypneic, other vital signs within normal limits  Physical exam as stated, patient is fatigued but easily aroused, is pale  Labs:      CBC with a hemoglobin of 6 8  CMP with potassium of 6 5, creatinine significantly elevated from baseline at 3 89, ,   Lipase 564     VBG with pH 7 24, pCO2 41 2    BGB elevated, 1 0    PT/INR 62/7 59    APTT 133      CXR: Unremarkable  EKG: NSR, rate 65    Patient given 3 L normal saline, started on insulin drip for DKA versus hyperosmolar hyperglycemic state, calcium gluconate ordered for hyperkalemia, and 1 unit PRBCs ordered for symptomatic anemia  Case discussed with critical care, recommend admission to step-down level 1  Stable but critical on admission            Amount and/or Complexity of Data Reviewed  Clinical lab tests: ordered and reviewed  Tests in the radiology section of CPT®: ordered and reviewed  Tests in the medicine section of CPT®: reviewed and ordered  Discuss the patient with other providers: yes  Independent visualization of images, tracings, or specimens: yes        Disposition  Final diagnoses:   D[facility] 2 ([facility] )   Normal anion gap metabolic acidosis   Uremia   Acute anemia   Elevated INR   Hyperkalemia   Diabetes mellitus ([facility] )     Time reflects when diagnosis was documented in both MDM as applicable and the Disposition within this note     Time User Action Codes Description Comment    12/17/2021  7:39 PM Russell Postin [E11 10] DKA, type 2 ([facility] [address] )     12/17/2021  9:56 PM Russell Postin [E87 2] Normal anion gap metabolic acidosis     31/63/6006  9:56 PM Estevan Casiano Add [N19] Uremia     12/17/2021  9:56 PM Russell Postin [D64 9] Acute anemia     12/17/2021  9:56 PM Russell Postin [R79 1] Elevated INR     12/17/2021  9:57 PM Estevan Casiano Add [E87 5] Hyperkalemia     12/17/2021  9:57 PM Estevan Casiano Add [E11 9] Diabetes mellitus [facility])       ED Disposition     ED Disposition Condition Date/Time Comment    Admit Stable Fri Dec 17, 2021  7:38 PM Case was discussed with Vonnie Tripp, Critical Care, and the patient's admission status was agreed to be Admission Status: inpatient status to the service of Dr Powell July   Follow-up Information    None         Current Discharge Medication List      CONTINUE these medications which have NOT CHANGED    Details   acetaminophen (TYLENOL) 325 mg tablet Take 3 tablets (975 mg total) by mouth every 8 (eight) hours as needed for moderate pain  Qty: 30 tablet, Refills: 0    Associated Diagnoses: Cellulitis of left lower extremity      amLODIPine (NORVASC) 5 mg tablet Take 1 tablet (5 mg total) by mouth daily  Qty: 30 tablet, Refills: 0    Associated Diagnoses: Essential hypertension      aspirin 81 mg chewable tablet Chew 81 mg daily        atorvastatin (LIPITOR) 40 mg tablet Take 1 tablet (40 mg total) by mouth every evening  Qty: 30 tablet, Refills: 0    Associated Diagnoses: Mixed hyperlipidemia      cyanocobalamin (VITAMIN B-12) 250 MCG tablet Take 4 tablets (1,000 mcg total) by mouth daily  Refills: 0    Comments: otc  Associated Diagnoses: Balance problem      docusate sodium (COLACE) 100 mg capsule Take 1 capsule (100 mg total) by mouth 2 (two) times a day as needed for constipation  Qty: 10 capsule, Refills: 0    Associated Diagnoses: Bilateral lower leg cellulitis      ezetimibe (ZETIA) 10 mg tablet Take 1 tablet (10 mg total) by mouth daily at bedtime  Qty: 30 tablet, Refills: 0    Associated Diagnoses: Mixed hyperlipidemia      fenofibrate (TRICOR) 145 mg tablet Take 145 mg by mouth daily  insulin regular (HUMULIN R) 500 units/mL CONCENTRATED injection Inject 0 17 mL (85 Units total) under the skin 3 (three) times a day before meals  Qty:  , Refills: 0    Associated Diagnoses: Type 2 diabetes mellitus with hyperglycemia, with long-term current use of insulin (HCC)      metoprolol tartrate (LOPRESSOR) 50 mg tablet Take 50 mg by mouth 2 (two) times a day  nystatin (MYCOSTATIN) powder Apply topically 2 (two) times a day  Qty: 60 g, Refills: 3    Associated Diagnoses: Tinea corporis      pantoprazole (PROTONIX) 40 mg tablet Take 40 mg by mouth daily      warfarin (COUMADIN) 5 mg tablet Take 0 5 tablets (2 5 mg total) by mouth daily  Qty:  , Refills: 0    Associated Diagnoses: History of cerebrovascular accident (CVA) involving cerebellum           No discharge procedures on file  PDMP Review     None           ED Provider  Attending physically available and evaluated Fabi Corea I managed the patient along with the ED Attending      Electronically Signed by           Akash Randhawa DO  12/17/21 8059

## 2021-12-17 NOTE — ED ATTENDING ATTESTATION
12/17/2021  I, Meron Maza MD, saw and evaluated the patient  I have discussed the patient with the resident/non-physician practitioner and agree with the resident's/non-physician practitioner's findings, Plan of Care, and MDM as documented in the resident's/non-physician practitioner's note, except where noted  All available labs and Radiology studies were reviewed  I was present for key portions of any procedure(s) performed by the resident/non-physician practitioner and I was immediately available to provide assistance  At this point I agree with the current assessment done in the Emergency Department  I have conducted an independent evaluation of this patient a history and physical is as follows:    58-year-old male with history of insulin-dependent diabetes presented for evaluation of generalized weakness, fatigue and elevated blood sugar at home over the last week, worsening  Also reporting increased urination  Found to have significantly elevated blood glucose with mild acidosis  Also MULUGETA  Appears dehydrated on exam   Potassium elevated without significant EKG changes  Anemic with unclear etiology  On warfarin for hx of DVT  Denies rectal bleeding  He has excoriations and some dried blood on the left lower leg  Denies any significant level bleeding  No sign of infection  No complaints of pain or tenderness  IV fluids, insulin drip, packed red cells started in the ED and he was admitted to step-down level 1 on critical care service for further management  ED Course  ED Course as of 12/21/21 1538   Fri Dec 17, 2021   1714 Lipase(!): 564   1714 pH, [place](!): 7 245   1715 BETA-HYDROXYBUTYRATE(!): 1 1   1814 WBC(!): 13 35   1814 Hemoglobin(!!): 6 8   1814 Sodium(!): 125   1814 Potassium(!!): 6 5   1814 CO2(!): 19   1814 BUN(!): 101   1814 Creatinine(!): 3 89   1814 Glucose, Random(!!): 793   1814 Glucose significantly elevated  Mild acidosis    MULUGETA likely related to fluid losses from hyperglycemia  Potassium elevated  No significant EKG changes  Will treat with calcium gluconate, sodium bicarbonate and insulin infusion     1913 POCT INR(!!): 7 59         Critical Care Time  CriticalCare Time  Performed by: Carolina Kraus MD  Authorized by: Carolina Kraus MD     Critical care provider statement:     Critical care time (minutes):  45    Critical care was necessary to treat or prevent imminent or life-threatening deterioration of the following conditions:  Dehydration, endocrine crisis, metabolic crisis and renal failure    Critical care was time spent personally by me on the following activities:  Obtaining history from patient or surrogate, development of treatment plan with patient or surrogate, evaluation of patient's response to treatment, examination of patient, discussions with consultants, ordering and performing treatments and interventions, ordering and review of laboratory studies, re-evaluation of patient's condition and review of old charts

## 2021-12-18 PROBLEM — E87.29 HIGH ANION GAP METABOLIC ACIDOSIS: Status: ACTIVE | Noted: 2021-01-01

## 2021-12-18 PROBLEM — E87.2 HIGH ANION GAP METABOLIC ACIDOSIS: Status: ACTIVE | Noted: 2021-01-01

## 2021-12-18 PROBLEM — R78.81 BACTEREMIA: Status: ACTIVE | Noted: 2021-01-01

## 2021-12-18 NOTE — ASSESSMENT & PLAN NOTE
Lab Results   Component Value Date    EGFR 17 12/18/2021    EGFR 19 12/18/2021    EGFR 22 12/18/2021    CREATININE 3 48 (H) 12/18/2021    CREATININE 3 16 (H) 12/18/2021    CREATININE 2 82 (H) 12/18/2021     · Baseline creatinine 1 75-2  · On admission creatinine elevated at 3 87, now 3 48  · BUN also elevated at 100 - most likely prerenal in setting of dehydration due to HSS state  · Also some concern for GIB   · Nephrology consulted  · Trend BUN/Cr  · Avoid hypotension

## 2021-12-18 NOTE — ASSESSMENT & PLAN NOTE
· Noted with INR 7 46 on admission  · Patient reports had blood in his urine last week  · He discontinued taking warfarin on Monday  · Hemoglobin on admission 6 8 and patient is receiving blood transfusion  · Patient is now status post 3 units PRBCs  · INR remains elevated; given 5 of vitamin K and 1 dose of DDAVP to reverse uremic bleeding  · Obtaining CT abdomen pelvis, if any concern for bleeding within the abdomen will reverse INR  · Check INR in a m

## 2021-12-18 NOTE — ASSESSMENT & PLAN NOTE
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

## 2021-12-18 NOTE — CONSULTS
e-Consult (IPC)  - Interventional Radiology  Eliud Morillo 78 y o  male MRN: [id number]  Unit/Bed#: S -01 Encounter: [id number]    IR has been consulted to evaluate the patient, determine the appropriate procedure, and whether or not a procedure can and should be performed regarding the care of Eliud Morillo  We were consulted by Molly Lyon concerning a retroperitoneal hemorrhage, and to possibly perform a embolization if medically appropriate for the patient  IP Consult to IR  Consult performed by: Staci Cavazos MD  Consult ordered by: Molly Lyon PA-C        12/18/21      Assessment/Recommendation:   62-year-old male presents with lethargy and confusion and a decreased hemoglobin with markedly elevated INR of 7 44  A non-contrast CT personally reviewed shows a retroperitoneal hemorrhage possibly from the kidney  However, no contrast was given and the precise etiology is difficult to determine  The patient is currently being resuscitated and is currently undergoing reversal of the elevated INR  Will plan for emergent arteriogram and possible embolization  However, the patient should undergo a CTA to better evaluate the source of hemorrhage  The patient will be at increased risk for renal failure and long-term dialysis  Total time spent in review of data, discussion with requesting provider and rendering advice was 15 minutes  Patient or appropriate family member was verbally informed by Molly Lyon of this consultative service on their behalf to provide more timely access to specialty care in lieu of an in person consultation  Verbal consent was obtained  Thank you for allowing Interventional Radiology to participate in the care of Eliud Morillo  Please don't hesitate to call or TigerText us with any questions       Staci Cavazos MD

## 2021-12-18 NOTE — ASSESSMENT & PLAN NOTE
Noted with INR 7 46 on admission  Patient reports had blood in his urine last week  He discontinued taking warfarin on Monday  Hemoglobin on admission 6 8 and patient is receiving blood transfusion    -- check UA  -- monitor INR in the a m   -- if signs of ongoing bleeding will give vitamin K

## 2021-12-18 NOTE — ASSESSMENT & PLAN NOTE
· Hold all antihypertensives in the setting of possible Gram-positive bacteremia and blood loss  · Continue telemetry

## 2021-12-18 NOTE — ASSESSMENT & PLAN NOTE
History of thrombotic CVA  Left facial droop no other residual neurological deficits  Patient is on aspirin, Coumadin currently held  -- restart medication when appropriate

## 2021-12-18 NOTE — CASE MANAGEMENT
Case Management Assessment & Discharge Planning Note    Patient name Maldonado Boggs  Location S /S -15 MRN [id number]  : 1955 Date 2021       Current Admission Date: 2021  Current Admission Diagnosis:Diabetic ketoacidosis associated with type 2 diabetes mellitus [facility])   Patient Active Problem List    Diagnosis Date Noted   • Diabetic ketoacidosis associated with type 2 diabetes mellitus ([facility] ) 2021   • Acute kidney injury superimposed on chronic kidney disease ([place] [address] ) 2021   • Supratherapeutic INR 2021   • Anemia 2021   • Sepsis ([facility]  ) 2021   • Dizziness 2020   • Class 3 severe obesity in adult [facility]) 2020   • Cellulitis of left lower extremity 2020   • Chronic venous stasis dermatitis of both lower extremities 2018   • Hypertension    • Hyperlipidemia    • CVA (cerebral vascular accident) ([place] [address] )    • History of DVT of lower extremity 10/05/2016   • Hypoalbuminemia 2016   • Open wound of left lower extremity 2016   • History () of right cerebellar CVA 2016   • Coronary artery disease involving native heart 2016   • Diplopia 2016   • Type 2 diabetes mellitus, with long-term current use of insulin ([place] [address] ) 2016   • CKD (chronic kidney disease) stage 3, GFR 30-59 ml/min 2016   • Morbid obesity with BMI of 40 0-44 9, adult ([facility] ) 2016   • Essential hypertension 2016   • MELINDA (obstructive sleep apnea) 2016   • Cerebrovascular accident (CVA) due to thrombosis ([facility] ) 2016      LOS (days): 1  Geometric Mean LOS (GMLOS) (days):   Days to GMLOS:     OBJECTIVE:    Risk of Unplanned Readmission Score: 21         Current admission status: Inpatient  Referral Reason: Other (Dispo Planning)    Preferred Pharmacy:   [id number] [facility] [address] [address]  Phone: [phone] Fax: [phone]    Primary Care Provider: Acey Lombard MD Zackary    Primary Insurance: [facility]  Secondary Insurance: [place] [employer]    ASSESSMENT:  Active Health Care Agents    There are no active Health Care Agents on file  Advance Directives  Does patient have a [address]?: No  Was patient offered paperwork?: Yes (Declined - wife reported that they were actively working on this)  Does patient currently have a Health Care decision maker?: Yes, please see Health Care Proxy section  Does patient have Advance Directives?: No  Was patient offered paperwork?: No (Declined)  Primary Contact: Amee Nunez - Wife         Readmission Root Cause  30 Day Readmission: No    Patient Information  Admitted from[de-identified] Home  Mental Status: Alert (Per discussion with nursing  Patient was soundly sleeping when CM attempted to meet with him )  During Assessment patient was accompanied by: Not accompanied during assessment  Assessment information provided by[de-identified] Spouse (CM contacted patient's spouse to complete CM Assessment)  Primary Caregiver: Self  Support Systems: Spouse/significant other,Daughter  [place] of Residence: [address] [place] [address] do you live in?: [address] [facility] entry access options  Select all that apply : Stairs  Number of steps to enter home  : 1  Do the steps have railings?: No  Type of Current Residence: [address] [place] home  Upon entering residence, is there a bedroom on the main floor (no further steps)?: No (Patient's wife reported that patient primarily stays on the first floor    She will assist with him to sponge bathe and he usually sleeps in his recliner on the first floor )  A bedroom is located on the following floor levels of residence (select all that apply):: 2nd Floor  Upon entering residence, is there a bathroom on the main floor (no further steps)?: No  Indicate which floors of current residence have a bathroom (select all the apply):: 2nd Floor  Number of steps to 2nd floor from main floor: One Flight  In the last 12 months, was there a time when you were not able to pay the mortgage or rent on time?: No  In the last 12 months, how many places have you lived?: 1  In the last 12 months, was there a time when you did not have a steady place to sleep or slept in a shelter (including now)?: No  Homeless/housing insecurity resource given?: No  Living Arrangements: Lives w/ Spouse/significant other  Is patient a ?: No    Activities of Daily Living Prior to Admission  Functional Status: Independent  Completes ADLs independently?: No  Level of ADL dependence: Assistance  Ambulates independently?: Yes  Does patient use assisted devices?: Yes  Assisted Devices (DME) used: Straight Cane,Shower Chair  Does patient currently own DME?: Yes  What DME does the patient currently own?: Straight Cane,Walker,Shower Chair  Does patient have a history of Outpatient Therapy (PT/OT)?: No  Does the patient have a history of Short-Term Rehab?: No  Does patient have a history of HHC?: Yes (Unsure of name of agency;  Verbalized the insurance sends a nurse out once a year for a check up)  Does patient currently have [address]?: No         Patient Information Continued  Income Source: SSI/SSD  Does patient have prescription coverage?: Yes (No issues with getting or affording his medications)  Within the past 12 months, you worried that your food would run out before you got the money to buy more : Never true  Within the past 12 months, the food you bought just didn’t last and you didn’t have money to get more : Never true  Food insecurity resource given?: N/A  Does patient receive dialysis treatments?: No  Does patient have a history of substance abuse?: No    PHQ 2/9 Screening   Reviewed PHQ 2/9 Depression Screening Score?: No    Means of Transportation  Means of Transport to Appts[de-identified] Family transport (Wife provides transportation stated patient has not driven in at least 6 months)  In the past 12 months, has lack of transportation kept you from medical appointments or from getting medications?: No  In the past 12 months, has lack of transportation kept you from meetings, work, or from getting things needed for daily living?: No  Was application for public transport provided?: N/A        DISCHARGE DETAILS:    Discharge planning discussed with[de-identified] Wife Jinny Turner of Choice: Yes  Comments - Freedom of Choice: No current CM DC needs discussed or identifed  CM contacted family/caregiver?: Yes             Contacts  Patient Contacts: Wife Venice Nogueira  Relationship to Patient[de-identified] Family  Contact Method: Phone  Phone Number: [phone]  Reason/Outcome: Emergency Contact,Continuity of [facility]                                                                        CM reviewed discharge planning process including the following: identifying caregivers at home, preference for d/c planning needs, [facility] Meds to Bed program, availability of treatment team to discuss questions or concerns patient and/or family may have regarding diagnosis, plan of care, old or new medications and discharge planning  CM will continue to follow for care coordination and update assessment as necessary

## 2021-12-18 NOTE — ASSESSMENT & PLAN NOTE
Patient is meeting sepsis criteria on admission with tachypnea are 30, WBC of 13  He is a febrile, blood pressure is stable  Source is most likely cellulitis  Chest x-rays negative  Denies pain with urination however had gross hematuria last week  Patient received 3 L of IV fluids per DKA protocol    -- follow blood cultures  -- follow wound culture  -- follow UA  -- started on cefazolin 2 g t i d   -- follow WBC  -- follow procalc  -- follow-up vital signs

## 2021-12-18 NOTE — ASSESSMENT & PLAN NOTE
Patient is on amlodipine 5 mg daily, lisinopril 10 mg daily, metoprolol 50 mg b i d   At home    -- continue home medication  -- monitor vital signs closely

## 2021-12-18 NOTE — ASSESSMENT & PLAN NOTE
Patient is on Lipitor, fenofibrate at home  -- continue home medication Detail Level: Detailed Quality 110: Preventive Care And Screening: Influenza Immunization: Influenza Immunization Administered during Influenza season Quality 130: Documentation Of Current Medications In The Medical Record: Current Medications Documented Quality 431: Preventive Care And Screening: Unhealthy Alcohol Use - Screening: Patient not identified as an unhealthy alcohol user when screened for unhealthy alcohol use using a systematic screening method

## 2021-12-18 NOTE — ASSESSMENT & PLAN NOTE
Lab Results   Component Value Date    HGBA1C 8 7 (H) 03/15/2021       Recent Labs     12/17/21  1501 12/17/21  1834 12/17/21  1947   POCGLU >500* >500* >500*     Patient was known history of DM type 2 insulin dependent  He came in with blood glucose of 760  VBG with pH of 7 245, pCO2 of 41, HC03 17 5, consistent with metabolic acidosis   anion gap corrected to albumin 19 3  Patient is treated per DKA protocol  He received 1400 cc of NS in the ED and finished total of 3 L in ICU    -- continue IV hydration per DKA protocol  -- continue insulin drip per DKA protocol  -- check BMP q 4 hours  -- switched to subcu insulin when anion gap closes and patient tolerates food  -- follow potassium, sodium, magnesium, phosphorus q 4 hours - replacement appropriate  -- npo      Blood Sugar Average: Last 72 hrs:

## 2021-12-18 NOTE — QUICK NOTE
78 M PMH CVA on coumadin, IDDM2, CKD3, HTN, HLD feeling unwell for past week with nausea, vomiting rising blood glucose despite increasing insulin  Additionally, noted hematuria  Concern for cellulitis per resident  Found to have hyperglycemia with BG >793, WBC 13, Hgb 6 8, K 6 5, Na 125, Albumin 2 1, Scr 3 89, , lipase 564, INR 7 59, BNB 1 1, pH 7 25    DKA  Cellulitis  Anemia  Hyperkalemia  Supratherapeutic INR    Start IVF, insulin gtt, serial BMP, check osmolality and calculate osmolal gap  R/o alcohol use  If gap not improving then may require HD  Non suppurative cellulitis  Starting antibiotics  1st gen over 3rd for stronger strep coverage  Transfuse to goal hgb >7  Hold warfarin and anticoagulation for now  Admit SD1   Discussed with resident and night intensivist

## 2021-12-18 NOTE — CONSULTS
Consultation - General Surgery   Yamil Moran 78 y o  male MRN: [id number]  Unit/Bed#: S -01 Encounter: [id number]    Assessment/Plan     Assessment:  69yo M with history of CKD, CVA, and DVT on Coumadin, presents with right retroperitoneal bleed from a subcapsular right renal hematoma  Patient is borderline stable  He has received 3 units of PRBCs for anemia of 5 2, he has down trending blood pressures 106/59, and is lethargic  He has significant right-sided tenderness but is not peritoneal   His supratherapeutic INR is being reversed with Kcentra and vitamin K  A conversation was had with the IR attending on-call and the decision was made to perform a stat CTA with plans for embolization of his retroperitoneal bleed  A conversation was had in detail with patient and family members, including his wife and 2 daughters, explaining the risk to benefit ratio administering contrast in the presence of his current CKD, with the risk of a potential future need for dialysis  Nephrology was made aware  Plan:  - no indication for surgical intervention  - continue resuscitation  - transfuse p r n   - CTA abdomen pelvis  - IR for embolization of RP hematoma  - recommend nephrology follow-up  - medical management of cellulitis and DKA  - recheck INR and TEG  - Strict I's and O's  - will likely need Urology consult if IR intervention is unsuccessful    History of Present Illness     HPI:  Yamil Moran is a 78 y o  male with history of CKD, CVA, has DVT, CAD s/p angioplasty and stent, HTN, dm, HLD, who was admitted to the critical care unit for treatment of DKA likely secondary from a left lower extremity cellulitis  Patient has also experienced progressively worsening right-sided abdominal pain for the past week  A CT scan of the abdomen was performed today showing subcapsular right renal hematoma with suprarenal extension      Patient is lethargic and states that his right-sided abdominal pain has been unchanged since admission  He complains of weakness and fatigue  He has hematuria and his INR as of this morning was 7 4  He has received Kcentra and approximately 15 units vitamin K, as well as DDAVP for reversal of uremia  He has received 3 units of blood for a hemoglobin of 5 2  Inpatient consult to Acute Care Surgery  Consult performed by: Bruno Laguna MD  Consult ordered by: Noemi Obrien PA-C          Review of Systems   Constitutional: Positive for activity change and fatigue  Negative for fever  HENT: Negative  Eyes: Negative  Respiratory: Negative  Cardiovascular: Negative  Gastrointestinal: Positive for abdominal pain and constipation  Negative for nausea and vomiting  Genitourinary: Positive for hematuria  Musculoskeletal: Positive for back pain  Neurological: Positive for weakness         Historical Information   Past Medical History:   Diagnosis Date   • Bell's palsy    • Cardiac disease    • Cerebellar stroke ([facility] )    • Diabetes mellitus ([facility] )    • Fracture     L hip   • Hyperlipidemia    • Hypertension    • Renal disorder    • Stroke [facility])     2013   • Stroke [facility])     [id number]     Past Surgical History:   Procedure Laterality Date   • CORONARY ANGIOPLASTY WITH STENT PLACEMENT     • FRACTURE SURGERY      L femur   • INCISION AND DRAINAGE OF WOUND Right 9/13/2016    Procedure: INCISION AND DRAINAGE (I&D) EXTREMITY, right lateral ankle;  Surgeon: Jan Avalos DPM;  Location: [facility];  Service:    • WOUND DEBRIDEMENT Right 9/15/2016    Procedure: DEBRIDEMENT WOUND ([address]) ankle wound with closure and FRANCY drain placement;  Surgeon: Jan Avalos DPM;  Location: [facility];  Service:      Social History   Social History     Substance and Sexual Activity   Alcohol Use No     Social History     Substance and Sexual Activity   Drug Use No     E-Cigarette/Vaping   • E-Cigarette Use Never User      E-Cigarette/Vaping Substances     Social History     Tobacco Use   Smoking Status Former Smoker   • Packs/day: 0 00   • Types: Cigarettes   • Quit date: 9/3/2006   • Years since quitting: 15 3   Smokeless Tobacco Never Used     Family History:   Family History   Problem Relation Age of Onset   • Diabetes Mother    • Stroke Mother        Meds/Allergies   current meds:   Current Facility-Administered Medications   Medication Dose Route Frequency   • acetaminophen (TYLENOL) tablet 650 mg  650 mg Oral Q4H PRN   • desmopressin (DDAVP) 34 4 mcg in sodium chloride 0 9 % 50 mL IVPB  0 3 mcg/kg Intravenous Once   • dextrose 5 % in lactated Ringer's infusion  100 mL/hr Intravenous Continuous   • fentanyl citrate (PF) 100 MCG/2ML 25 mcg  25 mcg Intravenous Q4H PRN   • insulin regular (HumuLIN R,NovoLIN R) 1 Units/mL in sodium chloride 0 9 % 100 mL infusion  0 3-21 Units/hr Intravenous Titrated   • ondansetron (ZOFRAN) injection 4 mg  4 mg Intravenous Q6H PRN   • pantoprazole (PROTONIX) injection 40 mg  40 mg Intravenous Q12H [address]   • phytonadione (AQUA-MEPHYTON) 10 mg/mL 10 mg in sodium chloride 0 9 % 50 mL IVPB  10 mg Intravenous Once   • prothrombin complex conc human (KCENTRA) 5,000 Units  50 Units/kg Intravenous Once   • [START ON 12/19/2021] vancomycin (VANCOCIN) 1,250 mg in sodium chloride 0 9 % 250 mL IVPB  15 mg/kg (Adjusted) Intravenous Daily PRN    and PTA meds:   Prior to Admission Medications   Prescriptions Last Dose Informant Patient Reported? Taking?   acetaminophen (TYLENOL) 325 mg tablet   No No   Sig: Take 3 tablets (975 mg total) by mouth every 8 (eight) hours as needed for moderate pain   amLODIPine (NORVASC) 5 mg tablet   No No   Sig: Take 1 tablet (5 mg total) by mouth daily   aspirin 81 mg chewable tablet  Self Yes No   Sig: Chew 81 mg daily     atorvastatin (LIPITOR) 40 mg tablet   No No   Sig: Take 1 tablet (40 mg total) by mouth every evening   cyanocobalamin (VITAMIN B-12) 250 MCG tablet   No No   Sig: Take 4 tablets (1,000 mcg total) by mouth daily   docusate sodium (COLACE) 100 mg capsule   No No   Sig: Take 1 capsule (100 mg total) by mouth 2 (two) times a day as needed for constipation   Patient not taking: Reported on 11/28/2020   ezetimibe (ZETIA) 10 mg tablet   No No   Sig: Take 1 tablet (10 mg total) by mouth daily at bedtime   fenofibrate (TRICOR) 145 mg tablet  Self Yes No   Sig: Take 145 mg by mouth daily  insulin regular (HUMULIN R) 500 units/mL CONCENTRATED injection   No No   Sig: Inject 0 17 mL (85 Units total) under the skin 3 (three) times a day before meals   metoprolol tartrate (LOPRESSOR) 50 mg tablet  Self Yes No   Sig: Take 50 mg by mouth 2 (two) times a day     nystatin (MYCOSTATIN) powder   No No   Sig: Apply topically 2 (two) times a day   pantoprazole (PROTONIX) 40 mg tablet  Self Yes No   Sig: Take 40 mg by mouth daily   warfarin (COUMADIN) 5 mg tablet   No No   Sig: Take 0 5 tablets (2 5 mg total) by mouth daily      Facility-Administered Medications: None     Allergies   Allergen Reactions   • Ace Inhibitors Other (See Comments)   • Ciprofloxacin Other (See Comments)   • Penicillins Hives   • Morphine And Related Anxiety       Objective   First Vitals:   Blood Pressure: 113/58 (12/17/21 1501)  Pulse: 66 (12/17/21 1501)  Temperature: 98 2 °F (36 8 °C) (12/17/21 1501)  Temp Source: Oral (12/17/21 2156)  Respirations: 18 (12/17/21 1501)  Height: 5' 7" (170 2 cm) (12/17/21 2040)  Weight - Scale: (!) 140 kg (308 lb 10 3 oz) (12/17/21 2040)  SpO2: 96 % (12/17/21 2029)    Current Vitals:   Blood Pressure: 106/59 (12/18/21 1711)  Pulse: 64 (12/18/21 1711)  Temperature: (!) 97 3 °F (36 3 °C) (12/18/21 1711)  Temp Source: Oral (12/18/21 1150)  Respirations: (!) 24 (12/18/21 1711)  Height: 5' 7" (170 2 cm) (12/17/21 2156)  Weight - Scale: 115 kg (253 lb 8 5 oz) (12/18/21 0300)  SpO2: 100 % (12/18/21 1609)      Intake/Output Summary (Last 24 hours) at 12/18/2021 1808  Last data filed at 12/18/2021 1700  Gross per 24 hour   Intake 6372 95 ml   Output 675 ml   Net 5697 95 ml       Invasive Devices  Report    Peripheral Intravenous Line            Peripheral IV 12/17/21 Left Antecubital 1 day    Peripheral IV 12/17/21 Right Hand <1 day    Peripheral IV 12/18/21 Right;Upper;Ventral (anterior) Arm <1 day          Drain            Urethral Catheter Temperature probe 16 Fr  <1 day                Physical Exam  Vitals reviewed  Constitutional:       Comments: Lethargic but responsive and appropriately answering questions   HENT:      Head: Normocephalic  Nose: Nose normal       Mouth/Throat:      Mouth: Mucous membranes are moist    Eyes:      Pupils: Pupils are equal, round, and reactive to light  Cardiovascular:      Rate and Rhythm: Normal rate  Pulses: Normal pulses  Pulmonary:      Effort: Pulmonary effort is normal    Abdominal:      Palpations: Abdomen is soft  Comments: Abdomen is soft and protuberant  There is a palpable reducible, bowel containing umbilical hernia that is nontender  He has significant right-sided tenderness RUQ> RLQ  He has localized guarding, no rebound, not peritoneal   There is no bruising or ecchymosis   Genitourinary:     Comments: Robison in place with light pink hematuria  Musculoskeletal:         General: Normal range of motion  Cervical back: Normal range of motion  Skin:     Capillary Refill: Capillary refill takes less than 2 seconds  Neurological:      General: No focal deficit present  Mental Status: He is oriented to person, place, and time  Lab Results:   I have personally reviewed pertinent lab results    , CBC:   Lab Results   Component Value Date    WBC 15 91 (H) 12/18/2021    HGB 5 2 (LL) 12/18/2021    HCT 16 7 (L) 12/18/2021    MCV 90 12/18/2021     (H) 12/18/2021    MCH 28 1 12/18/2021    MCHC 31 1 (L) 12/18/2021    RDW 14 2 12/18/2021    MPV 9 9 12/18/2021   , CMP:   Lab Results   Component Value Date    SODIUM 142 12/18/2021    K 4 8 12/18/2021     (H) 12/18/2021    CO2 20 (L) 12/18/2021    BUN 93 (H) 12/18/2021    CREATININE 3 60 (H) 12/18/2021    CALCIUM 8 1 (L) 12/18/2021     (H) 12/18/2021    ALT 69 12/18/2021    ALKPHOS 30 (L) 12/18/2021    EGFR 16 12/18/2021   , Coagulation:   Lab Results   Component Value Date    INR 7 44 (HH) 12/18/2021   , Urinalysis:   Lab Results   Component Value Date    COLORU Red 12/17/2021    CLARITYU Turbid 12/17/2021    SPECGRAV 1 020 12/17/2021    PHUR 5 5 12/17/2021    LEUKOCYTESUR Interference- unable to analyze (A) 12/17/2021    NITRITE Interference- unable to analyze 12/17/2021    GLUCOSEU Interference- unable to analyze 12/17/2021    KETONESU Interference- unable to analyze (A) 12/17/2021    BILIRUBINUR Interference- unable to analyze (A) 12/17/2021    BLOODU Interference- unable to analyze (A) 12/17/2021     Imaging: I have personally reviewed pertinent reports  CT abdomen pelvis wo contrast   Final Result by Dina Rhodes MD (12/18 1757)         1   subcapsular right renal hematoma with suprarenal extension  This is an atypical site for a spontaneous anticoagulation bleed  Suggest CT scan renal mass protocol when the hematoma has resolved to exclude the possibility of an underlying neoplasm  This finding was discussed with the physician caring for the patient prior to this dictation  Workstation performed: [id number]         XR chest 1 view portable   Final Result by Aida Lewis DO (12/17 8690)      No acute cardiopulmonary disease  Workstation performed: [id number]         IR embolization (specify vessel or site)    (Results Pending)   CTA abdomen pelvis w wo contrast    (Results Pending)       EKG, Pathology, and Other Studies: I have personally reviewed pertinent reports  Counseling / Coordination of Care  Total floor / unit time spent today 30 minutes  Greater than 50% of total time was spent with the patient and / or family counseling and / or coordination of care    A description of the counseling / coordination of care: N/A

## 2021-12-18 NOTE — ASSESSMENT & PLAN NOTE
· Hemoglobin noted on admission 6 8  · Reports had hematuria with clots last week     · 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  · Patient stopped taking warfarin on Monday  · On admission INR 7 46  · Patient is now status post 3 units PRBCs  · Given 5 mg IV vitamin K  · Given a DDAVP for uremic bleeding  · CT abdomen pelvis obtained secondary to worsening abdominal pain  · If any concern for bleeding on CT abdomen pelvis will reverse INR  · Continue to trend hemoglobin  · Transfuse if hemoglobin less than 7

## 2021-12-18 NOTE — ASSESSMENT & PLAN NOTE
Patient is complaining of pain in his left lower leg  Patient denies fever chills  On physical exam erythema, tenderness to palpation, marks of excoriation    No evidence of abscess formation  Meeting sepsis criteria with tachypnea, WBC of 13  No personal history of MRSA    · Blood cultures now positive for Gram-positive bacteremia in 1/2  · Transitioned to vancomycin  · Follow-up blood cultures

## 2021-12-18 NOTE — DISCHARGE INSTRUCTIONS
ARTERIOGRAM    WHAT YOU SHOULD KNOW:   An angiogram is a procedure to look at arteries in your body  Arteries are the blood vessels that carry blood from your heart to your body  AFTER YOU LEAVE:     Self-care:   · Limit activity: Rest for the remainder of the day of your procedure  Have some one with you until the next morning  Keep your arm or leg straight as much as possible  Rest as much as possible, sitting lying or reclining  Walk only to go to the bathroom, to bed or to eat  If the angiogram catheter was put in your leg, use the stairs as little as possible  No driving  · Keep your wound clean and dry  You may shower 24 hours after your procedure  The bandage you have on should fall off in 2-3 days  If there is any drainage from the puncture site, you should put on a clean bandage  · Watch for bleeding and bruising: It is normal to have a bruise and soreness where the angiogram catheter went in  · Diet:   · You may resume your regular diet, Sips of flat soda will help with mild nausea  · Drink more liquids than usual for the next 24 hours      · IMMEDIATELY Contact Interventional Radiology at [phone] [place] PATIENTS: Contact Interventional Radiology at [phone]) Verena Pantoja PATIENTS: Contact Interventional Radiology at [phone]) if any of the following occur:  · If your bruise gets larger or if you notice any active bleeding  APPLY DIRECT PRESSURE TO THE BLEEDING SITE  · If you notice increased swelling or have increased pain at the puncture site   · If you have any numbness or pain in the extremity of the puncture site   · If that extremity seems cold or pale      · You have fever greater than 101  · Persistent nausea or vomitting    Follow up with your primary healthcare provider  as directed: Write down your questions so you remember to ask them during your visits

## 2021-12-18 NOTE — UTILIZATION REVIEW
Initial Clinical Review    Admission: Date/Time/Statement:   Admission Orders (From admission, onward)     Ordered        12/17/21 1940  Inpatient Admission  Once                      Orders Placed This Encounter   Procedures   • Inpatient Admission     Standing Status:   Standing     Number of Occurrences:   1     Order Specific Question:   Level of Care     Answer:   Level 1 Stepdown [13]     Order Specific Question:   Estimated length of stay     Answer:   More than 2 Midnights     Order Specific Question:   Certification     Answer:   I certify that inpatient services are medically necessary for this patient for a duration of greater than two midnights  See H&P and MD Progress Notes for additional information about the patient's course of treatment  ED Arrival Information     Expected Arrival Acuity    - 12/17/2021 14:06 Emergent         Means of arrival Escorted by Service Admission type    Wheelchair Family Member Critical Care/ICU Emergency         Arrival complaint    Vomiting/Weak        Chief Complaint   Patient presents with   • Dizziness     states he has been dizzy and having high BGS for a week        Initial Presentation:  this is a 78year old male from home to ED admitted inpatient due to DKA with type 2 DM/Sepsis/anemia/Supra therapeutic INR/MULUGETA on CKD/Cellulitis of LLE  Presented due to lightheadedness starting week prior to arrival, glucose high  For last 2 days, poor intake, vomits after drinking, lethargic, slowed and weak day of arrival   Has had temporal headache last week  Episode hematuria with clots and stopped coumadin 12/13 On exam obese  Mucous membranes dry  Tachypnea  Multiple excoriations bilateral LE and behind right ear  Generalized weakness  Blood cultures done and now show gram positive cocci in clusters  INR 7 59   H&H 6 8/22 2  Wbc 13 35   glucose 793  K 6 5  Anion gap 13  CO2-19  Beta-hydroxybutyrate 1 1    Venous gas 7 245/41 2/30 9/17 5  Bun 101, creatinine 3 89 with baseline of 1 75 -2  In the ED given 3 liters of IVF, started on insulin gtt, calcium gluconate for high K, unit of PRBC  Plan is continued IVF and insulin gtt, check BMP every 4 hours  Follow cultures, start Ancef  Hold Coumadin and asa    Date: 12/18/21    Day 2:  Patient lethargic and confused  Complaints of stomach hurting  On exam appears toxic  Mucous membranes dry  Tachycardic  Abdominal distention, guarding and rebound  blood cultures now positive for Gram-positive cocci in 1 of 2 blood cultures and   Ancef transitioned to vancomycin   hgb dropping and Thus far has received 3 units of PRBC, today given vitamine K, DDAVP for uremic bleeding, trend hgb  Creatinine 3 48 and nephrology consulted  Start  Protonix, check ct abdomen  Continue insulin gtt       ED Triage Vitals   Temperature Pulse Respirations Blood Pressure SpO2   12/17/21 1501 12/17/21 1501 12/17/21 1501 12/17/21 1501 12/17/21 2029   98 2 °F (36 8 °C) 66 18 113/58 96 %      Temp Source Heart Rate Source Patient Position - Orthostatic VS BP Location FiO2 (%)   12/17/21 2156 12/17/21 2029 12/17/21 2029 12/17/21 2029 --   Oral Monitor Sitting Right arm       Pain Score       12/17/21 2157       8          Wt Readings from Last 1 Encounters:   12/18/21 115 kg (253 lb 8 5 oz)     Additional Vital Signs:   12/18/21 1530 98 °F (36 7 °C) -- 22 113/56 -- -- -- --   12/18/21 1510 95 9 °F (35 5 °C) Abnormal  61 22 109/57 -- -- -- --   12/18/21 1400 -- 54 Abnormal  -- 114/58 -- 100 % -- --   12/18/21 1150 97 9 °F (36 6 °C) 77 18 132/62 89 99 % None (Room air) Lying   12/18/21 0630 99 9 °F (37 7 °C) 82 20 154/72 -- -- -- --   12/18/21 0227 99 8 °F (37 7 °C) 83 18 165/74 -- -- -- --   12/18/21 0212 100 1 °F (37 8 °C) 87 18 142/65 -- 98 % -- --   12/17/21 2300 98 4 °F (36 9 °C) 79 22 186/81 Abnormal  116 97 % None (Room air) Lying   12/17/21 2200 -- 74 -- 153/72 104 97 % -- --   12/17/21 2156 98 3 °F (36 8 °C) 75 18 153/72 104 99 % None (Room air) Lying   12/17/21 2130 -- 74 18 147/65 94 94 % None (Room air) Lying   12/17/21 2100 -- 72 18 146/66 95 96 % None (Room air) Lying   12/17/21 2058 98 7 °F (37 1 °C) -- -- -- -- -- -- --   12/17/21 2029 -- 68 28 Abnormal  141/65 -- 96 % None (Room air)      Pertinent Labs/Diagnostic Test Results:   12/17/21 CxR - No acute cardiopulmonary disease  12/17/21 ecg Normal sinus rhythm  Normal ECG  When compared with ECG of 10-FEB-2021 21:24,  Premature atrial complexes are no longer Present  Results from last 7 days   Lab Units 12/17/21 2035   SARS-COV-2  Negative     Results from last 7 days   Lab Units 12/18/21  1524 12/18/21  1256 12/18/21  0449 12/18/21  0011 12/18/21  0011 12/17/21  1613 12/17/21  1613   WBC Thousand/uL 15 91* 14 18* 11 38*   < > 10 44*   < > 13 35*   HEMOGLOBIN g/dL 5 2* 5 7* 7 0*  --  6 8*  --  6 8*   HEMATOCRIT % 16 7* 18 0* 22 3*  --  21 8*  --  22 2*   PLATELETS Thousands/uL 411* 404* 458*   < > 433*   < > 493*   BANDS PCT %  --   --  1  --   --   --  6    < > = values in this interval not displayed       Results from last 7 days   Lab Units 12/18/21  1525 12/18/21  1256 12/18/21  0856 12/18/21  0449 12/18/21  0433 12/18/21  0009   SODIUM mmol/L 142 142 144 144  --  138   POTASSIUM mmol/L 4 8 4 6 4 2 4 2  --  4 4   CHLORIDE mmol/L 110* 112* 112* 112*  --  106   CO2 mmol/L 20* 18* 19* 19*  --  19*   ANION GAP mmol/L 12 12 13 13  --  13   BUN mg/dL 93* 91* 87* 88*  --  94*   CREATININE mg/dL 3 60* 3 48* 3 16* 2 82*  --  3 03*   EGFR ml/min/1 73sq m 16 17 19 22  --  20   CALCIUM mg/dL 8 1* 8 2* 8 2* 8 5  --  8 0*   CALCIUM, IONIZED mmol/L  --  1 15 1 12  --  1 15 1 13   MAGNESIUM mg/dL 1 9 2 0 2 1 2 2  --  2 2   PHOSPHORUS mg/dL 4 1 3 6 3 0 2 6  --  3 6     Results from last 7 days   Lab Units 12/18/21  1525 12/18/21  0449 12/17/21  1613   AST U/L 185* 14 11   ALT U/L 69 7* 10*   ALK PHOS U/L 30* 36* 41*   TOTAL PROTEIN g/dL 5 5* 6 5 7 8   ALBUMIN g/dL 1 3* 1 7* 2 1*   TOTAL BILIRUBIN mg/dL 0 12* 0 12* 0 14*   BILIRUBIN DIRECT mg/dL  --  0 07  --      Results from last 7 days   Lab Units 12/18/21  1409 12/18/21  1327 12/18/21  1241 12/18/21  0956 12/18/21  0859 12/18/21  0717 12/18/21  0555 12/18/21  0451 12/18/21  0411 12/18/21  0256 12/18/21  0153 12/18/21  0108   POC GLUCOSE mg/dl 388* 369* 347* 262* 182* 140 145* 177* 162* 174* 228* 405*     Results from last 7 days   Lab Units 12/18/21  1525 12/18/21  1256 12/18/21  0856 12/18/21  0449 12/18/21  0009 12/17/21  2211 12/17/21  2006 12/17/21  1613   GLUCOSE RANDOM mg/dL 289* 258* 159* 126 347* 524* 692* 793*     Results from last 7 days   Lab Units 12/17/21  1613   OSMOLALITY, SERUM mmol/*     BETA-HYDROXYBUTYRATE   Date Value Ref Range Status   12/17/2021 1 1 (H) <0 6 mmol/L Final      Results from last 7 days   Lab Units 12/17/21  1613   PH DHARMESH  7 245*   PCO2 DHARMESH mm Hg 41 2*   PO2 DHARMESH mm Hg 30 9*   HCO3 DHARMESH mmol/L 17 5*   BASE EXC DHARMESH mmol/L -9 1   O2 CONTENT DHARMESH ml/dL 5 1   O2 HGB, VENOUS % 46 0*     Results from last 7 days   Lab Units 12/18/21  0452 12/17/21  1813   PROTIME seconds 61 1* 62 0*   INR  7 44* 7 59*   PTT seconds  --  133*     Results from last 7 days   Lab Units 12/17/21  2211   PROCALCITONIN ng/ml 1 06*     Results from last 7 days   Lab Units 12/17/21  1613   LACTIC ACID mmol/L 2 0     Results from last 7 days   Lab Units 12/17/21  1613   LIPASE u/L 564*     Results from last 7 days   Lab Units 12/17/21  1925   CLARITY UA  Turbid   COLOR UA  Red   SPEC GRAV UA  1 020   PH UA  5 5   GLUCOSE UA mg/dl Interference- unable to analyze   KETONES UA mg/dl Interference- unable to analyze*   BLOOD UA  Interference- unable to analyze*   PROTEIN UA mg/dl Interference- unable to analyze*   NITRITE UA  Interference- unable to analyze   BILIRUBIN UA  Interference- unable to analyze*   UROBILINOGEN UA E U /dl Interference-unable to analyze   LEUKOCYTES UA  Interference- unable to analyze*   WBC UA /hpf Field obscured, unable to enumerate*   RBC UA /hpf Innumerable*   BACTERIA UA /hpf Field obscured, unable to enumerate*   EPITHELIAL CELLS WET PREP /hpf Field obscured, unable to enumerate*     Results from last 7 days   Lab Units 12/17/21 2035   INFLUENZA A PCR  Negative   INFLUENZA B PCR  Negative   RSV PCR  Negative     Results from last 7 days   Lab Units 12/18/21  0901 12/17/21  1708 12/17/21  1613   BLOOD CULTURE   --  Received in Microbiology Lab  Culture in Progress    --    GRAM STAIN RESULT  No Polys or Bacteria seen  --  Gram positive cocci in clusters*       ED Treatment:   Medication Administration from 12/17/2021 1405 to 12/17/2021 2155       Date/Time Order Dose Route Action Comments     12/17/2021 1624 sodium chloride 0 9 % bolus 1,000 mL 1,000 mL Intravenous New Bag      12/17/2021 1749 acetaminophen (TYLENOL) tablet 650 mg 650 mg Oral Given      12/17/2021 1951 insulin regular (HumuLIN R,NovoLIN R) 1 Units/mL in sodium chloride 0 9 % 100 mL infusion 12 Units/hr Intravenous Rate/Dose Change      12/17/2021 1814 insulin regular (HumuLIN R,NovoLIN R) 1 Units/mL in sodium chloride 0 9 % 100 mL infusion 10 Units/hr Intravenous New Bag      12/17/2021 1844 sodium bicarbonate 8 4 % injection 50 mEq 50 mEq Intravenous Given      12/17/2021 2023 calcium gluconate 1 g in sodium chloride 0 9% 50 mL (premix) 1 g Intravenous New Bag      12/17/2021 1844 sodium chloride 0 9 % bolus 2,000 mL 2,000 mL Intravenous New Bag      12/17/2021 2142 insulin regular (HumuLIN R,NovoLIN R) 1 Units/mL in sodium chloride 0 9 % 100 mL infusion 20 Units/hr Intravenous Rate/Dose Change      12/17/2021 2038 insulin regular (HumuLIN R,NovoLIN R) 1 Units/mL in sodium chloride 0 9 % 100 mL infusion 12 Units/hr Intravenous New Bag         Past Medical History:   Diagnosis Date   • Bell's palsy    • Cardiac disease    • Cerebellar stroke ([address] )    • Diabetes mellitus ([address] )    • Fracture     L hip   • Hyperlipidemia    • Hypertension    • Renal disorder • Stroke [facility])     2013   • Stroke [facility])     [id number]     Present on Admission:  • High anion gap metabolic acidosis  • Acute kidney injury superimposed on chronic kidney disease ([facility] )  • Hyperlipidemia  • CVA (cerebral vascular accident) [facility])      Admitting Diagnosis: Vomiting [R11 10]  Weakness [R53 1]  DKA, type 2 ([facility] ) [E11 10]  Age/Sex: 78 y o  male  Admission Orders: 12/17/21 1940 inpatient   Scheduled Medications:  atorvastatin, 40 mg, Oral, QPM  cyanocobalamin, 1,000 mcg, Oral, Daily  desmopressin, 0 3 mcg/kg, Intravenous, Once 1600 on 12/18/21  metoprolol tartrate, 50 mg, Oral, BID  pantoprazole, 40 mg, Intravenous, Q12H [address]  vancomycin, 20 mg/kg (Adjusted), Intravenous, Once 1300 12/18     amLODIPine (NORVASC) tablet 5 mg  Dose: 5 mg  Freq: Daily Route: PO  Start: 12/18/21 0900 End: 12/18/21 1540    ceFAZolin (ANCEF) IVPB (premix in dextrose) 1,000 mg 50 mL  Dose: 1,000 mg  Freq: Every 8 hours Route: IV  Last Dose: 1,000 mg (12/18/21 8774)  Start: 12/17/21 2200 End: 12/18/21 1212    phytonadione (MEPHYTON) tablet 5 mg  Dose: 5 mg  Freq:  Once Route: PO  Start: 12/18/21 1100 End: 12/18/21 1300    Continuous IV Infusions:  dextrose 5% lactated ringer's, 100 mL/hr, Intravenous, Continuous  insulin regular (HumuLIN R,NovoLIN R) infusion, 0 3-21 Units/hr, Intravenous, Titrated    insulin regular (HumuLIN R,NovoLIN R) 1 Units/mL in sodium chloride 0 9 % 100 mL infusion  Rate: 0 1-30 mL/hr Dose: 0 1-30 Units/hr  Freq: Continuous Route: IV  Last Dose: Stopped (12/18/21 1038)  Start: 12/17/21 2030 End: 12/18/21 1012    sodium chloride 0 9 % infusion  Rate: 500 mL/hr Dose: 500 mL/hr  Freq: Continuous Route: IV  Last Dose: 500 mL/hr (12/18/21 0103)  Start: 12/17/21 2030 End: 12/18/21 0156    sodium chloride 0 9 % infusion  Rate: 250 mL/hr Dose: 250 mL/hr  Freq: Continuous Route: IV  Last Dose: 250 mL/hr (12/18/21 0107)  Start: 12/18/21 2391 End: 12/18/21 0156    PRN Meds:  acetaminophen, 650 mg, Oral, Q4H PRN - used x 2 12/18  fentanyl citrate (PF), 25 mcg, Intravenous, Q4H PRN - used x 1 12/18   ondansetron, 4 mg, Intravenous, Q6H PRN  [START ON 12/19/2021] vancomycin, 15 mg/kg (Adjusted), Intravenous, Daily PRN    Cardio pulmonary monitoring   Neuro check every hour  Transfuse PRBC 1 unit on 12/17, 1 unit 12/18/21  NPO    IP CONSULT TO NEPHROLOGY      Network Utilization Review Department  ATTENTION: Please call with any questions or concerns to [phone] and carefully listen to the prompts so that you are directed to the right person  All voicemails are confidential   Matt Sena all requests for admission clinical reviews, approved or denied determinations and any other requests to dedicated fax number below belonging to the campus where the patient is receiving treatment   List of dedicated fax numbers for the Facilities:  [address] DENIALS (Administrative/Medical Necessity) [phone]   [address] (Maternity/NICU/Pediatrics) [phone]   [address] [phone]   [place] [address] [phone]   1306 [facility] 150 Medical [place] [address] [address] [address] [phone]   [address] [place] [address] [phone]

## 2021-12-18 NOTE — ASSESSMENT & PLAN NOTE
Patient is complaining of pain in his left lower leg  Patient denies fever chills  On physical exam erythema, tenderness to palpation, marks of excoriation  No evidence of abscess formation  Meeting sepsis criteria with tachypnea, WBC of 13  No personal history of MRSA    -- start cefazolin 1 g t i d   To complete 5 day course  -- monitor for symptom  -- follow cbc

## 2021-12-18 NOTE — ASSESSMENT & PLAN NOTE
· New onset right lower quadrant abdominal pain  · Patient states that he can not remember the last time he had a bowel movement though nursing reports that he had brown bowel movement yesterday with no signs of bleeding  · Repeat lactic sent  · Patient has been having down trending hemoglobin requiring 3 units PRBCs  · With bleeding and abdominal pain will start Protonix IV b i d   · INR elevated; given 5 mg of vitamin K   Will obtain CT abdomen pelvis  · CT abdomen pelvis obtained; unfortunately unable to scan with contrast secondary to kidney function  · Continue serial abdominal exams  · Follow-up lab work

## 2021-12-18 NOTE — ASSESSMENT & PLAN NOTE
Lab Results   Component Value Date    HGBA1C 8 7 (H) 03/15/2021     (P) 272 5     · Initially treated as DKA upon admission however BHB negative  · Initially started on DKA insulin drip now transition to mild DKA insulin  · Continue insulin drip for now with plans to transition to sliding scale insulin  · Maintain blood glucose 140-180

## 2021-12-18 NOTE — PROGRESS NOTES
Vancomycin Assessment    Peggy Nunez is a 78 y o  male who is currently receiving vancomycin 1750 mg once, followed by 1250 mg daily as needed for random level < 20 for skin-soft tissue infection     Relevant clinical data and objective history reviewed:  Creatinine   Date Value Ref Range Status   12/18/2021 3 16 (H) 0 60 - 1 30 mg/dL Final     Comment:     Standardized to IDMS reference method   12/18/2021 2 82 (H) 0 60 - 1 30 mg/dL Final     Comment:     Standardized to IDMS reference method   12/18/2021 3 03 (H) 0 60 - 1 30 mg/dL Final     Comment:     Standardized to IDMS reference method   09/24/2015 1 30 0 60 - 1 30 mg/dL Final     Comment:     Standardized to IDMS reference method  'Unable to analyze due to hemolysis     09/23/2015 1 50 (H) 0 60 - 1 30 mg/dL Final     Comment:     Standardized to IDMS reference method   09/22/2015 1 62 (H) 0 60 - 1 30 mg/dL Final     Comment:     Standardized to IDMS reference method     /72   Pulse 82   Temp 99 9 °F (37 7 °C) (Oral)   Resp 20   Ht 5' 7" (1 702 m)   Wt 115 kg (253 lb 8 5 oz)   SpO2 98%   BMI 39 71 kg/m²   I/O last 3 completed shifts: In: 9834 [I V :1298; IV Piggyback:3050]  Out: 300 [Urine:300]  Lab Results   Component Value Date/Time    BUN 87 (H) 12/18/2021 08:56 AM    BUN 17 09/24/2015 07:54 AM    WBC 11 38 (H) 12/18/2021 04:49 AM    WBC 5 78 09/23/2015 06:28 AM    HGB 7 0 (L) 12/18/2021 04:49 AM    HGB 13 1 09/23/2015 06:28 AM    HCT 22 3 (L) 12/18/2021 04:49 AM    HCT 41 7 09/23/2015 06:28 AM    MCV 88 12/18/2021 04:49 AM    MCV 83 09/23/2015 06:28 AM     (H) 12/18/2021 04:49 AM     09/23/2015 06:28 AM     Temp Readings from Last 3 Encounters:   12/18/21 99 9 °F (37 7 °C) (Oral)   02/10/21 99 5 °F (37 5 °C)   12/01/20 98 2 °F (36 8 °C) (Oral)     Vancomycin Days of Therapy: 1    Assessment/Plan  The patient is currently on vancomycin utilizing pulse dosing based on adjusted body weight (due to obesity)    Baseline risks associated with therapy include: pre-existing renal impairment and advanced age  The patient is currently receiving 1750 mg once, followed by 1250 mg daily as needed for random level < 20 and is clinically appropriate and dose will be continued  Pharmacy will also follow closely for s/sx of nephrotoxicity, infusion reactions, and appropriateness of therapy  BMP and CBC will be ordered per protocol  Plan for random level at approximately 1230 on 12/19  Due to infection severity, will target a trough of 15-20 (appropriate for most indications)   Pharmacy will continue to follow the patient’s culture results and clinical progress daily      Gabrielle Steward, Pharmacist

## 2021-12-18 NOTE — PHYSICAL THERAPY NOTE
Physical Therapy Cancellation Note         12/18/21 1237   PT Last Visit   PT Visit Date 12/18/21   Note Type   Note type Cancelled Session   Additional Comments PT orders noted and chart reviewed  Per RN, pt is lethargic and not very appropriate for PT mobility assessment  Will continue to follow and evaluate as appropriate        Mary Sylvester, PT,DPT

## 2021-12-18 NOTE — ASSESSMENT & PLAN NOTE
· Patient is meeting sepsis criteria on admission with tachypnea are 30, WBC of 13  · He is a febrile, blood pressure is stable  · Source is most likely cellulitis  · Chest x-rays negative  · Denies pain with urination however had gross hematuria last week  · Patient received 3 L of IV fluids per DKA protocol upon admission  · blood cultures now positive for Gram-positive cocci in 1 of 2 blood cultures  · Ancef transition to vancomycin  · MRSA pending  · follow wound culture  · follow UA  · follow WBC  · follow procalc  · Trend fever curve and white blood cell count

## 2021-12-18 NOTE — PROGRESS NOTES
[facility]  Progress Note - Marcus Cuff 1955, 78 y o  male MRN: [id number]  Unit/Bed#: S -01 Encounter: [id number]  Primary Care Provider: Brunilda Arevalo MD   Date and time admitted to hospital: 12/17/2021  3:03 PM    Bacteremia  Assessment & Plan  · Patient is meeting sepsis criteria on admission with tachypnea are 30, WBC of 13  · He is a febrile, blood pressure is stable  · Source is most likely cellulitis  · Chest x-rays negative  · Denies pain with urination however had gross hematuria last week  · Patient received 3 L of IV fluids per DKA protocol upon admission  · blood cultures now positive for Gram-positive cocci in 1 of 2 blood cultures  · Ancef transition to vancomycin  · MRSA pending  · follow wound culture  · follow UA  · follow WBC  · follow procalc  · Trend fever curve and white blood cell count    Anemia  Assessment & Plan  · Hemoglobin noted on admission 6 8  · Reports had hematuria with clots last week  · 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  · Patient stopped taking warfarin on Monday  · On admission INR 7 46  · Patient is now status post 3 units PRBCs  · Given 5 mg IV vitamin K  · Given a DDAVP for uremic bleeding  · CT abdomen pelvis obtained secondary to worsening abdominal pain  · If any concern for bleeding on CT abdomen pelvis will reverse INR  · Continue to trend hemoglobin  · Transfuse if hemoglobin less than 7        Supratherapeutic INR  Assessment & Plan  · Noted with INR 7 46 on admission  · Patient reports had blood in his urine last week  · He discontinued taking warfarin on Monday  · Hemoglobin on admission 6 8 and patient is receiving blood transfusion  · Patient is now status post 3 units PRBCs  · INR remains elevated; given 5 of vitamin K and 1 dose of DDAVP to reverse uremic bleeding  · Obtaining CT abdomen pelvis, if any concern for bleeding within the abdomen will reverse INR  · Check INR in a m      Acute kidney injury superimposed on chronic kidney disease [facility])  Assessment & Plan  Lab Results   Component Value Date    EGFR 17 12/18/2021    EGFR 19 12/18/2021    EGFR 22 12/18/2021    CREATININE 3 48 (H) 12/18/2021    CREATININE 3 16 (H) 12/18/2021    CREATININE 2 82 (H) 12/18/2021     · Baseline creatinine 1 75-2  · On admission creatinine elevated at 3 87, now 3 48  · BUN also elevated at 100 - most likely prerenal in setting of dehydration due to HSS state  · Also some concern for GIB   · Nephrology consulted  · Trend BUN/Cr  · Avoid hypotension    Cellulitis of left lower extremity  Assessment & Plan  Patient is complaining of pain in his left lower leg  Patient denies fever chills  On physical exam erythema, tenderness to palpation, marks of excoriation  No evidence of abscess formation  Meeting sepsis criteria with tachypnea, WBC of 13  No personal history of MRSA    · Blood cultures now positive for Gram-positive bacteremia in 1/2  · Transitioned to vancomycin  · Follow-up blood cultures    CVA (cerebral vascular accident) [facility])  Assessment & Plan  History of thrombotic CVA  Left facial droop no other residual neurological deficits  Patient is on aspirin, Coumadin currently held  -- restart medication when appropriate    Hyperlipidemia  Assessment & Plan  · NPO for now, hold home fenofibrate and statin    Hypertension  Assessment & Plan  · Hold all antihypertensives in the setting of possible Gram-positive bacteremia and blood loss  · Continue telemetry    Abdominal pain  Assessment & Plan  · New onset right lower quadrant abdominal pain  · Patient states that he can not remember the last time he had a bowel movement though nursing reports that he had brown bowel movement yesterday with no signs of bleeding  · Repeat lactic sent  · Patient has been having down trending hemoglobin requiring 3 units PRBCs  · With bleeding and abdominal pain will start Protonix IV b i d   · INR elevated; given 5 mg of vitamin K   Will obtain CT abdomen pelvis  · CT abdomen pelvis obtained; unfortunately unable to scan with contrast secondary to kidney function  · Continue serial abdominal exams  · Follow-up lab work    Type 2 diabetes mellitus, with long-term current use of insulin ([facility] [address] )  Assessment & Plan  Lab Results   Component Value Date    HGBA1C 8 7 (H) 03/15/2021     (P) 272 5     · Initially treated as DKA upon admission however BHB negative  · Initially started on DKA insulin drip now transition to mild DKA insulin  · Continue insulin drip for now with plans to transition to sliding scale insulin  · Maintain blood glucose 140-180    * High anion gap metabolic acidosis  Assessment & Plan  Lab Results   Component Value Date    HGBA1C 8 7 (H) 03/15/2021       Recent Labs     12/18/21  0956 12/18/21  1241 12/18/21  1327 12/18/21  1409   POCGLU 262* 347* 369* 388*     · Patient was known history of DM type 2 insulin dependent  · He came in with blood glucose of 760  · VBG with pH of 7 245, pCO2 of 41, HC03 17 5, consistent with metabolic acidosis   · anion gap corrected to albumin 19 3  · Patient was initially treated with DKA protocol however BHB negative and does not clinically appear to be in DKA  · Lactic acid negative  · Check serum osmoles  · Consult nephrology  · Continue Trend BMP q 4 hours    --     Blood Sugar Average: Last 72 hrs:  (P) 272 5      ----------------------------------------------------------------------------------------  HPI/24hr events:  Patient admitted for DKA and started on DKA protocol  And get close x2 in transitioned off of critical care insulin drip to non critical care insulin drip this morning  Received 2 units of PRBCs overnight an additional 1 unit this morning  Patient complaining of worsening right lower quadrant abdominal pain and sent for CT abdomen pelvis      Patient appropriate for transfer out of the ICU today?: No  Disposition: Continue Stepdown Level 1 level of care   Code Status: Level 1 - Full Code  ---------------------------------------------------------------------------------------  SUBJECTIVE  "My stomach hurts " Patient states that his abdomen is in a significant amount of pain but denies all other complaints  Review of Systems   Respiratory: Negative for cough and shortness of breath  Cardiovascular: Negative for chest pain and palpitations  Gastrointestinal: Positive for abdominal distention and abdominal pain  Negative for blood in stool, constipation, diarrhea, nausea and vomiting  Neurological: Negative for weakness, light-headedness and headaches  All other systems reviewed and are negative  Review of systems was reviewed and negative unless stated above in HPI/24-hour events   ---------------------------------------------------------------------------------------  OBJECTIVE    Vitals   Vitals:    21 1400 21 1510 21 1530 21 1609   BP: 114/58 109/57 113/56 118/56   BP Location:       Pulse: (!) 54 61  62   Resp:  22 22    Temp:  (!) 95 9 °F (35 5 °C) 98 °F (36 7 °C)    TempSrc:       SpO2: 100%   100%   Weight:       Height:         Temp (24hrs), Av 6 °F (37 °C), Min:95 9 °F (35 5 °C), Max:100 1 °F (37 8 °C)  Current: Temperature: 98 °F (36 7 °C)          Respiratory:  SpO2: SpO2: 100 %       Invasive/non-invasive ventilation settings   Respiratory  Report   Lab Data (Last 4 hours)    None         O2/Vent Data (Last 4 hours)    None                Physical Exam  Vitals reviewed  Constitutional:       General: He is not in acute distress  Appearance: He is toxic-appearing  He is not diaphoretic  HENT:      Head: Normocephalic and atraumatic  Nose: Nose normal       Mouth/Throat:      Mouth: Mucous membranes are dry  Eyes:      Extraocular Movements: Extraocular movements intact  Pupils: Pupils are equal, round, and reactive to light  Cardiovascular:      Rate and Rhythm: Regular rhythm  Tachycardia present        Heart sounds: No murmur heard  No friction rub  No gallop  Pulmonary:      Effort: Pulmonary effort is normal       Breath sounds: Normal breath sounds  No wheezing, rhonchi or rales  Abdominal:      General: There is distension  Tenderness: There is abdominal tenderness  There is guarding and rebound  Musculoskeletal:      Right lower leg: No edema  Skin:     General: Skin is warm and dry  Capillary Refill: Capillary refill takes less than 2 seconds  Neurological:      General: No focal deficit present  Mental Status: He is oriented to person, place, and time        Comments: Lethargic but AO x3             Laboratory and Diagnostics:  Results from last 7 days   Lab Units 12/18/21  1524 12/18/21  1256 12/18/21  0449 12/18/21  0011 12/17/21  1613   WBC Thousand/uL 15 91* 14 18* 11 38* 10 44* 13 35*   HEMOGLOBIN g/dL 5 2* 5 7* 7 0* 6 8* 6 8*   HEMATOCRIT % 16 7* 18 0* 22 3* 21 8* 22 2*   PLATELETS Thousands/uL 411* 404* 458* 433* 493*   BANDS PCT %  --   --  1  --  6   MONO PCT %  --   --  6  --  5     Results from last 7 days   Lab Units 12/18/21  1525 12/18/21  1256 12/18/21  0856 12/18/21  0449 12/18/21  0009 12/17/21  2211 12/17/21  2006 12/17/21  1613 12/17/21  1613   SODIUM mmol/L 142 142 144 144 138  --  131*  --  125*   POTASSIUM mmol/L 4 8 4 6 4 2 4 2 4 4  --  5 3  --  6 5*   CHLORIDE mmol/L 110* 112* 112* 112* 106  --  100  --  93*   CO2 mmol/L 20* 18* 19* 19* 19*  --  20*  --  19*   ANION GAP mmol/L 12 12 13 13 13  --  11  --  13   BUN mg/dL 93* 91* 87* 88* 94*  --  106*  --  101*   CREATININE mg/dL 3 60* 3 48* 3 16* 2 82* 3 03*  --  3 66*  --  3 89*   CALCIUM mg/dL 8 1* 8 2* 8 2* 8 5 8 0*  --  8 2*  --  8 7   GLUCOSE RANDOM mg/dL 289* 258* 159* 126 347* 524* 692*   < > 793*   ALT U/L 69  --   --  7*  --   --   --   --  10*   AST U/L 185*  --   --  14  --   --   --   --  11   ALK PHOS U/L 30*  --   --  36*  --   --   --   --  41*   ALBUMIN g/dL 1 3*  --   --  1 7*  --   --   --   --  2 1* TOTAL BILIRUBIN mg/dL 0 12*  --   --  0 12*  --   --   --   --  0 14*    < > = values in this interval not displayed  Results from last 7 days   Lab Units 12/18/21  1525 12/18/21  1256 12/18/21  0856 12/18/21  0449 12/18/21  0009   MAGNESIUM mg/dL 1 9 2 0 2 1 2 2 2 2   PHOSPHORUS mg/dL 4 1 3 6 3 0 2 6 3 6      Results from last 7 days   Lab Units 12/18/21  0452 12/17/21  1813   INR  7 44* 7 59*   PTT seconds  --  133*          Results from last 7 days   Lab Units 12/18/21  1525 12/17/21  1613   LACTIC ACID mmol/L 3 1* 2 0     ABG:    VBG:  Results from last 7 days   Lab Units 12/17/21  1613   PH DHARMESH  7 245*   PCO2 DHARMESH mm Hg 41 2*   PO2 DHARMESH mm Hg 30 9*   HCO3 DHARMESH mmol/L 17 5*   BASE EXC DHARMESH mmol/L -9 1     Results from last 7 days   Lab Units 12/17/21  2211   PROCALCITONIN ng/ml 1 06*       Micro  Results from last 7 days   Lab Units 12/18/21  0901 12/17/21  1708 12/17/21  1613   BLOOD CULTURE   --  Received in Microbiology Lab  Culture in Progress  --    GRAM STAIN RESULT  No Polys or Bacteria seen  --  Gram positive cocci in clusters*       EKG: Sinus tachycardia  Imaging: I have personally reviewed pertinent reports  Intake and Output  I/O       12/16 0701 12/17 0700 12/17 0701  12/18 0700 12/18 0701  12/19 0700    I V  (mL/kg)  1298 (11 3) 1675 (14 6)    IV Piggyback  3050     Total Intake(mL/kg)  4348 (37 8) 1675 (14 6)    Urine (mL/kg/hr)  300 200 (0 2)    Total Output  300 200    Net  +4048 +1475           Unmeasured Urine Occurrence  1 x           Height and Weights   Height: 5' 7" (170 2 cm)  IBW (Ideal Body Weight): 66 1 kg  Body mass index is 39 71 kg/m²    Weight (last 2 days)     Date/Time Weight    12/18/21 0300 115 (253 53)    12/17/21 2156 115 (253 75)    12/17/21 2040 140 (308 64)            Nutrition       Diet Orders   (From admission, onward)             Start     Ordered    12/17/21 2205  Room Service  Once        Question:  Type of Service  Answer:  Room Service - Appropriate with Assistance    12/17/21 2204 12/17/21 2021  Diet NPO  Diet effective now        References:    Nutrtion Support Algorithm Enteral vs  Parenteral   Question Answer Comment   Diet Type NPO    RD to adjust diet per protocol?  Yes        12/17/21 2020                  Active Medications  Scheduled Meds:  Current Facility-Administered Medications   Medication Dose Route Frequency Provider Last Rate   • acetaminophen  650 mg Oral Q4H PRN Louann Navas PA-C     • atorvastatin  40 mg Oral QPM Phong Azul MD     • cyanocobalamin  1,000 mcg Oral Daily Phong Azul MD     • desmopressin  0 3 mcg/kg Intravenous Once Armin Velasquez PA-C     • dextrose 5% lactated ringer's  100 mL/hr Intravenous Continuous Armin Velasquez PA-C 100 mL/hr (12/18/21 1623)   • fentanyl citrate (PF)  25 mcg Intravenous Q4H PRN Armin Velasquez PA-C     • insulin regular (HumuLIN R,NovoLIN R) infusion  0 3-21 Units/hr Intravenous Titrated Armin Velasquez PA-C 13 Units/hr (12/18/21 1624)   • metoprolol tartrate  50 mg Oral BID Phong Azul MD     • ondansetron  4 mg Intravenous Q6H PRN Phong Azul MD     • pantoprazole  40 mg Intravenous Q12H [address] Armin Velasquez PA-C     • [START ON 12/19/2021] vancomycin  15 mg/kg (Adjusted) Intravenous Daily PRN Armin Velasquez PA-C     • vancomycin  20 mg/kg (Adjusted) Intravenous Once Armin Velasquez PA-C 1,750 mg (12/18/21 1625)     Continuous Infusions:  dextrose 5% lactated ringer's, 100 mL/hr, Last Rate: 100 mL/hr (12/18/21 1623)  insulin regular (HumuLIN R,NovoLIN R) infusion, 0 3-21 Units/hr, Last Rate: 13 Units/hr (12/18/21 1624)      PRN Meds:   acetaminophen, 650 mg, Q4H PRN  fentanyl citrate (PF), 25 mcg, Q4H PRN  ondansetron, 4 mg, Q6H PRN  [START ON 12/19/2021] vancomycin, 15 mg/kg (Adjusted), Daily PRN        Invasive Devices Review  Invasive Devices  Report    Peripheral Intravenous Line            Peripheral IV 12/17/21 Left Antecubital <1 day    Peripheral IV 12/17/21 Right Hand <1 day    Peripheral IV 12/18/21 Right; Upper;Ventral (anterior) Arm <1 day                ---------------------------------------------------------------------------------------  Advance Directive and Living Will:      Power of :    POLST:    ---------------------------------------------------------------------------------------  Care Time Delivered:   No Critical Care time spent       Paula Marx PA-C      Portions of the record may have been created with voice recognition software  Occasional wrong word or "sound a like" substitutions may have occurred due to the inherent limitations of voice recognition software    Read the chart carefully and recognize, using context, where substitutions have occurred

## 2021-12-18 NOTE — ASSESSMENT & PLAN NOTE
Lab Results   Component Value Date    EGFR 15 12/17/2021    EGFR 35 02/10/2021    EGFR 43 12/01/2020    CREATININE 3 89 (H) 12/17/2021    CREATININE 1 94 (H) 02/10/2021    CREATININE 1 63 (H) 12/01/2020     Baseline creatinine 1 75-2  On admission creatinine elevated at 3 87  BUN also elevated at 100 - most likely prerenal in setting of dehydration due to HSS state    -- continue IV hydration  -- follow CMP a m   -- holding lisinopril, fenofibrate   Renal adjust dose of cefazolin

## 2021-12-18 NOTE — ASSESSMENT & PLAN NOTE
Lab Results   Component Value Date    HGBA1C 8 7 (H) 03/15/2021       Recent Labs     12/18/21  0956 12/18/21  1241 12/18/21  1327 12/18/21  1409   POCGLU 262* 347* 369* 388*     · Patient was known history of DM type 2 insulin dependent  · He came in with blood glucose of 760  · VBG with pH of 7 245, pCO2 of 41, HC03 17 5, consistent with metabolic acidosis   · anion gap corrected to albumin 19 3  · Patient was initially treated with DKA protocol however BHB negative and does not clinically appear to be in DKA  · Lactic acid negative  · Check serum osmoles  · Consult nephrology  · Continue Trend BMP q 4 hours    --     Blood Sugar Average: Last 72 hrs:  (P) 272 5

## 2021-12-18 NOTE — ASSESSMENT & PLAN NOTE
Lab Results   Component Value Date    HGBA1C 8 7 (H) 03/15/2021          Patient does not follow with endocrinologist  Home regimen consists of Humulin 500 units Q i d  per patient    -- treatment as per DKA  -- switched to subcu insulin when appropriate  -- diabetic diet future  -- follow A1c

## 2021-12-18 NOTE — H&P
[facility]  H&P- Yohannes Garcia 1955, 78 y o  male MRN: [id number]  Unit/Bed#: ISMAEL Encounter: [id number]  Primary Care Provider: Marium Mcpherson MD   Date and time admitted to hospital: 12/17/2021  3:03 PM    * Diabetic ketoacidosis associated with type 2 diabetes mellitus [facility])  Assessment & Plan  Lab Results   Component Value Date    HGBA1C 8 7 (H) 03/15/2021       Recent Labs     12/17/21  1501 12/17/21  1834 12/17/21  1947   POCGLU >500* >500* >500*     Patient was known history of DM type 2 insulin dependent  He came in with blood glucose of 760  VBG with pH of 7 245, pCO2 of 41, HC03 17 5, consistent with metabolic acidosis   anion gap corrected to albumin 19 3  Patient is treated per DKA protocol  He received 1400 cc of NS in the ED and finished total of 3 L in ICU    -- continue IV hydration per DKA protocol  -- continue insulin drip per DKA protocol  -- check BMP q 4 hours  -- switched to subcu insulin when anion gap closes and patient tolerates food  -- follow potassium, sodium, magnesium, phosphorus q 4 hours - replacement appropriate  -- npo      Blood Sugar Average: Last 72 hrs:      Sepsis ([facility] )  Assessment & Plan  Patient is meeting sepsis criteria on admission with tachypnea are 30, WBC of 13  He is a febrile, blood pressure is stable  Source is most likely cellulitis  Chest x-rays negative  Denies pain with urination however had gross hematuria last week  Patient received 3 L of IV fluids per DKA protocol    -- follow blood cultures  -- follow wound culture  -- follow UA  -- started on cefazolin 2 g t i d   -- follow WBC  -- follow procalc  -- follow-up vital signs    Anemia  Assessment & Plan  Hemoglobin noted on admission 6 8, B/L 11 5-13  Reports had hematuria with clots last week     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  Patient stopped taking warfarin on Monday  On admission INR 7 46    -- holding Coumadin  -- holding aspirin  -- holding DVT prophylaxis  -- follow INR in a m  Supratherapeutic INR  Assessment & Plan  Noted with INR 7 46 on admission  Patient reports had blood in his urine last week  He discontinued taking warfarin on Monday  Hemoglobin on admission 6 8 and patient is receiving blood transfusion    -- check UA  -- monitor INR in the a m   -- if signs of ongoing bleeding will give vitamin K    Acute kidney injury superimposed on chronic kidney disease [facility])  Assessment & Plan  Lab Results   Component Value Date    EGFR 15 12/17/2021    EGFR 35 02/10/2021    EGFR 43 12/01/2020    CREATININE 3 89 (H) 12/17/2021    CREATININE 1 94 (H) 02/10/2021    CREATININE 1 63 (H) 12/01/2020     Baseline creatinine 1 75-2  On admission creatinine elevated at 3 87  BUN also elevated at 100 - most likely prerenal in setting of dehydration due to HSS state    -- continue IV hydration  -- follow CMP a m   -- holding lisinopril, fenofibrate  Renal adjust dose of cefazolin    Cellulitis of left lower extremity  Assessment & Plan  Patient is complaining of pain in his left lower leg  Patient denies fever chills  On physical exam erythema, tenderness to palpation, marks of excoriation  No evidence of abscess formation  Meeting sepsis criteria with tachypnea, WBC of 13  No personal history of MRSA    -- start cefazolin 1 g t i d  To complete 5 day course  -- monitor for symptom  -- follow cbc    CVA (cerebral vascular accident) [facility])  Assessment & Plan  History of thrombotic CVA  Left facial droop no other residual neurological deficits  Patient is on aspirin, Coumadin currently held  -- restart medication when appropriate    Hyperlipidemia  Assessment & Plan  Patient is on Lipitor, fenofibrate at home  -- continue home medication    Hypertension  Assessment & Plan  Patient is on amlodipine 5 mg daily, lisinopril 10 mg daily, metoprolol 50 mg b i d   At home    -- continue home medication  -- monitor vital signs closely    Type 2 diabetes mellitus, with long-term current use of insulin ([facility] )  Assessment & Plan  Lab Results   Component Value Date    HGBA1C 8 7 (H) 03/15/2021          Patient does not follow with endocrinologist  Home regimen consists of Humulin 500 units Q i d  per patient    -- treatment as per DKA  -- switched to subcu insulin when appropriate  -- diabetic diet future  -- follow A1c    -------------------------------------------------------------------------------------------------------------  Chief Complaint:  Weakness,  vomiting    History of Present Illness     Ngoc Coronado is a 78 y o  male with history of DM type 2 insulin dependent, CKD, CAD s/p stent, DVT on Coumadin, MELINDA, CVA, cerebral aneurysm not ruptured, former smoker who presents with progressive weakness, mental slowness associated with nausea and vomiting over the last week  He reports was not able to keep anything down for 3 days  Reports feels his mouth is dry  He checked his BG at home it was 450-500, BG usually runs at level around 200  Patient reports was compliant with home insulins  Patient also complains of pain and redness in his left leg for 10 days  He recalls had a pimple on that leg that he scratched   Patient denies fever, chills, chest pain, palpitations, lightheadedness, abdominal pain, shortness of breath of cough  Patient also admits of having bright red blood in his urine last week  He stopped taking his Coumadin on Monday  On admission he noted with BG >700, Urin e positive for B-hydroxibutirate  His VBG with pH of 7 245 and finding of metabolic acidosis  AG elevated when corrected to albumin  He was started on insulin drip and provided with IV hydration  Admitted to ICU for DKA  Also meeting sepsis criteria on admission  Sourse is most likely cellulitis started on cefazoline dose adjusted for renal function  INR elevated at 7 46  Hg of 6 8 no active bleeding noted   Patient is provided with 1 U of PRBC      History obtained from patient and his wife over the phone  -------------------------------------------------------------------------------------------------------------  Dispo: Admit to Critical Care     Code Status: Level 1 - Full Code  --------------------------------------------------------------------------------------------------------------  Review of Systems   Constitutional: Positive for activity change, appetite change and fatigue  Negative for chills, fever and unexpected weight change  HENT: Negative  Negative for nosebleeds and rhinorrhea  Eyes: Negative  Respiratory: Negative for cough, chest tightness, shortness of breath and wheezing  Cardiovascular: Negative for chest pain, palpitations and leg swelling  Gastrointestinal: Positive for nausea and vomiting  Negative for abdominal pain, blood in stool, constipation and diarrhea  Endocrine: Positive for polydipsia  Negative for polyuria  Genitourinary: Positive for hematuria  Negative for difficulty urinating, dysuria and urgency  Skin: Positive for rash and wound  Negative for pallor  Excoriations on the left pretibial area  Pain and redness in the same area   Neurological: Positive for headaches  Negative for dizziness, syncope, weakness, light-headedness and numbness  Psychiatric/Behavioral: Negative  Negative for confusion  A 12-point, complete review of systems was reviewed and negative except as stated above     Physical Exam  Constitutional:       General: He is not in acute distress  Appearance: He is obese  He is ill-appearing  He is not toxic-appearing  HENT:      Head: Normocephalic and atraumatic  Nose: No congestion or rhinorrhea  Mouth/Throat:      Mouth: Mucous membranes are dry  Pharynx: No posterior oropharyngeal erythema  Eyes:      General: No scleral icterus  Extraocular Movements: Extraocular movements intact  Neck:      Vascular: No carotid bruit     Cardiovascular:      Rate and Rhythm: Normal rate and regular rhythm  Heart sounds: Normal heart sounds  No murmur heard  No friction rub  No gallop  Pulmonary:      Effort: Pulmonary effort is normal  No respiratory distress  Breath sounds: Normal breath sounds  No stridor  No wheezing, rhonchi or rales  Chest:      Chest wall: No tenderness  Abdominal:      General: Bowel sounds are normal  There is no distension  Palpations: Abdomen is soft  Tenderness: There is no abdominal tenderness  There is no guarding or rebound  Musculoskeletal:         General: No swelling or tenderness  Normal range of motion  Cervical back: No rigidity  Lymphadenopathy:      Cervical: No cervical adenopathy  Skin:     General: Skin is warm and dry  Findings: Erythema and lesion present  Comments: Multiple Excretion on the left pretibial area  Erythema and tenderness to palpation   Neurological:      Mental Status: He is alert and oriented to person, place, and time  Psychiatric:         Mood and Affect: Mood normal        --------------------------------------------------------------------------------------------------------------  Vitals:   Vitals:    12/17/21 2040 12/17/21 2058 12/17/21 2100 12/17/21 2130   BP:   146/66 147/65   BP Location:   Right arm Right arm   Pulse:   72 74   Resp:   18 18   Temp:  98 7 °F (37 1 °C)     SpO2:   96% 94%   Weight: (!) 140 kg (308 lb 10 3 oz)      Height: 5' 7" (1 702 m)        Temp  Min: 98 2 °F (36 8 °C)  Max: 98 7 °F (37 1 °C)  IBW (Ideal Body Weight): 66 1 kg  Height: 5' 7" (170 2 cm)  Body mass index is 48 34 kg/m²      Laboratory and Diagnostics:  Results from last 7 days   Lab Units 12/17/21  1613   WBC Thousand/uL 13 35*   HEMOGLOBIN g/dL 6 8*   HEMATOCRIT % 22 2*   PLATELETS Thousands/uL 493*   BANDS PCT % 6   MONO PCT % 5     Results from last 7 days   Lab Units 12/17/21 2006 12/17/21  1613   SODIUM mmol/L 131* 125*   POTASSIUM mmol/L 5 3 6 5*   CHLORIDE mmol/L 100 93*   CO2 mmol/L 20* 19* ANION GAP mmol/L 11 13   BUN mg/dL 106* 101*   CREATININE mg/dL 3 66* 3 89*   CALCIUM mg/dL 8 2* 8 7   GLUCOSE RANDOM mg/dL 692* 793*   ALT U/L  --  10*   AST U/L  --  11   ALK PHOS U/L  --  41*   ALBUMIN g/dL  --  2 1*   TOTAL BILIRUBIN mg/dL  --  0 14*          Results from last 7 days   Lab Units 12/17/21  1813   INR  7 59*   PTT seconds 133*          Results from last 7 days   Lab Units 12/17/21  1613   LACTIC ACID mmol/L 2 0     ABG:    VBG:  Results from last 7 days   Lab Units 12/17/21  1613   PH DHARMESH  7 245*   PCO2 DHARMESH mm Hg 41 2*   PO2 DHARMESH mm Hg 30 9*   HCO3 DHARMESH mmol/L 17 5*   BASE EXC DHARMESH mmol/L -9 1           Micro:  Results from last 7 days   Lab Units 12/17/21  1708 12/17/21  1613   BLOOD CULTURE  Received in Microbiology Lab  Culture in Progress  Received in Microbiology Lab  Culture in Progress  EKG:  Sinus rhythm  Imaging: I have personally reviewed pertinent reports          Historical Information   Past Medical History:   Diagnosis Date   • Bell's palsy    • Cardiac disease    • Cerebellar stroke ([facility] [address] )    • Diabetes mellitus ([facility] [address] )    • Fracture     L hip   • Hyperlipidemia    • Hypertension    • Renal disorder    • Stroke [facility])     2013   • Stroke [facility])     [id number]     Past Surgical History:   Procedure Laterality Date   • CORONARY ANGIOPLASTY WITH STENT PLACEMENT     • FRACTURE SURGERY      L femur   • INCISION AND DRAINAGE OF WOUND Right 9/13/2016    Procedure: INCISION AND DRAINAGE (I&D) EXTREMITY, right lateral ankle;  Surgeon: Minda Segura DPM;  Location: [facility];  Service:    • WOUND DEBRIDEMENT Right 9/15/2016    Procedure: DEBRIDEMENT WOUND ([address]) ankle wound with closure and FRANYC drain placement;  Surgeon: Minda Segura DPM;  Location: [facility];  Service:      Social History   Social History     Substance and Sexual Activity   Alcohol Use No     Social History     Substance and Sexual Activity   Drug Use No     Social History     Tobacco Use   Smoking Status Former Smoker   • Packs/day: 0 00   • Types: Cigarettes   • Quit date: 9/3/2006   • Years since quitting: 15 2   Smokeless Tobacco Never Used       Family History:   Family History   Problem Relation Age of Onset   • Diabetes Mother    • Stroke Mother      I have reviewed this patient's family history and commented on sigificant items within the HPI      Medications:  Current Facility-Administered Medications   Medication Dose Route Frequency   • acetaminophen (TYLENOL) tablet 650 mg  650 mg Oral Q4H PRN   • [START ON 12/18/2021] amLODIPine (NORVASC) tablet 5 mg  5 mg Oral Daily   • atorvastatin (LIPITOR) tablet 40 mg  40 mg Oral QPM   • [START ON 12/18/2021] ceFAZolin (ANCEF) IVPB (premix in dextrose) 1,000 mg 50 mL  1,000 mg Intravenous Q8H   • [START ON 12/18/2021] cyanocobalamin (VITAMIN B-12) tablet 1,000 mcg  1,000 mcg Oral Daily   • dextrose 5 % in lactated Ringer's infusion  250 mL/hr Intravenous Continuous   • insulin regular (HumuLIN R,NovoLIN R) 1 Units/mL in sodium chloride 0 9 % 100 mL infusion  0 1-30 Units/hr Intravenous Continuous   • metoprolol tartrate (LOPRESSOR) tablet 50 mg  50 mg Oral BID   • ondansetron (ZOFRAN) injection 4 mg  4 mg Intravenous Q6H PRN   • [START ON 12/18/2021] pantoprazole (PROTONIX) EC tablet 40 mg  40 mg Oral Early Morning   • sodium chloride 0 9 % infusion  500 mL/hr Intravenous Continuous    Followed by   • [START ON 12/18/2021] sodium chloride 0 9 % infusion  250 mL/hr Intravenous Continuous     Home medications:  Prior to Admission Medications   Prescriptions Last Dose Informant Patient Reported? Taking?   acetaminophen (TYLENOL) 325 mg tablet   No No   Sig: Take 3 tablets (975 mg total) by mouth every 8 (eight) hours as needed for moderate pain   amLODIPine (NORVASC) 5 mg tablet   No No   Sig: Take 1 tablet (5 mg total) by mouth daily   aspirin 81 mg chewable tablet  Self Yes No   Sig: Chew 81 mg daily     atorvastatin (LIPITOR) 40 mg tablet   No No   Sig: Take 1 tablet (40 mg total) by mouth every evening   cyanocobalamin (VITAMIN B-12) 250 MCG tablet   No No   Sig: Take 4 tablets (1,000 mcg total) by mouth daily   docusate sodium (COLACE) 100 mg capsule   No No   Sig: Take 1 capsule (100 mg total) by mouth 2 (two) times a day as needed for constipation   Patient not taking: Reported on 11/28/2020   ezetimibe (ZETIA) 10 mg tablet   No No   Sig: Take 1 tablet (10 mg total) by mouth daily at bedtime   fenofibrate (TRICOR) 145 mg tablet  Self Yes No   Sig: Take 145 mg by mouth daily  insulin regular (HUMULIN R) 500 units/mL CONCENTRATED injection   No No   Sig: Inject 0 17 mL (85 Units total) under the skin 3 (three) times a day before meals   metoprolol tartrate (LOPRESSOR) 50 mg tablet  Self Yes No   Sig: Take 50 mg by mouth 2 (two) times a day  nystatin (MYCOSTATIN) powder   No No   Sig: Apply topically 2 (two) times a day   pantoprazole (PROTONIX) 40 mg tablet  Self Yes No   Sig: Take 40 mg by mouth daily   warfarin (COUMADIN) 5 mg tablet   No No   Sig: Take 0 5 tablets (2 5 mg total) by mouth daily      Facility-Administered Medications: None     Allergies: Allergies   Allergen Reactions   • Ace Inhibitors Other (See Comments)   • Ciprofloxacin Other (See Comments)   • Penicillins Hives   • Morphine And Related Anxiety       ------------------------------------------------------------------------------------------------------------  Advance Directive and Living Will:      Power of :    POLST:    ------------------------------------------------------------------------------------------------------------  Anticipated Length of Stay is > 2 midnights    Care Time Delivered:   Upon my evaluation, this patient had a high probability of imminent or life-threatening deterioration due to Diabetic ketoacidosis, which required my direct attention, intervention, and personal management    I have personally provided 30 minutes (7:20 to 7:50) of critical care time, exclusive of procedures, teaching, family meetings, and any prior time recorded by providers other than myself  Susana Maxwell MD        Portions of the record may have been created with voice recognition software  Occasional wrong word or "sound a like" substitutions may have occurred due to the inherent limitations of voice recognition software    Read the chart carefully and recognize, using context, where substitutions have occurred

## 2021-12-19 NOTE — ASSESSMENT & PLAN NOTE
History of thrombotic CVA  Left facial droop no other residual neurological deficits  Patient is on aspirin, Coumadin which is currently held  -- restart medication when appropriate

## 2021-12-19 NOTE — BRIEF OP NOTE (RAD/CATH)
INTERVENTIONAL RADIOLOGY PROCEDURE NOTE    Date: 12/18/2021    Procedure: IR EMBOLIZATION (SPECIFY VESSEL OR SITE)     Preoperative diagnosis:   1  DKA, type 2 ([facility] )    2  Normal anion gap metabolic acidosis    3  Uremia    4  Acute anemia    5  Elevated INR    6  Hyperkalemia    7  Diabetes mellitus ([facility] )    8  Stage 3a chronic kidney disease ([facility] )    9  Left lower quadrant abdominal pain         Postoperative diagnosis: Same  Surgeon: Kalin Strong MD     Assistant: None  No qualified resident was available  Blood loss: minimal    Specimens: none    Findings: No active bleed  High poor flow in the renal artery with reflux into the aorta suggesting compression of the parenchyma by the hematoma  Complications: None immediate      Anesthesia: conscious sedation and local

## 2021-12-19 NOTE — PHYSICAL THERAPY NOTE
PHYSICAL THERAPY Evaluation    Physical Therapy Evaluation    Performed at least 2 patient identifiers during session:  Patient Active Problem List   Diagnosis   • Diplopia   • Type 2 diabetes mellitus, with long-term current use of insulin ([place] [address] )   • CKD (chronic kidney disease) stage 3, GFR 30-59 ml/min   • Morbid obesity with BMI of 40 0-44 9, adult ([place] [address] )   • Essential hypertension   • MELINDA (obstructive sleep apnea)   • Cerebrovascular accident (CVA) due to thrombosis ([place] [address] )   • Open wound of left lower extremity   • Abdominal pain   • History (2012) of right cerebellar CVA   • Coronary artery disease involving native heart   • Hypoalbuminemia   • History of DVT of lower extremity   • Hypertension   • Hyperlipidemia   • CVA (cerebral vascular accident) ([place] [address] )   • Chronic venous stasis dermatitis of both lower extremities   • Cellulitis of left lower extremity   • Class 3 severe obesity in adult [facility])   • Dizziness   • High anion gap metabolic acidosis   • Acute kidney injury superimposed on chronic kidney disease ([place] [address] )   • Supratherapeutic INR   • Anemia   • Bacteremia       Past Medical History:   Diagnosis Date   • Bell's palsy    • Cardiac disease    • Cerebellar stroke ([place] [address] )    • Diabetes mellitus ([place] [address] )    • Fracture     L hip   • Hyperlipidemia    • Hypertension    • Renal disorder    • Stroke [facility])     2013   • Stroke [facility])     [id number]       Past Surgical History:   Procedure Laterality Date   • CORONARY ANGIOPLASTY WITH STENT PLACEMENT     • FRACTURE SURGERY      L femur   • INCISION AND DRAINAGE OF WOUND Right 9/13/2016    Procedure: INCISION AND DRAINAGE (I&D) EXTREMITY, right lateral ankle;  Surgeon: Leah Gordillo DPM;  Location: [facility];  Service:    • WOUND DEBRIDEMENT Right 9/15/2016    Procedure: DEBRIDEMENT WOUND ([address]) ankle wound with closure and FRANCY drain placement;  Surgeon: Laeh Gordillo DPM; Location: [facility];  Service:           12/19/21 1146   PT Last Visit   PT Visit Date 12/19/21   Note Type   Note type Evaluation   Pain Assessment   Pain Assessment Tool 0-10   Pain Score 10 - Worst Possible Pain   Pain Location/Orientation   (L leg and R flank)   Restrictions/Precautions   Weight Bearing Precautions Per Order No   Home Living   Type of Home House   Home Layout Two level;Bed/bath upstairs  (NO BATHROOM ON FIRST FLOOR;  PT USES GALLON JUGS TO URINATE)   Additional Comments Pt states he sleeps in recliner chair on first floor; urinated in gallon jugs while wife is at work;  pt states he only goes up steps to have a bowel movement when wife is home; pt does not get in/out of shower  (pt appears with poor hygiene)   Prior Function   Lives With Spouse  (pt's wife works full-time)   Receives Help From Family   ADL Assistance Needs assistance   IADLs Needs assistance   Comments Pt states he does not use AD for ambulation on first floor   General   Additional Pertinent History multiple lines/tubes/monitors   Family/Caregiver Present No   Cognition   Overall Cognitive Status WFL   Arousal/Participation Alert   Orientation Level Oriented to person   Following Commands Follows one step commands with increased time or repetition   Subjective   Subjective pt states pain in LLE and R flank   RUE Assessment   RUE Assessment WFL   LUE Assessment   LUE Assessment WFL   RLE Assessment   RLE Assessment   (2-/5 at hip, knee, ankle)   LLE Assessment   LLE Assessment   (2-/5 at hip, knee, ankle)   Bed Mobility   Supine to Sit 2  Maximal assistance   Additional items Assist x 2; Increased time required;LE management;Verbal cues; Bedrails;HOB elevated;Comment  (log roll)   Sit to Supine 2  Maximal assistance   Additional items Assist x 2;LE management;Verbal cues; Bedrails  (log roll)   Additional Comments pt able to sit EOB x 20 seconds then with increased SOB and pain requiring pt to return to supine in bed     Transfers Sit to Stand Unable to assess   Ambulation/Elevation   Gait pattern Not appropriate   Balance   Static Sitting Poor -  (max A x 2)   Assessment   Prognosis Guarded   Problem List Decreased strength;Decreased range of motion;Decreased endurance; Impaired balance;Decreased mobility; Decreased cognition;Decreased coordination;Decreased safety awareness;Decreased skin integrity;Obesity;Pain   Assessment Pt is a 78 y o  male who presented to ED with c/o light headedness, high glucose, anemia Hgb 6 8  Dx:  Anemia, DM, DKA, elevated INR, uremia, cellulitis  Comorbidities affecting pt's physical performance at time of assessment include: DM, CVA  Personal factors affecting pt at time of IE include: poor hygiene, cellulitis, multiple lines/tubes, pain, inability to ambulate household distances  Prior to admission, pt was independent w/ all functional mobility w/ out AD on first floor only, ambulated household distances, only perform steps when wife home, pt urinates in gallon jugs while home alone; No first floor bathroom, lived in multi-level home and lived with wife  Upon evaluation: Pt requires max A  X 2 for bed mobility, unable to stand or ambulate this session  Full objective findings from PT assessment regarding body systems outlined above  Current limitations include impaired balance, decreased endurance, gait deviations, decreased activity tolerance, fall risk and SOB upon exertion, pain  Pt's clinical presentation is currently unstable/unpredictable seen in pt's presentation of continuous monitoring in ICU, fall risk, and pain  Pt would benefit from continued PT while in hospital and follow up with inpt rehab at D/C to increase strength, balance, endurance, independence with funcitonal mobility to return to PLOF, maximize independence and decrease caregiver burden  The patient's AM-PAC Basic Mobility Inpatient Short Form Raw Score is 8   A Raw score of less than or equal to 16 suggests the patient may benefit from discharge to post-acute rehabilitation services  Please also refer to the recommendation of the Physical Therapist for safe discharge planning  Barriers to Discharge Inaccessible home environment;Decreased caregiver support   Goals   Patient Goals to get my blood sugars under control   STG Expiration Date 01/02/22   Short Term Goal #1 1   mod I for sit to/from supine;  2   mod I for sit to/from standing with RW;  3   mod I to ambulate 50ft with RW;  4   S to ascend/descend 2 steps to safely access home and decrease caregiver burden   Plan   Treatment/Interventions ADL retraining;Functional transfer training;LE strengthening/ROM; Elevations; Therapeutic exercise; Endurance training;Patient/family training;Equipment eval/education;Gait training;Bed mobility; Compensatory technique education;Continued evaluation;Spoke to nursing   PT Frequency 4-6x/wk   Recommendation   PT Discharge Recommendation Post acute rehabilitation services   Additional Comments pt currently only able to sit EOB x 20 seconds with max A x 2;  pt is home alone during the day while wife works;  pt states he uses gallon jugs to urinate in throughout the day;  sleeps in recliner chair on first floor, sponge bathes as needed;  pt states he only goes up steps to have a bowel movement when wife is home     AM-PAC Basic Mobility Inpatient   Turning in Bed Without Bedrails 2   Lying on Back to Sitting on Edge of Flat Bed 2   Moving Bed to Chair 1   Standing Up From Chair 1   Walk in Room 1   Climb 3-5 Stairs 1   Basic Mobility Inpatient Raw Score 8   Turning Head Towards Sound 4   Follow Simple Instructions 3   Low Function Basic Mobility Raw Score 15   Low Function Basic Mobility Standardized Score 23 9   Highest Level Of Mobility   JH-HLM Goal 3: Sit at edge of bed   JH-HLM Highest Level of Mobility 3: Sit at edge of bed   JH-HLM Goal Achieved Yes     Fide Bess PT      Patient Name: Gil Gray  [id number] Date: 12/19/2021

## 2021-12-19 NOTE — BRIEF OP NOTE (RAD/CATH)
INTERVENTIONAL RADIOLOGY PROCEDURE NOTE    Date: 12/18/2021    Procedure: IR TEMPORARY DIALYSIS CATHETER PLACEMENT     Preoperative diagnosis:   1  DKA, type 2 ([facility] )    2  Normal anion gap metabolic acidosis    3  Uremia    4  Acute anemia    5  Elevated INR    6  Hyperkalemia    7  Diabetes mellitus ([facility] )    8  Stage 3a chronic kidney disease ([facility] )    9  Left lower quadrant abdominal pain         Postoperative diagnosis: Same  Surgeon: Betty Menendez MD     Assistant: None  No qualified resident was available  Blood loss: minimal    Specimens: none    Findings: Temporary right internal jugular vein dialysis catheter inserted  Complications: None immediate      Anesthesia: conscious sedation and local

## 2021-12-19 NOTE — ASSESSMENT & PLAN NOTE
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

## 2021-12-19 NOTE — INCIDENTAL FINDINGS
The following findings require follow up:  Non-Radiographic finding   Finding: Possible infiltrating neoplasm in the right lobe of the liver  Recommend dedicated triphasic CT scan of the liver for better characterization when the patient is more stable  subcapsular right renal hematoma with suprarenal extension  This is an atypical site for a spontaneous anticoagulation bleed  Suggest CT scan renal mass protocol when the hematoma has resolved to exclude the possibility of an underlying neoplasm         Follow up required: per PCP   Follow up should be done per PCP    Please notify the following clinician to assist with the follow up:  Pt's PCP

## 2021-12-19 NOTE — SEDATION DOCUMENTATION
rec'd patient from CT for urgent arteriogram for possible bleed  No bleed identified  Sheath removed  Will proceed with tem HC catheter

## 2021-12-19 NOTE — ASSESSMENT & PLAN NOTE
· Hold all antihypertensives in the setting of Gram-positive bacteremia on admission and acute blood loss anemia  · Continue telemetry

## 2021-12-19 NOTE — ASSESSMENT & PLAN NOTE
· Patient is meeting sepsis criteria on admission with tachypnea are 30, WBC of 13  · He is a febrile, blood pressure is stable  · Source is most likely cellulitis  · Chest x-rays negative  · Patient received 3 L of IV fluids per DKA protocol upon admission  · blood cultures now positive for Gram-positive cocci   · Antibiotics day 3  · MRSA pending  · Repeat blood cultures this morning  · follow wound culture  · Procalcitonin 1 06  · Trend fever curve and white blood cell count

## 2021-12-19 NOTE — ASSESSMENT & PLAN NOTE
Lab Results   Component Value Date    HGBA1C 12 2 (H) 12/18/2021       Recent Labs     12/19/21  0016 12/19/21  0105 12/19/21  0224 12/19/21  0415   POCGLU 282* 244* 196* 138     · Patient was known history of DM type 2 insulin dependent  · He came in with blood glucose of 760  · VBG with pH of 7 245, pCO2 of 41, HC03 17 5, consistent with metabolic acidosis   · Patient was initially treated with DKA protocol however BHB negative and does not clinically appear to be in DKA  · Serum eyes on 362  · Nephrology consult pending  · Continue Trend BMP q 4 hours  · Corrected anion gap this morning 16 8, unclear cause    --     Blood Sugar Average: Last 72 hrs:  (P) 975 [id number]

## 2021-12-19 NOTE — ASSESSMENT & PLAN NOTE
Lab Results   Component Value Date    EGFR 14 12/19/2021    EGFR 15 12/19/2021    EGFR 15 12/18/2021    CREATININE 3 99 (H) 12/19/2021    CREATININE 3 72 (H) 12/19/2021    CREATININE 3 71 (H) 12/18/2021     · Baseline creatinine 1 75-2, creatinine today 3 99  · BUN also elevated at 95 - most likely prerenal in setting of dehydration due to HSS state  · Also some concern for GIB   · Nephrology consulted, if urine output drops less than 30 cc an hour try 80 mg IV Lasix  · Trend BUN/Cr  · Avoid hypotension  ·

## 2021-12-19 NOTE — ASSESSMENT & PLAN NOTE
Patient complaining of pain in his left lower leg  Patient denies fever chills  On physical exam erythema, tenderness to palpation, marks of excoriation    No evidence of abscess formation  Meeting sepsis criteria with tachypnea, WBC of 13  No personal history of MRSA    · Blood cultures on admission positive for Gram-positive bacteremia  · Continue cefazolin and vancomycin, day 3 of antibiotics  · Follow-up admission blood cultures and repeat blood cultures today

## 2021-12-19 NOTE — ASSESSMENT & PLAN NOTE
· Noted with INR 7 46 on admission  · Patient reports had blood in his urine last week  · He discontinued taking warfarin on Monday prior to admission  · Hemoglobin on admission 6 8 and patient is receiving blood transfusion  · Patient is now status post 3 units PRBCs this admission  · given 5 of vitamin K and 1 dose of DDAVP to reverse uremic bleeding  · INR this morning 1 45

## 2021-12-19 NOTE — PROGRESS NOTES
[facility]  Progress Note - Ricardo Conn 1955, 78 y o  male MRN: [id number]  Unit/Bed#: ICU 04 Encounter: [id number]  Primary Care Provider: Gabriel Hudson MD   Date and time admitted to hospital: 12/17/2021  3:03 PM    Bacteremia  Assessment & Plan  · Patient is meeting sepsis criteria on admission with tachypnea are 30, WBC of 13  · He is a febrile, blood pressure is stable  · Source is most likely cellulitis  · Chest x-rays negative  · Patient received 3 L of IV fluids per DKA protocol upon admission  · blood cultures now positive for Gram-positive cocci   · Antibiotics day 3  · MRSA pending  · Repeat blood cultures this morning  · follow wound culture  · Procalcitonin 1 06  · Trend fever curve and white blood cell count    Anemia  Assessment & Plan  · Acute blood loss anemia  · Hemoglobin noted on admission 6 8, acute blood loss anemia  · Reports had hematuria with clots last week and she  · 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  · Patient stopped taking warfarin on Monday prior to admission  · On admission INR 7 46  · Patient is now status post 3 units PRBCs this admission  · Given 5 mg IV vitamin K and INR this morning 1 45  · Given a DDAVP for uremic bleeding  · Imaging this admission showing some scapular right renal hematoma with suprarenal hematoma  · Continue to trend hemoglobin  · Transfuse if hemoglobin less than 7, receiving blood this morning        Supratherapeutic INR  Assessment & Plan  · Noted with INR 7 46 on admission  · Patient reports had blood in his urine last week  · He discontinued taking warfarin on Monday prior to admission  · Hemoglobin on admission 6 8 and patient is receiving blood transfusion  · Patient is now status post 3 units PRBCs this admission  · given 5 of vitamin K and 1 dose of DDAVP to reverse uremic bleeding  · INR this morning 1 45    Acute kidney injury superimposed on chronic kidney disease ([facility] [address] )  Assessment & Plan  Lab Results   Component Value Date    EGFR 14 12/19/2021    EGFR 15 12/19/2021    EGFR 15 12/18/2021    CREATININE 3 99 (H) 12/19/2021    CREATININE 3 72 (H) 12/19/2021    CREATININE 3 71 (H) 12/18/2021     · Baseline creatinine 1 75-2, creatinine today 3 99  · BUN also elevated at 95 - most likely prerenal in setting of dehydration due to HSS state  · Also some concern for GIB   · Nephrology consulted, if urine output drops less than 30 cc an hour try 80 mg IV Lasix  · Trend BUN/Cr  · Avoid hypotension  ·     Cellulitis of left lower extremity  Assessment & Plan  Patient complaining of pain in his left lower leg  Patient denies fever chills  On physical exam erythema, tenderness to palpation, marks of excoriation  No evidence of abscess formation  Meeting sepsis criteria with tachypnea, WBC of 13  No personal history of MRSA    · Blood cultures on admission positive for Gram-positive bacteremia  · Continue cefazolin and vancomycin, day 3 of antibiotics  · Follow-up admission blood cultures and repeat blood cultures today    CVA (cerebral vascular accident) [facility])  Assessment & Plan  History of thrombotic CVA  Left facial droop no other residual neurological deficits  Patient is on aspirin, Coumadin which is currently held  -- restart medication when appropriate    Hyperlipidemia  Assessment & Plan  · NPO for now, hold home fenofibrate and statin     Hypertension  Assessment & Plan  · Hold all antihypertensives in the setting of Gram-positive bacteremia on admission and acute blood loss anemia  · Continue telemetry    Abdominal pain  Assessment & Plan  · New onset right lower quadrant abdominal pain  · Last bowel movement over the last 24 hours  · 12/18 Lactic acid 3 1  which cleared  · Patient has been having down trending hemoglobin this admission requiring 3 units PRBCs this admission, last on this morning  · Continue Protonix IV b i d   · INR elevated to 7 45 on admission; given 5 mg of vitamin K  Currently 1 45 today   · Scan abdomen pelvis is admission showing subscapular right renal hematoma with suprarenal extension  · 12/18 went for emergent embolization with IR:  There is no evidence of acute bleeding and no intervention was done  · 12/18 CTA abdomen pelvis with showing stable size of the right subscapular hematoma and right suprarenal hematoma    No evidence of active arterial or venous extravasation  · Continue serial abdominal exams  · Continue to trend hemoglobins  · Continue to hold home Coumadin    Type 2 diabetes mellitus, with long-term current use of insulin ([facility] [address] )  Assessment & Plan  Lab Results   Component Value Date    HGBA1C 12 2 (H) 12/18/2021     (P) 619 [id number]     · Initially treated as DKA upon admission however BHB negative  · Initially started on DKA insulin drip now transitioned to regular insulin drip  · Over last 24 hours patient used to 150 units of insulin  · Consider possible transition to subcu insulin in the next 24 hours  · Maintain blood glucose 140-180    * High anion gap metabolic acidosis  Assessment & Plan  Lab Results   Component Value Date    HGBA1C 12 2 (H) 12/18/2021       Recent Labs     12/19/21  0016 12/19/21  0105 12/19/21  0224 12/19/21  0415   POCGLU 282* 244* 196* 138     · Patient was known history of DM type 2 insulin dependent  · He came in with blood glucose of 760  · VBG with pH of 7 245, pCO2 of 41, HC03 17 5, consistent with metabolic acidosis   · Patient was initially treated with DKA protocol however BHB negative and does not clinically appear to be in DKA  · Serum eyes on 362  · Nephrology consult pending  · Continue Trend BMP q 4 hours  · Corrected anion gap this morning 16 8, unclear cause    --     Blood Sugar Average: Last 72 hrs:  (P) 005 [id number]        ----------------------------------------------------------------------------------------  HPI/24hr events: Yesterday found to have ABLA secondary to a subscapsular Rt renal hematoma and Rt suprarenal hematoma  S/P IR yesterday but unable to find acute bleeding so no intervention done  RI Temp HD placed by IR yesterday  Patient appropriate for transfer out of the ICU today?: No  Disposition: Continue Stepdown Level 1 level of care   Code Status: Level 1 - Full Code  ---------------------------------------------------------------------------------------  SUBJECTIVE  C/O 5/10 pain in Lt calf and Rt UQ  Denies SOB or other complaint     Review of Systems   All other systems reviewed and are negative       ---------------------------------------------------------------------------------------  OBJECTIVE    Vitals   Vitals:    21 0815 21 0900 21 1000 21 1100   BP: 170/72 165/71 (!) 187/79 168/72   Pulse: 80 78 79 79   Resp: 15 (!) 32 (!) 26 (!) 28   Temp:  98 8 °F (37 1 °C) (!) 97 2 °F (36 2 °C) 98 6 °F (37 °C)   TempSrc:       SpO2: 95% 95% 96% 95%   Weight:       Height:         Temp (24hrs), Av 4 °F (36 9 °C), Min:95 9 °F (35 5 °C), Max:100 °F (37 8 °C)  Current: Temperature: 98 6 °F (37 °C)          Respiratory:  SpO2: SpO2: 97 %       Invasive/non-invasive ventilation settings   Respiratory  Report   Lab Data (Last 4 hours)    None         O2/Vent Data (Last 4 hours)    None                Physical Exam  Vitals and nursing note reviewed  Constitutional:       Appearance: He is obese  He is ill-appearing  HENT:      Head: Normocephalic and atraumatic  Nose: Nose normal       Mouth/Throat:      Mouth: Mucous membranes are moist    Eyes:      Extraocular Movements: Extraocular movements intact  Conjunctiva/sclera: Conjunctivae normal       Pupils: Pupils are equal, round, and reactive to light  Cardiovascular:      Rate and Rhythm: Normal rate and regular rhythm  Pulmonary:      Effort: Pulmonary effort is normal  No respiratory distress  Breath sounds: Normal breath sounds        Comments: LS CTA b/l, On RA with SpO2 96%  Abdominal: General: Bowel sounds are normal  There is distension  Palpations: Abdomen is soft  Tenderness: There is abdominal tenderness  Comments: TTP in RUQ   Musculoskeletal:         General: Normal range of motion  Cervical back: Normal range of motion and neck supple  Skin:     General: Skin is warm  Findings: Erythema present  Comments: LLE TTP  Erythema in Lt calf  Rt Femoral IR site with Dressing C/D/I and area soft, NTTP, no sign of hematoma   Neurological:      General: No focal deficit present  Mental Status: He is alert and oriented to person, place, and time  Mental status is at baseline  Cranial Nerves: No cranial nerve deficit  Laboratory and Diagnostics:  Results from last 7 days   Lab Units 12/19/21  0845 12/19/21  0425 12/19/21  0022 12/18/21 2129 12/18/21  1524 12/18/21  1256 12/18/21  0449 12/18/21  0011 12/18/21  0011 12/17/21  1613 12/17/21  1613   WBC Thousand/uL  --  8 72  --   --  15 91* 14 18* 11 38*  --  10 44*  --  13 35*   HEMOGLOBIN g/dL 7 9* 6 8* 7 1* 8 6* 5 2* 5 7* 7 0*   < > 6 8*   < > 6 8*   HEMATOCRIT %  --  20 1*  --   --  16 7* 18 0* 22 3*  --  21 8*  --  22 2*   PLATELETS Thousands/uL  --  268  --   --  411* 404* 458*  --  433*  --  493*   BANDS PCT %  --   --   --   --   --   --  1  --   --   --  6   MONO PCT %  --  7  --   --   --   --  6  --   --   --  5    < > = values in this interval not displayed       Results from last 7 days   Lab Units 12/19/21  0845 12/19/21  0425 12/19/21  0020 12/18/21 2128 12/18/21  1525 12/18/21  1256 12/18/21  0856 12/18/21  0449 12/18/21  0449 12/17/21  2006 12/17/21  1613   SODIUM mmol/L 143 145 142 140 142 142 144   < > 144   < > 125*   POTASSIUM mmol/L 4 5 4 1 4 2 5 2 4 8 4 6 4 2   < > 4 2   < > 6 5*   CHLORIDE mmol/L 113* 113* 111* 109* 110* 112* 112*   < > 112*   < > 93*   CO2 mmol/L 19* 21 20* 17* 20* 18* 19*   < > 19*   < > 19*   ANION GAP mmol/L 11 11 11 14* 12 12 13   < > 13   < > 13   BUN mg/dL 90* 95* 94* 93* 93* 91* 87*   < > 88*   < > 101*   CREATININE mg/dL 4 06* 3 99* 3 72* 3 71* 3 60* 3 48* 3 16*   < > 2 82*   < > 3 89*   CALCIUM mg/dL 8 1* 8 3 8 5 7 7* 8 1* 8 2* 8 2*   < > 8 5   < > 8 7   GLUCOSE RANDOM mg/dL 107 114 232* 286* 289* 258* 159*   < > 126   < > 793*   ALT U/L  --  797*  --   --  69  --   --   --  7*  --  10*   AST U/L  --  7,681*  --   --  185*  --   --   --  14  --  11   ALK PHOS U/L  --  42*  --   --  30*  --   --   --  36*  --  41*   ALBUMIN g/dL  --  1 7*  --   --  1 3*  --   --   --  1 7*  --  2 1*   TOTAL BILIRUBIN mg/dL  --  0 27  --   --  0 12*  --   --   --  0 12*  --  0 14*    < > = values in this interval not displayed  Results from last 7 days   Lab Units 12/19/21  0845 12/19/21 0425 12/19/21  0020 12/18/21 2128 12/18/21  1525 12/18/21  1256 12/18/21  0856   MAGNESIUM mg/dL 2 0 2 3 1 8 1 5* 1 9 2 0 2 1   PHOSPHORUS mg/dL 4 2* 3 5 3 4 5 1* 4 1 3 6 3 0      Results from last 7 days   Lab Units 12/19/21  0425 12/18/21 2128 12/18/21  0452 12/17/21  1813   INR  1 45* 1 38* 7 44* 7 59*   PTT seconds  --   --   --  133*          Results from last 7 days   Lab Units 12/18/21 2129 12/18/21  1525 12/17/21  1613   LACTIC ACID mmol/L 1 7 3 1* 2 0     ABG:    VBG:  Results from last 7 days   Lab Units 12/19/21  0020   PH DHARMESH  7 338   PCO2 DHARMESH mm Hg 34 7*   PO2 DHARMESH mm Hg 38 5   HCO3 DHARMESH mmol/L 18 2*   BASE EXC DHARMESH mmol/L -6 9     Results from last 7 days   Lab Units 12/17/21  2211   PROCALCITONIN ng/ml 1 06*       Micro  Results from last 7 days   Lab Units 12/18/21  0901 12/17/21  1708 12/17/21  1613   GRAM STAIN RESULT  No Polys or Bacteria seen Gram positive cocci in clusters* Gram positive cocci in clusters*         Imaging: I have personally reviewed pertinent reports        Intake and Output  I/O       12/17 0701 12/18 0700 12/18 0701  12/19 0700 12/19 0701  12/20 0700    I V  (mL/kg) 1298 (11 3) 3078 8 (29)     Blood  700 350    IV Piggyback 3050      Total Intake(mL/kg) 4348 (37 8) 3778 8 (35 6) 350 (3 3)    Urine (mL/kg/hr) 300 945 (0 4) 120 (0 2)    Stool  0     Total Output 300 945 120    Net +4048 +2833 8 +230           Unmeasured Urine Occurrence 1 x      Unmeasured Stool Occurrence  1 x           Height and Weights   Height: 5' 7" (170 2 cm)  IBW (Ideal Body Weight): 66 1 kg  Body mass index is 36 67 kg/m²  Weight (last 2 days)     Date/Time Weight    12/19/21 0549 106 (234 13)    12/18/21 0300 115 (253 53)    12/17/21 2156 115 (253 75)    12/17/21 2040 140 (308 64)            Nutrition       Diet Orders   (From admission, onward)             Start     Ordered    12/17/21 2205  Room Service  Once        Question:  Type of Service  Answer:  Room Service - Appropriate with Assistance    12/17/21 2204 12/17/21 2021  Diet NPO  Diet effective now        References:    Nutrtion Support Algorithm Enteral vs  Parenteral   Question Answer Comment   Diet Type NPO    RD to adjust diet per protocol?  Yes        12/17/21 2020                  Active Medications  Scheduled Meds:  Current Facility-Administered Medications   Medication Dose Route Frequency Provider Last Rate   • acetaminophen  650 mg Oral Q4H PRN Janelle [place], PA-C     • cefazolin  1,000 mg Intravenous Q12H Janelle Bernadette, PA-C 1,000 mg (12/19/21 1039)   • fentanyl citrate (PF)  25 mcg Intravenous Q4H PRN Janelle [place], PA-C     • insulin regular (HumuLIN R,NovoLIN R) infusion  0 3-21 Units/hr Intravenous Titrated Janelle Bernadette, PA-C 2 Units/hr (12/19/21 1026)   • lactated ringers  100 mL/hr Intravenous Continuous Janelle [place], PA-C 100 mL/hr (12/19/21 9858)   • ondansetron  4 mg Intravenous Q6H PRN Janelle [place], PA-C     • pantoprazole  40 mg Intravenous Q12H [address] Janelle Bernadette, PA-C     • vancomycin  15 mg/kg (Adjusted) Intravenous Daily PRN Janelle Bernadette, PA-C       Continuous Infusions:  insulin regular (HumuLIN R,NovoLIN R) infusion, 0 3-21 Units/hr, Last Rate: 2 Units/hr (12/19/21 1026)  lactated ringers, 100 mL/hr, Last Rate: 100 mL/hr (12/19/21 3307)      PRN Meds:   acetaminophen, 650 mg, Q4H PRN  fentanyl citrate (PF), 25 mcg, Q4H PRN  ondansetron, 4 mg, Q6H PRN  vancomycin, 15 mg/kg (Adjusted), Daily PRN        Invasive Devices Review  Invasive Devices  Report    Central Venous Catheter Line            CVC Central Lines 12/18/21 Triple Right Internal jugular <1 day          Peripheral Intravenous Line            Peripheral IV 12/17/21 Left Antecubital 1 day    Peripheral IV 12/17/21 Right Hand 1 day    Peripheral IV 12/18/21 Right;Upper;Ventral (anterior) Arm 1 day          Drain            Urethral Catheter Temperature probe 16 Fr  <1 day                Rationale for remaining devices: Close I/O monitoring  ---------------------------------------------------------------------------------------  Advance Directive and Living Will:      Power of :    POLST:    ---------------------------------------------------------------------------------------  Care Time Delivered:   Upon my evaluation, this patient had a high probability of imminent or life-threatening deterioration due to ABLA, which required my direct attention, intervention, and personal management  I have personally provided 30 minutes (0700 to 0730) of critical care time, exclusive of procedures, teaching, family meetings, and any prior time recorded by providers other than myself  NELLY [place]      Portions of the record may have been created with voice recognition software  Occasional wrong word or "sound a like" substitutions may have occurred due to the inherent limitations of voice recognition software    Read the chart carefully and recognize, using context, where substitutions have occurred

## 2021-12-19 NOTE — ASSESSMENT & PLAN NOTE
· New onset right lower quadrant abdominal pain  · Last bowel movement over the last 24 hours  · 12/18 Lactic acid 3 1  which cleared  · Patient has been having down trending hemoglobin this admission requiring 3 units PRBCs this admission, last on this morning  · Continue Protonix IV b i d   · INR elevated to 7 45 on admission; given 5 mg of vitamin K  Currently 1 45 today   · Scan abdomen pelvis is admission showing subscapular right renal hematoma with suprarenal extension  · 12/18 went for emergent embolization with IR:  There is no evidence of acute bleeding and no intervention was done  · 12/18 CTA abdomen pelvis with showing stable size of the right subscapular hematoma and right suprarenal hematoma    No evidence of active arterial or venous extravasation  · Continue serial abdominal exams  · Continue to trend hemoglobins  · Continue to hold home Coumadin

## 2021-12-19 NOTE — ASSESSMENT & PLAN NOTE
Lab Results   Component Value Date    HGBA1C 12 2 (H) 12/18/2021     (P) 180 [id number]     · Initially treated as DKA upon admission however BHB negative  · Initially started on DKA insulin drip now transitioned to regular insulin drip  · Over last 24 hours patient used to 150 units of insulin  · Consider possible transition to subcu insulin in the next 24 hours  · Maintain blood glucose 140-180

## 2021-12-19 NOTE — PLAN OF CARE
Problem: PHYSICAL THERAPY ADULT  Goal: Performs mobility at highest level of function for planned discharge setting  See evaluation for individualized goals  Description: Treatment/Interventions: ADL retraining,Functional transfer training,LE strengthening/ROM,Elevations,Therapeutic exercise,Endurance training,Patient/family training,Equipment eval/education,Gait training,Bed mobility,Compensatory technique education,Continued evaluation,Spoke to nursing          See flowsheet documentation for full assessment, interventions and recommendations  Note: Prognosis: Guarded  Problem List: Decreased strength,Decreased range of motion,Decreased endurance,Impaired balance,Decreased mobility,Decreased cognition,Decreased coordination,Decreased safety awareness,Decreased skin integrity,Obesity,Pain  Assessment: Pt is a 78 y o  male who presented to ED with c/o light headedness, high glucose, anemia Hgb 6 8  Dx:  Anemia, DM, DKA, elevated INR, uremia, cellulitis  Comorbidities affecting pt's physical performance at time of assessment include: DM, CVA  Personal factors affecting pt at time of IE include: poor hygiene, cellulitis, multiple lines/tubes, pain, inability to ambulate household distances  Prior to admission, pt was independent w/ all functional mobility w/ out AD on first floor only, ambulated household distances, only perform steps when wife home, pt urinates in gallon jugs while home alone; No first floor bathroom, lived in multi-level home and lived with wife  Upon evaluation: Pt requires max A  X 2 for bed mobility, unable to stand or ambulate this session  Full objective findings from PT assessment regarding body systems outlined above  Current limitations include impaired balance, decreased endurance, gait deviations, decreased activity tolerance, fall risk and SOB upon exertion, pain   Pt's clinical presentation is currently unstable/unpredictable seen in pt's presentation of continuous monitoring in ICU, fall risk, and pain  Pt would benefit from continued PT while in hospital and follow up with inpt rehab at D/C to increase strength, balance, endurance, independence with funcitonal mobility to return to PLOF, maximize independence and decrease caregiver burden  The patient's AM-PAC Basic Mobility Inpatient Short Form Raw Score is 8  A Raw score of less than or equal to 16 suggests the patient may benefit from discharge to post-acute rehabilitation services  Please also refer to the recommendation of the Physical Therapist for safe discharge planning  Barriers to Discharge: Inaccessible home environment,Decreased caregiver support        PT Discharge Recommendation: Post acute rehabilitation services          See flowsheet documentation for full assessment

## 2021-12-19 NOTE — PROGRESS NOTES
Vancomycin IV Pharmacy-to-Dose Consultation    Lucretia Murdock is a 78 y o  male who is currently receiving Vancomycin IV with management by the Pharmacy Consult service  Assessment/Plan:  The patient was reviewed  Renal function remains poor  No signs or symptoms of nephrotoxicity and/or infusion reactions were documented in the chart  He is receiving a pulse dose of vancomycin 1250 mg when his random level is < 20  A random vancomycin level collected today at 1222 resulted with a level of 16 8, therefore a dose will be administered today  The next random level will be ordered for 12/20 @ 1300  We will continue to follow the patient’s culture results and clinical progress daily      Meet Alarcon, Pharmacist

## 2021-12-19 NOTE — CONSULTS
[address] [facility] 78 y o  male MRN: [id number]  Unit/Bed#: ICU 04 Encounter: [id number]    ASSESSMENT and PLAN:    78 y o  male with past medical history of CKD, hypertension, diabetes, chronic venous stasis, CVA, hyperlipidemia, DVT, CAD, who was admitted to [facility] on 12/17 after presenting with weakness, nausea, vomiting, abdominal pain  A renal consultation is requested today for assistance in the management of acute on chronic kidney injury  1) acute on chronic kidney injury stage III    - creatinine baseline 1 5-1 9 mg/dL  -admission creatinine 3 9 mg/dL on December 17  -creatinine on December 19th is 3 9 mg/dL  -urinalysis difficult to interpret due to hematuria  - urine output over 900 cc yesterday  - CT scan-with contrast and also IR study for embolization-stable right subcapsular hematoma and super renal hematoma, possible infiltrating neoplasm in the right lobe of the liver   -patient received DDAVP 1 time due to concern for acute bleed    Etiology-likely ATN in the setting of acute bleed, hypotension, question of sepsis, now contrast, compression of right kidney    Plan  -monitor for hypertension given possibility of PAGE kidney  - continue supportive measures per ICU team  -p r n   IV antihypertensives as needed to maintain blood pressure is 140 for better  -p r n  Lasix if the patient develops oliguria  -reviewed potential need for dialysis with the patient and the patient wishes to wait to sign the consent but understands and agrees to dialysis if needed verbally  -no objection to intravenous fluids for now  -please call anytime if the patient's metabolic state for volume state worsens  -patient does have temporary dialysis catheter placed by IR team  -reviewed with primary team    2) acute anemia    -setting of retroperitoneal bleed  -setting of potential malignancy?   Overall source still unclear  -patient has received multiple units of transfusion  -IR and surgery team on board  -no active bleed noted on IR study    3) CT scan with infiltrating neoplasm of right lobe of liver possible-per primary team    4) possible cellulitis-per primary team    5) hyperglycemia-per primary team    6) acid/base-bicarbonate stable    7) transaminitis-in the setting of shock? But also with infiltrative disease noted on CT scan  Per primary team      HISTORY OF PRESENT ILLNESS:  Requesting Physician: David Herbert MD  Reason for Consult:  Acute on chronic kidney injury    Marcus Gamez is a 78 y o  male with past medical history of CKD, hypertension, diabetes, chronic venous stasis, CVA, hyperlipidemia, DVT, CAD, who was admitted to [facility] on 12/17 after presenting with weakness, nausea, vomiting, abdominal pain  A renal consultation is requested today for assistance in the management of acute on chronic kidney injury  Patient initially presented with worsening weakness, nausea, vomiting, poor p o  Intake  Was initially noted to have acute anemia, hyperglycemia  Patient also mentioned hematuria prior to admission  Patient self discontinued Coumadin initially when noted hematuria at home  Initially there was concern for hyperglycemia, sepsis, and was transfused for anemia  Patient was coagulopathic  Patient underwent stat CT scan  Received 3 units of packed red blood cells  Was hypotensive  There is concern for retroperitoneal bleed and interventional radiology team was brought on board  No bleed was identified actively  There was concern for compression of the parenchyma by hematoma of the right kidney?     PAST MEDICAL HISTORY:  Past Medical History:   Diagnosis Date   • Bell's palsy    • Cardiac disease    • Cerebellar stroke ([facility] [address] )    • Diabetes mellitus ([facility] [address] )    • Fracture     L hip   • Hyperlipidemia    • Hypertension    • Renal disorder    • Stroke [facility])     2013   • Stroke [facility])     [id number]       PAST SURGICAL HISTORY:  Past Surgical History:   Procedure Laterality Date   • CORONARY ANGIOPLASTY WITH STENT PLACEMENT     • FRACTURE SURGERY      L femur   • INCISION AND DRAINAGE OF WOUND Right 9/13/2016    Procedure: INCISION AND DRAINAGE (I&D) EXTREMITY, right lateral ankle;  Surgeon: Homer Oreilly DPM;  Location: [facility];  Service:    • WOUND DEBRIDEMENT Right 9/15/2016    Procedure: DEBRIDEMENT WOUND [facility] OUT) ankle wound with closure and FRANCY drain placement;  Surgeon: Homer Oreilly DPM;  Location[facility];  Service:        ALLERGIES:  Allergies   Allergen Reactions   • Ace Inhibitors Other (See Comments)   • Ciprofloxacin Other (See Comments)   • Penicillins Hives   • Morphine And Related Anxiety       SOCIAL HISTORY:  Social History     Substance and Sexual Activity   Alcohol Use No     Social History     Substance and Sexual Activity   Drug Use No     Social History     Tobacco Use   Smoking Status Former Smoker   • Packs/day: 0 00   • Types: Cigarettes   • Quit date: 9/3/2006   • Years since quitting: 15 3   Smokeless Tobacco Never Used       FAMILY HISTORY:  Family History   Problem Relation Age of Onset   • Diabetes Mother    • Stroke Mother        MEDICATIONS:    Current Facility-Administered Medications:   •  acetaminophen (TYLENOL) tablet 650 mg, 650 mg, Oral, Q4H PRN, Santos Scales PA-C, 650 mg at 12/19/21 0448  •  ceFAZolin (ANCEF) IVPB (premix in dextrose) 1,000 mg 50 mL, 1,000 mg, Intravenous, Q12H, Santos Scales PA-C, Last Rate: 100 mL/hr at 12/18/21 2302, 1,000 mg at 12/18/21 2302  •  fentanyl citrate (PF) 100 MCG/2ML 25 mcg, 25 mcg, Intravenous, Q4H PRN, Santos Scales PA-C, 25 mcg at 12/18/21 1538  •  insulin regular (HumuLIN R,NovoLIN R) 1 Units/mL in sodium chloride 0 9 % 100 mL infusion, 0 3-21 Units/hr, Intravenous, Titrated, Santos Scales PA-C, Last Rate: 6 mL/hr at 12/19/21 0617, 6 Units/hr at 12/19/21 0617  •  lactated ringers infusion, 100 mL/hr, Intravenous, Continuous, Santos Scales PA-C, Last Rate: 100 mL/hr at 12/18/21 2224, 100 mL/hr at 12/18/21 2224  •  [facility] injection 4 mg, 4 mg, Intravenous, Q6H PRN, Cinthya Corrales PA-C  •  pantoprazole (PROTONIX) injection 40 mg, 40 mg, Intravenous, Q12H [address], Cinthya Corrales PA-C, 40 mg at 12/18/21 2219  •  vancomycin (VANCOCIN) 1,250 mg in sodium chloride 0 9 % 250 mL IVPB, 15 mg/kg (Adjusted), Intravenous, Daily PRN, Cintyha Corrales PA-C    REVIEW OF SYSTEMS:     All the systems were reviewed and were negative except as documented on the HPI  PHYSICAL EXAM:  Current Weight: Weight - Scale: 106 kg (234 lb 2 1 oz)  First Weight: Weight - Scale: (!) 140 kg (308 lb 10 3 oz)  Vitals:    12/19/21 0545 12/19/21 0549 12/19/21 0600 12/19/21 0700   BP: 148/66  158/67 163/71   Pulse: 84  83 85   Resp: (!) 29  (!) 31 (!) 27   Temp: 100 °F (37 8 °C)   99 2 °F (37 3 °C)   TempSrc:    Axillary   SpO2: 96%  96% 96%   Weight:  106 kg (234 lb 2 1 oz)     Height:           Intake/Output Summary (Last 24 hours) at 12/19/2021 6610  Last data filed at 12/19/2021 0600  Gross per 24 hour   Intake 3778 75 ml   Output 745 ml   Net 3033 75 ml     Physical Exam    General: NAD but appears uncomfortable  Skin: no rash  Eyes: anicteric sclera  ENT:  Dry mucous membrane  Neck: supple  Chest:  Rales bases but no rhonchi, no wheezes  CVS: s1s2, no murmur, no gallop, no rub  Abdomen: soft, tender right lower quadrant, abdomen distended with umbilical distension  Extremities:  1+ edema lower extremities  :  Positive luz  Neuro: AAOX3  Psych: normal affect      Invasive Devices:   Urethral Catheter Temperature probe 16 Fr   (Active)   Amt returned on insertion(mL) 175 mL 12/18/21 1649   Goal for Removal Remove after 48 hrs of I/O monitoring 12/18/21 1649   Site Assessment Clean;Skin intact 12/18/21 1649   Luz Care Done 12/18/21 2100   Collection Container Standard drainage bag 12/18/21 1649   Securement Method Securing device (Describe) 12/18/21 1649   Output (mL) 70 mL 12/19/21 0600     Lab Results:   Results from last 7 days   Lab Units 12/19/21  0425 12/19/21  0022 12/19/21  0020 12/18/21 2129 12/18/21 2128 12/18/21  1525 12/18/21  1525 12/18/21  1524 12/18/21  1524 12/18/21  1256 12/18/21  1256 12/18/21  0856 12/18/21  0449   WBC Thousand/uL 8 72  --   --   --   --   --   --   --  15 91*  --  14 18*   < > 11 38*   HEMOGLOBIN g/dL 6 8* 7 1*  --  8 6*  --   --   --    < > 5 2*   < > 5 7*   < > 7 0*   HEMATOCRIT % 20 1*  --   --   --   --   --   --   --  16 7*  --  18 0*   < > 22 3*   PLATELETS Thousands/uL 268  --   --   --   --   --   --   --  411*  --  404*   < > 458*   POTASSIUM mmol/L 4 1  --  4 2  --  5 2   < > 4 8  --   --    < > 4 6   < > 4 2   CHLORIDE mmol/L 113*  --  111*  --  109*   < > 110*  --   --    < > 112*   < > 112*   CO2 mmol/L 21  --  20*  --  17*   < > 20*  --   --    < > 18*   < > 19*   BUN mg/dL 95*  --  94*  --  93*   < > 93*  --   --    < > 91*   < > 88*   CREATININE mg/dL 3 99*  --  3 72*  --  3 71*   < > 3 60*  --   --    < > 3 48*   < > 2 82*   CALCIUM mg/dL 8 3  --  8 5  --  7 7*   < > 8 1*  --   --    < > 8 2*   < > 8 5   MAGNESIUM mg/dL 2 3  --  1 8  --  1 5*   < > 1 9  --   --    < > 2 0   < > 2 2   PHOSPHORUS mg/dL 3 5  --  3 4  --  5 1*   < > 4 1  --   --    < > 3 6   < > 2 6   ALK PHOS U/L 42*  --   --   --   --   --  30*  --   --   --   --   --  36*   ALT U/L 797*  --   --   --   --   --  69  --   --   --   --   --  7*   AST U/L 4,787*  --   --   --   --   --  185*  --   --   --   --   --  14    < > = values in this interval not displayed

## 2021-12-19 NOTE — SEDATION DOCUMENTATION
Patient rec'd from CT scan, arteriogram completed  Groin without hematoma  2nd unit of PRBC infusing  Right IJ Candis triple lumen catheter inserted  Patient tolerated well  Awake and hemodynamically stable for transfer to ICU on monitor  Report and care assumed by primary RN

## 2021-12-20 PROBLEM — R16.0 LIVER MASS: Status: ACTIVE | Noted: 2021-01-01

## 2021-12-20 PROBLEM — R74.01 TRANSAMINITIS: Status: ACTIVE | Noted: 2021-01-01

## 2021-12-20 NOTE — PROGRESS NOTES
[facility]  Progress Note - Hector Jose 1955, 78 y o  male MRN: [id number]  Unit/Bed#: ICU 04 Encounter: [id number]  Primary Care Provider: Enrique Trevino MD   Date and time admitted to hospital: 12/17/2021  3:03 PM    Transaminitis  Assessment & Plan  AST/ALT = 4700/797 on 12/19/2021  AST/ALT = 888/285    Much improved  · Will obtain a RUQ ultrasound with liver doppler  · Obtain CPK level, acute hepatitis panel    Right Liver mass  Assessment & Plan  Possible infiltrating neoplasm in the right lobe of the liver seen on CT scan       Recommend dedicated triphasic CT scan of the liver for better characterization when the patient is more stable  Staphylococcus aureus bacteremia  Assessment & Plan  · Patient is meeting sepsis criteria on admission with tachypnea are 30, WBC of 13  · 2/2 blood cultures positive for Staphylococcus aureus  · Source is most likely cellulitis  · Chest x-rays negative  · Patient received 3 L of IV fluids per DKA protocol upon admission  ·   · Antibiotics   · Cefazolin 1gm Q12 -  day 4  · MRSA culture negative  · Repeat blood cultures - negative to date  · Wound culture growing MSSA  Pansensitive  · Procalcitonin 1 06  · Trend fever curve and white blood cell count  · ID consulted      Anemia  Assessment & Plan  · Acute blood loss anemia  · Hemoglobin noted on admission 6 8, acute blood loss anemia  · Reports had hematuria with clots last week and she  · 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  · Patient stopped taking warfarin on Monday prior to admission  · On admission INR 7 46  · Patient is now status post 3 units PRBCs this admission  · Status post vitamin K /DDAVP  · Given a DDAVP for uremic bleeding  · Imaging this admission showing some scapular right renal hematoma with suprarenal hematoma  · Continue to trend hemoglobin  · Transfuse if hemoglobin less than 7        Supratherapeutic INR  Assessment & Plan  · Now resolved  · Noted with INR 7 46 on admission  · Patient reports had blood in his urine last week  · He discontinued taking warfarin on Monday prior to admission  · Hemoglobin on admission 6 8 and patient is receiving blood transfusion  · Patient is now status post 3 units PRBCs this admission  · given 5 of vitamin K and 1 dose of DDAVP to reverse uremic bleeding  · INR this morning 1 45    Acute kidney injury superimposed on chronic kidney disease [facility])  Assessment & Plan  Lab Results   Component Value Date    EGFR 13 12/20/2021    EGFR 13 12/19/2021    EGFR 13 12/19/2021    CREATININE 4 36 (H) 12/20/2021    CREATININE 4 26 (H) 12/19/2021    CREATININE 4 17 (H) 12/19/2021     · Baseline creatinine 1 75-2, creatinine today 3 99  · BUN also elevated at 95 - most likely prerenal in setting of dehydration due to HSS state  · Also some concern for GIB   · Reduced urine output observed = net +6 L  · Nephrology consulted, if urine output drops less than 30 cc an hour try 80 mg IV Lasix  · Trend BUN/Cr  · Avoid hypotension  · Consider trial of lasix    Cellulitis of left lower extremity  Assessment & Plan  There is current concern for necrotizing fascitis vs rhabdomyositis - AST/ALT - 4000      Patient complaining of pain in his left lower leg  Patient denies fever chills  On physical exam erythema, tenderness to palpation, marks of excoriation  Meeting sepsis criteria with tachypnea, WBC of 13  No personal history of MRSA    · Blood cultures on admission positive for Gram-positive bacteremia  · Continue cefazolin and vancomycin, day 3 of antibiotics  · Follow-up admission blood cultures and repeat blood cultures today    CVA (cerebral vascular accident) [facility])  Assessment & Plan  History of thrombotic CVA  Left facial droop no other residual neurological deficits  Patient is on aspirin, Coumadin which is currently held due to Rt renal bleed    -- restart medication when appropriate    Hyperlipidemia  Assessment & Plan  · NPO for now, hold home fenofibrate and statin     Hypertension  Assessment & Plan  · BP in the highs now  · Home Amlodipine initiated  maxed to 10 mg  · Started on cardene drip  · Concern for Page kidney in the setting of Rt renal subcapsular hematoma  · Nephrology on board  Appreciate recs  · Consider preload-directed anti-HTN if need for added BP medication - nitro drip   · Maintain with holding parameters    Abdominal pain  Assessment & Plan  · New onset right lower quadrant abdominal pain  · Last bowel movement over the last 24 hours  · 12/18 Lactic acid 3 1  which cleared  · Patient has been having down trending hemoglobin this admission requiring 3 units PRBCs this admission, last on this morning  · Continue Protonix IV b i d   · INR elevated to 7 45 on admission; given 5 mg of vitamin K  Currently 1 45 today   · Scan abdomen pelvis is admission showing subscapular right renal hematoma with suprarenal extension  · 12/18 went for emergent embolization with IR:  There is no evidence of acute bleeding and no intervention was done  · 12/18 CTA abdomen pelvis with showing stable size of the right subscapular hematoma and right suprarenal hematoma  No evidence of active arterial or venous extravasation  · Continue serial abdominal exams  · Continue to trend hemoglobins  · Continue to hold home Coumadin    MULUGETA on CKD (chronic kidney disease) stage 3, GFR 30-59 ml/min  Assessment & Plan  Lab Results   Component Value Date    EGFR 13 12/20/2021    EGFR 13 12/19/2021    EGFR 13 12/19/2021    CREATININE 4 36 (H) 12/20/2021    CREATININE 4 26 (H) 12/19/2021    CREATININE 4 17 (H) 12/19/2021       Baseline creatinine of 1 5 - 1 9  Now 4 36  Estimated GFR of 13  Acidotic  Suspect renal etiology  Hyperphosphatemic  Will benefit from phosphate binders  Fortunately, not hyperkalemic  Follows with Nephrology as outpatient  Monitor renal functions closely  Follow-up lactate  Nephrology consulted  Appreciate recommendation  Type 2 diabetes mellitus, with long-term current use of insulin ([facility] [address] )  Assessment & Plan  Lab Results   Component Value Date    HGBA1C 12 2 (H) 12/18/2021     (P) 403 [id number]     · Initially treated as DKA upon admission however BHB negative  · Initially started on DKA insulin drip now transitioned to regular insulin drip  · Over last 24 hours patient used to 150 units of insulin  · Consider possible transition to subcu insulin in the next 24 hours  · Maintain blood glucose 140-180  · Consider resumption of diet and basal/ meal time insulin doses    * High anion gap metabolic acidosis  Assessment & Plan  Lab Results   Component Value Date    HGBA1C 12 2 (H) 12/18/2021       Recent Labs     12/20/21  0600 12/20/21  0751 12/20/21  1006 12/20/21  1209   POCGLU 117 169* 128 134     · Patient was known history of DM type 2 insulin dependent  · He came in with blood glucose of 760  · VBG with pH of 7 245, pCO2 of 41, HC03 17 5, consistent with metabolic acidosis   · Patient was initially treated with DKA protocol however BHB negative and does not clinically appear to be in DKA  · Suspect secondary to uremia  · Nephrology consult pending  · Obtain lactate    --     Blood Sugar Average: Last 72 hrs:  (P) 717 [id number]      ----------------------------------------------------------------------------------------  HPI/24hr events:  Blood pressure is noted to be running high  On cardene drip  Patient appropriate for transfer out of the ICU today?: No  Disposition: Continue Critical Care   Code Status: Level 1 - Full Code  ---------------------------------------------------------------------------------------  SUBJECTIVE  Patient seen and examined at bedside  There was no acute overnight complaints  He reports right flank pain notable in the posterior area  Denies fever or chills  Review of Systems   Cardiovascular: Negative      Genitourinary: Positive for flank pain (right )      Review of systems was reviewed and negative unless stated above in HPI/24-hour events   ---------------------------------------------------------------------------------------  OBJECTIVE    Vitals   Vitals:    21 0800 21 1027 21 1030 21 1100   BP: (!) 204/80 137/63 145/60 168/73   Pulse: 90   86   Resp: (!) 0   (!) 29   Temp: 99 9 °F (37 7 °C)   100 4 °F (38 °C)   TempSrc:       SpO2: 96%   95%   Weight:       Height:         Temp (24hrs), Av 7 °F (37 6 °C), Min:98 8 °F (37 1 °C), Max:100 4 °F (38 °C)  Current: Temperature: 100 4 °F (38 °C)          Respiratory:  SpO2: SpO2: 95 %, SpO2 Activity: SpO2 Activity: At Rest, SpO2 Device: O2 Device: None (Room air), Capnography:         Invasive/non-invasive ventilation settings   Respiratory  Report   Lab Data (Last 4 hours)    None         O2/Vent Data (Last 4 hours)    None                Physical Exam  Vitals reviewed  Constitutional:       General: He is in acute distress  Appearance: He is not toxic-appearing  HENT:      Head: Normocephalic and atraumatic  Cardiovascular:      Rate and Rhythm: Normal rate and regular rhythm  Pulmonary:      Effort: No respiratory distress  Breath sounds: Normal breath sounds  Abdominal:      General: Bowel sounds are normal  There is no distension  Tenderness: There is no abdominal tenderness  There is right CVA tenderness  Musculoskeletal:      Right lower leg: Edema present  Left lower leg: Edema present  Skin:     Coloration: Skin is not jaundiced  Findings: No bruising  Neurological:      Mental Status: He is oriented to person, place, and time               Laboratory and Diagnostics:  Results from last 7 days   Lab Units 21  1028 21  0444 21  2328 21  2119 21  1525 21  1222 21  0845 21  0425 21  0425 21  2129 21  1524 21  1256 21  1256 21  0449 21  5069 12/18/21  0011 12/18/21  0011 12/17/21  1613 12/17/21  1613   WBC Thousand/uL 10 75* 10 10  --   --   --   --   --   --  8 72  --  15 91*  --  14 18*  --  11 38*  --  10 44*   < > 13 35*   HEMOGLOBIN g/dL 7 1* 7 3* 7 2* 7 6* 7 8* 7 6* 7 9*   < > 6 8*   < > 5 2*   < > 5 7*   < > 7 0*   < > 6 8*   < > 6 8*   HEMATOCRIT % 22 3* 22 6*  --   --   --   --   --   --  20 1*  --  16 7*  --  18 0*  --  22 3*  --  21 8*   < > 22 2*   PLATELETS Thousands/uL 292 287  --   --   --   --   --   --  268  --  411*  --  404*  --  458*  --  433*   < > 493*   BANDS PCT % 8  --   --   --   --   --   --   --   --   --   --   --   --   --  1  --   --   --  6   MONO PCT % 4  --   --   --   --   --   --   --  7  --   --   --   --   --  6  --   --   --  5    < > = values in this interval not displayed       Results from last 7 days   Lab Units 12/20/21  1028 12/19/21 2328 12/19/21 2119 12/19/21  1525 12/19/21  1222 12/19/21  0845 12/19/21  0425 12/18/21 2128 12/18/21  1525 12/18/21  0856 12/18/21  0449 12/17/21 2006 12/17/21  1613   SODIUM mmol/L 145 144 144 144 143 143 145   < > 142   < > 144   < > 125*   POTASSIUM mmol/L 4 6 4 6 4 5 4 6 4 5 4 5 4 1   < > 4 8   < > 4 2   < > 6 5*   CHLORIDE mmol/L 111* 111* 112* 111* 111* 113* 113*   < > 110*   < > 112*   < > 93*   CO2 mmol/L 18* 19* 20* 19* 20* 19* 21   < > 20*   < > 19*   < > 19*   ANION GAP mmol/L 16* 14* 12 14* 12 11 11   < > 12   < > 13   < > 13   BUN mg/dL 87* 87* 87* 90* 93* 90* 95*   < > 93*   < > 88*   < > 101*   CREATININE mg/dL 4 36* 4 26* 4 17* 4 20* 4 06* 4 06* 3 99*   < > 3 60*   < > 2 82*   < > 3 89*   CALCIUM mg/dL 8 3 8 3 8 4 8 3 8 2* 8 1* 8 3   < > 8 1*   < > 8 5   < > 8 7   GLUCOSE RANDOM mg/dL 117 133 123 134 140 107 114   < > 289*   < > 126   < > 793*   ALT U/L 285*  --   --   --   --   --  797*  --  69  --  7*  --  10*   AST U/L 888*  --   --   --   --   --  4,787*  --  185*  --  14  --  11   ALK PHOS U/L 79  --   --   --   --   --  42*  --  30*  --  36*  --  41* ALBUMIN g/dL 1 4*  --   --   --   --   --  1 7*  --  1 3*  --  1 7*  --  2 1*   TOTAL BILIRUBIN mg/dL 0 25  --   --   --   --   --  0 27  --  0 12*  --  0 12*  --  0 14*    < > = values in this interval not displayed  Results from last 7 days   Lab Units 12/19/21  2328 12/19/21 2119 12/19/21  1525 12/19/21  1222 12/19/21  0845 12/19/21  0425 12/19/21  0020   MAGNESIUM mg/dL 1 9 2 0 2 2 2 1 2 0 2 3 1 8   PHOSPHORUS mg/dL 4 4* 4 3* 4 7* 4 7* 4 2* 3 5 3 4      Results from last 7 days   Lab Units 12/20/21  0444 12/19/21  0425 12/18/21  2128 12/18/21  0452 12/17/21  1813   INR  1 43* 1 45* 1 38* 7 44* 7 59*   PTT seconds  --   --   --   --  133*          Results from last 7 days   Lab Units 12/18/21 2129 12/18/21  1525 12/17/21  1613   LACTIC ACID mmol/L 1 7 3 1* 2 0     ABG:    VBG:  Results from last 7 days   Lab Units 12/19/21  0020   PH DHARMESH  7 338   PCO2 DHARMESH mm Hg 34 7*   PO2 DHARMESH mm Hg 38 5   HCO3 DHARMESH mmol/L 18 2*   BASE EXC DHARMESH mmol/L -6 9     Results from last 7 days   Lab Units 12/17/21  2211   PROCALCITONIN ng/ml 1 06*       Micro  Results from last 7 days   Lab Units 12/19/21  1003 12/19/21  0846 12/18/21  0901 12/18/21  0453 12/17/21  1708 12/17/21  1613   BLOOD CULTURE  Received in Microbiology Lab  Culture in Progress  No Growth at 24 hrs   --   --  Staphylococcus aureus* Staphylococcus aureus*   GRAM STAIN RESULT   --   --  No Polys or Bacteria seen  --  Gram positive cocci in clusters* Gram positive cocci in clusters*   WOUND CULTURE   --   --  4+ Growth of Staphylococcus aureus*  --   --   --    MRSA CULTURE ONLY   --   --   --  No Methicillin Resistant Staphlyococcus aureus (MRSA) isolated  --   --        EKG:   Imaging: I have personally reviewed pertinent reports  and I have personally reviewed pertinent films in PACS    Intake and Output  I/O       12/18 0701 12/19 0700 12/19 0701 12/20 0700 12/20 0701 12/21 0700    P  O   90     I V  (mL/kg) 3078 8 (29) 2235 3 (21 1)     Blood 700 350 IV Piggyback  350     Total Intake(mL/kg) 3778 8 (35 6) 3025 3 (28 5)     Urine (mL/kg/hr) 945 (0 4) 2195 (0 9) 1050 (2 2)    Stool 0      Total Output 945 2195 1050    Net +2833 8 +830 3 -1050           Unmeasured Stool Occurrence 1 x            Height and Weights   Height: 5' 7" (170 2 cm)  IBW (Ideal Body Weight): 66 1 kg  Body mass index is 36 67 kg/m²  Weight (last 2 days)     Date/Time Weight    12/19/21 0549 106 (234 13)    12/18/21 0300 115 (253 53)            Nutrition       Diet Orders   (From admission, onward)             Start     Ordered    12/17/21 2205  Room Service  Once        Question:  Type of Service  Answer:  Room Service - Appropriate with Assistance    12/17/21 2204 12/17/21 2021  Diet NPO  Diet effective now        References:    Nutrtion Support Algorithm Enteral vs  Parenteral   Question Answer Comment   Diet Type NPO    RD to adjust diet per protocol?  Yes        12/17/21 2020                  Active Medications  Scheduled Meds:  Current Facility-Administered Medications   Medication Dose Route Frequency Provider Last Rate   • acetaminophen  650 mg Oral Q4H PRN Brunilda Witt PA-C     • [START ON 12/21/2021] amLODIPine  10 mg Oral Daily [place] Cons, CRNP     • cefazolin  1,000 mg Intravenous Q12H ONESIMO BokoerC 1,000 mg (12/20/21 1027)   • HYDROmorphone  0 5 mg Intravenous Q3H PRN Brunilda Witt PA-C     • insulin regular (HumuLIN R,NovoLIN R) infusion  0 3-21 Units/hr Intravenous Titrated ESDRAS Booker-C 2 Units/hr (12/20/21 1027)   • [START ON 12/21/2021] metoprolol tartrate  50 mg Oral Q12H [address] Brunilda Witt PA-C     • multi-electrolyte  100 mL/hr Intravenous Continuous [place] MD Nick 100 mL/hr (12/20/21 1040)   • niCARdipine  1-15 mg/hr Intravenous Titrated ONESIMO BookerC Stopped (12/20/21 1026)   • ondansetron  4 mg Intravenous Q6H PRN Brunilda Witt PA-C     • pantoprazole  40 mg Intravenous Q12H [address] Brunilda Witt PA-C     • vancomycin  15 mg/kg (Adjusted) Intravenous Daily PRN Santos Scales PA-C       Continuous Infusions:  insulin regular (HumuLIN R,NovoLIN R) infusion, 0 3-21 Units/hr, Last Rate: 2 Units/hr (12/20/21 1027)  multi-electrolyte, 100 mL/hr, Last Rate: 100 mL/hr (12/20/21 1040)  niCARdipine, 1-15 mg/hr, Last Rate: Stopped (12/20/21 1026)      PRN Meds:   acetaminophen, 650 mg, Q4H PRN  HYDROmorphone, 0 5 mg, Q3H PRN  ondansetron, 4 mg, Q6H PRN  vancomycin, 15 mg/kg (Adjusted), Daily PRN        Invasive Devices Review  Invasive Devices  Report    Central Venous Catheter Line            CVC Central Lines 12/18/21 Triple Right Internal jugular 1 day          Peripheral Intravenous Line            Peripheral IV 12/17/21 Left Antecubital 2 days    Peripheral IV 12/17/21 Right Hand 2 days    Peripheral IV 12/18/21 Right;Upper;Ventral (anterior) Arm 2 days          Drain            Urethral Catheter Temperature probe 16 Fr  1 day                Rationale for remaining devices:   ---------------------------------------------------------------------------------------  Advance Directive and Living Will:      Power of :    POLST:    ---------------------------------------------------------------------------------------  Care Time Delivered:   No Critical Care time spent       Radha Herron MD      Portions of the record may have been created with voice recognition software  Occasional wrong word or "sound a like" substitutions may have occurred due to the inherent limitations of voice recognition software    Read the chart carefully and recognize, using context, where substitutions have occurred

## 2021-12-20 NOTE — CONSULTS
CONSULT    Patient Name: Nicolasa Roque  Patient MRN: [id number]  Date of Service: 12/20/2021   Date / Time Note Created: 12/20/2021 2:33 PM   Referring Provider: Nakita Beltre MD    Provider Creating Note: NELLY Gordon    PCP: Xu Humphrey  Attending Provider:  Nakita Beltre MD    Reason for Consult: Hematuria    HPI --Nicolasa Roque is a 62-year-old comorbid male with history of type 2 diabetes, CVA, on Coumadin, hyperlipidemia, hypertension; admitted for sepsis secondary to left lower extremity cellulitis, DKA and supratherapeutic INR exceeding 7  Spouse is primary historian given patient's acute illness and lethargy  She denies any prior formal urologic consultation, evaluation or  surgical manipulation  He denies any chronic irritative obstructive urinary symptoms if the exception of recent acute gross hematuria  Patient is admitted to the intensive care unit for further medical stabilization  T-max 100 4  Leukocytosis peaked at 15  Glucose 692 on 12/17  Presenting lactate 3 1  Hemoglobin 5 2 requiring transfusion and acute kidney injury on CKD with creatinine exceeding 4 from baseline of approximately 2 blood culture positive for Staph  Our service was consulted after CT demonstrated right subcapsular and super renal hematoma  Patient was referred to Interventional Radiology for further examination  Right renal arterial grams negative for active contrast extravasation  Urologic consultation requested against this finding         Source:  Patient spouse and chart       Active Problems:    Patient Active Problem List   Diagnosis   • Diplopia   • Type 2 diabetes mellitus, with long-term current use of insulin ([address] )   • MULUGETA on CKD (chronic kidney disease) stage 3, GFR 30-59 ml/min   • Morbid obesity with BMI of 40 0-44 9, adult ([address] )   • Essential hypertension   • MELINDA (obstructive sleep apnea)   • Cerebrovascular accident (CVA) due to thrombosis ([address] )   • Open wound of left lower extremity • Abdominal pain   • History (2012) of right cerebellar CVA   • Coronary artery disease involving native heart   • Hypoalbuminemia   • History of DVT of lower extremity   • Hypertension   • Hyperlipidemia   • CVA (cerebral vascular accident) ([address] )   • Chronic venous stasis dermatitis of both lower extremities   • Cellulitis of left lower extremity   • Class 3 severe obesity in adult [facility])   • Dizziness   • High anion gap metabolic acidosis   • Acute kidney injury superimposed on chronic kidney disease (HCC)   • Supratherapeutic INR   • Anemia   • Staphylococcus aureus bacteremia   • Right Liver mass   • Transaminitis             Impressions  · Right subcapsular hematoma male--precipitated by supratherapeutic INR  No active extravasation seen on recent right renal angiogram   · Acute blood-loss anemia--secondary to previous  Recommendations  1  Continue medical stabilization and treatment for multiple medical concerns including DKA and sepsis  2  Maintain Robison catheter to straight drainage  3  Bed rest   4  Aggressive pain management  5  Manually Irrigate catheter for mild hematuria  6  Hold anticoagulants if feasible  7  Monitor hemoglobin  8  Reimage for any precipitous drops in hemoglobin and refer to Interventional Radiology for repeat arterial gram if necessary  9  Nephrectomy reserve for controlled life-threatening hemorrhage  10  Appreciate intensive care and subspecialty management  11  Can continue to monitor and treat conservatively at this time  12  It will take many weeks for resolution of hematoma  Reimage on the outpatient setting in approximately 10 weeks  13  Answered spouse is multiple questions to satisfaction  14  Urology will follow peripherally              Past Medical History:   Diagnosis Date   • Bell's palsy    • Cardiac disease    • Cerebellar stroke ([facility] )    • Diabetes mellitus ([facility] )    • Fracture     L hip   • Hyperlipidemia    • Hypertension    • Renal disorder    • Stroke [facility])     2013   • Stroke [facility])     [id number]       Past Surgical History:   Procedure Laterality Date   • CORONARY ANGIOPLASTY WITH STENT PLACEMENT     • FRACTURE SURGERY      L femur   • INCISION AND DRAINAGE OF WOUND Right 9/13/2016    Procedure: INCISION AND DRAINAGE (I&D) EXTREMITY, right lateral ankle;  Surgeon: Rajeev Yung DPM;  Location: [facility];  Service:    • IR EMBOLIZATION (SPECIFY VESSEL OR SITE)  12/18/2021   • IR TEMPORARY DIALYSIS CATHETER PLACEMENT  12/18/2021   • WOUND DEBRIDEMENT Right 9/15/2016    Procedure: DEBRIDEMENT WOUND [facility] OUT) ankle wound with closure and FRANCY drain placement;  Surgeon: Rajeev Yung DPM;  Location: [facility];  Service:        Family History   Problem Relation Age of Onset   • Diabetes Mother    • Stroke Mother        Social History     Socioeconomic History   • Marital status: /Civil Union     Spouse name: Not on file   • Number of children: Not on file   • Years of education: Not on file   • Highest education level: Not on file   Occupational History   • Not on file   Tobacco Use   • Smoking status: Former Smoker     Packs/day: 0 00     Types: Cigarettes     Quit date: 9/3/2006     Years since quitting: 15 3   • Smokeless tobacco: Never Used   Vaping Use   • Vaping Use: Never used   Substance and Sexual Activity   • Alcohol use: No   • Drug use: No   • Sexual activity: Not on file   Other Topics Concern   • Not on file   Social History Narrative   • Not on file     Social Determinants of Health     Financial Resource Strain: Not on file   Food Insecurity: No Food Insecurity   • Worried About Running Out of Food in the Last Year: Never true   • Ran Out of Food in the Last Year: Never true   Transportation Needs: No Transportation Needs   • Lack of Transportation (Medical): No   • Lack of Transportation (Non-Medical):  No   Physical Activity: Not on file   Stress: Not on file   Social Connections: Not on file   Intimate Partner Violence: Not on file   Housing Stability: Low Risk    • Unable to Pay for Housing in the Last Year: No   • Number of Places Lived in the Last Year: 1   • Unstable Housing in the Last Year: No       Allergies   Allergen Reactions   • Ace Inhibitors Other (See Comments)   • Ciprofloxacin Other (See Comments)   • Penicillins Hives   • Morphine And Related Anxiety       Review of Systems  Review of Systems - History obtained from chart review and the patient  General ROS: negative for - chills or fever  Respiratory ROS: no cough, shortness of breath, or wheezing  Cardiovascular ROS: no chest pain or dyspnea on exertion  Gastrointestinal ROS: positive for - abdominal pain, appetite loss and nausea/vomiting  Genito-Urinary ROS: positive for - hematuria  negative for - change in urinary stream, genital discharge, incontinence, pelvic pain, scrotal mass/pain or urinary frequency/urgency  Neurological ROS: no TIA or stroke symptoms       Chart Review   Allergies, current medications, history, problem list    Vital Signs  /73   Pulse 86   Temp 100 4 °F (38 °C)   Resp (!) 29   Ht 5' 7" (1 702 m)   Wt 106 kg (234 lb 2 1 oz)   SpO2 95%   BMI 36 67 kg/m²     Physical Exam  General appearance: alert and oriented, in no acute distress, appears stated age, cooperative and no distress  Head: Normocephalic, without obvious abnormality, atraumatic  Neck: no adenopathy, no carotid bruit, no JVD, supple, symmetrical, trachea midline and thyroid not enlarged, symmetric, no tenderness/mass/nodules  Lungs: diminished breath sounds  Heart: regular rate and rhythm, S1, S2 normal, no murmur, click, rub or gallop  Abdomen: soft, non-tender; bowel sounds normal; no masses,  no organomegaly  Extremities: extremities normal, warm and well-perfused; no cyanosis, clubbing, or edema  Pulses: 2+ and symmetric  Neurologic: Grossly normal  Robison-dark ranjit urine       Laboratory Studies  Lab Results   Component Value Date    HGBA1C 12 2 (H) 12/18/2021    HGBA1C 8 7 (H) 03/15/2021     (L) 09/24/2015    K 4 6 12/20/2021    K Unable to karissa 09/24/2015     (H) 12/20/2021     09/24/2015    CO2 18 (L) 12/20/2021    CO2 26 4 09/24/2015    GLUCOSE 196 (H) 09/24/2015    CREATININE 4 36 (H) 12/20/2021    CREATININE 1 30 09/24/2015    BUN 87 (H) 12/20/2021    BUN 17 09/24/2015    MG 1 9 12/19/2021    PHOS 4 4 (H) 12/19/2021     Lab Results   Component Value Date    WBC 10 75 (H) 12/20/2021    WBC 5 78 09/23/2015    RBC 2 40 (L) 12/20/2021    RBC 5 04 09/23/2015    HGB 7 1 (L) 12/20/2021    HGB 13 1 09/23/2015    HCT 22 3 (L) 12/20/2021    HCT 41 7 09/23/2015    MCV 93 12/20/2021    MCV 83 09/23/2015    MCH 29 6 12/20/2021    MCH 26 0 (L) 09/23/2015    RDW 15 3 (H) 12/20/2021    RDW 15 1 09/23/2015     12/20/2021     09/23/2015       Imaging and Other Studies  )CT abdomen pelvis wo contrast    Result Date: 12/18/2021  Narrative: CT ABDOMEN AND PELVIS WITHOUT IV CONTRAST INDICATION:   RLQ abdominal pain (Age >= 14y) abdominal pain  COMPARISON:  None  TECHNIQUE:  CT examination of the abdomen and pelvis was performed without intravenous contrast   Axial, sagittal, and coronal 2D reformatted images were created from the source data and submitted for interpretation  Radiation dose length product (DLP) for this visit:  [phone] mGy-cm   This examination, like all CT scans performed in the [facility], was performed utilizing techniques to minimize radiation dose exposure, including the use of iterative reconstruction and automated exposure control  Enteric contrast was not administered  FINDINGS: ABDOMEN LOWER CHEST:  Small right pleural effusion  LIVER/BILIARY TREE:  Question low-attenuation lesion in the posterior aspect of segment 5/6 (2, 30)  GALLBLADDER:  No calcified gallstones  No pericholecystic inflammatory change  SPLEEN:  Unremarkable  PANCREAS:  Unremarkable ADRENAL GLANDS:  Unremarkable   KIDNEYS/URETERS:  There is a right retroperitoneal hematoma, a large portion of which appears to be subcapsular deforming the renal parenchyma  There is also blood within the anterior and posterior renal fascia and also in the perirenal fascia  Small amount of fluid with a hematocrit level is noted above the right kidney  The subcapsular component is difficult to measure, but the suprarenal portion measures approximately 8 7 x 9 7 x 6 9 cm  STOMACH AND BOWEL:  There is a large amount of stool within the rectum  No evidence of bowel obstruction or bowel wall thickening  APPENDIX:  No findings to suggest appendicitis  ABDOMINOPELVIC CAVITY:  No ascites  No pneumoperitoneum  No lymphadenopathy  VESSELS:  Unremarkable for patient's age  PELVIS REPRODUCTIVE ORGANS:  Unremarkable for patient's age  URINARY BLADDER:  Unremarkable  ABDOMINAL WALL/INGUINAL REGIONS:  Umbilical hernia containing a short segment of nonobstructed small bowel  OSSEOUS STRUCTURES:  Stigmata of prior fixation hardware in the left femur  Degenerative changes in the spine with facet arthropathy and degenerative disc disease  Impression: 1   subcapsular right renal hematoma with suprarenal extension  This is an atypical site for a spontaneous anticoagulation bleed  Suggest CT scan renal mass protocol when the hematoma has resolved to exclude the possibility of an underlying neoplasm  This finding was discussed with the physician caring for the patient prior to this dictation  Workstation performed: [id number]     XR chest 1 view portable    Result Date: 12/17/2021  Narrative: CHEST INDICATION:   tachypnea, concern for pneumonia  COMPARISON:  9/2/2016 EXAM PERFORMED/VIEWS:  XR CHEST PORTABLE FINDINGS: Cardiomediastinal silhouette appears unremarkable  The lungs are clear  No pneumothorax or pleural effusion  Osseous structures appear within normal limits for patient age  Impression: No acute cardiopulmonary disease   Workstation performed: [id number]     XR tibia fibula 2 vw left    Result Date: 12/20/2021  Narrative: LEFT TIBIA AND FIBULA INDICATION:   r/o gas gangrene  COMPARISON:  1/29/2018 VIEWS:  XR TIBIA FIBULA 2 VW LEFT FINDINGS: There is no acute fracture or dislocation  No significant degenerative changes  No lytic or blastic osseous lesion  There are atherosclerotic calcifications  Soft tissues are otherwise unremarkable  Impression: No soft tissue emphysema identified  Workstation performed: [id number]     CTA abdomen w wo contrast    Result Date: 12/18/2021  Narrative: CT ANGIOGRAM OF THE ABDOMEN WITH AND WITHOUT IV CONTRAST INDICATION:  Retroperitoneal hematoma  Evaluate for active extravasation  COMPARISON: CT scan from approximately 3 hours earlier today  TECHNIQUE:  CT angiogram examination of the abdomen was performed according to standard protocol  This examination, like all CT scans performed in the [facility], was performed utilizing techniques to minimize radiation dose exposure, including the use of iterative reconstruction and automated exposure control  Contrast as well as noncontrast images were obtained  Rad dose 1903 mGy-cm IV Contrast:  100 mL of iodixanol (VISIPAQUE)  FINDINGS: VASCULAR STRUCTURES: Again shown is a subcapsular hematoma involving the right kidney, with a suprarenal component, which appears overall unchanged in size when compared to the scan from 3 hours earlier, with no findings to suggest active bleeding  Likewise, 10 minute delayed images also show no contrast pooling or evidence of active bleeding  Otherwise, no new findings when compared to the study from earlier today  OTHER FINDINGS ABDOMEN: LOWER CHEST: Right pleural effusion unchanged  LIVER/BILIARY TREE: Within the right lobe of the liver, there is an ill-defined area of decreased attenuation best shown on series 5 images 29 and 44    Timing of the contrast bolus was not optimal for the evaluation of the liver, but the finding is concerning for possible neoplastic disease  GALLBLADDER: No calcified gallstones  No pericholecystic inflammatory change  SPLEEN: Unremarkable  Normal size  PANCREAS: Unremarkable  ADRENAL GLANDS: Unremarkable  KIDNEYS/URETERS: Multiple low-attenuation lesions are noted in the left kidney, most too small to characterize  The largest which is located along the posterior cortex is compatible with a cyst, and measures approximately 7 2 cm  ADDITIONAL ABDOMINAL  STRUCTURES STOMACH AND VISUALIZED BOWEL: There is mild circumferential thickening of the distal esophagus probably related to reflux  ABDOMINAL CAVITY:  Unremarkable other than already described  No ascites or free intraperitoneal air  ABDOMINAL WALL/INGUINAL REGIONS:  Unremarkable  OSSEOUS STRUCTURES: No acute fracture or destructive osseous lesion  Impression: 1  Stable size of right subcapsular hematoma and right suprarenal hematoma  No evidence of active arterial or venous extravasation  2   Possible infiltrating neoplasm in the right lobe of the liver  Recommend dedicated triphasic CT scan of the liver for better characterization when the patient is more stable  Workstation performed: [id number]     IR temporary dialysis catheter placement    Result Date: 12/19/2021  Narrative: Temporary dialysis catheter insertion Clinical History: Renal failure  Fluoroscopy time: 0 1 minutes  Images: 1 Procedure: After explaining the risks and benefits of the procedure to the patient, informed consent was obtained  All elements of maximal sterile barrier technique were followed (cap, mask, sterile gown, sterile gloves, large sterile sheet, hand hygiene, and 2% chlorhexidine for cutaneous antisepsis)  The patient's right internal jugular vein was evaluated as a potential access site  The vein is patent, compressible, and free of thrombus   Under ultrasound guidance, a micropuncture needle was used  to aspirate the internal jugular vein through which a multipurpose 018 wire was advanced  Exchange dilator was placed through which a 035 J-wire was advanced centrally under fluoroscopic guidance  A static ultrasound image was recorded  After dilating the tract, a 12 5 Swiss 16 cm temporary dialysis catheter was inserted with its tip at the RA/SVC junction under fluoroscopic guidance  The catheter was secured in place with sutures and flushed per protocol  The patient tolerated the procedure well and left the department in stable condition  Impression: Impression: Successful temporary dialysis catheter insertion  Workstation performed: [id number]     IR embolization (specify vessel or site)    Result Date: 12/19/2021  Narrative: Right renal arteriogram Clinical History: Retroperitoneal hemorrhage Fluoroscopy time: 5 1 MIN ( 791 mGy ) Contrast: 65 mL of iodixanol (VISIPAQUE) Images: 132 Sedation time: 15 min Procedure: After explaining the risks and benefits of the procedure to the patient's family, informed consent was obtained  The patient's right groin was prepped and draped in usual sterile fashion and local anesthesia obtained with a 1% lidocaine solution  The right common femoral artery was evaluated a potential access site  The artery is patent and pulsatile  Under ultrasound guidance, a micropuncture needle was used to puncture the right common femoral artery through which a 0 018 wire was advanced  A 4-Swiss exchange dilator was used to advance a Bentson wire into the abdominal aorta  A 5-Swiss sheath was placed and a 4-Swiss Smith 1 catheter advanced into the abdominal aorta  The catheter was selectively advanced into the right renal artery and multiple arteriograms were performed including subselective segmental arterial branch catheterization  FINDINGS: Injection of the main renal artery demonstrates a high resistance vascular bed with significant reflux of contrast into the aorta  Multiple images demonstrate no contrast extravasation    Imaging of the capsular branches and right adrenal gland  were performed  Following the procedure, the sheath was removed and hemostasis obtained with a Proglide closure device  There was no bleeding or hematoma  The patient tolerated the procedure well and left the department in stable condition  Impression: Impression: No active contrast extravasation  High resistance vascular bed with contrast reflux consistent with compression of the parenchyma by the perinephric hematoma   Workstation performed: [id number]         Medications   Current Facility-Administered Medications   Medication Dose Route Frequency Provider Last Rate   • acetaminophen  650 mg Oral Q4H PRN Arlyn Agustin PA-C     • [START ON 12/21/2021] amLODIPine  10 mg Oral Daily NELLY Hugo     • cefazolin  1,000 mg Intravenous Q12H Arlyn Agustin PA-C 1,000 mg (12/20/21 1027)   • HYDROmorphone  0 5 mg Intravenous Q3H PRN Arlyn Agustin PA-C     • insulin regular (HumuLIN R,NovoLIN R) infusion  0 3-21 Units/hr Intravenous Titrated Arlyn Agustin PA-C 2 Units/hr (12/20/21 1027)   • [START ON 12/21/2021] metoprolol tartrate  50 mg Oral Q12H [address] Arlyn Agustin PA-C     • multi-electrolyte  100 mL/hr Intravenous Continuous Neelam Li  mL/hr (12/20/21 1040)   • niCARdipine  1-15 mg/hr Intravenous Titrated Arlyn Agustin PA-C Stopped (12/20/21 1026)   • ondansetron  4 mg Intravenous Q6H PRN Arlyn Agustin PA-C     • pantoprazole  40 mg Intravenous Q12H [address] Arlyn Agustin PA-C     • vancomycin  15 mg/kg (Adjusted) Intravenous Daily PRN HUGO Cain CRNP

## 2021-12-20 NOTE — ASSESSMENT & PLAN NOTE
AST/ALT = 4700/797 on 12/19/2021  AST/ALT = 888/285    Much improved    · Will obtain a RUQ ultrasound with liver doppler  · Obtain CPK level, acute hepatitis panel

## 2021-12-20 NOTE — ASSESSMENT & PLAN NOTE
Lab Results   Component Value Date    HGBA1C 12 2 (H) 12/18/2021     (P) 931 [id number]     · Initially treated as DKA upon admission however BHB negative  · Initially started on DKA insulin drip now transitioned to regular insulin drip  · Over last 24 hours patient used to 150 units of insulin  · Consider possible transition to subcu insulin in the next 24 hours  · Maintain blood glucose 140-180  · Consider resumption of diet and basal/ meal time insulin doses

## 2021-12-20 NOTE — ASSESSMENT & PLAN NOTE
· BP in the highs now  · Home Amlodipine initiated  maxed to 10 mg  · Started on cardene drip  · Concern for Page kidney in the setting of Rt renal subcapsular hematoma  · Nephrology on board  Appreciate recs    · Consider preload-directed anti-HTN if need for added BP medication - nitro drip   · Maintain with holding parameters

## 2021-12-20 NOTE — ASSESSMENT & PLAN NOTE
Lab Results   Component Value Date    EGFR 13 12/20/2021    EGFR 13 12/19/2021    EGFR 13 12/19/2021    CREATININE 4 36 (H) 12/20/2021    CREATININE 4 26 (H) 12/19/2021    CREATININE 4 17 (H) 12/19/2021       Baseline creatinine of 1 5 - 1 9  Now 4 36  Estimated GFR of 13  Acidotic  Suspect renal etiology  Hyperphosphatemic  Will benefit from phosphate binders  Fortunately, not hyperkalemic  Follows with Nephrology as outpatient  Monitor renal functions closely  Follow-up lactate  Nephrology consulted  Appreciate recommendation

## 2021-12-20 NOTE — ASSESSMENT & PLAN NOTE
· Acute blood loss anemia  · Hemoglobin noted on admission 6 8, acute blood loss anemia  · Reports had hematuria with clots last week and she  · 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  · Patient stopped taking warfarin on Monday prior to admission  · On admission INR 7 46  · Patient is now status post 3 units PRBCs this admission  · Status post vitamin K /DDAVP  · Given a DDAVP for uremic bleeding  · Imaging this admission showing some scapular right renal hematoma with suprarenal hematoma  · Continue to trend hemoglobin  · Transfuse if hemoglobin less than 7

## 2021-12-20 NOTE — ASSESSMENT & PLAN NOTE
· Now resolved    · Noted with INR 7 46 on admission  · Patient reports had blood in his urine last week  · He discontinued taking warfarin on Monday prior to admission  · Hemoglobin on admission 6 8 and patient is receiving blood transfusion  · Patient is now status post 3 units PRBCs this admission  · given 5 of vitamin K and 1 dose of DDAVP to reverse uremic bleeding  · INR this morning 1 45

## 2021-12-20 NOTE — PLAN OF CARE
Problem: MOBILITY - ADULT  Goal: Maintain or return to baseline ADL function  Description: INTERVENTIONS:  -  Assess patient's ability to carry out ADLs; assess patient's baseline for ADL function and identify physical deficits which impact ability to perform ADLs (bathing, care of mouth/teeth, toileting, grooming, dressing, etc )  - Assess/evaluate cause of self-care deficits   - Assess range of motion  - Assess patient's mobility; develop plan if impaired  - Assess patient's need for assistive devices and provide as appropriate  - Encourage maximum independence but intervene and supervise when necessary  - Involve family in performance of ADLs  - Assess for home care needs following discharge   - Consider OT consult to assist with ADL evaluation and planning for discharge  - Provide patient education as appropriate  Outcome: Progressing  Goal: Maintains/Returns to pre admission functional level  Description: INTERVENTIONS:  - Perform BMAT or MOVE assessment daily    - Set and communicate daily mobility goal to care team and patient/family/caregiver  - Collaborate with rehabilitation services on mobility goals if consulted  - Perform Range of Motion 4 times a day  - Reposition patient every 2 hours    - Dangle patient 3 times a day  - Stand patient 3 times a day  - Ambulate patient 3 times a day  - Out of bed to chair 3 times a day   - Out of bed for meals 3 times a day  - Out of bed for toileting  - Record patient progress and toleration of activity level   Outcome: Progressing     Problem: Potential for Falls  Goal: Patient will remain free of falls  Description: INTERVENTIONS:  - Educate patient/family on patient safety including physical limitations  - Instruct patient to call for assistance with activity   - Consult OT/PT to assist with strengthening/mobility   - Keep Call bell within reach  - Keep bed low and locked with side rails adjusted as appropriate  - Keep care items and personal belongings within reach  - Initiate and maintain comfort rounds  - Make Fall Risk Sign visible to staff  - Offer Toileting every 2 Hours, in advance of need  - Initiate/Maintain bed alarm  - Obtain necessary fall risk management equipment  - Apply yellow socks and bracelet for high fall risk patients  - Consider moving patient to room near nurses station  Outcome: Progressing     Problem: Prexisting or High Potential for Compromised Skin Integrity  Goal: Skin integrity is maintained or improved  Description: INTERVENTIONS:  - Identify patients at risk for skin breakdown  - Assess and monitor skin integrity  - Assess and monitor nutrition and hydration status  - Monitor labs   - Assess for incontinence   - Turn and reposition patient  - Assist with mobility/ambulation  - Relieve pressure over bony prominences  - Avoid friction and shearing  - Provide appropriate hygiene as needed including keeping skin clean and dry  - Evaluate need for skin moisturizer/barrier cream  - Collaborate with interdisciplinary team   - Patient/family teaching  - Consider wound care consult   Outcome: Progressing

## 2021-12-20 NOTE — ASSESSMENT & PLAN NOTE
Lab Results   Component Value Date    HGBA1C 12 2 (H) 12/18/2021       Recent Labs     12/20/21  0600 12/20/21  0751 12/20/21  1006 12/20/21  1209   POCGLU 117 169* 128 134     · Patient was known history of DM type 2 insulin dependent  · He came in with blood glucose of 760  · VBG with pH of 7 245, pCO2 of 41, HC03 17 5, consistent with metabolic acidosis   · Patient was initially treated with DKA protocol however BHB negative and does not clinically appear to be in DKA  · Suspect secondary to uremia  · Nephrology consult pending  · Obtain lactate    --     Blood Sugar Average: Last 72 hrs:  (P) 286 [id number]

## 2021-12-20 NOTE — ASSESSMENT & PLAN NOTE
Lab Results   Component Value Date    EGFR 13 12/20/2021    EGFR 13 12/19/2021    EGFR 13 12/19/2021    CREATININE 4 36 (H) 12/20/2021    CREATININE 4 26 (H) 12/19/2021    CREATININE 4 17 (H) 12/19/2021     · Baseline creatinine 1 75-2, creatinine today 3 99  · BUN also elevated at 95 - most likely prerenal in setting of dehydration due to HSS state  · Also some concern for GIB   · Reduced urine output observed = net +6 L  · Nephrology consulted, if urine output drops less than 30 cc an hour try 80 mg IV Lasix  · Trend BUN/Cr  · Avoid hypotension    · Consider trial of lasix

## 2021-12-20 NOTE — ASSESSMENT & PLAN NOTE
There is current concern for necrotizing fascitis vs rhabdomyositis - AST/ALT - 4000      Patient complaining of pain in his left lower leg  Patient denies fever chills  On physical exam erythema, tenderness to palpation, marks of excoriation      Meeting sepsis criteria with tachypnea, WBC of 13  No personal history of MRSA    · Blood cultures on admission positive for Gram-positive bacteremia  · Continue cefazolin and vancomycin, day 3 of antibiotics  · Follow-up admission blood cultures and repeat blood cultures today

## 2021-12-20 NOTE — ASSESSMENT & PLAN NOTE
History of thrombotic CVA  Left facial droop no other residual neurological deficits  Patient is on aspirin, Coumadin which is currently held due to Rt renal bleed    -- restart medication when appropriate

## 2021-12-20 NOTE — ASSESSMENT & PLAN NOTE
Possible infiltrating neoplasm in the right lobe of the liver seen on CT scan       Recommend dedicated triphasic CT scan of the liver for better characterization when the patient is more stable

## 2021-12-20 NOTE — CONSULTS
Consultation - Infectious Disease   Isabelshakila Hayes 78 y o  male MRN: [id number]  Unit/Bed#: ICU 04 Encounter: [id number]    Extensive review of the medical records in Epic including review of the notes, radiographs, and laboratory results  IMPRESSION/RECOMMENDATIONS:   1  Sepsis, POA:  With tachypnea, leukocytosis, elevated lactate, MULUGETA on CKD 3  In the setting of Staphylococcus aureus bacteremia  Low-grade fevers are noted  Lactate has normalized (3 1-> 1 7)  o Antibiotic therapy as below  o Supportive care per primary team  o CBC in am   o Monitor clinical response, temperature curve  2  Staphylococcus aureus bacteremia  LLE wound cx also with growth of Staph aureus, MSSA  No other clear source of bacteremia other than SSTI  ESR of 95 is significantly elevated  o Follow 12/19 repeat blood cultures to assess for clearance of bacteremia  o Continue cefazolin 1g IV q12 hours  o Discontinue vancomycin IV  3  LLE cellulitis: presumed SSTI source of bacteremia as no other obvious source is apparent   o Serial leg exams  o Left lower extremity elevation  o Continue cefazolin IV  o Monitor clinical response  4  Acute on chronic kidney disease stage 3:   o Renal dose medications  o Avoid nephrotoxins  o Repeat creatinine in a m   5  Diabetes mellitus type 2, insulin dependent:  Initially treated as DKA upon hospital admission  Hyperglycemia is a risk factor for infection and delayed wound healing   o Glycemic control as per primary service  6  Renal subcapsular hematoma:  With acute blood loss anemia in the setting of supratherapeutic INR  Status post IR angiography with no evidence of extravasation  o Hemoglobin monitoring per primary service   o Repeat CBC and PT/INR in a m   o Additional CT abd/pelv imaging per primary service  7  Transaminitis:  LFTs are downtrending    CT scan raises concern for infiltrating neoplasm in right hepatic lobe  o Repeat LFTs in a m   o Follow right upper quadrant ultrasound with Doppler  o Triple phase CT considered when patient is more hemodynamically stable and with improved renal function  8  History of penicillin allergy:  Hives age 14  Patient is currently tolerating cefazolin IV  My recommendations were discussed with the patient in detail who verbalized understanding  My recommendations were discussed with resident Dr Greg Negrete, from the primary service  Thank you for allowing me to participate in the care of this patient  The ID service will follow  HISTORY OF PRESENT ILLNESS:  Reason for Consult: bacteremia  CC: weakness, vomiting  HPI: Marissa Walls is a 78y o  year old male with PMH of IDDM, CAD, CKD who was admitted on December 17th  Patient initially presented with progressive weakness, mental slowness, nausea and vomiting over the past 1 week  He had not been able to keep anything down over the past 3 days prior to his hospitalization  He initially reported dry mouth  His glucose level at home was elevated close to 500 despite compliance with insulin  Patient also complained of pain and redness of his left leg over the past 10 days  He had a pimple on his leg that he scratched  He initially denied fever, chills  Upon admission, patient's blood glucose was greater than 700 and patient was admitted to ICU given concerns of diabetic ketoacidosis  Patient was reported to meet criteria for sepsis at the time of admission with possible skin and soft tissue source  Patient was initially treated with cefazolin IV  Patient was reported to be uremic and a temporary dialysis catheter was placed on December 18th  Patient's blood cultures returned positive for Staphylococcus aureus and ID service was consulted for further evaluation  Patient has had low-grade temperature but denies chills or sweats  He is tolerating current antibiotic therapy  REVIEW OF SYSTEMS:  Constitutional: Negative for fever, chills  HENT: Negative runny nose, sore throat, congestion  Eyes: Negative for change in vision  Respiratory: My breathing is so-so  Negative cough  Cardiovascular: Negative for chest pain, palpitations  Gastrointestinal: Negative for abdominal pain, nausea, vomiting, diarrhea  Genitourinary:  Negative for dysuria, hematuria  Musculoskeletal: +Left leg swelling  Negative for arthritis    Skin: +left leg scabs  Negative for rash  Neurological: Negative for syncope, focal weakness  Hematological: Negative for excessive bruising, bleeding  Psychiatric/Behavioral: Negative for confusion, hallucination        PAST MEDICAL HISTORY:  Past Medical History:   Diagnosis Date   • Bell's palsy    • Cardiac disease    • Cerebellar stroke ([facility] )    • Diabetes mellitus ([facility] )    • Fracture     L hip   • Hyperlipidemia    • Hypertension    • Renal disorder    • Stroke [facility])     2013   • Stroke [facility])     [id number]     Past Surgical History:   Procedure Laterality Date   • CORONARY ANGIOPLASTY WITH STENT PLACEMENT     • FRACTURE SURGERY      L femur   • INCISION AND DRAINAGE OF WOUND Right 9/13/2016    Procedure: INCISION AND DRAINAGE (I&D) EXTREMITY, right lateral ankle;  Surgeon: Zara Watts DPM;  [facility];  Service:    • IR EMBOLIZATION (SPECIFY VESSEL OR SITE)  12/18/2021   • IR TEMPORARY DIALYSIS CATHETER PLACEMENT  12/18/2021   • WOUND DEBRIDEMENT Right 9/15/2016    Procedure: DEBRIDEMENT WOUND [facility] OUT) ankle wound with closure and FRANCY drain placement;  Surgeon: Zara Watts DPM;  [facility];  Service:      FAMILY HISTORY:  Negative for immunodeficiency  Family History   Problem Relation Age of Onset   • Diabetes Mother    • Stroke Mother      SOCIAL HISTORY:  Social History   Social History     Substance and Sexual Activity   Alcohol Use No     Social History     Substance and Sexual Activity   Drug Use No     Social History     Tobacco Use   Smoking Status Former Smoker   • Packs/day: 0 00   • Types: Cigarettes   • Quit date: 9/3/2006 • Years since quitting: 15 3   Smokeless Tobacco Never Used     ALLERGIES:  Allergies   Allergen Reactions   • Ace Inhibitors Other (See Comments)   • Ciprofloxacin Other (See Comments)   • Penicillins Hives   • Morphine And Related Anxiety     MEDICATIONS:  All current active medications have been reviewed    Antibiotics: cefazolin since   Scheduled Meds:  Current Facility-Administered Medications   Medication Dose Route Frequency Provider Last Rate   • acetaminophen  650 mg Oral Q4H PRN Alida Delgadillo PA-C     • [START ON 2021] amLODIPine  10 mg Oral Daily NELLY Sarmiento     • cefazolin  1,000 mg Intravenous Q12H Alida Delgadillo PA-C 1,000 mg (21 1027)   • HYDROmorphone  0 5 mg Intravenous Q3H PRN Alida Delgadillo PA-C     • insulin regular (HumuLIN R,NovoLIN R) infusion  0 3-21 Units/hr Intravenous Titrated Alida Delgadillo PA-C 2 Units/hr (21 102)   • [START ON 2021] metoprolol tartrate  50 mg Oral Q12H [facility] Alida Delgadillo PA-C     • multi-electrolyte  100 mL/hr Intravenous Continuous Elaine Davis  mL/hr (21 1040)   • niCARdipine  1-15 mg/hr Intravenous Titrated Alida Delgadillo PA-C Stopped (21 1026)   • ondansetron  4 mg Intravenous Q6H PRN Alida Delgadillo PA-C     • pantoprazole  40 mg Intravenous Q12H [facility] Alida Delgadillo PA-C     • vancomycin  15 mg/kg (Adjusted) Intravenous Daily PRN Alida Delgadillo PA-C       Continuous Infusions:insulin regular (HumuLIN R,NovoLIN R) infusion, 0 3-21 Units/hr, Last Rate: 2 Units/hr (21 1027)  multi-electrolyte, 100 mL/hr, Last Rate: 100 mL/hr (21 1040)  niCARdipine, 1-15 mg/hr, Last Rate: Stopped (21 1026)      PRN Meds: •  acetaminophen  •  HYDROmorphone  •  ondansetron  •  vancomycin     PHYSICAL EXAM:  Temp:  [99 °F (37 2 °C)-100 4 °F (38 °C)] 100 4 °F (38 °C)  HR:  [80-95] 86  Resp:  [0-38] 29  BP: (137-216)/(60-86) 168/73  SpO2:  [94 %-97 %] 95 %  Temp (24hrs), Av 8 °F (37 7 °C), Min:99 °F (37 2 °C), Max:100 4 °F (38 °C)  Current: Temperature: 100 4 °F (38 °C)    Intake/Output Summary (Last 24 hours) at 12/20/2021 1425  Last data filed at 12/20/2021 0715  Gross per 24 hour   Intake 731 37 ml   Output 2695 ml   Net -1963 63 ml     General Appearance:  Appears chronically ill, debilitated and fatigued, resting in bed, disheveled appearance with poor hygiene   Head:  Normocephalic, without obvious abnormality, atraumatic   Eyes:  EOMI, Conjunctiva pink and sclera anicteric, both eyes   Nose: Nares normal, mucosa normal, no drainage   Throat: Oropharynx moist    Neck: Supple   Lungs:  Clear to auscultation bilaterally, respirations unlabored   Heart:   S1, S2, tachycardic, 2/6 murmur LLSB   Abdomen: Protuberant with umbilical hernia, soft, non-tender, non-distended   :  Robison catheter present draining small quantity of urine   Extremities:   Mild LLE edema   Skin: LLE fading erythema with pink discoloration  Superficial dry scabs noted distal LLE  Dirty feet  No draining wounds or fluctuance noted   Neurologic: Alert and oriented x  3, conversant, fluent speech   Psychiatric: Calm, cooperative with exam and interview     LABS, IMAGING, & OTHER STUDIES:  Lab Results:  I have personally reviewed pertinent labs and cultures  Results from last 7 days   Lab Units 12/20/21  1028 12/20/21  0444 12/19/21 2328 12/19/21  0845 12/19/21  0425   WBC Thousand/uL 10 75* 10 10  --   --  8 72   HEMOGLOBIN g/dL 7 1* 7 3* 7 2*   < > 6 8*   PLATELETS Thousands/uL 292 287  --   --  268    < > = values in this interval not displayed       Results from last 7 days   Lab Units 12/20/21  1028 12/19/21  2328 12/19/21  2328 12/19/21  2119 12/19/21  2119 12/19/21  0845 12/19/21  0425 12/18/21  2128 12/18/21  1525   SODIUM mmol/L 145  --  144  --  144   < > 145   < > 142   POTASSIUM mmol/L 4 6   < > 4 6   < > 4 5   < > 4 1   < > 4 8   CHLORIDE mmol/L 111*   < > 111*   < > 112*   < > 113*   < > 110*   CO2 mmol/L 18*   < > 19*   < > 20*   < > 21   < > 20*   BUN mg/dL 87*   < > 87*   < > 87*   < > 95*   < > 93*   CREATININE mg/dL 4 36*   < > 4 26*   < > 4 17*   < > 3 99*   < > 3 60*   EGFR ml/min/1 73sq m 13   < > 13   < > 13   < > 14   < > 16   CALCIUM mg/dL 8 3   < > 8 3   < > 8 4   < > 8 3   < > 8 1*   AST U/L 888*  --   --   --   --   --  3,745*  --  185*   ALT U/L 285*  --   --   --   --    < > 797*  --  69   ALK PHOS U/L 79  --   --   --   --    < > 42*  --  30*    < > = values in this interval not displayed  Results from last 7 days   Lab Units 12/19/21  1003 12/19/21  0846 12/18/21  0901 12/18/21  0453 12/17/21  1708 12/17/21  1613   BLOOD CULTURE  Received in Microbiology Lab  Culture in Progress  No Growth at 24 hrs   --   --  Staphylococcus aureus* Staphylococcus aureus*   GRAM STAIN RESULT   --   --  No Polys or Bacteria seen  --  Gram positive cocci in clusters* Gram positive cocci in clusters*   WOUND CULTURE   --   --  4+ Growth of Staphylococcus aureus*  --   --   --    MRSA CULTURE ONLY   --   --   --  No Methicillin Resistant Staphlyococcus aureus (MRSA) isolated  --   --      Results from last 7 days   Lab Units 12/17/21  2211   PROCALCITONIN ng/ml 1 06*     Imaging Studies:   12/18/21 CTA abd:  Subcapsular right renal hematoma, possible infiltrating neoplasm right lobe liver  I have personally reviewed pertinent imaging study reports and images in PACS

## 2021-12-20 NOTE — PROGRESS NOTES
Vancomycin IV Pharmacy-to-Dose Consultation    David Massey is a 78 y o  male who is currently receiving Vancomycin IV with management by the Pharmacy Consult service  Per Infectious Disease, vancomycin is being discontinued at this time  Pharmacy will sign off on consult at this time  Please call pharmacy with any further questions or concerns       Thank you,     Geoffrey Burgos, Pharmacist

## 2021-12-20 NOTE — ASSESSMENT & PLAN NOTE
· Patient is meeting sepsis criteria on admission with tachypnea are 30, WBC of 13  · 2/2 blood cultures positive for Staphylococcus aureus  · Source is most likely cellulitis  · Chest x-rays negative  · Patient received 3 L of IV fluids per DKA protocol upon admission  ·   · Antibiotics   · Cefazolin 1gm Q12 -  day 4  · MRSA culture negative  · Repeat blood cultures - negative to date  · Wound culture growing MSSA  Pansensitive  · Procalcitonin 1 06  · Trend fever curve and white blood cell count  · ID consulted

## 2021-12-20 NOTE — CONSULTS
Consultation - General Surgery  : RA Surgery Resident role on [employer]  Clarissa Ko 78 y o  male MRN: [id number]  Unit/Bed#: ICU 04 Encounter: [id number]        Assessment:  78y o  year old male admitted to medical critical care with Staph aureus bacteremia of unknown origin, spontaneous R renal hematoma (on [facility]), renal failure, DKA  Consult to rule out LLE NSTI    Based on chart review and lack of acute physical exam findings, favor venous stasis dermatitis as etiology of LLE skin findings  Regarding LLE wound culture positive for Staph aureus, favor presence of normal skin brenna vs colonization over active infection    Plan:  No indication for surgical intervention  Local wound care for BLE venous stasis wounds    HPI:  Clarissa Ko is a 78 y o  male with a history of IDDM2, CKD, CAD s/p PCI, hx 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, CVA, known non-ruptured cerebral aneurysm  He presented on Friday 12/17 with PO intolerance, N/V, feeling weak and dehydrated x1 week  He also complained of L leg pain and redness, and hematuria  He was found to be in diabetic ketoacidosis prompting IVF / insulin gtt, anemia at 6 8 for which he received 1u pRBC, and meeting sepsis criteria  He was found to have a R renal subcapsular hematoma, which he is being worked up for, as well as Staph aureus bacteremia of unknown origin  General surgery was consulted due to concern for LLE NSTI  Physical Exam  Constitutional:       General: He is not in acute distress  Appearance: Normal appearance  HENT:      Head: Normocephalic and atraumatic  Right Ear: External ear normal       Left Ear: External ear normal       Nose: Nose normal       Mouth/Throat:      Mouth: Mucous membranes are moist       Pharynx: Oropharynx is clear  Eyes:      General:         Right eye: No discharge  Left eye: No discharge  Extraocular Movements: Extraocular movements intact        Conjunctiva/sclera: Conjunctivae normal  Pupils: Pupils are equal, round, and reactive to light  Cardiovascular:      Rate and Rhythm: Normal rate  Pulses: Normal pulses  Heart sounds: Normal heart sounds  Pulmonary:      Effort: Pulmonary effort is normal  No respiratory distress  Abdominal:      General: Abdomen is flat  There is no distension  Tenderness: There is no abdominal tenderness  There is no guarding or rebound  Musculoskeletal:         General: No swelling (No significant swelling of BLE ), tenderness or signs of injury  Cervical back: Normal range of motion and neck supple  No rigidity or tenderness  Skin:     Coloration: Skin is not jaundiced  Findings: Lesion (LLE > RLE venous stasis ulcers, improved from prior based on imaging  No erythema or other signs of infection  Mild proximal L lower leg tenderness ) present  Neurological:      General: No focal deficit present  Mental Status: He is alert and oriented to person, place, and time  Mental status is at baseline  Psychiatric:         Mood and Affect: Mood normal          Behavior: Behavior normal            Review of Systems   Constitutional: Negative for activity change, appetite change, chills and fever  HENT: Negative  Negative for congestion, dental problem, ear pain, facial swelling, nosebleeds, sore throat and trouble swallowing  Eyes: Negative  Negative for pain and visual disturbance  Respiratory: Negative  Negative for apnea and shortness of breath  Cardiovascular: Negative  Negative for chest pain and palpitations  Gastrointestinal: Negative  Negative for abdominal pain, anal bleeding, blood in stool, nausea and vomiting  Endocrine: Negative  Negative for cold intolerance and heat intolerance  Genitourinary: Negative  Negative for dysuria and hematuria  Musculoskeletal: Positive for back pain  Negative for gait problem and joint swelling  Skin: Positive for wound (Chronic LLE venous stasis dermatitis)  Allergic/Immunologic: Negative  Neurological: Negative  Negative for dizziness, seizures, light-headedness and numbness  Hematological: Negative  Psychiatric/Behavioral: Negative  Negative for behavioral problems and hallucinations  Objective         Intake/Output Summary (Last 24 hours) at 12/20/2021 1026  Last data filed at 12/20/2021 0715  Gross per 24 hour   Intake 1222 87 ml   Output 2995 ml   Net -1772 13 ml       First Vitals:   Blood Pressure: 113/58 (12/17/21 1501)  Pulse: 66 (12/17/21 1501)  Temperature: 98 2 °F (36 8 °C) (12/17/21 1501)  Temp Source: Oral (12/17/21 2156)  Respirations: 18 (12/17/21 1501)  Height: 5' 7" (170 2 cm) (12/17/21 2040)  Weight - Scale: (!) 140 kg (308 lb 10 3 oz) (12/17/21 2040)  SpO2: 96 % (12/17/21 2029)    Current Vitals:   Blood Pressure: (!) 204/80 (12/20/21 0800)  Pulse: 90 (12/20/21 0800)  Temperature: 99 9 °F (37 7 °C) (12/20/21 0800)  Temp Source: Probe (12/19/21 1831)  Respirations: (!) 0 (12/20/21 0800)  Height: 5' 7" (170 2 cm) (12/17/21 2156)  Weight - Scale: 106 kg (234 lb 2 1 oz) (12/19/21 0549)  SpO2: 96 % (12/20/21 0800)    Invasive Devices  Report    Central Venous Catheter Line            CVC Central Lines 12/18/21 Triple Right Internal jugular 1 day          Peripheral Intravenous Line            Peripheral IV 12/17/21 Left Antecubital 2 days    Peripheral IV 12/17/21 Right Hand 2 days    Peripheral IV 12/18/21 Right;Upper;Ventral (anterior) Arm 2 days          Drain            Urethral Catheter Temperature probe 16 Fr  1 day                Imaging: I have personally reviewed pertinent reports  CT abdomen pelvis wo contrast    Result Date: 12/18/2021  Impression: 1   subcapsular right renal hematoma with suprarenal extension  This is an atypical site for a spontaneous anticoagulation bleed  Suggest CT scan renal mass protocol when the hematoma has resolved to exclude the possibility of an underlying neoplasm    This finding was discussed with the physician caring for the patient prior to this dictation  Workstation performed: [id number]     XR chest 1 view portable    Result Date: 12/17/2021  Impression: No acute cardiopulmonary disease  Workstation performed: [id number]     CTA abdomen w wo contrast    Result Date: 12/18/2021  Impression: 1  Stable size of right subcapsular hematoma and right suprarenal hematoma  No evidence of active arterial or venous extravasation  2   Possible infiltrating neoplasm in the right lobe of the liver  Recommend dedicated triphasic CT scan of the liver for better characterization when the patient is more stable  Workstation performed: [id number]     IR temporary dialysis catheter placement    Result Date: 12/19/2021  Impression: Impression: Successful temporary dialysis catheter insertion  Workstation performed: [id number]     IR embolization (specify vessel or site)    Result Date: 12/19/2021  Impression: Impression: No active contrast extravasation  High resistance vascular bed with contrast reflux consistent with compression of the parenchyma by the perinephric hematoma  Workstation performed: [id number]       EKG, Pathology, and Other Studies: I have personally reviewed pertinent reports      VTE Pharmacologic Prophylaxis: Sequential compression device (Venodyne)   VTE Mechanical Prophylaxis: sequential compression device    Historical Information   Past Medical History:   Diagnosis Date   • Bell's palsy    • Cardiac disease    • Cerebellar stroke ([facility] [address] )    • Diabetes mellitus ([facility] [address] )    • Fracture     L hip   • Hyperlipidemia    • Hypertension    • Renal disorder    • Stroke [facility])     2013   • Stroke [facility])     [id number]     Past Surgical History:   Procedure Laterality Date   • CORONARY ANGIOPLASTY WITH STENT PLACEMENT     • FRACTURE SURGERY      L femur   • INCISION AND DRAINAGE OF WOUND Right 9/13/2016    Procedure: INCISION AND DRAINAGE (I&D) EXTREMITY, right lateral ankle;  Surgeon: Dereje Roberts Herbert Mayfield DPM;  Location: [facility];  Service:    • IR EMBOLIZATION (SPECIFY VESSEL OR SITE)  12/18/2021   • IR TEMPORARY DIALYSIS CATHETER PLACEMENT  12/18/2021   • WOUND DEBRIDEMENT Right 9/15/2016    Procedure: DEBRIDEMENT WOUND [facility] OUT) ankle wound with closure and FRANCY drain placement;  Surgeon: Rajeev Yung DPM;  Location: [facility];  Service:      Social History   Social History     Substance and Sexual Activity   Alcohol Use No     Social History     Substance and Sexual Activity   Drug Use No     Social History     Tobacco Use   Smoking Status Former Smoker   • Packs/day: 0 00   • Types: Cigarettes   • Quit date: 9/3/2006   • Years since quitting: 15 3   Smokeless Tobacco Never Used     Family History   Problem Relation Age of Onset   • Diabetes Mother    • Stroke Mother        Meds/Allergies   all current active meds have been reviewed, current meds:   Current Facility-Administered Medications   Medication Dose Route Frequency   • acetaminophen (TYLENOL) tablet 650 mg  650 mg Oral Q4H PRN   • [START ON 12/21/2021] amLODIPine (NORVASC) tablet 10 mg  10 mg Oral Daily   • amLODIPine (NORVASC) tablet 5 mg  5 mg Oral Once   • ceFAZolin (ANCEF) IVPB (premix in dextrose) 1,000 mg 50 mL  1,000 mg Intravenous Q12H   • HYDROmorphone (DILAUDID) injection 0 5 mg  0 5 mg Intravenous Q3H PRN   • insulin regular (HumuLIN R,NovoLIN R) 1 Units/mL in sodium chloride 0 9 % 100 mL infusion  0 3-21 Units/hr Intravenous Titrated   • [START ON 12/21/2021] metoprolol tartrate (LOPRESSOR) tablet 50 mg  50 mg Oral Q12H [address]   • multi-electrolyte (PLASMALYTE-A/ISOLYTE-S PH 7 4) IV solution  100 mL/hr Intravenous Continuous   • niCARdipine (CARDENE) 25 mg (STANDARD CONCENTRATION) in sodium chloride 0 9% 250 mL  1-15 mg/hr Intravenous Titrated   • ondansetron (ZOFRAN) injection 4 mg  4 mg Intravenous Q6H PRN   • pantoprazole (PROTONIX) injection 40 mg  40 mg Intravenous Q12H [address]   • vancomycin (VANCOCIN) 1,250 mg in sodium chloride 0 9 % 250 mL IVPB  15 mg/kg (Adjusted) Intravenous Daily PRN    and PTA meds:   Prior to Admission Medications   Prescriptions Last Dose Informant Patient Reported? Taking?   acetaminophen (TYLENOL) 325 mg tablet   No No   Sig: Take 3 tablets (975 mg total) by mouth every 8 (eight) hours as needed for moderate pain   amLODIPine (NORVASC) 5 mg tablet   No No   Sig: Take 1 tablet (5 mg total) by mouth daily   aspirin 81 mg chewable tablet  Self Yes No   Sig: Chew 81 mg daily  atorvastatin (LIPITOR) 40 mg tablet   No No   Sig: Take 1 tablet (40 mg total) by mouth every evening   cyanocobalamin (VITAMIN B-12) 250 MCG tablet   No No   Sig: Take 4 tablets (1,000 mcg total) by mouth daily   docusate sodium (COLACE) 100 mg capsule   No No   Sig: Take 1 capsule (100 mg total) by mouth 2 (two) times a day as needed for constipation   Patient not taking: Reported on 11/28/2020   ezetimibe (ZETIA) 10 mg tablet   No No   Sig: Take 1 tablet (10 mg total) by mouth daily at bedtime   fenofibrate (TRICOR) 145 mg tablet  Self Yes No   Sig: Take 145 mg by mouth daily  insulin regular (HUMULIN R) 500 units/mL CONCENTRATED injection   No No   Sig: Inject 0 17 mL (85 Units total) under the skin 3 (three) times a day before meals   metoprolol tartrate (LOPRESSOR) 50 mg tablet  Self Yes No   Sig: Take 50 mg by mouth 2 (two) times a day  nystatin (MYCOSTATIN) powder   No No   Sig: Apply topically 2 (two) times a day   pantoprazole (PROTONIX) 40 mg tablet  Self Yes No   Sig: Take 40 mg by mouth daily   warfarin (COUMADIN) 5 mg tablet   No No   Sig: Take 0 5 tablets (2 5 mg total) by mouth daily      Facility-Administered Medications: None     Allergies   Allergen Reactions   • Ace Inhibitors Other (See Comments)   • Ciprofloxacin Other (See Comments)   • Penicillins Hives   • Morphine And Related Anxiety       Lab Results: I have personally reviewed pertinent lab results    , CBC:   Lab Results   Component Value Date WBC 10 10 12/20/2021    HGB 7 3 (L) 12/20/2021    HCT 22 6 (L) 12/20/2021    MCV 93 12/20/2021     12/20/2021    MCH 29 9 12/20/2021    MCHC 32 3 12/20/2021    RDW 14 9 12/20/2021    MPV 9 4 12/20/2021    NRBC 1 12/20/2021   , CMP:   Lab Results   Component Value Date    SODIUM 144 12/19/2021    K 4 6 12/19/2021     (H) 12/19/2021    CO2 19 (L) 12/19/2021    BUN 87 (H) 12/19/2021    CREATININE 4 26 (H) 12/19/2021    CALCIUM 8 3 12/19/2021    EGFR 13 12/19/2021       Counseling / Coordination of Care  Total floor / unit time spent today 25 minutes  Greater than 50% of total time was spent with the patient and / or family counseling and / or coordination of care      Inpatient consult to Acute Care Surgery  Consult performed by: Yazmin Molina MD  Consult ordered by: MD Yazmin Panda MD  12/20/2021 10:26 AM

## 2021-12-20 NOTE — PROGRESS NOTES
[facility] PROGRESS NOTE   Aldo Dennis 78 y o  male MRN: [id number]  Unit/Bed#: ICU 04 Encounter: [id number]  Reason for Consult:  Acute kidney injury on CKD    Summary:  18-year-old male with a history of CKD, hypertension, diabetes mellitus, chronic venous stasis, CVA, hyperlipidemia, DVT, CAD admitted on 12/17 with weakness, nausea, vomiting and abdominal pain  Nephrology consulted for management of acute kidney injury on chronic kidney disease  ASSESSMENT and PLAN:  1  Acute kidney injury (POA):  · Baseline creatinine 1 5-1 9 mg/dL  · 12/17:  Creatinine 3 9 on admission  · CT with contrast/IR:  Stable right subcapsular hematoma and suprarenal hematoma possibly infiltrating neoplasm right lobe of the liver  · Initial urinalysis on 12/17 difficult to interpret  Significant hematuria  · Etiology:  Likely ATN in the setting of hypotension, acute blood loss anemia, sepsis with delay in recovery due to contrast and compression of the right kidney  · Creatinine plateauing in the low 4s as of 12/19  Nonoliguric, Good urine output  · On IV fluids  · Temporary dialysis catheter placed on 12/18  · Plan:  · No clear indication for dialysis at this time  It was discussed previously with patient (please see Dr Morena Basilio note)  He agreed to proceed if needed  · Obtain CMP now  · Recheck urinalysis  · Robison catheter, strict I&O  · Recommend switching to Plasmalyte for pH balanced solution  2  Chronic kidney disease, stage III:  · Outpatient follow-up recommended  3  Blood pressure:    · Concern for Page kidney, on nicardipine infusion  Acute pain may also be contributing to blood pressure  Oral medications:  Currently on amlodipine 5 mg daily, metoprolol 50 mg every 12 hours  · Remains on Cardene infusion  · May need to increase amlodipine to 10 mg daily  Avoid over-correction, hypotension  Hold parameter on amlodipine adjusted  4  Acute anemia:    · In the setting of retroperitoneal bleed    Question malignancy  Status post multiple transfusions  Per last IR study no active bleeding  · Warfarin on hold  · Status post DDAVP, vitamin K  5  Acid-base:  · High anion gap metabolic acidosis  · Glucose 760 on admission  · Initially treated with DKA protocol  · Bicarbonate plateauing near 20-21  · Check a m  Labs  6  Hyperphosphatemia:  Mild, monitor  7  Electrolytes:  Potassium acceptable, 4 6   8  Bacteremia:  Management per primary team  9  Diabetes mellitus type 2:  Management per primary team   Last A1c 12 2 December 2021      SUBJECTIVE / 24H INTERVAL HISTORY:  Complains of right flank pain  Denies nausea, headache  OBJECTIVE:  Current Weight: Weight - Scale: 106 kg (234 lb 2 1 oz)  Vitals:    12/20/21 0730 12/20/21 0745 12/20/21 0756 12/20/21 0800   BP: (!) 213/84 (!) 209/81 (!) 199/76 (!) 204/80   Pulse: 90 87 90 90   Resp: (!) 26 (!) 27 (!) 26 (!) 0   Temp: 100 °F (37 8 °C) 100 °F (37 8 °C) 99 9 °F (37 7 °C) 99 9 °F (37 7 °C)   TempSrc:       SpO2: 96% 94% 95% 96%   Weight:       Height:           Intake/Output Summary (Last 24 hours) at 12/20/2021 6231  Last data filed at 12/20/2021 0715  Gross per 24 hour   Intake 1439 14 ml   Output 3125 ml   Net -1685 86 ml     General:  Acutely ill-appearing  Skin: no rash, warm and dry  Eyes: anicteric sclera  ENT:  Oropharynx moist  Neck: supple  Chest:  Anterior chest scattered crackles otherwise clear  Normal effort at rest  CVS: s1s2, no murmur, no gallop, no rub    Normal rate regular rhythm  Abdomen: soft, nontender, nl sounds, slightly distended appearing  Extremities: no edema LE b/l  :  luz  Neuro:  Lethargic but responds appropriately  Psych: normal affect  Medications:    Current Facility-Administered Medications:   •  acetaminophen (TYLENOL) tablet 650 mg, 650 mg, Oral, Q4H PRN, Jessica Gooden PA-C, 650 mg at 12/19/21 5582  •  amLODIPine (NORVASC) tablet 5 mg, 5 mg, Oral, Daily, Jessica Gooden PA-C, 5 mg at 12/20/21 3436  •  ceFAZolin (ANCEF) IVPB (premix in dextrose) 1,000 mg 50 mL, 1,000 mg, Intravenous, Q12H, Jean-Pierre Yosef, PA-C, Last Rate: 100 mL/hr at 12/19/21 2155, 1,000 mg at 12/19/21 2155  •  HYDROmorphone (DILAUDID) injection 0 5 mg, 0 5 mg, Intravenous, Q3H PRN, Jean-Pierre Yosef, PA-C, 0 5 mg at 12/20/21 6572  •  insulin regular (HumuLIN R,NovoLIN R) 1 Units/mL in sodium chloride 0 9 % 100 mL infusion, 0 3-21 Units/hr, Intravenous, Titrated, Jean-Pierre Yosef, PA-C, Last Rate: 4 mL/hr at 12/20/21 0753, 4 Units/hr at 12/20/21 7190  •  lactated ringers infusion, 100 mL/hr, Intravenous, Continuous, Jean-Pierre Yosef, PA-C, Last Rate: 100 mL/hr at 12/20/21 0937, 100 mL/hr at 12/20/21 5927  •  [START ON 12/21/2021] metoprolol tartrate (LOPRESSOR) tablet 50 mg, 50 mg, Oral, Q12H [address], Jean-Pierre Yosef, PA-C  •  niCARdipine (CARDENE) 25 mg (STANDARD CONCENTRATION) in sodium chloride 0 9% 250 mL, 1-15 mg/hr, Intravenous, Titrated, Jean-Pierre Yosef, PA-C, Last Rate: 100 mL/hr at 12/20/21 0801, 10 mg/hr at 12/20/21 0801  •  ondansetron [facility]) injection 4 mg, 4 mg, Intravenous, Q6H PRN, Jean-Pierre Yosef, PA-C  •  pantoprazole (PROTONIX) injection 40 mg, 40 mg, Intravenous, Q12H [address], Jean-Pierre Yosef, PA-C, 40 mg at 12/20/21 2284  •  vancomycin (VANCOCIN) 1,250 mg in sodium chloride 0 9 % 250 mL IVPB, 15 mg/kg (Adjusted), Intravenous, Daily PRN, Jean-Pierre Yosef, PA-C    Laboratory Results:  Results from last 7 days   Lab Units 12/20/21  0444 12/19/21  2328 12/19/21  2119 12/19/21  1525 12/19/21  1222 12/19/21  0845 12/19/21  0425 12/19/21  0022 12/19/21  0020 12/18/21  1525 12/18/21  1524 12/18/21  1256 12/18/21  1256 12/18/21  0856 12/18/21  0449 12/18/21  0011 12/18/21  0009 12/17/21 2006 12/17/21  1613   WBC Thousand/uL 10 10  --   --   --   --   --  8 72  --   --   --  15 91*  --  14 18*  --  11 38* 10 44*  --   --  13 35*   HEMOGLOBIN g/dL 7 3* 7 2* 7 6* 7 8* 7 6* 7 9* 6 8*   < >  --    < > 5 2*   < > 5 7*   < > 7 0* 6 8*   < >   < > 6 8* HEMATOCRIT % 22 6*  --   --   --   --   --  20 1*  --   --   --  16 7*  --  18 0*  --  22 3* 21 8*  --   --  22 2*   PLATELETS Thousands/uL 287  --   --   --   --   --  268  --   --   --  411*  --  404*  --  458* 433*  --   --  493*   POTASSIUM mmol/L  --  4 6 4 5 4 6 4 5 4 5 4 1  --  4 2   < >  --   --  4 6   < > 4 2  --    < >   < > 6 5*   CHLORIDE mmol/L  --  111* 112* 111* 111* 113* 113*  --  111*   < >  --   --  112*   < > 112*  --    < >   < > 93*   CO2 mmol/L  --  19* 20* 19* 20* 19* 21  --  20*   < >  --   --  18*   < > 19*  --    < >   < > 19*   BUN mg/dL  --  87* 87* 90* 93* 90* 95*  --  94*   < >  --   --  91*   < > 88*  --    < >   < > 101*   CREATININE mg/dL  --  4 26* 4 17* 4 20* 4 06* 4 06* 3 99*  --  3 72*   < >  --   --  3 48*   < > 2 82*  --    < >   < > 3 89*   CALCIUM mg/dL  --  8 3 8 4 8 3 8 2* 8 1* 8 3  --  8 5   < >  --   --  8 2*   < > 8 5  --    < >   < > 8 7   MAGNESIUM mg/dL  --  1 9 2 0 2 2 2 1 2 0 2 3  --  1 8   < >  --   --  2 0   < > 2 2  --    < >   < >  --    PHOSPHORUS mg/dL  --  4 4* 4 3* 4 7* 4 7* 4 2* 3 5  --  3 4   < >  --   --  3 6   < > 2 6  --    < >   < >  --     < > = values in this interval not displayed

## 2021-12-21 NOTE — ASSESSMENT & PLAN NOTE
· Acute blood loss anemia  · Hemoglobin noted on admission 6 8, acute blood loss anemia  · Reports had hematuria with clots last week and she  · 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  · Patient stopped taking warfarin on Monday prior to admission  · On admission INR 7 46  · Patient is now status post 7 units PRBCs this admission  · Status post vitamin K /DDAVP  · Given a DDAVP for uremic bleeding  · Imaging this admission showing some subcapsular right renal hematoma with suprarenal hematoma  · Repeat CT A/P 12/20 - Stable Rt subcapsular right renal hematoma with suprarenal hematoma  · Continue to trend hemoglobin  · Transfuse if hemoglobin less than 7

## 2021-12-21 NOTE — PROGRESS NOTES
[facility]  Progress Note - Mera Penaloza 1955, 78 y o  male MRN: [id number]  Unit/Bed#: ICU 04 Encounter: [id number]  Primary Care Provider: Lorna Min MD   Date and time admitted to hospital: 12/17/2021  3:03 PM    Right renal subcapsular hematoma  Assessment & Plan  · New onset right lower quadrant abdominal pain  · Last bowel movement over the last 24 hours  · 12/18 Lactic acid 3 1  which cleared  · Patient has been having down trending hemoglobin this admission requiring 3 units PRBCs this admission, last on this morning  · Continue Protonix IV b i d   · INR elevated to 7 45 on admission; given 5 mg of vitamin K  Currently 1 45 today   · Scan abdomen pelvis is admission showing subscapular right renal hematoma with suprarenal extension  · 12/18 went for emergent embolization with IR:  There is no evidence of acute bleeding and no intervention was done  · 12/18 CTA abdomen pelvis with showing stable size of the right subscapular hematoma and right suprarenal hematoma  No evidence of active arterial or venous extravasation  · 12/20 - CT abdomen reveals stable right subcapsular renal hematoma  · Improving liver function test     · Acute hepatitis negative  · Continue serial abdominal exams  · Continue to trend hemoglobins  · Continue to hold home Coumadin    Transaminitis  Assessment & Plan  Improving - AST/ALT -  467/158    AST/ALT = 4700/797 > 888/285  RUQ ultrasound liver Doppler revealed no obvious abnormality  Hepatitis panel and CPK levels normal     Suspect secondary to transient injury  Will monitor    Right Liver mass  Assessment & Plan  Possible infiltrating neoplasm in the right lobe of the liver seen on CT scan       Recommend dedicated triphasic CT scan of the liver for better characterization when the patient is more stable      Staphylococcus aureus bacteremia  Assessment & Plan  · Patient is meeting sepsis criteria on admission with tachypnea are 30, WBC of 13  · 2/2 blood cultures positive for Staphylococcus aureus  · Source is most likely cellulitis  · Chest x-rays negative  · Patient received 3 L of IV fluids per DKA protocol upon admission  ·   · Antibiotics   · Cefazolin 1gm Q12 -  day 5  · MRSA culture negative  · Repeat blood cultures - negative to date  · Wound culture growing MSSA  Pansensitive  · Procalcitonin 1 06  · Trend fever curve and white blood cell count  · ID consulted  Anemia  Assessment & Plan  · Acute blood loss anemia  · Hemoglobin noted on admission 6 8, acute blood loss anemia  · Reports had hematuria with clots last week and she  · 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  · Patient stopped taking warfarin on Monday prior to admission  · On admission INR 7 46  · Patient is now status post 7 units PRBCs this admission  · Status post vitamin K /DDAVP  · Given a DDAVP for uremic bleeding  · Imaging this admission showing some subcapsular right renal hematoma with suprarenal hematoma  · Repeat CT A/P 12/20 - Stable Rt subcapsular right renal hematoma with suprarenal hematoma  · Continue to trend hemoglobin  · Transfuse if hemoglobin less than 7        Supratherapeutic INR  Assessment & Plan  · Now resolved  · Noted with INR 7 46 on admission  · Patient reports had blood in his urine last week  · He discontinued taking warfarin on Monday prior to admission  · Hemoglobin on admission 6 8   · Patient is now status post 7 units PRBCs this admission via EMR record  Will need to clarify with lab    · given 5 of vitamin K and 1 dose of DDAVP to reverse uremic bleeding  · Will repeat PT/INR    Acute kidney injury superimposed on chronic kidney disease [facility])  Assessment & Plan  Lab Results   Component Value Date    EGFR 12 12/21/2021    EGFR 13 12/20/2021    EGFR 13 12/19/2021    CREATININE 4 52 (H) 12/21/2021    CREATININE 4 36 (H) 12/20/2021    CREATININE 4 26 (H) 12/19/2021     Results from last 7 days   Lab Units 12/21/21  0404 12/20/21  1028 12/19/21  2328 12/19/21  0845 12/19/21  0425   SODIUM mmol/L 144 145 144   < > 145   POTASSIUM mmol/L 4 6 4 6 4 6   < > 4 1   CHLORIDE mmol/L 111* 111* 111*   < > 113*   CO2 mmol/L 20* 18* 19*   < > 21   ANION GAP mmol/L 13 16* 14*   < > 11   BUN mg/dL 87* 87* 87*   < > 95*   CREATININE mg/dL 4 52* 4 36* 4 26*   < > 3 99*   CALCIUM mg/dL 8 1* 8 3 8 3   < > 8 3   ALK PHOS U/L 82 79  --   --  42*   ALT U/L 158* 285*  --   --  797*   AST U/L 467* 888*  --   --  4,787*    < > = values in this interval not displayed  · Baseline creatinine 1 75 -2,   · Creatinine creeping up - creatinine today 4 5  · BUN also elevated at 87   · Reduced urine output observed = net +8 5 L  · Nephrology consulted, if urine output drops less than 30 cc an hour try 80 mg IV Lasix  · Trend BUN/Cr  · Avoid hypotension  · Consider trial of lasix    Cellulitis of left lower extremity  Assessment & Plan  Left lower extremity cellulitis  Initially in vancomycin and cefazolin  Blood cultures positive for Staph  Aureus  MRSA negative  Vancomycin now discontinued  Continue renal adjusted cefazolin  No personal history of MRSA  · Continue cefazolin 1g Q12  Renally dosed  · Follow-up admission blood cultures and repeat blood cultures today    CVA (cerebral vascular accident) [facility])  Assessment & Plan  History of thrombotic CVA  Left facial droop no other residual neurological deficits  Patient is on aspirin, Coumadin which is currently held due to Rt renal bleed  -- restart medication when appropriate    Hyperlipidemia  Assessment & Plan  · NPO for now,   · hold home fenofibrate and statin the setting of transaminitis    Hypertension  Assessment & Plan  · BP acceptable  · On  Amlodipine 10 mg  · Off Cardene drip > 18 hours  · Concern for Page kidney in the setting of Rt renal subcapsular hematoma  · Nephrology on board  Appreciate recs    · Maintain with holding parameters    MULUGETA on CKD (chronic kidney disease) stage 3, GFR 30-59 ml/min  Assessment & Plan  Lab Results   Component Value Date    EGFR 12 12/21/2021    EGFR 13 12/20/2021    EGFR 13 12/19/2021    CREATININE 4 52 (H) 12/21/2021    CREATININE 4 36 (H) 12/20/2021    CREATININE 4 26 (H) 12/19/2021       Baseline creatinine of 1 5 - 1 9  Now 4 36  Estimated GFR of 13  Acidotic  Suspect renal etiology  Hyperphosphatemic  Will benefit from phosphate binders  Fortunately, not hyperkalemic  Follows with Nephrology as outpatient  Monitor renal functions closely  Follow-up lactate  Nephrology consulted  Appreciate recommendation  Type 2 diabetes mellitus, with long-term current use of insulin ([facility])  Assessment & Plan  Lab Results   Component Value Date    HGBA1C 12 2 (H) 12/18/2021     (P) 834 [id number]     · Initially treated as DKA upon admission however BHB negative  · Initially started on DKA insulin drip now transitioned to regular insulin drip  · Over last 24 hours patient used to 150 units of insulin  · Consider possible transition to subcut insulin in the next 24 hours  · Maintain blood glucose 140-180  · Consider resumption of diet and basal/ meal time insulin doses with initiation of diet      * High anion gap metabolic acidosis  Assessment & Plan  Lab Results   Component Value Date    HGBA1C 12 2 (H) 12/18/2021       Recent Labs     12/20/21  2349 12/21/21  0149 12/21/21  0403 12/21/21  0628   POCGLU 118 137 137 144*     · Now resolved  · Patient was known history of DM type 2 insulin dependent  · He came in with blood glucose of 760  · VBG with pH of 7 245, pCO2 of 41, HC03 17 5, consistent with metabolic acidosis   · Patient was initially treated with DKA protocol however BHB negative and does not clinically appear to be in DKA  · Suspect secondary to uremia  · Nephrology consult pending    --     Blood Sugar Average: Last 72 hrs:  (P) 725 [id number]      ----------------------------------------------------------------------------------------  HPI/24hr events:  Episodes of hypertension with SBP > 180  overnight requiring IV hydralazine    Patient appropriate for transfer out of the ICU today?: No  Disposition: Continue Critical Care   Code Status: Level 1 - Full Code  ---------------------------------------------------------------------------------------  SUBJECTIVE  Patient seen and examined at bedside  Blood pressure spike noted overnight requiring IV hydralazine  He still reports right flank pain notable in the posterior area  Denies fever or chills  Reports no recent BM and he remains NPO  He received 1 unit PRBC yesternight which was well tolerated  Review of Systems   Constitutional: Negative for chills and fever  Cardiovascular: Negative  Genitourinary: Positive for flank pain (right )  Review of systems was reviewed and negative unless stated above in HPI/24-hour events   ---------------------------------------------------------------------------------------  OBJECTIVE    Vitals   Vitals:    21 0042 21 0133 21 0300 21 0800   BP: (!) 173/76 (!) 189/79 158/70 139/65   BP Location: Right arm   Right arm   Pulse: 90  78 80   Resp:   22 20   Temp: 99 5 °F (37 5 °C)  99 9 °F (37 7 °C) 99 1 °F (37 3 °C)   TempSrc: Probe  Bladder Oral   SpO2:   93%    Weight:       Height:         Temp (24hrs), Av °F (37 8 °C), Min:99 1 °F (37 3 °C), Max:100 6 °F (38 1 °C)  Current: Temperature: 99 1 °F (37 3 °C)          Respiratory:  SpO2: SpO2: 93 %, SpO2 Activity: SpO2 Activity: At Rest, SpO2 Device: O2 Device: None (Room air), Capnography:         Invasive/non-invasive ventilation settings   Respiratory  Report   Lab Data (Last 4 hours)    None         O2/Vent Data (Last 4 hours)    None                Physical Exam  Vitals reviewed  Constitutional:       General: He is not in acute distress  Appearance: He is not toxic-appearing  HENT:      Head: Normocephalic and atraumatic  Cardiovascular:      Rate and Rhythm: Normal rate and regular rhythm  Pulmonary:      Effort: No respiratory distress  Breath sounds: Normal breath sounds  Abdominal:      General: Bowel sounds are normal  There is no distension  Tenderness: There is no abdominal tenderness  There is right CVA tenderness  Musculoskeletal:      Right lower leg: Edema present  Left lower leg: Edema (husky beefy colored  ) present  Skin:     Coloration: Skin is not jaundiced  Findings: No bruising  Neurological:      Mental Status: He is oriented to person, place, and time  Mental status is at baseline  Laboratory and Diagnostics:  Results from last 7 days   Lab Units 12/21/21  0404 12/20/21  2209 12/20/21  1431 12/20/21  1028 12/20/21  0444 12/19/21  2328 12/19/21  2119 12/19/21  0845 12/19/21  0425 12/18/21  2129 12/18/21  1524 12/18/21  1256 12/18/21  1256 12/18/21  0449 12/18/21  0449 12/18/21  0011 12/18/21  0011 12/17/21  1613 12/17/21  1613   WBC Thousand/uL  --   --   --  10 75* 10 10  --   --   --  8 72  --  15 91*  --  14 18*  --  11 38*  --  10 44*   < > 13 35*   HEMOGLOBIN g/dL 7 4* 7 5* 6 9* 7 1* 7 3* 7 2* 7 6*   < > 6 8*   < > 5 2*   < > 5 7*   < > 7 0*   < > 6 8*   < > 6 8*   HEMATOCRIT %  --   --   --  22 3* 22 6*  --   --   --  20 1*  --  16 7*  --  18 0*  --  22 3*  --  21 8*   < > 22 2*   PLATELETS Thousands/uL  --   --   --  292 287  --   --   --  268  --  411*  --  404*  --  458*  --  433*   < > 493*   BANDS PCT %  --   --   --  8  --   --   --   --   --   --   --   --   --   --  1  --   --   --  6   MONO PCT %  --   --   --  4  --   --   --   --  7  --   --   --   --   --  6  --   --   --  5    < > = values in this interval not displayed       Results from last 7 days   Lab Units 12/21/21  0404 12/20/21  1028 12/19/21  2328 12/19/21  2119 12/19/21  1525 12/19/21  1222 12/19/21  0845 12/19/21  0425 12/19/21  0425 12/18/21 2128 12/18/21  1525 12/18/21  0856 12/18/21  0449 12/17/21 2006 12/17/21  1613   SODIUM mmol/L 144 145 144 144 144 143 143   < > 145   < > 142   < > 144   < > 125*   POTASSIUM mmol/L 4 6 4 6 4 6 4 5 4 6 4 5 4 5   < > 4 1   < > 4 8   < > 4 2   < > 6 5*   CHLORIDE mmol/L 111* 111* 111* 112* 111* 111* 113*   < > 113*   < > 110*   < > 112*   < > 93*   CO2 mmol/L 20* 18* 19* 20* 19* 20* 19*   < > 21   < > 20*   < > 19*   < > 19*   ANION GAP mmol/L 13 16* 14* 12 14* 12 11   < > 11   < > 12   < > 13   < > 13   BUN mg/dL 87* 87* 87* 87* 90* 93* 90*   < > 95*   < > 93*   < > 88*   < > 101*   CREATININE mg/dL 4 52* 4 36* 4 26* 4 17* 4 20* 4 06* 4 06*   < > 3 99*   < > 3 60*   < > 2 82*   < > 3 89*   CALCIUM mg/dL 8 1* 8 3 8 3 8 4 8 3 8 2* 8 1*   < > 8 3   < > 8 1*   < > 8 5   < > 8 7   GLUCOSE RANDOM mg/dL 126 117 133 123 134 140 107   < > 114   < > 289*   < > 126   < > 793*   ALT U/L 158* 285*  --   --   --   --   --   --  797*  --  69  --  7*  --  10*   AST U/L 467* 888*  --   --   --   --   --   --  4,787*  --  185*  --  14  --  11   ALK PHOS U/L 82 79  --   --   --   --   --   --  42*  --  30*  --  36*  --  41*   ALBUMIN g/dL 1 3* 1 4*  --   --   --   --   --   --  1 7*  --  1 3*  --  1 7*  --  2 1*   TOTAL BILIRUBIN mg/dL 0 23 0 25  --   --   --   --   --   --  0 27  --  0 12*  --  0 12*  --  0 14*    < > = values in this interval not displayed       Results from last 7 days   Lab Units 12/21/21  0404 12/19/21  2328 12/19/21 2119 12/19/21  1525 12/19/21  1222 12/19/21  0845 12/19/21  0425   MAGNESIUM mg/dL 2 0 1 9 2 0 2 2 2 1 2 0 2 3   PHOSPHORUS mg/dL 4 1 4 4* 4 3* 4 7* 4 7* 4 2* 3 5      Results from last 7 days   Lab Units 12/20/21  0444 12/19/21  0425 12/18/21 2128 12/18/21  0452 12/17/21  1813   INR  1 43* 1 45* 1 38* 7 44* 7 59*   PTT seconds  --   --   --   --  133*          Results from last 7 days   Lab Units 12/20/21  1431 12/18/21 2129 12/18/21  1525 12/17/21  1613 LACTIC ACID mmol/L 0 6 1 7 3 1* 2 0     ABG:    VBG:  Results from last 7 days   Lab Units 12/19/21  0020   PH DHARMESH  7 338   PCO2 DHARMESH mm Hg 34 7*   PO2 DHARMESH mm Hg 38 5   HCO3 DHARMESH mmol/L 18 2*   BASE EXC DHARMESH mmol/L -6 9     Results from last 7 days   Lab Units 12/17/21  2211   PROCALCITONIN ng/ml 1 06*       Micro  Results from last 7 days   Lab Units 12/19/21  1003 12/19/21  0846 12/18/21  0901 12/18/21  0453 12/17/21  1708 12/17/21  1613   BLOOD CULTURE  No Growth at 24 hrs  No Growth at 24 hrs   --   --  Staphylococcus aureus* Staphylococcus aureus*   GRAM STAIN RESULT   --   --  No Polys or Bacteria seen  --  Gram positive cocci in clusters* Gram positive cocci in clusters*   WOUND CULTURE   --   --  4+ Growth of Staphylococcus aureus*  --   --   --    MRSA CULTURE ONLY   --   --   --  No Methicillin Resistant Staphlyococcus aureus (MRSA) isolated  --   --        EKG:   Imaging: I have personally reviewed pertinent reports  and I have personally reviewed pertinent films in PACS    Intake and Output  I/O       12/18 0701  12/19 0700 12/19 0701  12/20 0700 12/20 0701  12/21 0700    P  O   90     I V  (mL/kg) 3078 8 (29) 2235 3 (21 1)     Blood 700 350     IV Piggyback  350     Total Intake(mL/kg) 3778 8 (35 6) 3025 3 (28 5)     Urine (mL/kg/hr) 945 (0 4) 2195 (0 9) 1050 (2 2)    Stool 0      Total Output 945 2195 1050    Net +2833 8 +830 3 -1050           Unmeasured Stool Occurrence 1 x            Height and Weights   Height: 5' 7" (170 2 cm)  IBW (Ideal Body Weight): 66 1 kg  Body mass index is 36 67 kg/m²    Weight (last 2 days)     Date/Time Weight    12/19/21 0549 106 (234 13)            Nutrition       Diet Orders   (From admission, onward)             Start     Ordered    12/17/21 2205  Room Service  Once        Question:  Type of Service  Answer:  Room Service - Appropriate with Assistance    12/17/21 2204 12/17/21 2021  Diet NPO  Diet effective now        References:    Nutrtion Support Algorithm Enteral vs  Parenteral   Question Answer Comment   Diet Type NPO    RD to adjust diet per protocol?  Yes        12/17/21 2020                  Active Medications  Scheduled Meds:  Current Facility-Administered Medications   Medication Dose Route Frequency Provider Last Rate   • acetaminophen  650 mg Oral Q4H PRN Juan Phipps PA-C     • amLODIPine  10 mg Oral Daily NELLY CARABALLO     • cefazolin  1,000 mg Intravenous Q12H Juan Phipps PA-C Stopped (12/21/21 0400)   • HYDROmorphone  0 5 mg Intravenous Q3H PRN Juan Phipps PA-C     • insulin regular (HumuLIN R,NovoLIN R) infusion  0 3-21 Units/hr Intravenous Titrated Juan hPipps PA-C 0 5 Units/hr (12/21/21 0831)   • metoprolol tartrate  50 mg Oral Q12H [facility] Juan Phipps PA-C     • multi-electrolyte  100 mL/hr Intravenous Continuous Natacha Kunz  mL/hr (12/21/21 0727)   • ondansetron  4 mg Intravenous Q6H PRN Juan Phipps PA-C     • pantoprazole  40 mg Intravenous Q12H [facility]       Continuous Infusions:  insulin regular (HumuLIN R,NovoLIN R) infusion, 0 3-21 Units/hr, Last Rate: 0 5 Units/hr (12/21/21 0831)  multi-electrolyte, 100 mL/hr, Last Rate: 100 mL/hr (12/21/21 0727)      PRN Meds:   acetaminophen, 650 mg, Q4H PRN  HYDROmorphone, 0 5 mg, Q3H PRN  ondansetron, 4 mg, Q6H PRN        Invasive Devices Review  Invasive Devices  Report    Central Venous Catheter Line            CVC Central Lines 12/18/21 Triple Right Internal jugular 2 days          Peripheral Intravenous Line            Peripheral IV 12/17/21 Right Hand 3 days    Peripheral IV 12/18/21 Right;Upper;Ventral (anterior) Arm 3 days          Drain            Urethral Catheter Temperature probe 16 Fr  2 days                Rationale for remaining devices:   ---------------------------------------------------------------------------------------  Advance Directive and Living Will:      Power of :    POLST: ---------------------------------------------------------------------------------------  Care Time Delivered:   No Critical Care time spent       Seda Chin MD      Portions of the record may have been created with voice recognition software  Occasional wrong word or "sound a like" substitutions may have occurred due to the inherent limitations of voice recognition software    Read the chart carefully and recognize, using context, where substitutions have occurred

## 2021-12-21 NOTE — ASSESSMENT & PLAN NOTE
Lab Results   Component Value Date    HGBA1C 12 2 (H) 12/18/2021     (P) 879 [id number]     · Initially treated as DKA upon admission however BHB negative  · Initially started on DKA insulin drip now transitioned to regular insulin drip  · Over last 24 hours patient used to 150 units of insulin  · Consider possible transition to subcut insulin in the next 24 hours  · Maintain blood glucose 140-180  · Consider resumption of diet and basal/ meal time insulin doses with initiation of diet

## 2021-12-21 NOTE — ASSESSMENT & PLAN NOTE
Improving - AST/ALT -  467/158    AST/ALT worsened to 4700/797 > 888/285  RUQ ultrasound liver Doppler revealed no obvious abnormality  Hepatitis panel and CPK levels normal     Suspect secondary to transient injury    Will monitor

## 2021-12-21 NOTE — ASSESSMENT & PLAN NOTE
· New onset right lower quadrant abdominal pain  · Last bowel movement over the last 24 hours  · 12/18 Lactic acid 3 1  which cleared  · Patient has been having down trending hemoglobin this admission requiring 3 units PRBCs this admission, last on this morning  · Continue Protonix IV b i d   · INR elevated to 7 45 on admission; given 5 mg of vitamin K  Currently 1 45 today   · Scan abdomen pelvis is admission showing subscapular right renal hematoma with suprarenal extension  · 12/18 went for emergent embolization with IR:  There is no evidence of acute bleeding and no intervention was done  · 12/18 CTA abdomen pelvis with showing stable size of the right subscapular hematoma and right suprarenal hematoma  No evidence of active arterial or venous extravasation  · 12/20 - CT abdomen reveals stable right subcapsular renal hematoma  · Improving liver function test     · Acute hepatitis negative    · Continue serial abdominal exams  · Continue to trend hemoglobins  · Continue to hold home Coumadin

## 2021-12-21 NOTE — ASSESSMENT & PLAN NOTE
Lab Results   Component Value Date    EGFR 12 12/21/2021    EGFR 13 12/20/2021    EGFR 13 12/19/2021    CREATININE 4 52 (H) 12/21/2021    CREATININE 4 36 (H) 12/20/2021    CREATININE 4 26 (H) 12/19/2021     Results from last 7 days   Lab Units 12/21/21  0404 12/20/21  1028 12/19/21  2328 12/19/21  0845 12/19/21  0425   SODIUM mmol/L 144 145 144   < > 145   POTASSIUM mmol/L 4 6 4 6 4 6   < > 4 1   CHLORIDE mmol/L 111* 111* 111*   < > 113*   CO2 mmol/L 20* 18* 19*   < > 21   ANION GAP mmol/L 13 16* 14*   < > 11   BUN mg/dL 87* 87* 87*   < > 95*   CREATININE mg/dL 4 52* 4 36* 4 26*   < > 3 99*   CALCIUM mg/dL 8 1* 8 3 8 3   < > 8 3   ALK PHOS U/L 82 79  --   --  42*   ALT U/L 158* 285*  --   --  797*   AST U/L 467* 888*  --   --  4,787*    < > = values in this interval not displayed  · Baseline creatinine 1 75 -2,   · Creatinine creeping up - creatinine today 4 5  · BUN also elevated at 87   · Reduced urine output observed = net +8 5 L  · Nephrology consulted, if urine output drops less than 30 cc an hour try 80 mg IV Lasix  · Trend BUN/Cr  · Avoid hypotension    · Consider trial of lasix

## 2021-12-21 NOTE — PROGRESS NOTES
Progress Note - Infectious Disease   Paulo Marek 78 y o  male MRN: [id number]  Unit/Bed#: S -01 Encounter: [id number]    Impression/Recommendations:  1  Sepsis, POA:  With tachypnea, leukocytosis, elevated lactate, MULUGETA on CKD 3  In the setting of Staphylococcus aureus bacteremia  Low-grade fevers are noted  WBC remains elevated  Lactate has normalized (3 1-> 1 7)  ? Antibiotic therapy as below  ? Supportive care per primary team  ? CBC in am   ? Monitor clinical response, temperature curve  2  Staphylococcus aureus bacteremia  LLE wound cx also with growth of Staph aureus, MSSA  Consider possibility of seeding of renal subcapsular hematoma  ESR of 95 is significantly elevated  ? Follow 12/19 repeat blood cultures to assess for clearance of bacteremia  ? 2D echo to assess for valve vegetation  ? Continue cefazolin 1g IV q12 hours  ? Anticipate 4 week duration of IV antibiotic therapy from time of negative cultures  ? Suggest tunneled PICC line insertion tomorrow if repeat blood cultures are negative at 72 hours  3  LLE cellulitis: possible SSTI source of bacteremia  Cellulitis is improving while on cefazolin IV  ? Serial leg exams  ? Left lower extremity elevation  ? Continue cefazolin IV  ? Monitor clinical response  4  Acute on chronic kidney disease stage 3:   ? Renal dose medications  ? Avoid nephrotoxins  ? Repeat creatinine in a m   5  Diabetes mellitus type 2, insulin dependent:  Initially treated as DKA upon hospital admission  Hyperglycemia is a risk factor for infection and delayed wound healing  ? Glycemic control as per primary service  6  Renal subcapsular hematoma:  With acute blood loss anemia in the setting of supratherapeutic INR  Status post IR angiography with no evidence of extravasation  ? Hemoglobin monitoring per primary service   ? Repeat CBC   7  Transaminitis:  LFTs are downtrending  CT scan raises concern for infiltrating neoplasm in right hepatic lobe  ?  Repeat LFTs in a m   ? Triple phase CT considered when patient is more hemodynamically stable and with improved renal function  8  History of penicillin allergy:  Hives age 14  Patient is currently tolerating cefazolin IV      My recommendations were discussed with the patient in detail who verbalized understanding  The ID service will follow  Antibiotics: cefazolin since 12/17  Scheduled Meds:  Current Facility-Administered Medications   Medication Dose Route Frequency Provider Last Rate   • [MAR Hold] acetaminophen  650 mg Oral Q4H PRN Nicolas Roche PA-C     • [facility] Hold] amLODIPine  10 mg Oral Daily NELLY Guzman     • [facility] Hold] cefazolin  1,000 mg Intravenous Q12H Nicolas Roche PA-C 1,000 mg (12/21/21 1102)   • [MAR Hold] HYDROmorphone  0 5 mg Intravenous Q3H PRN Nicolas Roche PA-C     • [facility] Hold] insulin glargine  45 Units Subcutaneous HS Doron Carvajal MD     • [MAR Hold] insulin lispro  15 Units Subcutaneous TID With Meals Doron Carvajal MD     • [facility] Hold] insulin lispro  2-12 Units Subcutaneous TID AC Doron Carvajal MD     • insulin regular (HumuLIN R,NovoLIN R) infusion  0 3-21 Units/hr Intravenous Titrated Doron Carvajal MD 7 Units/hr (12/21/21 1619)   • [MAR Hold] metoprolol tartrate  50 mg Oral Q12H [address] Nicolas Roche PA-C     • [facility] Hold] ondansetron  4 mg Intravenous Q6H PRN Nicolas Roche PA-C     • [facility] Hold] pantoprazole  40 mg Intravenous Q12H [address] HUGO MCNEILL       Continuous Infusions:insulin regular (HumuLIN R,NovoLIN R) infusion, 0 3-21 Units/hr, Last Rate: 7 Units/hr (12/21/21 1619)      PRN Meds: •  [MAR Hold] acetaminophen  •  [MAR Hold] HYDROmorphone  •  [MAR Hold] ondansetron    Subjective:  Patient reports left leg pain  He denies fever, chills, vomiting, diarrhea, rash  He is tolerating current antibiotic therapy      Objective:  Vitals:  Temp:  [99 1 °F (37 3 °C)-100 2 °F (37 9 °C)] 99 2 °F (37 3 °C)  HR:  [73-94] 73  Resp:  [16-31] 20  BP: (139-189)/(63-79) 154/67  SpO2:  [92 %-99 %] 96 %  Temp (24hrs), Av 6 °F (37 6 °C), Min:99 1 °F (37 3 °C), Max:100 2 °F (37 9 °C)  Current: Temperature: 99 2 °F (37 3 °C)  Physical Exam:   General Appearance:  Patient appears stated age, chronically ill and debilitated, lying in bed, no acute distress   ENT: Oropharynx moist, poor dentition   Lungs:   Clear to auscultation bilaterally; respirations unlabored   Heart:  S1, S2, RRR, 2/6 systolic murmur LLSB   Abdomen:   Obese, protuberant with umbilical hernia, soft, non-tender   : Robison catheter present draining urine   Extremities: No distal leg edema b/l   Neurologic: AAO to person, surroundings, conversant, fluent speech   Skin: dry scabs of distal LLE noted  No draining wounds or fluctuance  Labs, Cultures, Imaging, & Other studies:   All pertinent labs, cultures and imaging studies were personally reviewed  Results from last 7 days   Lab Units 21  1404 21  1000 21  0404 21  1431 21  1028 21  0444 21  0444   WBC Thousand/uL 11 21*  --   --   --  10 75*  --  10 10   HEMOGLOBIN g/dL 7 7* 8 6* 7 4*   < > 7 1*   < > 7 3*   PLATELETS Thousands/uL 258  --   --   --  292  --  287    < > = values in this interval not displayed  Results from last 7 days   Lab Units 21  0759 21  0404 21  0404 21  1028 21  1028   SODIUM mmol/L 143  --  144  --  145   POTASSIUM mmol/L 4 3   < > 4 6   < > 4 6   CHLORIDE mmol/L 112*   < > 111*   < > 111*   CO2 mmol/L 20*   < > 20*   < > 18*   BUN mg/dL 80*   < > 87*   < > 87*   CREATININE mg/dL 4 56*   < > 4 52*   < > 4 36*   EGFR ml/min/1 73sq m 12   < > 12   < > 13   CALCIUM mg/dL 8 1*   < > 8 1*   < > 8 3   AST U/L 387*  --  467*  --  888*   ALT U/L 161*   < > 158*   < > 285*   ALK PHOS U/L 82   < > 82   < > 79    < > = values in this interval not displayed       Results from last 7 days   Lab Units 21  1003 21  0846 21  0901 12/18/21  0453 12/17/21  1708 12/17/21  1613   BLOOD CULTURE  No Growth at 48 hrs  No Growth at 48 hrs   --   --  Staphylococcus aureus* Staphylococcus aureus*   GRAM STAIN RESULT   --   --  No Polys or Bacteria seen  --  Gram positive cocci in clusters* Gram positive cocci in clusters*   WOUND CULTURE   --   --  4+ Growth of Staphylococcus aureus*  --   --   --    MRSA CULTURE ONLY   --   --   --  No Methicillin Resistant Staphlyococcus aureus (MRSA) isolated  --   --      Results from last 7 days   Lab Units 12/17/21  2211   PROCALCITONIN ng/ml 1 06*     Results from last 7 days   Lab Units 12/20/21  1028   CRP mg/L 421 8*     12/20 CT abd/pelv: Unchanged right suprarenal and multiple right renal subcapsular hematomas  No new sites of intra-abdominal or intrapelvic hemorrhage

## 2021-12-21 NOTE — ASSESSMENT & PLAN NOTE
Left lower extremity cellulitis  Initially in vancomycin and cefazolin  Blood cultures positive for Staph  Aureus  MRSA negative  Vancomycin now discontinued  Continue renal adjusted cefazolin  No personal history of MRSA  · Continue cefazolin 1g Q12   Renally dosed  · Follow-up admission blood cultures and repeat blood cultures today

## 2021-12-21 NOTE — OCCUPATIONAL THERAPY NOTE
Occupational Therapy Cancellation     Patient Name: Fabi Corea  HDVTI'Q Date: 12/21/2021  Problem List  Principal Problem:    High anion gap metabolic acidosis  Active Problems:    Type 2 diabetes mellitus, with long-term current use of insulin (HCC)    MULUGETA on CKD (chronic kidney disease) stage 3, GFR 30-59 ml/min    Right renal subcapsular hematoma    Hypertension    Hyperlipidemia    CVA (cerebral vascular accident) ([facility] [address] )    Cellulitis of left lower extremity    Acute kidney injury superimposed on chronic kidney disease ([facility] [address] )    Supratherapeutic INR    Anemia    Staphylococcus aureus bacteremia    Right Liver mass    Transaminitis    Past Medical History  Past Medical History:   Diagnosis Date   • Bell's palsy    • Cardiac disease    • Cerebellar stroke ([facility] [address] )    • Diabetes mellitus ([facility] [address] )    • Fracture     L hip   • Hyperlipidemia    • Hypertension    • Renal disorder    • Stroke [facility])     2013   • Stroke [facility])     [id number]     Past Surgical History  Past Surgical History:   Procedure Laterality Date   • CORONARY ANGIOPLASTY WITH STENT PLACEMENT     • FRACTURE SURGERY      L femur   • INCISION AND DRAINAGE OF WOUND Right 9/13/2016    Procedure: INCISION AND DRAINAGE (I&D) EXTREMITY, right lateral ankle;  Surgeon: Cristopher Serrano DPM;  Location: [facility];  Service:    • IR EMBOLIZATION (SPECIFY VESSEL OR SITE)  12/18/2021   • IR TEMPORARY DIALYSIS CATHETER PLACEMENT  12/18/2021   • WOUND DEBRIDEMENT Right 9/15/2016    Procedure: DEBRIDEMENT WOUND [facility] OUT) ankle wound with closure and FRANCY drain placement;  Surgeon: Cristopher Serrano DPM;  Location: [facility];  Service:         12/21/21 1016   OT Last Visit   OT Visit Date 12/21/21  (Tuesday)   Note Type   Note type Evaluation   Additional Comments OT orders received and chart review completed  Per RN appropriate to see pt  Contact made w/ pt  Pt declined participation despite encouragement   Will cancel and continue to follow as appropriate and schedule allows     [facility], OTR/L

## 2021-12-21 NOTE — PHYSICAL THERAPY NOTE
Physical Therapy Cancellation Note       12/21/21 5384   PT Last Visit   PT Visit Date 12/21/21   Note Type   Note Type Cancelled Session   Cancel Reasons Other  (pt declining)   Assessment   Assessment Pt approached this AM, however declining all mobility at this time despite encouragement  Will continue to follow and treat as appropriate        Josy Suarez, PT,DPT

## 2021-12-21 NOTE — ASSESSMENT & PLAN NOTE
· Patient is meeting sepsis criteria on admission with tachypnea are 30, WBC of 13  · 2/2 blood cultures positive for Staphylococcus aureus  · Source is most likely cellulitis  · Chest x-rays negative  · Patient received 3 L of IV fluids per DKA protocol upon admission  ·   · Antibiotics   · Cefazolin 1gm Q12 -  day 5  · MRSA culture negative  · Repeat blood cultures - negative to date  · Wound culture growing MSSA  Pansensitive  · Procalcitonin 1 06  · Trend fever curve and white blood cell count  · ID consulted

## 2021-12-21 NOTE — ASSESSMENT & PLAN NOTE
· BP acceptable  · On  Amlodipine 10 mg  · Off Cardene drip > 18 hours  · Concern for Page kidney in the setting of Rt renal subcapsular hematoma  · Nephrology on board  Appreciate recs    · Maintain with holding parameters

## 2021-12-21 NOTE — ASSESSMENT & PLAN NOTE
Lab Results   Component Value Date    HGBA1C 12 2 (H) 12/18/2021       Recent Labs     12/20/21  2349 12/21/21  0149 12/21/21  0403 12/21/21  0628   POCGLU 118 137 137 144*     · Now resolved  · Patient was known history of DM type 2 insulin dependent  · He came in with blood glucose of 760  · VBG with pH of 7 245, pCO2 of 41, HC03 17 5, consistent with metabolic acidosis   · Patient was initially treated with DKA protocol however BHB negative and does not clinically appear to be in DKA  · Suspect secondary to uremia  · Nephrology consult pending    --     Blood Sugar Average: Last 72 hrs:  (P) 438 [id number]

## 2021-12-21 NOTE — ASSESSMENT & PLAN NOTE
Lab Results   Component Value Date    EGFR 12 12/21/2021    EGFR 13 12/20/2021    EGFR 13 12/19/2021    CREATININE 4 52 (H) 12/21/2021    CREATININE 4 36 (H) 12/20/2021    CREATININE 4 26 (H) 12/19/2021       Baseline creatinine of 1 5 - 1 9  Now 4 36  Estimated GFR of 13  Acidotic  Suspect renal etiology  Hyperphosphatemic  Will benefit from phosphate binders  Fortunately, not hyperkalemic  Follows with Nephrology as outpatient  Monitor renal functions closely  Follow-up lactate  Nephrology consulted  Appreciate recommendation

## 2021-12-21 NOTE — PROGRESS NOTES
Progress Note - Clarissa Ko 78 y o  male MRN: [id number]    Unit/Bed#: ICU 04 Encounter: [id number]      Assessment:  Clarissa Ko is a comorbid, obese 79-year-old male with type 2 diabetes, uncontrolled; admitted with cellulitis and sepsis with DKA  On Coumadin chronically for history of CVA, presented with supratherapeutic INR exceeding 7 and a right subcapsular perinephric bleed  Patient is afebrile, persistent complaints of pain  Repeat CT did not demonstrate bleed progression  Transfuse per ICU team   Robison catheter in-situ  575 milliliters clear yellow urinary output  Followed by multiple consultants including, Infectious Disease, General surgery, and endocrinology  He is status post IR arteriogram--negative for extravasation  Plan:  · Continue medical stabilization  · Monitor H&H, intake and output  · Pain management  · Continue to observe given stable CT   · Hold anticoagulation  Subjective:   + Back/flank  pain    Objective:     Vitals: Blood pressure 154/67, pulse 73, temperature 99 2 °F (37 3 °C), temperature source Oral, resp  rate 20, height 5' 7" (1 702 m), weight 106 kg (234 lb 2 1 oz), SpO2 96 %  ,Body mass index is 36 67 kg/m²        Intake/Output Summary (Last 24 hours) at 12/21/2021 1633  Last data filed at 12/21/2021 1427  Gross per 24 hour   Intake 3175 78 ml   Output 1525 ml   Net 1650 78 ml       Physical Exam: General appearance: alert, appears older than stated age, cooperative, no distress and moderately obese  Head: Normocephalic, without obvious abnormality, atraumatic  Neck: no adenopathy, no carotid bruit, no JVD, supple, symmetrical, trachea midline and thyroid not enlarged, symmetric, no tenderness/mass/nodules  Lungs: diminished breath sounds  Heart: regular rate and rhythm, S1, S2 normal, no murmur, click, rub or gallop  Abdomen: soft, non-tender; bowel sounds normal; no masses,  no organomegaly  Extremities: edema BLE  Pulses: 2+ and symmetric  Neurologic: Mental status: alertness: lethargic  Robison--clear ranjit urine     Invasive Devices  Report    Central Venous Catheter Line            CVC Central Lines 12/18/21 Triple Right Internal jugular 2 days          Peripheral Intravenous Line            Peripheral IV 12/18/21 Right;Upper;Ventral (anterior) Arm 3 days          Drain            Urethral Catheter Temperature probe 16 Fr  2 days              Lab Results   Component Value Date    WBC 11 21 (H) 12/21/2021    HGB 7 7 (L) 12/21/2021    HCT 23 8 (L) 12/21/2021    MCV 92 12/21/2021     12/21/2021     Lab Results   Component Value Date    SODIUM 143 12/21/2021    K 4 3 12/21/2021     (H) 12/21/2021    CO2 20 (L) 12/21/2021    BUN 80 (H) 12/21/2021    CREATININE 4 56 (H) 12/21/2021    GLUC 97 12/21/2021    CALCIUM 8 1 (L) 12/21/2021       Lab, Imaging and other studies: I have personally reviewed pertinent reports

## 2021-12-21 NOTE — ASSESSMENT & PLAN NOTE
· Now resolved  · Noted with INR 7 46 on admission  · Patient reports had blood in his urine last week  · He discontinued taking warfarin on Monday prior to admission  · Hemoglobin on admission 6 8   · Patient is now status post 7 units PRBCs this admission via EMR record  Will need to clarify with lab    · given 5 of vitamin K and 1 dose of DDAVP to reverse uremic bleeding  · Will repeat PT/INR

## 2021-12-21 NOTE — PLAN OF CARE
Problem: MOBILITY - ADULT  Goal: Maintain or return to baseline ADL function  Description: INTERVENTIONS:  -  Assess patient's ability to carry out ADLs; assess patient's baseline for ADL function and identify physical deficits which impact ability to perform ADLs (bathing, care of mouth/teeth, toileting, grooming, dressing, etc )  - Assess/evaluate cause of self-care deficits   - Assess range of motion  - Assess patient's mobility; develop plan if impaired  - Assess patient's need for assistive devices and provide as appropriate  - Encourage maximum independence but intervene and supervise when necessary  - Involve family in performance of ADLs  - Assess for home care needs following discharge   - Consider OT consult to assist with ADL evaluation and planning for discharge  - Provide patient education as appropriate  Outcome: Progressing     Problem: MOBILITY - ADULT  Goal: Maintains/Returns to pre admission functional level  Description: INTERVENTIONS:  - Perform BMAT or MOVE assessment daily    - Set and communicate daily mobility goal to care team and patient/family/caregiver     - Record patient progress and toleration of activity level   Outcome: Progressing     Problem: Potential for Falls  Goal: Patient will remain free of falls  Description: INTERVENTIONS:  - Educate patient/family on patient safety including physical limitations  - Instruct patient to call for assistance with activity   - Consult OT/PT to assist with strengthening/mobility   - Keep Call bell within reach  - Keep bed low and locked with side rails adjusted as appropriate  - Keep care items and personal belongings within reach  - Initiate and maintain comfort rounds  - Apply yellow socks and bracelet for high fall risk patients  - Consider moving patient to room near nurses station  Outcome: Progressing     Problem: Prexisting or High Potential for Compromised Skin Integrity  Goal: Skin integrity is maintained or improved  Description: INTERVENTIONS:  - Identify patients at risk for skin breakdown  - Assess and monitor skin integrity  - Assess and monitor nutrition and hydration status  - Monitor labs   - Assess for incontinence   - Turn and reposition patient  - Assist with mobility/ambulation  - Relieve pressure over bony prominences  - Avoid friction and shearing  - Provide appropriate hygiene as needed including keeping skin clean and dry  - Evaluate need for skin moisturizer/barrier cream  - Collaborate with interdisciplinary team   - Patient/family teaching  - Consider wound care consult   Outcome: Progressing

## 2021-12-21 NOTE — PROGRESS NOTES
NEPHROLOGY HOSPITAL PROGRESS NOTE   Kaiden Laboy 78 y o  male MRN: [id number]  Unit/Bed#: ICU 04 Encounter: [id number]  Reason for Consult:  Acute kidney injury    Summary:  42-year-old male with a history of CKD, hypertension, diabetes mellitus, chronic venous stasis, CVA, hyperlipidemia, DVT, CAD admitted on 12/17 with weakness, nausea, vomiting and abdominal pain  Nephrology consulted for management of acute kidney injury on chronic kidney disease  ASSESSMENT and PLAN:  Acute kidney injury (POA):  · Baseline creatinine 1 5-1 9 mg/dL  · 12/17:  Creatinine 3 9 on admission  · Right renal subcapsular hematoma  CT with contrast/IR:  Stable right subcapsular hematoma and suprarenal hematoma possibly infiltrating neoplasm right lobe of the liver  Seen and evaluated by IR did not require embolization  · Initial urinalysis on 12/17 difficult to interpret  Significant hematuria  · Repeat urinalysis 12/20:  2+ protein, 4-10 RBCs, 30-50 WBCs, occasional bacteria, coarse and fine granular casts  Specific gravity greater than 1 030  · Etiology:  Likely ATN in the setting of hypotension, acute blood loss anemia, sepsis with delay in recovery due to contrast and compression of the right kidney due to hematoma  Urinalysis coarse and fine granular casts supporting ATN  · Creatinine slowly increasing currently up to 4 56  Nonoliguric, Good urine output  · On IV fluids until this morning  · Plan:  · No clear indication for dialysis at this time hopefully creatinine will begin to plateau/decline  It was discussed previously with patient (please see Dr Marlane Bamberger note)  He agreed to proceed if needed  · Robison catheter, strict I&O  · Adequate urine output  Will continue to monitor closely  Will consider Lasix if urine output declines  May benefit from a dose of albumin with Lasix due to severe hypoalbuminemia      Chronic kidney disease, stage III:  · Outpatient follow-up recommended    Blood pressure:    · Blood pressure intermittently elevated  Concern for Page kidney, on nicardipine infusion  Acute pain may also be contributing to blood pressure  · Oral medications:  Currently on amlodipine 5 mg daily, metoprolol 50 mg every 12 hours  · Recommend hydralazine as needed for blood pressure spikes  · Avoid over-correction/hypotension    Acute anemia:    · In the setting of retroperitoneal bleed  hematuria  · Question malignancy  · Status post multiple transfusions  last transfusion 12/20  · Per last IR study no active bleeding  · Warfarin on hold  · Status post DDAVP, vitamin K    Acid-base:  · High anion gap metabolic acidosis, improving  · Glucose 760 on admission  · Initially treated with DKA protocol  · Bicarbonate plateauing near 49-93    MBD/Hyperphosphatemia:     · last phosphorus level within normal limits 4 1  · Mild hypercalcemia:  Monitor    Electrolytes:  Potassium acceptable    Bacteremia:  Staph aureus bacteremia  Management per primary team    Diabetes mellitus type 2:  Management per primary team   On insulin infusion  Last A1c 12 2 December 2021     Right liver mass/Abnormal liver function studies:  Improving    Other:  CVA, DVT        SUBJECTIVE / 24H INTERVAL HISTORY:  Complains of right flank pain, right lower abdominal pain    OBJECTIVE:  Current Weight: Weight - Scale: 106 kg (234 lb 2 1 oz)  Vitals:    12/21/21 0042 12/21/21 0133 12/21/21 0300 12/21/21 0800   BP: (!) 173/76 (!) 189/79 158/70 139/65   BP Location: Right arm   Right arm   Pulse: 90  78 80   Resp:   22 20   Temp: 99 5 °F (37 5 °C)  99 9 °F (37 7 °C) 99 1 °F (37 3 °C)   TempSrc: Probe  Bladder Oral   SpO2:   93%    Weight:       Height:           Intake/Output Summary (Last 24 hours) at 12/21/2021 1032  Last data filed at 12/21/2021 0950  Gross per 24 hour   Intake 3125 78 ml   Output 950 ml   Net 2175 78 ml     General: NAD    Acutely ill-appearing  Skin: no rash  Eyes: anicteric sclera  ENT: moist mucous membrane  Neck: supple  Chest:  Anterior chest scattered rales/rhonchi  Mildly Tachypneic  CVS: s1s2, no murmur, no gallop, no rub  Normal rate  Abdomen: soft, nontender, nl sounds, protuberant  Extremities:  Mild edema LE b/l  :  luz  Neuro: AAOX3    Lethargic  Psych:  Flat affect  Medications:    Current Facility-Administered Medications:   •  acetaminophen (TYLENOL) tablet 650 mg, 650 mg, Oral, Q4H PRN, Corrinne Sangeeta, PA-C, 650 mg at 12/20/21 1647  •  amLODIPine (NORVASC) tablet 10 mg, 10 mg, Oral, Daily, Mee Raymundo, CRNP, 10 mg at 12/21/21 5084  •  calcium gluconate 1 g in sodium chloride 0 9% 50 mL (premix), 1 g, Intravenous, Once, Sammy Mitchell PA-C, Last Rate: 100 mL/hr at 12/21/21 1023, 1 g at 12/21/21 1023  •  ceFAZolin (ANCEF) IVPB (premix in dextrose) 1,000 mg 50 mL, 1,000 mg, Intravenous, Q12H, Corrinne Clore, PA-C, Stopped at 12/21/21 0400  •  HYDROmorphone (DILAUDID) injection 0 5 mg, 0 5 mg, Intravenous, Q3H PRN, Corrinne Clore, PA-C, 0 5 mg at 12/21/21 0907  •  insulin regular (HumuLIN R,NovoLIN R) 1 Units/mL in sodium chloride 0 9 % 100 mL infusion, 0 3-21 Units/hr, Intravenous, Titrated, Corrinne Clore, PA-C, Last Rate: 0 5 mL/hr at 12/21/21 1009, 0 5 Units/hr at 12/21/21 1009  •  metoprolol tartrate (LOPRESSOR) tablet 50 mg, 50 mg, Oral, Q12H [facility], Hernanrincorky Rutherford, PA-C, 50 mg at 12/21/21 9580  •  ondansetron [facility]) injection 4 mg, 4 mg, Intravenous, Q6H PRN, Corrinne Clore, PA-C  •  pantoprazole (PROTONIX) injection 40 mg, 40 mg, Intravenous, Q12H [facility], Norine HUGO Rutherford, 40 mg at 12/21/21 7042    Laboratory Results:  Results from last 7 days   Lab Units 12/21/21  1000 12/21/21  0759 12/21/21  0404 12/20/21  2209 12/20/21  1431 12/20/21  1028 12/20/21  0444 12/19/21  2328 12/19/21  2119 12/19/21  2119 12/19/21  1525 12/19/21  1525 12/19/21  1222 12/19/21  1222 12/19/21  0845 12/19/21  0845 12/19/21  0425 12/19/21  0425 12/18/21  1525 12/18/21  1524 12/18/21  1256 12/18/21  1256 12/18/21  0856 12/18/21  0449 12/18/21  0011 12/18/21  0009   WBC Thousand/uL  --   --   --   --   --  10 75* 10 10  --   --   --   --   --   --   --   --   --   --  8 72  --  15 91*  --  14 18*  --  11 38* 10 44*  --    HEMOGLOBIN g/dL 8 6*  --  7 4* 7 5* 6 9* 7 1* 7 3* 7 2*   < > 7 6*   < > 7 8*   < > 7 6*   < > 7 9*   < > 6 8*   < > 5 2*   < > 5 7*   < > 7 0* 6 8*   < >   HEMATOCRIT %  --   --   --   --   --  22 3* 22 6*  --   --   --   --   --   --   --   --   --   --  20 1*  --  16 7*  --  18 0*  --  22 3* 21 8*  --    PLATELETS Thousands/uL  --   --   --   --   --  292 287  --   --   --   --   --   --   --   --   --   --  268  --  411*  --  404*  --  458* 433*  --    POTASSIUM mmol/L  --  4 3 4 6  --   --  4 6  --  4 6  --  4 5  --  4 6  --  4 5   < > 4 5   < > 4 1   < >  --   --  4 6   < > 4 2  --    < >   CHLORIDE mmol/L  --  112* 111*  --   --  111*  --  111*  --  112*  --  111*  --  111*   < > 113*   < > 113*   < >  --   --  112*   < > 112*  --    < >   CO2 mmol/L  --  20* 20*  --   --  18*  --  19*  --  20*  --  19*  --  20*   < > 19*   < > 21   < >  --   --  18*   < > 19*  --    < >   BUN mg/dL  --  80* 87*  --   --  87*  --  87*  --  87*  --  90*  --  93*   < > 90*   < > 95*   < >  --   --  91*   < > 88*  --    < >   CREATININE mg/dL  --  4 56* 4 52*  --   --  4 36*  --  4 26*  --  4 17*  --  4 20*  --  4 06*   < > 4 06*   < > 3 99*   < >  --   --  3 48*   < > 2 82*  --    < >   CALCIUM mg/dL  --  8 1* 8 1*  --   --  8 3  --  8 3  --  8 4  --  8 3  --  8 2*   < > 8 1*   < > 8 3   < >  --   --  8 2*   < > 8 5  --    < >   MAGNESIUM mg/dL  --   --  2 0  --   --   --   --  1 9  --  2 0  --  2 2  --  2 1  --  2 0  --  2 3   < >  --   --  2 0   < > 2 2  --    < >   PHOSPHORUS mg/dL  --   --  4 1  --   --   --   --  4 4*  --  4 3*  --  4 7*  --  4 7*  --  4 2*  --  3 5   < >  --   --  3 6   < > 2 6  --    < >    < > = values in this interval not displayed

## 2021-12-21 NOTE — CONSULTS
Consultation - [facility]    Patient Information: Ricardo Conn 78 y o  male MRN: [id number]  Unit/Bed#: ICU 04 Encounter: [id number]  Admitting Physician: Arianna Garcia MD  PCP: Gabriel Hudson MD  Date of Consultation:  12/21/21    ASSESSMENT:  66yr male with T2DM requiring U500 insulin at home is admitted with renal subcapsular hematoma and staph aureus sepsis  PLAN:    1  Type 2 diabetes  He is uncontrolled with recent A1c 12%  Goal is 8%  Home regimen was Humulin R U500 85u TID but patient was taking a guestimated dose twice daily with a syringe  Presently has hyperglycemia in spite of IV insulin infusion  TDD was 93u  Transition to basal bolus regimen  lantus 45u bedtime and humalog 15u tidac plus correction algorithm 4  Monitor capillary glucose and titrate accordingly  2  Sepsis/ Bacteremia  Primary service and ID following  Will expect hyperglycemia associated with stress  3  Subcapsular hematoma  A1c might be inaccurately high at this time  Total time spent was 50min of which >50% was in coordination of care  Reason for consultation:   Hyperglycemia  History of Present Illness:    Ricardo Conn is a 78 y o  male with type 2 diabetes for 25 yrs, HTN, CAD, CKD3, thrombotic CVA, HLD, L hip fracture, chronic venous stasis and DVT was admitted with staph aureus bacteremia, sepsis, MULUGETA, Transaminitis and Rt renal subcapsular hematoma in context of warfarin use  He has 25 yr hx of type 2 diabetes with insulin resistance requiring  insulin 85u tid  He follows with  endocrinology but last visit was 2018  Review of Systems:    Review of Systems   Constitutional: Positive for activity change, appetite change and fatigue  HENT: Negative  Eyes: Negative  Respiratory: Negative  Cardiovascular: Negative for chest pain  Gastrointestinal: Negative  Endocrine: Negative  Genitourinary: Negative  Musculoskeletal: Negative  Skin: Negative  Allergic/Immunologic: Negative  Neurological: Negative  Hematological: Negative  Psychiatric/Behavioral: Negative  All other systems reviewed and are negative  Past Medical and Surgical History:     Past Medical History:   Diagnosis Date   • Bell's palsy    • Cardiac disease    • Cerebellar stroke ([facility] [address] )    • Diabetes mellitus ([facility] [address] )    • Fracture     L hip   • Hyperlipidemia    • Hypertension    • Renal disorder    • Stroke [facility])     2013   • Stroke [facility])     [id number]       Past Surgical History:   Procedure Laterality Date   • CORONARY ANGIOPLASTY WITH STENT PLACEMENT     • FRACTURE SURGERY      L femur   • INCISION AND DRAINAGE OF WOUND Right 9/13/2016    Procedure: INCISION AND DRAINAGE (I&D) EXTREMITY, right lateral ankle;  Surgeon: Negrita Arnold DPM;  Location: [facility];  Service:    • IR EMBOLIZATION (SPECIFY VESSEL OR SITE)  12/18/2021   • IR TEMPORARY DIALYSIS CATHETER PLACEMENT  12/18/2021   • WOUND DEBRIDEMENT Right 9/15/2016    Procedure: DEBRIDEMENT WOUND ([address]) ankle wound with closure and FRANCY drain placement;  Surgeon: Negrita Arnold DPM;  [facility];  Service:        Meds/Allergies:    PTA meds:   Prior to Admission Medications   Prescriptions Last Dose Informant Patient Reported? Taking?   acetaminophen (TYLENOL) 325 mg tablet   No No   Sig: Take 3 tablets (975 mg total) by mouth every 8 (eight) hours as needed for moderate pain   amLODIPine (NORVASC) 5 mg tablet   No No   Sig: Take 1 tablet (5 mg total) by mouth daily   aspirin 81 mg chewable tablet  Self Yes No   Sig: Chew 81 mg daily     atorvastatin (LIPITOR) 40 mg tablet   No No   Sig: Take 1 tablet (40 mg total) by mouth every evening   cyanocobalamin (VITAMIN B-12) 250 MCG tablet   No No   Sig: Take 4 tablets (1,000 mcg total) by mouth daily   docusate sodium (COLACE) 100 mg capsule   No No   Sig: Take 1 capsule (100 mg total) by mouth 2 (two) times a day as needed for constipation   Patient not taking: Reported on 11/28/2020   ezetimibe (ZETIA) 10 mg tablet   No No   Sig: Take 1 tablet (10 mg total) by mouth daily at bedtime   fenofibrate (TRICOR) 145 mg tablet  Self Yes No   Sig: Take 145 mg by mouth daily  insulin regular (HUMULIN R) 500 units/mL CONCENTRATED injection   No No   Sig: Inject 0 17 mL (85 Units total) under the skin 3 (three) times a day before meals   metoprolol tartrate (LOPRESSOR) 50 mg tablet  Self Yes No   Sig: Take 50 mg by mouth 2 (two) times a day  nystatin (MYCOSTATIN) powder   No No   Sig: Apply topically 2 (two) times a day   pantoprazole (PROTONIX) 40 mg tablet  Self Yes No   Sig: Take 40 mg by mouth daily   warfarin (COUMADIN) 5 mg tablet   No No   Sig: Take 0 5 tablets (2 5 mg total) by mouth daily      Facility-Administered Medications: None       Allergies:    Allergies   Allergen Reactions   • Ace Inhibitors Other (See Comments)   • Ciprofloxacin Other (See Comments)   • Penicillins Hives   • Morphine And Related Anxiety     History:     Marital Status: /Civil Union   Occupation:   Substance Use History:   Social History     Substance and Sexual Activity   Alcohol Use No     Social History     Tobacco Use   Smoking Status Former Smoker   • Packs/day: 0 00   • Types: Cigarettes   • Quit date: 9/3/2006   • Years since quitting: 15 3   Smokeless Tobacco Never Used     Social History     Substance and Sexual Activity   Drug Use No       Family History:    Family History   Problem Relation Age of Onset   • Diabetes Mother    • Stroke Mother        Physical Exam:     Vitals:   Blood Pressure: 154/67 (12/21/21 1124)  Pulse: 73 (12/21/21 1124)  Temperature: 99 2 °F (37 3 °C) (12/21/21 1124)  Temp Source: Oral (12/21/21 1124)  Respirations: 20 (12/21/21 1124)  Height: 5' 7" (170 2 cm) (12/17/21 2156)  Weight - Scale: 106 kg (234 lb 2 1 oz) (12/19/21 0549)  SpO2: 96 % (12/21/21 1124)    Physical Exam      Lab and Imaging Results: I have personally reviewed pertinent films in PACS    Results from last 7 days   Lab Units 12/21/21  1000 12/20/21  1431 12/20/21  1028   WBC Thousand/uL  --   --  10 75*   HEMOGLOBIN g/dL 8 6*   < > 7 1*   HEMATOCRIT %  --   --  22 3*   PLATELETS Thousands/uL  --   --  292   LYMPHO PCT %  --   --  14   MONO PCT %  --   --  4   EOS PCT %  --   --  0    < > = values in this interval not displayed  Results from last 7 days   Lab Units 12/21/21  0759   POTASSIUM mmol/L 4 3   CHLORIDE mmol/L 112*   CO2 mmol/L 20*   BUN mg/dL 80*   CREATININE mg/dL 4 56*   CALCIUM mg/dL 8 1*   ALK PHOS U/L 82   ALT U/L 161*   AST U/L 387*     Results from last 7 days   Lab Units 12/21/21  0759   INR  1 45*     POC Glucose (mg/dl)   Date Value   12/21/2021 149 (H)   12/21/2021 104   12/21/2021 104   12/21/2021 144 (H)   12/21/2021 137   12/21/2021 137   12/20/2021 118   12/20/2021 137   12/20/2021 157 (H)   12/20/2021 146 (H)         ** Please Note: Dragon 360 Dictation voice to text software may have been used in the creation of this document   **

## 2021-12-22 PROBLEM — N18.9 ACUTE KIDNEY INJURY SUPERIMPOSED ON CHRONIC KIDNEY DISEASE (HCC): Status: RESOLVED | Noted: 2021-01-01 | Resolved: 2021-01-01

## 2021-12-22 PROBLEM — R78.81 BACTEREMIA: Status: RESOLVED | Noted: 2021-01-01 | Resolved: 2021-01-01

## 2021-12-22 PROBLEM — B95.61 STAPHYLOCOCCUS AUREUS BACTEREMIA: Status: ACTIVE | Noted: 2020-09-28

## 2021-12-22 PROBLEM — R78.81 STAPHYLOCOCCUS AUREUS BACTEREMIA: Status: ACTIVE | Noted: 2020-09-28

## 2021-12-22 PROBLEM — E87.29 HIGH ANION GAP METABOLIC ACIDOSIS: Status: RESOLVED | Noted: 2021-01-01 | Resolved: 2021-01-01

## 2021-12-22 PROBLEM — E87.2 HIGH ANION GAP METABOLIC ACIDOSIS: Status: RESOLVED | Noted: 2021-01-01 | Resolved: 2021-01-01

## 2021-12-22 PROBLEM — N17.9 ACUTE KIDNEY INJURY SUPERIMPOSED ON CHRONIC KIDNEY DISEASE (HCC): Status: RESOLVED | Noted: 2021-01-01 | Resolved: 2021-01-01

## 2021-12-22 NOTE — QUICK NOTE
Patient's blood sugar on AM labs noted to be 73  Finger stick blood sugar 62 while awake, alert, and oriented  Orange juice and artur crackers provided to patient  Finger stick recheck 141  No further intervention needed  Patient did not eat breakfast so all insulin was held this morning

## 2021-12-22 NOTE — PROGRESS NOTES
[facility] [place] [facility] PROGRESS [facility]   David Massey 78 y o  male MRN: [id number]  Unit/Bed#: S -01 Encounter: [id number]  Reason for Consult: MULUGETA on CKD    ASSESSMENT and PLAN:    78 y o  male with past medical history of CKD, hypertension, diabetes, chronic venous stasis, CVA, hyperlipidemia, DVT, CAD, who was admitted to [address] [place] [address] on 12/17 after presenting with weakness, nausea, vomiting, abdominal pain  A renal consultation is requested today for assistance in the management of acute on chronic kidney injury     1) acute on chronic kidney injury stage III     - creatinine baseline 1 5-1 9 mg/dL  -admission creatinine 3 9 mg/dL on December 17  -creatinine on December 19th is 3 9 mg/dL  -urinalysis difficult to interpret due to hematuria  -repeat urinalysis with 30-50 WBC, 4-10 RBC  - CT scan-with contrast and also IR study for embolization-stable right subcapsular hematoma and super renal hematoma, possible infiltrating neoplasm in the right lobe of the liver   -patient received DDAVP 1 time due to concern for acute bleed     Etiology-likely ATN in the setting of acute bleed, hypotension, question of sepsis, now contrast, compression of right kidney    12/18-patient underwent IR evaluation for bleed for retroperitoneal hematoma  Required multiple transfusions  Acute anemia  Transfer to the ICU  Patient had temporary dialysis catheter placed by IR team    12/19-creatinine stabilizing/slightly higher to 4 3  Monitoring without dialysis  12/22-creatinine appears to be slightly improving 4 3  Bicarbonate slightly low at 19  Corrected calcium borderline elevated      Plan  -monitor blood pressures with amlodipine and metoprolol    Appears to be improving  -BMP in a m   -I/O; avoid nephrotoxic agents  -check ionized calcium in a m   -hemoglobin trend per primary team-would continue to trend given decreasing hemoglobin  -can give Lasix if patient develops oliguria or pulmonary edema  -monitor for hypertension given possibility of PAGE kidney  -  if the patient does require dialysis, will need a formal consent  Patient wanted to wait until this need arose when I discussed with him prior  No urgent indication for dialysis today  -patient has temporary dialysis catheter which will need to be removed if the patient shows signs of renal recovery     2) acute anemia     -setting of retroperitoneal bleed  -setting of potential malignancy? Overall source still unclear  -patient has received multiple units of transfusion  -IR and surgery team on board  -no active bleed noted on IR study     3) CT scan with infiltrating neoplasm of right lobe of liver possible-per primary team    -urology team was also brought on board due to renal cyst   Appear to be cyst in the right kidney  Likely the source of bleed  For now monitoring with q 3 months imaging     4)  sepsis    -ID team on board  -staph bacteremia  -source possibly lower extremity wounds  -ID team is following for clearance of bacteremia  -echocardiogram-  -antibiotics per ID team  --per primary team     5) hyperglycemia-per primary team     6) acid/base-bicarbonate stable     7) transaminitis-in the setting of shock? But also with infiltrative disease noted on CT scan  Per primary team    8) lower extremity wounds-surgery team on board  Monitoring for now  Chronic venous stasis  9) hypertension-there is concern for hypertensive urgency and required nicardipine drip in the ICU  Possible Page kidney related  Amlodipine was increased        SUBJECTIVE / 24H INTERVAL HISTORY:  Blood pressures overall appear to be improving  Urine output 775 recorded yesterday  Patient main complaint is back pain and leg pain today  Denies shortness of breath      OBJECTIVE:  Current Weight: Weight - Scale: 106 kg (234 lb)  Vitals:    12/22/21 0542 12/22/21 0733 12/22/21 1048 12/22/21 1054   BP:  166/74 156/61 137/70   BP Location:       Pulse:   69    Resp: Temp:  98 4 °F (36 9 °C)  99 5 °F (37 5 °C)   TempSrc:       SpO2:       Weight: 106 kg (234 lb 2 1 oz)  106 kg (234 lb)    Height:   5' 7" (1 702 m)        Intake/Output Summary (Last 24 hours) at 12/22/2021 1225  Last data filed at 12/22/2021 1101  Gross per 24 hour   Intake 240 ml   Output 975 ml   Net -735 ml     General: NAD  Skin: no rash  Eyes: anicteric sclera  ENT: moist mucous membrane  Neck: supple  Chest: CTA b/l, no ronchii, no wheeze, no rubs, , diminished intake bases  CVS: s1s2, no murmur, no gallop, no rub  Abdomen: soft, nontender, nl sounds  Extremities:  1+ edema LE b/l, left lower extremity erythema  :  Positive luz  Neuro: AAOX3  Psych: normal affect    Medications:    Current Facility-Administered Medications:   •  acetaminophen (TYLENOL) tablet 650 mg, 650 mg, Oral, Q4H PRN, Naz Hartford, PA-C, 650 mg at 12/20/21 1647  •  amLODIPine (NORVASC) tablet 10 mg, 10 mg, Oral, Daily, Naz Hartford, PA-C, 10 mg at 12/22/21 0929  •  ceFAZolin (ANCEF) IVPB (premix in dextrose) 1,000 mg 50 mL, 1,000 mg, Intravenous, Q12H, Naz Hartford, PA-C, Last Rate: 100 mL/hr at 12/22/21 1012, 1,000 mg at 12/22/21 1012  •  HYDROmorphone (DILAUDID) injection 0 5 mg, 0 5 mg, Intravenous, Q3H PRN, Naz Hartford, PA-C, 0 5 mg at 12/22/21 1220  •  insulin glargine (LANTUS) subcutaneous injection 45 Units 0 45 mL, 45 Units, Subcutaneous, HS, Naz Hartford, PA-C, 45 Units at 12/21/21 2209  •  insulin lispro (HumaLOG) 100 units/mL subcutaneous injection 15 Units, 15 Units, Subcutaneous, TID With Meals, Naz Borrero, PA-C  •  insulin lispro (HumaLOG) 100 units/mL subcutaneous injection 2-12 Units, 2-12 Units, Subcutaneous, TID AC, 2 Units at 12/22/21 1223 **AND** Fingerstick Glucose (POCT), , , TID AC, Naz Borrero PA-C  •  metoprolol tartrate (LOPRESSOR) tablet 50 mg, 50 mg, Oral, Q12H [facility], Piotr Marquez PA-C, 50 mg at 12/22/21 1803  •  ondansetron (ZOFRAN) injection 4 mg, 4 mg, Intravenous, Q6H PRN, Tuan Siegel HUGO Marquez  •  pantoprazole (PROTONIX) injection 40 mg, 40 mg, Intravenous, Q12H [facility], Piotr HUGO Marquez, 40 mg at 12/22/21 0758  •  senna-docusate sodium (SENOKOT S) 8 6-50 mg per tablet 1 tablet, 1 tablet, Oral, HS, Keegan Park MD    Laboratory Results:  Results from last 7 days   Lab Units 12/22/21  1011 12/22/21  0446 12/21/21  2141 12/21/21  1404 12/21/21  1000 12/21/21  0759 12/21/21  0404 12/20/21  2209 12/20/21  1431 12/20/21  1028 12/20/21  0444 12/20/21  0444 12/19/21  2328 12/19/21  2328 12/19/21  2119 12/19/21  2119 12/19/21  1525 12/19/21  1525 12/19/21  1222 12/19/21  1222 12/19/21  0845 12/19/21  0845 12/19/21  0425 12/19/21  0425 12/18/21  1525 12/18/21  1524 12/18/21  1256 12/18/21  1256 12/18/21  0856 12/18/21  0449   WBC Thousand/uL  --   --   --  11 21*  --   --   --   --   --  10 75*  --  10 10  --   --   --   --   --   --   --   --   --   --   --  8 72  --  15 91*  --  14 18*  --  11 38*   HEMOGLOBIN g/dL 7 0* 7 8* 8 0* 7 7* 8 6*  --  7 4* 7 5*   < > 7 1*   < > 7 3*   < > 7 2*   < > 7 6*   < > 7 8*   < > 7 6*   < > 7 9*   < > 6 8*   < > 5 2*   < > 5 7*   < > 7 0*   HEMATOCRIT %  --   --   --  23 8*  --   --   --   --   --  22 3*  --  22 6*  --   --   --   --   --   --   --   --   --   --   --  20 1*  --  16 7*  --  18 0*  --  22 3*   PLATELETS Thousands/uL  --   --   --  258  --   --   --   --   --  292  --  287  --   --   --   --   --   --   --   --   --   --   --  268  --  411*  --  404*  --  458*   POTASSIUM mmol/L  --  4 3  --   --   --  4 3 4 6  --   --  4 6  --   --   --  4 6  --  4 5  --  4 6   < > 4 5   < > 4 5   < > 4 1   < >  --   --  4 6   < > 4 2   CHLORIDE mmol/L  --  109*  --   --   --  112* 111*  --   --  111*  --   --   --  111*  --  112*  --  111*   < > 111*   < > 113*   < > 113*   < >  --   --  112*   < > 112*   CO2 mmol/L  --  19*  --   --   --  20* 20*  --   --  18*  --   --   --  19*  --  20*  --  19*   < > 20*   < > 19*   < > 21   < >  --   --  18*   < > 19* BUN mg/dL  --  82*  --   --   --  80* 87*  --   --  87*  --   --   --  87*  --  87*  --  90*   < > 93*   < > 90*   < > 95*   < >  --   --  91*   < > 88*   CREATININE mg/dL  --  4 39*  --   --   --  4 56* 4 52*  --   --  4 36*  --   --   --  4 26*  --  4 17*  --  4 20*   < > 4 06*   < > 4 06*   < > 3 99*   < >  --   --  3 48*   < > 2 82*   CALCIUM mg/dL  --  8 6  --   --   --  8 1* 8 1*  --   --  8 3  --   --   --  8 3  --  8 4  --  8 3   < > 8 2*   < > 8 1*   < > 8 3   < >  --   --  8 2*   < > 8 5   MAGNESIUM mg/dL  --   --   --   --   --   --  2 0  --   --   --   --   --   --  1 9  --  2 0  --  2 2  --  2 1  --  2 0  --  2 3   < >  --   --  2 0   < > 2 2   PHOSPHORUS mg/dL  --   --   --   --   --   --  4 1  --   --   --   --   --   --  4 4*  --  4 3*  --  4 7*  --  4 7*  --  4 2*  --  3 5   < >  --   --  3 6   < > 2 6    < > = values in this interval not displayed

## 2021-12-22 NOTE — ASSESSMENT & PLAN NOTE
POA AST/ALT elevated to 800/200s  Now improving to 120/90s      Plan  · Continue to monitor for improvement  · Caution with pain control meds (ie tyenlol) in setting of resolving transaminitis

## 2021-12-22 NOTE — PLAN OF CARE
Problem: GASTROINTESTINAL - ADULT  Goal: Maintains or returns to baseline bowel function  Description: INTERVENTIONS:  - Assess bowel function  - Encourage oral fluids to ensure adequate hydration  - Administer IV fluids if ordered to ensure adequate hydration  - Administer ordered medications as needed  - Encourage mobilization and activity  - Consider nutritional services referral to assist patient with adequate nutrition and appropriate food choices  Outcome: Not Progressing     Problem: GENITOURINARY - ADULT  Goal: Urinary catheter remains patent  Description: INTERVENTIONS:  - Assess patency of urinary catheter  - If patient has a chronic luz, consider changing catheter if non-functioning  - Follow guidelines for intermittent irrigation of non-functioning urinary catheter  Outcome: Progressing     Problem: MOBILITY - ADULT  Goal: Maintains/Returns to pre admission functional level  Description: INTERVENTIONS:  - Perform BMAT or MOVE assessment daily    - Set and communicate daily mobility goal to care team and patient/family/caregiver     - Collaborate with rehabilitation services on mobility goals if consulted  - Out of bed for toileting  - Record patient progress and toleration of activity level   Outcome: Not Progressing     Problem: Potential for Falls  Goal: Patient will remain free of falls  Description: INTERVENTIONS:  - Educate patient/family on patient safety including physical limitations  - Instruct patient to call for assistance with activity   - Consult OT/PT to assist with strengthening/mobility   - Keep Call bell within reach  - Keep bed low and locked with side rails adjusted as appropriate  - Keep care items and personal belongings within reach  - Initiate and maintain comfort rounds  - Make Fall Risk Sign visible to staff  - Apply yellow socks and bracelet for high fall risk patients  - Consider moving patient to room near nurses station  Outcome: Progressing

## 2021-12-22 NOTE — PLAN OF CARE
Problem: MOBILITY - ADULT  Goal: Maintain or return to baseline ADL function  Description: INTERVENTIONS:  -  Assess patient's ability to carry out ADLs; assess patient's baseline for ADL function and identify physical deficits which impact ability to perform ADLs (bathing, care of mouth/teeth, toileting, grooming, dressing, etc )  - Assess/evaluate cause of self-care deficits   - Assess range of motion  - Assess patient's mobility; develop plan if impaired  - Assess patient's need for assistive devices and provide as appropriate  - Encourage maximum independence but intervene and supervise when necessary  - Involve family in performance of ADLs  - Assess for home care needs following discharge   - Consider OT consult to assist with ADL evaluation and planning for discharge  - Provide patient education as appropriate  Outcome: Progressing  Goal: Maintains/Returns to pre admission functional level  Description: INTERVENTIONS:  - Perform BMAT or MOVE assessment daily    - Set and communicate daily mobility goal to care team and patient/family/caregiver  - Collaborate with rehabilitation services on mobility goals if consulted  - Perform Range of Motion 3 times a day  - Reposition patient every 2 hours    - Dangle patient 3 times a day  - Stand patient 3 times a day  - Ambulate patient 3 times a day  - Out of bed to chair 3 times a day   - Out of bed for meals 3 times a day  - Out of bed for toileting  - Record patient progress and toleration of activity level   Outcome: Progressing     Problem: Potential for Falls  Goal: Patient will remain free of falls  Description: INTERVENTIONS:  - Educate patient/family on patient safety including physical limitations  - Instruct patient to call for assistance with activity   - Consult OT/PT to assist with strengthening/mobility   - Keep Call bell within reach  - Keep bed low and locked with side rails adjusted as appropriate  - Keep care items and personal belongings within reach  - Initiate and maintain comfort rounds  - Make Fall Risk Sign visible to staff  - Offer Toileting every 2 Hours, in advance of need  - Initiate/Maintain alarm  - Obtain necessary fall risk management equipment:   - Apply yellow socks and bracelet for high fall risk patients  - Consider moving patient to room near nurses station  Outcome: Progressing     Problem: Prexisting or High Potential for Compromised Skin Integrity  Goal: Skin integrity is maintained or improved  Description: INTERVENTIONS:  - Identify patients at risk for skin breakdown  - Assess and monitor skin integrity  - Assess and monitor nutrition and hydration status  - Monitor labs   - Assess for incontinence   - Turn and reposition patient  - Assist with mobility/ambulation  - Relieve pressure over bony prominences  - Avoid friction and shearing  - Provide appropriate hygiene as needed including keeping skin clean and dry  - Evaluate need for skin moisturizer/barrier cream  - Collaborate with interdisciplinary team   - Patient/family teaching  - Consider wound care consult   Outcome: Progressing     Problem: Nutrition/Hydration-ADULT  Goal: Nutrient/Hydration intake appropriate for improving, restoring or maintaining nutritional needs  Description: Monitor and assess patient's nutrition/hydration status for malnutrition  Collaborate with interdisciplinary team and initiate plan and interventions as ordered  Monitor patient's weight and dietary intake as ordered or per policy  Utilize nutrition screening tool and intervene as necessary  Determine patient's food preferences and provide high-protein, high-caloric foods as appropriate       INTERVENTIONS:  - Monitor oral intake, urinary output, labs, and treatment plans  - Assess nutrition and hydration status and recommend course of action  - Evaluate amount of meals eaten  - Assist patient with eating if necessary   - Allow adequate time for meals  - Recommend/ encourage appropriate diets, oral nutritional supplements, and vitamin/mineral supplements  - Order, calculate, and assess calorie counts as needed  - Recommend, monitor, and adjust tube feedings and TPN/PPN based on assessed needs  - Assess need for intravenous fluids  - Provide specific nutrition/hydration education as appropriate  - Include patient/family/caregiver in decisions related to nutrition  Outcome: Progressing

## 2021-12-22 NOTE — ASSESSMENT & PLAN NOTE
Lab Results   Component Value Date    EGFR 12 12/22/2021    EGFR 12 12/21/2021    EGFR 12 12/21/2021    CREATININE 4 39 (H) 12/22/2021    CREATININE 4 56 (H) 12/21/2021    CREATININE 4 52 (H) 12/21/2021    Baseline creatinine of 1 5 - 1 9  Remains elevated consistently >4    Plan    Nephrology consulted recs as below:  · BMP in a m  · Avoid hypotension and nephrotoxins   · Check ionized calcium in a m    · Maintain temporary dialysis catheter as patient may require dialysis in the setting of unresolved MULUGETA

## 2021-12-22 NOTE — ASSESSMENT & PLAN NOTE
BP remains stable ranging 130-150/60-70s    Plan  · On  Amlodipine 10 mg  · Concern for Page kidney in the setting of right renal subcapsular hematoma  · Nephrology on board- recommending lasix if patient becomes oliguric  · Monitor VSS

## 2021-12-22 NOTE — PLAN OF CARE
Problem: PHYSICAL THERAPY ADULT  Goal: Performs mobility at highest level of function for planned discharge setting  See evaluation for individualized goals  Description: Treatment/Interventions: ADL retraining,Functional transfer training,LE strengthening/ROM,Elevations,Therapeutic exercise,Endurance training,Patient/family training,Equipment eval/education,Gait training,Bed mobility,Compensatory technique education,Continued evaluation,Spoke to nursing          See flowsheet documentation for full assessment, interventions and recommendations  Outcome: Not Progressing  Note: Prognosis: Guarded  Problem List: Decreased strength,Decreased range of motion,Impaired balance,Decreased endurance,Decreased mobility,Decreased coordination,Decreased cognition,Decreased safety awareness,Decreased skin integrity,Obesity,Pain  Assessment: pt began tx session lying supine in the bed and was agreeable to participate in PT intervention  pt required encouragement and motivation to agree to participate in PT intervention  pt continues to require max Ax1 for all bed mobility w/ LE managemenet and HHA to pull up into a seated EOB position  pt was able to increase static/dynamic sitting balance by performing TE activities at EOB w/o LOB  pt continues to struggle with performing a STS tranfers from EOB  pt attempted STS twice and was not able to clear buttocks from EOB on both attempts  At this point pt was self limiting and refused to participate in any other PT activites and requested to be placed back in supine  pt would benefit from continued focus on bed mobility with progression of functional transfers  continues to recommend post acute rehab services at the time of D/C in order to maximize pt functional independence and safety w/ all OOB mobility when appropriate   post tx pt in bed w/ call bell and all pt needs met   Barriers to Discharge: Inaccessible home environment,Decreased caregiver support        PT Discharge Recommendation: Post acute rehabilitation services          See flowsheet documentation for full assessment

## 2021-12-22 NOTE — ASSESSMENT & PLAN NOTE
POA Acute blood loss anemia in setting of gross hematuria, Hg on admission was 6 8 s/p 3 units of PRBC   Hg improved after transfusion now downtrending once again without clear source of bleeding    Plan  · Will d/c q6 H/H, repeat CBC in the am  ·  Transfuse for Hg < 7  · Monitor for s/sx of blood loss  · Continue to hold coumadin

## 2021-12-22 NOTE — CASE MANAGEMENT
Case Management Discharge Planning Note    Patient name Alex Gillespie  Location S /S -51 MRN [id number]  : 1955 Date 2021       Current Admission Date: 2021  Current Admission Diagnosis:High anion gap metabolic acidosis   Patient Active Problem List    Diagnosis Date Noted   • Right Liver mass 2021   • Transaminitis 2021   • High anion gap metabolic acidosis    • Acute kidney injury superimposed on chronic kidney disease ([facility] ) 2021   • Supratherapeutic INR 2021   • Anemia 2021   • Staphylococcus aureus bacteremia 2021   • Dizziness 2020   • Class 3 severe obesity in adult [facility]) 2020   • Cellulitis of left lower extremity 2020   • Chronic venous stasis dermatitis of both lower extremities 2018   • Hypertension    • Hyperlipidemia    • CVA (cerebral vascular accident) ([place] [address] )    • History of DVT of lower extremity 10/05/2016   • Hypoalbuminemia 2016   • Open wound of left lower extremity 2016   • Right renal subcapsular hematoma 2016   • History () of right cerebellar CVA 2016   • Coronary artery disease involving native heart 2016   • Diplopia 2016   • Type 2 diabetes mellitus, with long-term current use of insulin ([place] [address] ) 2016   • MULUGETA on CKD (chronic kidney disease) stage 3, GFR 30-59 ml/min 2016   • Morbid obesity with BMI of 40 0-44 9, adult ([facility] ) 2016   • Essential hypertension 2016   • MELINDA (obstructive sleep apnea) 2016   • Cerebrovascular accident (CVA) due to thrombosis ([email or web address] ) 2016      LOS (days): 5  Geometric Mean LOS (GMLOS) (days): 4 80  Days to GMLOS:-0 1     OBJECTIVE:  Risk of Unplanned Readmission Score: 21         Current admission status: Inpatient   Preferred Pharmacy:   [facility]/pharmacy [address] , [place] - [id number] [facility]  [address] [facility]  [address]  Phone: [phone] Fax: [phone]    Primary Care Provider: Hermelindo Bella MD    Primary Insurance: [facility]  Secondary Insurance: [place] [employer]    DISCHARGE DETAILS:    Discharge planning discussed with[de-identified] Patient and wife Ragini Miller of Choice: Yes  Comments - Freedom of Choice: Agreeable to a blanket referral to STR facilities in the area  CM contacted family/caregiver?: Yes  Were Treatment Team discharge recommendations reviewed with patient/caregiver?: Yes  Did patient/caregiver verbalize understanding of patient care needs?: Yes  Were patient/caregiver advised of the risks associated with not following Treatment Team discharge recommendations?: Yes    Contacts  Patient Contacts: Wife Pravin  Relationship to Patient[de-identified] Family  Contact Method: Phone  Phone Number: [phone]  Reason/Outcome: Continuity of Care,Referral,Discharge Planning              Other Referral/Resources/Interventions Provided:  Interventions: Short Term Rehab  Referral Comments: CM met with patient at bedside and discussed STR recommendation  Patient is agreeable to a blanket referral to the local STR facilties with the understanding that he will be there for physical therapy and for IV ABX as he will need these for 4 weeks  Treatment Team Recommendation: Short Term Rehab  Discharge Destination Plan[de-identified] Short Term Rehab                                         Additional Comments: CM contacted patient's wife and reviewed the STR recommendation and blanket referral   She is agreeable with this plan  Wife will reach out to patient and see about patient getting a copy of his vaccination card here to the hospital                   CM department will continue to follow to assist with discharge coordination

## 2021-12-22 NOTE — ASSESSMENT & PLAN NOTE
POA septic secondary to LLE cellulittis  2/2 bcx MSSA started on Cefazolin  Wound cultures growing MSSA      Plan  · Repeat cultures negative at 72 hours  · ID following-recommending 4 weeks of IV abx through January 16th,2022  · PICC consult placed on 12/22/21  · Monitor fever curves and WBC

## 2021-12-22 NOTE — PROGRESS NOTES
Progress Note - Infectious Disease   Gil Gray 78 y o  male MRN: [id number]  Unit/Bed#: S -01 Encounter: [id number]    Impression/Recommendations:  1  Sepsis, POA:  With tachypnea, leukocytosis, elevated lactate, MULUGETA on CKD 3  In the setting of Staphylococcus aureus bacteremia  Low-grade fevers are noted  WBC remains elevated  Lactate has normalized (3 1-> 1 7)  ? Antibiotic therapy as below  ? Supportive care per primary team  ? CBC in am   ? Monitor clinical response, temperature curve    2  Staphylococcus aureus bacteremia  LLE wound cx also with growth of Staph aureus, MSSA  Consider possibility of seeding of renal subcapsular hematoma although repeat blood cx from 12/19 remain negative  ESR of 95 is significantly elevated  12/22 transthoracic echocardiogram is negative for valve vegetation  ? Continue cefazolin 1g IV q12 hours  ? Anticipate 4 week duration of IV antibiotic therapy from time of negative cultures thru Jan 16th  ? Weekly labs while on iv ABx: CBC with diff, creatinine  ? Suggest tunneled PICC line insertion as repeat blood cultures remain negative at 72 hours    3  LLE cellulitis: possible SSTI source of bacteremia  Cellulitis is improving while on cefazolin IV  ? Serial leg exams  ? Left lower extremity elevation  ? Continue cefazolin IV  ? Monitor clinical response    4  Acute on chronic kidney disease stage 3:   ? Renal dose medications  ? Avoid nephrotoxins  ? Repeat creatinine in a m     5  Diabetes mellitus type 2, insulin dependent:  Initially treated as DKA upon hospital admission  Hyperglycemia is a risk factor for infection and delayed wound healing  ? Glycemic control as per primary service    6  Renal subcapsular hematoma:  With anemia in the setting of supratherapeutic INR  Status post IR angiography with no evidence of extravasation  ? Repeat CBC in am     7  Transaminitis:  LFTs are downtrending    CT scan raises concern for infiltrating neoplasm in right hepatic lobe  ? Repeat LFTs in a m   ? Triple phase CT considered when patient is more hemodynamically stable and with improved renal function    8  History of penicillin allergy:  Hives age 14  Patient is currently tolerating cefazolin IV      My recommendations were discussed with the patient in detail who verbalized understanding  My recommendations were discussed with Dr Rhoda Ambriz from the primary service and case management team  The ID service will follow  Antibiotics: cefazolin since 12/17  Scheduled Meds:  Current Facility-Administered Medications   Medication Dose Route Frequency Provider Last Rate   • acetaminophen  650 mg Oral Q4H PRN Jose Aranda PA-C     • amLODIPine  10 mg Oral Daily Jose Aranda PA-C     • cefazolin  1,000 mg Intravenous Q12H Jose Aranda PA-C Stopped (12/22/21 1256)   • docusate sodium  100 mg Oral BID Farooq Lazcano MD     • HYDROmorphone  0 5 mg Intravenous Q3H PRN Jose Aranda PA-C     • insulin glargine  30 Units Subcutaneous HS Anabela Gómez MD     • insulin lispro  10 Units Subcutaneous TID With Meals Anabela Gómez MD     • insulin lispro  2-12 Units Subcutaneous TID AC Jose Aranda PA-C     • lidocaine  1 patch Topical Daily Farooq Lazcano MD     • metoprolol tartrate  50 mg Oral Q12H [address] Piotr Marquez PA-C     • ondansetron  4 mg Intravenous Q6H PRN Jose Aranda PA-C     • oxyCODONE  2 5 mg Oral Q6H PRN Farooq Lazcano MD     • oxyCODONE  5 mg Oral Q4H PRN Farooq Lazcano MD     • pantoprazole  40 mg Intravenous Q12H [address] Piotr Marquez PA-C     • polyethylene glycol  17 g Oral Daily PRN Farooq Lazcano MD     • senna-docusate sodium  1 tablet Oral HS Milagros Ramires MD       Continuous Infusions:   PRN Meds: •  acetaminophen  •  HYDROmorphone  •  ondansetron  •  oxyCODONE  •  oxyCODONE  •  polyethylene glycol    Subjective:  Patient reports left leg pain which is worse to touch   He reports continued weakness and fatigue  He is unable to walk presently despite efforts with physical therapy  He denies fever, chills, vomiting, diarrhea, rash  He is tolerating current antibiotic therapy  Objective:  Vitals:  Temp:  [98 1 °F (36 7 °C)-99 6 °F (37 6 °C)] 99 5 °F (37 5 °C)  HR:  [69-76] 69  Resp:  [19-26] 26  BP: (137-186)/(61-79) 137/70  SpO2:  [96 %] 96 %  Temp (24hrs), Av 9 °F (37 2 °C), Min:98 1 °F (36 7 °C), Max:99 6 °F (37 6 °C)  Current: Temperature: 99 5 °F (37 5 °C)  Physical Exam:   General Appearance:  Patient appears stated age, chronically ill and debilitated, lying in bed, no acute distress   ENT: Oropharynx moist, poor dentition   Lungs:   Clear to auscultation bilaterally; respirations unlabored   Heart:  S1, S2, RRR, 2/6 systolic murmur LLSB   Abdomen:   Obese, protuberant with umbilical hernia, soft, non-tender   : Robison catheter present draining urine with sediment in tubing   Extremities: No distal leg edema b/l   Neurologic: AAO to person, surroundings, conversant, fluent speech   Skin: dry scabs of distal LLE noted  No draining wounds or fluctuance  Labs, Cultures, Imaging, & Other studies:   All pertinent labs, cultures and imaging studies were personally reviewed  Results from last 7 days   Lab Units 21  1011 21  0446 21  2141 21  1404 21  1404 21  1431 21  1028 21  0444 21  0444   WBC Thousand/uL  --   --   --   --  11 21*  --  10 75*  --  10 10   HEMOGLOBIN g/dL 7 0* 7 8* 8 0*   < > 7 7*   < > 7 1*   < > 7 3*   PLATELETS Thousands/uL  --   --   --   --  258  --  292  --  287    < > = values in this interval not displayed       Results from last 7 days   Lab Units 21  0446 21  0759 21  0759 21  0404 21  0404   SODIUM mmol/L 141  --  143  --  144   POTASSIUM mmol/L 4 3   < > 4 3   < > 4 6   CHLORIDE mmol/L 109*   < > 112*   < > 111*   CO2 mmol/L 19*   < > 20*   < > 20*   BUN mg/dL 82*   < > 80*   < > 87* CREATININE mg/dL 4 39*   < > 4 56*   < > 4 52*   EGFR ml/min/1 73sq m 12   < > 12   < > 12   CALCIUM mg/dL 8 6   < > 8 1*   < > 8 1*   AST U/L 123*  --  387*  --  467*   ALT U/L 95*   < > 161*   < > 158*   ALK PHOS U/L 81   < > 82   < > 82    < > = values in this interval not displayed  Results from last 7 days   Lab Units 12/19/21  1003 12/19/21  0846 12/18/21  0901 12/18/21  0453 12/17/21  1708 12/17/21  1613   BLOOD CULTURE  No Growth at 48 hrs  No Growth at 72 hrs   --   --  Staphylococcus aureus* Staphylococcus aureus*   GRAM STAIN RESULT   --   --  No Polys or Bacteria seen  --  Gram positive cocci in clusters* Gram positive cocci in clusters*   WOUND CULTURE   --   --  4+ Growth of Staphylococcus aureus*  --   --   --    MRSA CULTURE ONLY   --   --   --  No Methicillin Resistant Staphlyococcus aureus (MRSA) isolated  --   --      Results from last 7 days   Lab Units 12/17/21  2211   PROCALCITONIN ng/ml 1 06*     Results from last 7 days   Lab Units 12/20/21  1028   CRP mg/L 421 8*     12/20 CT abd/pelv: Unchanged right suprarenal and multiple right renal subcapsular hematomas  No new sites of intra-abdominal or intrapelvic hemorrhage

## 2021-12-22 NOTE — ASSESSMENT & PLAN NOTE
POA INR of 7 46, s/p Vitamin K and 1 dose of DDAVP for reversal    Plan  · Now resolved, repeat INR on 12/21 1 45   · Continue to hold coumadin in setting of downtrending H/H

## 2021-12-22 NOTE — PROGRESS NOTES
Progress Note - Mera Penaloza 78 y o  male MRN: [id number]    Unit/Bed#: S -01 Encounter: [id number]      Assessment:  Mera Penaloza is a comorbid 43-year-old male admitted with fulminant sepsis, secondary to no extremity cellulitis and resultant staph bacteremia, DKA and supratherapeutic INR exceeding 7 while anticoagulated with Coumadin  Urologic consultation was requested secondary to right subcapsular hematoma  Patient is status post negative IR arteriogram   Repeat CT demonstrated no further expansion of bleeding  Patient is transferred out of the intensive care unit to step-down  Hemoglobin 6 9 on the 20th 7 7 today, status post transfusion this admission  Followed by multiple inter disciplinary specialists including Nephrology, Infectious Disease and endocrinology  Although chronically ill-appearing patient is afebrile, hemodynamically stable with persistent complaints of flank pain to be expected  Plan:  · Continue medical optimization and symptom management to include pain management  · PT/OT for reconditioning  · Maintain Robison catheter to straight drainage at this time  · Baseline creatinine between 1 5--1 9 per Nephrology  Still way a above at 4 5  · Do not recommend catheter removal   · Plan for trial of void as outpatient in short-term rehab  · No further  intervention indicated  · Will follow peripherally  · Patient will eventually require reimaging in approximately 10 weeks once blood products are resorbed  · Service will contact patient/caregiver with hospital follow-up appointment date and time once discharged  Subjective:   Persistent flank and abdominal pain  Objective:     Vitals: Blood pressure 166/74, pulse 69, temperature 98 4 °F (36 9 °C), resp  rate (!) 26, height 5' 7" (1 702 m), weight 106 kg (234 lb 2 1 oz), SpO2 96 %  ,Body mass index is 36 67 kg/m²        Intake/Output Summary (Last 24 hours) at 12/22/2021 0900  Last data filed at 12/21/2021 1718  Gross per 24 hour   Intake --   Output 775 ml   Net -775 ml       Physical Exam: General appearance: alert and oriented, in no acute distress, appears stated age, cooperative, no distress and morbidly obese  Head: Normocephalic, without obvious abnormality, atraumatic  Neck: no adenopathy, no carotid bruit, no JVD, supple, symmetrical, trachea midline and thyroid not enlarged, symmetric, no tenderness/mass/nodules  Lungs: diminished breath sounds  Heart: regular rate and rhythm, S1, S2 normal, no murmur, click, rub or gallop  Abdomen: soft, non-tender; bowel sounds normal; no masses,  no organomegaly  Extremities: extremities normal, warm and well-perfused; no cyanosis, clubbing, or edema  Pulses: 2+ and symmetric  Neurologic: Grossly normal  Robison--clear yellow urine     Invasive Devices  Report    Central Venous Catheter Line            CVC Central Lines 12/18/21 Triple Right Internal jugular 3 days          Peripheral Intravenous Line            Peripheral IV 12/22/21 Right;Ventral (anterior) Forearm <1 day          Drain            Urethral Catheter Temperature probe 16 Fr  3 days              Lab Results   Component Value Date    WBC 11 21 (H) 12/21/2021    HGB 7 8 (L) 12/22/2021    HCT 23 8 (L) 12/21/2021    MCV 92 12/21/2021     12/21/2021     Lab Results   Component Value Date    SODIUM 141 12/22/2021    K 4 3 12/22/2021     (H) 12/22/2021    CO2 19 (L) 12/22/2021    BUN 82 (H) 12/22/2021    CREATININE 4 39 (H) 12/22/2021    GLUC 73 12/22/2021    CALCIUM 8 6 12/22/2021         Lab, Imaging and other studies: I have personally reviewed pertinent reports

## 2021-12-22 NOTE — PHYSICAL THERAPY NOTE
PHYSICAL THERAPY NOTE          Patient Name: Lucretia MCCLAINIJAntonioO Date: 12/22/2021 12/22/21 0825   PT Last Visit   PT Visit Date 12/22/21   Note Type   Note Type Treatment   Pain Assessment   Pain Assessment Tool 0-10   Pain Score 8   Pain Location/Orientation Orientation: Left; Location: Leg   Pain Onset/Description Descriptor: Pressure   Effect of Pain on Daily Activities limited activity tolerance    Patient's Stated Pain Goal No pain   Hospital Pain Intervention(s) Repositioned; Ambulation/increased activity; Emotional support; Rest   Multiple Pain Sites No   Restrictions/Precautions   Weight Bearing Precautions Per Order No   General   Chart Reviewed Yes   Response to Previous Treatment Patient with no complaints from previous session  Family/Caregiver Present No   Cognition   Overall Cognitive Status WFL   Arousal/Participation Alert; Responsive; Other (Comment)  (self limiting, anxious)   Attention Attends with cues to redirect  (pt required encouragement and motivation to participate)   Orientation Level Oriented X4   Memory Unable to assess   Following Commands Follows one step commands with increased time or repetition   Comments pt was self limiting in todays tx session as well as anxious about standing at EOB  Subjective   Subjective pt was agreeable to participate in PT intervention  pt stated that from his L knee to his L ankle is where his pain is 8/10    Bed Mobility   Supine to Sit 2  Maximal assistance   Additional items Assist x 1;HOB elevated; Bedrails; Increased time required;Verbal cues;LE management; Other  (HHA )   Sit to Supine 2  Maximal assistance   Additional items Assist x 1;HOB elevated; Bedrails; Increased time required;Verbal cues;LE management   Additional Comments pt was able to increase sitting tolerance compared to previous tx sessions 10 minutes w/o LOB    Transfers   Sit to Stand 1  Dependent Additional items Assist x 1;HOB elevated; Bedrails; Increased time required;Verbal cues   Additional Comments pt attempted STS transfers from EOB x2 but was unable to clear buttocks from EOB to complete transfer    Ambulation/Elevation   Gait pattern Not appropriate   Exercises   Quad Sets Supine;15 reps;AROM; Bilateral   Knee AROM Long Arc Quad Sitting;10 reps;AROM; Bilateral   Ankle Pumps Sitting;20 reps;AROM; Bilateral   Marching Sitting;10 reps;AROM; Bilateral   Assessment   Prognosis Guarded   Problem List Decreased strength;Decreased range of motion; Impaired balance;Decreased endurance;Decreased mobility; Decreased coordination;Decreased cognition;Decreased safety awareness;Decreased skin integrity;Obesity;Pain   Assessment pt began tx session lying supine in the bed and was agreeable to participate in PT intervention  pt required encouragement and motivation to agree to participate in PT intervention  pt continues to require max Ax1 for all bed mobility w/ LE managemenet and HHA to pull up into a seated EOB position  pt was able to increase static/dynamic sitting balance by performing TE activities at EOB w/o LOB  pt continues to struggle with performing a STS tranfers from EOB  pt attempted STS twice and was not able to clear buttocks from EOB on both attempts  At this point pt was self limiting and refused to participate in any other PT activites and requested to be placed back in supine  pt would benefit from continued focus on bed mobility with progression of functional transfers  continues to recommend post acute rehab services at the time of D/C in order to maximize pt functional independence and safety w/ all OOB mobility when appropriate   post tx pt in bed w/ call bell and all pt needs met    Barriers to Discharge Inaccessible home environment;Decreased caregiver support   Goals   Patient Goals to get back into bed    STG Expiration Date 01/02/21   Plan   Treatment/Interventions ADL retraining;Functional transfer training;LE strengthening/ROM; Elevations; Endurance training; Therapeutic exercise;Patient/family training;Equipment eval/education; Bed mobility;Gait training; Compensatory technique education;Continued evaluation;Spoke to nursing   PT Frequency 4-6x/wk   Recommendation   PT Discharge Recommendation Post acute rehabilitation services   AM-PAC Basic Mobility Inpatient   Turning in Bed Without Bedrails 2   Lying on Back to Sitting on Edge of Flat Bed 2   Moving Bed to Chair 1   Standing Up From Chair 1   Walk in Room 1   Climb 3-5 Stairs 1   Basic Mobility Inpatient Raw Score 8   Turning Head Towards Sound 4   Follow Simple Instructions 3   Low Function Basic Mobility Raw Score 15   Low Function Basic Mobility Standardized Score 23 9   Highest Level Of Mobility   JH-HL Goal 3: Sit at edge of bed   JH-HLM Highest Level of Mobility 2: Bed activities/Dependent transfer   JH-HLM Goal Achieved No   Education   Education Provided Other  (bed mobility education )   Patient Reinforcement needed   End of Consult   Patient Position at End of Consult Supine; All needs within reach;Bed/Chair alarm activated   The patient's AM-PAC Basic Mobility Inpatient Short Form Raw Score is 8  A Raw score of less than or equal to 16 suggests the patient may benefit from discharge to post-acute rehabilitation services  Please also refer to the recommendation of the Physical Therapist for safe discharge planning      Walter West PTA

## 2021-12-22 NOTE — ASSESSMENT & PLAN NOTE
Patient had presented originally with complaint of RLQ abdominal pain  Subsequently on 12/18 went for emergent embolization with IR:  There is no evidence of acute bleeding and no intervention was done in the setting of CTA a/p showing stable right subcapsular hematom of the right kidney  No evidence of active arterial or venous extravasation noted  Repeat imaging 12/20 - CT abdomen revealed stable right subcapsular renal hematoma      Plan  · Continue to monitor and treat abdominal pain as appropriate  · Continue to hold Coumadin

## 2021-12-22 NOTE — OCCUPATIONAL THERAPY NOTE
Occupational Therapy Cancellation     Patient Name: Paul Soriano  IUBCL'E Date: 12/22/2021  Problem List  Principal Problem:    High anion gap metabolic acidosis  Active Problems:    Type 2 diabetes mellitus, with long-term current use of insulin (HCC)    MULUGETA on CKD (chronic kidney disease) stage 3, GFR 30-59 ml/min    Right renal subcapsular hematoma    Hypertension    Hyperlipidemia    CVA (cerebral vascular accident) ([facility] [address] )    Cellulitis of left lower extremity    Acute kidney injury superimposed on chronic kidney disease ([facility] [address] )    Supratherapeutic INR    Anemia    Staphylococcus aureus bacteremia    Right Liver mass    Transaminitis    Past Medical History  Past Medical History:   Diagnosis Date   • Bell's palsy    • Cardiac disease    • Cerebellar stroke ([facility] [address] )    • Diabetes mellitus ([facility] [address] )    • Fracture     L hip   • Hyperlipidemia    • Hypertension    • Renal disorder    • Stroke [facility])     2013   • Stroke [facility])     [id number]     Past Surgical History  Past Surgical History:   Procedure Laterality Date   • CORONARY ANGIOPLASTY WITH STENT PLACEMENT     • FRACTURE SURGERY      L femur   • INCISION AND DRAINAGE OF WOUND Right 9/13/2016    Procedure: INCISION AND DRAINAGE (I&D) EXTREMITY, right lateral ankle;  Surgeon: Jan Avalos DPM;  Location: [facility];  Service:    • IR EMBOLIZATION (SPECIFY VESSEL OR SITE)  12/18/2021   • IR TEMPORARY DIALYSIS CATHETER PLACEMENT  12/18/2021   • WOUND DEBRIDEMENT Right 9/15/2016    Procedure: DEBRIDEMENT WOUND ([address]) ankle wound with closure and FRANCY drain placement;  Surgeon: Jan Avalos DPM;  Location: [facility];  Service:            12/22/21 0536   OT Last Visit   OT Visit Date 12/22/21  (Wednesday)   Note Type   Note type Evaluation   Cancel Reasons Refusal  (pt declined participation despite encouragement)   Additional Comments OT orders received and chart review completed  Contact made w/ pt from [phone]   Pt declined participation despite max encouragement from therapist  Pt's sister present  Will cancel and continue to follow as appropriate and schedule allows  RN, Apolonia Guillen, OTR/L

## 2021-12-22 NOTE — PROGRESS NOTES
[facility]  Progress Note - William Brito 1955, 78 y o  male MRN: [id number]  Unit/Bed#: S -01 Encounter: [id number]  Primary Care Provider: Chay Hess MD   Date and time admitted to hospital: 12/17/2021  3:03 PM    * Staphylococcus aureus bacteremia  Assessment & Plan  POA septic secondary to LLE cellulittis  2/2 bcx MSSA started on Cefazolin  Wound cultures growing MSSA  Plan  · Repeat cultures negative at 72 hours  · ID following-recommending 4 weeks of IV abx through January 16th,2022  · PICC consult placed on 12/22/21  · Monitor fever curves and WBC    MULUGETA on CKD (chronic kidney disease) stage 3, GFR 30-59 ml/min  Assessment & Plan  Lab Results   Component Value Date    EGFR 12 12/22/2021    EGFR 12 12/21/2021    EGFR 12 12/21/2021    CREATININE 4 39 (H) 12/22/2021    CREATININE 4 56 (H) 12/21/2021    CREATININE 4 52 (H) 12/21/2021    Baseline creatinine of 1 5 - 1 9  Remains elevated consistently >4    Plan    Nephrology consulted recs as below:  · BMP in a m  · Avoid hypotension and nephrotoxins   · Check ionized calcium in a m  · Maintain temporary dialysis catheter as patient may require dialysis in the setting of unresolved MULUGETA    Right renal subcapsular hematoma  Assessment & Plan  Patient had presented originally with complaint of RLQ abdominal pain  Subsequently on 12/18 went for emergent embolization with IR:  There is no evidence of acute bleeding and no intervention was done in the setting of CTA a/p showing stable right subcapsular hematom of the right kidney  No evidence of active arterial or venous extravasation noted  Repeat imaging 12/20 - CT abdomen revealed stable right subcapsular renal hematoma  Plan  · Continue to monitor and treat abdominal pain as appropriate  · Continue to hold Coumadin      Anemia  Assessment & Plan  POA Acute blood loss anemia in setting of gross hematuria, Hg on admission was 6 8 s/p 3 units of PRBC   Hg improved after transfusion now downtrending once again without clear source of bleeding    Plan  · Will d/c q6 H/H, repeat CBC in the am  ·  Transfuse for Hg < 7  · Monitor for s/sx of blood loss  · Continue to hold coumadin     Type 2 diabetes mellitus, with long-term current use of insulin [facility])  Assessment & Plan  Lab Results   Component Value Date    HGBA1C 12 2 (H) 12/18/2021       Recent Labs     12/22/21  0735 12/22/21  0824 12/22/21  1053 12/22/21  1555   POCGLU 62* 141* 184* 203*       Blood Sugar Average: Last 72 hrs:  (P) 852 [id number]     Patient initially presents in DKA require insulin drip, AG closed as of 12/21 am and has remained stable  Plan  · Endocrinology following  · Lantus now 30 units at bedtime and Lispro 10 units TID along with algorithm 4 correction  · Goal -180mg/dL  · Hypoglycemia protocol and QID monitoring    Hyperlipidemia  Assessment & Plan  · Continue to hold enofibrate and statin the setting of slowly improving transaminitis      CVA (cerebral vascular accident) [facility])  Assessment & Plan  Patient with history of thrombotic CVA with Left facial droop no other residual neurological deficits  Plan  · Monitor for any acute changes in mental status  · Hold ASA and Coumadin in setting of acute anemia      Hypertension  Assessment & Plan  BP remains stable ranging 130-150/60-70s    Plan  · On  Amlodipine 10 mg  · Concern for Page kidney in the setting of right renal subcapsular hematoma  · Nephrology on board- recommending lasix if patient becomes oliguric  · Monitor VSS      Transaminitis  Assessment & Plan  POA AST/ALT elevated to 800/200s  Now improving to 120/90s  Plan  · Continue to monitor for improvement  · Caution with pain control meds (ie tyenlol) in setting of resolving transaminitis     Right Liver mass  Assessment & Plan  Possible infiltrating neoplasm in the right lobe of the liver seen on CT scan    Recommend dedicated triphasic CT scan of the liver for better characterization when the patient is more stable  Supratherapeutic INR  Assessment & Plan  POA INR of 7 46, s/p Vitamin K and 1 dose of DDAVP for reversal    Plan  · Now resolved, repeat INR on  1 45   · Continue to hold coumadin in setting of downtrending H/H         VTE Pharmacologic Prophylaxis:   VTE Score: 4 Moderate Risk (Score 3-4) - Pharmacological DVT Prophylaxis Contraindicated  Sequential Compression Devices Ordered  Mechanical VTE Prophylaxis in Place: Yes    Patient Centered Rounds: I have performed bedside rounds with nursing staff today  Discussions with Specialists or Other Care Team Provider: ID, Nephrology, Urology    Education and Discussions with Family / Patient: Updated  (sister) via phone  Current Length of Stay: 5 day(s)    Current Patient Status: Inpatient     Discharge Plan / Estimated Discharge Date: TBD    Code Status: Level 1 - Full Code      Subjective:   Patient reports he feels improved from when he first arrived but still not at his baseline  Intermittent abdominal pain  Patient also states he has not had a BM in multiple days  Discussed that will add a bowel regimen for him today  Objective:     Vitals:   Temp (24hrs), Av 9 °F (37 2 °C), Min:98 1 °F (36 7 °C), Max:99 6 °F (37 6 °C)    Temp:  [98 1 °F (36 7 °C)-99 6 °F (37 6 °C)] 99 °F (37 2 °C)  HR:  [69-76] 69  Resp:  [18-26] 18  BP: (137-186)/(61-79) 146/68  SpO2:  [96 %] 96 %  Body mass index is 36 65 kg/m²  Input and Output Summary (last 24 hours): Intake/Output Summary (Last 24 hours) at 2021 1729  Last data filed at 2021 1718  Gross per 24 hour   Intake 660 ml   Output 450 ml   Net 210 ml       Physical Exam:     Physical Exam  Vitals reviewed  Constitutional:       Appearance: Normal appearance  HENT:      Head: Normocephalic and atraumatic        Right Ear: External ear normal       Left Ear: External ear normal       Nose: Nose normal       Mouth/Throat: Mouth: Mucous membranes are moist       Pharynx: Oropharynx is clear  Eyes:      Conjunctiva/sclera: Conjunctivae normal    Cardiovascular:      Rate and Rhythm: Normal rate and regular rhythm  Pulses: Normal pulses  Pulmonary:      Effort: Pulmonary effort is normal       Breath sounds: Normal breath sounds  Abdominal:      General: Bowel sounds are normal       Palpations: Abdomen is soft  Tenderness: There is no abdominal tenderness  Comments: Hyperactive bowel sounds   Musculoskeletal:         General: Normal range of motion  Cervical back: Neck supple  Skin:     General: Skin is warm and dry  Comments: Skin excoriations noted in suprapubic region   Neurological:      Mental Status: He is alert  Mental status is at baseline  Psychiatric:         Mood and Affect: Mood normal           Additional Data:     Labs:  Results from last 7 days   Lab Units 12/22/21  1011 12/21/21  2141 12/21/21  1404   WBC Thousand/uL  --   --  11 21*   HEMOGLOBIN g/dL 7 0*   < > 7 7*   HEMATOCRIT %  --   --  23 8*   PLATELETS Thousands/uL  --   --  258   BANDS PCT %  --   --  1   LYMPHO PCT %  --   --  3*   MONO PCT %  --   --  9   EOS PCT %  --   --  2    < > = values in this interval not displayed       Results from last 7 days   Lab Units 12/22/21  0446   SODIUM mmol/L 141   POTASSIUM mmol/L 4 3   CHLORIDE mmol/L 109*   CO2 mmol/L 19*   BUN mg/dL 82*   CREATININE mg/dL 4 39*   ANION GAP mmol/L 13   CALCIUM mg/dL 8 6   ALBUMIN g/dL 1 3*   TOTAL BILIRUBIN mg/dL 0 19*   ALK PHOS U/L 81   ALT U/L 95*   AST U/L 123*   GLUCOSE RANDOM mg/dL 73     Results from last 7 days   Lab Units 12/21/21  0759   INR  1 45*     Results from last 7 days   Lab Units 12/22/21  1555 12/22/21  1053 12/22/21  0824 12/22/21  0735 12/22/21  0007 12/21/21  2204 12/21/21  1958 12/21/21  1800 12/21/21  1604 12/21/21  1357 12/21/21  1207 12/21/21  1006   POC GLUCOSE mg/dl 203* 184* 141* 62* 130 197* 238* 197* 278* 303* 149* 104 Results from last 7 days   Lab Units 12/18/21  0449   HEMOGLOBIN A1C % 12 2*     Results from last 7 days   Lab Units 12/20/21  1431 12/18/21  2129 12/18/21  1525 12/17/21  2211 12/17/21  1613   LACTIC ACID mmol/L 0 6 1 7 3 1*  --  2 0   PROCALCITONIN ng/ml  --   --   --  1 06*  --        Imaging: Reviewed radiology reports from this admission including: abdominal/pelvic CT scan    Recent Cultures (last 7 days):     Results from last 7 days   Lab Units 12/19/21  1003 12/19/21  0846 12/18/21  0901 12/17/21  1708 12/17/21  1613   BLOOD CULTURE  No Growth at 72 hrs   No Growth at 72 hrs   --  Staphylococcus aureus* Staphylococcus aureus*   GRAM STAIN RESULT   --   --  No Polys or Bacteria seen Gram positive cocci in clusters* Gram positive cocci in clusters*   WOUND CULTURE   --   --  4+ Growth of Staphylococcus aureus*  --   --        Lines/Drains:  Invasive Devices  Report    Central Venous Catheter Line            CVC Central Lines 12/18/21 Triple Right Internal jugular 3 days          Peripheral Intravenous Line            Peripheral IV 12/22/21 Right;Ventral (anterior) Forearm <1 day          Drain            Urethral Catheter Temperature probe 16 Fr  4 days                Telemetry:        Last 24 Hours Medication List:   Current Facility-Administered Medications   Medication Dose Route Frequency Provider Last Rate   • acetaminophen  650 mg Oral Q4H PRN Madelaine Flynn PA-C     • amLODIPine  10 mg Oral Daily Madelaine Flynn PA-C     • cefazolin  1,000 mg Intravenous Q12H Madelaine Flynn PA-C Stopped (12/22/21 1256)   • docusate sodium  100 mg Oral BID Easton Andre MD     • HYDROmorphone  0 5 mg Intravenous Q3H PRN Madelaine Flynn PA-C     • insulin glargine  30 Units Subcutaneous HS Jenifer Traore MD     • insulin lispro  10 Units Subcutaneous TID With Meals Jenifer Traore MD     • insulin lispro  2-12 Units Subcutaneous TID AC Madelaine Flynn PA-C     • lidocaine  1 patch Topical Daily Milagros MD Ike     • metoprolol tartrate  50 mg Oral Q12H [facility] Piotr Marquez PA-C     • ondansetron  4 mg Intravenous Q6H PRN Duong Oden PA-C     • oxyCODONE  2 5 mg Oral Q6H PRN Shane Vann MD     • oxyCODONE  5 mg Oral Q4H PRN Shane Vann MD     • pantoprazole  40 mg Intravenous Q12H [facility] Piotr Marquez PA-C     • polyethylene glycol  17 g Oral Daily PRN Shane Vann MD     • senna-docusate sodium  1 tablet Oral HS Shane Vann MD          Today, Patient Was Seen By: Shane Vann MD    ** Please Note: This note has been constructed using a voice recognition system   **

## 2021-12-22 NOTE — ASSESSMENT & PLAN NOTE
Patient with history of thrombotic CVA with Left facial droop no other residual neurological deficits      Plan  · Monitor for any acute changes in mental status  · Hold ASA and Coumadin in setting of acute anemia

## 2021-12-22 NOTE — ASSESSMENT & PLAN NOTE
Lab Results   Component Value Date    HGBA1C 12 2 (H) 12/18/2021       Recent Labs     12/22/21  0735 12/22/21  0824 12/22/21  1053 12/22/21  1555   POCGLU 62* 141* 184* 203*       Blood Sugar Average: Last 72 hrs:  (P) 223 [id number]     Patient initially presents in DKA require insulin drip, AG closed as of 12/21 am and has remained stable      Plan  · Endocrinology following  · Lantus now 30 units at bedtime and Lispro 10 units TID along with algorithm 4 correction  · Goal -180mg/dL  · Hypoglycemia protocol and QID monitoring

## 2021-12-23 PROBLEM — K59.00 CONSTIPATION: Status: ACTIVE | Noted: 2021-01-01

## 2021-12-23 NOTE — PLAN OF CARE
Problem: PHYSICAL THERAPY ADULT  Goal: Performs mobility at highest level of function for planned discharge setting  See evaluation for individualized goals  Description: Treatment/Interventions: ADL retraining,Functional transfer training,LE strengthening/ROM,Elevations,Therapeutic exercise,Endurance training,Patient/family training,Equipment eval/education,Gait training,Bed mobility,Compensatory technique education,Continued evaluation,Spoke to nursing          See flowsheet documentation for full assessment, interventions and recommendations  Outcome: Not Progressing  Note: Prognosis: Guarded  Problem List: Decreased strength,Decreased range of motion,Decreased endurance,Impaired balance,Decreased coordination,Decreased mobility,Decreased cognition,Impaired judgement,Decreased safety awareness,Decreased skin integrity,Obesity,Pain  Assessment: pt began tx session lying supine in the bed on the bed pan and was agreeable to participate in PT intervention  pt required mod Ax1 for all rolling and repositioning in the bed from Left to R w/ LE and trunk management and max Ax1 to complete a supien<>sit EOB transfer w/ LE and trunk management  pt continues to remain consistant w/ sitting tolerance as pt was able to sit EOB 10 minutes while performing TE activities w/o LOB  in todays tx session 3 attempts at completing a STS tranfer from EOB to RW were unsuccessful as pt continues to have difficulty clearing buttocks from EOB  At this point pt refused to attempt any more PT intervention and requested to be placed back in supine ion the bed  pt would benefit from continued focus on bed mobility w/ progression of functional transfers  continue to recommend post acute rehab services at the time of D/C in order to maximize pt functional independence and safety w/ all OOB mobility and functional transfers   post tx session pt in bed /w call bell and all pt needs met   Barriers to Discharge: [address] [facility] home environment,Decreased caregiver support        PT Discharge Recommendation: Post acute rehabilitation services          See flowsheet documentation for full assessment

## 2021-12-23 NOTE — PLAN OF CARE
Problem: MOBILITY - ADULT  Goal: Maintain or return to baseline ADL function  Description: INTERVENTIONS:  -  Assess patient's ability to carry out ADLs; assess patient's baseline for ADL function and identify physical deficits which impact ability to perform ADLs (bathing, care of mouth/teeth, toileting, grooming, dressing, etc )  - Assess/evaluate cause of self-care deficits   - Assess range of motion  - Assess patient's mobility; develop plan if impaired  - Assess patient's need for assistive devices and provide as appropriate  - Encourage maximum independence but intervene and supervise when necessary  - Involve family in performance of ADLs  - Assess for home care needs following discharge   - Consider OT consult to assist with ADL evaluation and planning for discharge  - Provide patient education as appropriate  Outcome: Not Progressing     Problem: Nutrition/Hydration-ADULT  Goal: Nutrient/Hydration intake appropriate for improving, restoring or maintaining nutritional needs  Description: Monitor and assess patient's nutrition/hydration status for malnutrition  Collaborate with interdisciplinary team and initiate plan and interventions as ordered  Monitor patient's weight and dietary intake as ordered or per policy  Utilize nutrition screening tool and intervene as necessary  Determine patient's food preferences and provide high-protein, high-caloric foods as appropriate       INTERVENTIONS:  - Monitor oral intake, urinary output, labs, and treatment plans  - Assess nutrition and hydration status and recommend course of action  - Evaluate amount of meals eaten  - Assist patient with eating if necessary   - Allow adequate time for meals  - Recommend/ encourage appropriate diets, oral nutritional supplements, and vitamin/mineral supplements  - Order, calculate, and assess calorie counts as needed  - Recommend, monitor, and adjust tube feedings and TPN/PPN based on assessed needs  - Assess need for intravenous fluids  - Provide specific nutrition/hydration education as appropriate  - Include patient/family/caregiver in decisions related to nutrition  Outcome: Not Progressing     Problem: GASTROINTESTINAL - ADULT  Goal: Maintains or returns to baseline bowel function  Description: INTERVENTIONS:  - Assess bowel function  - Encourage oral fluids to ensure adequate hydration  - Administer IV fluids if ordered to ensure adequate hydration  - Administer ordered medications as needed  - Encourage mobilization and activity  - Consider nutritional services referral to assist patient with adequate nutrition and appropriate food choices  Outcome: Not Progressing  Goal: Maintains adequate nutritional intake  Description: INTERVENTIONS:  - Monitor percentage of each meal consumed  - Identify factors contributing to decreased intake, treat as appropriate  - Assist with meals as needed  - Monitor I&O, weight, and lab values if indicated  - Obtain nutrition services referral as needed  Outcome: Not Progressing     Problem: GENITOURINARY - ADULT  Goal: Urinary catheter remains patent  Description: INTERVENTIONS:  - Assess patency of urinary catheter  - If patient has a chronic luz, consider changing catheter if non-functioning  - Follow guidelines for intermittent irrigation of non-functioning urinary catheter  Outcome: Progressing

## 2021-12-23 NOTE — PLAN OF CARE
Problem: MOBILITY - ADULT  Goal: Maintain or return to baseline ADL function  Description: INTERVENTIONS:  -  Assess patient's ability to carry out ADLs; assess patient's baseline for ADL function and identify physical deficits which impact ability to perform ADLs (bathing, care of mouth/teeth, toileting, grooming, dressing, etc )  - Assess/evaluate cause of self-care deficits   - Assess range of motion  - Assess patient's mobility; develop plan if impaired  - Assess patient's need for assistive devices and provide as appropriate  - Encourage maximum independence but intervene and supervise when necessary  - Involve family in performance of ADLs  - Assess for home care needs following discharge   - Consider OT consult to assist with ADL evaluation and planning for discharge  - Provide patient education as appropriate  Outcome: Progressing  Goal: Maintains/Returns to pre admission functional level  Description: INTERVENTIONS:  - Perform BMAT or MOVE assessment daily    - Set and communicate daily mobility goal to care team and patient/family/caregiver     - Collaborate with rehabilitation services on mobility goals if consulted  - Out of bed for toileting  - Record patient progress and toleration of activity level   Outcome: Progressing     Problem: Potential for Falls  Goal: Patient will remain free of falls  Description: INTERVENTIONS:  - Educate patient/family on patient safety including physical limitations  - Instruct patient to call for assistance with activity   - Consult OT/PT to assist with strengthening/mobility   - Keep Call bell within reach  - Keep bed low and locked with side rails adjusted as appropriate  - Keep care items and personal belongings within reach  - Initiate and maintain comfort rounds  - Make Fall Risk Sign visible to staff  - Apply yellow socks and bracelet for high fall risk patients  - Consider moving patient to room near nurses station  Outcome: Progressing     Problem: Prexisting or High Potential for Compromised Skin Integrity  Goal: Skin integrity is maintained or improved  Description: INTERVENTIONS:  - Identify patients at risk for skin breakdown  - Assess and monitor skin integrity  - Assess and monitor nutrition and hydration status  - Monitor labs   - Assess for incontinence   - Turn and reposition patient  - Assist with mobility/ambulation  - Relieve pressure over bony prominences  - Avoid friction and shearing  - Provide appropriate hygiene as needed including keeping skin clean and dry  - Evaluate need for skin moisturizer/barrier cream  - Collaborate with interdisciplinary team   - Patient/family teaching  - Consider wound care consult   Outcome: Progressing     Problem: Nutrition/Hydration-ADULT  Goal: Nutrient/Hydration intake appropriate for improving, restoring or maintaining nutritional needs  Description: Monitor and assess patient's nutrition/hydration status for malnutrition  Collaborate with interdisciplinary team and initiate plan and interventions as ordered  Monitor patient's weight and dietary intake as ordered or per policy  Utilize nutrition screening tool and intervene as necessary  Determine patient's food preferences and provide high-protein, high-caloric foods as appropriate       INTERVENTIONS:  - Monitor oral intake, urinary output, labs, and treatment plans  - Assess nutrition and hydration status and recommend course of action  - Evaluate amount of meals eaten  - Assist patient with eating if necessary   - Allow adequate time for meals  - Recommend/ encourage appropriate diets, oral nutritional supplements, and vitamin/mineral supplements  - Order, calculate, and assess calorie counts as needed  - Recommend, monitor, and adjust tube feedings and TPN/PPN based on assessed needs  - Assess need for intravenous fluids  - Provide specific nutrition/hydration education as appropriate  - Include patient/family/caregiver in decisions related to nutrition  Outcome: Progressing     Problem: GASTROINTESTINAL - ADULT  Goal: Minimal or absence of nausea and/or vomiting  Description: INTERVENTIONS:  - Administer IV fluids if ordered to ensure adequate hydration  - Maintain NPO status until nausea and vomiting are resolved  - Nasogastric tube if ordered  - Administer ordered antiemetic medications as needed  - Provide nonpharmacologic comfort measures as appropriate  - Advance diet as tolerated, if ordered  - Consider nutrition services referral to assist patient with adequate nutrition and appropriate food choices  Outcome: Progressing  Goal: Maintains or returns to baseline bowel function  Description: INTERVENTIONS:  - Assess bowel function  - Encourage oral fluids to ensure adequate hydration  - Administer IV fluids if ordered to ensure adequate hydration  - Administer ordered medications as needed  - Encourage mobilization and activity  - Consider nutritional services referral to assist patient with adequate nutrition and appropriate food choices  Outcome: Progressing  Goal: Maintains adequate nutritional intake  Description: INTERVENTIONS:  - Monitor percentage of each meal consumed  - Identify factors contributing to decreased intake, treat as appropriate  - Assist with meals as needed  - Monitor I&O, weight, and lab values if indicated  - Obtain nutrition services referral as needed  Outcome: Progressing  Goal: Establish and maintain optimal ostomy function  Description: INTERVENTIONS:  - Assess bowel function  - Encourage oral fluids to ensure adequate hydration  - Administer IV fluids if ordered to ensure adequate hydration   - Administer ordered medications as needed  - Encourage mobilization and activity  - Nutrition services referral to assist patient with appropriate food choices  - Assess stoma site  - Consider wound care consult   Outcome: Progressing  Goal: Oral mucous membranes remain intact  Description: INTERVENTIONS  - Assess oral mucosa and hygiene practices  - Implement preventative oral hygiene regimen  - Implement oral medicated treatments as ordered  - Initiate Nutrition services referral as needed  Outcome: Progressing     Problem: GENITOURINARY - ADULT  Goal: Maintains or returns to baseline urinary function  Description: INTERVENTIONS:  - Assess urinary function  - Encourage oral fluids to ensure adequate hydration if ordered  - Administer IV fluids as ordered to ensure adequate hydration  - Administer ordered medications as needed  - Offer frequent toileting  - Follow urinary retention protocol if ordered  Outcome: Progressing  Goal: Absence of urinary retention  Description: INTERVENTIONS:  - Assess patient’s ability to void and empty bladder  - Monitor I/O  - Bladder scan as needed  - Discuss with physician/AP medications to alleviate retention as needed  - Discuss catheterization for long term situations as appropriate  Outcome: Progressing  Goal: Urinary catheter remains patent  Description: INTERVENTIONS:  - Assess patency of urinary catheter  - If patient has a chronic luz, consider changing catheter if non-functioning  - Follow guidelines for intermittent irrigation of non-functioning urinary catheter  Outcome: Progressing

## 2021-12-23 NOTE — PROGRESS NOTES
Progress Note - Infectious Disease   Dianna Rodriguez 78 y o  male MRN: [id number]  Unit/Bed#: S -01 Encounter: [id number]    Impression/Recommendations:  1  Sepsis, POA:  With tachypnea, leukocytosis, elevated lactate, MULUGETA on CKD 3  In the setting of Staphylococcus aureus bacteremia  WBC is gradually down trending  Lactate has normalized (3 1-> 1 7)  ? Antibiotic therapy as below  ? Supportive care per primary team  ? CBC in am   ? Monitor clinical response, temperature curve    2  Staphylococcus aureus bacteremia  LLE wound cx also with growth of Staph aureus, MSSA  Consider possibility of seeding of renal subcapsular hematoma although repeat blood cx from 12/19 remain negative  ESR of 95 is significantly elevated  12/22 transthoracic echocardiogram is negative for valve vegetation  ? Continue cefazolin 1g IV q12 hours  ? Anticipate 4 week duration of IV antibiotic therapy from time of negative cultures thru Jan 16th  ? Weekly labs while on iv ABx: CBC with diff, creatinine  ? Suggest tunneled PICC line insertion as repeat blood cultures remain negative at 72 hours; discussed with Dr Catie Howard from nephrology service    3  LLE cellulitis: possible SSTI source of bacteremia  Cellulitis has resolved  ? Left lower extremity elevation  ? Continue cefazolin IV  ? Monitor clinical response    4  Acute on chronic kidney disease stage 3:   ? Renal dose medications, avoid nephrotoxins  ? Nephrology service is following with no immediate plans for dialysis  ? Repeat creatinine in a m     5  Diabetes mellitus type 2, insulin dependent:  Initially treated as DKA upon hospital admission  Hyperglycemia is a risk factor for infection and delayed wound healing  ? Glycemic control as per primary service    6  Renal subcapsular hematoma:  With anemia in the setting of supratherapeutic INR  Status post IR angiography with no evidence of extravasation  ? Repeat CBC in am     7  Transaminitis:  LFTs are downtrending    CT scan raises concern for infiltrating neoplasm in right hepatic lobe  ? Triple phase CT considered with improved renal function    8  History of penicillin allergy:  Hives age 14  Patient is currently tolerating cefazolin IV      My recommendations were discussed with the patient in detail who verbalized understanding  My recommendations were discussed with resident Dr Charlie Robertson from the primary service  The ID service will follow  Antibiotics: cefazolin since 12/17  Scheduled Meds:  Current Facility-Administered Medications   Medication Dose Route Frequency Provider Last Rate   • acetaminophen  650 mg Oral Q4H PRN Luis Angel Oconnor PA-C     • amLODIPine  10 mg Oral Daily Luis Angel Oconnor PA-C     • cefazolin  1,000 mg Intravenous Q12H Luis Angel Oconnor PA-C Stopped (12/23/21 1120)   • docusate sodium  100 mg Oral BID Esvin Price MD     • insulin glargine  30 Units Subcutaneous HS Анна Castaneda MD     • insulin lispro  10 Units Subcutaneous TID With Meals Анна Castaneda MD     • insulin lispro  2-12 Units Subcutaneous TID AC Luis Angel Oconnor PA-C     • metoprolol tartrate  50 mg Oral Q12H [address] Luis Angel Oconnor PA-C     • nystatin   Topical BID Esvin Price MD     • ondansetron  4 mg Intravenous Q6H PRN Luis Angel Oconnor PA-C     • oxyCODONE  2 5 mg Oral Q6H PRN Esvin Price MD     • oxyCODONE  5 mg Oral Q4H PRN Esvin Price MD     • pantoprazole  40 mg Intravenous Q12H [address] Piotr Marquez PA-C     • polyethylene glycol  17 g Oral Daily PRN Esvin Price MD     • senna-docusate sodium  1 tablet Oral HS Milagros Ramires MD       Continuous Infusions:   PRN Meds: •  acetaminophen  •  ondansetron  •  oxyCODONE  •  oxyCODONE  •  polyethylene glycol    Subjective:  Patient reports chronic left leg pain which is worse to touch  He reports continued weakness and fatigue and says he feels uncomfortable in bed  He denies fever, vomiting, diarrhea, rash    He is tolerating current antibiotic therapy  Objective:  Vitals:  Temp:  [98 6 °F (37 °C)-99 2 °F (37 3 °C)] 98 6 °F (37 °C)  HR:  [71-81] 81  Resp:  [18-22] 20  BP: (146-178)/(68-88) 170/82  SpO2:  [94 %-95 %] 94 %  Temp (24hrs), Av 9 °F (37 2 °C), Min:98 6 °F (37 °C), Max:99 2 °F (37 3 °C)  Current: Temperature: 98 6 °F (37 °C)  Physical Exam:   General Appearance:  Patient appears stated age, chronically ill and debilitated, laying in bed, no acute distress   ENT: Oropharynx moist, poor dentition   Lungs:   Clear to auscultation bilaterally; respirations unlabored   Heart:  S1, S2, RRR   Abdomen:   Obese, protuberant with umbilical hernia, soft, non-tender   : Robison catheter present draining urine    Extremities: No distal leg edema b/l   Neurologic: AAO to person, surroundings, conversant, fluent speech   Skin: Dry scabs of distal LLE noted without erythema  No draining wounds or fluctuance  Labs, Cultures, Imaging, & Other studies:   All pertinent labs, cultures and imaging studies were personally reviewed  Results from last 7 days   Lab Units 21  0530 21  1011 21  0446 21  2141 21  1404 21  1431 21  1028   WBC Thousand/uL 10 35*  --   --   --  11 21*  --  10 75*   HEMOGLOBIN g/dL 7 7* 7 0* 7 8*   < > 7 7*   < > 7 1*   PLATELETS Thousands/uL 332  --   --   --  258  --  292    < > = values in this interval not displayed       Results from last 7 days   Lab Units 21  0530 21  0446 21  0446 21  0759 21  0759   SODIUM mmol/L 140  --  141  --  143   POTASSIUM mmol/L 4 9   < > 4 3   < > 4 3   CHLORIDE mmol/L 108   < > 109*   < > 112*   CO2 mmol/L 18*   < > 19*   < > 20*   BUN mg/dL 79*   < > 82*   < > 80*   CREATININE mg/dL 4 57*   < > 4 39*   < > 4 56*   EGFR ml/min/1 73sq m 12   < > 12   < > 12   CALCIUM mg/dL 8 4   < > 8 6   < > 8 1*   AST U/L 51*  --  123*  --  387*   ALT U/L 85*   < > 95*   < > 161*   ALK PHOS U/L 82   < > 81   < > 82    < > = values in this interval not displayed  Results from last 7 days   Lab Units 12/19/21  1003 12/19/21  0846 12/18/21  0901 12/18/21  0453 12/17/21  1708 12/17/21  1613   BLOOD CULTURE  No Growth at 72 hrs  No Growth After 4 Days  --   --  Staphylococcus aureus* Staphylococcus aureus*   GRAM STAIN RESULT   --   --  No Polys or Bacteria seen  --  Gram positive cocci in clusters* Gram positive cocci in clusters*   WOUND CULTURE   --   --  4+ Growth of Staphylococcus aureus*  --   --   --    MRSA CULTURE ONLY   --   --   --  No Methicillin Resistant Staphlyococcus aureus (MRSA) isolated  --   --      Results from last 7 days   Lab Units 12/17/21  2211   PROCALCITONIN ng/ml 1 06*     Results from last 7 days   Lab Units 12/20/21  1028   CRP mg/L 421 8*     12/20 CT abd/pelv: Unchanged right suprarenal and multiple right renal subcapsular hematomas  No new sites of intra-abdominal or intrapelvic hemorrhage

## 2021-12-23 NOTE — ASSESSMENT & PLAN NOTE
Patient with history of thrombotic CVA with Left facial droop no other residual neurological deficits      Plan  · Monitor for any acute changes in mental status

## 2021-12-23 NOTE — PHYSICAL THERAPY NOTE
PHYSICAL THERAPY NOTE          Patient Name: Paul Soriano  [id number] Date: 12/23/2021 12/23/21 1424   PT Last Visit   PT Visit Date 12/23/21   Note Type   Note Type Treatment   Pain Assessment   Pain Assessment Tool 0-10   Pain Score 8   Pain Location/Orientation Location: Abdomen; Location: Back   Pain Onset/Description Descriptor: Pressure   Effect of Pain on Daily Activities limited activity tolerance and functional mobility    Patient's Stated Pain Goal No pain   Hospital Pain Intervention(s) Repositioned; Ambulation/increased activity; Emotional support; Rest   Multiple Pain Sites No   Restrictions/Precautions   Weight Bearing Precautions Per Order No   General   Chart Reviewed Yes   Additional Pertinent History multiple lines, tubes and catheter    Response to Previous Treatment Patient with no complaints from previous session  Family/Caregiver Present No   Cognition   Overall Cognitive Status WFL   Arousal/Participation Alert; Responsive; Cooperative  (pt continues to be self limiting and anxious throughout tx )   Attention Attends with cues to redirect   Orientation Level Oriented X4   Memory Unable to assess   Following Commands Follows one step commands with increased time or repetition   Comments pt continues to be self limiting and anxious throughout tx session    Subjective   Subjective pt was agreeable to participate in PT intervention    Bed Mobility   Rolling R 3  Moderate assistance   Additional items Assist x 1;HOB elevated; Bedrails; Increased time required;Verbal cues;LE management; Other  (trunk management )   Rolling L 3  Moderate assistance   Additional items Assist x 1;HOB elevated; Bedrails; Increased time required;Verbal cues;LE management; Other  (trunk management )   Supine to Sit 2  Maximal assistance   Additional items Assist x 1;HOB elevated; Bedrails; Increased time required;Verbal cues;LE management; Other  (trunk management )   Sit to Supine 2  Maximal assistance   Additional items Assist x 1;Bedrails;HOB elevated; Increased time required;Verbal cues;LE management; Other  (trunk management )   Additional Comments pt continues to remain consistant w/ 10 minutes of sitting tolerance w/o LOB    Transfers   Sit to Stand 1  Dependent   Additional items Assist x 1;HOB elevated; Bedrails; Increased time required;Verbal cues   Additional Comments pt continues to have difficulty clearing buttocks from EOB on 3 STS attempst in [facility] tx session    Ambulation/Elevation   Gait pattern Not appropriate   Balance   Static Sitting Poor +   Exercises   Quad Sets Supine;15 reps;AROM; Bilateral   Glute Sets Supine;10 reps;AROM; Bilateral   Knee AROM Long Arc Quad Sitting;15 reps;AROM; Bilateral   Ankle Pumps Sitting;20 reps;AROM; Bilateral   Marching Sitting;10 reps;AROM; Bilateral   Assessment   Prognosis Guarded   Problem List Decreased strength;Decreased range of motion;Decreased endurance; Impaired balance;Decreased coordination;Decreased mobility; Decreased cognition; Impaired judgement;Decreased safety awareness;Decreased skin integrity;Obesity;Pain   Assessment pt began tx session lying supine in the bed on the bed pan and was agreeable to participate in PT intervention  pt required mod Ax1 for all rolling and repositioning in the bed from Left to R w/ LE and trunk management and max Ax1 to complete a supien<>sit EOB transfer w/ LE and trunk management  pt continues to remain consistant w/ sitting tolerance as pt was able to sit EOB 10 minutes while performing TE activities w/o LOB  in [facility] tx session 3 attempts at completing a STS tranfer from EOB to RW were unsuccessful as pt continues to have difficulty clearing buttocks from EOB  At this point pt refused to attempt any more PT intervention and requested to be placed back in supine ion the bed   pt would benefit from continued focus on bed mobility w/ progression of functional transfers  continue to recommend post acute rehab services at the time of D/C in order to maximize pt functional independence and safety w/ all OOB mobility and functional transfers  post tx session pt in bed /w call bell and all pt needs met    Barriers to Discharge Inaccessible home environment;Decreased caregiver support   Goals   Patient Goals to get back into bed    STG Expiration Date 01/02/22   Plan   Treatment/Interventions ADL retraining;Functional transfer training;LE strengthening/ROM; Elevations; Therapeutic exercise; Endurance training;Patient/family training;Equipment eval/education; Bed mobility;Gait training; Compensatory technique education;Continued evaluation;Spoke to nursing   PT Frequency 4-6x/wk   Recommendation   PT Discharge Recommendation Post acute rehabilitation services   AM-PAC Basic Mobility Inpatient   Turning in Bed Without Bedrails 2   Lying on Back to Sitting on Edge of Flat Bed 2   Moving Bed to Chair 1   Standing Up From Chair 1   Walk in Room 1   Climb 3-5 Stairs 1   Basic Mobility Inpatient Raw Score 8   Turning Head Towards Sound 4   Follow Simple Instructions 3   Low Function Basic Mobility Raw Score 15   Low Function Basic Mobility Standardized Score 23 9   Highest Level Of Mobility   JH-HLM Goal 3: Sit at edge of bed   JH-HLM Highest Level of Mobility 3: Sit at edge of bed   JH-HLM Goal Achieved Yes   Education   Education Provided Other  (bed mobility and functional transfers )   Patient Reinforcement needed   End of Consult   Patient Position at End of Consult Supine;Bed/Chair alarm activated; All needs within reach   The patient's AM-PAC Basic Mobility Inpatient Short Form Raw Score is 8  A Raw score of less than or equal to 16 suggests the patient may benefit from discharge to post-acute rehabilitation services  Please also refer to the recommendation of the Physical Therapist for safe discharge planning        Irving Saint, PTA

## 2021-12-23 NOTE — ASSESSMENT & PLAN NOTE
POA Acute blood loss anemia in setting of gross hematuria, Hg on admission was 6 8 s/p 3 units of PRBC   Hg improved after transfusion now downtrending once again without clear source of bleeding    Plan  · Monitor H/H  ·  Transfuse for Hg < 7  · Monitor for s/sx of blood loss  · Heparin initiated in the setting of new DVT of LLE

## 2021-12-23 NOTE — ASSESSMENT & PLAN NOTE
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

## 2021-12-23 NOTE — PROGRESS NOTES
NEPHROLOGY PROGRESS NOTE    Aldo Dennis 78 y o  male MRN: [id number]  Unit/Bed#: S -01 Encounter: [id number]  Reason for Consult:  Acute on chronic kidney disease    Patient was sleeping when I went into the room and I awakened him  Is very tired abdomen looked a little distended but he had no complaints with  Otherwise no acute changes  ASSESSMENT/PLAN:  1  Renal    Patient acute on chronic kidney disease baseline creatinine is 1 5-1 9  He developed acute renal failure likely due to ATN after multiple contrast studies that he received earlier in hospitalization he also had retroperitoneal bleed and subcapsular hematoma the kidney 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 given the findings of reflux of contrast from the kidney when interventional Radiology performed the embolization  Unfortunately creatinine is been stable around 4 5 all week without significant improvement  He is producing urine and potassium is normal as well  There is no urgent indication for dialysis he just having very prolonged recovery phase a and will monitor  Continue supportive care  Monitor renal function    2  Patient has infiltrating growth of right lobe of the liver  3  Patient had staph bacteremia  Infectious Disease and primary team managing antibiotics  SUBJECTIVE:  Review of Systems   Constitutional: Positive for malaise/fatigue  Negative for chills, fever and night sweats  HENT: Negative  Eyes: Negative  Cardiovascular: Negative for chest pain, dyspnea on exertion, orthopnea and palpitations  Respiratory: Negative  Negative for cough, shortness of breath, sputum production and wheezing  Musculoskeletal: Positive for back pain  Gastrointestinal: Negative for abdominal pain, diarrhea, nausea and vomiting  Genitourinary:        Catheter present   Neurological: Positive for weakness  Negative for focal weakness, headaches and light-headedness     Psychiatric/Behavioral: Negative for altered mental status, hallucinations and hypervigilance  The patient is not nervous/anxious  OBJECTIVE:  Current Weight: Weight - Scale: 105 kg (232 lb 5 8 oz)  Vitals:Temp (24hrs), Av 9 °F (37 2 °C), Min:98 6 °F (37 °C), Max:99 2 °F (37 3 °C)  Current: Temperature: 98 6 °F (37 °C)   Blood pressure 170/82, pulse 81, temperature 98 6 °F (37 °C), temperature source Oral, resp  rate 20, height 5' 7" (1 702 m), weight 105 kg (232 lb 5 8 oz), SpO2 94 %  , Body mass index is 36 39 kg/m²  Intake/Output Summary (Last 24 hours) at 2021 1301  Last data filed at 2021 0901  Gross per 24 hour   Intake 900 ml   Output 1200 ml   Net -300 ml       Physical Exam: /82 (BP Location: Left arm)   Pulse 81   Temp 98 6 °F (37 °C) (Oral)   Resp 20   Ht 5' 7" (1 702 m)   Wt 105 kg (232 lb 5 8 oz)   SpO2 94%   BMI 36 39 kg/m²   Physical Exam  Constitutional:       General: He is not in acute distress  Appearance: He is not toxic-appearing or diaphoretic  HENT:      Head: Normocephalic and atraumatic  Mouth/Throat:      Mouth: Mucous membranes are dry  Eyes:      Extraocular Movements: Extraocular movements intact  Cardiovascular:      Rate and Rhythm: Normal rate and regular rhythm  Heart sounds: No friction rub  No gallop  Comments: Some edema  Pulmonary:      Effort: Pulmonary effort is normal  No respiratory distress  Breath sounds: Normal breath sounds  No wheezing, rhonchi or rales  Abdominal:      General: Bowel sounds are normal  There is distension  Palpations: Abdomen is soft  Tenderness: There is no abdominal tenderness  There is no rebound  Musculoskeletal:      Cervical back: Neck supple  Neurological:      Mental Status: He is alert and oriented to person, place, and time  Mental status is at baseline           Medications:    Current Facility-Administered Medications:   •  acetaminophen (TYLENOL) tablet 650 mg, 650 mg, Oral, Q4H PRN, Titi Fraction, PA-C, 650 mg at 12/20/21 1647  •  amLODIPine (NORVASC) tablet 10 mg, 10 mg, Oral, Daily, Gonzalez Fernandez PA-C, 10 mg at 12/23/21 7973  •  ceFAZolin (ANCEF) IVPB (premix in dextrose) 1,000 mg 50 mL, 1,000 mg, Intravenous, Q12H, Gonzalez Fernandez PA-C, Stopped at 12/23/21 1120  •  docusate sodium (COLACE) capsule 100 mg, 100 mg, Oral, BID, Graciela Moser MD, 100 mg at 12/23/21 0904  •  insulin glargine (LANTUS) subcutaneous injection 30 Units 0 3 mL, 30 Units, Subcutaneous, HS, Luis Vargas MD, 30 Units at 12/22/21 2240  •  insulin lispro (HumaLOG) 100 units/mL subcutaneous injection 10 Units, 10 Units, Subcutaneous, TID With Meals, Luis Vargas MD, 5 Units at 12/22/21 1717  •  insulin lispro (HumaLOG) 100 units/mL subcutaneous injection 2-12 Units, 2-12 Units, Subcutaneous, TID AC, 4 Units at 12/23/21 1118 **AND** Fingerstick Glucose (POCT), , , TID AC, Gonzalez Fernandez PA-C  •  metoprolol tartrate (LOPRESSOR) tablet 50 mg, 50 mg, Oral, Q12H [facility], Piotr Marquez, HUGO, 50 mg at 12/23/21 9255  •  nystatin (MYCOSTATIN) powder, , Topical, BID, Graciela Moser MD, Given at 12/23/21 0906  •  ondansetron (ZOFRAN) injection 4 mg, 4 mg, Intravenous, Q6H PRN, Gonzalez Fernandez PA-C  •  oxyCODONE (ROXICODONE) IR tablet 2 5 mg, 2 5 mg, Oral, Q6H PRN, Graciela Moser MD  •  oxyCODONE (ROXICODONE) IR tablet 5 mg, 5 mg, Oral, Q4H PRN, Graciela Moser MD, 5 mg at 12/23/21 2612  •  pantoprazole (PROTONIX) injection 40 mg, 40 mg, Intravenous, Q12H [facility], Piotr Marquez PA-C, 40 mg at 12/23/21 1566  •  polyethylene glycol (MIRALAX) packet 17 g, 17 g, Oral, Daily PRN, Graciela Moser MD, 17 g at 12/23/21 0718  •  senna-docusate sodium (SENOKOT S) 8 6-50 mg per tablet 1 tablet, 1 tablet, Oral, HS, Graciela Moser MD, 1 tablet at 12/22/21 2240    Laboratory Results:  Lab Results   Component Value Date    WBC 10 35 (H) 12/23/2021    HGB 7 7 (L) 12/23/2021    HCT 25 0 (L) 12/23/2021    MCV 94 12/23/2021  12/23/2021     Lab Results   Component Value Date    SODIUM 140 12/23/2021    K 4 9 12/23/2021     12/23/2021    CO2 18 (L) 12/23/2021    BUN 79 (H) 12/23/2021    CREATININE 4 57 (H) 12/23/2021    GLUC 184 (H) 12/23/2021    CALCIUM 8 4 12/23/2021     Lab Results   Component Value Date    CALCIUM 8 4 12/23/2021    PHOS 4 1 12/21/2021     No results found for: LABPROT

## 2021-12-23 NOTE — ASSESSMENT & PLAN NOTE
Possible infiltrating neoplasm in the right lobe of the liver seen on CT scan  Recommend dedicated triphasic CT scan of the liver for better characterization when the patient is more stable    AFP negative  Eventually will need colonoscopy and EGD

## 2021-12-23 NOTE — ASSESSMENT & PLAN NOTE
Patient had presented originally with complaint of RLQ abdominal pain  Subsequently on 12/18 went for emergent embolization with IR:  There is no evidence of acute bleeding and no intervention was done in the setting of CTA a/p showing stable right subcapsular hematom of the right kidney  No evidence of active arterial or venous extravasation noted  Repeat imaging 12/20 - CT abdomen revealed stable right subcapsular renal hematoma      Plan  · Continue to monitor and treat abdominal pain as appropriate  · Monitor for any s/sx of acute blood loss

## 2021-12-23 NOTE — ASSESSMENT & PLAN NOTE
Bowel regimen ordered, discussed with nursing-pt had a BM yesterday at 9pm    · Patient reports having bowel movement  · Continue senokot daily and Miralax daily PRN  · Status post Relistor  · Monitor for BM

## 2021-12-23 NOTE — CONSULTS
[facility] Electronic Health Record Consult  IR has been consulted to evaluate the patient, determine the appropriate procedure, and whether or not a procedure can and should be performed regarding the care of Niranjan Tanner  We were consulted by [facility] concerning Loyd Martin, and to possibly perform a tunneled central line if medically appropriate for the patient  The patient is aware that a specialty consultation is taking place, and agrees to the [facility] Consult on their behalf  Assessment/Plan:   79 yo male with acute on chronic kidney disease with right subcapsular renal hematoma, with right IJV triple lumen temp HD catheter placed 12/18 who requires durable IV access for prolonged IV atbx for Staph bacteremia  Patient has suitable access currently via the triple lumen temp HD catheter  We will plan for a tunneled catheter on Monday as the schedule permits  Long term dialysis catheter needs to be addressed by nephrology, as if a tunneled HD cath is also needed, we could do that at the same time  I spent 20 minutes in medical consultative time evaluating the medical record and imaging of Niranjan Tanner  Thank you for allowing Inter[facility]iology to participate in the care of Niranjan Tanner  Please don't hesitate to call or TigerText us with any questions       Shannan Valentin MD

## 2021-12-23 NOTE — PROGRESS NOTES
[facility]  Progress Note - Pooja Andrews 1955, 78 y o  male MRN: [id number]  Unit/Bed#: S -01 Encounter: [id number]  Primary Care Provider: Deanna Macias MD   Date and time admitted to hospital: 12/17/2021  3:03 PM    * Staphylococcus aureus bacteremia  Assessment & Plan  POA septic secondary to LLE cellulittis  2/2 bcx MSSA started on Cefazolin  Wound cultures growing MSSA  Plan  · Repeat cultures negative at 72 hours  · ID following-recommending 4 weeks of IV abx through January 16th,2022  · IR consult placed for placement of tunneled line  · Monitor fever curves and WBC    MULUGETA on CKD (chronic kidney disease) stage 3, GFR 30-59 ml/min  Assessment & Plan  Lab Results   Component Value Date    EGFR 12 12/23/2021    EGFR 12 12/22/2021    EGFR 12 12/21/2021    CREATININE 4 57 (H) 12/23/2021    CREATININE 4 39 (H) 12/22/2021    CREATININE 4 56 (H) 12/21/2021    Baseline creatinine of 1 5 - 1 9  Remains elevated consistently >4    Plan    Nephrology consulted recs as below:  · BMP in a m  · Avoid hypotension and nephrotoxins   · Maintain temporary dialysis catheter, no urgent indication for dialysis at this time    Right renal subcapsular hematoma  Assessment & Plan  Patient had presented originally with complaint of RLQ abdominal pain  Subsequently on 12/18 went for emergent embolization with IR:  There is no evidence of acute bleeding and no intervention was done in the setting of CTA a/p showing stable right subcapsular hematom of the right kidney  No evidence of active arterial or venous extravasation noted  Repeat imaging 12/20 - CT abdomen revealed stable right subcapsular renal hematoma  Plan  · Continue to monitor and treat abdominal pain as appropriate  · Continue to hold Coumadin      Anemia  Assessment & Plan  POA Acute blood loss anemia in setting of gross hematuria, Hg on admission was 6 8 s/p 3 units of PRBC   Hg improved after transfusion now downtrending once again without clear source of bleeding    Plan  · Will d/c q6 H/H, repeat CBC in the am  ·  Transfuse for Hg < 7  · Monitor for s/sx of blood loss  · Continue to hold coumadin     Type 2 diabetes mellitus, with long-term current use of insulin [facility])  Assessment & Plan  Lab Results   Component Value Date    HGBA1C 12 2 (H) 12/18/2021       Recent Labs     12/22/21  1555 12/22/21  2102 12/23/21  0739 12/23/21  1053   POCGLU 203* 168* 192* 206*       Blood Sugar Average: Last 72 hrs:  (P) 619 [id number]     Patient initially presents in DKA require insulin drip, AG closed as of 12/21 am and has remained stable  Plan  · Endocrinology following  · Lantus now 30 units at bedtime and Lispro 10 units TID along with algorithm 4 correction  · Goal -180mg/dL  · Hypoglycemia protocol and QID monitoring    Hyperlipidemia  Assessment & Plan  · Continue to hold enofibrate and statin the setting of slowly improving transaminitis      CVA (cerebral vascular accident) [facility])  Assessment & Plan  Patient with history of thrombotic CVA with Left facial droop no other residual neurological deficits  Plan  · Monitor for any acute changes in mental status  · Hold ASA and Coumadin in setting of acute anemia      Hypertension  Assessment & Plan  BP remains stable ranging 130-150/60-70s    Plan  · On  Amlodipine 10 mg  · Concern for Page kidney in the setting of right renal subcapsular hematoma  · Nephrology on board- recommending lasix if patient becomes oliguric  · Monitor VSS      Constipation  Assessment & Plan  Bowel regimen ordered:    · Rectal suppository x 1 today  · Continue senokot daily and Miralax daily PRN  · Relistor ordered at this time in setting of continue opioid use for pain control  · Monitor for BM    Transaminitis  Assessment & Plan  POA AST/ALT elevated to 800/200s  Now improving to 120/90s      Plan  · Continue to monitor for improvement  · Caution with pain control meds (ie tyenlol) in setting of resolving transaminitis     Right Liver mass  Assessment & Plan  Possible infiltrating neoplasm in the right lobe of the liver seen on CT scan  Recommend dedicated triphasic CT scan of the liver for better characterization when the patient is more stable  Supratherapeutic INR  Assessment & Plan  POA INR of 7 46, s/p Vitamin K and 1 dose of DDAVP for reversal    Plan  · Now resolved, repeat INR on  1 45   · Continue to hold coumadin in setting of downtrending H/H         VTE Pharmacologic Prophylaxis:   VTE Score: 4 Moderate Risk (Score 3-4) - Pharmacological DVT Prophylaxis Contraindicated  Sequential Compression Devices Ordered  Mechanical VTE Prophylaxis in Place: Yes    Patient Centered Rounds: I have performed bedside rounds with nursing staff today  Discussions with Specialists or Other Care Team Provider: ID, Nephrology, Urology    Education and Discussions with Family / Patient: Updated  (sister) via phone  Current Length of Stay: 6 day(s)    Current Patient Status: Inpatient     Discharge Plan / Estimated Discharge Date: TBD    Code Status: Level 1 - Full Code      Subjective:   Patient reports decreased appetite this morning  States he still has not had a bowel movement  Encouraged patient that should participate in PT/OT as this will help will over outcome  Objective:     Vitals:   Temp (24hrs), Av 8 °F (37 1 °C), Min:98 3 °F (36 8 °C), Max:99 2 °F (37 3 °C)    Temp:  [98 3 °F (36 8 °C)-99 2 °F (37 3 °C)] 98 3 °F (36 8 °C)  HR:  [71-81] 79  Resp:  [18-22] 18  BP: (149-178)/(69-88) 164/79  SpO2:  [92 %-95 %] 92 %  Body mass index is 36 39 kg/m²  Input and Output Summary (last 24 hours): Intake/Output Summary (Last 24 hours) at 2021 1552  Last data filed at 2021 1501  Gross per 24 hour   Intake 1080 ml   Output 2200 ml   Net -1120 ml       Physical Exam:     Physical Exam  Vitals reviewed  Constitutional:       Appearance: Normal appearance  HENT:      Head: Normocephalic and atraumatic  Right Ear: External ear normal       Left Ear: External ear normal       Nose: Nose normal       Mouth/Throat:      Mouth: Mucous membranes are moist       Pharynx: Oropharynx is clear  Eyes:      Conjunctiva/sclera: Conjunctivae normal    Cardiovascular:      Rate and Rhythm: Normal rate and regular rhythm  Pulses: Normal pulses  Pulmonary:      Effort: Pulmonary effort is normal       Breath sounds: Normal breath sounds  Abdominal:      General: Bowel sounds are normal       Palpations: Abdomen is soft  Tenderness: There is no abdominal tenderness  Musculoskeletal:         General: Normal range of motion  Cervical back: Neck supple  Skin:     General: Skin is warm and dry  Comments: Skin excoriations noted in suprapubic region   Neurological:      Mental Status: He is alert  Mental status is at baseline     Psychiatric:         Mood and Affect: Mood normal           Additional Data:     Labs:  Results from last 7 days   Lab Units 12/23/21  0530   WBC Thousand/uL 10 35*   HEMOGLOBIN g/dL 7 7*   HEMATOCRIT % 25 0*   PLATELETS Thousands/uL 332   BANDS PCT % 11*   LYMPHO PCT % 5*   MONO PCT % 3*   EOS PCT % 3     Results from last 7 days   Lab Units 12/23/21  0530   SODIUM mmol/L 140   POTASSIUM mmol/L 4 9   CHLORIDE mmol/L 108   CO2 mmol/L 18*   BUN mg/dL 79*   CREATININE mg/dL 4 57*   ANION GAP mmol/L 14*   CALCIUM mg/dL 8 4   ALBUMIN g/dL 1 3*   TOTAL BILIRUBIN mg/dL 0 20   ALK PHOS U/L 82   ALT U/L 85*   AST U/L 51*   GLUCOSE RANDOM mg/dL 184*     Results from last 7 days   Lab Units 12/21/21  0759   INR  1 45*     Results from last 7 days   Lab Units 12/23/21  1053 12/23/21  0739 12/22/21  2102 12/22/21  1555 12/22/21  1053 12/22/21  0824 12/22/21  0735 12/22/21  0007 12/21/21  2204 12/21/21  1958 12/21/21  1800 12/21/21  1604   POC GLUCOSE mg/dl 206* 192* 168* 203* 184* 141* 62* 130 197* 238* 197* 278*     Results from last 7 days Lab Units 12/18/21  0449   HEMOGLOBIN A1C % 12 2*     Results from last 7 days   Lab Units 12/20/21  1431 12/18/21  2129 12/18/21  1525 12/17/21  2211 12/17/21  1613   LACTIC ACID mmol/L 0 6 1 7 3 1*  --  2 0   PROCALCITONIN ng/ml  --   --   --  1 06*  --        Imaging: Reviewed radiology reports from this admission including: abdominal/pelvic CT scan    Recent Cultures (last 7 days):     Results from last 7 days   Lab Units 12/19/21  1003 12/19/21  0846 12/18/21  0901 12/17/21  1708 12/17/21  1613   BLOOD CULTURE  No Growth at 72 hrs  No Growth After 4 Days    --  Staphylococcus aureus* Staphylococcus aureus*   GRAM STAIN RESULT   --   --  No Polys or Bacteria seen Gram positive cocci in clusters* Gram positive cocci in clusters*   WOUND CULTURE   --   --  4+ Growth of Staphylococcus aureus*  --   --        Lines/Drains:  Invasive Devices  Report    Central Venous Catheter Line            CVC Central Lines 12/18/21 Triple Right Internal jugular 4 days          Peripheral Intravenous Line            Peripheral IV 12/22/21 Right;Ventral (anterior) Forearm 1 day          Drain            Urethral Catheter Temperature probe 16 Fr  4 days                Telemetry:        Last 24 Hours Medication List:   Current Facility-Administered Medications   Medication Dose Route Frequency Provider Last Rate   • acetaminophen  650 mg Oral Q4H PRN Jennifer Fernandez PA-C     • amLODIPine  10 mg Oral Daily Jennifer Fernandez PA-C     • cefazolin  1,000 mg Intravenous Q12H Jennifer Fernandez PA-C Stopped (12/23/21 1120)   • docusate sodium  100 mg Oral BID Milagros Ramires MD     • insulin glargine  30 Units Subcutaneous HS Kalen Torres MD     • insulin lispro  10 Units Subcutaneous TID With Meals Sujata Dumont MD     • insulin lispro  2-12 Units Subcutaneous TID  Jennifer Fernandez PA-C     • metoprolol tartrate  50 mg Oral Q12H [facility] Jennifer Fernandez PA-C     • nystatin   Topical BID An De Guzman MD     • ondansetron  4 mg Intravenous Q6H PRN Kvng Kimball PA-C     • oxyCODONE  2 5 mg Oral Q6H PRN Frnacis Mcgee MD     • oxyCODONE  5 mg Oral Q4H PRN Francis Mcgee MD     • pantoprazole  40 mg Intravenous Q12H [address] Piotr Marquez PA-C     • polyethylene glycol  17 g Oral Daily PRN Francis Mcgee MD     • senna-docusate sodium  1 tablet Oral HS Francis Mcgee MD          Today, Patient Was Seen By: Francis Mcgee MD    ** Please Note: This note has been constructed using a voice recognition system   **

## 2021-12-23 NOTE — ASSESSMENT & PLAN NOTE
Lab Results   Component Value Date    HGBA1C 12 2 (H) 12/18/2021       Recent Labs     12/22/21  1555 12/22/21  2102 12/23/21  0739 12/23/21  1053   POCGLU 203* 168* 192* 206*       Blood Sugar Average: Last 72 hrs:  (P) 696 [id number]     Patient initially presents in DKA require insulin drip, AG closed as of 12/21 am and has remained stable      Plan  · Endocrinology following  · Lantus now 32 units at bedtime and Lispro 6 units TID along with algorithm 4 correction  · Goal -180mg/dL  · Hypoglycemia protocol and QID monitoring

## 2021-12-23 NOTE — ASSESSMENT & PLAN NOTE
Lab Results   Component Value Date    EGFR 12 12/23/2021    EGFR 12 12/22/2021    EGFR 12 12/21/2021    CREATININE 4 57 (H) 12/23/2021    CREATININE 4 39 (H) 12/22/2021    CREATININE 4 56 (H) 12/21/2021    Baseline creatinine of 1 5 - 1 9   Remains elevated consistently >4    Plan    Nephrology consulted recs as below:  · Continue to monitor BMP   · Avoid hypotension and nephrotoxins   · Discontinue IJ dialysis catheter, no urgent indication for dialysis at this time

## 2021-12-23 NOTE — PROGRESS NOTES
Progress Note - Arlin Cervantes 78 y o  male MRN: [id number]    Unit/Bed#: S -01 Encounter: [id number]      CC: diabetes f/u    Subjective:   Arlin Cervantes is a 78y o  year old male with type 2 diabetes admitted with renal subcapsular hematoma and Staph aureus sepsis  Uses insulin U500 at home  No hypoglycemia  Complains of not feeling well, uncomfortable  P o  Intake is low, has only been picking on fruit  Not received any mealtime insulin as result    Objective:     Vitals: Blood pressure 164/79, pulse 79, temperature 98 3 °F (36 8 °C), temperature source Oral, resp  rate 18, height 5' 7" (1 702 m), weight 105 kg (232 lb 5 8 oz), SpO2 92 %  ,Body mass index is 36 39 kg/m²  Intake/Output Summary (Last 24 hours) at 12/23/2021 1645  Last data filed at 12/23/2021 1501  Gross per 24 hour   Intake 1080 ml   Output 2200 ml   Net -1120 ml       Physical Exam:  General Appearance: awake, appears stated age and cooperative  Head: Normocephalic, without obvious abnormality, atraumatic  Extremities: moves all extremities  Skin: Skin color and temperature normal    Pulm: no labored breathing    Lab, Imaging and other studies: I have personally reviewed pertinent reports  POC Glucose (mg/dl)   Date Value   12/23/2021 245 (H)   12/23/2021 206 (H)   12/23/2021 192 (H)   12/22/2021 168 (H)   12/22/2021 203 (H)   12/22/2021 184 (H)   12/22/2021 141 (H)   12/22/2021 62 (L)   12/22/2021 130   12/21/2021 197 (H)       Assessment and Plan:  1  Type 2 diabetes mellitus on long-term insulin therapy with hyperglycemia  2  History of insulin resistance   No further hypoglycemia, now blood sugars are high ranging 168-245 mg/dL  Recommend the following for now  -increase Lantus to 32 units subcutaneously at bedtime  - decrease Humalog to 5 units with meals, if patient is eating less, but consuming carbohydrates, please give 2 units  -continue sliding scale insulin  Will continue to follow and make changes as needed    3  Sepsis-continue antibiotics per primary team  4  Salvador on CKD      Portions of the record may have been created with voice recognition software  Occasional wrong word or "sound a like" substitutions may have occurred due to the inherent limitations of voice recognition software  Read the chart carefully and recognize, using context, where substitutions have occurred

## 2021-12-24 PROBLEM — I82.432 ACUTE DEEP VEIN THROMBOSIS (DVT) OF POPLITEAL VEIN OF LEFT LOWER EXTREMITY (HCC): Status: ACTIVE | Noted: 2021-01-01

## 2021-12-24 PROBLEM — R74.01 TRANSAMINITIS: Status: RESOLVED | Noted: 2021-01-01 | Resolved: 2021-01-01

## 2021-12-24 PROBLEM — R79.1 SUPRATHERAPEUTIC INR: Status: RESOLVED | Noted: 2021-01-01 | Resolved: 2021-01-01

## 2021-12-24 NOTE — PROGRESS NOTES
Progress Note - Infectious Disease   Hector Jose 78 y o  male MRN: [id number]  Unit/Bed#: S -01 Encounter: [id number]    Impression/Recommendations:  1  Sepsis, POA:  With tachypnea, leukocytosis, elevated lactate, MULUGETA on CKD 3  In the setting of Staphylococcus aureus bacteremia  WBC is decreasing  Lactate has normalized (3 1-> 1 7)  ? Antibiotic therapy as below  ? Supportive care per primary team  ? CBC in am   ? Monitor clinical response, temperature curve    2  Staphylococcus aureus bacteremia  LLE wound cx also with growth of Staph aureus, MSSA  Consider possibility of seeding of renal subcapsular hematoma although repeat blood cx from 12/19 remain negative  ESR of 95 is significantly elevated  12/22 transthoracic echocardiogram is negative for valve vegetation  ? Continue cefazolin 1g IV q12 hours  ? Anticipate 4 week duration of IV antibiotic therapy from time of negative cultures thru Jan 16th  ? Weekly labs while on iv ABx: CBC with diff, creatinine  ? Suggest tunneled PICC line insertion as repeat blood cultures remain negative at 72 hours; discussed with Dr Pauly Garcia from nephrology service    3  LLE cellulitis: possible SSTI source of bacteremia  Cellulitis has resolved  ? Left lower extremity elevation  ? Continue cefazolin IV as above  ? Monitor clinical response    4  Acute on chronic kidney disease stage 3:   ? Renal dose medications, avoid nephrotoxins  ? Nephrology service is following with no immediate plans for dialysis  ? Repeat creatinine in a m     5  Diabetes mellitus type 2, insulin dependent:  Initially treated as DKA upon hospital admission  Hyperglycemia is a risk factor for infection and delayed wound healing  ? Glycemic control as per primary service    6  Renal subcapsular hematoma:  With anemia in the setting of supratherapeutic INR  Status post IR angiography with no evidence of extravasation  Hgb is currently stable  7  Transaminitis:  LFTs are downtrending    CT scan raises concern for infiltrating neoplasm in right hepatic lobe  ? Triple phase CT considered with improved renal function    8  History of penicillin allergy:  Hives age 14  Patient is currently tolerating cefazolin IV      My recommendations were discussed with the patient and his sister who verbalized understanding  ID service will formally re-evaluate this patient on Monday  Please contact ID attending on call with questions this weekend as needed  Antibiotics: cefazolin since 12/17  Scheduled Meds:  Current Facility-Administered Medications   Medication Dose Route Frequency Provider Last Rate   • acetaminophen  650 mg Oral Q4H PRN Americo Alva PA-C     • amLODIPine  10 mg Oral Daily Americo Alva PA-C     • cefazolin  1,000 mg Intravenous Q12H Americo Alva PA-C 1,000 mg (12/24/21 0955)   • docusate sodium  100 mg Oral BID Elton Meraz MD     • insulin glargine  32 Units Subcutaneous HS Suma Zimmerman MD     • insulin lispro  1-6 Units Subcutaneous HS Bony Sam DO     • insulin lispro  2-12 Units Subcutaneous TID AC Americo Alva PA-C     • insulin lispro  5 Units Subcutaneous TID With Meals Suma Zimmerman MD     • metoprolol tartrate  50 mg Oral Q12H [address] Americo Alva PA-C     • nystatin   Topical BID Elton Meraz MD     • ondansetron  4 mg Intravenous Q6H PRN Americo Alva PA-C     • oxyCODONE  2 5 mg Oral Q6H PRN Elton Meraz MD     • oxyCODONE  5 mg Oral Q4H PRN Elton Meraz MD     • pantoprazole  40 mg Intravenous Q12H [address] Piotr Marquez PA-C     • polyethylene glycol  17 g Oral Daily PRN Elton Meraz MD     • senna-docusate sodium  1 tablet Oral HS Milagros Ramires MD       Continuous Infusions:   PRN Meds: •  acetaminophen  •  ondansetron  •  oxyCODONE  •  oxyCODONE  •  polyethylene glycol    Subjective:  Patient reports fatigue but otherwise offers no complaints  He denies fever, vomiting, diarrhea, rash    He is tolerating current antibiotic therapy  Objective:  Vitals:  Temp:  [97 9 °F (36 6 °C)-98 9 °F (37 2 °C)] 98 6 °F (37 °C)  HR:  [60-79] 66  Resp:  [18-26] 22  BP: (140-170)/(67-80) 170/77  SpO2:  [90 %-97 %] 92 %  Temp (24hrs), Av 4 °F (36 9 °C), Min:97 9 °F (36 6 °C), Max:98 9 °F (37 2 °C)  Current: Temperature: 98 6 °F (37 °C)  Physical Exam:   General Appearance:  Patient appears stated age, chronically ill and debilitated, laying in bed, no acute distress   ENT: Oropharynx moist, poor dentition   Lungs:   Clear to auscultation bilaterally; respirations unlabored   Heart:  S1, S2, RRR   Abdomen:   Obese, protuberant with umbilical hernia, soft, non-tender   Extremities: No distal leg edema b/l   Neurologic: AAO to person, surroundings, conversant, fluent speech   Skin: Dry scabs of distal LLE noted without erythema  No draining wounds or fluctuance  Labs, Cultures, Imaging, & Other studies:   All pertinent labs, cultures and imaging studies were personally reviewed  Results from last 7 days   Lab Units 21  0514 21  0530 21  1011 21  2141 21  1404   WBC Thousand/uL 10 81* 10 35*  --   --  11 21*   HEMOGLOBIN g/dL 7 9* 7 7* 7 0*   < > 7 7*   PLATELETS Thousands/uL 384 332  --   --  258    < > = values in this interval not displayed  Results from last 7 days   Lab Units 21  0514 21  0530 21  0530 21  0446 21  0446   SODIUM mmol/L 141  --  140  --  141   POTASSIUM mmol/L 4 9   < > 4 9   < > 4 3   CHLORIDE mmol/L 110*   < > 108   < > 109*   CO2 mmol/L 20*   < > 18*   < > 19*   BUN mg/dL 79*   < > 79*   < > 82*   CREATININE mg/dL 4 62*   < > 4 57*   < > 4 39*   EGFR ml/min/1 73sq m 12   < > 12   < > 12   CALCIUM mg/dL 8 3   < > 8 4   < > 8 6   AST U/L 34  --  51*  --  123*   ALT U/L 62   < > 85*   < > 95*   ALK PHOS U/L 78   < > 82   < > 81    < > = values in this interval not displayed       Results from last 7 days   Lab Units 21  1002 12/19/21  0846 12/18/21  0901 12/18/21  0453 12/17/21  1708 12/17/21  1613   BLOOD CULTURE  No Growth After 4 Days  No Growth After 5 Days  --   --  Staphylococcus aureus* Staphylococcus aureus*   GRAM STAIN RESULT   --   --  No Polys or Bacteria seen  --  Gram positive cocci in clusters* Gram positive cocci in clusters*   WOUND CULTURE   --   --  4+ Growth of Staphylococcus aureus*  --   --   --    MRSA CULTURE ONLY   --   --   --  No Methicillin Resistant Staphlyococcus aureus (MRSA) isolated  --   --      Results from last 7 days   Lab Units 12/17/21  2211   PROCALCITONIN ng/ml 1 06*     Results from last 7 days   Lab Units 12/20/21  1028   CRP mg/L 421 8*     12/20 CT abd/pelv: Unchanged right suprarenal and multiple right renal subcapsular hematomas  No new sites of intra-abdominal or intrapelvic hemorrhage

## 2021-12-24 NOTE — TREATMENT PLAN
Endocrinology follow up  Reviewed chart remotely  Appetite and intake remains poor, although does have tendency for post-prandial hyperglycemia  Will make modest adjustment to mealtime insulin, increasing prandial dose to 6 units for full meal and 3 units for 1/2 meal  Will also add algorithm 3 nighttime correction   Will continue to monitor    Dr Grace Najera

## 2021-12-24 NOTE — PROGRESS NOTES
NEPHROLOGY PROGRESS NOTE    Clarissa Ko 78 y o  male MRN: [id number]  Unit/Bed#: S -01 Encounter: [id number]  Reason for Consult:  Acute on chronic kidney disease    Patient is relatively stable still with back pain but really no other significant changes reported  Patient eating little bit better  ASSESSMENT/PLAN:  1  Renal    Patient has acute on chronic kidney disease baseline creatinine is 1 5-1 9 as stated  He developed ATN and creatinine seems to be stable in the mid 4 range without significant change all week  Urine output starting to increase  He did have contrast studies and also potentially had injury to kidney related to subcapsular hematoma  No significant proven all week  There is no urgent indication for dialysis but if there is no improvement obviously the patient's renal function has progressed but hopefully will see it improve in time  For now no specific changes  My partner had had a dialysis catheter placed and was never used  Continue supportive care  Discontinue IJ dialysis catheter    2  Infectious Disease    Patient being treated for Staph aureus bacteremia  [place] to be rely clear related to left lower extremity wound  On cefazolin and recommended for week treatment course through January 16th by infectious Disease is following the patient  3  Right liver mass  Suspicious for neoplasm  No definitive diagnosis has been made primary service is aware  SUBJECTIVE:  Review of Systems   Constitutional: Positive for malaise/fatigue  Negative for chills, diaphoresis, fever and night sweats  HENT: Negative  Eyes: Negative  Cardiovascular: Negative for chest pain, dyspnea on exertion, orthopnea and palpitations  Respiratory: Negative for cough, shortness of breath, sputum production and wheezing  Musculoskeletal: Positive for back pain  Gastrointestinal: Negative for abdominal pain, diarrhea, nausea and vomiting  Genitourinary:        Catheter in  Neurological: Positive for weakness  Negative for dizziness, focal weakness, headaches and light-headedness  Psychiatric/Behavioral: Negative for altered mental status, depression, hallucinations and hypervigilance  OBJECTIVE:  Current Weight: Weight - Scale: 107 kg (235 lb 14 3 oz)  Vitals:Temp (24hrs), Av 4 °F (36 9 °C), Min:97 9 °F (36 6 °C), Max:98 9 °F (37 2 °C)  Current: Temperature: 98 6 °F (37 °C)   Blood pressure 170/77, pulse 66, temperature 98 6 °F (37 °C), temperature source Oral, resp  rate 22, height 5' 7" (1 702 m), weight 107 kg (235 lb 14 3 oz), SpO2 92 %  , Body mass index is 36 95 kg/m²  Intake/Output Summary (Last 24 hours) at 2021 1108  Last data filed at 2021 0130  Gross per 24 hour   Intake 500 ml   Output 1800 ml   Net -1300 ml       Physical Exam: /77 (BP Location: Left arm)   Pulse 66   Temp 98 6 °F (37 °C) (Oral)   Resp 22   Ht 5' 7" (1 702 m)   Wt 107 kg (235 lb 14 3 oz)   SpO2 92%   BMI 36 95 kg/m²   Physical Exam  Constitutional:       General: He is not in acute distress  Appearance: He is not toxic-appearing or diaphoretic  HENT:      Head: Normocephalic and atraumatic  Mouth/Throat:      Mouth: Mucous membranes are dry  Eyes:      General: No scleral icterus  Extraocular Movements: Extraocular movements intact  Cardiovascular:      Rate and Rhythm: Normal rate and regular rhythm  Heart sounds: No friction rub  No gallop  Pulmonary:      Effort: Pulmonary effort is normal  No respiratory distress  Breath sounds: No wheezing, rhonchi or rales  Abdominal:      General: Bowel sounds are normal  There is no distension  Palpations: Abdomen is soft  Tenderness: There is no abdominal tenderness  There is no rebound  Musculoskeletal:      Cervical back: Normal range of motion and neck supple  Neurological:      Mental Status: He is alert and oriented to person, place, and time   Mental status is at baseline  Psychiatric:         Mood and Affect: Mood normal          Behavior: Behavior normal          Thought Content:  Thought content normal          Judgment: Judgment normal          Medications:    Current Facility-Administered Medications:   •  acetaminophen (TYLENOL) tablet 650 mg, 650 mg, Oral, Q4H PRN, Jose Aranda PA-C, 650 mg at 12/24/21 0540  •  amLODIPine (NORVASC) tablet 10 mg, 10 mg, Oral, Daily, Jose Aranda PA-C, 10 mg at 12/24/21 6944  •  ceFAZolin (ANCEF) IVPB (premix in dextrose) 1,000 mg 50 mL, 1,000 mg, Intravenous, Q12H, Jose Aranda PA-C, Last Rate: 100 mL/hr at 12/24/21 0955, 1,000 mg at 12/24/21 0955  •  docusate sodium (COLACE) capsule 100 mg, 100 mg, Oral, BID, Farooq Lazcano MD, 100 mg at 12/24/21 0954  •  insulin glargine (LANTUS) subcutaneous injection 32 Units 0 32 mL, 32 Units, Subcutaneous, HS, Isaiah Blanco MD, 32 Units at 12/23/21 2336  •  insulin lispro (HumaLOG) 100 units/mL subcutaneous injection 1-6 Units, 1-6 Units, Subcutaneous, HS, Bony Yao [facility], DO  •  insulin lispro (HumaLOG) 100 units/mL subcutaneous injection 2-12 Units, 2-12 Units, Subcutaneous, TID AC, 2 Units at 12/24/21 0954 **AND** Fingerstick Glucose (POCT), , , TID AC, Piotr Marquez PA-C  •  insulin lispro (HumaLOG) 100 units/mL subcutaneous injection 5 Units, 5 Units, Subcutaneous, TID With Meals, Isaiah Blanco MD, 5 Units at 12/24/21 1000  •  metoprolol tartrate (LOPRESSOR) tablet 50 mg, 50 mg, Oral, Q12H [facility], Piotr Marquez PA-C, 50 mg at 12/24/21 1960  •  nystatin (MYCOSTATIN) powder, , Topical, BID, Farooq Lazcano MD, Given at 12/23/21 8727  •  ondansetron (ZOFRAN) injection 4 mg, 4 mg, Intravenous, Q6H PRN, Jose Aranda PA-C  •  oxyCODONE (ROXICODONE) IR tablet 2 5 mg, 2 5 mg, Oral, Q6H PRN, Farooq Lazcano MD  •  oxyCODONE (ROXICODONE) IR tablet 5 mg, 5 mg, Oral, Q4H PRN, Farooq Lazcano MD, 5 mg at 12/24/21 0954  •  pantoprazole (PROTONIX) injection 40 mg, 40 mg, Intravenous, Q12H [facility], Piotr Marquez PA-C, 40 mg at 12/24/21 0954  •  polyethylene glycol (MIRALAX) packet 17 g, 17 g, Oral, Daily PRN, Spencer Bunch MD, 17 g at 12/23/21 0728  •  senna-docusate sodium (SENOKOT S) 8 6-50 mg per tablet 1 tablet, 1 tablet, Oral, HS, Spencer Bunch MD, 1 tablet at 12/23/21 2050    Laboratory Results:  Lab Results   Component Value Date    WBC 10 81 (H) 12/24/2021    HGB 7 9 (L) 12/24/2021    HCT 25 3 (L) 12/24/2021    MCV 94 12/24/2021     12/24/2021     Lab Results   Component Value Date    SODIUM 141 12/24/2021    K 4 9 12/24/2021     (H) 12/24/2021    CO2 20 (L) 12/24/2021    BUN 79 (H) 12/24/2021    CREATININE 4 62 (H) 12/24/2021    GLUC 191 (H) 12/24/2021    CALCIUM 8 3 12/24/2021     Lab Results   Component Value Date    CALCIUM 8 3 12/24/2021    PHOS 4 1 12/21/2021     No results found for: LABPROT

## 2021-12-24 NOTE — ASSESSMENT & PLAN NOTE
Patient reports c/o of new onset left lower extremity pain, on examination exquisitely tender behind the calf  Discussed with patient concern for DVT and obtained LLE doppler  Plan  · VAS doppler showing DVT in popliteal ans gastroc veins  · Continue heparin drip  · Likely transition to p o   Anticoagulants tomorrow  · Continue to monitor closely

## 2021-12-24 NOTE — PROGRESS NOTES
Pt continues to have severe pain in his left leg, medicated regularly, pedal pulses palpable  He has great difficulty moving his leg at all, when staff is assisting him to turn and prop  Noted this shift that his resp rate was ranging from 24-28 for most of the night except towards early morning hour when he was is in a deep sleep, breathing at these times is somewhat labored accessory muscles are used, 02 sats in low 90's on RA, applied nasal cannula at 2l for comfort to help decrease work of breathing  Pt has a frequent wet cough which is sometimes productive but most often not  Breath sounds scattered rhonchi and occasional exp wheeze  He has systemic edema throughout, upper extremities are +3  Lower extremities not quite as edematous, mult healing scabs on bilat shins and calves/ Triple lumen IJ intact, reserved for dialysis  Right lower arm IV flushes well, nor painful or red, but diffiicult to assess for infiltration due to excessive edema, however, pt tolerating flushes and IV meds without any issues, [place] maintained

## 2021-12-24 NOTE — PROGRESS NOTES
[facility]  Progress Note - Yohannes Garcia 1955, 78 y o  male MRN: [id number]  Unit/Bed#: S -01 Encounter: [id number]  Primary Care Provider: Marium Mcpherson MD   Date and time admitted to hospital: 12/17/2021  3:03 PM    * Staphylococcus aureus bacteremia  Assessment & Plan  POA septic secondary to LLE cellulittis  2/2 bcx MSSA started on Cefazolin  Wound cultures growing MSSA  Plan  · Repeat cultures collected on 12/19/21 negative at 5 days  · ID following-recommending 4 weeks of IV abx through January 16th,2022  · IR planning for placement of  Tunneled line on 12/27/21  · Monitor fever curves and WBC    MULUGETA on CKD (chronic kidney disease) stage 3, GFR 30-59 ml/min  Assessment & Plan  Lab Results   Component Value Date    EGFR 12 12/23/2021    EGFR 12 12/22/2021    EGFR 12 12/21/2021    CREATININE 4 57 (H) 12/23/2021    CREATININE 4 39 (H) 12/22/2021    CREATININE 4 56 (H) 12/21/2021    Baseline creatinine of 1 5 - 1 9  Remains elevated consistently >4    Plan    Nephrology consulted recs as below:  · Continue to monitor BMP   · Avoid hypotension and nephrotoxins   · Discontinue IJ dialysis catheter, no urgent indication for dialysis at this time    Acute deep vein thrombosis (DVT) of popliteal vein of left lower extremity ([address] )  Assessment & Plan  Patient reports c/o of new onset left lower extremity pain, on examination exquisitely tender behind the calf  Discussed with patient concern for DVT and obtained LLE doppler  Plan  · VAS doppler showing DVT in popliteal ans gastroc veins  · Heparin drip initiated  · Continue to monitor closely    Anemia  Assessment & Plan  POA Acute blood loss anemia in setting of gross hematuria, Hg on admission was 6 8 s/p 3 units of PRBC   Hg improved after transfusion now downtrending once again without clear source of bleeding    Plan  · Monitor H/H  ·  Transfuse for Hg < 7  · Monitor for s/sx of blood loss  · Heparin initiated in the setting of new DVT of LLE     Type 2 diabetes mellitus, with long-term current use of insulin [facility])  Assessment & Plan  Lab Results   Component Value Date    HGBA1C 12 2 (H) 12/18/2021       Recent Labs     12/22/21  1555 12/22/21  2102 12/23/21  0739 12/23/21  1053   POCGLU 203* 168* 192* 206*       Blood Sugar Average: Last 72 hrs:  (P) 930 [id number]     Patient initially presents in DKA require insulin drip, AG closed as of 12/21 am and has remained stable  Plan  · Endocrinology following  · Lantus now 32 units at bedtime and Lispro 5 units TID along with algorithm 4 correction  · Goal -180mg/dL  · Hypoglycemia protocol and QID monitoring    Hyperlipidemia  Assessment & Plan  · Transaminitis has now resolved  · Will resume statin therpay      Right renal subcapsular hematoma  Assessment & Plan  Patient had presented originally with complaint of RLQ abdominal pain  Subsequently on 12/18 went for emergent embolization with IR:  There is no evidence of acute bleeding and no intervention was done in the setting of CTA a/p showing stable right subcapsular hematom of the right kidney  No evidence of active arterial or venous extravasation noted  Repeat imaging 12/20 - CT abdomen revealed stable right subcapsular renal hematoma  Plan  · Continue to monitor and treat abdominal pain as appropriate  · Monitor for any s/sx of acute blood loss      CVA (cerebral vascular accident) [facility])  Assessment & Plan  Patient with history of thrombotic CVA with Left facial droop no other residual neurological deficits      Plan  · Monitor for any acute changes in mental status      Hypertension  Assessment & Plan  BP remains stable ranging 150/60-70s    Plan  · On  Amlodipine 10 mg  · Concern for [place] kidney in the setting of right renal subcapsular hematoma  · Nephrology on board- recommending lasix if patient becomes oliguric  · Monitor VSS      Constipation  Assessment & Plan  Bowel regimen ordered, discussed with nursing-pt had a BM yesterday at 9pm    · Rectal suppository x 1 today  · Continue senokot daily and Miralax daily PRN  · Relistor ordered at this time in setting of continue opioid use for pain control  · Monitor for BM    Right Liver mass  Assessment & Plan  Possible infiltrating neoplasm in the right lobe of the liver seen on CT scan  Recommend dedicated triphasic CT scan of the liver for better characterization when the patient is more stable  Transaminitis-resolved as of 2021  Assessment & Plan  POA AST/ALT elevated to 800/200s  Now improving to 120/90s  Plan  · Continue to monitor for improvement  · Caution with pain control meds (ie tyenlol) in setting of resolving transaminitis     Supratherapeutic INR-resolved as of 2021  Assessment & Plan  POA INR of 7 46, s/p Vitamin K and 1 dose of DDAVP for reversal    Plan  · Now resolved, repeat INR on  1 45   · Continue to hold coumadin in setting of downtrending H/H         VTE Pharmacologic Prophylaxis:   VTE Score: 4 Moderate Risk (Score 3-4) - Pharmacological DVT Prophylaxis Ordered: Heparin Drip  Mechanical VTE Prophylaxis in Place: Yes    Patient Centered Rounds: I have performed bedside rounds with nursing staff today  Discussions with Specialists or Other Care Team Provider: ID, Nephrology, Urology    Education and Discussions with Family / Patient: Updated  (wife) via phone  Current Length of Stay: 7 day(s)    Current Patient Status: Inpatient     Discharge Plan / Estimated Discharge Date: TBD    Code Status: Level 1 - Full Code      Subjective:   Patient reports acute left leg pain this morning   Tender to light palpation on exam     Objective:     Vitals:   Temp (24hrs), Av 4 °F (36 9 °C), Min:97 9 °F (36 6 °C), Max:98 9 °F (37 2 °C)    Temp:  [97 9 °F (36 6 °C)-98 9 °F (37 2 °C)] 98 6 °F (37 °C)  HR:  [60-79] 66  Resp:  [18-26] 22  BP: (140-170)/(67-80) 170/77  SpO2:  [90 %-97 %] 92 %  Body mass index is 36 95 kg/m²  Input and Output Summary (last 24 hours): Intake/Output Summary (Last 24 hours) at 12/24/2021 1340  Last data filed at 12/24/2021 0130  Gross per 24 hour   Intake 500 ml   Output 1800 ml   Net -1300 ml       Physical Exam:     Physical Exam  Vitals reviewed  Constitutional:       Appearance: Normal appearance  HENT:      Head: Normocephalic and atraumatic  Right Ear: External ear normal       Left Ear: External ear normal       Nose: Nose normal       Mouth/Throat:      Mouth: Mucous membranes are moist       Pharynx: Oropharynx is clear  Eyes:      Conjunctiva/sclera: Conjunctivae normal    Cardiovascular:      Rate and Rhythm: Normal rate and regular rhythm  Pulses: Normal pulses  Pulmonary:      Effort: Pulmonary effort is normal       Breath sounds: Normal breath sounds  Abdominal:      General: Bowel sounds are normal       Palpations: Abdomen is soft  Tenderness: There is no abdominal tenderness  Musculoskeletal:      Right lower leg: Edema present  Left lower leg: Edema present  Comments: Tenderness in the left lower calf  Slight warmth compared to right  No worsening of erythema or purluence    Skin:     General: Skin is warm and dry  Comments: Skin excoriations noted in suprapubic region   Neurological:      Mental Status: He is alert  Mental status is at baseline     Psychiatric:         Mood and Affect: Mood normal           Additional Data:     Labs:  Results from last 7 days   Lab Units 12/24/21  0514   WBC Thousand/uL 10 81*   HEMOGLOBIN g/dL 7 9*   HEMATOCRIT % 25 3*   PLATELETS Thousands/uL 384   BANDS PCT % 9*   LYMPHO PCT % 6*   MONO PCT % 7   EOS PCT % 6     Results from last 7 days   Lab Units 12/24/21  0514   SODIUM mmol/L 141   POTASSIUM mmol/L 4 9   CHLORIDE mmol/L 110*   CO2 mmol/L 20*   BUN mg/dL 79*   CREATININE mg/dL 4 62*   ANION GAP mmol/L 11   CALCIUM mg/dL 8 3   ALBUMIN g/dL 1 4*   TOTAL BILIRUBIN mg/dL 0 28   ALK PHOS U/L 78   ALT U/L 62   AST U/L 34   GLUCOSE RANDOM mg/dL 191*     Results from last 7 days   Lab Units 12/21/21  0759   INR  1 45*     Results from last 7 days   Lab Units 12/24/21  1110 12/24/21  0731 12/23/21  2139 12/23/21  1606 12/23/21  1053 12/23/21  0739 12/22/21  2102 12/22/21  1555 12/22/21  1053 12/22/21  0824 12/22/21  0735 12/22/21  0007   POC GLUCOSE mg/dl 218* 186* 234* 245* 206* 192* 168* 203* 184* 141* 62* 130     Results from last 7 days   Lab Units 12/18/21  0449   HEMOGLOBIN A1C % 12 2*     Results from last 7 days   Lab Units 12/20/21  1431 12/18/21  2129 12/18/21  1525 12/17/21  2211 12/17/21  1613   LACTIC ACID mmol/L 0 6 1 7 3 1*  --  2 0   PROCALCITONIN ng/ml  --   --   --  1 06*  --        Imaging: No pertinent imaging reviewed  Recent Cultures (last 7 days):     Results from last 7 days   Lab Units 12/19/21  1003 12/19/21  0846 12/18/21  0901 12/17/21  1708 12/17/21  1613   BLOOD CULTURE  No Growth After 4 Days  No Growth After 5 Days    --  Staphylococcus aureus* Staphylococcus aureus*   GRAM STAIN RESULT   --   --  No Polys or Bacteria seen Gram positive cocci in clusters* Gram positive cocci in clusters*   WOUND CULTURE   --   --  4+ Growth of Staphylococcus aureus*  --   --        Lines/Drains:  Invasive Devices  Report    Central Venous Catheter Line            CVC Central Lines 12/18/21 Triple Right Internal jugular 5 days          Peripheral Intravenous Line            Peripheral IV 12/22/21 Right;Ventral (anterior) Forearm 2 days          Drain            Urethral Catheter Temperature probe 16 Fr  5 days                Telemetry:        Last 24 Hours Medication List:   Current Facility-Administered Medications   Medication Dose Route Frequency Provider Last Rate   • acetaminophen  650 mg Oral Q4H PRN Ishan HUGO Armstrong     • amLODIPine  10 mg Oral Daily Ishan HUGO Armstrong     • atorvastatin  40 mg Oral Daily With Tisha Vasquez MD     • cefazolin  1,000 mg Intravenous Q12H ONESIMO BonillaC 1,000 mg (12/24/21 1888)   • docusate sodium  100 mg Oral BID Allegra Jacinto MD     • heparin (porcine)  3-30 Units/kg/hr (Order-Specific) Intravenous Titrated Lan Trevino MD     • heparin (porcine)  4,200 Units Intravenous Q1H PRN Lan Trevino MD     • heparin (porcine)  8,400 Units Intravenous Once Lan Trevino MD     • heparin (porcine)  8,400 Units Intravenous Q1H PRN Lan Trevino MD     • insulin glargine  32 Units Subcutaneous HS Anna Gandhi MD     • insulin lispro  1-6 Units Subcutaneous HS Bony Sam DO     • insulin lispro  2-12 Units Subcutaneous TID AC Piotr Marquez PA-C     • insulin lispro  5 Units Subcutaneous TID With Meals Anna Gandhi MD     • metoprolol tartrate  50 mg Oral Q12H [facility] David Marquez PA-C     • nystatin   Topical BID Allegra Jacinto MD     • ondansetron  4 mg Intravenous Q6H PRN David Marquez PA-C     • oxyCODONE  2 5 mg Oral Q6H PRN Allegra Jacinto MD     • oxyCODONE  5 mg Oral Q4H PRN Allegra Jacinto MD     • pantoprazole  40 mg Intravenous Q12H [facility] Piotr Whitman PA-C     • polyethylene glycol  17 g Oral Daily PRN Allegra Jacinto MD     • senna-docusate sodium  1 tablet Oral HS Allegra Jacinto MD          Today, Patient Was Seen By: Allegra Jacinto MD    ** Please Note: This note has been constructed using a voice recognition system   **

## 2021-12-25 NOTE — PROGRESS NOTES
NEPHROLOGY PROGRESS NOTE    Marissa Walls 78 y o  male MRN: [id number]  Unit/Bed#: S -01 Encounter: [id number]  Reason for Consult:  Acute on chronic kidney disease    The patient is lying in bed still complaining of his right flank pain and left leg pain  Otherwise says he is eating a little bit just does not feel well  ASSESSMENT/PLAN:  1  Renal    Patient has acute on chronic kidney disease creatinine is 1 5-1 9 baseline  He presented with retroperitoneal bleeding ended up having contrast exposure with imaging as well as arteriogram to embolize the bleeding  All week his creatinine is been running in the 4 range it is slightly lower today than yesterday but not significantly changed  The patient produced 1 8 L of urine yesterday  Electrolytes are normal   He may have also had PAGE kidney related to capsular bleed  Also there was a comment by Interventional Radiology they saw reflux of contrast when they injected into that renal artery suggesting that there was increased pressure on the kidney externally  At this point unsure how long it will take for renal function to declined back to baseline or whether not this is a new baseline  There is no emergent indication for dialysis but certainly renal function is worsened he will need close follow-up on discharge  My partner had had a dialysis catheter placed in anticipation  It was never used  I asked the nurses to remove it yesterday they told me that the ICU nurses would do it because was placed in IR  I put in a consult for IR to discontinue the temporary dialysis catheter is not being used at the present time  Monitor renal function  Discontinue IJ dialysis catheter    2  Infectious Disease    Being treated and followed by infectious disease for Staph aureus bacteremia  On cefazolin  3  Liver mass    Suspicious for neoplasm  No definitive diagnosis made at this point      SUBJECTIVE:  Review of Systems   Constitutional: Positive for malaise/fatigue  Negative for chills, fever and night sweats  HENT: Negative  Eyes: Negative  Cardiovascular: Positive for dyspnea on exertion and leg swelling  Negative for chest pain and orthopnea  Respiratory: Negative for cough, shortness of breath, sputum production and wheezing  Musculoskeletal:        Pain in right flank and left leg   Gastrointestinal: Negative for abdominal pain, diarrhea, nausea and vomiting  Genitourinary: Robison catheter no gross hematuria   Neurological: Positive for weakness  Negative for dizziness, focal weakness, headaches and light-headedness  Psychiatric/Behavioral: Negative for altered mental status, depression, hallucinations and hypervigilance  OBJECTIVE:  Current Weight: Weight - Scale: 107 kg (236 lb 15 9 oz)  Vitals:Temp (24hrs), Av 4 °F (36 9 °C), Min:97 6 °F (36 4 °C), Max:99 2 °F (37 3 °C)  Current: Temperature: 99 2 °F (37 3 °C)   Blood pressure 140/66, pulse 68, temperature 99 2 °F (37 3 °C), resp  rate 19, height 5' 7" (1 702 m), weight 107 kg (236 lb 15 9 oz), SpO2 94 %  , Body mass index is 37 12 kg/m²  No intake or output data in the 24 hours ending 21 1129    Physical Exam: /66   Pulse 68   Temp 99 2 °F (37 3 °C)   Resp 19   Ht 5' 7" (1 702 m)   Wt 107 kg (236 lb 15 9 oz)   SpO2 94%   BMI 37 12 kg/m²   Physical Exam  Constitutional:       General: He is not in acute distress  Appearance: He is not toxic-appearing or diaphoretic  HENT:      Head: Normocephalic and atraumatic  Mouth/Throat:      Mouth: Mucous membranes are dry  Eyes:      General: No scleral icterus  Extraocular Movements: Extraocular movements intact  Cardiovascular:      Rate and Rhythm: Normal rate and regular rhythm  Heart sounds: No friction rub  No gallop  Comments: Positive edema  Pulmonary:      Effort: Pulmonary effort is normal  No respiratory distress  Breath sounds: Rhonchi present   No wheezing or rales    Abdominal:      General: Bowel sounds are normal  There is no distension  Palpations: Abdomen is soft  Tenderness: There is no abdominal tenderness  There is no rebound  Musculoskeletal:      Cervical back: Normal range of motion and neck supple  Neurological:      General: No focal deficit present  Mental Status: He is alert and oriented to person, place, and time  Psychiatric:         Behavior: Behavior normal          Thought Content:  Thought content normal          Judgment: Judgment normal          Medications:    Current Facility-Administered Medications:   •  acetaminophen (TYLENOL) tablet 650 mg, 650 mg, Oral, Q4H PRN, Ritu Lob, PA-C, 650 mg at 12/25/21 0440  •  amLODIPine (NORVASC) tablet 10 mg, 10 mg, Oral, Daily, Ritu Lob, PA-C, 10 mg at 12/25/21 6605  •  atorvastatin (LIPITOR) tablet 40 mg, 40 mg, Oral, Daily With Dinner, Eric Pires MD, 40 mg at 12/24/21 1827  •  ceFAZolin (ANCEF) IVPB (premix in dextrose) 1,000 mg 50 mL, 1,000 mg, Intravenous, Q12H, Ritu Trevino PA-C, Last Rate: 100 mL/hr at 12/24/21 2328, 1,000 mg at 12/24/21 2328  •  docusate sodium (COLACE) capsule 100 mg, 100 mg, Oral, BID, Eric Pires MD, 100 mg at 12/24/21 1827  •  heparin (porcine) 25,000 units in 0 45% NaCl 250 mL infusion (premix), 3-30 Units/kg/hr (Order-Specific), Intravenous, Titrated, Lan Trevino MD, Last Rate: 16 8 mL/hr at 12/25/21 0942, 16 Units/kg/hr at 12/25/21 0942  •  heparin (porcine) injection 4,200 Units, 4,200 Units, Intravenous, Q1H PRN, Lan Trevino MD  •  heparin (porcine) injection 8,400 Units, 8,400 Units, Intravenous, Q1H PRN, Lan Trevino MD  •  insulin glargine (LANTUS) subcutaneous injection 32 Units 0 32 mL, 32 Units, Subcutaneous, HS, Nelson Prieto MD, 20 Units at 12/24/21 2328  •  insulin lispro (HumaLOG) 100 units/mL subcutaneous injection 1-6 Units, 1-6 Units, Subcutaneous, Bony YI, DO  •  insulin lispro (HumaLOG) 100 units/mL subcutaneous injection 2-12 Units, 2-12 Units, Subcutaneous, TID AC, 6 Units at 12/24/21 1828 **AND** Fingerstick Glucose (POCT), , , TID AC, Piotr Marquez PA-C  •  insulin lispro (HumaLOG) 100 units/mL subcutaneous injection 6 Units, 6 Units, Subcutaneous, TID With Meals, Den Sam, DO, 3 Units at 12/1955  •  metoprolol tartrate (LOPRESSOR) tablet 50 mg, 50 mg, Oral, Q12H [address], Piotr Marquez PA-C, 50 mg at 12/25/21 9544  •  nystatin (MYCOSTATIN) powder, , Topical, BID, An De Guzman MD, Given at 12/25/21 0825  •  ondansetron (ZOFRAN) injection 4 mg, 4 mg, Intravenous, Q6H PRN, Jennifer Fernandez PA-C  •  oxyCODONE (ROXICODONE) IR tablet 2 5 mg, 2 5 mg, Oral, Q6H PRN, An De Guzman MD  •  oxyCODONE (ROXICODONE) IR tablet 5 mg, 5 mg, Oral, Q4H PRN, An De Guzman MD, 5 mg at 12/25/21 0834  •  pantoprazole (PROTONIX) injection 40 mg, 40 mg, Intravenous, Q12H [address], Piotr Marquez PA-C, 40 mg at 12/25/21 0825  •  polyethylene glycol (MIRALAX) packet 17 g, 17 g, Oral, Daily PRN, An De Guzman MD, 17 g at 12/23/21 0728  •  senna-docusate sodium (SENOKOT S) 8 6-50 mg per tablet 1 tablet, 1 tablet, Oral, HS, An De Guzman MD, 1 tablet at 12/24/21 7006    Laboratory Results:  Lab Results   Component Value Date    WBC 10 81 (H) 12/24/2021    HGB 7 7 (L) 12/25/2021    HCT 25 2 (L) 12/25/2021    MCV 94 12/24/2021     12/24/2021     Lab Results   Component Value Date    SODIUM 142 12/25/2021    K 5 1 12/25/2021     (H) 12/25/2021    CO2 20 (L) 12/25/2021    BUN 70 (H) 12/25/2021    CREATININE 4 54 (H) 12/25/2021    GLUC 109 12/25/2021    CALCIUM 8 3 12/25/2021     Lab Results   Component Value Date    CALCIUM 8 3 12/25/2021    PHOS 4 1 12/21/2021     No results found for: LABPROT

## 2021-12-25 NOTE — PROGRESS NOTES
[facility]  Progress Note - Lucia Newby 1955, 78 y o  male MRN: [id number]  Unit/Bed#: S -01 Encounter: [id number]  Primary Care Provider: Jaleel Coyle MD   Date and time admitted to hospital: 12/17/2021  3:03 PM    * Staphylococcus aureus bacteremia  Assessment & Plan  POA septic secondary to LLE cellulittis  2/2 bcx MSSA started on Cefazolin  Wound cultures growing MSSA  Plan  · Repeat cultures collected on 12/19/21 negative so far  · ID following-recommending 4 weeks of IV abx through January 16th,2022  · IR planning for placement of  Tunneled line on 12/27/21  · Monitor fever curves and WBC    Right renal subcapsular hematoma  Assessment & Plan  Patient had presented originally with complaint of RLQ abdominal pain  Subsequently on 12/18 went for emergent embolization with IR:  There is no evidence of acute bleeding and no intervention was done in the setting of CTA a/p showing stable right subcapsular hematom of the right kidney  No evidence of active arterial or venous extravasation noted  Repeat imaging 12/20 - CT abdomen revealed stable right subcapsular renal hematoma  Plan  · Continue to monitor and treat abdominal pain as appropriate  · Monitor for any s/sx of acute blood loss      Type 2 diabetes mellitus, with long-term current use of insulin [facility])  Assessment & Plan  Lab Results   Component Value Date    HGBA1C 12 2 (H) 12/18/2021       Recent Labs     12/22/21  1555 12/22/21  2102 12/23/21  0739 12/23/21  1053   POCGLU 203* 168* 192* 206*       Blood Sugar Average: Last 72 hrs:  (P) 065 [id number]     Patient initially presents in DKA require insulin drip, AG closed as of 12/21 am and has remained stable      Plan  · Endocrinology following  · Lantus now 32 units at bedtime and Lispro 6 units TID along with algorithm 4 correction  · Goal -180mg/dL  · Hypoglycemia protocol and QID monitoring    Right Liver mass  Assessment & Plan  Possible infiltrating neoplasm in the right lobe of the liver seen on CT scan  Recommend dedicated triphasic CT scan of the liver for better characterization when the patient is more stable  AFP negative  Eventually will need colonoscopy and EGD    Acute deep vein thrombosis (DVT) of popliteal vein of left lower extremity ([address] )  Assessment & Plan  Patient reports c/o of new onset left lower extremity pain, on examination exquisitely tender behind the calf  Discussed with patient concern for DVT and obtained LLE doppler  Plan  · VAS doppler showing DVT in popliteal ans gastroc veins  · Continue heparin drip  · Likely transition to p o  Anticoagulants tomorrow  · Continue to monitor closely    Constipation  Assessment & Plan  Bowel regimen ordered, discussed with nursing-pt had a BM yesterday at 9pm    · Patient reports having bowel movement  · Continue senokot daily and Miralax daily PRN  · Status post Relistor  · Monitor for BM    Anemia  Assessment & Plan  POA Acute blood loss anemia in setting of gross hematuria, Hg on admission was 6 8 s/p 3 units of PRBC  Hg improved after transfusion now downtrending once again without clear source of bleeding    Plan  · Monitor H/H  ·  Transfuse for Hg < 7  · Monitor for s/sx of blood loss  · Heparin initiated in the setting of new DVT of LLE     CVA (cerebral vascular accident) [facility])  Assessment & Plan  Patient with history of thrombotic CVA with Left facial droop no other residual neurological deficits      Plan  · Monitor for any acute changes in mental status      Hyperlipidemia  Assessment & Plan  · Transaminitis has now resolved  · Will resume statin therpay      Primary hypertension  Assessment & Plan  BP remains stable ranging 150/60-70s    Plan  · On  Amlodipine 10 mg  · Concern for Page kidney in the setting of right renal subcapsular hematoma  · Nephrology on board- recommending lasix if patient becomes oliguric  · Monitor VSS      MULUGETA on CKD (chronic kidney disease) stage 3, GFR 30-59 ml/min  Assessment & Plan  Lab Results   Component Value Date    EGFR 12 12/23/2021    EGFR 12 12/22/2021    EGFR 12 12/21/2021    CREATININE 4 57 (H) 12/23/2021    CREATININE 4 39 (H) 12/22/2021    CREATININE 4 56 (H) 12/21/2021    Baseline creatinine of 1 5 - 1 9  Remains elevated consistently >4    Plan    Nephrology consulted recs as below:  · Continue to monitor BMP   · Avoid hypotension and nephrotoxins   · Discontinue IJ dialysis catheter, no urgent indication for dialysis at this time    Transaminitis-resolved as of 12/24/2021  Assessment & Plan  POA AST/ALT elevated to 800/200s  Now improving to 120/90s  Plan  · Continue to monitor for improvement  · Caution with pain control meds (ie tyenlol) in setting of resolving transaminitis     Supratherapeutic INR-resolved as of 12/24/2021  Assessment & Plan  POA INR of 7 46, s/p Vitamin K and 1 dose of DDAVP for reversal    Plan  · Now resolved, repeat INR on 12/21 1 45   · Continue to hold coumadin in setting of downtrending H/H     VTE Pharmacologic Prophylaxis:   Pharmacologic: Heparin Drip  Mechanical VTE Prophylaxis in Place: Yes    Patient Centered Rounds: I have performed bedside rounds with nursing staff today  Discussions with Specialists or Other Care Team Provider:  Nursing    Education and Discussions with Family / Patient:  Updated wife Ayan Marcano    Time Spent for Care: 30 minutes  More than 50% of total time spent on counseling and coordination of care as described above  Current Length of Stay: 8 day(s)    Current Patient Status: Inpatient   Certification Statement: The patient will continue to require additional inpatient hospital stay due to Bacteremia    Discharge Plan:  Pending clinical improvement    Code Status: Level 1 - Full Code      Subjective:     Patient was seen at bedside  He is feeling okay today  Offers no specific complaints  Does report some pain in right side of the abdomen and left calf    Reports having a bowel movement    Objective:     Vitals:   Temp (24hrs), Av 4 °F (36 9 °C), Min:97 6 °F (36 4 °C), Max:99 2 °F (37 3 °C)    Temp:  [97 6 °F (36 4 °C)-99 2 °F (37 3 °C)] 98 3 °F (36 8 °C)  HR:  [65-71] 65  Resp:  [19] 19  BP: (140-162)/(66-72) 149/71  SpO2:  [91 %-98 %] 91 %  Body mass index is 37 12 kg/m²  Input and Output Summary (last 24 hours):     No intake or output data in the 24 hours ending 21 1330    Physical Exam:     Physical Exam    Gen -Patient comfortable in bed  Neck- Supple  No thyromegaly or lymphadenopathy  Lungs-Clear bilaterally without any wheeze or rales   Heart S1-S2, regular rate and rhythm, no murmurs  Abdomen-distended soft nontender  Extremities-no cyanosi,  clubbing   Multiple wounds lower extremity  Edema noted  Skin- no rash  Neuro-nonfocal       Additional Data:     Labs:    Results from last 7 days   Lab Units 21  0514   WBC Thousand/uL  --   --  10 81*   HEMOGLOBIN g/dL 7 7*   < > 7 9*   HEMATOCRIT % 25 2*   < > 25 3*   PLATELETS Thousands/uL  --   --  384   BANDS PCT %  --   --  9*   LYMPHO PCT %  --   --  6*   MONO PCT %  --   --  7   EOS PCT %  --   --  6    < > = values in this interval not displayed  Results from last 7 days   Lab Units 2114 21  0514   SODIUM mmol/L 142   < > 141   POTASSIUM mmol/L 5 1   < > 4 9   CHLORIDE mmol/L 110*   < > 110*   CO2 mmol/L 20*   < > 20*   BUN mg/dL 70*   < > 79*   CREATININE mg/dL 4 54*   < > 4 62*   ANION GAP mmol/L 12   < > 11   CALCIUM mg/dL 8 3   < > 8 3   ALBUMIN g/dL  --   --  1 4*   TOTAL BILIRUBIN mg/dL  --   --  0 28   ALK PHOS U/L  --   --  78   ALT U/L  --   --  62   AST U/L  --   --  34   GLUCOSE RANDOM mg/dL 109   < > 191*    < > = values in this interval not displayed       Results from last 7 days   Lab Units 21  1410   INR  1 90*     Results from last 7 days   Lab Units 21  1150 21  0830 21  0314 21  4549 12/24/21  1551 12/24/21  1110 12/24/21  0731 12/23/21  2139 12/23/21  1606 12/23/21  1053 12/23/21  0739 12/22/21  2102   POC GLUCOSE mg/dl 209* 129 135 104 278* 218* 186* 234* 245* 206* 192* 168*         Results from last 7 days   Lab Units 12/20/21  1431 12/18/21  2129 12/18/21  1525   LACTIC ACID mmol/L 0 6 1 7 3 1*           * I Have Reviewed All Lab Data Listed Above  * Additional Pertinent Lab Tests Reviewed: All Labs Within Last 24 Hours Reviewed    Imaging:    Imaging Reports Reviewed Today Include:   Imaging Personally Reviewed by Myself Includes:      Recent Cultures (last 7 days):     Results from last 7 days   Lab Units 12/19/21  1003 12/19/21  0846   BLOOD CULTURE  No Growth After 5 Days  No Growth After 5 Days         Last 24 Hours Medication List:   Current Facility-Administered Medications   Medication Dose Route Frequency Provider Last Rate   • acetaminophen  650 mg Oral Q4H PRN Karol Frances PA-C     • amLODIPine  10 mg Oral Daily Karol Frances PA-C     • atorvastatin  40 mg Oral Daily With Ruben Norton MD     • cefazolin  1,000 mg Intravenous Q12H Karol Frances PA-C 1,000 mg (12/25/21 1201)   • docusate sodium  100 mg Oral BID Bairon Burch MD     • heparin (porcine)  3-30 Units/kg/hr (Order-Specific) Intravenous Titrated Lan Trevino MD 16 Units/kg/hr (12/25/21 0942)   • heparin (porcine)  4,200 Units Intravenous Q1H PRN Lan Trevino MD     • heparin (porcine)  8,400 Units Intravenous Q1H PRN Lan Trevino MD     • insulin glargine  32 Units Subcutaneous HS Mohini Menendez MD     • insulin lispro  1-6 Units Subcutaneous HS Bony Sam DO     • insulin lispro  2-12 Units Subcutaneous TID AC Karol Frances PA-C     • insulin lispro  6 Units Subcutaneous TID With Meals Roger Sam DO     • metoprolol tartrate  50 mg Oral Q12H [address] Karol Frances PA-C     • nystatin   Topical BID Bairon Burch MD     • ondansetron  4 mg Intravenous Q6H PRN Kvng Kimball PA-C     • oxyCODONE  2 5 mg Oral Q6H PRN Francis Mcgee MD     • oxyCODONE  5 mg Oral Q4H PRN Francis Mcgee MD     • pantoprazole  40 mg Intravenous Q12H [address] Piotr Marquez PA-C     • polyethylene glycol  17 g Oral Daily PRN Francis Mcgee MD     • senna-docusate sodium  1 tablet Oral HS Francis Mcgee MD          Today, Patient Was Seen By: Isatu Garza MD    ** Please Note: Dictation voice to text software may have been used in the creation of this document   **

## 2021-12-26 PROBLEM — K59.00 CONSTIPATION: Status: RESOLVED | Noted: 2021-01-01 | Resolved: 2021-01-01

## 2021-12-26 NOTE — ASSESSMENT & PLAN NOTE
POA septic secondary to LLE cellulittis  2/2 bcx MSSA started on Cefazolin  Wound cultures growing MSSA      Plan  · Repeat cultures collected on 12/19/21 negative x  5 days  · ID following-recommending 4 weeks of IV abx through January 16th,2022  · Patient s/p IR placement of Tunneled line on 12/27/21 for long term abx  · Monitor fever curves and WBC

## 2021-12-26 NOTE — PLAN OF CARE
Problem: MOBILITY - ADULT  Goal: Maintain or return to baseline ADL function  Description: INTERVENTIONS:  -  Assess patient's ability to carry out ADLs; assess patient's baseline for ADL function and identify physical deficits which impact ability to perform ADLs (bathing, care of mouth/teeth, toileting, grooming, dressing, etc )  - Assess/evaluate cause of self-care deficits   - Assess range of motion  - Assess patient's mobility; develop plan if impaired  - Assess patient's need for assistive devices and provide as appropriate  - Encourage maximum independence but intervene and supervise when necessary  - Involve family in performance of ADLs  - Assess for home care needs following discharge   - Consider OT consult to assist with ADL evaluation and planning for discharge  - Provide patient education as appropriate  Outcome: Progressing  Goal: Maintains/Returns to pre admission functional level  Description: INTERVENTIONS:  - Perform BMAT or MOVE assessment daily    - Set and communicate daily mobility goal to care team and patient/family/caregiver  - Collaborate with rehabilitation services on mobility goals if consulted  - Perform Range of Motion 4 times a day  - Reposition patient every 2 hours    - Dangle patient 4 times a day  - Stand patient 4 times a day  - Ambulate patient 4 times a day  - Out of bed to chair 4 times a day   - Out of bed for meals 4 times a day  - Out of bed for toileting  - Record patient progress and toleration of activity level   Outcome: Progressing     Problem: Potential for Falls  Goal: Patient will remain free of falls  Description: INTERVENTIONS:  - Educate patient/family on patient safety including physical limitations  - Instruct patient to call for assistance with activity   - Consult OT/PT to assist with strengthening/mobility   - Keep Call bell within reach  - Keep bed low and locked with side rails adjusted as appropriate  - Keep care items and personal belongings within reach  - Initiate and maintain comfort rounds  - Make Fall Risk Sign visible to staff  - Offer Toileting every 2 Hours, in advance of need  - Initiate/Maintain  bed alarm  - Obtain necessary fall risk management equipment:  bed alarm  - Apply yellow socks and bracelet for high fall risk patients  - Consider moving patient to room near nurses station  Outcome: Progressing     Problem: Prexisting or High Potential for Compromised Skin Integrity  Goal: Skin integrity is maintained or improved  Description: INTERVENTIONS:  - Identify patients at risk for skin breakdown  - Assess and monitor skin integrity  - Assess and monitor nutrition and hydration status  - Monitor labs   - Assess for incontinence   - Turn and reposition patient  - Assist with mobility/ambulation  - Relieve pressure over bony prominences  - Avoid friction and shearing  - Provide appropriate hygiene as needed including keeping skin clean and dry  - Evaluate need for skin moisturizer/barrier cream  - Collaborate with interdisciplinary team   - Patient/family teaching  - Consider wound care consult   Outcome: Progressing     Problem: Nutrition/Hydration-ADULT  Goal: Nutrient/Hydration intake appropriate for improving, restoring or maintaining nutritional needs  Description: Monitor and assess patient's nutrition/hydration status for malnutrition  Collaborate with interdisciplinary team and initiate plan and interventions as ordered  Monitor patient's weight and dietary intake as ordered or per policy  Utilize nutrition screening tool and intervene as necessary  Determine patient's food preferences and provide high-protein, high-caloric foods as appropriate       INTERVENTIONS:  - Monitor oral intake, urinary output, labs, and treatment plans  - Assess nutrition and hydration status and recommend course of action  - Evaluate amount of meals eaten  - Assist patient with eating if necessary   - Allow adequate time for meals  - Recommend/ encourage appropriate diets, oral nutritional supplements, and vitamin/mineral supplements  - Order, calculate, and assess calorie counts as needed  - Recommend, monitor, and adjust tube feedings and TPN/PPN based on assessed needs  - Assess need for intravenous fluids  - Provide specific nutrition/hydration education as appropriate  - Include patient/family/caregiver in decisions related to nutrition  Outcome: Progressing     Problem: GASTROINTESTINAL - ADULT  Goal: Minimal or absence of nausea and/or vomiting  Description: INTERVENTIONS:  - Administer IV fluids if ordered to ensure adequate hydration  - Maintain NPO status until nausea and vomiting are resolved  - Nasogastric tube if ordered  - Administer ordered antiemetic medications as needed  - Provide nonpharmacologic comfort measures as appropriate  - Advance diet as tolerated, if ordered  - Consider nutrition services referral to assist patient with adequate nutrition and appropriate food choices  Outcome: Progressing  Goal: Maintains or returns to baseline bowel function  Description: INTERVENTIONS:  - Assess bowel function  - Encourage oral fluids to ensure adequate hydration  - Administer IV fluids if ordered to ensure adequate hydration  - Administer ordered medications as needed  - Encourage mobilization and activity  - Consider nutritional services referral to assist patient with adequate nutrition and appropriate food choices  Outcome: Progressing  Goal: Maintains adequate nutritional intake  Description: INTERVENTIONS:  - Monitor percentage of each meal consumed  - Identify factors contributing to decreased intake, treat as appropriate  - Assist with meals as needed  - Monitor I&O, weight, and lab values if indicated  - Obtain nutrition services referral as needed  Outcome: Progressing  Goal: Establish and maintain optimal ostomy function  Description: INTERVENTIONS:  - Assess bowel function  - Encourage oral fluids to ensure adequate hydration  - Administer IV fluids if ordered to ensure adequate hydration   - Administer ordered medications as needed  - Encourage mobilization and activity  - Nutrition services referral to assist patient with appropriate food choices  - Assess stoma site  - Consider wound care consult   Outcome: Progressing  Goal: Oral mucous membranes remain intact  Description: INTERVENTIONS  - Assess oral mucosa and hygiene practices  - Implement preventative oral hygiene regimen  - Implement oral medicated treatments as ordered  - Initiate Nutrition services referral as needed  Outcome: Progressing     Problem: GENITOURINARY - ADULT  Goal: Maintains or returns to baseline urinary function  Description: INTERVENTIONS:  - Assess urinary function  - Encourage oral fluids to ensure adequate hydration if ordered  - Administer IV fluids as ordered to ensure adequate hydration  - Administer ordered medications as needed  - Offer frequent toileting  - Follow urinary retention protocol if ordered  Outcome: Progressing  Goal: Absence of urinary retention  Description: INTERVENTIONS:  - Assess patient’s ability to void and empty bladder  - Monitor I/O  - Bladder scan as needed  - Discuss with physician/AP medications to alleviate retention as needed  - Discuss catheterization for long term situations as appropriate  Outcome: Progressing  Goal: Urinary catheter remains patent  Description: INTERVENTIONS:  - Assess patency of urinary catheter  - If patient has a chronic luz, consider changing catheter if non-functioning  - Follow guidelines for intermittent irrigation of non-functioning urinary catheter  Outcome: Progressing

## 2021-12-26 NOTE — ASSESSMENT & PLAN NOTE
Lab Results   Component Value Date    HGBA1C 12 2 (H) 12/18/2021       Recent Labs     12/25/21  1657 12/25/21  2110 12/26/21  0839 12/26/21  1125   POCGLU 200* 176* 236* 230*       Blood Sugar Average: Last 72 hrs:  (P) 326 [id number]     Patient initially presents in DKA require insulin drip, AG closed as of 12/21 am and has remained stable      Plan  · Endocrinology following-appreciate recs  · Patient to be discharged on Lantus 32 units at bedtime and Lispro 6 units TID   · Hypoglycemia protocol and QID monitoring

## 2021-12-26 NOTE — ASSESSMENT & PLAN NOTE
Bowel regimen ordered, discussed with nursing-pt had a BM yesterday at 9pm  Patient states now having regular BMs      · Patient reports having bowel movement  · Continue senokot daily and Miralax daily PRN  · Status post Relistor  · Monitor for BM

## 2021-12-26 NOTE — ASSESSMENT & PLAN NOTE
Lab Results   Component Value Date    EGFR 12 12/25/2021    EGFR 12 12/24/2021    EGFR 12 12/23/2021    CREATININE 4 54 (H) 12/25/2021    CREATININE 4 62 (H) 12/24/2021    CREATININE 4 57 (H) 12/23/2021    Baseline creatinine of 1 5 - 1 9   Remains elevated consistently >4    Plan    Nephrology consulted recs as below:  · Continue to monitor BMP   · Avoid hypotension and nephrotoxins   · No urgent indication for dialysis at this time

## 2021-12-26 NOTE — PROGRESS NOTES
Progress Note - Infectious Disease   Nicolasa Roque 78 y o  male MRN: [id number]  Unit/Bed#: S -01 Encounter: [id number]    Impression/Recommendations:  1  Sepsis, POA:  With tachypnea, leukocytosis, elevated lactate, MULUGETA on CKD 3  In the setting of Staphylococcus aureus bacteremia  WBC is decreasing  Lactate has normalized (3 1-> 1 7)  ? Antibiotic therapy as below  ? Supportive care per primary team  ? Monitor clinical response, temperature curve    2  Staphylococcus aureus bacteremia  LLE wound cx also with growth of Staph aureus, MSSA  Consider possibility of seeding of renal subcapsular hematoma although repeat blood cx from 12/19 remain negative  ESR of 95 is significantly elevated  12/22 transthoracic echocardiogram is negative for valve vegetation  ? Continue cefazolin 1g IV q12 hours  ? Anticipate 4 week duration of IV antibiotic therapy from time of negative cultures thru Jan 16th  ? Weekly labs while on iv ABx: CBC with diff, creatinine  ? Suggest tunneled PICC line insertion as repeat blood cultures remain negative at 5 days    3  LLE cellulitis: possible SSTI source of bacteremia  Cellulitis has resolved  ? Left lower extremity elevation  ? Continue cefazolin IV as above  ? Monitor clinical response    4  Acute on chronic kidney disease stage 3:   ? Renal dose medications, avoid nephrotoxins  ? Nephrology service is following with no immediate plans for dialysis  ? Repeat creatinine in a m     5  Diabetes mellitus type 2, insulin dependent:  Initially treated as DKA upon hospital admission  Hyperglycemia is a risk factor for infection and delayed wound healing  ? Glycemic control as per primary service    6  Renal subcapsular hematoma:  With anemia in the setting of supratherapeutic INR  Status post IR angiography with no evidence of extravasation  Hgb is currently stable  7  Transaminitis:  LFTs are downtrending    CT scan raises concern for infiltrating neoplasm in right hepatic lobe  ? Consider triple phase CT with improved renal function    8  History of penicillin allergy:  Hives age 14  Patient is currently tolerating cefazolin IV      My recommendations were discussed with the patient who verbalized understanding  ID service will follow       Antibiotics: cefazolin since 12/17  Scheduled Meds:  Current Facility-Administered Medications   Medication Dose Route Frequency Provider Last Rate   • acetaminophen  650 mg Oral Q4H PRN Betsey Teran PA-C     • amLODIPine  10 mg Oral Daily Betsey Teran PA-C     • atorvastatin  40 mg Oral Daily With Dinner Reanna Murray MD     • cefazolin  1,000 mg Intravenous Q12H Betsey Teran PA-C Stopped (12/26/21 0021)   • docusate sodium  100 mg Oral BID Reanna Murray MD     • heparin (porcine)  3-30 Units/kg/hr (Order-Specific) Intravenous Titrated Lan Trevino MD 13 Units/kg/hr (12/26/21 0549)   • heparin (porcine)  4,200 Units Intravenous Q1H PRN Lan Trevino MD     • heparin (porcine)  8,400 Units Intravenous Q1H PRN Lan Trevino MD     • insulin glargine  32 Units Subcutaneous HS Eber Salvador MD     • insulin lispro  1-6 Units Subcutaneous HS Bony Sam DO     • insulin lispro  2-12 Units Subcutaneous TID AC Betsey Teran PA-C     • insulin lispro  6 Units Subcutaneous TID With Meals Maria Guadalupe Sam DO     • metoprolol tartrate  50 mg Oral Q12H [facility] Betsey Teran PA-C     • nystatin   Topical BID Reanna Murray MD     • ondansetron  4 mg Intravenous Q6H PRN Betsey Teran PA-C     • oxyCODONE  2 5 mg Oral Q6H PRN Reanna Murray MD     • oxyCODONE  5 mg Oral Q4H PRN Reanna Murray MD     • pantoprazole  40 mg Intravenous Q12H [facility] Piotr Marquez PA-C     • polyethylene glycol  17 g Oral Daily PRN Reanna Murray MD     • senna-docusate sodium  1 tablet Oral HS Milagros Ramires MD       Continuous Infusions:heparin (porcine), 3-30 Units/kg/hr (Order-Specific), Last Rate: 13 Units/kg/hr (21 0549)      PRN Meds: •  acetaminophen  •  heparin (porcine)  •  heparin (porcine)  •  ondansetron  •  oxyCODONE  •  oxyCODONE  •  polyethylene glycol    Subjective:  Patient reports fatigue and diminished appetite  He denies fever, SOB, vomiting, diarrhea, rash  He is tolerating current antibiotic therapy  Objective:  Vitals:  Temp:  [98 1 °F (36 7 °C)-99 8 °F (37 7 °C)] 98 1 °F (36 7 °C)  HR:  [65-77] 70  Resp:  [17-22] 22  BP: (137-165)/(59-72) 165/69  SpO2:  [87 %-92 %] 91 %  Temp (24hrs), Av 7 °F (37 1 °C), Min:98 1 °F (36 7 °C), Max:99 8 °F (37 7 °C)  Current: Temperature: 98 1 °F (36 7 °C)  Physical Exam:   General Appearance:  Patient appears stated age, chronically ill and debilitated, laying in bed, no acute distress   ENT: Oropharynx moist, poor dentition   Lungs:   Clear to auscultation bilaterally; respirations unlabored   Heart:  S1, S2, RRR   Abdomen:   Obese, protuberant with umbilical hernia, soft, non-tender   Extremities: No distal leg edema b/l   Neurologic: AAO to person, surroundings, conversant, fluent speech   Skin: Dry scabs of distal LLE noted without erythema  No draining wounds or fluctuance  RT ACW tunneled cathter in place     Labs, Cultures, Imaging, & Other studies:   All pertinent labs, cultures and imaging studies were personally reviewed  Results from last 7 days   Lab Units 21  2357 21  1401 21  0443 21  0514 21  0514 21  0530 21  0530 21  2141 21  1404   WBC Thousand/uL  --   --   --   --  10 81*  --  10 35*  --  11 21*   HEMOGLOBIN g/dL 7 4* 8 7* 7 7*   < > 7 9*   < > 7 7*   < > 7 7*   PLATELETS Thousands/uL  --   --   --   --  384  --  332  --  258    < > = values in this interval not displayed       Results from last 7 days   Lab Units 21  0443 21  0514 21  0514 21  0530 21  0530 21  0446 21  0446   SODIUM mmol/L 142  --  141  --  140   < > 141   POTASSIUM mmol/L 5 1   < > 4 9   < > 4 9   < > 4 3   CHLORIDE mmol/L 110*   < > 110*   < > 108   < > 109*   CO2 mmol/L 20*   < > 20*   < > 18*   < > 19*   BUN mg/dL 70*   < > 79*   < > 79*   < > 82*   CREATININE mg/dL 4 54*   < > 4 62*   < > 4 57*   < > 4 39*   EGFR ml/min/1 73sq m 12   < > 12   < > 12   < > 12   CALCIUM mg/dL 8 3   < > 8 3   < > 8 4   < > 8 6   AST U/L  --   --  34  --  51*  --  123*   ALT U/L  --   --  62   < > 85*   < > 95*   ALK PHOS U/L  --   --  78   < > 82   < > 81    < > = values in this interval not displayed       Results from last 7 days   Lab Units 12/20/21  1028   CRP mg/L 421 8*

## 2021-12-26 NOTE — PROGRESS NOTES
NEPHROLOGY PROGRESS NOTE    Niranjan Semen 78 y o  male MRN: [id number]  Unit/Bed#: S -01 Encounter: [id number]  Reason for Consult:  Acute on chronic kidney disease    The patient was sleeping when I went into the room  I awakened him he was tired little lethargic but then became more talkative  Ongoing chronic issue of his right flank pain  Otherwise no acute complaints  ASSESSMENT/PLAN:  1  Renal    The patient has acute on chronic kidney disease baseline creatinine prior to this admission was 1 5-1 9  He presented with retroperitoneal bleed had arteriogram CT scan with contrast and all week creatinine is been running in the 4 range  It was slightly lower yesterday  He also developed subcapsular bleed there was a question of PAGE kidney playing a role because IR commented that there was back flushed of contrast when injected into that renal artery  Suspect he has dense ATN  He is nonoliguric  My partner had placed a dialysis catheter that was never used I have ordered interventional Radiology to discontinue it  Monitor renal function  DC IJ catheter    No other changes  Continue with supportive care  2  Liver mass  Suspicious for neoplasm  Will need definitive diagnosis  3  Infectious disease    Patient was Staph aureus bacteremia  On cefazolin  Infectious Disease following recommended for week duration through January 16th 2022  SUBJECTIVE:  Review of Systems   Constitutional: Positive for malaise/fatigue  Negative for chills, fever and night sweats  HENT: Negative  Eyes: Negative  Cardiovascular: Negative for chest pain, dyspnea on exertion, orthopnea and palpitations  Respiratory: Negative for cough, shortness of breath, sputum production and wheezing  Gastrointestinal: Negative for abdominal pain, diarrhea, nausea and vomiting  Genitourinary:        Catheter in  Still with right flank pain  Neurological: Positive for weakness   Negative for dizziness, focal weakness, headaches and light-headedness  Psychiatric/Behavioral: Negative for altered mental status, hallucinations and hypervigilance  The patient is not nervous/anxious  OBJECTIVE:  Current Weight: Weight - Scale: 107 kg (235 lb 14 3 oz)  Vitals:Temp (24hrs), Av 8 °F (37 1 °C), Min:98 1 °F (36 7 °C), Max:99 8 °F (37 7 °C)  Current: Temperature: 98 9 °F (37 2 °C)   Blood pressure 142/71, pulse 69, temperature 98 9 °F (37 2 °C), resp  rate 22, height 5' 7" (1 702 m), weight 107 kg (235 lb 14 3 oz), SpO2 91 %  , Body mass index is 36 95 kg/m²  Intake/Output Summary (Last 24 hours) at 2021 1221  Last data filed at 2021 0021  Gross per 24 hour   Intake 290 ml   Output 600 ml   Net -310 ml       Physical Exam: /71   Pulse 69   Temp 98 9 °F (37 2 °C)   Resp 22   Ht 5' 7" (1 702 m)   Wt 107 kg (235 lb 14 3 oz)   SpO2 91%   BMI 36 95 kg/m²   Physical Exam  Constitutional:       General: He is not in acute distress  Appearance: He is not toxic-appearing or diaphoretic  HENT:      Head: Normocephalic and atraumatic  Mouth/Throat:      Mouth: Mucous membranes are dry  Eyes:      General: No scleral icterus  Extraocular Movements: Extraocular movements intact  Cardiovascular:      Rate and Rhythm: Normal rate and regular rhythm  Heart sounds: No friction rub  No gallop  Comments: Positive edema  Abdominal:      General: Bowel sounds are normal       Palpations: Abdomen is soft  Tenderness: There is no abdominal tenderness  There is no rebound  Musculoskeletal:      Cervical back: Normal range of motion and neck supple  Neurological:      General: No focal deficit present  Mental Status: He is alert and oriented to person, place, and time  Mental status is at baseline           Medications:    Current Facility-Administered Medications:   •  acetaminophen (TYLENOL) tablet 650 mg, 650 mg, Oral, Q4H PRN, Piotr Marquez PA-C, 650 mg at 12/26/21 0451  •  amLODIPine (NORVASC) tablet 10 mg, 10 mg, Oral, Daily, Alexsander Jimenez PA-C, 10 mg at 12/26/21 9159  •  atorvastatin (LIPITOR) tablet 40 mg, 40 mg, Oral, Daily With Dinner, Joseluis Miguel MD, 40 mg at 12/25/21 1659  •  ceFAZolin (ANCEF) IVPB (premix in dextrose) 1,000 mg 50 mL, 1,000 mg, Intravenous, Q12H, Alexsander Jimenez PA-C, Last Rate: 100 mL/hr at 12/26/21 1109, 1,000 mg at 12/26/21 1109  •  docusate sodium (COLACE) capsule 100 mg, 100 mg, Oral, BID, Joseluis Miguel MD, 100 mg at 12/25/21 1700  •  heparin (porcine) 25,000 units in 0 45% NaCl 250 mL infusion (premix), 3-30 Units/kg/hr (Order-Specific), Intravenous, Titrated, Lan Trevino MD, Last Rate: 13 7 mL/hr at 12/26/21 0549, 13 Units/kg/hr at 12/26/21 0549  •  heparin (porcine) injection 4,200 Units, 4,200 Units, Intravenous, Q1H PRN, Lan Trevino MD  •  heparin (porcine) injection 8,400 Units, 8,400 Units, Intravenous, Q1H PRN, Lan Trevino MD  •  insulin glargine (LANTUS) subcutaneous injection 32 Units 0 32 mL, 32 Units, Subcutaneous, HS, Raad Santa MD, 32 Units at 12/25/21 2218  •  insulin lispro (HumaLOG) 100 units/mL subcutaneous injection 1-6 Units, 1-6 Units, Subcutaneous, HS, Bony Sam, DO, 1 Units at 12/25/21 2219  •  insulin lispro (HumaLOG) 100 units/mL subcutaneous injection 2-12 Units, 2-12 Units, Subcutaneous, TID AC, 4 Units at 12/26/21 0921 **AND** Fingerstick Glucose (POCT), , , TID AC, Piotr Marquez PA-C  •  insulin lispro (HumaLOG) 100 units/mL subcutaneous injection 6 Units, 6 Units, Subcutaneous, TID With Meals, Orval Lesser Mimms, DO, 6 Units at 12/26/21 3803  •  metoprolol tartrate (LOPRESSOR) tablet 50 mg, 50 mg, Oral, Q12H [address], Piotr HUGO Marquez, 50 mg at 12/26/21 0489  •  nystatin (MYCOSTATIN) powder, , Topical, BID, Joseluis Miguel MD, 1 application at 79/21/27 0920  •  ondansetron (ZOFRAN) injection 4 mg, 4 mg, Intravenous, Q6H PRN, Alexsander Jimenez PA-C  • oxyCODONE (ROXICODONE) IR tablet 2 5 mg, 2 5 mg, Oral, Q6H PRN, Diaz Cox MD  •  oxyCODONE (ROXICODONE) IR tablet 5 mg, 5 mg, Oral, Q4H PRN, Diaz Cox MD, 5 mg at 12/26/21 0451  •  pantoprazole (PROTONIX) injection 40 mg, 40 mg, Intravenous, Q12H [address], Piotr Marquez PA-C, 40 mg at 12/26/21 0919  •  polyethylene glycol (MIRALAX) packet 17 g, 17 g, Oral, Daily PRN, Diaz Cox MD, 17 g at 12/23/21 0728  •  senna-docusate sodium (SENOKOT S) 8 6-50 mg per tablet 1 tablet, 1 tablet, Oral, HS, Diaz Cox MD, 1 tablet at 12/24/21 0219    Laboratory Results:  Lab Results   Component Value Date    WBC 10 81 (H) 12/24/2021    HGB 7 4 (L) 12/25/2021    HCT 24 0 (L) 12/25/2021    MCV 94 12/24/2021     12/24/2021     Lab Results   Component Value Date    SODIUM 142 12/25/2021    K 5 1 12/25/2021     (H) 12/25/2021    CO2 20 (L) 12/25/2021    BUN 70 (H) 12/25/2021    CREATININE 4 54 (H) 12/25/2021    GLUC 109 12/25/2021    CALCIUM 8 3 12/25/2021     Lab Results   Component Value Date    CALCIUM 8 3 12/25/2021    PHOS 4 1 12/21/2021     No results found for: LABPROT none

## 2021-12-26 NOTE — ASSESSMENT & PLAN NOTE
POA Acute blood loss anemia in setting of gross hematuria, Hg on admission was 6 8 s/p 3 units of PRBC  Hg improved after transfusion now downtrending once again without clear source of bleeding  12/26/21 CT abdomen pelvis to assess for underlying bleed-Compared to 6 days ago, decreased size of suprarenal right hematoma  Otherwise stable appearance of the right kidney  Increased left pleural effusion, stable right effusion  Improved ascites  H/H stable this morning to 7 8  No signs of blood loss      Plan  · Patient to have weekly labs CBC, BMP to follow up

## 2021-12-26 NOTE — PROGRESS NOTES
[facility]  Progress Note - Bennie Galvez 1955, 78 y o  male MRN: [id number]  Unit/Bed#: S -01 Encounter: [id number]  Primary Care Provider: Sterling Fonseca MD   Date and time admitted to hospital: 12/17/2021  3:03 PM    * Staphylococcus aureus bacteremia  Assessment & Plan  POA septic secondary to LLE cellulittis  2/2 bcx MSSA started on Cefazolin  Wound cultures growing MSSA  Plan  · Repeat cultures collected on 12/19/21 negative x  5 days  · ID following-recommending 4 weeks of IV abx through January 16th,2022  · IR planning for placement of  Tunneled line on 12/27/21  · Monitor fever curves and WBC    MULUGETA on CKD (chronic kidney disease) stage 3, GFR 30-59 ml/min  Assessment & Plan  Lab Results   Component Value Date    EGFR 12 12/25/2021    EGFR 12 12/24/2021    EGFR 12 12/23/2021    CREATININE 4 54 (H) 12/25/2021    CREATININE 4 62 (H) 12/24/2021    CREATININE 4 57 (H) 12/23/2021    Baseline creatinine of 1 5 - 1 9  Remains elevated consistently >4    Plan    Nephrology consulted recs as below:  · Continue to monitor BMP   · Avoid hypotension and nephrotoxins   · No urgent indication for dialysis at this time    Acute deep vein thrombosis (DVT) of popliteal vein of left lower extremity ([address] )  Assessment & Plan  Patient reports c/o of new onset left lower extremity pain, on examination exquisitely tender behind the calf  Discussed with patient concern for DVT and obtained LLE doppler  Plan  · VAS doppler showing DVT in popliteal ans gastroc veins  · Hold all Anticoagulants in the setting of decreasing Hemoglobin, will obtain CT abdomen pelvis to assess for underlying bleed  · Continue to monitor closely    Anemia  Assessment & Plan  POA Acute blood loss anemia in setting of gross hematuria, Hg on admission was 6 8 s/p 3 units of PRBC  Hg improved after transfusion now downtrending once again without clear source of bleeding        Transfuse for Hg < 7    Plan  · Monitor H/H q8  · H/H pending from this morning (discussed with nursing and will obtain now)  ·  Transfuse for Hg < 7  · Monitor for s/sx of blood loss  · Hold all Anticoagulants in the setting of decreasing Hemoglobin, will obtain CT abdomen pelvis to assess for underlying bleed    Type 2 diabetes mellitus, with long-term current use of insulin [facility])  Assessment & Plan  Lab Results   Component Value Date    HGBA1C 12 2 (H) 12/18/2021       Recent Labs     12/25/21  1657 12/25/21  2110 12/26/21  0839 12/26/21  1125   POCGLU 200* 176* 236* 230*       Blood Sugar Average: Last 72 hrs:  (P) 051 [id number]     Patient initially presents in DKA require insulin drip, AG closed as of 12/21 am and has remained stable  Plan  · Endocrinology following-appreciate recs  · Lantus now 32 units at bedtime and Lispro 6 units TID along with algorithm 4 correction  · Goal -180mg/dL  · Hypoglycemia protocol and QID monitoring    Right renal subcapsular hematoma  Assessment & Plan  Patient had presented originally with complaint of RLQ abdominal pain  Subsequently on 12/18 went for emergent embolization with IR:  There is no evidence of acute bleeding and no intervention was done in the setting of CTA a/p showing stable right subcapsular hematom of the right kidney  No evidence of active arterial or venous extravasation noted  Repeat imaging 12/20 - CT abdomen revealed stable right subcapsular renal hematoma  Plan  · Continue to monitor and treat abdominal pain as appropriate  · Monitor for any s/sx of acute blood loss      CVA (cerebral vascular accident) [facility])  Assessment & Plan  Patient with history of thrombotic CVA with Left facial droop no other residual neurological deficits      Plan  · Monitor for any acute changes in mental status      Primary hypertension  Assessment & Plan  BP remains stable ranging 150/60-70s    Plan  · On  Amlodipine 10 mg  · Concern for Page kidney in the setting of right renal subcapsular hematoma  · Nephrology on board- recommending lasix if patient becomes oliguric  · Monitor VSS      Right Liver mass  Assessment & Plan  Possible infiltrating neoplasm in the right lobe of the liver seen on CT scan  Recommend dedicated triphasic CT scan of the liver for better characterization when the patient is more stable  AFP negative  Eventually will need colonoscopy and EGD    Constipation-resolved as of 2021  Assessment & Plan  Bowel regimen ordered, discussed with nursing-pt had a BM yesterday at 9pm  Patient states now having regular BMs  · Patient reports having bowel movement  · Continue senokot daily and Miralax daily PRN  · Status post Relistor  · Monitor for BM        VTE Pharmacologic Prophylaxis:   VTE Score: 4 Held for now due downtrending H/H  Will assess for underlying bleed  Mechanical VTE Prophylaxis in Place: No    Patient Centered Rounds: I have performed bedside rounds with nursing staff today  Discussions with Specialists or Other Care Team Provider: ID, Nephrology, Urology    Education and Discussions with Family / Patient: Updated  (wife) via phone  Current Length of Stay: 9 day(s)    Current Patient Status: Inpatient     Discharge Plan / Estimated Discharge Date: TBD    Code Status: Level 1 - Full Code      Subjective:   Patient reports 11/10 pain this morning in left leg and right lower back  Spoke to nursing and will give a once time dose of dilaudid  Objective:     Vitals:   Temp (24hrs), Av 8 °F (37 1 °C), Min:98 1 °F (36 7 °C), Max:99 8 °F (37 7 °C)    Temp:  [98 1 °F (36 7 °C)-99 8 °F (37 7 °C)] 98 9 °F (37 2 °C)  HR:  [65-77] 69  Resp:  [17-22] 22  BP: (137-165)/(59-72) 142/71  SpO2:  [87 %-92 %] 91 %  Body mass index is 36 95 kg/m²  Input and Output Summary (last 24 hours):        Intake/Output Summary (Last 24 hours) at 2021 1206  Last data filed at 2021 0021  Gross per 24 hour   Intake 290 ml   Output 600 ml   Net -310 ml Physical Exam:     Physical Exam  Vitals reviewed  Constitutional:       Appearance: Normal appearance  HENT:      Head: Normocephalic and atraumatic  Right Ear: External ear normal       Left Ear: External ear normal       Nose: Nose normal       Mouth/Throat:      Mouth: Mucous membranes are moist       Pharynx: Oropharynx is clear  Eyes:      Conjunctiva/sclera: Conjunctivae normal    Cardiovascular:      Rate and Rhythm: Normal rate and regular rhythm  Pulses: Normal pulses  Pulmonary:      Effort: Pulmonary effort is normal       Breath sounds: Normal breath sounds  Abdominal:      General: Bowel sounds are normal       Palpations: Abdomen is soft  Tenderness: There is no abdominal tenderness  Musculoskeletal:      Right lower leg: Edema present  Left lower leg: Edema present  Comments: No worsening of erythema or purluence noted of the left lower extremity    Skin:     General: Skin is warm and dry  Comments: Improving skin excoriations noted in suprapubic region   Neurological:      Mental Status: He is alert  Mental status is at baseline  Psychiatric:         Mood and Affect: Mood normal           Additional Data:     Labs:  Results from last 7 days   Lab Units 12/25/21  2357 12/25/21 0443 12/24/21  0514   WBC Thousand/uL  --   --  10 81*   HEMOGLOBIN g/dL 7 4*   < > 7 9*   HEMATOCRIT % 24 0*   < > 25 3*   PLATELETS Thousands/uL  --   --  384   BANDS PCT %  --   --  9*   LYMPHO PCT %  --   --  6*   MONO PCT %  --   --  7   EOS PCT %  --   --  6    < > = values in this interval not displayed       Results from last 7 days   Lab Units 12/25/21  0443 12/24/21  0514 12/24/21  0514   SODIUM mmol/L 142   < > 141   POTASSIUM mmol/L 5 1   < > 4 9   CHLORIDE mmol/L 110*   < > 110*   CO2 mmol/L 20*   < > 20*   BUN mg/dL 70*   < > 79*   CREATININE mg/dL 4 54*   < > 4 62*   ANION GAP mmol/L 12   < > 11   CALCIUM mg/dL 8 3   < > 8 3   ALBUMIN g/dL  --   --  1 4*   TOTAL BILIRUBIN mg/dL  --   --  0 28   ALK PHOS U/L  --   --  78   ALT U/L  --   --  62   AST U/L  --   --  34   GLUCOSE RANDOM mg/dL 109   < > 191*    < > = values in this interval not displayed  Results from last 7 days   Lab Units 12/24/21  1410   INR  1 90*     Results from last 7 days   Lab Units 12/26/21  1125 12/26/21  0839 12/25/21  2110 12/25/21  1657 12/25/21  1150 12/25/21  0830 12/25/21  0314 12/24/21  2139 12/24/21  1551 12/24/21  1110 12/24/21  0731 12/23/21  2139   POC GLUCOSE mg/dl 230* 236* 176* 200* 209* 129 135 104 278* 218* 186* 234*         Results from last 7 days   Lab Units 12/20/21  1431   LACTIC ACID mmol/L 0 6       Imaging: No pertinent imaging reviewed      Recent Cultures (last 7 days):           Lines/Drains:  Invasive Devices  Report    Central Venous Catheter Line            CVC Central Lines 12/18/21 Triple Right Internal jugular 7 days          Peripheral Intravenous Line            Peripheral IV 12/22/21 Right;Ventral (anterior) Forearm 4 days    Peripheral IV 12/24/21 Right Antecubital 1 day          Drain            Urethral Catheter Temperature probe 16 Fr  7 days                Telemetry:        Last 24 Hours Medication List:   Current Facility-Administered Medications   Medication Dose Route Frequency Provider Last Rate   • acetaminophen  650 mg Oral Q4H PRN Sarah Stallworth PA-C     • amLODIPine  10 mg Oral Daily Sarah Stallworth PA-C     • atorvastatin  40 mg Oral Daily With Lionel Lieberman MD     • cefazolin  1,000 mg Intravenous Q12H Sarah Stallworth PA-C 1,000 mg (12/26/21 1109)   • docusate sodium  100 mg Oral BID Keegan Park MD     • heparin (porcine)  3-30 Units/kg/hr (Order-Specific) Intravenous Titrated Lan Trevino MD 13 Units/kg/hr (12/26/21 8222)   • heparin (porcine)  4,200 Units Intravenous Q1H PRN Lan Trevino MD     • heparin (porcine)  8,400 Units Intravenous Q1H PRN Lan Trevino MD     • insulin glargine  32 Units Subcutaneous HS Donna Morataya MD     • insulin lispro  1-6 Units Subcutaneous HS Bony Maximilian Sam, DO     • insulin lispro  2-12 Units Subcutaneous TID AC Jennifer Fernandez PA-C     • insulin lispro  6 Units Subcutaneous TID With Meals Den Esequiel Sam, DO     • metoprolol tartrate  50 mg Oral Q12H [address] Jennifer Fernandez PA-C     • nystatin   Topical BID An De Guzman MD     • ondansetron  4 mg Intravenous Q6H PRN Jennifer Fernandez PA-C     • oxyCODONE  2 5 mg Oral Q6H PRN An De Guzman MD     • oxyCODONE  5 mg Oral Q4H PRN An De Guzman MD     • pantoprazole  40 mg Intravenous Q12H [address] Piotr Marquez PA-C     • polyethylene glycol  17 g Oral Daily PRN An De Guzman MD     • senna-docusate sodium  1 tablet Oral HS An De Guzman MD          Today, Patient Was Seen By: An De Guzman MD    ** Please Note: This note has been constructed using a voice recognition system   **

## 2021-12-26 NOTE — ASSESSMENT & PLAN NOTE
BP remains stable ranging 130s/60-70s    Plan  · Continue  Amlodipine 10 mg  · Concern for Page kidney in the setting of right renal subcapsular hematoma  · Patient to follow-up outpatient with Nephrology

## 2021-12-26 NOTE — PLAN OF CARE
Problem: MOBILITY - ADULT  Goal: Maintain or return to baseline ADL function  Description: INTERVENTIONS:  -  Assess patient's ability to carry out ADLs; assess patient's baseline for ADL function and identify physical deficits which impact ability to perform ADLs (bathing, care of mouth/teeth, toileting, grooming, dressing, etc )  - Assess/evaluate cause of self-care deficits   - Assess range of motion  - Assess patient's mobility; develop plan if impaired  - Assess patient's need for assistive devices and provide as appropriate  - Encourage maximum independence but intervene and supervise when necessary  - Involve family in performance of ADLs  - Assess for home care needs following discharge   - Consider OT consult to assist with ADL evaluation and planning for discharge  - Provide patient education as appropriate  Outcome: Progressing  Goal: Maintains/Returns to pre admission functional level  Description: INTERVENTIONS:  - Perform BMAT or MOVE assessment daily    - Set and communicate daily mobility goal to care team and patient/family/caregiver     - Collaborate with rehabilitation services on mobility goals if consulted    - Record patient progress and toleration of activity level   Outcome: Progressing     Problem: Potential for Falls  Goal: Patient will remain free of falls  Description: INTERVENTIONS:  - Educate patient/family on patient safety including physical limitations  - Instruct patient to call for assistance with activity   - Consult OT/PT to assist with strengthening/mobility   - Keep Call bell within reach  - Keep bed low and locked with side rails adjusted as appropriate  - Keep care items and personal belongings within reach  - Initiate and maintain comfort rounds  - Make Fall Risk Sign visible to staff    - Apply yellow socks and bracelet for high fall risk patients  - Consider moving patient to room near nurses station  Outcome: Progressing     Problem: Prexisting or High Potential for Compromised Skin Integrity  Goal: Skin integrity is maintained or improved  Description: INTERVENTIONS:  - Identify patients at risk for skin breakdown  - Assess and monitor skin integrity  - Assess and monitor nutrition and hydration status  - Monitor labs   - Assess for incontinence   - Turn and reposition patient  - Assist with mobility/ambulation  - Relieve pressure over bony prominences  - Avoid friction and shearing  - Provide appropriate hygiene as needed including keeping skin clean and dry  - Evaluate need for skin moisturizer/barrier cream  - Collaborate with interdisciplinary team   - Patient/family teaching  - Consider wound care consult   Outcome: Progressing     Problem: Nutrition/Hydration-ADULT  Goal: Nutrient/Hydration intake appropriate for improving, restoring or maintaining nutritional needs  Description: Monitor and assess patient's nutrition/hydration status for malnutrition  Collaborate with interdisciplinary team and initiate plan and interventions as ordered  Monitor patient's weight and dietary intake as ordered or per policy  Utilize nutrition screening tool and intervene as necessary  Determine patient's food preferences and provide high-protein, high-caloric foods as appropriate       INTERVENTIONS:  - Monitor oral intake, urinary output, labs, and treatment plans  - Assess nutrition and hydration status and recommend course of action  - Evaluate amount of meals eaten  - Assist patient with eating if necessary   - Allow adequate time for meals  - Recommend/ encourage appropriate diets, oral nutritional supplements, and vitamin/mineral supplements  - Order, calculate, and assess calorie counts as needed  - Recommend, monitor, and adjust tube feedings and TPN/PPN based on assessed needs  - Assess need for intravenous fluids  - Provide specific nutrition/hydration education as appropriate  - Include patient/family/caregiver in decisions related to nutrition  Outcome: Progressing Problem: GASTROINTESTINAL - ADULT  Goal: Minimal or absence of nausea and/or vomiting  Description: INTERVENTIONS:  - Administer IV fluids if ordered to ensure adequate hydration  - Maintain NPO status until nausea and vomiting are resolved  - Nasogastric tube if ordered  - Administer ordered antiemetic medications as needed  - Provide nonpharmacologic comfort measures as appropriate  - Advance diet as tolerated, if ordered  - Consider nutrition services referral to assist patient with adequate nutrition and appropriate food choices  Outcome: Progressing  Goal: Maintains or returns to baseline bowel function  Description: INTERVENTIONS:  - Assess bowel function  - Encourage oral fluids to ensure adequate hydration  - Administer IV fluids if ordered to ensure adequate hydration  - Administer ordered medications as needed  - Encourage mobilization and activity  - Consider nutritional services referral to assist patient with adequate nutrition and appropriate food choices  Outcome: Progressing  Goal: Maintains adequate nutritional intake  Description: INTERVENTIONS:  - Monitor percentage of each meal consumed  - Identify factors contributing to decreased intake, treat as appropriate  - Assist with meals as needed  - Monitor I&O, weight, and lab values if indicated  - Obtain nutrition services referral as needed  Outcome: Progressing  Goal: Establish and maintain optimal ostomy function  Description: INTERVENTIONS:  - Assess bowel function  - Encourage oral fluids to ensure adequate hydration  - Administer IV fluids if ordered to ensure adequate hydration   - Administer ordered medications as needed  - Encourage mobilization and activity  - Nutrition services referral to assist patient with appropriate food choices  - Assess stoma site  - Consider wound care consult   Outcome: Progressing  Goal: Oral mucous membranes remain intact  Description: INTERVENTIONS  - Assess oral mucosa and hygiene practices  - Implement preventative oral hygiene regimen  - Implement oral medicated treatments as ordered  - Initiate Nutrition services referral as needed  Outcome: Progressing     Problem: GENITOURINARY - ADULT  Goal: Maintains or returns to baseline urinary function  Description: INTERVENTIONS:  - Assess urinary function  - Encourage oral fluids to ensure adequate hydration if ordered  - Administer IV fluids as ordered to ensure adequate hydration  - Administer ordered medications as needed  - Offer frequent toileting  - Follow urinary retention protocol if ordered  Outcome: Progressing  Goal: Absence of urinary retention  Description: INTERVENTIONS:  - Assess patient’s ability to void and empty bladder  - Monitor I/O  - Bladder scan as needed  - Discuss with physician/AP medications to alleviate retention as needed  - Discuss catheterization for long term situations as appropriate  Outcome: Progressing  Goal: Urinary catheter remains patent  Description: INTERVENTIONS:  - Assess patency of urinary catheter  - If patient has a chronic luz, consider changing catheter if non-functioning  - Follow guidelines for intermittent irrigation of non-functioning urinary catheter  Outcome: Progressing

## 2021-12-26 NOTE — ASSESSMENT & PLAN NOTE
Possible infiltrating neoplasm in the right lobe of the liver seen on CT scan  Recommend dedicated triphasic CT scan of the liver for better characterization when the patient is more stable    AFP negative  Eventually will need colonoscopy and EGD    Plan  · Follow up with Gastroenterology outpatient for further imaging and evaluation of right liver mass

## 2021-12-26 NOTE — ASSESSMENT & PLAN NOTE
Patient reports c/o of new onset left lower extremity pain, on examination exquisitely tender behind the calf  Discussed with patient concern for DVT and obtained LLE doppler      Plan  · VAS doppler showing DVT in popliteal ans gastroc veins  · Patient to be discharged Eliquis 5mg BID for DVT prophylaxis   · Continue to monitor closely

## 2021-12-27 NOTE — QUICK NOTE
Received call to assess patient due to altered mental status  Per wife, patient was at his baseline mental status after his procedure this afternoon  He then took a nap and when he awoke at ~6:00 pm, he was disoriented to person, place, and time  He was unable to tell me his own name  Pt is currently agitated and paranoid  He did not allow me to do a complete physical exam  Attention waxed and waned during my questioning  Last glucose at 1611 was 214  Suspect delirium 2/2 infection vs  dehydration vs  metabolic abnormality vs  medication side effect  Ordered stat CMP, CBC, UA

## 2021-12-27 NOTE — PROGRESS NOTES
[facility]  Progress Note - Hector Jose 1955, 78 y o  male MRN: [id number]  Unit/Bed#: S -01 Encounter: [id number]  Primary Care Provider: Enrique Trevino MD   Date and time admitted to hospital: 12/17/2021  3:03 PM    * Staphylococcus aureus bacteremia  Assessment & Plan  POA septic secondary to LLE cellulittis  2/2 bcx MSSA started on Cefazolin  Wound cultures growing MSSA  Plan  · Repeat cultures collected on 12/19/21 negative x  5 days  · ID following-recommending 4 weeks of IV abx through January 16th,2022  · IR planning for placement of  Tunneled line today 12/27/21 for long term abx  · Monitor fever curves and WBC    MULUGETA on CKD (chronic kidney disease) stage 3, GFR 30-59 ml/min  Assessment & Plan  Lab Results   Component Value Date    EGFR 12 12/25/2021    EGFR 12 12/24/2021    EGFR 12 12/23/2021    CREATININE 4 54 (H) 12/25/2021    CREATININE 4 62 (H) 12/24/2021    CREATININE 4 57 (H) 12/23/2021    Baseline creatinine of 1 5 - 1 9  Remains elevated consistently >4    Plan    Nephrology consulted recs as below:  · Continue to monitor BMP   · Avoid hypotension and nephrotoxins   · No urgent indication for dialysis at this time    Acute deep vein thrombosis (DVT) of popliteal vein of left lower extremity ([address] )  Assessment & Plan  Patient reports c/o of new onset left lower extremity pain, on examination exquisitely tender behind the calf  Discussed with patient concern for DVT and obtained LLE doppler  Plan  · VAS doppler showing DVT in popliteal ans gastroc veins  · Will start Eliquis 5mg BID for DVT prophylaxis   · Continue to monitor closely    Anemia  Assessment & Plan  POA Acute blood loss anemia in setting of gross hematuria, Hg on admission was 6 8 s/p 3 units of PRBC  Hg improved after transfusion now downtrending once again without clear source of bleeding      12/26/21 CT abdomen pelvis to assess for underlying bleed-Compared to 6 days ago, decreased size of suprarenal right hematoma  Otherwise stable appearance of the right kidney  Increased left pleural effusion, stable right effusion  Improved ascites  Plan  · Monitor H/H q8  ·  Transfuse for Hg < 7  · Monitor for s/sx of blood loss  · Consider resuming anticoagulation in the setting of stable Hg, and no worsening of hematoma (ie  bleed)    Type 2 diabetes mellitus, with long-term current use of insulin [facility])  Assessment & Plan  Lab Results   Component Value Date    HGBA1C 12 2 (H) 12/18/2021       Recent Labs     12/25/21  1657 12/25/21  2110 12/26/21  0839 12/26/21  1125   POCGLU 200* 176* 236* 230*       Blood Sugar Average: Last 72 hrs:  (P) 400 [id number]     Patient initially presents in DKA require insulin drip, AG closed as of 12/21 am and has remained stable  Plan  · Endocrinology following-appreciate recs  · Lantus now 32 units at bedtime and Lispro 6 units TID along with algorithm 4 correction  · Goal -180mg/dL  · Hypoglycemia protocol and QID monitoring    Hyperlipidemia  Assessment & Plan  · Transaminitis has now resolved  · Will resume statin therpay      Right renal subcapsular hematoma  Assessment & Plan  Patient had presented originally with complaint of RLQ abdominal pain  Subsequently on 12/18 went for emergent embolization with IR:  There is no evidence of acute bleeding and no intervention was done in the setting of CTA a/p showing stable right subcapsular hematom of the right kidney  No evidence of active arterial or venous extravasation noted  Repeat imaging 12/20 - CT abdomen revealed stable right subcapsular renal hematoma  Plan  · Continue to monitor and treat abdominal pain as appropriate  · Monitor for any s/sx of acute blood loss      CVA (cerebral vascular accident) [facility])  Assessment & Plan  Patient with history of thrombotic CVA with Left facial droop no other residual neurological deficits      Plan  · Monitor for any acute changes in mental status      Primary hypertension  Assessment & Plan  BP remains stable ranging 150/60-70s    Plan  · On  Amlodipine 10 mg  · Concern for Page kidney in the setting of right renal subcapsular hematoma  · Nephrology on board- recommending lasix if patient becomes oliguric  · Monitor VSS      Right Liver mass  Assessment & Plan  Possible infiltrating neoplasm in the right lobe of the liver seen on CT scan  Recommend dedicated triphasic CT scan of the liver for better characterization when the patient is more stable  AFP negative  Eventually will need colonoscopy and EGD    Constipation-resolved as of 2021  Assessment & Plan  Bowel regimen ordered, discussed with nursing-pt had a BM yesterday at 9pm  Patient states now having regular BMs  · Patient reports having bowel movement  · Continue senokot daily and Miralax daily PRN  · Status post Relistor  · Monitor for BM        VTE Pharmacologic Prophylaxis:   VTE Score: 4 Held for now due downtrending H/H  Will assess for underlying bleed  Mechanical VTE Prophylaxis in Place: No    Patient Centered Rounds: I have performed bedside rounds with nursing staff today  Discussions with Specialists or Other Care Team Provider: ID, Nephrology, Urology    Education and Discussions with Family / Patient: Will update family    Current Length of Stay: 10 day(s)    Current Patient Status: Inpatient     Discharge Plan / Estimated Discharge Date: TBD    Code Status: Level 1 - Full Code      Subjective:   Patient laying in bed this morning, does experience pain on palpation of the RLQ  No hematuria, hematochezia per nursing  Objective:     Vitals:   Temp (24hrs), Av 6 °F (37 °C), Min:98 4 °F (36 9 °C), Max:98 9 °F (37 2 °C)    Temp:  [98 4 °F (36 9 °C)-98 9 °F (37 2 °C)] 98 5 °F (36 9 °C)  HR:  [62-77] 62  Resp:  [16-22] 16  BP: (123-170)/(65-91) 123/65  SpO2:  [89 %-97 %] 91 %  Body mass index is 37 29 kg/m²  Input and Output Summary (last 24 hours):        Intake/Output Summary (Last 24 hours) at 12/27/2021 1055  Last data filed at 12/27/2021 0640  Gross per 24 hour   Intake 410 ml   Output 2225 ml   Net -1815 ml       Physical Exam:     Physical Exam  Vitals reviewed  Constitutional:       Appearance: Normal appearance  HENT:      Head: Normocephalic and atraumatic  Right Ear: External ear normal       Left Ear: External ear normal       Nose: Nose normal       Mouth/Throat:      Mouth: Mucous membranes are moist       Pharynx: Oropharynx is clear  Eyes:      Conjunctiva/sclera: Conjunctivae normal    Cardiovascular:      Rate and Rhythm: Normal rate and regular rhythm  Pulses: Normal pulses  Pulmonary:      Effort: Pulmonary effort is normal       Breath sounds: Normal breath sounds  Abdominal:      General: Bowel sounds are normal       Palpations: Abdomen is soft  Tenderness: There is no abdominal tenderness  Musculoskeletal:      Right lower leg: Edema present  Left lower leg: Edema present  Comments: No worsening of erythema or purluence noted of the left lower extremity    Skin:     General: Skin is warm and dry  Neurological:      Mental Status: He is alert  Mental status is at baseline  Psychiatric:         Mood and Affect: Mood normal           Additional Data:     Labs:  Results from last 7 days   Lab Units 12/27/21 0637 12/25/21 0443 12/24/21 0514   WBC Thousand/uL  --   --  10 81*   HEMOGLOBIN g/dL 7 2*   < > 7 9*   HEMATOCRIT % 23 4*   < > 25 3*   PLATELETS Thousands/uL  --   --  384   BANDS PCT %  --   --  9*   LYMPHO PCT %  --   --  6*   MONO PCT %  --   --  7   EOS PCT %  --   --  6    < > = values in this interval not displayed       Results from last 7 days   Lab Units 12/27/21 0637 12/25/21 0443 12/24/21  0514   SODIUM mmol/L 139   < > 141   POTASSIUM mmol/L 5 3   < > 4 9   CHLORIDE mmol/L 109*   < > 110*   CO2 mmol/L 20*   < > 20*   BUN mg/dL 72*   < > 79*   CREATININE mg/dL 4 34*   < > 4 62*   ANION GAP mmol/L 10   < > 11 CALCIUM mg/dL 8 5   < > 8 3   ALBUMIN g/dL  --   --  1 4*   TOTAL BILIRUBIN mg/dL  --   --  0 28   ALK PHOS U/L  --   --  78   ALT U/L  --   --  62   AST U/L  --   --  34   GLUCOSE RANDOM mg/dL 165*   < > 191*    < > = values in this interval not displayed  Results from last 7 days   Lab Units 12/24/21  1410   INR  1 90*     Results from last 7 days   Lab Units 12/27/21  1052 12/27/21  0713 12/26/21  2049 12/26/21  1624 12/26/21  1125 12/26/21  0839 12/25/21  2110 12/25/21  1657 12/25/21  1150 12/25/21  0830 12/25/21  0314 12/24/21  2139   POC GLUCOSE mg/dl 193* 177* 235* 186* 230* 236* 176* 200* 209* 129 135 104         Results from last 7 days   Lab Units 12/20/21  1431   LACTIC ACID mmol/L 0 6       Imaging: No pertinent imaging reviewed      Recent Cultures (last 7 days):           Lines/Drains:  Invasive Devices  Report    Central Venous Catheter Line            CVC Central Lines 12/18/21 Triple Right Internal jugular 8 days          Peripheral Intravenous Line            Peripheral IV 12/24/21 Right Antecubital 2 days          Drain            Urethral Catheter Temperature probe 16 Fr  8 days                Telemetry:        Last 24 Hours Medication List:   Current Facility-Administered Medications   Medication Dose Route Frequency Provider Last Rate   • acetaminophen  650 mg Oral Q4H PRN Kristi Art PA-C     • amLODIPine  10 mg Oral Daily Kristi Art PA-C     • apixaban  5 mg Oral BID Loyde Dubin, MD     • atorvastatin  40 mg Oral Daily With Nancy Hernadez MD     • cefazolin  1,000 mg Intravenous Q12H Kristi Art PA-C 1,000 mg (12/27/21 1006)   • docusate sodium  100 mg Oral BID Loyde Dubin, MD     • insulin glargine  32 Units Subcutaneous HS Rohan Brown MD     • insulin lispro  1-6 Units Subcutaneous HS Bony Sam, DO     • insulin lispro  2-12 Units Subcutaneous TID AC Piotr Marquez PA-C     • insulin lispro  6 Units Subcutaneous TID With Meals [place] Gabriella Silva,      • metoprolol tartrate  50 mg Oral Q12H [address] Duane Hastings PA-C     • nystatin   Topical BID Carl Byers MD     • ondansetron  4 mg Intravenous Q6H PRN Duane Hastings PA-C     • oxyCODONE  2 5 mg Oral Q6H PRN Carl Byers MD     • oxyCODONE  5 mg Oral Q4H PRN Carl Byers MD     • pantoprazole  40 mg Intravenous Q12H [address] Piotr HUGO Marquez     • polyethylene glycol  17 g Oral Daily PRN Carl Byers MD     • senna-docusate sodium  1 tablet Oral HS Carl Byers MD          Today, Patient Was Seen By: Calr Byers MD    ** Please Note: This note has been constructed using a voice recognition system   **

## 2021-12-27 NOTE — PLAN OF CARE
Problem: PHYSICAL THERAPY ADULT  Goal: Performs mobility at highest level of function for planned discharge setting  See evaluation for individualized goals  Description: Treatment/Interventions: ADL retraining,Functional transfer training,LE strengthening/ROM,Elevations,Therapeutic exercise,Endurance training,Patient/family training,Equipment eval/education,Gait training,Bed mobility,Compensatory technique education,Continued evaluation,Spoke to nursing          See flowsheet documentation for full assessment, interventions and recommendations  Outcome: Not Progressing  Note: Prognosis: Guarded  Problem List: Decreased strength,Decreased range of motion,Decreased endurance,Impaired balance,Decreased mobility,Impaired judgement,Decreased safety awareness,Decreased coordination,Decreased cognition,Decreased skin integrity,Obesity,Pain  Assessment: pt began tx session lying supine in the bed and was agreeable to participate in PT intervention  In todays tx session pt required increased assistance to roll and reposition in bed from L to R with LE and trunk management as pt remained consistant w  completing a supine,>sit EOB transfer w/ LE and trunk management  After completion of supine<>sit EOB pt became dizzy and required 3 minutes for symptoms to subside  pt attempted 3 STS from EOB to RW but continues to have difficulty clearing buttocks from EOB w/ max Ax1  pt would benefit from continued focus on bed mobility and functional transfers when appropriate  continue to recommend post acute rehab services at the time of D/C in order to maximize pt functional independence and safety w/ all OOB mobility when appropriate   Barriers to Discharge: Inaccessible home environment,Decreased caregiver support        PT Discharge Recommendation: Post acute rehabilitation services          See flowsheet documentation for full assessment

## 2021-12-27 NOTE — CONSULTS
Interventional Radiology  Consultation 12/27/2021     Consults  Lisbeth Perez   [birth year]   [id number]      Assessment/Plan:  Request to remove temporary dialysis catheter as it is no longer being used per Dr Ev Sin  Catheter will be removed in IR today  Medical Problems             Problem List     * (Principal) Staphylococcus aureus bacteremia    Diplopia    Type 2 diabetes mellitus, with long-term current use of insulin ([address] )    Lab Results   Component Value Date    HGBA1C 12 2 (H) 12/18/2021       Recent Labs     12/26/21  1125 12/26/21  1624 12/26/21  2049 12/27/21  0713   POCGLU 230* 186* 235* 177*       Blood Sugar Average: Last 72 hrs:  (P) 192 [id number]        MULUGETA on CKD (chronic kidney disease) stage 3, GFR 30-59 ml/min    Lab Results   Component Value Date    EGFR 13 12/27/2021    EGFR 12 12/26/2021    EGFR 12 12/25/2021    CREATININE 4 34 (H) 12/27/2021    CREATININE 4 39 (H) 12/26/2021    CREATININE 4 54 (H) 12/25/2021         Morbid obesity with BMI of 40 0-44 9, adult (HCC)    Essential hypertension    MELINDA (obstructive sleep apnea)    Cerebrovascular accident (CVA) due to thrombosis ([address] )    Open wound of left lower extremity    Right renal subcapsular hematoma    History (2012) of right cerebellar CVA    Coronary artery disease involving native heart    Hypoalbuminemia    History of DVT of lower extremity    Primary hypertension    Hyperlipidemia (Chronic)    CVA (cerebral vascular accident) ([address] ) (Chronic)    Chronic venous stasis dermatitis of both lower extremities    Class 3 severe obesity in adult [facility])    Dizziness    Anemia    Right Liver mass    Acute deep vein thrombosis (DVT) of popliteal vein of left lower extremity (HCC)                  Subjective:     Patient ID: Lisbeth Perez is a 78 y o  male  History of Present Illness  Patient with renal failure  Request to remove emporary dialysis catheter as it is not being used      Review of Systems      Past Medical History: Diagnosis Date   • Bell's palsy    • Cardiac disease    • C[address]bellar stroke [address] [address] )    • Diabetes mellitus [address] [address] )    • Fracture     L hip   • Hyperlipidemia    • Hypertension    • Renal disorder    • Stroke [facility])     2013   • Stroke [facility])     [id number]        Past Surgical History:   Procedure Laterality Date   • CORONARY ANGIOPLASTY WITH STENT PLACEMENT     • FRACTURE SURGERY      L femur   • INCISION AND DRAINAGE OF WOUND Right 9/13/2016    Procedure: INCISION AND DRAINAGE (I&D) EXTREMITY, right lateral ankle;  Surgeon: Meghann Patel DPM;  Location: [facility];  Service:    • IR EMBOLIZATION (SPECIFY VESSEL OR SITE)  12/18/2021   • IR TEMPORARY DIALYSIS CATHETER PLACEMENT  12/18/2021   • WOUND DEBRIDEMENT Right 9/15/2016    Procedure: DEBRIDEMENT WOUND ([address]) ankle wound with closure and FRANCY drain placement;  Surgeon: Meghann Patel DPM;  Location: [facility];  Service:         Social History     Tobacco Use   Smoking Status Former Smoker   • Packs/day: 0 00   • Types: Cigarettes   • Quit date: 9/3/2006   • Years since quitting: 15 3   Smokeless Tobacco Never Used        Social History     Substance and Sexual Activity   Alcohol Use No        Social History     Substance and Sexual Activity   Drug Use No        Allergies   Allergen Reactions   • Ace Inhibitors Other (See Comments)   • Ciprofloxacin Other (See Comments)   • Penicillins Hives   • Morphine And Related Anxiety       Current Facility-Administered Medications   Medication Dose Route Frequency Provider Last Rate Last Admin   • acetaminophen (TYLENOL) tablet 650 mg  650 mg Oral Q4H PRN Ernestina Fernandez PA-C   650 mg at 12/26/21 2211   • amLODIPine (NORVASC) tablet 10 mg  10 mg Oral Daily Ernestina Fernandez PA-C   10 mg at 12/26/21 8815   • atorvastatin (LIPITOR) tablet 40 mg  40 mg Oral Daily With Selina Ramires MD   40 mg at 12/26/21 1804   • ceFAZolin (ANCEF) IVPB (premix in dextrose) 1,000 mg 50 mL  1,000 mg Intravenous Q12H Duong Oden PA-C   Stopped at 12/27/21 0000   • docusate sodium (COLACE) capsule 100 mg  100 mg Oral BID Shane Vann MD   100 mg at 12/26/21 1804   • insulin glargine (LANTUS) subcutaneous injection 32 Units 0 32 mL  32 Units Subcutaneous HS Kayden Willett MD   32 Units at 12/26/21 2151   • insulin lispro (HumaLOG) 100 units/mL subcutaneous injection 1-6 Units  1-6 Units Subcutaneous HS Cosme Sensor Mimms, DO   3 Units at 12/26/21 2155   • insulin lispro (HumaLOG) 100 units/mL subcutaneous injection 2-12 Units  2-12 Units Subcutaneous TID AC Duong Oden PA-C   4 Units at 12/26/21 8157   • insulin lispro (HumaLOG) 100 units/mL subcutaneous injection 6 Units  6 Units Subcutaneous TID With Meals Cosme Sensor Mimms, DO   6 Units at 12/26/21 1696   • metoprolol tartrate (LOPRESSOR) tablet 50 mg  50 mg Oral Q12H [address] Duong Oden PA-C   50 mg at 12/26/21 2151   • nystatin (MYCOSTATIN) powder   Topical BID Shane Vann MD   1 application at 23/48/83 1818   • ondansetron (ZOFRAN) injection 4 mg  4 mg Intravenous Q6H PRN Duong Oden PA-C       • oxyCODONE (ROXICODONE) IR tablet 2 5 mg  2 5 mg Oral Q6H PRN Shane Vann MD       • oxyCODONE (ROXICODONE) IR tablet 5 mg  5 mg Oral Q4H PRN Shane Vann MD   5 mg at 12/27/21 0425   • pantoprazole (PROTONIX) injection 40 mg  40 mg Intravenous Q12H [address] Piotr Marquez PA-C   40 mg at 12/26/21 2151   • polyethylene glycol (MIRALAX) packet 17 g  17 g Oral Daily PRN Shane Vann MD   17 g at 12/23/21 0728   • senna-docusate sodium (SENOKOT S) 8 6-50 mg per tablet 1 tablet  1 tablet Oral HS Shane Vann MD   1 tablet at 12/24/21 4639          Objective:    Vitals:    12/27/21 0423 12/27/21 0600 12/27/21 0700 12/27/21 0713   BP: 170/70  163/91    BP Location: Left arm      Pulse: 67   74   Resp: 18   20   Temp: 98 5 °F (36 9 °C)   98 4 °F (36 9 °C)   TempSrc: Oral      SpO2: 94%   97%   Weight:  108 kg (238 lb 1 6 oz) Height:            Physical Exam      Lab Results   Component Value Date     (H) 05/12/2014      Lab Results   Component Value Date    WBC 10 81 (H) 12/24/2021    HGB 7 2 (L) 12/27/2021    HCT 23 4 (L) 12/27/2021    MCV 94 12/24/2021     12/24/2021     Lab Results   Component Value Date    INR 1 90 (H) 12/24/2021    INR 1 45 (H) 12/21/2021    INR 1 43 (H) 12/20/2021    PROTIME 21 6 (H) 12/24/2021    PROTIME 17 5 (H) 12/21/2021    PROTIME 17 3 (H) 12/20/2021     Lab Results   Component Value Date    PTT 86 (H) 12/26/2021         I have personally reviewed pertinent imaging and laboratory results  Code Status: Level 1 - Full Code  Advance Directive and Living Will:      Power of :    POLST:      IR has been consulted to evaluate the patient, determine the appropriate procedure, and whether or not a procedure can and should be performed  Thank you for allowing me to participate in the care of Pooja Andrews  Please don't hesitate to call, text, email, or TigerText with any questions  This text is generated with voice recognition software  There may be translation, syntax,  or grammatical errors  If you have any questions, please contact the dictating provider

## 2021-12-27 NOTE — PHYSICAL THERAPY NOTE
PHYSICAL THERAPY NOTE          Patient Name: Darylene London [id number] Date: 12/27/2021 12/27/21 1045   PT Last Visit   PT Visit Date 12/27/21   Note Type   Note Type Treatment   Pain Assessment   Pain Assessment Tool 0-10   Pain Score 10 - Worst Possible Pain   Pain Location/Orientation Orientation: Left; Location: Knee   Pain Onset/Description Descriptor: Throbbing   Effect of Pain on Daily Activities limited activity tolerance and functional mobility    Patient's Stated Pain Goal No pain   Hospital Pain Intervention(s) Repositioned; Ambulation/increased activity; Emotional support;Elevated; Rest   Multiple Pain Sites No   Restrictions/Precautions   Weight Bearing Precautions Per Order No   General   Chart Reviewed Yes   Additional Pertinent History multiple lines, tubes and catheter    Response to Previous Treatment Patient with no complaints from previous session  Family/Caregiver Present No   Cognition   Overall Cognitive Status WFL   Arousal/Participation Alert; Responsive; Cooperative   Attention Attends with cues to redirect   Orientation Level Oriented X4   Memory Unable to assess   Following Commands Follows one step commands with increased time or repetition   Comments pt continues to be self limiting and anxious throuighout tx session    Subjective   Subjective pt was agreeable to participate in PT intervention  Bed Mobility   Rolling R 2  Maximal assistance   Additional items Assist x 1;HOB elevated; Bedrails; Increased time required;LE management;Verbal cues; Other  (trunk management )   Rolling L 2  Maximal assistance   Additional items Assist x 1;HOB elevated; Bedrails; Increased time required;Verbal cues;LE management; Other  (trunk management )   Supine to Sit 2  Maximal assistance   Additional items Assist x 1;HOB elevated; Bedrails; Increased time required;Verbal cues;LE management; Other  (HHA and trunk management )   Sit to Supine 2  Maximal assistance   Additional items Assist x 1;HOB elevated; Bedrails; Increased time required;Verbal cues;LE management; Other  (trunk management )   Additional Comments pt required more assistance in [facility] tx session compared to previous tx session as pt continues to be self limiting    Transfers   Sit to Stand 1  Dependent   Additional items Assist x 1;HOB elevated; Bedrails; Increased time required;Verbal cues   Additional Comments pt continues to have difficulty clearing buttocks from EOb  pt could not complete a sit<>stand functional transfer in [facility] tzx session from EOB to RW  Ambulation/Elevation   Gait pattern Not appropriate   Exercises   Quad Sets Supine;15 reps;AROM; Bilateral   Glute Sets Supine;10 reps;AROM; Bilateral   Knee AROM Long Arc Quad Sitting;15 reps;AROM; Bilateral   Ankle Pumps Sitting;20 reps;AROM; Bilateral   Marching Sitting;5 reps;AROM; Bilateral   Assessment   Prognosis Guarded   Problem List Decreased strength;Decreased range of motion;Decreased endurance; Impaired balance;Decreased mobility; Impaired judgement;Decreased safety awareness;Decreased coordination;Decreased cognition;Decreased skin integrity;Obesity;Pain   Assessment pt began tx session lying supine in the bed and was agreeable to participate in PT intervention  In [facility] tx session pt required increased assistance to roll and reposition in bed from L to R with LE and trunk management as pt remained consistant w  completing a supine,>sit EOB transfer w/ LE and trunk management  After completion of supine<>sit EOB pt became dizzy and required 3 minutes for symptoms to subside  pt attempted 3 STS from EOB to RW but continues to have difficulty clearing buttocks from EOB w/ max Ax1  pt would benefit from continued focus on bed mobility and functional transfers when appropriate   continue to recommend post acute rehab services at the time of D/C in order to maximize pt functional independence and safety w/ all OOB mobility when appropriate    Barriers to Discharge Inaccessible home environment;Decreased caregiver support   Goals   Patient Goals to go back to bed    STG Expiration Date 01/02/21   Plan   Treatment/Interventions ADL retraining;Functional transfer training;LE strengthening/ROM; Elevations; Therapeutic exercise; Endurance training;Patient/family training;Equipment eval/education; Bed mobility;Gait training;Spoke to nursing   Progress Slow progress, decreased activity tolerance   PT Frequency 4-6x/wk   Recommendation   PT Discharge Recommendation Post acute rehabilitation services   AM-PAC Basic Mobility Inpatient   Turning in Bed Without Bedrails 2   Lying on Back to Sitting on Edge of Flat Bed 2   Moving Bed to Chair 1   Standing Up From Chair 1   Walk in Room 1   Climb 3-5 Stairs 1   Basic Mobility Inpatient Raw Score 8   Turning Head Towards Sound 4   Follow Simple Instructions 3   Low Function Basic Mobility Raw Score 15   Low Function Basic Mobility Standardized Score 23 9   Highest Level Of Mobility   JH-HL Goal 3: Sit at edge of bed   JH-HLM Highest Level of Mobility 3: Sit at edge of bed   JH-HLM Goal Achieved Yes   Education   Education Provided Mobility training;Assistive device; Other  (bed mobility and functional transfers )   Patient Reinforcement needed   End of Consult   Patient Position at End of Consult Supine;Bed/Chair alarm activated; All needs within reach   The patient's AM-PAC Basic Mobility Inpatient Short Form Raw Score is 8  A Raw score of less than or equal to 16 suggests the patient may benefit from discharge to post-acute rehabilitation services  Please also refer to the recommendation of the Physical Therapist for safe discharge planning          [facility] Delores, PTA

## 2021-12-27 NOTE — PROGRESS NOTES
Progress Note - Infectious Disease   William Brito 78 y o  male MRN: [id number]  Unit/Bed#: S -01 Encounter: [id number]    Impression/Recommendations:  1  Sepsis, POA:  With tachypnea, leukocytosis, elevated lactate, MULUGETA on CKD 3  In the setting of Staphylococcus aureus bacteremia  WBC is decreasing  Lactate has normalized (3 1-> 1 7)  ? Antibiotic therapy as below  ? Supportive care per primary team  ? Monitor clinical response, temperature curve    2  Staphylococcus aureus bacteremia  LLE wound cx also with growth of Staph aureus, MSSA  Consider possibility of seeding of renal subcapsular hematoma although repeat blood cx from 12/19 remain negative  ESR of 95 is significantly elevated  12/22 transthoracic echocardiogram is negative for valve vegetation  ? Continue cefazolin 1g IV q12 hours  ? Anticipate 4 week duration of IV antibiotic therapy from time of negative cultures thru Jan 16th  ? Weekly labs while on iv ABx: CBC with diff, creatinine  ? Ok for tunneled PICC line insertion today    3  LLE cellulitis: possible SSTI source of bacteremia  Cellulitis has resolved  ? Left lower extremity elevation  ? Continue cefazolin IV as above  ? Monitor clinical response    4  Acute on chronic kidney disease stage 3:  Creatinine appears to have plateaued  ? Renal dose medications, avoid nephrotoxins  ? Nephrology service is following with no plans for dialysis and dialysis catheter was removed  ? Repeat creatinine in a m     5  Diabetes mellitus type 2, insulin dependent:  Initially treated as DKA upon hospital admission  Hyperglycemia is a risk factor for infection and delayed wound healing  ? Glycemic control as per primary service    6  Renal subcapsular hematoma:  With anemia in the setting of supratherapeutic INR  Status post IR angiography with no evidence of extravasation  Hgb is currently stable  7  Transaminitis:  LFTs are downtrending    CT scan raises concern for infiltrating neoplasm in right hepatic lobe  ? Consider triple phase CT with improved renal function    8  History of penicillin allergy:  Hives age 14  Patient is currently tolerating cefazolin IV      My recommendations were discussed with the patient's wife who verbalized understanding  ID service will follow  Antibiotics: cefazolin since 12/17  Scheduled Meds:  Current Facility-Administered Medications   Medication Dose Route Frequency Provider Last Rate   • acetaminophen  650 mg Oral Q4H PRN Radha Cleveland PA-C     • amLODIPine  10 mg Oral Daily Radha Cleveland PA-C     • apixaban  5 mg Oral BID Yvette Franz MD     • atorvastatin  40 mg Oral Daily With Lizbeth Evans MD     • cefazolin  1,000 mg Intravenous Q12H Radha Cleveland PA-C 1,000 mg (12/27/21 1006)   • docusate sodium  100 mg Oral BID Yvette Franz MD     • insulin glargine  32 Units Subcutaneous HS Martin Palencia MD     • insulin lispro  1-6 Units Subcutaneous HS Bony Sam DO     • insulin lispro  2-12 Units Subcutaneous TID AC Radha Cleveland PA-C     • insulin lispro  6 Units Subcutaneous TID With Meals She Sam, DO     • metoprolol tartrate  50 mg Oral Q12H [address] Radha Cleveland PA-C     • nystatin   Topical BID Yvette Franz MD     • ondansetron  4 mg Intravenous Q6H PRN Radha Cleveland PA-C     • oxyCODONE  2 5 mg Oral Q6H PRN Yvette Franz MD     • oxyCODONE  5 mg Oral Q4H PRN Yvette Franz MD     • pantoprazole  40 mg Intravenous Q12H [address] Piotr Marquez PA-C     • polyethylene glycol  17 g Oral Daily PRN Yvette Franz MD     • senna-docusate sodium  1 tablet Oral HS Milagros Ramires MD       Continuous Infusions:   PRN Meds: •  acetaminophen  •  ondansetron  •  oxyCODONE  •  oxyCODONE  •  polyethylene glycol    Subjective:  Patient underwent tunneled PICC line insertion today  He is resting post-procedure and was not awakened by me       Objective:  Vitals:  Temp:  [98 3 °F (36 8 °C)-98 8 °F (37 1 °C)] 98 3 °F (36 8 °C)  HR:  [62-77] 71  Resp:  [16-20] 16  BP: (123-170)/(64-91) 136/64  SpO2:  [89 %-98 %] 96 %  Temp (24hrs), Av 5 °F (36 9 °C), Min:98 3 °F (36 8 °C), Max:98 8 °F (37 1 °C)  Current: Temperature: 98 3 °F (36 8 °C)  Physical Exam:   General Appearance:  Patient appears stated age, resting in bed, no acute distress   ENT: Oropharynx moist   Lungs:   Clear to auscultation bilaterally; respirations unlabored   Heart:  S1, S2, RRR   Abdomen:   Obese, protuberant with umbilical hernia, soft, non-tender   Extremities: No distal leg edema b/l   Neurologic: No acute focal deficits   Skin: Dry scabs of distal LLE noted without erythema  No draining wounds or fluctuance  RT ACW tunneled PICC line in place     Labs, Cultures, Imaging, & Other studies:   All pertinent labs, cultures and imaging studies were personally reviewed  Results from last 7 days   Lab Units 21  0637 21  2213 21  1254 21  0443 21  0514 21  0530 21  0530 21  2141 21  1404   WBC Thousand/uL  --   --   --   --  10 81*  --  10 35*  --  11 21*   HEMOGLOBIN g/dL 7 2* 7 0* 7 4*   < > 7 9*   < > 7 7*   < > 7 7*   PLATELETS Thousands/uL  --   --   --   --  384  --  332  --  258    < > = values in this interval not displayed       Results from last 7 days   Lab Units 21  0637 21  1254 21  1254 21  0443 21  0443 21  0514 21  0514 21  0530 21  0530 21  0446 21  0446   SODIUM mmol/L 139  --  139  --  142   < > 141   < > 140   < > 141   POTASSIUM mmol/L 5 3   < > 5 0   < > 5 1   < > 4 9   < > 4 9   < > 4 3   CHLORIDE mmol/L 109*   < > 108   < > 110*   < > 110*   < > 108   < > 109*   CO2 mmol/L 20*   < > 21   < > 20*   < > 20*   < > 18*   < > 19*   BUN mg/dL 72*   < > 72*   < > 70*   < > 79*   < > 79*   < > 82*   CREATININE mg/dL 4 34*   < > 4 39*   < > 4 54*   < > 4 62*   < > 4 57*   < > 4 39*   EGFR ml/min/1 73sq m 13   < > 12   < > 12   < > 12   < > 12   < > 12   CALCIUM mg/dL 8 5   < > 8 4   < > 8 3   < > 8 3   < > 8 4   < > 8 6   AST U/L  --   --   --   --   --   --  34  --  51*  --  123*   ALT U/L  --   --   --   --   --   --  62   < > 85*   < > 95*   ALK PHOS U/L  --   --   --   --   --   --  78   < > 82   < > 81    < > = values in this interval not displayed

## 2021-12-27 NOTE — BRIEF OP NOTE (RAD/CATH)
INTERVENTIONAL RADIOLOGY PROCEDURE NOTE    Date: 12/27/2021    Procedure:  Removal of temporary dialysis catheter  Placement of tunneled PICC line  Preoperative diagnosis:   1  DKA, type 2 ([facility] )    2  Normal anion gap metabolic acidosis    3  Uremia    4  Acute anemia    5  Elevated INR    6  Hyperkalemia    7  Diabetes mellitus ([facility] )    8  Stage 3a chronic kidney disease ([facility] )    9  Left lower quadrant abdominal pain    10  Secondary hypertension    11  Open wound of left lower extremity, initial encounter    12  Bacteremia    13  Cellulitis of left lower extremity         Postoperative diagnosis: Same  Surgeon: Irina Burnett MD     Assistant: None  No qualified resident was available  Blood loss:  Minimal    Specimens:  None     Findings:  Initial ultrasound demonstrates partial thrombus in the right internal jugular vein access site of the temporary dialysis catheter  Small caliber jugular vein  Right neck temporary dialysis catheter was removed over an Amplatz wire which was placed in IVC under fluoroscopy  Decision made to use the existing access site due to the partial thrombus in the jugular vein and small caliber  A 25 cm PICC line was tunneled from the right upper chest to the existing access site and placed over sheath which was placed over the Amplatz wire  Catheter secured with Prolene suture  Catheter flushed with saline  Complications: None immediate      Anesthesia: local

## 2021-12-27 NOTE — PROGRESS NOTES
Patient was not seen or examined personally  Chart was reviewed and case d/w primary service  66yr male with T2DM requiring U500 insulin at home is admitted with renal subcapsular hematoma, staph aureus sepsis, MULUGETA, Acute Lt LE DVT and a liver mass supicious for malignancy      PLAN:     1  Type 2 diabetes  He is uncontrolled with recent A1c 12%  Goal is 8%  Home regimen was Humulin R U500 85u TID but patient was taking a guestimated dose twice daily with a syringe      Presently has mild post prandial hyperglycemia with irregular food intake  Has multiple interventions through the day yesterday  Fasting glucose is acceptable  Basal insulin - lantus 32u seems effective  Continue same  Bolus - mild post prandial hyperglycemia  Aim to give half dose of mealtime if eats<80% of meals  Continue current regimen        2  Sepsis/ Bacteremia  Primary service and ID following      3  MULUGETA  Nephrology following  Will adjust based on decision for dialysis or improved renal functions

## 2021-12-27 NOTE — PROGRESS NOTES
NEPHROLOGY PROGRESS NOTE   Yamil Moran 78 y o  male MRN: [id number]  Unit/Bed#: S -01 Encounter: [id number]  Reason for Consult: MULUGETA    ASSESSMENT AND PLAN:  79 yo man PMH DM type 2, CKD G3b (baseline creatinine 1 7 mg/dL, eGFR 35) , hypertension p/w retroperitoneal bleed complicated with MULUGETA, nephrology consulted for MULUGETA      #Non-Oliguric KDIGO MULUGETA stage  3 likely secondary on CKD G3b  • Etiology:  Likely secondary to ATN in the setting of retroperitoneal bleed  • Current creatinine:  4 34, plateauing  • Peak creatinine:  4 6 mg/dL  • UA:  Hematuria, WBC 30-50  • Renal imaging : CT right super renal hematoma, no hydronephrosis  • Treatment:  • No indication of 14 dialysis at this time  • Maintain MAP:  Over 65 mmHg if possible/avoid hypoperfusion:  Hold parameters on blood pressure medications  • Avoid nephrotoxic agents such as NSAIDs, and IV contrast if possible  Avoid opioids   • Adjust medications to GFR    #CKD G3b  · Baseline creatinine: 1 7 mg/dL, eGFR 35  · Etiology:  Likely secondary to nephrosclerosis in the settings of hypertension      #Acid-base Disorder  • serum HCO3 04LGAW/Q  • Metabolic acidosis secondary to MULUGETA  • AG: 10  • No sodium bicarb at this time    #Volume status/hypertension:  • Volume:  Euvolemic  • Blood pressure:  Normotensive /65  • Recommend:  • Low-sodium diet  • Amlodipine 10 mg once a day  • Metoprolol 50 mg b i d     #Anemia:  • Current hemoglobin:  7 mg/dL  • Secondary to retroperitoneal bleed  • Treatment:  • Transfuse for hemoglobin less than 7 0 per primary service      # retroperitoneal bleed  · Stable management as per primary team    SUBJECTIVE:  Patient seen and examined at bedside  No chest pain, shortness of breath, nausea, vomiting, abdominal pain or diarrhea  No urinary complaints    Urinary output 2 2 L in the last 20    OBJECTIVE:  Current Weight: Weight - Scale: 108 kg (238 lb 1 6 oz)  Vitals:    12/27/21 1318   BP:    Pulse: 65   Resp:    Temp:    SpO2: 98%       Intake/Output Summary (Last 24 hours) at 12/27/2021 1451  Last data filed at 12/27/2021 0640  Gross per 24 hour   Intake 410 ml   Output 2225 ml   Net -1815 ml     Wt Readings from Last 3 Encounters:   12/27/21 108 kg (238 lb 1 6 oz)   12/01/20 120 kg (263 lb 10 7 oz)   10/20/20 117 kg (258 lb 2 5 oz)     Temp Readings from Last 3 Encounters:   12/27/21 98 5 °F (36 9 °C)   02/10/21 99 5 °F (37 5 °C)   12/01/20 98 2 °F (36 8 °C) (Oral)     BP Readings from Last 3 Encounters:   12/27/21 123/65   02/11/21 (!) 178/88   12/01/20 160/74     Pulse Readings from Last 3 Encounters:   12/27/21 65   02/11/21 66   12/01/20 76        General:  Obese, no acute distress at this time  Skin:  No acute rash  Eyes:  No scleral icterus and noninjected  ENT:  Normocephalic, atraumatic, mucous membranes moist  Neck:  Supple, no jugular venous distention  Chest:  Clear to auscultation percussion, good respiratory effort, no use of accessory respiratory muscles  CVS:  Regular rate and rhythm without a murmur rub   Abdomen:  soft and nontender normal bowel  Extremities:  LE edema 1+  Neuro:  No gross focality  Psych:  Alert but disoriented      Medications:    Current Facility-Administered Medications:   •  acetaminophen (TYLENOL) tablet 650 mg, 650 mg, Oral, Q4H PRN, Luis Angel Oconnor PA-C, 650 mg at 12/26/21 2211  •  amLODIPine (NORVASC) tablet 10 mg, 10 mg, Oral, Daily, Luis Angel Oconnor PA-C, 10 mg at 12/27/21 3626  •  apixaban (ELIQUIS) tablet 5 mg, 5 mg, Oral, BID, Esvin Price MD, 5 mg at 12/27/21 1415  •  atorvastatin (LIPITOR) tablet 40 mg, 40 mg, Oral, Daily With Dinner, Esvin Price MD, 40 mg at 12/26/21 1804  •  ceFAZolin (ANCEF) IVPB (premix in dextrose) 1,000 mg 50 mL, 1,000 mg, Intravenous, Q12H, Luis Angel Oconnor PA-C, Last Rate: 100 mL/hr at 12/27/21 1006, 1,000 mg at 12/27/21 1006  •  docusate sodium (COLACE) capsule 100 mg, 100 mg, Oral, BID, Esvin Price MD, 100 mg at 12/27/21 7029  • insulin glargine (LANTUS) subcutaneous injection 32 Units 0 32 mL, 32 Units, Subcutaneous, HS, Maura Guerra MD, 32 Units at 12/26/21 2151  •  insulin lispro (HumaLOG) 100 units/mL subcutaneous injection 1-6 Units, 1-6 Units, Subcutaneous, HS, [facility], DO, 3 Units at 12/26/21 2155  •  insulin lispro (HumaLOG) 100 units/mL subcutaneous injection 2-12 Units, 2-12 Units, Subcutaneous, TID AC, 4 Units at 12/26/21 0921 **AND** Fingerstick Glucose (POCT), , , TID AC, Piotr Marquez PA-C  •  insulin lispro (HumaLOG) 100 units/mL subcutaneous injection 6 Units, 6 Units, Subcutaneous, TID With Meals, Piotr Alvarado [facility], DO, 6 Units at 12/26/21 9611  •  metoprolol tartrate (LOPRESSOR) tablet 50 mg, 50 mg, Oral, Q12H [address], Piotr Marquez PA-C, 50 mg at 12/27/21 9120  •  nystatin (MYCOSTATIN) powder, , Topical, BID, Esvin Price MD, Given at 12/27/21 0853  •  ondansetron (ZOFRAN) injection 4 mg, 4 mg, Intravenous, Q6H PRN, Luis Angel Oconnor PA-C  •  oxyCODONE (ROXICODONE) IR tablet 2 5 mg, 2 5 mg, Oral, Q6H PRN, Esvin Price MD  •  oxyCODONE (ROXICODONE) IR tablet 5 mg, 5 mg, Oral, Q4H PRN, Esvin Price MD, 5 mg at 12/27/21 1415  •  pantoprazole (PROTONIX) injection 40 mg, 40 mg, Intravenous, Q12H [address], Piotr Marquez PA-C, 40 mg at 12/27/21 0853  •  polyethylene glycol (MIRALAX) packet 17 g, 17 g, Oral, Daily PRN, Esvin Price MD, 17 g at 12/23/21 1435  •  senna-docusate sodium (SENOKOT S) 8 6-50 mg per tablet 1 tablet, 1 tablet, Oral, HS, Esvin Price MD, 1 tablet at 12/24/21 2329    Laboratory Results:  Results from last 7 days   Lab Units 12/27/21  0637 12/26/21  2213 12/26/21  1254 12/25/21  2357 12/25/21  1401 12/25/21  0443 12/24/21  0514 12/23/21  0530 12/23/21  0530 12/22/21  1011 12/22/21  0446 12/21/21  2141 12/21/21  1404 12/21/21  1000 12/21/21  0759 12/21/21  0404 12/21/21  0404   WBC Thousand/uL  --   --   --   --   --   --  10 81*  --  10 35*  --   --   -- 11 21*  --   --   --   --    HEMOGLOBIN g/dL 7 2* 7 0* 7 4* 7 4* 8 7* 7 7* 7 9*   < > 7 7*   < > 7 8*   < > 7 7*   < >  --   --  7 4*   HEMATOCRIT % 23 4* 22 8* 24 5* 24 0* 28 4* 25 2* 25 3*   < > 25 0*  --   --   --  23 8*   < >  --   --   --    PLATELETS Thousands/uL  --   --   --   --   --   --  384  --  332  --   --   --  258  --   --   --   --    SODIUM mmol/L 139  --  139  --   --  142 141  --  140  --  141  --   --   --  143   < > 144   POTASSIUM mmol/L 5 3  --  5 0  --   --  5 1 4 9  --  4 9  --  4 3  --   --   --  4 3   < > 4 6   CHLORIDE mmol/L 109*  --  108  --   --  110* 110*  --  108  --  109*  --   --   --  112*   < > 111*   CO2 mmol/L 20*  --  21  --   --  20* 20*  --  18*  --  19*  --   --   --  20*   < > 20*   BUN mg/dL 72*  --  72*  --   --  70* 79*  --  79*  --  82*  --   --   --  80*   < > 87*   CREATININE mg/dL 4 34*  --  4 39*  --   --  4 54* 4 62*  --  4 57*  --  4 39*  --   --   --  4 56*   < > 4 52*   CALCIUM mg/dL 8 5  --  8 4  --   --  8 3 8 3  --  8 4  --  8 6  --   --   --  8 1*   < > 8 1*   MAGNESIUM mg/dL  --   --   --   --   --   --   --   --   --   --   --   --   --   --   --   --  2 0   PHOSPHORUS mg/dL  --   --   --   --   --   --   --   --   --   --   --   --   --   --   --   --  4 1    < > = values in this interval not displayed  CT abdomen pelvis wo contrast   Final Result by Nolon Siemens, MD (12/27 4059)      Compared to 6 days ago, decreased size of suprarenal right hematoma  Otherwise stable appearance of the right kidney  Increased left pleural effusion, stable right effusion  Improved ascites  Other stable findings described above              Workstation performed: [id number]         VAS lower limb venous duplex study, unilateral/limited   Final Result by Nolan Thayer MD (12/24 2132)      CT abdomen pelvis wo contrast   Final Result by Fide Casper MD (12/20 1812)      Unchanged right suprarenal and multiple right renal subcapsular hematomas  No new sites of intra-abdominal or intrapelvic hemorrhage  Persistent nephrograms  Slightly larger small right pleural effusion with mild associated compressive atelectasis  No new sites of intra-abdominal or intrapelvic hemorrhage  Workstation performed: [id number]         US right upper quadrant with liver dopplers   Final Result by Sherri Rahman MD (12/20 1447)      Hepatomegaly  Patent hepatic vasculature with normal direction of flow, noting that the hepatic veins were not imaged  Grossly stable appearance of right renal subcapsular and suprarenal hematomas  Workstation performed: [id number]         XR tibia fibula 2 vw left   Final Result by Delvin Nogueira MD (12/20 1224)      No soft tissue emphysema identified  Workstation performed: [id number]         IR temporary dialysis catheter placement   Final Result by Justin Avalos MD (12/19 1301)   Impression:      Successful temporary dialysis catheter insertion  Workstation performed: [id number]         IR embolization (specify vessel or site)   Final Result by Justin Avalos MD (12/19 1305)   Impression:       No active contrast extravasation  High resistance vascular bed with contrast reflux consistent with compression of the parenchyma by the perinephric hematoma  Workstation performed: [id number]         CTA abdomen w wo contrast   Final Result by Dima Del Rosario MD (12/18 5147)         1  Stable size of right subcapsular hematoma and right suprarenal hematoma  No evidence of active arterial or venous extravasation  2   Possible infiltrating neoplasm in the right lobe of the liver  Recommend dedicated triphasic CT scan of the liver for better characterization when the patient is more stable              Workstation performed: [id number]         CT abdomen pelvis wo contrast   Final Result by Dima Del Rosario MD (12/18 0194)         1   subcapsular right renal hematoma with suprarenal extension  This is an atypical site for a spontaneous anticoagulation bleed  Suggest CT scan renal mass protocol when the hematoma has resolved to exclude the possibility of an underlying neoplasm  This finding was discussed with the physician caring for the patient prior to this dictation  Workstation performed: [id number]         XR chest 1 view portable   Final Result by Enedina Pressley DO (12/17 7886)      No acute cardiopulmonary disease  Workstation performed: [id number]         IR PICC line placement single lumen (preferred for home anitbiotics/medications)    (Results Pending)   IR tunneled central line placement    (Results Pending)       Portions of the record may have been created with voice recognition software  Occasional wrong word or "sound a like" substitutions may have occurred due to the inherent limitations of voice recognition software  Read the chart carefully and recognize, using context, where substitutions have occurred

## 2021-12-28 NOTE — CASE MANAGEMENT
Case Management Progress Note    Patient name Lucia Newby  Location S /S -59 MRN [id number]  : 1955 Date 2021       LOS (days): 11  Geometric Mean LOS (GMLOS) (days): 4 80  Days to GMLOS:-6        OBJECTIVE:        Current admission status: Inpatient  Preferred Pharmacy:   [address] [place] [address], [address] [place] [address], [address]  [address] [address]  Phone: [phone] Fax: [phone]    Primary Care Provider: Jaleel Coyle MD    Primary Insurance: Sudha Rodriguez [facility]  Secondary Insurance: [place] [employer]    PROGRESS NOTE:        CM was contacted by Enrique love that prior authorization was received  CM notified [facility] provider who reported that they would be keeping patient today to monitor his hemoglobin overnight as he required transfusion last night  CM updated Enrique love  CM department will continue to follow to assist with discharge coordination

## 2021-12-28 NOTE — OCCUPATIONAL THERAPY NOTE
Occupational Therapy Evaluation     Patient Name: Ngoc Coronado  CVZDJ'T Date: 12/28/2021  Problem List  Principal Problem:    Staphylococcus aureus bacteremia  Active Problems:    Type 2 diabetes mellitus, with long-term current use of insulin ([facility] )    MULUGETA on CKD (chronic kidney disease) stage 3, GFR 30-59 ml/min    Right renal subcapsular hematoma    Primary hypertension    Hyperlipidemia    CVA (cerebral vascular accident) ([facility] )    Anemia    Right Liver mass    Acute deep vein thrombosis (DVT) of popliteal vein of left lower extremity ([facility] )    Past Medical History  Past Medical History:   Diagnosis Date   • Bell's palsy    • Cardiac disease    • Cerebellar stroke ([facility] )    • Diabetes mellitus ([facility] )    • Fracture     L hip   • Hyperlipidemia    • Hypertension    • Renal disorder    • Stroke ([facility] )     2013   • Stroke [facility])     [id number]     Past Surgical History  Past Surgical History:   Procedure Laterality Date   • CORONARY ANGIOPLASTY WITH STENT PLACEMENT     • FRACTURE SURGERY      L femur   • INCISION AND DRAINAGE OF WOUND Right 9/13/2016    Procedure: INCISION AND DRAINAGE (I&D) EXTREMITY, right lateral ankle;  Surgeon: Shagufta Ordaz DPM;  [facility]: [facility];  Service:    • IR EMBOLIZATION (SPECIFY VESSEL OR SITE)  12/18/2021   • IR TEMPORARY DIALYSIS CATHETER PLACEMENT  12/18/2021   • IR TUNNELED CENTRAL LINE PLACEMENT  12/27/2021   • WOUND DEBRIDEMENT Right 9/15/2016    Procedure: DEBRIDEMENT WOUND [facility] OUT) ankle wound with closure and FRANCY drain placement;  Surgeon: Shagufta Ordaz DPM;  [facility];  Service:            12/28/21 0903   OT Last Visit   OT Visit Date 12/28/21   Note Type   Note type Evaluation   Restrictions/Precautions   Weight Bearing Precautions Per Order No   Pain Assessment   Pain Assessment Tool 0-10   Pain Score 10 - Worst Possible Pain   Pain Location/Orientation Orientation: Left; Location: Leg   Home Living   Type of [address] [place] [address] Two level;Bed/bath upstairs  (no bathroom on FF, pt using gallon jugs for urination)   Bathroom Shower/Tub   (sponge bathing)   Bathroom Toilet   (using gallon jugs to urinate)   Bathroom Accessibility Not accessible   Home Equipment   (denies)   Additional Comments sleeps in recliner on FF, no use of AD at baseline   Prior Function   Lives With Spouse  (who works full time)   [address] Help From Family   ADL Assistance Needs assistance  (only goes upstairs for BM with (A) from wife)   IADLs Needs assistance   Falls in the last 6 months 0   Vocational Retired   Comments (+)drives, has not driven recently   Lifestyle   Autonomy PTA pt living with wife, pt with FFSU at home, (A) with all ADLs and IADLs, (-)falls, (+)drives   Reciprocal Relationships supportive wife, who works during the day   Service to Others retired   Intrinsic Gratification enjoys watching tv   Subjective   Subjective "I need you to move out of the way so I can get over to that chair"   ADL   Eating Assistance 7  Independent   Grooming Assistance 5  [address] [facility] 4  Minimal Assistance   LB [address] 2  Maximal [address] Deficit Increased time to complete; Thread RUE; Thread LUE;Supervision/safety  (doff/donning gown)   LB Dressing Assistance 1  Total Assistance   LB Dressing Deficit Don/doff R sock; Don/doff L sock   Toileting Assistance  1  Total Assistance   Bed Mobility   Supine to Sit 2  Maximal assistance   Additional items Assist x 1; Increased time required;Verbal cues;LE management   Sit to Supine 2  Maximal assistance   Additional items Assist x 1; Increased time required;LE management;Verbal cues   Additional Comments Able to sit EOB ~ 15 min for ADL tasks   Transfers   Sit to Stand 2  Maximal assistance   Additional items Assist x 1; Increased time required;Verbal cues   Stand to Sit 2  Maximal assistance   Additional items Assist x 1; Increased time required;Verbal cues   Additional Comments x4 trials of sit >< stand   Functional Mobility   Additional Comments Attempting to side step to chair, unable to advance   Balance   Static Sitting Good   Dynamic Sitting Fair +   Static Standing Poor -   Activity Tolerance   Activity Tolerance Patient limited by fatigue   Medical Staff Made Aware ADITI Davis   RUE Assessment   RUE Assessment WFL   LUE Assessment   LUE Assessment WFL   Hand Function   Gross Motor Coordination Functional   Fine Motor Coordination Functional   Cognition   Overall Cognitive Status Impaired   Arousal/Participation Alert; Cooperative   Attention Attends with cues to redirect   Orientation Level Oriented X4   Memory Within functional limits   Following Commands Follows one step commands with increased time or repetition   Comments limited insights into deficits, decreased safety awareness  Pt to advance feet or stand without assistance but insistant on pushing therapist out of the way to sit in recliner chair   Assessment   Limitation Decreased ADL status; Decreased UE strength;Decreased Safe judgement during ADL;Decreased endurance;Decreased self-care trans;Decreased high-level ADLs   Prognosis Good   Assessment Pt is a 78 y o  male seen for OT evaluation s/p admission to [address] [place] [address] on 12/17/2021 due to weakness, N&V  Pt diagnosed with Staphylococcus aureus bacteremia  Pt with no recent admissions in the last 2 months  Pt has a significant PMH impacting occupational performance including: DM II, MULUGETA, HTN, HLD, CVA, anemia, R liver mass, DVT  Pt with active OT evaluation and treatment orders and activity orders for Up with assistance  PTA pt living with wife, pt with FFSU at home, (A) with all ADLs and IADLs, (-)falls, (+)drives  Pt is motivated to return to home  Personal and environmental factors supporting pt at time of IE include age   Personal and environmental factors inhibiting engagement in occupations include current habits and behavioral patterns, lifestyle patterns, limited social support, inaccessible home environment, inaccessible bathroom environment, difficulty completing ADLs and difficulty completing IADLs  During evaluation pt performed as is outlined above in flowsheet  Pt demonstrating good sitting balance and motivation to participate  Pt required VC for safety, VC for attention to task and education on safety with transfers  Standardized assessments used to assist in identifying performance deficits include AMPAC 6-Clicks and Barthel ADL Index  Performance deficits that affect the pt’s occupational performance during the initial evaluation include impaired balance, functional mobility, endurance, activity tolerance, forward functional reach, functional standing tolerance and overall strength, attention to task, direction following, communication and social skills, safety awareness, insight into deficits, problem solving and learning/remembering new tasks, interpersonal skills and use of coping skills  Based on pt’s functional performance and deficits the following occupations will be addressed in OT treatments in order to maximize pt’s independence and overall occupational performance: grooming, bathing/showering, toileting and toilet hygiene, dressing, feeding and functional mobility  Goals are listed below  Upon discharge from acute care setting recommend d/c to post-acute rehabilitation services  This evaluation required an extensive review of medical and/or therapy records and additional review of physical, cognitive and psychosocial history related to functional performance  Based upon functional performance deficits and assessments, this evaluation has been identified as a high complexity evaluation  Goals   Patient Goals to go home   LTG Time Frame 10-14   Long Term Goal see goals listed below   Plan   Treatment Interventions ADL retraining;Functional transfer training;UE strengthening/ROM; Endurance training;Cognitive reorientation;Patient/family training;Equipment evaluation/education; Compensatory technique education; Energy conservation; Activityengagement   Goal Expiration Date 01/11/22   OT Treatment Day 0   OT Frequency 3-5x/wk   Recommendation   OT Discharge Recommendation Post acute rehabilitation services   AM-PAC Daily Activity Inpatient   Lower Body Dressing 1   Bathing 2   Toileting 1   Upper Body Dressing 3   Grooming 3   Eating 4   Daily Activity Raw Score 14   Daily Activity Standardized Score (Calc for Raw Score >=11) 33 39   AM-PAC Applied Cognition Inpatient   Following a Speech/Presentation 3   Understanding Ordinary Conversation 3   Taking Medications 3   Remembering Where Things Are Placed or Put Away 3   Remembering List of 4-5 Errands 3   Taking Care of Complicated Tasks 3   Applied Cognition Raw Score 18   Applied Cognition Standardized Score 38 07   Barthel Index   Feeding 10   Bathing 0   Grooming Score 5   Dressing Score 5   Bladder Score 0   Bowels Score 5   Toilet Use Score 5   Transfers (Bed/Chair) Score 5   Mobility (Level Surface) Score 0   Stairs Score 0   Barthel Index Score 35        GOALS:       -Patient will perform grooming tasks sitting at the sink with overall mod I     -Patient will be min A for bathing tasks while sitting at the sink in order to increase (I) with self care    -Patient will be mod I with UB dressing while sitting EOB in order to  increased (I) with self care    -Patient will be mod A with LB dressing while in sitting in order to increase (I) with self care    -Patient will be mod A with all toileting tasks including clothing management and hygiene    -Patient will demonstrate min A with bed mobility for ability to manage own comfort and initiate OOB tasks      -Patient will demonstrate mod A with toilet transfer     -Patient will demonstrate sitting EOB for 20 min with mod I in order to increase active participation in functional activities    -Patient will demonstrate standing for 3 min with min A in order to increase active participation in functional activities    -Patient will engage in ongoing cognitive assessment in order to assist with safe discharge planning/recommendations  Patient will follow multi-step instructions with no VC in order to safely complete functional tasks    -Patient will increase UE strength to 5/5 in order to improve performance with functional transfers        The patient's raw score on the AM-PAC Daily Activity inpatient short form is 14, standardized score is 33 39, less than 39 4  Patients at this level are likely to benefit from discharge to post-acute rehabilitation services  Please refer to the recommendation of the Occupational Therapist for safe discharge planning        At the end of the session, all needs met and pt supine in bed, bed alarm activated, HOB elevated and call bell within reach    [facility], OTR/L

## 2021-12-28 NOTE — CASE MANAGEMENT
Case Management Progress Note    Patient name Mera Penaloza  Location S /S -58 MRN [id number]  : 1955 Date 2021       LOS (days): 11  Geometric Mean LOS (GMLOS) (days): 4 80  Days to GMLOS:-6        OBJECTIVE:        Current admission status: Inpatient  Preferred Pharmacy:   [address] [place] [address], [address] [place] [address], [address]  [address] [place] [address] [facility]  Phone: [phone] Fax: [phone]    Primary Care Provider: Lorna Min MD    Primary Insurance: [facility]  Secondary [place] [employer]    PROGRESS NOTE:        CM reached out to [facility] and requested they initiate prior authorization for patient to admit for STR  IMM reviewed with patient  Patient agrees with discharge determination  CM department will continue to follow to assist with discharge coordination

## 2021-12-28 NOTE — PROGRESS NOTES
NEPHROLOGY PROGRESS NOTE   Emely Rayo 78 y o  male MRN: [id number]  Unit/Bed#: S -01 Encounter: [id number]  Reason for Consult: MULUGETA    ASSESSMENT AND PLAN:  79 yo man PMH DM type 2, CKD G3b (baseline creatinine 1 7 mg/dL, eGFR 35) , hypertension p/w retroperitoneal bleed complicated with MULUGETA, nephrology consulted for MULUGETA        #Non-Oliguric KDIGO MULUGETA stage  3  on CKD G3b  · Etiology:  Likely secondary to ATN in the setting of retroperitoneal bleed  · Current creatinine:   plateauing at 4 3 mg/dL  · Peak creatinine:  4 6 mg/dL  · UA:  Hematuria, WBC 30-50  · Renal imaging : CT right super renal hematoma, no hydronephrosis  · Treatment:  · No indication of dialysis at this time  · Maintain MAP:  Over 65 mmHg if possible/avoid hypoperfusion:  Hold parameters on blood pressure medications  · Avoid nephrotoxic agents such as NSAIDs, and IV contrast if possible  Avoid opioids   · Adjust medications to GFR     #CKD G3b  · Baseline creatinine: 1 7 mg/dL, eGFR 35  · Etiology:  Likely secondary to nephrosclerosis in the settings of hypertension        #Acid-base Disorder  · serum HCO3  21 mmol/, goal >75  · Metabolic acidosis secondary to MULUGETA  · AG: 10  · No sodium bicarb at this time     #Volume status/hypertension:  · Volume:   euvolemic  · Blood pressure:   normotensive /51  · Recommend:  · Low-sodium diet  · Amlodipine 10 mg once a day  · Metoprolol 50 mg b i d      #Anemia:  · Current hemoglobin:   7 4 mg/dL  · Secondary to retroperitoneal bleed  · Treatment:  · Transfuse for hemoglobin less than 7 0 per primary service       # retroperitoneal bleed  · Management as per primary to    SUBJECTIVE:  Patient seen and examined at bedside  Patient complains of back pain, not feeling good today    Urinary output 1 L a day    OBJECTIVE:  Current Weight: Weight - Scale: 108 kg (238 lb 8 6 oz)  Vitals:    12/28/21 1134   BP: 137/51   Pulse: 62   Resp: 18   Temp: 98 4 °F (36 9 °C)   SpO2: 96%       Intake/Output Summary (Last 24 hours) at 12/28/2021 1250  Last data filed at 12/27/2021 2100  Gross per 24 hour   Intake --   Output 1000 ml   Net -1000 ml     Wt Readings from Last 3 Encounters:   12/28/21 108 kg (238 lb 8 6 oz)   12/01/20 120 kg (263 lb 10 7 oz)   10/20/20 117 kg (258 lb 2 5 oz)     Temp Readings from Last 3 Encounters:   12/28/21 98 4 °F (36 9 °C) (Oral)   02/10/21 99 5 °F (37 5 °C)   12/01/20 98 2 °F (36 8 °C) (Oral)     BP Readings from Last 3 Encounters:   12/28/21 137/51   02/11/21 (!) 178/88   12/01/20 160/74     Pulse Readings from Last 3 Encounters:   12/28/21 62   02/11/21 66   12/01/20 76        General:  no acute distress at this time  Skin:  No acute rash  Eyes:  No scleral icterus and noninjected  ENT:  Normocephalic, atraumatic, mucous membranes moist  Neck:  Supple, no jugular venous distention  Chest:  Clear to auscultation percussion, good respiratory effort, no use of accessory respiratory muscles  CVS:  Regular rate and rhythm without a murmur rub   Abdomen:  soft and nontender   Extremities:  LE edema 1+  Neuro:  No gross focality  Psych:  Alert and cooperative      Medications:    Current Facility-Administered Medications:   •  acetaminophen (TYLENOL) tablet 650 mg, 650 mg, Oral, Q4H PRN, Karol Frances PA-C, 650 mg at 12/26/21 2211  •  amLODIPine (NORVASC) tablet 10 mg, 10 mg, Oral, Daily, Karol Frances PA-C, 10 mg at 12/28/21 0845  •  apixaban (ELIQUIS) tablet 5 mg, 5 mg, Oral, BID, Bairon Burch MD, 5 mg at 12/28/21 0845  •  atorvastatin (LIPITOR) tablet 40 mg, 40 mg, Oral, Daily With Dinner, Bairon Burch MD, 40 mg at 12/27/21 1848  •  ceFAZolin (ANCEF) IVPB (premix in dextrose) 1,000 mg 50 mL, 1,000 mg, Intravenous, Q12H, Karol Frances PA-C, Last Rate: 100 mL/hr at 12/28/21 1046, 1,000 mg at 12/28/21 1046  •  docusate sodium (COLACE) capsule 100 mg, 100 mg, Oral, BID, Bairon Burch MD, 100 mg at 12/28/21 0845  •  insulin glargine (LANTUS) subcutaneous injection 32 Units 0 32 mL, 32 Units, Subcutaneous, HS, Klarissa Clement MD, 32 Units at 12/27/21 2207  •  insulin lispro (HumaLOG) 100 units/mL subcutaneous injection 1-6 Units, 1-6 Units, Subcutaneous, HS, [facility], DO, 2 Units at 12/27/21 2210  •  insulin lispro (HumaLOG) 100 units/mL subcutaneous injection 2-12 Units, 2-12 Units, Subcutaneous, TID AC, 4 Units at 12/26/21 0921 **AND** Fingerstick Glucose (POCT), , , TID AC, Piotr Marquez PA-C  •  insulin lispro (HumaLOG) 100 units/mL subcutaneous injection 6 Units, 6 Units, Subcutaneous, TID With Meals, Beny Bonilla [facility], DO, 6 Units at 12/28/21 0848  •  metoprolol tartrate (LOPRESSOR) tablet 50 mg, 50 mg, Oral, Q12H [facility], Piotr Marquez PA-C, 50 mg at 12/28/21 0845  •  nystatin (MYCOSTATIN) powder, , Topical, BID, Angeles Felix MD, 1 application at 90/24/53 0850  •  ondansetron (ZOFRAN) injection 4 mg, 4 mg, Intravenous, Q6H PRN, Maikel Sexton PA-C  •  oxyCODONE (ROXICODONE) IR tablet 2 5 mg, 2 5 mg, Oral, Q6H PRN, Angeles Felix MD  •  oxyCODONE (ROXICODONE) IR tablet 5 mg, 5 mg, Oral, Q4H PRN, Angeles Felix MD, 5 mg at 12/28/21 0527  •  pantoprazole (PROTONIX) injection 40 mg, 40 mg, Intravenous, Q12H [facility], Piotr Marquez PA-C, 40 mg at 12/28/21 0845  •  polyethylene glycol (MIRALAX) packet 17 g, 17 g, Oral, Daily PRN, Angeles Felix MD, 17 g at 12/23/21 0728  •  senna-docusate sodium (SENOKOT S) 8 6-50 mg per tablet 1 tablet, 1 tablet, Oral, HS, Angeles Felix MD, 1 tablet at 12/27/21 2201    Laboratory Results:  Results from last 7 days   Lab Units 12/28/21  0535 12/27/21 2010 12/27/21 2009 12/27/21  0637 12/26/21  2213 12/26/21  1254 12/25/21  2357 12/25/21  1401 12/25/21  0443 12/25/21  0443 12/24/21  0514 12/24/21  0514 12/23/21  0530 12/23/21  0530 12/21/21  2141 12/21/21  1404   WBC Thousand/uL 10 38*  --  10 80*  --   --   --   --   --   --   --   --  10 81*  --  10 35*  --  11 21*   HEMOGLOBIN g/dL 7 4*  --  6 8* 7 2* 7 0* 7 4* 7 4* 8 7*   < > 7 7*   < > 7 9*   < > 7 7*   < > 7 7*   HEMATOCRIT % 24 6*  --  22 3* 23 4* 22 8* 24 5* 24 0* 28 4*   < > 25 2*   < > 25 3*   < > 25 0*  --  23 8*   PLATELETS Thousands/uL 502*  --  506*  --   --   --   --   --   --   --   --  384  --  332  --  258   SODIUM mmol/L 139 139  --  139  --  139  --   --   --  142  --  141  --  140   < >  --    POTASSIUM mmol/L 5 5* 5 6*  --  5 3  --  5 0  --   --   --  5 1  --  4 9  --  4 9   < >  --    CHLORIDE mmol/L 110* 109*  --  109*  --  108  --   --   --  110*  --  110*  --  108   < >  --    CO2 mmol/L 21 21  --  20*  --  21  --   --   --  20*  --  20*  --  18*   < >  --    BUN mg/dL 69* 72*  --  72*  --  72*  --   --   --  70*  --  79*  --  79*   < >  --    CREATININE mg/dL 4 37* 4 32*  --  4 34*  --  4 39*  --   --   --  4 54*  --  4 62*  --  4 57*   < >  --    CALCIUM mg/dL 8 4 8 5  --  8 5  --  8 4  --   --   --  8 3  --  8 3  --  8 4   < >  --     < > = values in this interval not displayed  IR tunneled central line placement   Final Result by Lacho Armstrong MD (12/27 9910)   Impression:   1  Successful fluoroscopically guided placement of a 5 [place] dual-lumen power injectable tunneled central catheter via the existing right neck temporary dialysis catheter via the jugular vein  The tip of the catheter is in the right atrium and may be    used immediately  Workstation performed: [id number]         CT abdomen pelvis wo contrast   Final Result by Nolon Siemens, MD (12/27 5316)      Compared to 6 days ago, decreased size of suprarenal right hematoma  Otherwise stable appearance of the right kidney  Increased left pleural effusion, stable right effusion  Improved ascites  Other stable findings described above              Workstation performed: [id number]         VAS lower limb venous duplex study, unilateral/limited   Final Result by Nolan Thayer MD (12/24 2132)      CT abdomen pelvis wo contrast   Final Result by Alfonso Lilly MD (12/20 1812)      Unchanged right suprarenal and multiple right renal subcapsular hematomas  No new sites of intra-abdominal or intrapelvic hemorrhage  Persistent nephrograms  Slightly larger small right pleural effusion with mild associated compressive atelectasis  No new sites of intra-abdominal or intrapelvic hemorrhage  Workstation performed: [id number]         US right upper quadrant with liver dopplers   Final Result by Humberto Carlson MD (12/20 1447)      Hepatomegaly  Patent hepatic vasculature with normal direction of flow, noting that the hepatic veins were not imaged  Grossly stable appearance of right renal subcapsular and suprarenal hematomas  Workstation performed: [id number]         XR tibia fibula 2 vw left   Final Result by Chary Hampton MD (12/20 1224)      No soft tissue emphysema identified  Workstation performed: [id number]         IR temporary dialysis catheter placement   Final Result by Jackie Hernandez MD (12/19 1301)   Impression:      Successful temporary dialysis catheter insertion  Workstation performed: [id number]         IR embolization (specify vessel or site)   Final Result by Jackie Hernandez MD (12/19 1305)   Impression:       No active contrast extravasation  High resistance vascular bed with contrast reflux consistent with compression of the parenchyma by the perinephric hematoma  Workstation performed: [id number]         CTA abdomen w wo contrast   Final Result by Dwain Milan MD (12/18 2113)         1  Stable size of right subcapsular hematoma and right suprarenal hematoma  No evidence of active arterial or venous extravasation  2   Possible infiltrating neoplasm in the right lobe of the liver  Recommend dedicated triphasic CT scan of the liver for better characterization when the patient is more stable              Workstation performed: [id number] CT abdomen pelvis wo contrast   Final Result by Lida Lakhani MD (12/18 2136)         1   subcapsular right renal hematoma with suprarenal extension  This is an atypical site for a spontaneous anticoagulation bleed  Suggest CT scan renal mass protocol when the hematoma has resolved to exclude the possibility of an underlying neoplasm  This finding was discussed with the physician caring for the patient prior to this dictation  Workstation performed: [id number]         XR chest 1 view portable   Final Result by Amanda England DO (12/17 1873)      No acute cardiopulmonary disease  Workstation performed: [id number]         IR PICC line placement single lumen (preferred for home anitbiotics/medications)    (Results Pending)       Portions of the record may have been created with voice recognition software  Occasional wrong word or "sound a like" substitutions may have occurred due to the inherent limitations of voice recognition software  Read the chart carefully and recognize, using context, where substitutions have occurred

## 2021-12-28 NOTE — PLAN OF CARE
Problem: MOBILITY - ADULT  Goal: Maintain or return to baseline ADL function  Description: INTERVENTIONS:  -  Assess patient's ability to carry out ADLs; assess patient's baseline for ADL function and identify physical deficits which impact ability to perform ADLs (bathing, care of mouth/teeth, toileting, grooming, dressing, etc )  - Assess/evaluate cause of self-care deficits   - Assess range of motion  - Assess patient's mobility; develop plan if impaired  - Assess patient's need for assistive devices and provide as appropriate  - Encourage maximum independence but intervene and supervise when necessary  - Involve family in performance of ADLs  - Assess for home care needs following discharge   - Consider OT consult to assist with ADL evaluation and planning for discharge  - Provide patient education as appropriate  Outcome: Progressing  Goal: Maintains/Returns to pre admission functional level  Description: INTERVENTIONS:  - Perform BMAT or MOVE assessment daily    - Set and communicate daily mobility goal to care team and patient/family/caregiver  - Collaborate with rehabilitation services on mobility goals if consulted  - Perform Range of Motion 3 times a day  - Reposition patient every 2 hours    - Dangle patient 3 times a day  - Stand patient 3 times a day  - Ambulate patient 3 times a day  - Out of bed to chair 3 times a day   - Out of bed for meals 3 times a day  - Out of bed for toileting  - Record patient progress and toleration of activity level   Outcome: Progressing     Problem: Potential for Falls  Goal: Patient will remain free of falls  Description: INTERVENTIONS:  - Educate patient/family on patient safety including physical limitations  - Instruct patient to call for assistance with activity   - Consult OT/PT to assist with strengthening/mobility   - Keep Call bell within reach  - Keep bed low and locked with side rails adjusted as appropriate  - Keep care items and personal belongings within reach  - Initiate and maintain comfort rounds  - Make Fall Risk Sign visible to staff  - Offer Toileting every 2 Hours, in advance of need  - Initiate/Maintain alarm  - Obtain necessary fall risk management equipment:   - Apply yellow socks and bracelet for high fall risk patients  - Consider moving patient to room near nurses station  Outcome: Progressing     Problem: Prexisting or High Potential for Compromised Skin Integrity  Goal: Skin integrity is maintained or improved  Description: INTERVENTIONS:  - Identify patients at risk for skin breakdown  - Assess and monitor skin integrity  - Assess and monitor nutrition and hydration status  - Monitor labs   - Assess for incontinence   - Turn and reposition patient  - Assist with mobility/ambulation  - Relieve pressure over bony prominences  - Avoid friction and shearing  - Provide appropriate hygiene as needed including keeping skin clean and dry  - Evaluate need for skin moisturizer/barrier cream  - Collaborate with interdisciplinary team   - Patient/family teaching  - Consider wound care consult   Outcome: Progressing     Problem: Nutrition/Hydration-ADULT  Goal: Nutrient/Hydration intake appropriate for improving, restoring or maintaining nutritional needs  Description: Monitor and assess patient's nutrition/hydration status for malnutrition  Collaborate with interdisciplinary team and initiate plan and interventions as ordered  Monitor patient's weight and dietary intake as ordered or per policy  Utilize nutrition screening tool and intervene as necessary  Determine patient's food preferences and provide high-protein, high-caloric foods as appropriate       INTERVENTIONS:  - Monitor oral intake, urinary output, labs, and treatment plans  - Assess nutrition and hydration status and recommend course of action  - Evaluate amount of meals eaten  - Assist patient with eating if necessary   - Allow adequate time for meals  - Recommend/ encourage appropriate diets, oral nutritional supplements, and vitamin/mineral supplements  - Order, calculate, and assess calorie counts as needed  - Recommend, monitor, and adjust tube feedings and TPN/PPN based on assessed needs  - Assess need for intravenous fluids  - Provide specific nutrition/hydration education as appropriate  - Include patient/family/caregiver in decisions related to nutrition  Outcome: Progressing     Problem: GASTROINTESTINAL - ADULT  Goal: Minimal or absence of nausea and/or vomiting  Description: INTERVENTIONS:  - Administer IV fluids if ordered to ensure adequate hydration  - Maintain NPO status until nausea and vomiting are resolved  - Nasogastric tube if ordered  - Administer ordered antiemetic medications as needed  - Provide nonpharmacologic comfort measures as appropriate  - Advance diet as tolerated, if ordered  - Consider nutrition services referral to assist patient with adequate nutrition and appropriate food choices  Outcome: Progressing  Goal: Maintains or returns to baseline bowel function  Description: INTERVENTIONS:  - Assess bowel function  - Encourage oral fluids to ensure adequate hydration  - Administer IV fluids if ordered to ensure adequate hydration  - Administer ordered medications as needed  - Encourage mobilization and activity  - Consider nutritional services referral to assist patient with adequate nutrition and appropriate food choices  Outcome: Progressing  Goal: Maintains adequate nutritional intake  Description: INTERVENTIONS:  - Monitor percentage of each meal consumed  - Identify factors contributing to decreased intake, treat as appropriate  - Assist with meals as needed  - Monitor I&O, weight, and lab values if indicated  - Obtain nutrition services referral as needed  Outcome: Progressing  Goal: Establish and maintain optimal ostomy function  Description: INTERVENTIONS:  - Assess bowel function  - Encourage oral fluids to ensure adequate hydration  - Administer IV fluids if ordered to ensure adequate hydration   - Administer ordered medications as needed  - Encourage mobilization and activity  - Nutrition services referral to assist patient with appropriate food choices  - Assess stoma site  - Consider wound care consult   Outcome: Progressing  Goal: Oral mucous membranes remain intact  Description: INTERVENTIONS  - Assess oral mucosa and hygiene practices  - Implement preventative oral hygiene regimen  - Implement oral medicated treatments as ordered  - Initiate Nutrition services referral as needed  Outcome: Progressing     Problem: GENITOURINARY - ADULT  Goal: Maintains or returns to baseline urinary function  Description: INTERVENTIONS:  - Assess urinary function  - Encourage oral fluids to ensure adequate hydration if ordered  - Administer IV fluids as ordered to ensure adequate hydration  - Administer ordered medications as needed  - Offer frequent toileting  - Follow urinary retention protocol if ordered  Outcome: Progressing  Goal: Absence of urinary retention  Description: INTERVENTIONS:  - Assess patient’s ability to void and empty bladder  - Monitor I/O  - Bladder scan as needed  - Discuss with physician/AP medications to alleviate retention as needed  - Discuss catheterization for long term situations as appropriate  Outcome: Progressing  Goal: Urinary catheter remains patent  Description: INTERVENTIONS:  - Assess patency of urinary catheter  - If patient has a chronic luz, consider changing catheter if non-functioning  - Follow guidelines for intermittent irrigation of non-functioning urinary catheter  Outcome: Progressing

## 2021-12-28 NOTE — PROGRESS NOTES
Progress Note - Infectious Disease   William Brito 78 y o  male MRN: [id number]  Unit/Bed#: S -01 Encounter: [id number]    Impression/Recommendations:  1  Sepsis, POA:  With tachypnea, leukocytosis, elevated lactate, MULUGETA on CKD 3  In the setting of Staphylococcus aureus bacteremia  WBC is decreasing  Lactate has normalized (3 1-> 1 7)  ? Antibiotic therapy as below  ? Supportive care per primary team  ? Monitor clinical response, temperature curve    2  Staphylococcus aureus bacteremia  LLE wound cx also with growth of Staph aureus, MSSA  Consider possibility of seeding of renal subcapsular hematoma although repeat blood cx from 12/19 remain negative  ESR of 95 is significantly elevated  12/22 transthoracic echocardiogram is negative for valve vegetation  ? Continue cefazolin 1g IV q12 hours  ? Anticipate 4 week duration of IV antibiotic therapy from time of negative cultures thru Jan 16th  ? Weekly labs while on iv ABx: CBC with diff, creatinine  ? Ok for tunneled PICC line insertion today    3  LLE cellulitis: possible SSTI source of bacteremia  Cellulitis has resolved  ? Left lower extremity elevation  ? Continue cefazolin IV as above  ? Monitor clinical response    4  Acute on chronic kidney disease stage 3:  Creatinine appears to have plateaued  ? Renal dose medications, avoid nephrotoxins  ? Nephrology service is following with no plans for dialysis and dialysis catheter was removed  ? Repeat creatinine in a m     5  Diabetes mellitus type 2, insulin dependent:  Initially treated as DKA upon hospital admission  Hyperglycemia is a risk factor for infection and delayed wound healing  ? Glycemic control as per primary service    6  Renal subcapsular hematoma:  With anemia in the setting of supratherapeutic INR  Status post IR angiography with no evidence of extravasation  Hgb is currently stable  7  Transaminitis:  LFTs are downtrending    CT abd raises concern for infiltrating neoplasm in right hepatic lobe  ? Consider triple phase CT with improved renal function; most likely as outpt    8  History of penicillin allergy:  Hives age 14  Patient is currently tolerating cefazolin IV      My recommendations were discussed with the patient and his wife who verbalized understanding  ID service will follow  Antibiotics: cefazolin since 12/17  Scheduled Meds:  Current Facility-Administered Medications   Medication Dose Route Frequency Provider Last Rate   • acetaminophen  650 mg Oral Q4H PRN Suleiman Schwab, PA-C     • amLODIPine  10 mg Oral Daily Suleiman Schwab, PA-C     • apixaban  5 mg Oral BID Deonte Quiñonez MD     • atorvastatin  40 mg Oral Daily With Deon Wright MD     • cefazolin  1,000 mg Intravenous Q12H Suleiman Schwab, PA-C 1,000 mg (12/28/21 1046)   • docusate sodium  100 mg Oral BID Deonte Quiñonez MD     • insulin glargine  32 Units Subcutaneous HS Braxton Crabtree MD     • insulin lispro  1-6 Units Subcutaneous HS [facility]ony [facility]ao [facility], DO     • insulin lispro  2-12 Units Subcutaneous TID AC Suleiman Schwab PA-C     • insulin lispro  6 Units Subcutaneous TID With Meals Drena Runner [facility], DO     • metoprolol tartrate  50 mg Oral Q12H [facility] Suleiman Schwab, PADeepaliC     • nystatin   Topical BID Deonte Quiñonez MD     • ondansetron  4 mg Intravenous Q6H PRN Suleiman Schwab PADeepaliC     • oxyCODONE  2 5 mg Oral Q6H PRN Deonte Quiñonez MD     • oxyCODONE  5 mg Oral Q4H PRN Deonte Quiñonez MD     • pantoprazole  40 mg Intravenous Q12H [facility] Piotr Marquez PA-C     • polyethylene glycol  17 g Oral Daily PRN Deonte Quiñonez MD     • senna-docusate sodium  1 tablet Oral HS Milagros Ramires MD       Continuous Infusions:   PRN Meds: •  acetaminophen  •  ondansetron  •  oxyCODONE  •  oxyCODONE  •  polyethylene glycol    Subjective:  Patient says he feels well and offers no complaints  He denies fever, chills, vomiting, diarrhea, rash    He is tolerating current antibiotic therapy  Wife is concerned about patient's mental status as she says there have been times where he has not recognized her  Objective:  Vitals:  Temp:  [98 2 °F (36 8 °C)-98 8 °F (37 1 °C)] 98 8 °F (37 1 °C)  HR:  [59-74] 66  Resp:  [18-20] 18  BP: (124-159)/(51-70) 144/59  SpO2:  [89 %-97 %] 94 %  Temp (24hrs), Av 5 °F (36 9 °C), Min:98 2 °F (36 8 °C), Max:98 8 °F (37 1 °C)  Current: Temperature: 98 8 °F (37 1 °C)  Physical Exam:   General Appearance:  Patient appears stated age, resting in bed, no acute distress   ENT: Oropharynx moist, O2 2L via NC intact   Lungs:   Clear to auscultation bilaterally; respirations unlabored   Heart:  S1, S2, RRR   Abdomen:   Obese, protuberant with umbilical hernia, soft, non-tender   Extremities: No distal leg edema b/l   Neurologic: AAO to person, surroundings, conversant, fluent speech  He recognizes that his wife is beside him  Skin: Dry scabs of distal LLE noted without erythema  No draining wounds or fluctuance  RT ACW tunneled PICC line intact     Labs, Cultures, Imaging, & Other studies:   All pertinent labs, cultures and imaging studies were personally reviewed  Results from last 7 days   Lab Units 21  0535 2137 213 21  0514   WBC Thousand/uL 10 38* 10 80*  --   --  10 81*   HEMOGLOBIN g/dL 7 4* 6 8* 7 2*   < > 7 9*   PLATELETS Thousands/uL 502* 506*  --   --  384    < > = values in this interval not displayed       Results from last 7 days   Lab Units 21  1321 21  0535 21  0535 21  0443 21  0514 21  0530 21  0530   SODIUM mmol/L 138  --  139  --  139   < > 141   < > 140   POTASSIUM mmol/L 5 3   < > 5 5*   < > 5 6*   < > 4 9   < > 4 9   CHLORIDE mmol/L 110*   < > 110*   < > 109*   < > 110*   < > 108   CO2 mmol/L 22   < > 21   < > 21   < > 20*   < > 18*   BUN mg/dL 72*   < > 69*   < > 72*   < > 79*   < > 79*   CREATININE mg/dL 4 43* < > 4 37*   < > 4 32*   < > 4 62*   < > 4 57*   EGFR ml/min/1 73sq m 12   < > 13   < > 13   < > 12   < > 12   CALCIUM mg/dL 7 8*   < > 8 4   < > 8 5   < > 8 3   < > 8 4   AST U/L  --   --   --   --  31  --  34  --  51*   ALT U/L  --   --   --   --  12  --  62   < > 85*   ALK PHOS U/L  --   --   --   --  74  --  78   < > 82    < > = values in this interval not displayed  12/28/21 CT head: no acute abnormality

## 2021-12-28 NOTE — PROGRESS NOTES
[facility]  Progress Note - Adamaris Gan 1955, 78 y o  male MRN: [id number]  Unit/Bed#: S -01 Encounter: [id number]  Primary Care Provider: Jane Moreno MD   Date and time admitted to hospital: 12/17/2021  3:03 PM    * Staphylococcus aureus bacteremia  Assessment & Plan  POA septic secondary to LLE cellulittis  2/2 bcx MSSA started on Cefazolin  Wound cultures growing MSSA  Plan  · Repeat cultures collected on 12/19/21 negative x  5 days  · ID following-recommending 4 weeks of IV abx through January 16th,2022  · Patient s/p IR placement of Tunneled line on 12/27/21 for long term abx  · Monitor fever curves and WBC    MULUGETA on CKD (chronic kidney disease) stage 3, GFR 30-59 ml/min  Assessment & Plan  Lab Results   Component Value Date    EGFR 12 12/25/2021    EGFR 12 12/24/2021    EGFR 12 12/23/2021    CREATININE 4 54 (H) 12/25/2021    CREATININE 4 62 (H) 12/24/2021    CREATININE 4 57 (H) 12/23/2021    Baseline creatinine of 1 5 - 1 9  Remains elevated consistently >4    Plan    Nephrology consulted recs as below:  · Continue to monitor BMP   · Avoid hypotension and nephrotoxins   · No urgent indication for dialysis at this time    Acute deep vein thrombosis (DVT) of popliteal vein of left lower extremity ([address] )  Assessment & Plan  Patient reports c/o of new onset left lower extremity pain, on examination exquisitely tender behind the calf  Discussed with patient concern for DVT and obtained LLE doppler  Plan  · VAS doppler showing DVT in popliteal ans gastroc veins  · Will start Eliquis 5mg BID for DVT prophylaxis   · Continue to monitor closely    Anemia  Assessment & Plan  POA Acute blood loss anemia in setting of gross hematuria, Hg on admission was 6 8 s/p 3 units of PRBC  Hg improved after transfusion now downtrending once again without clear source of bleeding      12/26/21 CT abdomen pelvis to assess for underlying bleed-Compared to 6 days ago, decreased size of suprarenal right hematoma  Otherwise stable appearance of the right kidney  Increased left pleural effusion, stable right effusion  Improved ascites  Patient s/p 1 unit of PRBC due to low Hg overnight 6 8  This am improved to 7 4    Plan  · Monitor H/H closely   ·  Transfuse for Hg < 7  · Monitor for s/sx of blood loss  · Continue anticoagulation and ensure Hg remains stable    Type 2 diabetes mellitus, with long-term current use of insulin [facility])  Assessment & Plan  Lab Results   Component Value Date    HGBA1C 12 2 (H) 12/18/2021       Recent Labs     12/25/21  1657 12/25/21  2110 12/26/21  0839 12/26/21  1125   POCGLU 200* 176* 236* 230*       Blood Sugar Average: Last 72 hrs:  (P) 943 [id number]     Patient initially presents in DKA require insulin drip, AG closed as of 12/21 am and has remained stable  Plan  · Endocrinology following-appreciate recs  · Lantus now 32 units at bedtime and Lispro 6 units TID along with algorithm 4 correction  · Goal -180mg/dL  · Hypoglycemia protocol and QID monitoring    Hyperlipidemia  Assessment & Plan  · Transaminitis has now resolved  · Continue statin therpay      Right renal subcapsular hematoma  Assessment & Plan  Patient had presented originally with complaint of RLQ abdominal pain  Subsequently on 12/18 went for emergent embolization with IR:  There is no evidence of acute bleeding and no intervention was done in the setting of CTA a/p showing stable right subcapsular hematom of the right kidney  No evidence of active arterial or venous extravasation noted  Repeat imaging 12/20 - CT abdomen revealed stable right subcapsular renal hematoma  Plan  · Continue to monitor and treat abdominal pain as appropriate  · Monitor for any s/sx of acute blood loss      CVA (cerebral vascular accident) [facility])  Assessment & Plan  Patient with history of thrombotic CVA with Left facial droop no other residual neurological deficits      Plan  · Monitor for any acute changes in mental status      Primary hypertension  Assessment & Plan  BP remains stable ranging 130s/60-70s    Plan  · On  Amlodipine 10 mg  · Concern for Page kidney in the setting of right renal subcapsular hematoma  · Nephrology on board- recommending lasix if patient becomes oliguric  · Monitor VSS      Right Liver mass  Assessment & Plan  Possible infiltrating neoplasm in the right lobe of the liver seen on CT scan  Recommend dedicated triphasic CT scan of the liver for better characterization when the patient is more stable  AFP negative  Eventually will need colonoscopy and EGD    Constipation-resolved as of 2021  Assessment & Plan  Bowel regimen ordered, discussed with nursing-pt had a BM yesterday at 9pm  Patient states now having regular BMs  · Patient reports having bowel movement  · Continue senokot daily and Miralax daily PRN  · Status post Relistor  · Monitor for BM        VTE Pharmacologic Prophylaxis:   VTE Score: 4 Moderate Risk (Score 3-4) - Pharmacological DVT Prophylaxis Ordered: Apixaban (Eliquis)  Mechanical VTE Prophylaxis in Place: No    Patient Centered Rounds: I have performed bedside rounds with nursing staff today  Discussions with Specialists or Other Care Team Provider: ID, Nephrology, Urology    Education and Discussions with Family / Patient: Will update family    Current Length of Stay: 6 day(s)    Current Patient Status: Inpatient     Discharge Plan / Estimated Discharge Date: TBD    Code Status: Level 1 - Full Code      Subjective:   Patient upon examination was working with PT to transition from sitting to standing this am     Objective:     Vitals:   Temp (24hrs), Av 5 °F (36 9 °C), Min:98 2 °F (36 8 °C), Max:98 7 °F (37 1 °C)    Temp:  [98 2 °F (36 8 °C)-98 7 °F (37 1 °C)] 98 7 °F (37 1 °C)  HR:  [59-74] 64  Resp:  [16-20] 18  BP: (124-159)/(51-70) 135/62  SpO2:  [89 %-97 %] 95 %  Body mass index is 37 36 kg/m²       Input and Output Summary (last 24 hours): Intake/Output Summary (Last 24 hours) at 12/28/2021 1319  Last data filed at 12/27/2021 2100  Gross per 24 hour   Intake --   Output 1000 ml   Net -1000 ml       Physical Exam:     Physical Exam  Vitals reviewed  Constitutional:       Appearance: Normal appearance  HENT:      Head: Normocephalic and atraumatic  Right Ear: External ear normal       Left Ear: External ear normal       Nose: Nose normal       Mouth/Throat:      Mouth: Mucous membranes are moist       Pharynx: Oropharynx is clear  Eyes:      Conjunctiva/sclera: Conjunctivae normal    Cardiovascular:      Rate and Rhythm: Normal rate and regular rhythm  Pulses: Normal pulses  Pulmonary:      Effort: Pulmonary effort is normal       Breath sounds: Normal breath sounds  Abdominal:      General: Bowel sounds are normal       Palpations: Abdomen is soft  Tenderness: There is no abdominal tenderness  Musculoskeletal:      Right lower leg: Edema present  Left lower leg: Edema present  Comments: No worsening of erythema or purluence noted of the left lower extremity    Skin:     General: Skin is warm and dry  Neurological:      Mental Status: He is alert  Mental status is at baseline  Psychiatric:         Mood and Affect: Mood normal           Additional Data:     Labs:  Results from last 7 days   Lab Units 12/28/21 0535 12/27/21 2009 12/27/21 2009   WBC Thousand/uL 10 38*   < > 10 80*   HEMOGLOBIN g/dL 7 4*   < > 6 8*   HEMATOCRIT % 24 6*   < > 22 3*   PLATELETS Thousands/uL 502*   < > 506*   BANDS PCT %  --   --  3   LYMPHO PCT %  --   --  6*   MONO PCT %  --   --  4   EOS PCT %  --   --  6    < > = values in this interval not displayed       Results from last 7 days   Lab Units 12/28/21 0535 12/27/21 2010 12/27/21 2010   SODIUM mmol/L 139   < > 139   POTASSIUM mmol/L 5 5*   < > 5 6*   CHLORIDE mmol/L 110*   < > 109*   CO2 mmol/L 21   < > 21   BUN mg/dL 69*   < > 72*   CREATININE mg/dL 4 37*   < > 4 32*   ANION GAP mmol/L 8   < > 9   CALCIUM mg/dL 8 4   < > 8 5   ALBUMIN g/dL  --   --  1 4*   TOTAL BILIRUBIN mg/dL  --   --  0 43   ALK PHOS U/L  --   --  74   ALT U/L  --   --  12   AST U/L  --   --  31   GLUCOSE RANDOM mg/dL 150*   < > 209*    < > = values in this interval not displayed  Results from last 7 days   Lab Units 12/24/21  1410   INR  1 90*     Results from last 7 days   Lab Units 12/28/21  1311 12/28/21  1234 12/28/21  0733 12/27/21  2125 12/27/21  1611 12/27/21  1052 12/27/21  0713 12/26/21  2049 12/26/21  1624 12/26/21  1125 12/26/21  0839 12/25/21  2110   POC GLUCOSE mg/dl 204* 241* 144* 220* 214* 193* 177* 235* 186* 230* 236* 176*               Imaging: No pertinent imaging reviewed      Recent Cultures (last 7 days):           Lines/Drains:  Invasive Devices  Report    Central Venous Catheter Line            CVC Central Lines 12/27/21 Double Right <1 day          Peripheral Intravenous Line            Peripheral IV 12/24/21 Right Antecubital 3 days          Drain            Urethral Catheter Temperature probe 16 Fr  9 days                Telemetry:        Last 24 Hours Medication List:   Current Facility-Administered Medications   Medication Dose Route Frequency Provider Last Rate   • acetaminophen  650 mg Oral Q4H PRN Nanda Tobias PA-C     • amLODIPine  10 mg Oral Daily Nanda Tobias PA-C     • apixaban  5 mg Oral BID Chely James MD     • atorvastatin  40 mg Oral Daily With Jolene Ventura MD     • cefazolin  1,000 mg Intravenous Q12H Nanda Tobias PA-C 1,000 mg (12/28/21 1046)   • docusate sodium  100 mg Oral BID Chely James MD     • insulin glargine  32 Units Subcutaneous HS Sathya Mcarthur MD     • insulin lispro  1-6 Units Subcutaneous HS Bony Sam, DO     • insulin lispro  2-12 Units Subcutaneous TID AC Nanda Tobias PA-C     • insulin lispro  6 Units Subcutaneous TID With Meals Malena Sam DO     • metoprolol tartrate  50 mg Oral Q12H [address] Jodie Davila PA-C     • nystatin   Topical BID Titi Cortez MD     • ondansetron  4 mg Intravenous Q6H PRN Jodie Davila PA-C     • oxyCODONE  2 5 mg Oral Q6H PRN Titi Cortez MD     • oxyCODONE  5 mg Oral Q4H PRN Titi Cortez MD     • pantoprazole  40 mg Intravenous Q12H [address] Piotr Marquez PA-C     • polyethylene glycol  17 g Oral Daily PRN Titi Cortez MD     • senna-docusate sodium  1 tablet Oral HS Titi Cortez MD          Today, Patient Was Seen By: Titi Cortez MD    ** Please Note: This note has been constructed using a voice recognition system   **

## 2021-12-28 NOTE — PLAN OF CARE
Problem: MOBILITY - ADULT  Goal: Maintain or return to baseline ADL function  Description: INTERVENTIONS:  -  Assess patient's ability to carry out ADLs; assess patient's baseline for ADL function and identify physical deficits which impact ability to perform ADLs (bathing, care of mouth/teeth, toileting, grooming, dressing, etc )  - Assess/evaluate cause of self-care deficits   - Assess range of motion  - Assess patient's mobility; develop plan if impaired  - Assess patient's need for assistive devices and provide as appropriate  - Encourage maximum independence but intervene and supervise when necessary  - Involve family in performance of ADLs  - Assess for home care needs following discharge   - Consider OT consult to assist with ADL evaluation and planning for discharge  - Provide patient education as appropriate  Outcome: Progressing  Goal: Maintains/Returns to pre admission functional level  Description: INTERVENTIONS:  - Perform BMAT or MOVE assessment daily    - Set and communicate daily mobility goal to care team and patient/family/caregiver  - Collaborate with rehabilitation services on mobility goals if consulted  - Perform Range of Motion 3 times a day  - Reposition patient every 2 hours    - Dangle patient 3 times a day  - Stand patient 3 times a day  - Ambulate patient 3 times a day  - Out of bed to chair 3 times a day   - Out of bed for meals 3 times a day  - Out of bed for toileting  - Record patient progress and toleration of activity level   Outcome: Progressing     Problem: Potential for Falls  Goal: Patient will remain free of falls  Description: INTERVENTIONS:  - Educate patient/family on patient safety including physical limitations  - Instruct patient to call for assistance with activity   - Consult OT/PT to assist with strengthening/mobility   - Keep Call bell within reach  - Keep bed low and locked with side rails adjusted as appropriate  - Keep care items and personal belongings within reach  - Initiate and maintain comfort rounds  - Make Fall Risk Sign visible to staff  - Offer Toileting every 2 Hours, in advance of need  - Initiate/Maintain alarm  - Obtain necessary fall risk management equipment:   - Apply yellow socks and bracelet for high fall risk patients  - Consider moving patient to room near nurses station  Outcome: Progressing     Problem: Prexisting or High Potential for Compromised Skin Integrity  Goal: Skin integrity is maintained or improved  Description: INTERVENTIONS:  - Identify patients at risk for skin breakdown  - Assess and monitor skin integrity  - Assess and monitor nutrition and hydration status  - Monitor labs   - Assess for incontinence   - Turn and reposition patient  - Assist with mobility/ambulation  - Relieve pressure over bony prominences  - Avoid friction and shearing  - Provide appropriate hygiene as needed including keeping skin clean and dry  - Evaluate need for skin moisturizer/barrier cream  - Collaborate with interdisciplinary team   - Patient/family teaching  - Consider wound care consult   Outcome: Progressing     Problem: Nutrition/Hydration-ADULT  Goal: Nutrient/Hydration intake appropriate for improving, restoring or maintaining nutritional needs  Description: Monitor and assess patient's nutrition/hydration status for malnutrition  Collaborate with interdisciplinary team and initiate plan and interventions as ordered  Monitor patient's weight and dietary intake as ordered or per policy  Utilize nutrition screening tool and intervene as necessary  Determine patient's food preferences and provide high-protein, high-caloric foods as appropriate       INTERVENTIONS:  - Monitor oral intake, urinary output, labs, and treatment plans  - Assess nutrition and hydration status and recommend course of action  - Evaluate amount of meals eaten  - Assist patient with eating if necessary   - Allow adequate time for meals  - Recommend/ encourage appropriate diets, oral nutritional supplements, and vitamin/mineral supplements  - Order, calculate, and assess calorie counts as needed  - Recommend, monitor, and adjust tube feedings and TPN/PPN based on assessed needs  - Assess need for intravenous fluids  - Provide specific nutrition/hydration education as appropriate  - Include patient/family/caregiver in decisions related to nutrition  Outcome: Progressing     Problem: GASTROINTESTINAL - ADULT  Goal: Minimal or absence of nausea and/or vomiting  Description: INTERVENTIONS:  - Administer IV fluids if ordered to ensure adequate hydration  - Maintain NPO status until nausea and vomiting are resolved  - Nasogastric tube if ordered  - Administer ordered antiemetic medications as needed  - Provide nonpharmacologic comfort measures as appropriate  - Advance diet as tolerated, if ordered  - Consider nutrition services referral to assist patient with adequate nutrition and appropriate food choices  Outcome: Progressing  Goal: Maintains or returns to baseline bowel function  Description: INTERVENTIONS:  - Assess bowel function  - Encourage oral fluids to ensure adequate hydration  - Administer IV fluids if ordered to ensure adequate hydration  - Administer ordered medications as needed  - Encourage mobilization and activity  - Consider nutritional services referral to assist patient with adequate nutrition and appropriate food choices  Outcome: Progressing  Goal: Maintains adequate nutritional intake  Description: INTERVENTIONS:  - Monitor percentage of each meal consumed  - Identify factors contributing to decreased intake, treat as appropriate  - Assist with meals as needed  - Monitor I&O, weight, and lab values if indicated  - Obtain nutrition services referral as needed  Outcome: Progressing  Goal: Establish and maintain optimal ostomy function  Description: INTERVENTIONS:  - Assess bowel function  - Encourage oral fluids to ensure adequate hydration  - Administer IV fluids if ordered to ensure adequate hydration   - Administer ordered medications as needed  - Encourage mobilization and activity  - Nutrition services referral to assist patient with appropriate food choices  - Assess stoma site  - Consider wound care consult   Outcome: Progressing  Goal: Oral mucous membranes remain intact  Description: INTERVENTIONS  - Assess oral mucosa and hygiene practices  - Implement preventative oral hygiene regimen  - Implement oral medicated treatments as ordered  - Initiate Nutrition services referral as needed  Outcome: Progressing

## 2021-12-28 NOTE — PLAN OF CARE
Problem: OCCUPATIONAL THERAPY ADULT  Goal: Performs self-care activities at highest level of function for planned discharge setting  See evaluation for individualized goals  Description: Treatment Interventions: ADL retraining,Functional transfer training,UE strengthening/ROM,Endurance training,Cognitive reorientation,Patient/family training,Equipment evaluation/education,Compensatory technique education,Energy conservation,Activityengagement          See flowsheet documentation for full assessment, interventions and recommendations  Note: Limitation: Decreased ADL status,Decreased UE strength,Decreased Safe judgement during ADL,Decreased endurance,Decreased self-care trans,Decreased high-level ADLs  Prognosis: Good  Assessment: Pt is a 78 y o  male seen for OT evaluation s/p admission to [facility] on 12/17/2021 due to weakness, N&V  Pt diagnosed with Staphylococcus aureus bacteremia  Pt with no recent admissions in the last 2 months  Pt has a significant PMH impacting occupational performance including: DM II, MULUGETA, HTN, HLD, CVA, anemia, R liver mass, DVT  Pt with active OT evaluation and treatment orders and activity orders for Up with assistance  PTA pt living with wife, pt with FFSU at home, (A) with all ADLs and IADLs, (-)falls, (+)drives  Pt is motivated to return to home  Personal and environmental factors supporting pt at time of IE include age  Personal and environmental factors inhibiting engagement in occupations include current habits and behavioral patterns, lifestyle patterns, limited social support, inaccessible home environment, inaccessible bathroom environment, difficulty completing ADLs and difficulty completing IADLs  During evaluation pt performed as is outlined above in flowsheet  Pt demonstrating good sitting balance and motivation to participate  Pt required VC for safety, VC for attention to task and education on safety with transfers   Standardized assessments used to assist in identifying performance deficits include AMPAC 6-Clicks and Barthel ADL Index  Performance deficits that affect the pt’s occupational performance during the initial evaluation include impaired balance, functional mobility, endurance, activity tolerance, forward functional reach, functional standing tolerance and overall strength, attention to task, direction following, communication and social skills, safety awareness, insight into deficits, problem solving and learning/remembering new tasks, interpersonal skills and use of coping skills  Based on pt’s functional performance and deficits the following occupations will be addressed in OT treatments in order to maximize pt’s independence and overall occupational performance: grooming, bathing/showering, toileting and toilet hygiene, dressing, feeding and functional mobility  Goals are listed below  Upon discharge from acute care setting recommend d/c to post-acute rehabilitation services  This evaluation required an extensive review of medical and/or therapy records and additional review of physical, cognitive and psychosocial history related to functional performance  Based upon functional performance deficits and assessments, this evaluation has been identified as a high complexity evaluation       OT Discharge Recommendation: Post acute rehabilitation services

## 2021-12-28 NOTE — PROGRESS NOTES
Patient noted to have change in mental status from previous assessment  Patient became disoriented to person, place and time  Patient would become agitated when asked to follow a command  VSS with a BS of 204  Dr  Angi Jefferson called and at the bedside  Patients mental status would wax and wane during bedside assessment       Stat CT ordered and patient currently off the floor for test

## 2021-12-29 NOTE — PROGRESS NOTES
Progress Note - Infectious Disease   Mera Penaloza 78 y o  male MRN: [id number]  Unit/Bed#: S -01 Encounter: [id number]    Impression/Recommendations:  1  Sepsis, POA:  With tachypnea, leukocytosis, elevated lactate, MULUGETA on CKD 3  In the setting of Staphylococcus aureus bacteremia  WBC and lactate have normalized (3 1-> 1 7)  ? Antibiotic therapy as below  ? Supportive care per primary team  ? Okay for discharge from ID standpoint    2  Staphylococcus aureus bacteremia  LLE wound cx also with growth of Staph aureus, MSSA  Consider possibility of seeding of renal subcapsular hematoma although repeat blood cx from 12/19 remain negative  ESR of 95 is significantly elevated  12/22 transthoracic echocardiogram is negative for valve vegetation  ? Continue cefazolin 1g IV q12 hours  ? Anticipate 4 week duration of IV antibiotic therapy from time of negative cultures thru Jan 16th  ? Weekly labs while on iv ABx: CBC with diff, creatinine  ? Outpt ID follow-up will be arranged within 2 weeks of hospital discharge    3  LLE cellulitis: possible SSTI source of bacteremia  Cellulitis has resolved while on cefazolin  4  Acute on chronic kidney disease stage 3:  Creatinine appears to have plateaued  ? Renal dose medications, avoid nephrotoxins  ? Nephrology service is following with no plans for dialysis and dialysis catheter was removed  ? Repeat creatinine weekly as stated above    5  Diabetes mellitus type 2, insulin dependent:  Initially treated as DKA upon hospital admission  Hyperglycemia is a risk factor for infection and delayed wound healing  ? Glycemic control as per primary service    6  Renal subcapsular hematoma:  With anemia in the setting of supratherapeutic INR  Status post IR angiography with no evidence of extravasation  Hgb is currently stable  7  Transaminitis:  LFTs are downtrending  CT abd raises concern for infiltrating neoplasm in right hepatic lobe  ?  Consider triple phase CT with improved renal function; most likely as outpt    8  History of penicillin allergy:  Hives age 14  Patient is currently tolerating cefazolin IV      My recommendations were discussed with the patient and his wife who verbalized understanding  My recommendations were discussed with resident Dr Jessica Rao from the primary service  ID service will follow  Antibiotics: cefazolin since 12/17  Scheduled Meds:  Current Facility-Administered Medications   Medication Dose Route Frequency Provider Last Rate   • acetaminophen  650 mg Oral Q4H PRN Ariane Marroquin PA-C     • amLODIPine  10 mg Oral Daily Ariane Marroquin PA-C     • apixaban  5 mg Oral BID Adonay Woodward MD     • atorvastatin  40 mg Oral Daily With Deb Marley MD     • cefazolin  1,000 mg Intravenous Q12H Ariane Marroquin PA-C 1,000 mg (12/29/21 1037)   • docusate sodium  100 mg Oral BID Adonay Woodward MD     • insulin glargine  32 Units Subcutaneous HS Socorro Giang MD     • insulin lispro  1-6 Units Subcutaneous HS Bony Sam, DO     • insulin lispro  2-12 Units Subcutaneous TID AC Ariane Marroquin PA-C     • insulin lispro  6 Units Subcutaneous TID With Meals Kavon Sam, DO     • metoprolol tartrate  50 mg Oral Q12H [facility] Ariane Marroquin PA-C     • nystatin   Topical BID Adonay Woodward MD     • ondansetron  4 mg Intravenous Q6H PRN Ariane Marroquin PA-C     • oxyCODONE  2 5 mg Oral Q6H PRN Adonay Woodward MD     • oxyCODONE  5 mg Oral Q4H PRN Adonay Woodward MD     • pantoprazole  40 mg Intravenous Q12H [facility] Piotr Marquez PA-C     • polyethylene glycol  17 g Oral Daily PRN Adonay Woodward MD     • senna-docusate sodium  1 tablet Oral HS Milagros Ramires MD       Continuous Infusions:   PRN Meds: •  acetaminophen  •  ondansetron  •  oxyCODONE  •  oxyCODONE  •  polyethylene glycol    Subjective:  Patient says he feels a bit cold today  He offers no other complaints    He denies fever, vomiting, abdominal pain, diarrhea, rash  He is tolerating current antibiotic therapy  Objective:  Vitals:  Temp:  [98 °F (36 7 °C)-98 8 °F (37 1 °C)] 98 2 °F (36 8 °C)  HR:  [64-70] 65  Resp:  [18-24] 19  BP: (135-161)/(54-64) 145/54  SpO2:  [94 %-97 %] 96 %  Temp (24hrs), Av 5 °F (36 9 °C), Min:98 °F (36 7 °C), Max:98 8 °F (37 1 °C)  Current: Temperature: 98 2 °F (36 8 °C)  Physical Exam:   General Appearance:  Patient appears stated age, resting in bed, no acute distress   ENT: Oropharynx moist   Lungs:   Clear to auscultation bilaterally; respirations unlabored   Heart:  S1, S2, RRR   Abdomen:   Obese, protuberant with umbilical hernia, soft, non-tender   Extremities: No distal leg edema b/l   Neurologic: AAO to person, surroundings, conversant, fluent speech  He recognizes that his wife is beside him  Skin: Dry scabs of distal LLE noted without erythema  No draining wounds or fluctuance  RT ACW tunneled PICC line intact       Labs, Cultures, Imaging, & Other studies:   All pertinent labs, cultures and imaging studies were personally reviewed  Results from last 7 days   Lab Units 21  0440 21  0535 21   WBC Thousand/uL 9 71 10 38* 10 80*   HEMOGLOBIN g/dL 7 8* 7 4* 6 8*   PLATELETS Thousands/uL 464* 502* 506*     Results from last 7 days   Lab Units 21  0440 21  1321 21  1321 21  0535 21  0535 21  0443 21  0514 21  0530 21  0530   SODIUM mmol/L 140  --  138  --  139   < > 139   < > 141   < > 140   POTASSIUM mmol/L 4 9   < > 5 3   < > 5 5*   < > 5 6*   < > 4 9   < > 4 9   CHLORIDE mmol/L 110*   < > 110*   < > 110*   < > 109*   < > 110*   < > 108   CO2 mmol/L 21   < > 22   < > 21   < > 21   < > 20*   < > 18*   BUN mg/dL 67*   < > 72*   < > 69*   < > 72*   < > 79*   < > 79*   CREATININE mg/dL 4 26*   < > 4 43*   < > 4 37*   < > 4 32*   < > 4 62*   < > 4 57*   EGFR ml/min/1 73sq m 13   < > 12   < > 13   < > 13   < > 12   < > 12   CALCIUM mg/dL 8 1*   < > 7 8*   < > 8 4   < > 8 5   < > 8 3   < > 8 4   AST U/L  --   --   --   --   --   --  31  --  34  --  51*   ALT U/L  --   --   --   --   --   --  12  --  62   < > 85*   ALK PHOS U/L  --   --   --   --   --   --  74  --  78   < > 82    < > = values in this interval not displayed  12/28/21 CT head: no acute abnormality

## 2021-12-29 NOTE — CASE MANAGEMENT
Case Management Discharge Planning Note    Patient name Yohannes Garcia  Location S /S -72 MRN [id number]  : 1955 Date 2021       Current Admission Date: 2021  Current Admission Diagnosis:Staphylococcus aureus bacteremia   Patient Active Problem List    Diagnosis Date Noted   • Acute deep vein thrombosis (DVT) of popliteal vein of left lower extremity ([facility] [address] ) 2021   • Right Liver mass 2021   • Anemia 2021   • Dizziness 2020   • Class 3 severe obesity in adult [facility]) 2020   • Staphylococcus aureus bacteremia 2020   • Chronic venous stasis dermatitis of both lower extremities 2018   • Primary hypertension    • Hyperlipidemia    • CVA (cerebral vascular accident) ([place] [address] )    • History of DVT of lower extremity 10/05/2016   • Hypoalbuminemia 2016   • Open wound of left lower extremity 2016   • Right renal subcapsular hematoma 2016   • History () of right cerebellar CVA 2016   • Coronary artery disease involving native heart 2016   • Diplopia 2016   • Type 2 diabetes mellitus, with long-term current use of insulin ([place] [address] ) 2016   • MULUGETA on CKD (chronic kidney disease) stage 3, GFR 30-59 ml/min 2016   • Morbid obesity with BMI of 40 0-44 9, adult ([facility] ) 2016   • Essential hypertension 2016   • MELINDA (obstructive sleep apnea) 2016   • Cerebrovascular accident (CVA) due to thrombosis ([facility] ) 2016      LOS (days): 12  Geometric Mean LOS (GMLOS) (days): 4 80  Days to GMLOS:-6 9     OBJECTIVE:  Risk of Unplanned Readmission Score: 23         Current admission status: Inpatient   Preferred Pharmacy:   [facility] [phone] [id number] [facility]  [address] [facility]  [address]  Phone: [phone] Fax: [phone]    Primary Care Provider: Marium Mcpherson MD    Primary Insurance: Wally Tobar [facility] REP  Secondary Insurance: Mario Pollack 55 DETAILS:    Discharge planning discussed with[de-identified] Patient and wife  [place]: Yes  Comments - Freedom of Choice: Prior authorization received for [place]  CM contacted family/caregiver?: Yes  Were Treatment Team discharge recommendations reviewed with patient/caregiver?: Yes  Did patient/caregiver verbalize understanding of patient care needs?: Yes  Were patient/caregiver advised of the risks associated with not following Treatment Team discharge recommendations?: Yes    Contacts  Patient Contacts: Patient and wife Emanuel Moreno  Relationship to Patient[de-identified] Family  Contact Method: In [facility]  Phone Number: [phone]  Reason/Outcome: Discharge Planning,Continuity of [address]         Is the patient interested in [address] at discharge?: No    DME Referral Provided  Referral made for DME?: No    Other Referral/Resources/Interventions Provided:  Interventions: Short Term Rehab  Referral Comments: [place]         Treatment Team Recommendation: Short Term Rehab  Discharge Destination Plan[de-identified] Short Term Rehab  Transport at Discharge : S Ambulance     Number/Name of Dispatcher: Bo Bhatti [phone]  [facility] [place]  ETA of Transport (Date): 12/29/21  ETA of Transport (Time): 1300              IMM Given (Date):: 12/28/21  IMM Given to[de-identified] Patient  Family notified[de-identified] CM contacted patients wife and reviewed DCP and transportation time  Accepting Facility Name, [facility] : [address]  Receiving Facility/Agency Phone Number: [phone]  Facility/Agency Fax Number: [phone]       CM reviewed the availability of treatment team to discuss questions or concerns patient and/or family may have regarding  understanding medications and recognizing signs and symptoms once discharged  CM also encouraged patient to follow up with all recommended appointments after discharge   Patient advised of importance for patient and family to participate in managing patient's medical well being

## 2021-12-29 NOTE — INCIDENTAL FINDINGS
The following findings require follow up:  Non-Radiographic finding   Finding: Right liver mass noted on CT abdomen and pelvis on multiple imaging studies during hospitalization   Follow up required: Triphasic CT scan of liver   Follow up should be done within 3 months or per Gastroenterology assessment  month(s)    Please notify the following clinician to assist with the follow up:   PCP/Gastroenterology referral selene

## 2021-12-29 NOTE — DISCHARGE INSTR - AVS FIRST PAGE
Dear Eliud Morillo,     It was our pleasure to care for you here at [facility]  It is our hope that we were always able to exceed the expected standards for your care during your stay  You were hospitalized due to Bacteremia (Bloodstream infection) with Staphylococcus aureus  You were cared for on the 2nd floor by Loras Eisenmenger, MD under the service of Angelia Lopez MD with the [facility] who covers for your primary care physician (PCP), Nora Boggs MD, while you were hospitalized  If you have any questions or concerns related to this hospitalization, you may contact us at [phone]  For follow up as well as any medication refills, we recommend that you follow up with your primary care physician  A registered nurse will reach out to you by phone within a few days after your discharge to answer any additional questions that you may have after going home  However, at this time we provide for you here, the most important instructions / recommendations at discharge:     Notable Medication Adjustments -   Patient to be discharged on long term IV abx through 01/16/21  Testing Required after Discharge -   Per infectious disease , patient requires weekly labs while on iv ABx: CBC with diff, creatinine  Important follow up information -    Patient to follow-up closely with infectious disease  Per ID, will call patient to schedule in office follow-up  Other Instructions -   Patient to establish with Gastroenterology in the setting of newly found liver mass  Will need follow-up CT to further evaluate mass  Please review this entire after visit summary as additional general instructions including medication list, appointments, activity, diet, any pertinent wound care, and other additional recommendations from your care team that may be provided for you        Sincerely,     Loras Eisenmenger, MD

## 2021-12-29 NOTE — PLAN OF CARE
Problem: MOBILITY - ADULT  Goal: Maintain or return to baseline ADL function  Description: INTERVENTIONS:  -  Assess patient's ability to carry out ADLs; assess patient's baseline for ADL function and identify physical deficits which impact ability to perform ADLs (bathing, care of mouth/teeth, toileting, grooming, dressing, etc )  - Assess/evaluate cause of self-care deficits   - Assess range of motion  - Assess patient's mobility; develop plan if impaired  - Assess patient's need for assistive devices and provide as appropriate  - Encourage maximum independence but intervene and supervise when necessary  - Involve family in performance of ADLs  - Assess for home care needs following discharge   - Consider OT consult to assist with ADL evaluation and planning for discharge  - Provide patient education as appropriate  Outcome: Progressing  Goal: Maintains/Returns to pre admission functional level  Description: INTERVENTIONS:  - Perform BMAT or MOVE assessment daily    - Set and communicate daily mobility goal to care team and patient/family/caregiver     - Collaborate with rehabilitation services on mobility goals if consulted  - Ambulate patient 2 times a day  - Out of bed for meals 3 times a day  - Out of bed for toileting  - Record patient progress and toleration of activity level   Outcome: Progressing     Problem: Potential for Falls  Goal: Patient will remain free of falls  Description: INTERVENTIONS:  - Educate patient/family on patient safety including physical limitations  - Instruct patient to call for assistance with activity   - Consult OT/PT to assist with strengthening/mobility   - Keep Call bell within reach  - Keep bed low and locked with side rails adjusted as appropriate  - Keep care items and personal belongings within reach  - Initiate and maintain comfort rounds  - Make Fall Risk Sign visible to staff  - Initiate/Maintain bed/chair alarm  - Obtain necessary fall risk management equipment  - Apply yellow socks and bracelet for high fall risk patients  - Consider moving patient to room near nurses station  Outcome: Progressing     Problem: Prexisting or High Potential for Compromised Skin Integrity  Goal: Skin integrity is maintained or improved  Description: INTERVENTIONS:  - Identify patients at risk for skin breakdown  - Assess and monitor skin integrity  - Assess and monitor nutrition and hydration status  - Monitor labs   - Assess for incontinence   - Turn and reposition patient  - Assist with mobility/ambulation  - Relieve pressure over bony prominences  - Avoid friction and shearing  - Provide appropriate hygiene as needed including keeping skin clean and dry  - Evaluate need for skin moisturizer/barrier cream  - Collaborate with interdisciplinary team   - Patient/family teaching  - Consider wound care consult   Outcome: Progressing     Problem: Nutrition/Hydration-ADULT  Goal: Nutrient/Hydration intake appropriate for improving, restoring or maintaining nutritional needs  Description: Monitor and assess patient's nutrition/hydration status for malnutrition  Collaborate with interdisciplinary team and initiate plan and interventions as ordered  Monitor patient's weight and dietary intake as ordered or per policy  Utilize nutrition screening tool and intervene as necessary  Determine patient's food preferences and provide high-protein, high-caloric foods as appropriate       INTERVENTIONS:  - Monitor oral intake, urinary output, labs, and treatment plans  - Assess nutrition and hydration status and recommend course of action  - Evaluate amount of meals eaten  - Assist patient with eating if necessary   - Allow adequate time for meals  - Recommend/ encourage appropriate diets, oral nutritional supplements, and vitamin/mineral supplements  - Order, calculate, and assess calorie counts as needed  - Recommend, monitor, and adjust tube feedings and TPN/PPN based on assessed needs  - Assess need for intravenous fluids  - Provide specific nutrition/hydration education as appropriate  - Include patient/family/caregiver in decisions related to nutrition  Outcome: Progressing     Problem: GASTROINTESTINAL - ADULT  Goal: Minimal or absence of nausea and/or vomiting  Description: INTERVENTIONS:  - Administer IV fluids if ordered to ensure adequate hydration  - Maintain NPO status until nausea and vomiting are resolved  - Nasogastric tube if ordered  - Administer ordered antiemetic medications as needed  - Provide nonpharmacologic comfort measures as appropriate  - Advance diet as tolerated, if ordered  - Consider nutrition services referral to assist patient with adequate nutrition and appropriate food choices  Outcome: Progressing  Goal: Maintains or returns to baseline bowel function  Description: INTERVENTIONS:  - Assess bowel function  - Encourage oral fluids to ensure adequate hydration  - Administer IV fluids if ordered to ensure adequate hydration  - Administer ordered medications as needed  - Encourage mobilization and activity  - Consider nutritional services referral to assist patient with adequate nutrition and appropriate food choices  Outcome: Progressing  Goal: Maintains adequate nutritional intake  Description: INTERVENTIONS:  - Monitor percentage of each meal consumed  - Identify factors contributing to decreased intake, treat as appropriate  - Assist with meals as needed  - Monitor I&O, weight, and lab values if indicated  - Obtain nutrition services referral as needed  Outcome: Progressing  Goal: Establish and maintain optimal ostomy function  Description: INTERVENTIONS:  - Assess bowel function  - Encourage oral fluids to ensure adequate hydration  - Administer IV fluids if ordered to ensure adequate hydration   - Administer ordered medications as needed  - Encourage mobilization and activity  - Nutrition services referral to assist patient with appropriate food choices  - Assess stoma site  - Consider wound care consult   Outcome: Progressing  Goal: Oral mucous membranes remain intact  Description: INTERVENTIONS  - Assess oral mucosa and hygiene practices  - Implement preventative oral hygiene regimen  - Implement oral medicated treatments as ordered  - Initiate Nutrition services referral as needed  Outcome: Progressing     Problem: GENITOURINARY - ADULT  Goal: Maintains or returns to baseline urinary function  Description: INTERVENTIONS:  - Assess urinary function  - Encourage oral fluids to ensure adequate hydration if ordered  - Administer IV fluids as ordered to ensure adequate hydration  - Administer ordered medications as needed  - Offer frequent toileting  - Follow urinary retention protocol if ordered  Outcome: Progressing  Goal: Absence of urinary retention  Description: INTERVENTIONS:  - Assess patient’s ability to void and empty bladder  - Monitor I/O  - Bladder scan as needed  - Discuss with physician/AP medications to alleviate retention as needed  - Discuss catheterization for long term situations as appropriate  Outcome: Progressing  Goal: Urinary catheter remains patent  Description: INTERVENTIONS:  - Assess patency of urinary catheter  - If patient has a chronic luz, consider changing catheter if non-functioning  - Follow guidelines for intermittent irrigation of non-functioning urinary catheter  Outcome: Progressing

## 2021-12-29 NOTE — DISCHARGE SUMMARY
[facility]  Discharge- Ngoc Coronado 1955, 78 y o  male MRN: [id number]  Unit/Bed#: S -01 Encounter: [id number]  Primary Care Provider: Rachel Pandya MD   Date and time admitted to hospital: 12/17/2021  3:03 PM    * Staphylococcus aureus bacteremia  Assessment & Plan  POA septic secondary to LLE cellulittis  2/2 bcx MSSA started on Cefazolin  Wound cultures growing MSSA  Plan  · Repeat cultures collected on 12/19/21 negative x  5 days  · ID following-recommending 4 weeks of IV abx through January 16th,2022  · Patient s/p IR placement of Tunneled line on 12/27/21 for long term abx  · Monitor fever curves and WBC    MULUGETA on CKD (chronic kidney disease) stage 3, GFR 30-59 ml/min  Assessment & Plan  Lab Results   Component Value Date    EGFR 12 12/25/2021    EGFR 12 12/24/2021    EGFR 12 12/23/2021    CREATININE 4 54 (H) 12/25/2021    CREATININE 4 62 (H) 12/24/2021    CREATININE 4 57 (H) 12/23/2021    Baseline creatinine of 1 5 - 1 9  Remains elevated consistently >4    Plan    Nephrology consulted recs as below:  · Continue to monitor BMP   · Avoid hypotension and nephrotoxins   · No urgent indication for dialysis at this time    Acute deep vein thrombosis (DVT) of popliteal vein of left lower extremity ([address] )  Assessment & Plan  Patient reports c/o of new onset left lower extremity pain, on examination exquisitely tender behind the calf  Discussed with patient concern for DVT and obtained LLE doppler  Plan  · VAS doppler showing DVT in popliteal ans gastroc veins  · Patient to be discharged Eliquis 5mg BID for DVT prophylaxis   · Continue to monitor closely    Anemia  Assessment & Plan  POA Acute blood loss anemia in setting of gross hematuria, Hg on admission was 6 8 s/p 3 units of PRBC  Hg improved after transfusion now downtrending once again without clear source of bleeding      12/26/21 CT abdomen pelvis to assess for underlying bleed-Compared to 6 days ago, decreased size of suprarenal right hematoma  Otherwise stable appearance of the right kidney  Increased left pleural effusion, stable right effusion  Improved ascites  H/H stable this morning to 7 8  No signs of blood loss  Plan  · Patient to have weekly labs CBC, BMP to follow up        Type 2 diabetes mellitus, with long-term current use of insulin [facility])  Assessment & Plan  Lab Results   Component Value Date    HGBA1C 12 2 (H) 12/18/2021       Recent Labs     12/25/21  1657 12/25/21  2110 12/26/21  0839 12/26/21  1125   POCGLU 200* 176* 236* 230*       Blood Sugar Average: Last 72 hrs:  (P) 034 [id number]     Patient initially presents in DKA require insulin drip, AG closed as of 12/21 am and has remained stable  Plan  · Endocrinology following-appreciate recs  · Patient to be discharged on Lantus 32 units at bedtime and Lispro 6 units TID   · Hypoglycemia protocol and QID monitoring    Hyperlipidemia  Assessment & Plan  · Transaminitis has now resolved  · Continue statin therpay      Right renal subcapsular hematoma  Assessment & Plan  Patient had presented originally with complaint of RLQ abdominal pain  Subsequently on 12/18 went for emergent embolization with IR:  There is no evidence of acute bleeding and no intervention was done in the setting of CTA a/p showing stable right subcapsular hematom of the right kidney  No evidence of active arterial or venous extravasation noted  Repeat imaging 12/20 - CT abdomen revealed stable right subcapsular renal hematoma  Plan  · Continue to monitor and treat abdominal pain as appropriate  · Monitor for any s/sx of acute blood loss      CVA (cerebral vascular accident) [facility])  Assessment & Plan  Patient with history of thrombotic CVA with Left facial droop no other residual neurological deficits      Plan  · Monitor for any acute changes in mental status      Primary hypertension  Assessment & Plan  BP remains stable ranging 130s/60-70s    Plan  · Continue Amlodipine 10 mg  · Concern for Page kidney in the setting of right renal subcapsular hematoma  · Patient to follow-up outpatient with Nephrology        Right Liver mass  Assessment & Plan  Possible infiltrating neoplasm in the right lobe of the liver seen on CT scan  Recommend dedicated triphasic CT scan of the liver for better characterization when the patient is more stable  AFP negative  Eventually will need colonoscopy and EGD    Plan  · Follow up with Gastroenterology outpatient for further imaging and evaluation of right liver mass    Constipation-resolved as of 12/26/2021  Assessment & Plan  Bowel regimen ordered, discussed with nursing-pt had a BM yesterday at 9pm  Patient states now having regular BMs  · Patient reports having bowel movement  · Continue senokot daily and Miralax daily PRN  · Status post Relistor  · Monitor for BM        Discharging Resident: Anabela Zelaya MD  Attending: No att  providers found  PCP: Jaleel Coyle MD  Admission Date: 12/17/2021  Discharge Date: 12/29/21    Disposition:      [address]  [place] [facility]:   · Not Applicable to this Patient - Not Applicable to this Patient    Reason for Admission: Staphylococcus aureus bacteremia    Consultations During Hospital Stay:  · IR, Nephrology, Endocrinology, Urology, ID and surgery    Procedures Performed:     · Tunneled Catheter placement on 12/27/21  · IR arteriogram on 12/24/21      Medication Adjustments and Discharge Medications:  · Summary of Medication Adjustments made as a result of this hospitalization: Will decrease home insulin regimen  Patient to be discharged on Lantus 32 units qhs and Lispro 6 units TID  · Medication Dosing Tapers - Please refer to Discharge Medication List for details on any medication dosing tapers (if applicable to patient)    · Medications being temporarily held (include recommended restart time): None  · Discharge Medication List: See after visit summary for reconciled discharge medications  Wound Care Recommendations:  When applicable, please see wound care section of After Visit Summary  Diet Recommendations at Discharge:  Diet -        Diet Orders   (From admission, onward)             Start     Ordered    12/27/21 1704  Diet Juan Manuel/CHO Controlled; Consistent Carbohydrate Diet Level 1 (4 carb servings/60 grams CHO/meal); Hi Fiber  Diet effective now        References:    Nutrtion Support Algorithm Enteral vs  Parenteral   Question Answer Comment   Diet Type Juan Manuel/CHO Controlled    Juan Manuel/CHO Controlled Consistent Carbohydrate Diet Level 1 (4 carb servings/60 grams CHO/meal)    Other Restriction(s): Hi Fiber    RD to adjust diet per protocol? Yes        12/27/21 1704    12/17/21 2205  Room Service  Once        Question:  Type of Service  Answer:  Room Service - Appropriate with Assistance    12/17/21 2204              Fluid Restriction - No Fluid Restriction at Discharge  Instructions for any Catheters / Lines Present at Discharge (including removal date, if applicable): NA    Significant Findings / Test Results:     · 12/26/21 CT abdomen pelvis wo contrast- 4 1 x 4 4 x 3 2 cm (length X depth X width) ill-defined hypodensity in the right hepatic lobe peripherally (2/28), reported previously on 12/18/2021 CTA  Hepatic morphology is within normal limits  No bile duct dilatation  · Decreased size of suprarenal right hematoma  Otherwise stable appearance of the right kidney  Increased left pleural effusion, stable right effusion  Improved ascites  Incidental Findings:   · As above     Test Results Pending at Discharge (will require follow up):    · None     Outpatient Tests Requested:  · Weekly CBC and BMP    Complications:  None    Hospital Course:     Eliud Morillo is a 78 y o  male with history of DM type 2 insulin dependent, CKD, CAD s/p stent, DVT on Coumadin, MELINDA, CVA, cerebral aneurysm not ruptured, former smoker who originally presented on 12/17/21 with progressive weakness, gross hematuria and clotting and left leg pain x 1 week  Upon admission to ICU patient noted to be in DKA started on insulin drip, Hg low noted to be 6 8 and patient did have a supratherapeutic INR to 4 and home Coumadin was held  Patient with complaints of right lower quadrant abdominal pain since admission and surpatherapetuic INR for which his home Coumadin was held  Imaging was obtained  and was notable for right renal subcapsular hematoma first noted on 12/18 and underwent  emergent embolization with IR: However, there was no evidence of acute bleeding and no intervention was done  Hemoglobin has been ranging in the mid- low 7 (no source of bleed) despite multiple follow up CT ab/pelvis showing decrease in size of right renal subcapsular hematoma  Due to above patient's anticoagulation had been held with plans to restart after stabilization of Hg  However, on 12/24/21 pain started to complain of worsening left leg pain and upon VAS duplex study patient was noted to have developed a  DVT in popliteal ans gastroc veins for which he was started on Eliquis 5mg BID  Additionally, during this admission patient was ultimately found to have staph aureus bactermia  Infectious disease was consulted for staph bactermia and ultimately recommended long term IV antibiotics x 4 weeks until 1/16/21 with close follow-up outpatient  Nephrology was consulted for MULUGETA with Cr baseline 1 5-1 9, however, patient did plateau at 4 3 and did not require further intervention  Patient with stable Hg and AAOx 3 this morning is stable for discharge  Condition at Discharge: stable     Discharge Day Visit / Exam:     Subjective:  Patient reports feeling tired this morning, but is AAOx 3    Vitals: Blood Pressure: 145/54 (12/29/21 0734)  Pulse: 65 (12/29/21 0734)  Temperature: 98 2 °F (36 8 °C) (12/29/21 0734)  Temp Source: Oral (12/28/21 2227)  Respirations: 19 (12/29/21 8924)  Height: 5' 7" (170 2 cm) (12/22/21 1048)  Weight - Scale: 107 kg (236 lb 15 9 oz) (12/29/21 0600)  SpO2: 96 % (12/29/21 0734)  Exam:   Physical Exam  Constitutional:       Appearance: He is well-developed  HENT:      Head: Normocephalic and atraumatic  Right Ear: External ear normal       Left Ear: External ear normal       Nose: Nose normal    Eyes:      Conjunctiva/sclera: Conjunctivae normal    Cardiovascular:      Rate and Rhythm: Normal rate and regular rhythm  Heart sounds: Normal heart sounds  Pulmonary:      Effort: Pulmonary effort is normal       Breath sounds: Normal breath sounds  Abdominal:      General: Bowel sounds are normal       Palpations: Abdomen is soft  Musculoskeletal:      Cervical back: Normal range of motion and neck supple  Right lower leg: Edema present  Left lower leg: Edema present  Comments: Multiple abrasions of the lower ext b/l   Skin:     General: Skin is warm and dry  Neurological:      Mental Status: He is alert and oriented to person, place, and time  Discussion with Family: Discussed with wife plan for discharge at bedside  Goals of Care Discussions:  · Code Status at Discharge: Level 1 - Full Code  · Were there any Goals of Care Discussions during Hospitalization?: No  · Results of any General Goals of Care Discussions: None   · POLST Completed: No   · If POLST Completed, Summary of POLST Agreement Provided Here: NA   · OK to Rehospitalize if Needed? Yes    Discharge instructions/Information to patient and family:   See after visit summary section titled Discharge Instructions for information provided to patient and family        Planned Readmission: None      ** Please Note: This note has been constructed using a voice recognition system **

## 2021-12-30 NOTE — UTILIZATION REVIEW
Notification of Discharge   This is a Notification of Discharge from our facility [address]  Please be advised that this patient has been discharge from our facility  Below you will find the admission and discharge date and time including the patient’s disposition  UTILIZATION REVIEW CONTACT:  Heather Morales  Utilization   Network Utilization Review Department  Phone: [phone] x carefully listen to the prompts  All voicemails are confidential   Email: [email or web address]     PHYSICIAN ADVISORY SERVICES:  FOR [id number] REVIEW - MEDICAL NECESSITY DENIAL  Phone: [phone]  Fax: [phone]  Email: [email or web address]     PRESENTATION DATE: 12/17/2021  3:03 PM  OBERVATION ADMISSION DATE:   INPATIENT ADMISSION DATE: 12/17/21  7:30 PM   DISCHARGE DATE: 12/29/2021  1:19 PM  DISPOSITION: Non [facility] SNF/TCU/SNU Non [facility] SNF/TCU/SNU      IMPORTANT INFORMATION:  Send all requests for admission clinical reviews, approved or denied determinations and any other requests to dedicated fax number below belonging to the campus where the patient is receiving treatment   List of dedicated fax numbers:  [address] DENIALS (Administrative/Medical Necessity) [phone]   [address] [place] [address] (Maternity/NICU/Pediatrics) [phone]   [facility] [phone]   [facility] [phone]   [address] [phone]   [address] [facility] [phone]   [address] [place] [address] [phone]   [facility]  [phone]   [address] [facility] [phone]   [address] [place] [address] [address] [facility] [phone]

## 2022-01-01 ENCOUNTER — APPOINTMENT (INPATIENT)
Dept: DIALYSIS | Facility: HOSPITAL | Age: 67
DRG: 673 | End: 2022-01-01
Payer: COMMERCIAL

## 2022-01-01 ENCOUNTER — APPOINTMENT (INPATIENT)
Dept: ULTRASOUND IMAGING | Facility: HOSPITAL | Age: 67
DRG: 673 | End: 2022-01-01
Payer: COMMERCIAL

## 2022-01-01 ENCOUNTER — APPOINTMENT (EMERGENCY)
Dept: CT IMAGING | Facility: HOSPITAL | Age: 67
DRG: 673 | End: 2022-01-01
Payer: COMMERCIAL

## 2022-01-01 ENCOUNTER — APPOINTMENT (INPATIENT)
Dept: CT IMAGING | Facility: HOSPITAL | Age: 67
DRG: 673 | End: 2022-01-01
Payer: COMMERCIAL

## 2022-01-01 ENCOUNTER — TELEPHONE (OUTPATIENT)
Dept: OTHER | Facility: OTHER | Age: 67
End: 2022-01-01

## 2022-01-01 ENCOUNTER — APPOINTMENT (INPATIENT)
Dept: RADIOLOGY | Facility: HOSPITAL | Age: 67
DRG: 673 | End: 2022-01-01
Payer: COMMERCIAL

## 2022-01-01 ENCOUNTER — HOSPITAL ENCOUNTER (INPATIENT)
Facility: HOSPITAL | Age: 67
LOS: 13 days | DRG: 673 | End: 2022-01-14
Attending: EMERGENCY MEDICINE | Admitting: INTERNAL MEDICINE
Payer: COMMERCIAL

## 2022-01-01 ENCOUNTER — APPOINTMENT (INPATIENT)
Dept: NEUROLOGY | Facility: HOSPITAL | Age: 67
DRG: 673 | End: 2022-01-01
Payer: COMMERCIAL

## 2022-01-01 ENCOUNTER — APPOINTMENT (INPATIENT)
Dept: NON INVASIVE DIAGNOSTICS | Facility: HOSPITAL | Age: 67
DRG: 673 | End: 2022-01-01
Payer: COMMERCIAL

## 2022-01-01 ENCOUNTER — APPOINTMENT (INPATIENT)
Dept: VASCULAR ULTRASOUND | Facility: HOSPITAL | Age: 67
DRG: 673 | End: 2022-01-01
Payer: COMMERCIAL

## 2022-01-01 ENCOUNTER — TELEPHONE (OUTPATIENT)
Dept: CT IMAGING | Facility: HOSPITAL | Age: 67
End: 2022-01-01

## 2022-01-01 ENCOUNTER — APPOINTMENT (INPATIENT)
Dept: RADIOLOGY | Facility: HOSPITAL | Age: 67
DRG: 673 | End: 2022-01-01
Attending: RADIOLOGY
Payer: COMMERCIAL

## 2022-01-01 ENCOUNTER — APPOINTMENT (INPATIENT)
Dept: MRI IMAGING | Facility: HOSPITAL | Age: 67
DRG: 673 | End: 2022-01-01
Payer: COMMERCIAL

## 2022-01-01 VITALS
SYSTOLIC BLOOD PRESSURE: 110 MMHG | HEART RATE: 61 BPM | DIASTOLIC BLOOD PRESSURE: 53 MMHG | HEIGHT: 67 IN | TEMPERATURE: 100.4 F | OXYGEN SATURATION: 98 % | WEIGHT: 233.25 LBS | RESPIRATION RATE: 21 BRPM | BODY MASS INDEX: 36.61 KG/M2

## 2022-01-01 DIAGNOSIS — I60.9 SAH (SUBARACHNOID HEMORRHAGE) (HCC): ICD-10-CM

## 2022-01-01 DIAGNOSIS — N17.9 AKI (ACUTE KIDNEY INJURY) (HCC): ICD-10-CM

## 2022-01-01 DIAGNOSIS — K76.89 LIVER DYSFUNCTION: ICD-10-CM

## 2022-01-01 DIAGNOSIS — R41.82 CHANGE IN MENTAL STATUS: ICD-10-CM

## 2022-01-01 DIAGNOSIS — E79.0 HYPERURICEMIA: ICD-10-CM

## 2022-01-01 DIAGNOSIS — I46.9 CARDIAC ARREST (HCC): ICD-10-CM

## 2022-01-01 DIAGNOSIS — L03.116 CELLULITIS OF LEFT LOWER EXTREMITY: Primary | ICD-10-CM

## 2022-01-01 DIAGNOSIS — I63.9 CVA (CEREBRAL VASCULAR ACCIDENT) (HCC): Chronic | ICD-10-CM

## 2022-01-01 DIAGNOSIS — E72.20 HYPERAMMONEMIA (HCC): ICD-10-CM

## 2022-01-01 DIAGNOSIS — N18.30 STAGE 3 CHRONIC KIDNEY DISEASE, UNSPECIFIED WHETHER STAGE 3A OR 3B CKD (HCC): ICD-10-CM

## 2022-01-01 DIAGNOSIS — D64.9 ANEMIA: ICD-10-CM

## 2022-01-01 PROBLEM — R31.9 HEMATURIA: Status: ACTIVE | Noted: 2022-01-01

## 2022-01-01 PROBLEM — E87.5 HYPERKALEMIA: Status: ACTIVE | Noted: 2022-01-01

## 2022-01-01 PROBLEM — G93.40 ENCEPHALOPATHY: Status: ACTIVE | Noted: 2022-01-01

## 2022-01-01 PROBLEM — R79.1 SUPRATHERAPEUTIC INR: Status: ACTIVE | Noted: 2022-01-01

## 2022-01-01 PROBLEM — R94.5 ABNORMAL LIVER FUNCTION: Status: ACTIVE | Noted: 2022-01-01

## 2022-01-01 PROBLEM — N18.9 RENAL FAILURE (ARF), ACUTE ON CHRONIC (HCC): Status: ACTIVE | Noted: 2022-01-01

## 2022-01-01 LAB
ABO GROUP BLD BPU: NORMAL
ABO GROUP BLD: NORMAL
ALBUMIN SERPL BCP-MCNC: 1.5 G/DL (ref 3.5–5)
ALBUMIN SERPL BCP-MCNC: 1.6 G/DL (ref 3.5–5)
ALBUMIN SERPL BCP-MCNC: 1.7 G/DL (ref 3.5–5)
ALBUMIN SERPL BCP-MCNC: 1.8 G/DL (ref 3.5–5)
ALBUMIN SERPL BCP-MCNC: 1.9 G/DL (ref 3.5–5)
ALBUMIN SERPL BCP-MCNC: 1.9 G/DL (ref 3.5–5)
ALBUMIN SERPL BCP-MCNC: 2 G/DL (ref 3.5–5)
ALBUMIN SERPL BCP-MCNC: 2.2 G/DL (ref 3.5–5)
ALBUMIN SERPL BCP-MCNC: 5.7 G/DL (ref 3.5–5)
ALP SERPL-CCNC: 52 U/L (ref 46–116)
ALP SERPL-CCNC: 52 U/L (ref 46–116)
ALP SERPL-CCNC: 53 U/L (ref 46–116)
ALP SERPL-CCNC: 53 U/L (ref 46–116)
ALP SERPL-CCNC: 57 U/L (ref 46–116)
ALP SERPL-CCNC: 58 U/L (ref 46–116)
ALP SERPL-CCNC: 68 U/L (ref 46–116)
ALP SERPL-CCNC: 75 U/L (ref 46–116)
ALP SERPL-CCNC: 85 U/L (ref 46–116)
ALT SERPL W P-5'-P-CCNC: 10 U/L (ref 12–78)
ALT SERPL W P-5'-P-CCNC: 10 U/L (ref 12–78)
ALT SERPL W P-5'-P-CCNC: 28 U/L (ref 12–78)
ALT SERPL W P-5'-P-CCNC: 7 U/L (ref 12–78)
ALT SERPL W P-5'-P-CCNC: 8 U/L (ref 12–78)
ALT SERPL W P-5'-P-CCNC: 9 U/L (ref 12–78)
ALT SERPL W P-5'-P-CCNC: 9 U/L (ref 12–78)
ALT SERPL W P-5'-P-CCNC: <6 U/L (ref 12–78)
ALT SERPL W P-5'-P-CCNC: <6 U/L (ref 12–78)
AMMONIA PLAS-SCNC: 14 UMOL/L (ref 11–35)
AMMONIA PLAS-SCNC: 48 UMOL/L (ref 11–35)
ANCILLARY VALUES: ABNORMAL
ANCILLARY VALUES: ABNORMAL
ANION GAP SERPL CALCULATED.3IONS-SCNC: 10 MMOL/L (ref 4–13)
ANION GAP SERPL CALCULATED.3IONS-SCNC: 10 MMOL/L (ref 4–13)
ANION GAP SERPL CALCULATED.3IONS-SCNC: 11 MMOL/L (ref 4–13)
ANION GAP SERPL CALCULATED.3IONS-SCNC: 11 MMOL/L (ref 4–13)
ANION GAP SERPL CALCULATED.3IONS-SCNC: 12 MMOL/L (ref 4–13)
ANION GAP SERPL CALCULATED.3IONS-SCNC: 13 MMOL/L (ref 4–13)
ANION GAP SERPL CALCULATED.3IONS-SCNC: 14 MMOL/L (ref 4–13)
ANION GAP SERPL CALCULATED.3IONS-SCNC: 15 MMOL/L (ref 4–13)
ANION GAP SERPL CALCULATED.3IONS-SCNC: 25 MMOL/L (ref 4–13)
ANION GAP SERPL CALCULATED.3IONS-SCNC: 7 MMOL/L (ref 4–13)
ANION GAP SERPL CALCULATED.3IONS-SCNC: 7 MMOL/L (ref 4–13)
ANION GAP SERPL CALCULATED.3IONS-SCNC: 9 MMOL/L (ref 4–13)
ANISOCYTOSIS BLD QL SMEAR: PRESENT
APAP SERPL-MCNC: 4.7 UG/ML (ref 10–20)
APTT PPP: 40 SECONDS (ref 23–37)
APTT PPP: 55 SECONDS (ref 23–37)
APTT PPP: 55 SECONDS (ref 23–37)
APTT PPP: 62 SECONDS (ref 23–37)
APTT PPP: 82 SECONDS (ref 23–37)
ARTERIAL PATENCY WRIST A: ABNORMAL
ARTERIAL PATENCY WRIST A: ABNORMAL
AST SERPL W P-5'-P-CCNC: 24 U/L (ref 5–45)
AST SERPL W P-5'-P-CCNC: 29 U/L (ref 5–45)
AST SERPL W P-5'-P-CCNC: 31 U/L (ref 5–45)
AST SERPL W P-5'-P-CCNC: 38 U/L (ref 5–45)
AST SERPL W P-5'-P-CCNC: 42 U/L (ref 5–45)
AST SERPL W P-5'-P-CCNC: 45 U/L (ref 5–45)
AST SERPL W P-5'-P-CCNC: 50 U/L (ref 5–45)
AST SERPL W P-5'-P-CCNC: 51 U/L (ref 5–45)
AST SERPL W P-5'-P-CCNC: 54 U/L (ref 5–45)
ATRIAL RATE: 62 BPM
BACTERIA BLD CULT: NORMAL
BACTERIA BLD CULT: NORMAL
BACTERIA UR CULT: NORMAL
BACTERIA UR QL AUTO: ABNORMAL /HPF
BASE EX.OXY STD BLDV CALC-SCNC: 94.2 % (ref 60–80)
BASE EXCESS BLDA CALC-SCNC: -9 MMOL/L (ref -2–3)
BASE EXCESS BLDA CALC-SCNC: 0 MMOL/L (ref -2–3)
BASE EXCESS BLDA CALC-SCNC: 2.3 MMOL/L
BASE EXCESS BLDA CALC-SCNC: 3.4 MMOL/L
BASE EXCESS BLDA CALC-SCNC: 5 MMOL/L (ref -2–3)
BASE EXCESS BLDV CALC-SCNC: -8.5 MMOL/L
BASO STIPL BLD QL SMEAR: PRESENT
BASO STIPL BLD QL SMEAR: PRESENT
BASOPHILS # BLD AUTO: 0.03 THOUSANDS/ΜL (ref 0–0.1)
BASOPHILS # BLD AUTO: 0.05 THOUSANDS/ΜL (ref 0–0.1)
BASOPHILS # BLD AUTO: 0.06 THOUSANDS/ΜL (ref 0–0.1)
BASOPHILS # BLD AUTO: 0.06 THOUSANDS/ΜL (ref 0–0.1)
BASOPHILS # BLD AUTO: 0.1 THOUSANDS/ΜL (ref 0–0.1)
BASOPHILS # BLD MANUAL: 0 THOUSAND/UL (ref 0–0.1)
BASOPHILS # BLD MANUAL: 0 THOUSAND/UL (ref 0–0.1)
BASOPHILS # BLD MANUAL: 0.09 THOUSAND/UL (ref 0–0.1)
BASOPHILS # BLD MANUAL: 0.43 THOUSAND/UL (ref 0–0.1)
BASOPHILS NFR BLD AUTO: 0 % (ref 0–1)
BASOPHILS NFR BLD AUTO: 0 % (ref 0–1)
BASOPHILS NFR BLD AUTO: 1 % (ref 0–1)
BASOPHILS NFR MAR MANUAL: 0 % (ref 0–1)
BASOPHILS NFR MAR MANUAL: 0 % (ref 0–1)
BASOPHILS NFR MAR MANUAL: 1 % (ref 0–1)
BASOPHILS NFR MAR MANUAL: 5 % (ref 0–1)
BILIRUB DIRECT SERPL-MCNC: 0.08 MG/DL (ref 0–0.2)
BILIRUB DIRECT SERPL-MCNC: 0.18 MG/DL (ref 0–0.2)
BILIRUB SERPL-MCNC: 0.18 MG/DL (ref 0.2–1)
BILIRUB SERPL-MCNC: 0.21 MG/DL (ref 0.2–1)
BILIRUB SERPL-MCNC: 0.3 MG/DL (ref 0.2–1)
BILIRUB SERPL-MCNC: 0.38 MG/DL (ref 0.2–1)
BILIRUB SERPL-MCNC: 0.46 MG/DL (ref 0.2–1)
BILIRUB SERPL-MCNC: 0.49 MG/DL (ref 0.2–1)
BILIRUB SERPL-MCNC: 0.54 MG/DL (ref 0.2–1)
BILIRUB SERPL-MCNC: 0.6 MG/DL (ref 0.2–1)
BILIRUB SERPL-MCNC: 0.77 MG/DL (ref 0.2–1)
BILIRUB UR QL STRIP: NEGATIVE
BLD GP AB SCN SERPL QL: POSITIVE
BPU ID: NORMAL
BUN SERPL-MCNC: 49 MG/DL (ref 5–25)
BUN SERPL-MCNC: 50 MG/DL (ref 5–25)
BUN SERPL-MCNC: 51 MG/DL (ref 5–25)
BUN SERPL-MCNC: 53 MG/DL (ref 5–25)
BUN SERPL-MCNC: 55 MG/DL (ref 5–25)
BUN SERPL-MCNC: 58 MG/DL (ref 5–25)
BUN SERPL-MCNC: 58 MG/DL (ref 5–25)
BUN SERPL-MCNC: 63 MG/DL (ref 5–25)
BUN SERPL-MCNC: 68 MG/DL (ref 5–25)
BUN SERPL-MCNC: 69 MG/DL (ref 5–25)
BUN SERPL-MCNC: 69 MG/DL (ref 5–25)
BUN SERPL-MCNC: 73 MG/DL (ref 5–25)
BUN SERPL-MCNC: 77 MG/DL (ref 5–25)
BUN SERPL-MCNC: 81 MG/DL (ref 5–25)
BUN SERPL-MCNC: 83 MG/DL (ref 5–25)
BUN SERPL-MCNC: 86 MG/DL (ref 5–25)
BUN SERPL-MCNC: 88 MG/DL (ref 5–25)
CA-I BLD-SCNC: 1.29 MMOL/L (ref 1.12–1.32)
CALCIUM ALBUM COR SERPL-MCNC: 10.5 MG/DL (ref 8.3–10.1)
CALCIUM ALBUM COR SERPL-MCNC: 9.3 MG/DL (ref 8.3–10.1)
CALCIUM ALBUM COR SERPL-MCNC: 9.5 MG/DL (ref 8.3–10.1)
CALCIUM ALBUM COR SERPL-MCNC: 9.6 MG/DL (ref 8.3–10.1)
CALCIUM ALBUM COR SERPL-MCNC: 9.7 MG/DL (ref 8.3–10.1)
CALCIUM ALBUM COR SERPL-MCNC: 9.8 MG/DL (ref 8.3–10.1)
CALCIUM ALBUM COR SERPL-MCNC: 9.9 MG/DL (ref 8.3–10.1)
CALCIUM ALBUM COR SERPL-MCNC: 9.9 MG/DL (ref 8.3–10.1)
CALCIUM SERPL-MCNC: 10.2 MG/DL (ref 8.3–10.1)
CALCIUM SERPL-MCNC: 6.9 MG/DL (ref 8.3–10.1)
CALCIUM SERPL-MCNC: 7.5 MG/DL (ref 8.3–10.1)
CALCIUM SERPL-MCNC: 7.6 MG/DL (ref 8.3–10.1)
CALCIUM SERPL-MCNC: 7.7 MG/DL (ref 8.3–10.1)
CALCIUM SERPL-MCNC: 7.7 MG/DL (ref 8.3–10.1)
CALCIUM SERPL-MCNC: 7.8 MG/DL (ref 8.3–10.1)
CALCIUM SERPL-MCNC: 7.9 MG/DL (ref 8.3–10.1)
CALCIUM SERPL-MCNC: 8 MG/DL (ref 8.3–10.1)
CALCIUM SERPL-MCNC: 8.1 MG/DL (ref 8.3–10.1)
CALCIUM SERPL-MCNC: 8.2 MG/DL (ref 8.3–10.1)
CALCIUM SERPL-MCNC: 8.4 MG/DL (ref 8.3–10.1)
CALCIUM SERPL-MCNC: 8.6 MG/DL (ref 8.3–10.1)
CARDIAC TROPONIN I PNL SERPL HS: 18 NG/L (ref 8–18)
CHLORIDE SERPL-SCNC: 100 MMOL/L (ref 100–108)
CHLORIDE SERPL-SCNC: 101 MMOL/L (ref 100–108)
CHLORIDE SERPL-SCNC: 104 MMOL/L (ref 100–108)
CHLORIDE SERPL-SCNC: 105 MMOL/L (ref 100–108)
CHLORIDE SERPL-SCNC: 106 MMOL/L (ref 100–108)
CHLORIDE SERPL-SCNC: 108 MMOL/L (ref 100–108)
CHLORIDE SERPL-SCNC: 108 MMOL/L (ref 100–108)
CHLORIDE SERPL-SCNC: 95 MMOL/L (ref 100–108)
CHLORIDE SERPL-SCNC: 95 MMOL/L (ref 100–108)
CHLORIDE SERPL-SCNC: 97 MMOL/L (ref 100–108)
CHLORIDE SERPL-SCNC: 98 MMOL/L (ref 100–108)
CHOLEST SERPL-MCNC: 98 MG/DL
CK MB SERPL-MCNC: 1.1 NG/ML (ref 0–5)
CK MB SERPL-MCNC: <1 % (ref 0–2.5)
CK SERPL-CCNC: 121 U/L (ref 39–308)
CK SERPL-CCNC: 165 U/L (ref 39–308)
CK SERPL-CCNC: 62 U/L (ref 39–308)
CLARITY UR: ABNORMAL
CO2 SERPL-SCNC: 13 MMOL/L (ref 21–32)
CO2 SERPL-SCNC: 17 MMOL/L (ref 21–32)
CO2 SERPL-SCNC: 18 MMOL/L (ref 21–32)
CO2 SERPL-SCNC: 19 MMOL/L (ref 21–32)
CO2 SERPL-SCNC: 22 MMOL/L (ref 21–32)
CO2 SERPL-SCNC: 22 MMOL/L (ref 21–32)
CO2 SERPL-SCNC: 23 MMOL/L (ref 21–32)
CO2 SERPL-SCNC: 23 MMOL/L (ref 21–32)
CO2 SERPL-SCNC: 24 MMOL/L (ref 21–32)
CO2 SERPL-SCNC: 25 MMOL/L (ref 21–32)
CO2 SERPL-SCNC: 27 MMOL/L (ref 21–32)
COLOR UR: YELLOW
CREAT SERPL-MCNC: 4.1 MG/DL (ref 0.6–1.3)
CREAT SERPL-MCNC: 4.36 MG/DL (ref 0.6–1.3)
CREAT SERPL-MCNC: 4.37 MG/DL (ref 0.6–1.3)
CREAT SERPL-MCNC: 4.47 MG/DL (ref 0.6–1.3)
CREAT SERPL-MCNC: 4.49 MG/DL (ref 0.6–1.3)
CREAT SERPL-MCNC: 4.52 MG/DL (ref 0.6–1.3)
CREAT SERPL-MCNC: 4.61 MG/DL (ref 0.6–1.3)
CREAT SERPL-MCNC: 5.33 MG/DL (ref 0.6–1.3)
CREAT SERPL-MCNC: 5.34 MG/DL (ref 0.6–1.3)
CREAT SERPL-MCNC: 5.4 MG/DL (ref 0.6–1.3)
CREAT SERPL-MCNC: 5.84 MG/DL (ref 0.6–1.3)
CREAT SERPL-MCNC: 5.96 MG/DL (ref 0.6–1.3)
CREAT SERPL-MCNC: 6.15 MG/DL (ref 0.6–1.3)
CREAT SERPL-MCNC: 6.44 MG/DL (ref 0.6–1.3)
CREAT SERPL-MCNC: 6.58 MG/DL (ref 0.6–1.3)
CREAT SERPL-MCNC: 6.99 MG/DL (ref 0.6–1.3)
CREAT SERPL-MCNC: 7.16 MG/DL (ref 0.6–1.3)
CREAT SERPL-MCNC: 7.37 MG/DL (ref 0.6–1.3)
CREAT SERPL-MCNC: 7.37 MG/DL (ref 0.6–1.3)
CROSSMATCH: NORMAL
DS:DELIVERY SYSTEM: AC
DS:DELIVERY SYSTEM: AC
E WAVE DECELERATION TIME: 244 MS
EOSINOPHIL # BLD AUTO: 0.04 THOUSAND/ΜL (ref 0–0.61)
EOSINOPHIL # BLD AUTO: 0.34 THOUSAND/ΜL (ref 0–0.61)
EOSINOPHIL # BLD AUTO: 0.37 THOUSAND/ΜL (ref 0–0.61)
EOSINOPHIL # BLD AUTO: 0.41 THOUSAND/ΜL (ref 0–0.61)
EOSINOPHIL # BLD AUTO: 0.55 THOUSAND/ΜL (ref 0–0.61)
EOSINOPHIL # BLD MANUAL: 0.09 THOUSAND/UL (ref 0–0.4)
EOSINOPHIL # BLD MANUAL: 0.34 THOUSAND/UL (ref 0–0.4)
EOSINOPHIL # BLD MANUAL: 0.35 THOUSAND/UL (ref 0–0.4)
EOSINOPHIL # BLD MANUAL: 0.41 THOUSAND/UL (ref 0–0.4)
EOSINOPHIL NFR BLD AUTO: 0 % (ref 0–6)
EOSINOPHIL NFR BLD AUTO: 3 % (ref 0–6)
EOSINOPHIL NFR BLD AUTO: 3 % (ref 0–6)
EOSINOPHIL NFR BLD AUTO: 5 % (ref 0–6)
EOSINOPHIL NFR BLD AUTO: 6 % (ref 0–6)
EOSINOPHIL NFR BLD MANUAL: 1 % (ref 0–6)
EOSINOPHIL NFR BLD MANUAL: 4 % (ref 0–6)
ERYTHROCYTE [DISTWIDTH] IN BLOOD BY AUTOMATED COUNT: 15.3 % (ref 11.6–15.1)
ERYTHROCYTE [DISTWIDTH] IN BLOOD BY AUTOMATED COUNT: 15.4 % (ref 11.6–15.1)
ERYTHROCYTE [DISTWIDTH] IN BLOOD BY AUTOMATED COUNT: 15.5 % (ref 11.6–15.1)
ERYTHROCYTE [DISTWIDTH] IN BLOOD BY AUTOMATED COUNT: 15.7 % (ref 11.6–15.1)
ERYTHROCYTE [DISTWIDTH] IN BLOOD BY AUTOMATED COUNT: 15.8 % (ref 11.6–15.1)
ERYTHROCYTE [DISTWIDTH] IN BLOOD BY AUTOMATED COUNT: 15.9 % (ref 11.6–15.1)
ERYTHROCYTE [DISTWIDTH] IN BLOOD BY AUTOMATED COUNT: 16.1 % (ref 11.6–15.1)
ERYTHROCYTE [DISTWIDTH] IN BLOOD BY AUTOMATED COUNT: 16.4 % (ref 11.6–15.1)
ERYTHROCYTE [DISTWIDTH] IN BLOOD BY AUTOMATED COUNT: 17.2 % (ref 11.6–15.1)
ERYTHROCYTE [DISTWIDTH] IN BLOOD BY AUTOMATED COUNT: 17.4 % (ref 11.6–15.1)
ERYTHROCYTE [DISTWIDTH] IN BLOOD BY AUTOMATED COUNT: 17.5 % (ref 11.6–15.1)
ERYTHROCYTE [DISTWIDTH] IN BLOOD BY AUTOMATED COUNT: 17.7 % (ref 11.6–15.1)
ERYTHROCYTE [DISTWIDTH] IN BLOOD BY AUTOMATED COUNT: 17.7 % (ref 11.6–15.1)
ERYTHROCYTE [DISTWIDTH] IN BLOOD BY AUTOMATED COUNT: 17.8 % (ref 11.6–15.1)
ERYTHROCYTE [DISTWIDTH] IN BLOOD BY AUTOMATED COUNT: 17.9 % (ref 11.6–15.1)
EST. AVERAGE GLUCOSE BLD GHB EST-MCNC: 157 MG/DL
FIO2 GAS DIL.REBREATH: 100 L
FLUAV RNA RESP QL NAA+PROBE: NEGATIVE
FLUBV RNA RESP QL NAA+PROBE: NEGATIVE
GFR SERPL CREATININE-BSD FRML MDRD: 10 ML/MIN/1.73SQ M
GFR SERPL CREATININE-BSD FRML MDRD: 12 ML/MIN/1.73SQ M
GFR SERPL CREATININE-BSD FRML MDRD: 13 ML/MIN/1.73SQ M
GFR SERPL CREATININE-BSD FRML MDRD: 13 ML/MIN/1.73SQ M
GFR SERPL CREATININE-BSD FRML MDRD: 14 ML/MIN/1.73SQ M
GFR SERPL CREATININE-BSD FRML MDRD: 6 ML/MIN/1.73SQ M
GFR SERPL CREATININE-BSD FRML MDRD: 6 ML/MIN/1.73SQ M
GFR SERPL CREATININE-BSD FRML MDRD: 7 ML/MIN/1.73SQ M
GFR SERPL CREATININE-BSD FRML MDRD: 8 ML/MIN/1.73SQ M
GFR SERPL CREATININE-BSD FRML MDRD: 9 ML/MIN/1.73SQ M
GLUCOSE SERPL-MCNC: 101 MG/DL (ref 65–140)
GLUCOSE SERPL-MCNC: 102 MG/DL (ref 65–140)
GLUCOSE SERPL-MCNC: 103 MG/DL (ref 65–140)
GLUCOSE SERPL-MCNC: 105 MG/DL (ref 65–140)
GLUCOSE SERPL-MCNC: 106 MG/DL (ref 65–140)
GLUCOSE SERPL-MCNC: 107 MG/DL (ref 65–140)
GLUCOSE SERPL-MCNC: 109 MG/DL (ref 65–140)
GLUCOSE SERPL-MCNC: 109 MG/DL (ref 65–140)
GLUCOSE SERPL-MCNC: 115 MG/DL (ref 65–140)
GLUCOSE SERPL-MCNC: 115 MG/DL (ref 65–140)
GLUCOSE SERPL-MCNC: 118 MG/DL (ref 65–140)
GLUCOSE SERPL-MCNC: 119 MG/DL (ref 65–140)
GLUCOSE SERPL-MCNC: 121 MG/DL (ref 65–140)
GLUCOSE SERPL-MCNC: 123 MG/DL (ref 65–140)
GLUCOSE SERPL-MCNC: 128 MG/DL (ref 65–140)
GLUCOSE SERPL-MCNC: 128 MG/DL (ref 65–140)
GLUCOSE SERPL-MCNC: 130 MG/DL (ref 65–140)
GLUCOSE SERPL-MCNC: 132 MG/DL (ref 65–140)
GLUCOSE SERPL-MCNC: 134 MG/DL (ref 65–140)
GLUCOSE SERPL-MCNC: 134 MG/DL (ref 65–140)
GLUCOSE SERPL-MCNC: 135 MG/DL (ref 65–140)
GLUCOSE SERPL-MCNC: 136 MG/DL (ref 65–140)
GLUCOSE SERPL-MCNC: 138 MG/DL (ref 65–140)
GLUCOSE SERPL-MCNC: 138 MG/DL (ref 65–140)
GLUCOSE SERPL-MCNC: 139 MG/DL (ref 65–140)
GLUCOSE SERPL-MCNC: 139 MG/DL (ref 65–140)
GLUCOSE SERPL-MCNC: 142 MG/DL (ref 65–140)
GLUCOSE SERPL-MCNC: 144 MG/DL (ref 65–140)
GLUCOSE SERPL-MCNC: 152 MG/DL (ref 65–140)
GLUCOSE SERPL-MCNC: 152 MG/DL (ref 65–140)
GLUCOSE SERPL-MCNC: 156 MG/DL (ref 65–140)
GLUCOSE SERPL-MCNC: 157 MG/DL (ref 65–140)
GLUCOSE SERPL-MCNC: 159 MG/DL (ref 65–140)
GLUCOSE SERPL-MCNC: 160 MG/DL (ref 65–140)
GLUCOSE SERPL-MCNC: 160 MG/DL (ref 65–140)
GLUCOSE SERPL-MCNC: 162 MG/DL (ref 65–140)
GLUCOSE SERPL-MCNC: 162 MG/DL (ref 65–140)
GLUCOSE SERPL-MCNC: 164 MG/DL (ref 65–140)
GLUCOSE SERPL-MCNC: 166 MG/DL (ref 65–140)
GLUCOSE SERPL-MCNC: 168 MG/DL (ref 65–140)
GLUCOSE SERPL-MCNC: 173 MG/DL (ref 65–140)
GLUCOSE SERPL-MCNC: 177 MG/DL (ref 65–140)
GLUCOSE SERPL-MCNC: 178 MG/DL (ref 65–140)
GLUCOSE SERPL-MCNC: 179 MG/DL (ref 65–140)
GLUCOSE SERPL-MCNC: 183 MG/DL (ref 65–140)
GLUCOSE SERPL-MCNC: 183 MG/DL (ref 65–140)
GLUCOSE SERPL-MCNC: 185 MG/DL (ref 65–140)
GLUCOSE SERPL-MCNC: 188 MG/DL (ref 65–140)
GLUCOSE SERPL-MCNC: 188 MG/DL (ref 65–140)
GLUCOSE SERPL-MCNC: 190 MG/DL (ref 65–140)
GLUCOSE SERPL-MCNC: 192 MG/DL (ref 65–140)
GLUCOSE SERPL-MCNC: 195 MG/DL (ref 65–140)
GLUCOSE SERPL-MCNC: 197 MG/DL (ref 65–140)
GLUCOSE SERPL-MCNC: 198 MG/DL (ref 65–140)
GLUCOSE SERPL-MCNC: 208 MG/DL (ref 65–140)
GLUCOSE SERPL-MCNC: 209 MG/DL (ref 65–140)
GLUCOSE SERPL-MCNC: 214 MG/DL (ref 65–140)
GLUCOSE SERPL-MCNC: 215 MG/DL (ref 65–140)
GLUCOSE SERPL-MCNC: 216 MG/DL (ref 65–140)
GLUCOSE SERPL-MCNC: 227 MG/DL (ref 65–140)
GLUCOSE SERPL-MCNC: 228 MG/DL (ref 65–140)
GLUCOSE SERPL-MCNC: 231 MG/DL (ref 65–140)
GLUCOSE SERPL-MCNC: 262 MG/DL (ref 65–140)
GLUCOSE SERPL-MCNC: 289 MG/DL (ref 65–140)
GLUCOSE SERPL-MCNC: 292 MG/DL (ref 65–140)
GLUCOSE SERPL-MCNC: 298 MG/DL (ref 65–140)
GLUCOSE SERPL-MCNC: 369 MG/DL (ref 65–140)
GLUCOSE SERPL-MCNC: 60 MG/DL (ref 65–140)
GLUCOSE SERPL-MCNC: 79 MG/DL (ref 65–140)
GLUCOSE SERPL-MCNC: 90 MG/DL (ref 65–140)
GLUCOSE SERPL-MCNC: 91 MG/DL (ref 65–140)
GLUCOSE SERPL-MCNC: 98 MG/DL (ref 65–140)
GLUCOSE UR STRIP-MCNC: NEGATIVE MG/DL
HBA1C MFR BLD: 7.1 %
HBV SURFACE AB SER-ACNC: 9.29 MIU/ML
HCO3 BLDA-SCNC: 18.8 MMOL/L (ref 22–28)
HCO3 BLDA-SCNC: 24.8 MMOL/L (ref 22–28)
HCO3 BLDA-SCNC: 26.8 MMOL/L (ref 22–28)
HCO3 BLDA-SCNC: 27.5 MMOL/L (ref 22–28)
HCO3 BLDA-SCNC: 28.2 MMOL/L (ref 22–28)
HCO3 BLDV-SCNC: 16.4 MMOL/L (ref 24–30)
HCT VFR BLD AUTO: 14.4 % (ref 36.5–49.3)
HCT VFR BLD AUTO: 20.6 % (ref 36.5–49.3)
HCT VFR BLD AUTO: 21.5 % (ref 36.5–49.3)
HCT VFR BLD AUTO: 21.7 % (ref 36.5–49.3)
HCT VFR BLD AUTO: 22 % (ref 36.5–49.3)
HCT VFR BLD AUTO: 22.4 % (ref 36.5–49.3)
HCT VFR BLD AUTO: 23.2 % (ref 36.5–49.3)
HCT VFR BLD AUTO: 23.3 % (ref 36.5–49.3)
HCT VFR BLD AUTO: 23.7 % (ref 36.5–49.3)
HCT VFR BLD AUTO: 24 % (ref 36.5–49.3)
HCT VFR BLD AUTO: 24.3 % (ref 36.5–49.3)
HCT VFR BLD AUTO: 24.8 % (ref 36.5–49.3)
HCT VFR BLD AUTO: 24.9 % (ref 36.5–49.3)
HCT VFR BLD AUTO: 25.4 % (ref 36.5–49.3)
HCT VFR BLD AUTO: 25.4 % (ref 36.5–49.3)
HCT VFR BLD AUTO: 25.5 % (ref 36.5–49.3)
HCT VFR BLD AUTO: 25.6 % (ref 36.5–49.3)
HCT VFR BLD AUTO: 25.6 % (ref 36.5–49.3)
HCT VFR BLD AUTO: 25.7 % (ref 36.5–49.3)
HCT VFR BLD AUTO: 25.9 % (ref 36.5–49.3)
HCT VFR BLD AUTO: 26 % (ref 36.5–49.3)
HCT VFR BLD AUTO: 26.2 % (ref 36.5–49.3)
HCT VFR BLD AUTO: 26.6 % (ref 36.5–49.3)
HCT VFR BLD AUTO: 27.6 % (ref 36.5–49.3)
HCT VFR BLD AUTO: 44.2 % (ref 36.5–49.3)
HCT VFR BLD CALC: 17 % (ref 36.5–49.3)
HCT VFR BLD CALC: 22 % (ref 36.5–49.3)
HCT VFR BLD CALC: 23 % (ref 36.5–49.3)
HCT VFR BLD CALC: 23 % (ref 36.5–49.3)
HCT VFR BLD CALC: 52 % (ref 36.5–49.3)
HDLC SERPL-MCNC: 25 MG/DL
HEMOCCULT STL QL: NEGATIVE
HGB BLD-MCNC: 14 G/DL (ref 12–17)
HGB BLD-MCNC: 4.2 G/DL (ref 12–17)
HGB BLD-MCNC: 6.2 G/DL (ref 12–17)
HGB BLD-MCNC: 6.6 G/DL (ref 12–17)
HGB BLD-MCNC: 6.7 G/DL (ref 12–17)
HGB BLD-MCNC: 6.8 G/DL (ref 12–17)
HGB BLD-MCNC: 7 G/DL (ref 12–17)
HGB BLD-MCNC: 7.1 G/DL (ref 12–17)
HGB BLD-MCNC: 7.1 G/DL (ref 12–17)
HGB BLD-MCNC: 7.2 G/DL (ref 12–17)
HGB BLD-MCNC: 7.4 G/DL (ref 12–17)
HGB BLD-MCNC: 7.6 G/DL (ref 12–17)
HGB BLD-MCNC: 7.8 G/DL (ref 12–17)
HGB BLD-MCNC: 7.9 G/DL (ref 12–17)
HGB BLD-MCNC: 8 G/DL (ref 12–17)
HGB BLD-MCNC: 8 G/DL (ref 12–17)
HGB BLD-MCNC: 8.2 G/DL (ref 12–17)
HGB BLD-MCNC: 8.2 G/DL (ref 12–17)
HGB BLD-MCNC: 8.4 G/DL (ref 12–17)
HGB BLD-MCNC: 8.5 G/DL (ref 12–17)
HGB BLDA-MCNC: 17.7 G/DL (ref 12–17)
HGB BLDA-MCNC: 5.8 G/DL (ref 12–17)
HGB BLDA-MCNC: 7.5 G/DL (ref 12–17)
HGB BLDA-MCNC: 7.8 G/DL (ref 12–17)
HGB BLDA-MCNC: 7.8 G/DL (ref 12–17)
HGB UR QL STRIP.AUTO: ABNORMAL
HYPERCHROMIA BLD QL SMEAR: PRESENT
HYPERCHROMIA BLD QL SMEAR: PRESENT
IMM GRANULOCYTES # BLD AUTO: 0.2 THOUSAND/UL (ref 0–0.2)
IMM GRANULOCYTES # BLD AUTO: 0.22 THOUSAND/UL (ref 0–0.2)
IMM GRANULOCYTES # BLD AUTO: 0.24 THOUSAND/UL (ref 0–0.2)
IMM GRANULOCYTES # BLD AUTO: 0.38 THOUSAND/UL (ref 0–0.2)
IMM GRANULOCYTES # BLD AUTO: >0.5 THOUSAND/UL (ref 0–0.2)
IMM GRANULOCYTES NFR BLD AUTO: 14 % (ref 0–2)
IMM GRANULOCYTES NFR BLD AUTO: 2 % (ref 0–2)
IMM GRANULOCYTES NFR BLD AUTO: 5 % (ref 0–2)
INR PPP: 1.38 (ref 0.84–1.19)
INR PPP: 1.43 (ref 0.84–1.19)
INR PPP: 1.51 (ref 0.84–1.19)
INR PPP: 1.6 (ref 0.84–1.19)
INR PPP: 1.79 (ref 0.84–1.19)
INR PPP: 2.8 (ref 0.84–1.19)
INR PPP: 5.63 (ref 0.84–1.19)
INR PPP: 6.78 (ref 0.84–1.19)
KETONES UR STRIP-MCNC: NEGATIVE MG/DL
LACTATE SERPL-SCNC: 0.6 MMOL/L (ref 0.5–2)
LACTATE SERPL-SCNC: 1.3 MMOL/L (ref 0.5–2)
LACTATE SERPL-SCNC: 6 MMOL/L (ref 0.5–2)
LDLC SERPL CALC-MCNC: 45 MG/DL (ref 0–100)
LEUKOCYTE ESTERASE UR QL STRIP: ABNORMAL
LIPASE SERPL-CCNC: 626 U/L (ref 73–393)
LYMPHOCYTES # BLD AUTO: 0.26 THOUSAND/UL (ref 0.6–4.47)
LYMPHOCYTES # BLD AUTO: 0.5 THOUSANDS/ΜL (ref 0.6–4.47)
LYMPHOCYTES # BLD AUTO: 0.64 THOUSANDS/ΜL (ref 0.6–4.47)
LYMPHOCYTES # BLD AUTO: 0.69 THOUSANDS/ΜL (ref 0.6–4.47)
LYMPHOCYTES # BLD AUTO: 0.71 THOUSANDS/ΜL (ref 0.6–4.47)
LYMPHOCYTES # BLD AUTO: 0.79 THOUSANDS/ΜL (ref 0.6–4.47)
LYMPHOCYTES # BLD AUTO: 0.86 THOUSAND/UL (ref 0.6–4.47)
LYMPHOCYTES # BLD AUTO: 0.86 THOUSAND/UL (ref 0.6–4.47)
LYMPHOCYTES # BLD AUTO: 1.01 THOUSAND/UL (ref 0.6–4.47)
LYMPHOCYTES # BLD AUTO: 10 % (ref 14–44)
LYMPHOCYTES # BLD AUTO: 3 % (ref 14–44)
LYMPHOCYTES NFR BLD AUTO: 5 % (ref 14–44)
LYMPHOCYTES NFR BLD AUTO: 6 % (ref 14–44)
LYMPHOCYTES NFR BLD AUTO: 7 % (ref 14–44)
LYMPHOCYTES NFR BLD AUTO: 7 % (ref 14–44)
LYMPHOCYTES NFR BLD AUTO: 8 % (ref 14–44)
MAGNESIUM SERPL-MCNC: 1.4 MG/DL (ref 1.6–2.6)
MAGNESIUM SERPL-MCNC: 1.8 MG/DL (ref 1.6–2.6)
MAGNESIUM SERPL-MCNC: 1.8 MG/DL (ref 1.6–2.6)
MAGNESIUM SERPL-MCNC: 2 MG/DL (ref 1.6–2.6)
MCH RBC QN AUTO: 28.7 PG (ref 26.8–34.3)
MCH RBC QN AUTO: 29.3 PG (ref 26.8–34.3)
MCH RBC QN AUTO: 29.4 PG (ref 26.8–34.3)
MCH RBC QN AUTO: 29.5 PG (ref 26.8–34.3)
MCH RBC QN AUTO: 29.6 PG (ref 26.8–34.3)
MCH RBC QN AUTO: 29.8 PG (ref 26.8–34.3)
MCH RBC QN AUTO: 29.8 PG (ref 26.8–34.3)
MCH RBC QN AUTO: 30 PG (ref 26.8–34.3)
MCH RBC QN AUTO: 30 PG (ref 26.8–34.3)
MCH RBC QN AUTO: 30.1 PG (ref 26.8–34.3)
MCH RBC QN AUTO: 30.2 PG (ref 26.8–34.3)
MCHC RBC AUTO-ENTMCNC: 28.5 G/DL (ref 31.4–37.4)
MCHC RBC AUTO-ENTMCNC: 30.4 G/DL (ref 31.4–37.4)
MCHC RBC AUTO-ENTMCNC: 30.5 G/DL (ref 31.4–37.4)
MCHC RBC AUTO-ENTMCNC: 30.6 G/DL (ref 31.4–37.4)
MCHC RBC AUTO-ENTMCNC: 30.8 G/DL (ref 31.4–37.4)
MCHC RBC AUTO-ENTMCNC: 30.9 G/DL (ref 31.4–37.4)
MCHC RBC AUTO-ENTMCNC: 31 G/DL (ref 31.4–37.4)
MCHC RBC AUTO-ENTMCNC: 31.1 G/DL (ref 31.4–37.4)
MCHC RBC AUTO-ENTMCNC: 31.3 G/DL (ref 31.4–37.4)
MCHC RBC AUTO-ENTMCNC: 32.3 G/DL (ref 31.4–37.4)
MCV RBC AUTO: 101 FL (ref 82–98)
MCV RBC AUTO: 93 FL (ref 82–98)
MCV RBC AUTO: 95 FL (ref 82–98)
MCV RBC AUTO: 96 FL (ref 82–98)
MCV RBC AUTO: 97 FL (ref 82–98)
MCV RBC AUTO: 98 FL (ref 82–98)
MCV RBC AUTO: 99 FL (ref 82–98)
METAMYELOCYTES NFR BLD MANUAL: 2 % (ref 0–1)
METAMYELOCYTES NFR BLD MANUAL: 5 % (ref 0–1)
MONOCYTES # BLD AUTO: 0 THOUSAND/UL (ref 0–1.22)
MONOCYTES # BLD AUTO: 0.09 THOUSAND/UL (ref 0–1.22)
MONOCYTES # BLD AUTO: 0.52 THOUSAND/UL (ref 0–1.22)
MONOCYTES # BLD AUTO: 0.55 THOUSAND/ΜL (ref 0.17–1.22)
MONOCYTES # BLD AUTO: 0.68 THOUSAND/ΜL (ref 0.17–1.22)
MONOCYTES # BLD AUTO: 0.74 THOUSAND/ΜL (ref 0.17–1.22)
MONOCYTES # BLD AUTO: 0.77 THOUSAND/ΜL (ref 0.17–1.22)
MONOCYTES # BLD AUTO: 0.86 THOUSAND/ΜL (ref 0.17–1.22)
MONOCYTES # BLD AUTO: 1.01 THOUSAND/UL (ref 0–1.22)
MONOCYTES NFR BLD AUTO: 6 % (ref 4–12)
MONOCYTES NFR BLD AUTO: 6 % (ref 4–12)
MONOCYTES NFR BLD AUTO: 7 % (ref 4–12)
MONOCYTES NFR BLD AUTO: 7 % (ref 4–12)
MONOCYTES NFR BLD AUTO: 8 % (ref 4–12)
MONOCYTES NFR BLD: 0 % (ref 4–12)
MONOCYTES NFR BLD: 1 % (ref 4–12)
MONOCYTES NFR BLD: 10 % (ref 4–12)
MONOCYTES NFR BLD: 6 % (ref 4–12)
MV E'TISSUE VEL-SEP: 6 CM/S
MV PEAK A VEL: 0.84 M/S
MV PEAK E VEL: 118 CM/S
MV STENOSIS PRESSURE HALF TIME: 0 MS
MYELOCYTES NFR BLD MANUAL: 1 % (ref 0–1)
MYELOCYTES NFR BLD MANUAL: 3 % (ref 0–1)
MYELOCYTES NFR BLD MANUAL: 3 % (ref 0–1)
MYELOCYTES NFR BLD MANUAL: 4 % (ref 0–1)
NEUTROPHILS # BLD AUTO: 11.51 THOUSANDS/ΜL (ref 1.85–7.62)
NEUTROPHILS # BLD AUTO: 5.68 THOUSANDS/ΜL (ref 1.85–7.62)
NEUTROPHILS # BLD AUTO: 5.83 THOUSANDS/ΜL (ref 1.85–7.62)
NEUTROPHILS # BLD AUTO: 9.06 THOUSANDS/ΜL (ref 1.85–7.62)
NEUTROPHILS # BLD AUTO: 9.45 THOUSANDS/ΜL (ref 1.85–7.62)
NEUTROPHILS # BLD MANUAL: 6.18 THOUSAND/UL (ref 1.85–7.62)
NEUTROPHILS # BLD MANUAL: 6.9 THOUSAND/UL (ref 1.85–7.62)
NEUTROPHILS # BLD MANUAL: 7.05 THOUSAND/UL (ref 1.85–7.62)
NEUTROPHILS # BLD MANUAL: 7.92 THOUSAND/UL (ref 1.85–7.62)
NEUTS BAND NFR BLD MANUAL: 11 % (ref 0–8)
NEUTS BAND NFR BLD MANUAL: 2 % (ref 0–8)
NEUTS BAND NFR BLD MANUAL: 4 % (ref 0–8)
NEUTS BAND NFR BLD MANUAL: 9 % (ref 0–8)
NEUTS SEG NFR BLD AUTO: 61 % (ref 43–75)
NEUTS SEG NFR BLD AUTO: 63 % (ref 43–75)
NEUTS SEG NFR BLD AUTO: 64 % (ref 43–75)
NEUTS SEG NFR BLD AUTO: 73 % (ref 43–75)
NEUTS SEG NFR BLD AUTO: 75 % (ref 43–75)
NEUTS SEG NFR BLD AUTO: 81 % (ref 43–75)
NEUTS SEG NFR BLD AUTO: 82 % (ref 43–75)
NEUTS SEG NFR BLD AUTO: 87 % (ref 43–75)
NEUTS SEG NFR BLD AUTO: 89 % (ref 43–75)
NITRITE UR QL STRIP: NEGATIVE
NON-SQ EPI CELLS URNS QL MICRO: ABNORMAL /HPF
NRBC BLD AUTO-RTO: 0 /100 WBCS
NRBC BLD AUTO-RTO: 1 /100 WBC (ref 0–2)
NRBC BLD AUTO-RTO: 1 /100 WBC (ref 0–2)
NT-PROBNP SERPL-MCNC: ABNORMAL PG/ML
O2 CT BLDA-SCNC: 11 ML/DL (ref 16–23)
O2 CT BLDA-SCNC: 11.4 ML/DL (ref 16–23)
O2 CT BLDV-SCNC: 10.6 ML/DL
OTHER STN SPEC: ABNORMAL
OXYHGB MFR BLDA: 95.4 % (ref 94–97)
OXYHGB MFR BLDA: 98.5 % (ref 94–97)
P AXIS: 19 DEGREES
PCO2 BLD: 20 MMOL/L (ref 21–32)
PCO2 BLD: 28 MMOL/L (ref 21–32)
PCO2 BLD: 29 MMOL/L (ref 21–32)
PCO2 BLD: 35.3 MM HG (ref 36–44)
PCO2 BLD: 49.8 MM HG (ref 36–44)
PCO2 BLD: 51.1 MM HG (ref 36–44)
PCO2 BLD: >103 MM HG (ref 36–44)
PCO2 BLD: >103 MM HG (ref 36–44)
PCO2 BLDA: 30 MM HG (ref 36–44)
PCO2 BLDA: 39.7 MM HG (ref 36–44)
PCO2 BLDV: 31.3 MM HG (ref 42–50)
PH BLD: 7 [PH] (ref 7.35–7.45)
PH BLD: 7.02 [PH] (ref 7.35–7.45)
PH BLD: 7.19 [PH] (ref 7.35–7.45)
PH BLD: 7.33 [PH] (ref 7.35–7.45)
PH BLD: 7.51 [PH] (ref 7.35–7.45)
PH BLDA: 7.46 [PH] (ref 7.35–7.45)
PH BLDA: 7.54 [PH] (ref 7.35–7.45)
PH BLDV: 7.34 [PH] (ref 7.3–7.4)
PH UR STRIP.AUTO: 6 [PH]
PHOSPHATE SERPL-MCNC: 5.1 MG/DL (ref 2.3–4.1)
PHOSPHATE SERPL-MCNC: 6 MG/DL (ref 2.3–4.1)
PHOSPHATE SERPL-MCNC: 6.9 MG/DL (ref 2.3–4.1)
PHOSPHATE SERPL-MCNC: 7.5 MG/DL (ref 2.3–4.1)
PHOSPHATE SERPL-MCNC: 7.9 MG/DL (ref 2.3–4.1)
PHOSPHATE SERPL-MCNC: 7.9 MG/DL (ref 2.3–4.1)
PHOSPHATE SERPL-MCNC: 9.9 MG/DL (ref 2.3–4.1)
PLATELET # BLD AUTO: 251 THOUSANDS/UL (ref 149–390)
PLATELET # BLD AUTO: 262 THOUSANDS/UL (ref 149–390)
PLATELET # BLD AUTO: 271 THOUSANDS/UL (ref 149–390)
PLATELET # BLD AUTO: 272 THOUSANDS/UL (ref 149–390)
PLATELET # BLD AUTO: 273 THOUSANDS/UL (ref 149–390)
PLATELET # BLD AUTO: 296 THOUSANDS/UL (ref 149–390)
PLATELET # BLD AUTO: 310 THOUSANDS/UL (ref 149–390)
PLATELET # BLD AUTO: 315 THOUSANDS/UL (ref 149–390)
PLATELET # BLD AUTO: 315 THOUSANDS/UL (ref 149–390)
PLATELET # BLD AUTO: 331 THOUSANDS/UL (ref 149–390)
PLATELET # BLD AUTO: 338 THOUSANDS/UL (ref 149–390)
PLATELET # BLD AUTO: 350 THOUSANDS/UL (ref 149–390)
PLATELET # BLD AUTO: 386 THOUSANDS/UL (ref 149–390)
PLATELET # BLD AUTO: 391 THOUSANDS/UL (ref 149–390)
PLATELET # BLD AUTO: 445 THOUSANDS/UL (ref 149–390)
PLATELET # BLD AUTO: 502 THOUSANDS/UL (ref 149–390)
PLATELET BLD QL SMEAR: ABNORMAL
PLATELET BLD QL SMEAR: ADEQUATE
PMV BLD AUTO: 10 FL (ref 8.9–12.7)
PMV BLD AUTO: 10 FL (ref 8.9–12.7)
PMV BLD AUTO: 10.1 FL (ref 8.9–12.7)
PMV BLD AUTO: 8.9 FL (ref 8.9–12.7)
PMV BLD AUTO: 9.1 FL (ref 8.9–12.7)
PMV BLD AUTO: 9.2 FL (ref 8.9–12.7)
PMV BLD AUTO: 9.3 FL (ref 8.9–12.7)
PMV BLD AUTO: 9.4 FL (ref 8.9–12.7)
PMV BLD AUTO: 9.6 FL (ref 8.9–12.7)
PMV BLD AUTO: 9.6 FL (ref 8.9–12.7)
PMV BLD AUTO: 9.7 FL (ref 8.9–12.7)
PMV BLD AUTO: 9.7 FL (ref 8.9–12.7)
PMV BLD AUTO: 9.8 FL (ref 8.9–12.7)
PMV BLD AUTO: 9.9 FL (ref 8.9–12.7)
PO2 BLD: 106 MM HG (ref 75–129)
PO2 BLD: 230 MM HG (ref 75–129)
PO2 BLD: 41 MM HG (ref 75–129)
PO2 BLD: 45 MM HG (ref 75–129)
PO2 BLD: <13 MM HG (ref 75–129)
PO2 BLDA: 116.1 MM HG (ref 75–129)
PO2 BLDA: 81.3 MM HG (ref 75–129)
PO2 BLDV: 159.6 MM HG (ref 35–45)
POLYCHROMASIA BLD QL SMEAR: PRESENT
POTASSIUM BLD-SCNC: 4.6 MMOL/L (ref 3.5–5.3)
POTASSIUM BLD-SCNC: 4.6 MMOL/L (ref 3.5–5.3)
POTASSIUM BLD-SCNC: 5.2 MMOL/L (ref 3.5–5.3)
POTASSIUM BLD-SCNC: 6 MMOL/L (ref 3.5–5.3)
POTASSIUM BLD-SCNC: 6.9 MMOL/L (ref 3.5–5.3)
POTASSIUM SERPL-SCNC: 4.4 MMOL/L (ref 3.5–5.3)
POTASSIUM SERPL-SCNC: 4.6 MMOL/L (ref 3.5–5.3)
POTASSIUM SERPL-SCNC: 4.7 MMOL/L (ref 3.5–5.3)
POTASSIUM SERPL-SCNC: 4.7 MMOL/L (ref 3.5–5.3)
POTASSIUM SERPL-SCNC: 4.8 MMOL/L (ref 3.5–5.3)
POTASSIUM SERPL-SCNC: 4.8 MMOL/L (ref 3.5–5.3)
POTASSIUM SERPL-SCNC: 4.9 MMOL/L (ref 3.5–5.3)
POTASSIUM SERPL-SCNC: 5 MMOL/L (ref 3.5–5.3)
POTASSIUM SERPL-SCNC: 5.2 MMOL/L (ref 3.5–5.3)
POTASSIUM SERPL-SCNC: 5.5 MMOL/L (ref 3.5–5.3)
POTASSIUM SERPL-SCNC: 5.9 MMOL/L (ref 3.5–5.3)
PR INTERVAL: 152 MS
PRESSURE SETTING: 8
PRESSURE SETTING: 8
PROCALCITONIN SERPL-MCNC: 1.34 NG/ML
PROCALCITONIN SERPL-MCNC: 1.42 NG/ML
PROT SERPL-MCNC: 10.3 G/DL (ref 6.4–8.2)
PROT SERPL-MCNC: 6.9 G/DL (ref 6.4–8.2)
PROT SERPL-MCNC: 7 G/DL (ref 6.4–8.2)
PROT SERPL-MCNC: 7.1 G/DL (ref 6.4–8.2)
PROT SERPL-MCNC: 7.3 G/DL (ref 6.4–8.2)
PROT SERPL-MCNC: 7.3 G/DL (ref 6.4–8.2)
PROT SERPL-MCNC: 7.5 G/DL (ref 6.4–8.2)
PROT SERPL-MCNC: 7.5 G/DL (ref 6.4–8.2)
PROT SERPL-MCNC: 7.7 G/DL (ref 6.4–8.2)
PROT UR STRIP-MCNC: ABNORMAL MG/DL
PROTHROMBIN TIME: 16.9 SECONDS (ref 11.6–14.5)
PROTHROMBIN TIME: 17.3 SECONDS (ref 11.6–14.5)
PROTHROMBIN TIME: 18.1 SECONDS (ref 11.6–14.5)
PROTHROMBIN TIME: 18.9 SECONDS (ref 11.6–14.5)
PROTHROMBIN TIME: 20.6 SECONDS (ref 11.6–14.5)
PROTHROMBIN TIME: 28.9 SECONDS (ref 11.6–14.5)
PROTHROMBIN TIME: 49.4 SECONDS (ref 11.6–14.5)
PROTHROMBIN TIME: 56.9 SECONDS (ref 11.6–14.5)
QRS AXIS: 13 DEGREES
QRSD INTERVAL: 94 MS
QT INTERVAL: 410 MS
QTC INTERVAL: 416 MS
RBC # BLD AUTO: 1.43 MILLION/UL (ref 3.88–5.62)
RBC # BLD AUTO: 2.23 MILLION/UL (ref 3.88–5.62)
RBC # BLD AUTO: 2.37 MILLION/UL (ref 3.88–5.62)
RBC # BLD AUTO: 2.46 MILLION/UL (ref 3.88–5.62)
RBC # BLD AUTO: 2.46 MILLION/UL (ref 3.88–5.62)
RBC # BLD AUTO: 2.55 MILLION/UL (ref 3.88–5.62)
RBC # BLD AUTO: 2.58 MILLION/UL (ref 3.88–5.62)
RBC # BLD AUTO: 2.59 MILLION/UL (ref 3.88–5.62)
RBC # BLD AUTO: 2.62 MILLION/UL (ref 3.88–5.62)
RBC # BLD AUTO: 2.65 MILLION/UL (ref 3.88–5.62)
RBC # BLD AUTO: 2.66 MILLION/UL (ref 3.88–5.62)
RBC # BLD AUTO: 2.66 MILLION/UL (ref 3.88–5.62)
RBC # BLD AUTO: 2.67 MILLION/UL (ref 3.88–5.62)
RBC # BLD AUTO: 2.67 MILLION/UL (ref 3.88–5.62)
RBC # BLD AUTO: 2.69 MILLION/UL (ref 3.88–5.62)
RBC #/AREA URNS AUTO: ABNORMAL /HPF
RBC MORPH BLD: PRESENT
RBC MORPH BLD: PRESENT
RESPIRATORY RATE: 28
RESPIRATORY RATE: 28
RH BLD: POSITIVE
RSV RNA RESP QL NAA+PROBE: NEGATIVE
SAMPLE SITE: ABNORMAL
SAMPLE SITE: ABNORMAL
SAO2 % BLD FROM PO2: 100 % (ref 60–85)
SAO2 % BLD FROM PO2: 72 % (ref 60–85)
SAO2 % BLD FROM PO2: 96 % (ref 60–85)
SARS-COV-2 RNA RESP QL NAA+PROBE: NEGATIVE
SL CV LV EF: 65
SODIUM BLD-SCNC: 132 MMOL/L (ref 136–145)
SODIUM BLD-SCNC: 136 MMOL/L (ref 136–145)
SODIUM BLD-SCNC: 136 MMOL/L (ref 136–145)
SODIUM BLD-SCNC: 137 MMOL/L (ref 136–145)
SODIUM BLD-SCNC: 139 MMOL/L (ref 136–145)
SODIUM SERPL-SCNC: 129 MMOL/L (ref 136–145)
SODIUM SERPL-SCNC: 131 MMOL/L (ref 136–145)
SODIUM SERPL-SCNC: 132 MMOL/L (ref 136–145)
SODIUM SERPL-SCNC: 132 MMOL/L (ref 136–145)
SODIUM SERPL-SCNC: 134 MMOL/L (ref 136–145)
SODIUM SERPL-SCNC: 134 MMOL/L (ref 136–145)
SODIUM SERPL-SCNC: 137 MMOL/L (ref 136–145)
SODIUM SERPL-SCNC: 137 MMOL/L (ref 136–145)
SODIUM SERPL-SCNC: 138 MMOL/L (ref 136–145)
SODIUM SERPL-SCNC: 139 MMOL/L (ref 136–145)
SP GR UR STRIP.AUTO: 1.02 (ref 1–1.03)
SPECIMEN EXPIRATION DATE: NORMAL
SPECIMEN SOURCE: ABNORMAL
STOMATOCYTES BLD QL SMEAR: PRESENT
STOMATOCYTES BLD QL SMEAR: PRESENT
T WAVE AXIS: 68 DEGREES
TRIGL SERPL-MCNC: 141 MG/DL
UNIT DISPENSE STATUS: NORMAL
UNIT PRODUCT CODE: NORMAL
UNIT PRODUCT VOLUME: 203 ML
UNIT PRODUCT VOLUME: 280 ML
UNIT PRODUCT VOLUME: 350 ML
UNIT RH: NORMAL
UROBILINOGEN UR QL STRIP.AUTO: 0.2 E.U./DL
VENTILATION VALUE: 500
VENTILATION VALUE: 500
VENTRICULAR RATE: 62 BPM
WBC # BLD AUTO: 10.14 THOUSAND/UL (ref 4.31–10.16)
WBC # BLD AUTO: 11.19 THOUSAND/UL (ref 4.31–10.16)
WBC # BLD AUTO: 11.55 THOUSAND/UL (ref 4.31–10.16)
WBC # BLD AUTO: 13.34 THOUSAND/UL (ref 4.31–10.16)
WBC # BLD AUTO: 7.48 THOUSAND/UL (ref 4.31–10.16)
WBC # BLD AUTO: 7.58 THOUSAND/UL (ref 4.31–10.16)
WBC # BLD AUTO: 8.25 THOUSAND/UL (ref 4.31–10.16)
WBC # BLD AUTO: 8.59 THOUSAND/UL (ref 4.31–10.16)
WBC # BLD AUTO: 8.6 THOUSAND/UL (ref 4.31–10.16)
WBC # BLD AUTO: 8.7 THOUSAND/UL (ref 4.31–10.16)
WBC # BLD AUTO: 8.71 THOUSAND/UL (ref 4.31–10.16)
WBC # BLD AUTO: 8.83 THOUSAND/UL (ref 4.31–10.16)
WBC # BLD AUTO: 8.85 THOUSAND/UL (ref 4.31–10.16)
WBC # BLD AUTO: 9.09 THOUSAND/UL (ref 4.31–10.16)
WBC # BLD AUTO: 9.91 THOUSAND/UL (ref 4.31–10.16)
WBC #/AREA URNS AUTO: ABNORMAL /HPF

## 2022-01-01 PROCEDURE — 74176 CT ABD & PELVIS W/O CONTRAST: CPT

## 2022-01-01 PROCEDURE — 76882 US LMTD JT/FCL EVL NVASC XTR: CPT

## 2022-01-01 PROCEDURE — 99291 CRITICAL CARE FIRST HOUR: CPT | Performed by: INTERNAL MEDICINE

## 2022-01-01 PROCEDURE — 82948 REAGENT STRIP/BLOOD GLUCOSE: CPT

## 2022-01-01 PROCEDURE — 85014 HEMATOCRIT: CPT | Performed by: INTERNAL MEDICINE

## 2022-01-01 PROCEDURE — 99285 EMERGENCY DEPT VISIT HI MDM: CPT | Performed by: EMERGENCY MEDICINE

## 2022-01-01 PROCEDURE — 5A1945Z RESPIRATORY VENTILATION, 24-96 CONSECUTIVE HOURS: ICD-10-PCS | Performed by: INTERNAL MEDICINE

## 2022-01-01 PROCEDURE — 5A1D70Z PERFORMANCE OF URINARY FILTRATION, INTERMITTENT, LESS THAN 6 HOURS PER DAY: ICD-10-PCS | Performed by: INTERNAL MEDICINE

## 2022-01-01 PROCEDURE — 82803 BLOOD GASES ANY COMBINATION: CPT

## 2022-01-01 PROCEDURE — 86850 RBC ANTIBODY SCREEN: CPT | Performed by: INTERNAL MEDICINE

## 2022-01-01 PROCEDURE — 80053 COMPREHEN METABOLIC PANEL: CPT

## 2022-01-01 PROCEDURE — 85610 PROTHROMBIN TIME: CPT | Performed by: PHYSICIAN ASSISTANT

## 2022-01-01 PROCEDURE — 80061 LIPID PANEL: CPT | Performed by: INTERNAL MEDICINE

## 2022-01-01 PROCEDURE — 82550 ASSAY OF CK (CPK): CPT

## 2022-01-01 PROCEDURE — 85049 AUTOMATED PLATELET COUNT: CPT

## 2022-01-01 PROCEDURE — G1004 CDSM NDSC: HCPCS

## 2022-01-01 PROCEDURE — 85027 COMPLETE CBC AUTOMATED: CPT | Performed by: PHYSICIAN ASSISTANT

## 2022-01-01 PROCEDURE — 76937 US GUIDE VASCULAR ACCESS: CPT

## 2022-01-01 PROCEDURE — 99233 SBSQ HOSP IP/OBS HIGH 50: CPT | Performed by: INTERNAL MEDICINE

## 2022-01-01 PROCEDURE — 86902 BLOOD TYPE ANTIGEN DONOR EA: CPT

## 2022-01-01 PROCEDURE — 86922 COMPATIBILITY TEST ANTIGLOB: CPT

## 2022-01-01 PROCEDURE — 77001 FLUOROGUIDE FOR VEIN DEVICE: CPT | Performed by: RADIOLOGY

## 2022-01-01 PROCEDURE — 70450 CT HEAD/BRAIN W/O DYE: CPT

## 2022-01-01 PROCEDURE — 85018 HEMOGLOBIN: CPT | Performed by: FAMILY MEDICINE

## 2022-01-01 PROCEDURE — 93005 ELECTROCARDIOGRAM TRACING: CPT

## 2022-01-01 PROCEDURE — 82272 OCCULT BLD FECES 1-3 TESTS: CPT

## 2022-01-01 PROCEDURE — 83735 ASSAY OF MAGNESIUM: CPT | Performed by: PHYSICIAN ASSISTANT

## 2022-01-01 PROCEDURE — 86900 BLOOD TYPING SEROLOGIC ABO: CPT | Performed by: PHYSICIAN ASSISTANT

## 2022-01-01 PROCEDURE — 94760 N-INVAS EAR/PLS OXIMETRY 1: CPT

## 2022-01-01 PROCEDURE — 99285 EMERGENCY DEPT VISIT HI MDM: CPT

## 2022-01-01 PROCEDURE — 84132 ASSAY OF SERUM POTASSIUM: CPT

## 2022-01-01 PROCEDURE — 84295 ASSAY OF SERUM SODIUM: CPT

## 2022-01-01 PROCEDURE — 31500 INSERT EMERGENCY AIRWAY: CPT | Performed by: INTERNAL MEDICINE

## 2022-01-01 PROCEDURE — 0JH63XZ INSERTION OF TUNNELED VASCULAR ACCESS DEVICE INTO CHEST SUBCUTANEOUS TISSUE AND FASCIA, PERCUTANEOUS APPROACH: ICD-10-PCS | Performed by: RADIOLOGY

## 2022-01-01 PROCEDURE — 84100 ASSAY OF PHOSPHORUS: CPT

## 2022-01-01 PROCEDURE — 83036 HEMOGLOBIN GLYCOSYLATED A1C: CPT | Performed by: INTERNAL MEDICINE

## 2022-01-01 PROCEDURE — 30233K1 TRANSFUSION OF NONAUTOLOGOUS FROZEN PLASMA INTO PERIPHERAL VEIN, PERCUTANEOUS APPROACH: ICD-10-PCS | Performed by: INTERNAL MEDICINE

## 2022-01-01 PROCEDURE — 80053 COMPREHEN METABOLIC PANEL: CPT | Performed by: INTERNAL MEDICINE

## 2022-01-01 PROCEDURE — 76937 US GUIDE VASCULAR ACCESS: CPT | Performed by: INTERNAL MEDICINE

## 2022-01-01 PROCEDURE — 4A133B1 MONITORING OF ARTERIAL PRESSURE, PERIPHERAL, PERCUTANEOUS APPROACH: ICD-10-PCS | Performed by: INTERNAL MEDICINE

## 2022-01-01 PROCEDURE — 02H633Z INSERTION OF INFUSION DEVICE INTO RIGHT ATRIUM, PERCUTANEOUS APPROACH: ICD-10-PCS | Performed by: RADIOLOGY

## 2022-01-01 PROCEDURE — 85014 HEMATOCRIT: CPT | Performed by: NURSE PRACTITIONER

## 2022-01-01 PROCEDURE — 96374 THER/PROPH/DIAG INJ IV PUSH: CPT

## 2022-01-01 PROCEDURE — 85018 HEMOGLOBIN: CPT | Performed by: PHYSICIAN ASSISTANT

## 2022-01-01 PROCEDURE — 82140 ASSAY OF AMMONIA: CPT

## 2022-01-01 PROCEDURE — 80143 DRUG ASSAY ACETAMINOPHEN: CPT

## 2022-01-01 PROCEDURE — 82805 BLOOD GASES W/O2 SATURATION: CPT | Performed by: PHYSICIAN ASSISTANT

## 2022-01-01 PROCEDURE — 86921 COMPATIBILITY TEST INCUBATE: CPT

## 2022-01-01 PROCEDURE — 82805 BLOOD GASES W/O2 SATURATION: CPT

## 2022-01-01 PROCEDURE — 4A133J1 MONITORING OF ARTERIAL PULSE, PERIPHERAL, PERCUTANEOUS APPROACH: ICD-10-PCS | Performed by: INTERNAL MEDICINE

## 2022-01-01 PROCEDURE — 85018 HEMOGLOBIN: CPT

## 2022-01-01 PROCEDURE — 90935 HEMODIALYSIS ONE EVALUATION: CPT | Performed by: INTERNAL MEDICINE

## 2022-01-01 PROCEDURE — 85730 THROMBOPLASTIN TIME PARTIAL: CPT

## 2022-01-01 PROCEDURE — 82550 ASSAY OF CK (CPK): CPT | Performed by: STUDENT IN AN ORGANIZED HEALTH CARE EDUCATION/TRAINING PROGRAM

## 2022-01-01 PROCEDURE — 0241U HB NFCT DS VIR RESP RNA 4 TRGT: CPT | Performed by: EMERGENCY MEDICINE

## 2022-01-01 PROCEDURE — 84484 ASSAY OF TROPONIN QUANT: CPT | Performed by: EMERGENCY MEDICINE

## 2022-01-01 PROCEDURE — 73560 X-RAY EXAM OF KNEE 1 OR 2: CPT

## 2022-01-01 PROCEDURE — 93321 DOPPLER ECHO F-UP/LMTD STD: CPT | Performed by: INTERNAL MEDICINE

## 2022-01-01 PROCEDURE — 80048 BASIC METABOLIC PNL TOTAL CA: CPT | Performed by: FAMILY MEDICINE

## 2022-01-01 PROCEDURE — 03HY32Z INSERTION OF MONITORING DEVICE INTO UPPER ARTERY, PERCUTANEOUS APPROACH: ICD-10-PCS | Performed by: INTERNAL MEDICINE

## 2022-01-01 PROCEDURE — 85730 THROMBOPLASTIN TIME PARTIAL: CPT | Performed by: SURGERY

## 2022-01-01 PROCEDURE — 99232 SBSQ HOSP IP/OBS MODERATE 35: CPT | Performed by: INTERNAL MEDICINE

## 2022-01-01 PROCEDURE — 93880 EXTRACRANIAL BILAT STUDY: CPT

## 2022-01-01 PROCEDURE — 99223 1ST HOSP IP/OBS HIGH 75: CPT | Performed by: STUDENT IN AN ORGANIZED HEALTH CARE EDUCATION/TRAINING PROGRAM

## 2022-01-01 PROCEDURE — 85610 PROTHROMBIN TIME: CPT

## 2022-01-01 PROCEDURE — 85014 HEMATOCRIT: CPT

## 2022-01-01 PROCEDURE — 83880 ASSAY OF NATRIURETIC PEPTIDE: CPT

## 2022-01-01 PROCEDURE — 82947 ASSAY GLUCOSE BLOOD QUANT: CPT

## 2022-01-01 PROCEDURE — 87040 BLOOD CULTURE FOR BACTERIA: CPT | Performed by: EMERGENCY MEDICINE

## 2022-01-01 PROCEDURE — NC001 PR NO CHARGE: Performed by: RADIOLOGY

## 2022-01-01 PROCEDURE — 85014 HEMATOCRIT: CPT | Performed by: FAMILY MEDICINE

## 2022-01-01 PROCEDURE — 36415 COLL VENOUS BLD VENIPUNCTURE: CPT | Performed by: EMERGENCY MEDICINE

## 2022-01-01 PROCEDURE — 77001 FLUOROGUIDE FOR VEIN DEVICE: CPT

## 2022-01-01 PROCEDURE — 80076 HEPATIC FUNCTION PANEL: CPT | Performed by: PHYSICIAN ASSISTANT

## 2022-01-01 PROCEDURE — 93308 TTE F-UP OR LMTD: CPT | Performed by: INTERNAL MEDICINE

## 2022-01-01 PROCEDURE — 71045 X-RAY EXAM CHEST 1 VIEW: CPT

## 2022-01-01 PROCEDURE — 85027 COMPLETE CBC AUTOMATED: CPT | Performed by: GENERAL PRACTICE

## 2022-01-01 PROCEDURE — 85018 HEMOGLOBIN: CPT | Performed by: NURSE PRACTITIONER

## 2022-01-01 PROCEDURE — 84145 PROCALCITONIN (PCT): CPT

## 2022-01-01 PROCEDURE — 36556 INSERT NON-TUNNEL CV CATH: CPT | Performed by: INTERNAL MEDICINE

## 2022-01-01 PROCEDURE — P9016 RBC LEUKOCYTES REDUCED: HCPCS

## 2022-01-01 PROCEDURE — 99233 SBSQ HOSP IP/OBS HIGH 50: CPT | Performed by: GENERAL PRACTICE

## 2022-01-01 PROCEDURE — 93325 DOPPLER ECHO COLOR FLOW MAPG: CPT

## 2022-01-01 PROCEDURE — 99232 SBSQ HOSP IP/OBS MODERATE 35: CPT | Performed by: PHYSICIAN ASSISTANT

## 2022-01-01 PROCEDURE — 87086 URINE CULTURE/COLONY COUNT: CPT | Performed by: EMERGENCY MEDICINE

## 2022-01-01 PROCEDURE — 85027 COMPLETE CBC AUTOMATED: CPT

## 2022-01-01 PROCEDURE — 93880 EXTRACRANIAL BILAT STUDY: CPT | Performed by: SURGERY

## 2022-01-01 PROCEDURE — 85025 COMPLETE CBC W/AUTO DIFF WBC: CPT | Performed by: PHYSICIAN ASSISTANT

## 2022-01-01 PROCEDURE — 86901 BLOOD TYPING SEROLOGIC RH(D): CPT | Performed by: PHYSICIAN ASSISTANT

## 2022-01-01 PROCEDURE — 84100 ASSAY OF PHOSPHORUS: CPT | Performed by: INTERNAL MEDICINE

## 2022-01-01 PROCEDURE — 84100 ASSAY OF PHOSPHORUS: CPT | Performed by: PHYSICIAN ASSISTANT

## 2022-01-01 PROCEDURE — C1750 CATH, HEMODIALYSIS,LONG-TERM: HCPCS

## 2022-01-01 PROCEDURE — 94002 VENT MGMT INPAT INIT DAY: CPT

## 2022-01-01 PROCEDURE — 85014 HEMATOCRIT: CPT | Performed by: PHYSICIAN ASSISTANT

## 2022-01-01 PROCEDURE — NC001 PR NO CHARGE: Performed by: INTERNAL MEDICINE

## 2022-01-01 PROCEDURE — 36600 WITHDRAWAL OF ARTERIAL BLOOD: CPT

## 2022-01-01 PROCEDURE — 99292 CRITICAL CARE ADDL 30 MIN: CPT | Performed by: INTERNAL MEDICINE

## 2022-01-01 PROCEDURE — 86901 BLOOD TYPING SEROLOGIC RH(D): CPT | Performed by: INTERNAL MEDICINE

## 2022-01-01 PROCEDURE — 96375 TX/PRO/DX INJ NEW DRUG ADDON: CPT

## 2022-01-01 PROCEDURE — 94150 VITAL CAPACITY TEST: CPT

## 2022-01-01 PROCEDURE — 30233N1 TRANSFUSION OF NONAUTOLOGOUS RED BLOOD CELLS INTO PERIPHERAL VEIN, PERCUTANEOUS APPROACH: ICD-10-PCS | Performed by: INTERNAL MEDICINE

## 2022-01-01 PROCEDURE — 85025 COMPLETE CBC W/AUTO DIFF WBC: CPT | Performed by: INTERNAL MEDICINE

## 2022-01-01 PROCEDURE — 99232 SBSQ HOSP IP/OBS MODERATE 35: CPT | Performed by: STUDENT IN AN ORGANIZED HEALTH CARE EDUCATION/TRAINING PROGRAM

## 2022-01-01 PROCEDURE — 83735 ASSAY OF MAGNESIUM: CPT

## 2022-01-01 PROCEDURE — 80048 BASIC METABOLIC PNL TOTAL CA: CPT | Performed by: INTERNAL MEDICINE

## 2022-01-01 PROCEDURE — 93321 DOPPLER ECHO F-UP/LMTD STD: CPT

## 2022-01-01 PROCEDURE — 85610 PROTHROMBIN TIME: CPT | Performed by: INTERNAL MEDICINE

## 2022-01-01 PROCEDURE — 99232 SBSQ HOSP IP/OBS MODERATE 35: CPT | Performed by: GENERAL PRACTICE

## 2022-01-01 PROCEDURE — 73700 CT LOWER EXTREMITY W/O DYE: CPT

## 2022-01-01 PROCEDURE — 36620 INSERTION CATHETER ARTERY: CPT | Performed by: INTERNAL MEDICINE

## 2022-01-01 PROCEDURE — 97530 THERAPEUTIC ACTIVITIES: CPT

## 2022-01-01 PROCEDURE — 85027 COMPLETE CBC AUTOMATED: CPT | Performed by: NURSE PRACTITIONER

## 2022-01-01 PROCEDURE — 86850 RBC ANTIBODY SCREEN: CPT | Performed by: PHYSICIAN ASSISTANT

## 2022-01-01 PROCEDURE — 36558 INSERT TUNNELED CV CATH: CPT | Performed by: RADIOLOGY

## 2022-01-01 PROCEDURE — 99152 MOD SED SAME PHYS/QHP 5/>YRS: CPT | Performed by: RADIOLOGY

## 2022-01-01 PROCEDURE — 85025 COMPLETE CBC W/AUTO DIFF WBC: CPT | Performed by: EMERGENCY MEDICINE

## 2022-01-01 PROCEDURE — 85027 COMPLETE CBC AUTOMATED: CPT | Performed by: INTERNAL MEDICINE

## 2022-01-01 PROCEDURE — 99222 1ST HOSP IP/OBS MODERATE 55: CPT | Performed by: INTERNAL MEDICINE

## 2022-01-01 PROCEDURE — 30233N1 TRANSFUSION OF NONAUTOLOGOUS RED BLOOD CELLS INTO PERIPHERAL VEIN, PERCUTANEOUS APPROACH: ICD-10-PCS | Performed by: EMERGENCY MEDICINE

## 2022-01-01 PROCEDURE — 94762 N-INVAS EAR/PLS OXIMTRY CONT: CPT

## 2022-01-01 PROCEDURE — 82330 ASSAY OF CALCIUM: CPT | Performed by: PHYSICIAN ASSISTANT

## 2022-01-01 PROCEDURE — 85018 HEMOGLOBIN: CPT | Performed by: INTERNAL MEDICINE

## 2022-01-01 PROCEDURE — 0BH18EZ INSERTION OF ENDOTRACHEAL AIRWAY INTO TRACHEA, VIA NATURAL OR ARTIFICIAL OPENING ENDOSCOPIC: ICD-10-PCS | Performed by: INTERNAL MEDICINE

## 2022-01-01 PROCEDURE — 97163 PT EVAL HIGH COMPLEX 45 MIN: CPT

## 2022-01-01 PROCEDURE — 94003 VENT MGMT INPAT SUBQ DAY: CPT

## 2022-01-01 PROCEDURE — 99223 1ST HOSP IP/OBS HIGH 75: CPT | Performed by: INTERNAL MEDICINE

## 2022-01-01 PROCEDURE — P9017 PLASMA 1 DONOR FRZ W/IN 8 HR: HCPCS

## 2022-01-01 PROCEDURE — 86706 HEP B SURFACE ANTIBODY: CPT | Performed by: NURSE PRACTITIONER

## 2022-01-01 PROCEDURE — 95816 EEG AWAKE AND DROWSY: CPT

## 2022-01-01 PROCEDURE — 82553 CREATINE MB FRACTION: CPT

## 2022-01-01 PROCEDURE — 36558 INSERT TUNNELED CV CATH: CPT

## 2022-01-01 PROCEDURE — 85027 COMPLETE CBC AUTOMATED: CPT | Performed by: FAMILY MEDICINE

## 2022-01-01 PROCEDURE — 99232 SBSQ HOSP IP/OBS MODERATE 35: CPT | Performed by: NEUROLOGICAL SURGERY

## 2022-01-01 PROCEDURE — 86901 BLOOD TYPING SEROLOGIC RH(D): CPT

## 2022-01-01 PROCEDURE — 85007 BL SMEAR W/DIFF WBC COUNT: CPT

## 2022-01-01 PROCEDURE — 85730 THROMBOPLASTIN TIME PARTIAL: CPT | Performed by: NURSE PRACTITIONER

## 2022-01-01 PROCEDURE — 86850 RBC ANTIBODY SCREEN: CPT

## 2022-01-01 PROCEDURE — 99222 1ST HOSP IP/OBS MODERATE 55: CPT | Performed by: SURGERY

## 2022-01-01 PROCEDURE — C9113 INJ PANTOPRAZOLE SODIUM, VIA: HCPCS | Performed by: PHYSICIAN ASSISTANT

## 2022-01-01 PROCEDURE — 74022 RADEX COMPL AQT ABD SERIES: CPT

## 2022-01-01 PROCEDURE — 85610 PROTHROMBIN TIME: CPT | Performed by: NURSE PRACTITIONER

## 2022-01-01 PROCEDURE — 80053 COMPREHEN METABOLIC PANEL: CPT | Performed by: EMERGENCY MEDICINE

## 2022-01-01 PROCEDURE — 71250 CT THORAX DX C-: CPT

## 2022-01-01 PROCEDURE — 99232 SBSQ HOSP IP/OBS MODERATE 35: CPT | Performed by: HOSPITALIST

## 2022-01-01 PROCEDURE — 85007 BL SMEAR W/DIFF WBC COUNT: CPT | Performed by: FAMILY MEDICINE

## 2022-01-01 PROCEDURE — 93010 ELECTROCARDIOGRAM REPORT: CPT | Performed by: INTERNAL MEDICINE

## 2022-01-01 PROCEDURE — 93325 DOPPLER ECHO COLOR FLOW MAPG: CPT | Performed by: INTERNAL MEDICINE

## 2022-01-01 PROCEDURE — 70551 MRI BRAIN STEM W/O DYE: CPT

## 2022-01-01 PROCEDURE — 83605 ASSAY OF LACTIC ACID: CPT | Performed by: PHYSICIAN ASSISTANT

## 2022-01-01 PROCEDURE — 83690 ASSAY OF LIPASE: CPT

## 2022-01-01 PROCEDURE — 80069 RENAL FUNCTION PANEL: CPT | Performed by: GENERAL PRACTICE

## 2022-01-01 PROCEDURE — 85730 THROMBOPLASTIN TIME PARTIAL: CPT | Performed by: INTERNAL MEDICINE

## 2022-01-01 PROCEDURE — 80048 BASIC METABOLIC PNL TOTAL CA: CPT

## 2022-01-01 PROCEDURE — 97167 OT EVAL HIGH COMPLEX 60 MIN: CPT

## 2022-01-01 PROCEDURE — 83605 ASSAY OF LACTIC ACID: CPT | Performed by: EMERGENCY MEDICINE

## 2022-01-01 PROCEDURE — 80048 BASIC METABOLIC PNL TOTAL CA: CPT | Performed by: PHYSICIAN ASSISTANT

## 2022-01-01 PROCEDURE — 99223 1ST HOSP IP/OBS HIGH 75: CPT | Performed by: PSYCHIATRY & NEUROLOGY

## 2022-01-01 PROCEDURE — 93308 TTE F-UP OR LMTD: CPT

## 2022-01-01 PROCEDURE — 86900 BLOOD TYPING SEROLOGIC ABO: CPT

## 2022-01-01 PROCEDURE — 86900 BLOOD TYPING SEROLOGIC ABO: CPT | Performed by: INTERNAL MEDICINE

## 2022-01-01 PROCEDURE — 02HV33Z INSERTION OF INFUSION DEVICE INTO SUPERIOR VENA CAVA, PERCUTANEOUS APPROACH: ICD-10-PCS | Performed by: INTERNAL MEDICINE

## 2022-01-01 PROCEDURE — 81001 URINALYSIS AUTO W/SCOPE: CPT | Performed by: EMERGENCY MEDICINE

## 2022-01-01 PROCEDURE — 76937 US GUIDE VASCULAR ACCESS: CPT | Performed by: RADIOLOGY

## 2022-01-01 PROCEDURE — 85007 BL SMEAR W/DIFF WBC COUNT: CPT | Performed by: GENERAL PRACTICE

## 2022-01-01 PROCEDURE — 95816 EEG AWAKE AND DROWSY: CPT | Performed by: PSYCHIATRY & NEUROLOGY

## 2022-01-01 RX ORDER — MECLIZINE HYDROCHLORIDE 25 MG/1
25 TABLET ORAL ONCE
Status: COMPLETED | OUTPATIENT
Start: 2022-01-01 | End: 2022-01-01

## 2022-01-01 RX ORDER — EZETIMIBE 10 MG/1
10 TABLET ORAL
Status: DISCONTINUED | OUTPATIENT
Start: 2022-01-01 | End: 2022-01-01

## 2022-01-01 RX ORDER — ALBUMIN (HUMAN) 12.5 G/50ML
25 SOLUTION INTRAVENOUS EVERY 8 HOURS
Status: COMPLETED | OUTPATIENT
Start: 2022-01-01 | End: 2022-01-01

## 2022-01-01 RX ORDER — SODIUM CHLORIDE 9 MG/ML
100 INJECTION, SOLUTION INTRAVENOUS CONTINUOUS
Status: DISCONTINUED | OUTPATIENT
Start: 2022-01-01 | End: 2022-01-01

## 2022-01-01 RX ORDER — HEPARIN SODIUM 10000 [USP'U]/100ML
3-20 INJECTION, SOLUTION INTRAVENOUS
Status: DISCONTINUED | OUTPATIENT
Start: 2022-01-01 | End: 2022-01-01

## 2022-01-01 RX ORDER — VANCOMYCIN HYDROCHLORIDE 500 MG/100ML
INJECTION, SOLUTION INTRAVENOUS
Status: COMPLETED | OUTPATIENT
Start: 2022-01-01 | End: 2022-01-01

## 2022-01-01 RX ORDER — HYDRALAZINE HYDROCHLORIDE 20 MG/ML
10 INJECTION INTRAMUSCULAR; INTRAVENOUS ONCE
Status: COMPLETED | OUTPATIENT
Start: 2022-01-01 | End: 2022-01-01

## 2022-01-01 RX ORDER — FUROSEMIDE 10 MG/ML
80 INJECTION INTRAMUSCULAR; INTRAVENOUS ONCE
Status: COMPLETED | OUTPATIENT
Start: 2022-01-01 | End: 2022-01-01

## 2022-01-01 RX ORDER — CALCIUM CHLORIDE 100 MG/ML
SYRINGE (ML) INTRAVENOUS CODE/TRAUMA/SEDATION MEDICATION
Status: COMPLETED | OUTPATIENT
Start: 2022-01-01 | End: 2022-01-01

## 2022-01-01 RX ORDER — CEFAZOLIN SODIUM 1 G/50ML
1000 SOLUTION INTRAVENOUS EVERY 24 HOURS
Qty: 150 ML | Refills: 0 | Status: CANCELLED | OUTPATIENT
Start: 2022-01-01 | End: 2022-01-16

## 2022-01-01 RX ORDER — CEFAZOLIN SODIUM 1 G/50ML
1000 SOLUTION INTRAVENOUS EVERY 24 HOURS
Status: DISCONTINUED | OUTPATIENT
Start: 2022-01-01 | End: 2022-01-01

## 2022-01-01 RX ORDER — LACTULOSE 20 G/30ML
20 SOLUTION ORAL 2 TIMES DAILY
Status: DISCONTINUED | OUTPATIENT
Start: 2022-01-01 | End: 2022-01-01

## 2022-01-01 RX ORDER — PANTOPRAZOLE SODIUM 40 MG/1
40 TABLET, DELAYED RELEASE ORAL DAILY
Status: DISCONTINUED | OUTPATIENT
Start: 2022-01-01 | End: 2022-01-01

## 2022-01-01 RX ORDER — LIDOCAINE HYDROCHLORIDE 20 MG/ML
JELLY TOPICAL 3 TIMES DAILY
Qty: 30 ML | Refills: 0 | Status: CANCELLED | OUTPATIENT
Start: 2022-01-01 | End: 2022-01-16

## 2022-01-01 RX ORDER — INSULIN GLARGINE 100 [IU]/ML
32 INJECTION, SOLUTION SUBCUTANEOUS
Status: DISCONTINUED | OUTPATIENT
Start: 2022-01-01 | End: 2022-01-01

## 2022-01-01 RX ORDER — SODIUM BICARBONATE 650 MG/1
650 TABLET ORAL
Status: DISCONTINUED | OUTPATIENT
Start: 2022-01-01 | End: 2022-01-01

## 2022-01-01 RX ORDER — METOPROLOL TARTRATE 50 MG/1
50 TABLET, FILM COATED ORAL 2 TIMES DAILY
Status: DISCONTINUED | OUTPATIENT
Start: 2022-01-01 | End: 2022-01-01

## 2022-01-01 RX ORDER — GLYCOPYRROLATE 0.2 MG/ML
0.1 INJECTION INTRAMUSCULAR; INTRAVENOUS EVERY 4 HOURS PRN
Status: DISCONTINUED | OUTPATIENT
Start: 2022-01-01 | End: 2022-01-01 | Stop reason: HOSPADM

## 2022-01-01 RX ORDER — AMLODIPINE BESYLATE 5 MG/1
5 TABLET ORAL DAILY
Status: DISCONTINUED | OUTPATIENT
Start: 2022-01-01 | End: 2022-01-01

## 2022-01-01 RX ORDER — OXYCODONE HYDROCHLORIDE 5 MG/1
2.5 TABLET ORAL EVERY 6 HOURS PRN
Status: DISCONTINUED | OUTPATIENT
Start: 2022-01-01 | End: 2022-01-01

## 2022-01-01 RX ORDER — CEFAZOLIN SODIUM 1 G/50ML
1000 SOLUTION INTRAVENOUS EVERY 12 HOURS
Status: DISCONTINUED | OUTPATIENT
Start: 2022-01-01 | End: 2022-01-01

## 2022-01-01 RX ORDER — TORSEMIDE 20 MG/1
40 TABLET ORAL 2 TIMES DAILY
Qty: 120 TABLET | Refills: 0 | Status: CANCELLED | OUTPATIENT
Start: 2022-01-01 | End: 2022-02-12

## 2022-01-01 RX ORDER — NYSTATIN 100000 [USP'U]/G
POWDER TOPICAL 2 TIMES DAILY
Status: DISCONTINUED | OUTPATIENT
Start: 2022-01-01 | End: 2022-01-01

## 2022-01-01 RX ORDER — HYDROMORPHONE HCL/PF 1 MG/ML
0.2 SYRINGE (ML) INJECTION ONCE
Status: COMPLETED | OUTPATIENT
Start: 2022-01-01 | End: 2022-01-01

## 2022-01-01 RX ORDER — LACTULOSE 20 G/30ML
10 SOLUTION ORAL 2 TIMES DAILY
Status: DISCONTINUED | OUTPATIENT
Start: 2022-01-01 | End: 2022-01-01

## 2022-01-01 RX ORDER — ACETAMINOPHEN 325 MG/1
975 TABLET ORAL EVERY 8 HOURS PRN
Status: DISCONTINUED | OUTPATIENT
Start: 2022-01-01 | End: 2022-01-01

## 2022-01-01 RX ORDER — OXYCODONE HYDROCHLORIDE 5 MG/1
5 TABLET ORAL ONCE
Status: COMPLETED | OUTPATIENT
Start: 2022-01-01 | End: 2022-01-01

## 2022-01-01 RX ORDER — DOCUSATE SODIUM 100 MG/1
100 CAPSULE, LIQUID FILLED ORAL 2 TIMES DAILY PRN
Status: DISCONTINUED | OUTPATIENT
Start: 2022-01-01 | End: 2022-01-01

## 2022-01-01 RX ORDER — CARVEDILOL 12.5 MG/1
25 TABLET ORAL 2 TIMES DAILY WITH MEALS
Status: DISCONTINUED | OUTPATIENT
Start: 2022-01-01 | End: 2022-01-01

## 2022-01-01 RX ORDER — DEXTROSE MONOHYDRATE 25 G/50ML
50 INJECTION, SOLUTION INTRAVENOUS ONCE
Status: COMPLETED | OUTPATIENT
Start: 2022-01-01 | End: 2022-01-01

## 2022-01-01 RX ORDER — CALCIUM ACETATE 667 MG/1
667 CAPSULE ORAL
Status: DISCONTINUED | OUTPATIENT
Start: 2022-01-01 | End: 2022-01-01

## 2022-01-01 RX ORDER — AMLODIPINE BESYLATE 5 MG/1
10 TABLET ORAL DAILY
Status: DISCONTINUED | OUTPATIENT
Start: 2022-01-01 | End: 2022-01-01

## 2022-01-01 RX ORDER — HYDROMORPHONE HCL/PF 1 MG/ML
1 SYRINGE (ML) INJECTION EVERY 2 HOUR PRN
Status: DISCONTINUED | OUTPATIENT
Start: 2022-01-01 | End: 2022-01-01

## 2022-01-01 RX ORDER — CALCIUM ACETATE 667 MG/1
667 CAPSULE ORAL
Qty: 90 CAPSULE | Refills: 0 | Status: CANCELLED | OUTPATIENT
Start: 2022-01-01 | End: 2022-02-12

## 2022-01-01 RX ORDER — POLYETHYLENE GLYCOL 3350 17 G/17G
17 POWDER, FOR SOLUTION ORAL DAILY
Qty: 10 EACH | Refills: 0 | Status: CANCELLED | OUTPATIENT
Start: 2022-01-01

## 2022-01-01 RX ORDER — ASPIRIN 81 MG/1
81 TABLET, CHEWABLE ORAL DAILY
Status: DISCONTINUED | OUTPATIENT
Start: 2022-01-01 | End: 2022-01-01

## 2022-01-01 RX ORDER — HYDROMORPHONE HCL/PF 1 MG/ML
1 SYRINGE (ML) INJECTION
Status: DISCONTINUED | OUTPATIENT
Start: 2022-01-01 | End: 2022-01-01 | Stop reason: HOSPADM

## 2022-01-01 RX ORDER — ATORVASTATIN CALCIUM 40 MG/1
40 TABLET, FILM COATED ORAL EVERY EVENING
Status: DISCONTINUED | OUTPATIENT
Start: 2022-01-01 | End: 2022-01-01

## 2022-01-01 RX ORDER — AMLODIPINE BESYLATE 10 MG/1
10 TABLET ORAL DAILY
Qty: 30 TABLET | Refills: 0 | Status: CANCELLED | OUTPATIENT
Start: 2022-01-01 | End: 2022-02-12

## 2022-01-01 RX ORDER — ACETAMINOPHEN 325 MG/1
975 TABLET ORAL EVERY 8 HOURS SCHEDULED
Status: DISCONTINUED | OUTPATIENT
Start: 2022-01-01 | End: 2022-01-01

## 2022-01-01 RX ORDER — FENTANYL CITRATE 50 UG/ML
25 INJECTION, SOLUTION INTRAMUSCULAR; INTRAVENOUS ONCE
Status: COMPLETED | OUTPATIENT
Start: 2022-01-01 | End: 2022-01-01

## 2022-01-01 RX ORDER — FENTANYL CITRATE 50 UG/ML
INJECTION, SOLUTION INTRAMUSCULAR; INTRAVENOUS
Status: COMPLETED
Start: 2022-01-01 | End: 2022-01-01

## 2022-01-01 RX ORDER — SODIUM BICARBONATE 84 MG/ML
INJECTION, SOLUTION INTRAVENOUS CODE/TRAUMA/SEDATION MEDICATION
Status: COMPLETED | OUTPATIENT
Start: 2022-01-01 | End: 2022-01-01

## 2022-01-01 RX ORDER — ACETAMINOPHEN 325 MG/1
975 TABLET ORAL ONCE
Status: COMPLETED | OUTPATIENT
Start: 2022-01-01 | End: 2022-01-01

## 2022-01-01 RX ORDER — BISACODYL 10 MG
10 SUPPOSITORY, RECTAL RECTAL DAILY PRN
Status: DISCONTINUED | OUTPATIENT
Start: 2022-01-01 | End: 2022-01-01 | Stop reason: HOSPADM

## 2022-01-01 RX ORDER — EPINEPHRINE 0.1 MG/ML
SYRINGE (ML) INJECTION CODE/TRAUMA/SEDATION MEDICATION
Status: COMPLETED | OUTPATIENT
Start: 2022-01-01 | End: 2022-01-01

## 2022-01-01 RX ORDER — ATROPINE SULFATE 1 MG/ML
INJECTION, SOLUTION INTRAMUSCULAR; INTRAVENOUS; SUBCUTANEOUS
Status: COMPLETED
Start: 2022-01-01 | End: 2022-01-01

## 2022-01-01 RX ORDER — CHLORHEXIDINE GLUCONATE 0.12 MG/ML
15 RINSE ORAL EVERY 12 HOURS SCHEDULED
Status: DISCONTINUED | OUTPATIENT
Start: 2022-01-01 | End: 2022-01-01

## 2022-01-01 RX ORDER — MULTIVIT-MIN/IRON FUM/FOLIC AC 7.5 MG-4
1 TABLET ORAL DAILY
COMMUNITY
End: 2022-01-01 | Stop reason: HOSPADM

## 2022-01-01 RX ORDER — DEXTROSE 25 % IN WATER 25 %
50 SYRINGE (ML) INTRAVENOUS ONCE
Status: DISCONTINUED | OUTPATIENT
Start: 2022-01-01 | End: 2022-01-01

## 2022-01-01 RX ORDER — MAGNESIUM SULFATE HEPTAHYDRATE 40 MG/ML
2 INJECTION, SOLUTION INTRAVENOUS ONCE
Status: COMPLETED | OUTPATIENT
Start: 2022-01-01 | End: 2022-01-01

## 2022-01-01 RX ORDER — NYSTATIN 100000 [USP'U]/G
POWDER TOPICAL 2 TIMES DAILY
Qty: 15 G | Refills: 0 | Status: CANCELLED | OUTPATIENT
Start: 2022-01-01

## 2022-01-01 RX ORDER — OXYCODONE HYDROCHLORIDE 5 MG/1
5 TABLET ORAL EVERY 6 HOURS PRN
Status: DISCONTINUED | OUTPATIENT
Start: 2022-01-01 | End: 2022-01-01

## 2022-01-01 RX ORDER — LIDOCAINE HYDROCHLORIDE 20 MG/ML
JELLY TOPICAL 3 TIMES DAILY
Status: DISCONTINUED | OUTPATIENT
Start: 2022-01-01 | End: 2022-01-01

## 2022-01-01 RX ORDER — FENTANYL CITRATE 50 UG/ML
25 INJECTION, SOLUTION INTRAMUSCULAR; INTRAVENOUS EVERY 2 HOUR PRN
Status: DISCONTINUED | OUTPATIENT
Start: 2022-01-01 | End: 2022-01-01 | Stop reason: HOSPADM

## 2022-01-01 RX ORDER — INSULIN GLARGINE 100 [IU]/ML
15 INJECTION, SOLUTION SUBCUTANEOUS
Qty: 10 ML | Refills: 0 | Status: CANCELLED | OUTPATIENT
Start: 2022-01-01

## 2022-01-01 RX ORDER — INSULIN GLARGINE 100 [IU]/ML
15 INJECTION, SOLUTION SUBCUTANEOUS
Status: DISCONTINUED | OUTPATIENT
Start: 2022-01-01 | End: 2022-01-01

## 2022-01-01 RX ORDER — AMLODIPINE BESYLATE 10 MG/1
10 TABLET ORAL DAILY
Status: DISCONTINUED | OUTPATIENT
Start: 2022-01-01 | End: 2022-01-01

## 2022-01-01 RX ORDER — ACETAMINOPHEN 325 MG/1
975 TABLET ORAL EVERY 8 HOURS SCHEDULED
Qty: 270 TABLET | Refills: 0 | Status: CANCELLED | OUTPATIENT
Start: 2022-01-01 | End: 2022-02-12

## 2022-01-01 RX ORDER — MIDAZOLAM HYDROCHLORIDE 2 MG/2ML
INJECTION, SOLUTION INTRAMUSCULAR; INTRAVENOUS CODE/TRAUMA/SEDATION MEDICATION
Status: COMPLETED | OUTPATIENT
Start: 2022-01-01 | End: 2022-01-01

## 2022-01-01 RX ORDER — PANTOPRAZOLE SODIUM 40 MG/1
40 INJECTION, POWDER, FOR SOLUTION INTRAVENOUS
Status: DISCONTINUED | OUTPATIENT
Start: 2022-01-01 | End: 2022-01-01

## 2022-01-01 RX ORDER — DEXTROSE 50 % IN WATER 50 %
SYRINGE (ML) INTRAVENOUS CODE/TRAUMA/SEDATION MEDICATION
Status: COMPLETED | OUTPATIENT
Start: 2022-01-01 | End: 2022-01-01

## 2022-01-01 RX ORDER — HYDROMORPHONE HCL/PF 1 MG/ML
0.5 SYRINGE (ML) INJECTION ONCE
Status: COMPLETED | OUTPATIENT
Start: 2022-01-01 | End: 2022-01-01

## 2022-01-01 RX ORDER — FENTANYL CITRATE 50 UG/ML
INJECTION, SOLUTION INTRAMUSCULAR; INTRAVENOUS CODE/TRAUMA/SEDATION MEDICATION
Status: COMPLETED | OUTPATIENT
Start: 2022-01-01 | End: 2022-01-01

## 2022-01-01 RX ORDER — FENOFIBRATE 145 MG/1
145 TABLET, COATED ORAL DAILY
Status: DISCONTINUED | OUTPATIENT
Start: 2022-01-01 | End: 2022-01-01

## 2022-01-01 RX ORDER — LORAZEPAM 2 MG/ML
1 INJECTION INTRAMUSCULAR ONCE AS NEEDED
Status: DISCONTINUED | OUTPATIENT
Start: 2022-01-01 | End: 2022-01-01

## 2022-01-01 RX ORDER — FUROSEMIDE 10 MG/ML
40 INJECTION INTRAMUSCULAR; INTRAVENOUS ONCE
Status: COMPLETED | OUTPATIENT
Start: 2022-01-01 | End: 2022-01-01

## 2022-01-01 RX ORDER — HEPARIN SODIUM 5000 [USP'U]/ML
5000 INJECTION, SOLUTION INTRAVENOUS; SUBCUTANEOUS EVERY 8 HOURS SCHEDULED
Status: DISCONTINUED | OUTPATIENT
Start: 2022-01-01 | End: 2022-01-01

## 2022-01-01 RX ORDER — LORAZEPAM 2 MG/ML
1 INJECTION INTRAMUSCULAR
Status: DISCONTINUED | OUTPATIENT
Start: 2022-01-01 | End: 2022-01-01 | Stop reason: HOSPADM

## 2022-01-01 RX ORDER — MECLIZINE HYDROCHLORIDE 25 MG/1
25 TABLET ORAL EVERY 8 HOURS PRN
Status: DISCONTINUED | OUTPATIENT
Start: 2022-01-01 | End: 2022-01-01

## 2022-01-01 RX ORDER — TORSEMIDE 20 MG/1
40 TABLET ORAL 2 TIMES DAILY
Status: DISCONTINUED | OUTPATIENT
Start: 2022-01-01 | End: 2022-01-01

## 2022-01-01 RX ORDER — PROPOFOL 10 MG/ML
5-50 INJECTION, EMULSION INTRAVENOUS
Status: DISCONTINUED | OUTPATIENT
Start: 2022-01-01 | End: 2022-01-01 | Stop reason: HOSPADM

## 2022-01-01 RX ORDER — POLYETHYLENE GLYCOL 3350 17 G/17G
17 POWDER, FOR SOLUTION ORAL DAILY
Status: DISCONTINUED | OUTPATIENT
Start: 2022-01-01 | End: 2022-01-01

## 2022-01-01 RX ADMIN — ASPIRIN 81 MG CHEWABLE TABLET 81 MG: 81 TABLET CHEWABLE at 08:58

## 2022-01-01 RX ADMIN — ACETAMINOPHEN 975 MG: 325 TABLET, FILM COATED ORAL at 13:39

## 2022-01-01 RX ADMIN — ASPIRIN 81 MG CHEWABLE TABLET 81 MG: 81 TABLET CHEWABLE at 08:48

## 2022-01-01 RX ADMIN — INSULIN LISPRO 2 UNITS: 100 INJECTION, SOLUTION INTRAVENOUS; SUBCUTANEOUS at 21:55

## 2022-01-01 RX ADMIN — APIXABAN 5 MG: 5 TABLET, FILM COATED ORAL at 17:19

## 2022-01-01 RX ADMIN — ACETAMINOPHEN 975 MG: 325 TABLET, FILM COATED ORAL at 21:50

## 2022-01-01 RX ADMIN — POLYETHYLENE GLYCOL 3350 17 G: 17 POWDER, FOR SOLUTION ORAL at 09:08

## 2022-01-01 RX ADMIN — INSULIN GLARGINE 32 UNITS: 100 INJECTION, SOLUTION SUBCUTANEOUS at 23:05

## 2022-01-01 RX ADMIN — MAGNESIUM SULFATE HEPTAHYDRATE 2 G: 40 INJECTION, SOLUTION INTRAVENOUS at 09:26

## 2022-01-01 RX ADMIN — OXYCODONE HYDROCHLORIDE 5 MG: 5 TABLET ORAL at 01:52

## 2022-01-01 RX ADMIN — LACTULOSE 10 G: 10 SOLUTION ORAL at 08:15

## 2022-01-01 RX ADMIN — NYSTATIN 1 APPLICATION: 100000 POWDER TOPICAL at 09:28

## 2022-01-01 RX ADMIN — CEFAZOLIN SODIUM 1000 MG: 1 SOLUTION INTRAVENOUS at 06:34

## 2022-01-01 RX ADMIN — CEFAZOLIN SODIUM 1000 MG: 1 SOLUTION INTRAVENOUS at 17:24

## 2022-01-01 RX ADMIN — ATORVASTATIN CALCIUM 40 MG: 40 TABLET, FILM COATED ORAL at 17:19

## 2022-01-01 RX ADMIN — AMLODIPINE BESYLATE 5 MG: 5 TABLET ORAL at 12:01

## 2022-01-01 RX ADMIN — ASPIRIN 81 MG CHEWABLE TABLET 81 MG: 81 TABLET CHEWABLE at 08:09

## 2022-01-01 RX ADMIN — DOCUSATE SODIUM 100 MG: 100 CAPSULE ORAL at 10:38

## 2022-01-01 RX ADMIN — INSULIN LISPRO 1 UNITS: 100 INJECTION, SOLUTION INTRAVENOUS; SUBCUTANEOUS at 18:45

## 2022-01-01 RX ADMIN — LACTULOSE 10 G: 10 SOLUTION ORAL at 09:28

## 2022-01-01 RX ADMIN — APIXABAN 5 MG: 5 TABLET, FILM COATED ORAL at 17:57

## 2022-01-01 RX ADMIN — ALBUMIN (HUMAN) 25 G: 0.25 INJECTION, SOLUTION INTRAVENOUS at 11:56

## 2022-01-01 RX ADMIN — ACETAMINOPHEN 975 MG: 325 TABLET, FILM COATED ORAL at 17:25

## 2022-01-01 RX ADMIN — NYSTATIN: 100000 POWDER TOPICAL at 08:59

## 2022-01-01 RX ADMIN — AMLODIPINE BESYLATE 10 MG: 10 TABLET ORAL at 08:32

## 2022-01-01 RX ADMIN — ASPIRIN 81 MG CHEWABLE TABLET 81 MG: 81 TABLET CHEWABLE at 08:32

## 2022-01-01 RX ADMIN — CARVEDILOL 25 MG: 12.5 TABLET, FILM COATED ORAL at 09:28

## 2022-01-01 RX ADMIN — Medication 2500 UNITS: at 13:28

## 2022-01-01 RX ADMIN — CEFAZOLIN SODIUM 1000 MG: 1 SOLUTION INTRAVENOUS at 16:51

## 2022-01-01 RX ADMIN — NYSTATIN: 100000 POWDER TOPICAL at 08:40

## 2022-01-01 RX ADMIN — ASPIRIN 81 MG CHEWABLE TABLET 81 MG: 81 TABLET CHEWABLE at 17:37

## 2022-01-01 RX ADMIN — EZETIMIBE 10 MG: 10 TABLET ORAL at 21:06

## 2022-01-01 RX ADMIN — EZETIMIBE 10 MG: 10 TABLET ORAL at 21:15

## 2022-01-01 RX ADMIN — INSULIN LISPRO 2 UNITS: 100 INJECTION, SOLUTION INTRAVENOUS; SUBCUTANEOUS at 22:13

## 2022-01-01 RX ADMIN — SODIUM BICARBONATE 650 MG: 650 TABLET ORAL at 12:00

## 2022-01-01 RX ADMIN — CALCIUM CHLORIDE 1 G: 100 INJECTION PARENTERAL at 10:06

## 2022-01-01 RX ADMIN — CARVEDILOL 25 MG: 12.5 TABLET, FILM COATED ORAL at 09:08

## 2022-01-01 RX ADMIN — OXYCODONE HYDROCHLORIDE 5 MG: 5 TABLET ORAL at 03:05

## 2022-01-01 RX ADMIN — SODIUM BICARBONATE 650 MG: 650 TABLET ORAL at 17:30

## 2022-01-01 RX ADMIN — NYSTATIN: 100000 POWDER TOPICAL at 17:46

## 2022-01-01 RX ADMIN — POLYETHYLENE GLYCOL 3350 17 G: 17 POWDER, FOR SOLUTION ORAL at 08:15

## 2022-01-01 RX ADMIN — AMLODIPINE BESYLATE 10 MG: 10 TABLET ORAL at 09:28

## 2022-01-01 RX ADMIN — CYANOCOBALAMIN TAB 500 MCG 1000 MCG: 500 TAB at 09:28

## 2022-01-01 RX ADMIN — POLYETHYLENE GLYCOL 3350 17 G: 17 POWDER, FOR SOLUTION ORAL at 09:26

## 2022-01-01 RX ADMIN — ACETAMINOPHEN 975 MG: 325 TABLET, FILM COATED ORAL at 05:13

## 2022-01-01 RX ADMIN — LACTULOSE 10 G: 10 SOLUTION ORAL at 08:44

## 2022-01-01 RX ADMIN — SODIUM BICARBONATE 650 MG: 650 TABLET ORAL at 14:00

## 2022-01-01 RX ADMIN — SODIUM BICARBONATE 100 ML/HR: 84 INJECTION, SOLUTION INTRAVENOUS at 08:57

## 2022-01-01 RX ADMIN — INSULIN LISPRO 2 UNITS: 100 INJECTION, SOLUTION INTRAVENOUS; SUBCUTANEOUS at 11:37

## 2022-01-01 RX ADMIN — ACETAMINOPHEN 975 MG: 325 TABLET, FILM COATED ORAL at 06:20

## 2022-01-01 RX ADMIN — SODIUM BICARBONATE 650 MG: 650 TABLET ORAL at 08:36

## 2022-01-01 RX ADMIN — CYANOCOBALAMIN TAB 500 MCG 1000 MCG: 500 TAB at 08:36

## 2022-01-01 RX ADMIN — ASPIRIN 81 MG CHEWABLE TABLET 81 MG: 81 TABLET CHEWABLE at 08:16

## 2022-01-01 RX ADMIN — INSULIN LISPRO 2 UNITS: 100 INJECTION, SOLUTION INTRAVENOUS; SUBCUTANEOUS at 08:13

## 2022-01-01 RX ADMIN — SODIUM BICARBONATE 650 MG: 650 TABLET ORAL at 13:41

## 2022-01-01 RX ADMIN — SODIUM BICARBONATE 650 MG: 650 TABLET ORAL at 12:05

## 2022-01-01 RX ADMIN — EPINEPHRINE 1 MG: 0.1 INJECTION, SOLUTION ENDOTRACHEAL; INTRACARDIAC; INTRAVENOUS at 10:05

## 2022-01-01 RX ADMIN — ATORVASTATIN CALCIUM 40 MG: 40 TABLET, FILM COATED ORAL at 17:57

## 2022-01-01 RX ADMIN — ATORVASTATIN CALCIUM 40 MG: 40 TABLET, FILM COATED ORAL at 17:13

## 2022-01-01 RX ADMIN — APIXABAN 5 MG: 5 TABLET, FILM COATED ORAL at 08:12

## 2022-01-01 RX ADMIN — PANTOPRAZOLE SODIUM 40 MG: 40 TABLET, DELAYED RELEASE ORAL at 08:36

## 2022-01-01 RX ADMIN — INSULIN LISPRO 1 UNITS: 100 INJECTION, SOLUTION INTRAVENOUS; SUBCUTANEOUS at 21:50

## 2022-01-01 RX ADMIN — INSULIN LISPRO 2 UNITS: 100 INJECTION, SOLUTION INTRAVENOUS; SUBCUTANEOUS at 18:48

## 2022-01-01 RX ADMIN — CYANOCOBALAMIN TAB 500 MCG 1000 MCG: 500 TAB at 17:37

## 2022-01-01 RX ADMIN — APIXABAN 5 MG: 5 TABLET, FILM COATED ORAL at 17:46

## 2022-01-01 RX ADMIN — EZETIMIBE 10 MG: 10 TABLET ORAL at 21:49

## 2022-01-01 RX ADMIN — ACETAMINOPHEN 975 MG: 325 TABLET, FILM COATED ORAL at 05:23

## 2022-01-01 RX ADMIN — SODIUM BICARBONATE 650 MG: 650 TABLET ORAL at 10:37

## 2022-01-01 RX ADMIN — SODIUM BICARBONATE 650 MG: 650 TABLET ORAL at 17:45

## 2022-01-01 RX ADMIN — CEFAZOLIN SODIUM 1000 MG: 1 SOLUTION INTRAVENOUS at 06:30

## 2022-01-01 RX ADMIN — ACETAMINOPHEN 975 MG: 325 TABLET, FILM COATED ORAL at 13:55

## 2022-01-01 RX ADMIN — CARVEDILOL 25 MG: 12.5 TABLET, FILM COATED ORAL at 08:49

## 2022-01-01 RX ADMIN — LACTULOSE 10 G: 10 SOLUTION ORAL at 17:31

## 2022-01-01 RX ADMIN — NYSTATIN: 100000 POWDER TOPICAL at 08:35

## 2022-01-01 RX ADMIN — INSULIN GLARGINE 32 UNITS: 100 INJECTION, SOLUTION SUBCUTANEOUS at 21:05

## 2022-01-01 RX ADMIN — LACTULOSE 10 G: 10 SOLUTION ORAL at 09:07

## 2022-01-01 RX ADMIN — LIDOCAINE HYDROCHLORIDE: 20 JELLY TOPICAL at 08:35

## 2022-01-01 RX ADMIN — PROPOFOL 50 MCG/KG/MIN: 10 INJECTION, EMULSION INTRAVENOUS at 03:56

## 2022-01-01 RX ADMIN — Medication 1000 UNITS: at 15:35

## 2022-01-01 RX ADMIN — INSULIN LISPRO 1 UNITS: 100 INJECTION, SOLUTION INTRAVENOUS; SUBCUTANEOUS at 17:50

## 2022-01-01 RX ADMIN — EZETIMIBE 10 MG: 10 TABLET ORAL at 21:05

## 2022-01-01 RX ADMIN — ATORVASTATIN CALCIUM 40 MG: 40 TABLET, FILM COATED ORAL at 17:37

## 2022-01-01 RX ADMIN — SODIUM BICARBONATE 50 MEQ: 84 INJECTION, SOLUTION INTRAVENOUS at 10:04

## 2022-01-01 RX ADMIN — INSULIN GLARGINE 32 UNITS: 100 INJECTION, SOLUTION SUBCUTANEOUS at 22:12

## 2022-01-01 RX ADMIN — SODIUM BICARBONATE 650 MG: 650 TABLET ORAL at 17:25

## 2022-01-01 RX ADMIN — AMLODIPINE BESYLATE 10 MG: 10 TABLET ORAL at 10:37

## 2022-01-01 RX ADMIN — PANTOPRAZOLE SODIUM 40 MG: 40 TABLET, DELAYED RELEASE ORAL at 08:48

## 2022-01-01 RX ADMIN — CARVEDILOL 25 MG: 12.5 TABLET, FILM COATED ORAL at 17:25

## 2022-01-01 RX ADMIN — LORAZEPAM 1 MG: 2 INJECTION INTRAMUSCULAR; INTRAVENOUS at 16:19

## 2022-01-01 RX ADMIN — SODIUM BICARBONATE 50 MEQ: 84 INJECTION, SOLUTION INTRAVENOUS at 09:58

## 2022-01-01 RX ADMIN — HYDROMORPHONE HYDROCHLORIDE 0.2 MG: 1 INJECTION, SOLUTION INTRAMUSCULAR; INTRAVENOUS; SUBCUTANEOUS at 09:08

## 2022-01-01 RX ADMIN — EPINEPHRINE 1 MG: 0.1 INJECTION, SOLUTION ENDOTRACHEAL; INTRACARDIAC; INTRAVENOUS at 09:57

## 2022-01-01 RX ADMIN — INSULIN GLARGINE 32 UNITS: 100 INJECTION, SOLUTION SUBCUTANEOUS at 22:08

## 2022-01-01 RX ADMIN — SODIUM BICARBONATE 650 MG: 650 TABLET ORAL at 09:08

## 2022-01-01 RX ADMIN — INSULIN GLARGINE 15 UNITS: 100 INJECTION, SOLUTION SUBCUTANEOUS at 22:08

## 2022-01-01 RX ADMIN — EZETIMIBE 10 MG: 10 TABLET ORAL at 21:08

## 2022-01-01 RX ADMIN — ASPIRIN 81 MG CHEWABLE TABLET 81 MG: 81 TABLET CHEWABLE at 09:28

## 2022-01-01 RX ADMIN — CARVEDILOL 25 MG: 12.5 TABLET, FILM COATED ORAL at 08:36

## 2022-01-01 RX ADMIN — ACETAMINOPHEN 975 MG: 325 TABLET, FILM COATED ORAL at 19:28

## 2022-01-01 RX ADMIN — INSULIN LISPRO 1 UNITS: 100 INJECTION, SOLUTION INTRAVENOUS; SUBCUTANEOUS at 21:51

## 2022-01-01 RX ADMIN — SODIUM BICARBONATE 650 MG: 650 TABLET ORAL at 08:44

## 2022-01-01 RX ADMIN — EZETIMIBE 10 MG: 10 TABLET ORAL at 21:34

## 2022-01-01 RX ADMIN — CALCIUM ACETATE 667 MG: 667 CAPSULE ORAL at 08:12

## 2022-01-01 RX ADMIN — INSULIN LISPRO 4 UNITS: 100 INJECTION, SOLUTION INTRAVENOUS; SUBCUTANEOUS at 08:38

## 2022-01-01 RX ADMIN — EZETIMIBE 10 MG: 10 TABLET ORAL at 21:51

## 2022-01-01 RX ADMIN — ATORVASTATIN CALCIUM 40 MG: 40 TABLET, FILM COATED ORAL at 17:48

## 2022-01-01 RX ADMIN — LACTULOSE 10 G: 10 SOLUTION ORAL at 17:19

## 2022-01-01 RX ADMIN — SODIUM BICARBONATE 650 MG: 650 TABLET ORAL at 09:28

## 2022-01-01 RX ADMIN — CARVEDILOL 25 MG: 12.5 TABLET, FILM COATED ORAL at 17:20

## 2022-01-01 RX ADMIN — ACETAMINOPHEN 975 MG: 325 TABLET, FILM COATED ORAL at 05:34

## 2022-01-01 RX ADMIN — CEFAZOLIN SODIUM 1000 MG: 1 SOLUTION INTRAVENOUS at 17:48

## 2022-01-01 RX ADMIN — OXYCODONE HYDROCHLORIDE 5 MG: 5 TABLET ORAL at 05:35

## 2022-01-01 RX ADMIN — OXYCODONE HYDROCHLORIDE 5 MG: 5 TABLET ORAL at 00:57

## 2022-01-01 RX ADMIN — ATORVASTATIN CALCIUM 40 MG: 40 TABLET, FILM COATED ORAL at 20:57

## 2022-01-01 RX ADMIN — NYSTATIN: 100000 POWDER TOPICAL at 10:39

## 2022-01-01 RX ADMIN — DOCUSATE SODIUM 100 MG: 100 CAPSULE ORAL at 17:38

## 2022-01-01 RX ADMIN — APIXABAN 5 MG: 5 TABLET, FILM COATED ORAL at 17:31

## 2022-01-01 RX ADMIN — APIXABAN 5 MG: 5 TABLET, FILM COATED ORAL at 10:37

## 2022-01-01 RX ADMIN — CARVEDILOL 25 MG: 12.5 TABLET, FILM COATED ORAL at 08:32

## 2022-01-01 RX ADMIN — ATROPINE SULFATE 0.5 MG: 1 INJECTION, SOLUTION INTRAMUSCULAR; INTRAVENOUS; SUBCUTANEOUS at 11:58

## 2022-01-01 RX ADMIN — NYSTATIN: 100000 POWDER TOPICAL at 17:58

## 2022-01-01 RX ADMIN — NYSTATIN: 100000 POWDER TOPICAL at 17:38

## 2022-01-01 RX ADMIN — NYSTATIN 1 APPLICATION: 100000 POWDER TOPICAL at 18:37

## 2022-01-01 RX ADMIN — ATORVASTATIN CALCIUM 40 MG: 40 TABLET, FILM COATED ORAL at 17:46

## 2022-01-01 RX ADMIN — LACTULOSE 10 G: 10 SOLUTION ORAL at 17:48

## 2022-01-01 RX ADMIN — CYANOCOBALAMIN TAB 500 MCG 1000 MCG: 500 TAB at 08:09

## 2022-01-01 RX ADMIN — ACETAMINOPHEN 975 MG: 325 TABLET, FILM COATED ORAL at 09:28

## 2022-01-01 RX ADMIN — ACETAMINOPHEN 975 MG: 325 TABLET, FILM COATED ORAL at 21:48

## 2022-01-01 RX ADMIN — LACTULOSE 20 G: 20 SOLUTION ORAL at 08:57

## 2022-01-01 RX ADMIN — CALCIUM ACETATE 667 MG: 667 CAPSULE ORAL at 16:50

## 2022-01-01 RX ADMIN — NYSTATIN: 100000 POWDER TOPICAL at 09:29

## 2022-01-01 RX ADMIN — METOPROLOL TARTRATE 50 MG: 50 TABLET, FILM COATED ORAL at 09:28

## 2022-01-01 RX ADMIN — HYDROMORPHONE HYDROCHLORIDE 0.2 MG: 1 INJECTION, SOLUTION INTRAMUSCULAR; INTRAVENOUS; SUBCUTANEOUS at 08:36

## 2022-01-01 RX ADMIN — ACETAMINOPHEN 975 MG: 325 TABLET, FILM COATED ORAL at 13:35

## 2022-01-01 RX ADMIN — PANTOPRAZOLE SODIUM 40 MG: 40 TABLET, DELAYED RELEASE ORAL at 08:58

## 2022-01-01 RX ADMIN — CARVEDILOL 25 MG: 12.5 TABLET, FILM COATED ORAL at 17:37

## 2022-01-01 RX ADMIN — CYANOCOBALAMIN TAB 500 MCG 1000 MCG: 500 TAB at 08:58

## 2022-01-01 RX ADMIN — LACTULOSE 10 G: 10 SOLUTION ORAL at 17:12

## 2022-01-01 RX ADMIN — CARVEDILOL 25 MG: 12.5 TABLET, FILM COATED ORAL at 17:48

## 2022-01-01 RX ADMIN — LACTULOSE 10 G: 10 SOLUTION ORAL at 08:36

## 2022-01-01 RX ADMIN — AMLODIPINE BESYLATE 10 MG: 10 TABLET ORAL at 08:44

## 2022-01-01 RX ADMIN — ASPIRIN 81 MG CHEWABLE TABLET 81 MG: 81 TABLET CHEWABLE at 09:08

## 2022-01-01 RX ADMIN — PHYTONADIONE 10 MG: 10 INJECTION, EMULSION INTRAMUSCULAR; INTRAVENOUS; SUBCUTANEOUS at 11:53

## 2022-01-01 RX ADMIN — HYDRALAZINE HYDROCHLORIDE 10 MG: 20 INJECTION, SOLUTION INTRAMUSCULAR; INTRAVENOUS at 06:20

## 2022-01-01 RX ADMIN — AMLODIPINE BESYLATE 10 MG: 10 TABLET ORAL at 08:16

## 2022-01-01 RX ADMIN — PANTOPRAZOLE SODIUM 40 MG: 40 TABLET, DELAYED RELEASE ORAL at 09:09

## 2022-01-01 RX ADMIN — PANTOPRAZOLE SODIUM 40 MG: 40 TABLET, DELAYED RELEASE ORAL at 08:12

## 2022-01-01 RX ADMIN — ACETAMINOPHEN 975 MG: 325 TABLET, FILM COATED ORAL at 05:10

## 2022-01-01 RX ADMIN — SODIUM BICARBONATE 650 MG: 650 TABLET ORAL at 08:15

## 2022-01-01 RX ADMIN — CEFAZOLIN SODIUM 1000 MG: 1 SOLUTION INTRAVENOUS at 19:43

## 2022-01-01 RX ADMIN — LACTULOSE 10 G: 10 SOLUTION ORAL at 10:38

## 2022-01-01 RX ADMIN — LACTULOSE 10 G: 10 SOLUTION ORAL at 08:32

## 2022-01-01 RX ADMIN — LIDOCAINE HYDROCHLORIDE: 20 JELLY TOPICAL at 18:48

## 2022-01-01 RX ADMIN — INSULIN LISPRO 2 UNITS: 100 INJECTION, SOLUTION INTRAVENOUS; SUBCUTANEOUS at 17:32

## 2022-01-01 RX ADMIN — ACETAMINOPHEN 975 MG: 325 TABLET, FILM COATED ORAL at 13:33

## 2022-01-01 RX ADMIN — SODIUM BICARBONATE 650 MG: 650 TABLET ORAL at 08:58

## 2022-01-01 RX ADMIN — APIXABAN 5 MG: 5 TABLET, FILM COATED ORAL at 17:48

## 2022-01-01 RX ADMIN — LACTULOSE 20 G: 20 SOLUTION ORAL at 09:28

## 2022-01-01 RX ADMIN — OXYCODONE HYDROCHLORIDE 5 MG: 5 TABLET ORAL at 08:44

## 2022-01-01 RX ADMIN — OXYCODONE HYDROCHLORIDE 5 MG: 5 TABLET ORAL at 23:00

## 2022-01-01 RX ADMIN — PANTOPRAZOLE SODIUM 40 MG: 40 TABLET, DELAYED RELEASE ORAL at 08:32

## 2022-01-01 RX ADMIN — ASPIRIN 81 MG CHEWABLE TABLET 81 MG: 81 TABLET CHEWABLE at 08:44

## 2022-01-01 RX ADMIN — PANTOPRAZOLE SODIUM 40 MG: 40 TABLET, DELAYED RELEASE ORAL at 10:37

## 2022-01-01 RX ADMIN — SODIUM BICARBONATE 650 MG: 650 TABLET ORAL at 11:37

## 2022-01-01 RX ADMIN — CALCIUM CHLORIDE 1 G: 100 INJECTION PARENTERAL at 10:00

## 2022-01-01 RX ADMIN — INSULIN LISPRO 2 UNITS: 100 INJECTION, SOLUTION INTRAVENOUS; SUBCUTANEOUS at 13:34

## 2022-01-01 RX ADMIN — ALBUMIN (HUMAN) 25 G: 0.25 INJECTION, SOLUTION INTRAVENOUS at 04:07

## 2022-01-01 RX ADMIN — MECLIZINE HYDROCHLORIDE 25 MG: 25 TABLET ORAL at 13:55

## 2022-01-01 RX ADMIN — TORSEMIDE 40 MG: 20 TABLET ORAL at 13:33

## 2022-01-01 RX ADMIN — SODIUM BICARBONATE 650 MG: 650 TABLET ORAL at 17:56

## 2022-01-01 RX ADMIN — INSULIN LISPRO 1 UNITS: 100 INJECTION, SOLUTION INTRAVENOUS; SUBCUTANEOUS at 16:42

## 2022-01-01 RX ADMIN — INSULIN LISPRO 1 UNITS: 100 INJECTION, SOLUTION INTRAVENOUS; SUBCUTANEOUS at 12:05

## 2022-01-01 RX ADMIN — ACETAMINOPHEN 975 MG: 325 TABLET, FILM COATED ORAL at 13:38

## 2022-01-01 RX ADMIN — ACETAMINOPHEN 975 MG: 325 TABLET, FILM COATED ORAL at 22:03

## 2022-01-01 RX ADMIN — ACETAMINOPHEN 975 MG: 325 TABLET, FILM COATED ORAL at 05:38

## 2022-01-01 RX ADMIN — LACTULOSE 20 G: 20 SOLUTION ORAL at 20:56

## 2022-01-01 RX ADMIN — OXYCODONE HYDROCHLORIDE 5 MG: 5 TABLET ORAL at 00:13

## 2022-01-01 RX ADMIN — ATORVASTATIN CALCIUM 40 MG: 40 TABLET, FILM COATED ORAL at 17:31

## 2022-01-01 RX ADMIN — INSULIN GLARGINE 32 UNITS: 100 INJECTION, SOLUTION SUBCUTANEOUS at 21:32

## 2022-01-01 RX ADMIN — CEFAZOLIN SODIUM 1000 MG: 1 SOLUTION INTRAVENOUS at 17:57

## 2022-01-01 RX ADMIN — LACTULOSE 10 G: 10 SOLUTION ORAL at 08:12

## 2022-01-01 RX ADMIN — PANTOPRAZOLE SODIUM 40 MG: 40 TABLET, DELAYED RELEASE ORAL at 17:37

## 2022-01-01 RX ADMIN — INSULIN LISPRO 2 UNITS: 100 INJECTION, SOLUTION INTRAVENOUS; SUBCUTANEOUS at 11:57

## 2022-01-01 RX ADMIN — INSULIN LISPRO 1 UNITS: 100 INJECTION, SOLUTION INTRAVENOUS; SUBCUTANEOUS at 21:05

## 2022-01-01 RX ADMIN — INSULIN GLARGINE 32 UNITS: 100 INJECTION, SOLUTION SUBCUTANEOUS at 21:50

## 2022-01-01 RX ADMIN — CEFAZOLIN SODIUM 1000 MG: 1 SOLUTION INTRAVENOUS at 18:44

## 2022-01-01 RX ADMIN — CEFAZOLIN SODIUM 1000 MG: 1 SOLUTION INTRAVENOUS at 20:56

## 2022-01-01 RX ADMIN — NYSTATIN: 100000 POWDER TOPICAL at 08:50

## 2022-01-01 RX ADMIN — CYANOCOBALAMIN TAB 500 MCG 1000 MCG: 500 TAB at 08:48

## 2022-01-01 RX ADMIN — EPINEPHRINE 1 MG: 0.1 INJECTION, SOLUTION ENDOTRACHEAL; INTRACARDIAC; INTRAVENOUS at 10:09

## 2022-01-01 RX ADMIN — PROPOFOL 20 MCG/KG/MIN: 10 INJECTION, EMULSION INTRAVENOUS at 15:28

## 2022-01-01 RX ADMIN — NYSTATIN: 100000 POWDER TOPICAL at 17:27

## 2022-01-01 RX ADMIN — APIXABAN 5 MG: 5 TABLET, FILM COATED ORAL at 17:13

## 2022-01-01 RX ADMIN — LACTULOSE 10 G: 10 SOLUTION ORAL at 18:37

## 2022-01-01 RX ADMIN — LACTULOSE 10 G: 10 SOLUTION ORAL at 17:38

## 2022-01-01 RX ADMIN — TORSEMIDE 40 MG: 20 TABLET ORAL at 08:32

## 2022-01-01 RX ADMIN — POLYETHYLENE GLYCOL 3350 17 G: 17 POWDER, FOR SOLUTION ORAL at 10:38

## 2022-01-01 RX ADMIN — LIDOCAINE HYDROCHLORIDE: 20 JELLY TOPICAL at 23:26

## 2022-01-01 RX ADMIN — NYSTATIN: 100000 POWDER TOPICAL at 10:23

## 2022-01-01 RX ADMIN — ATORVASTATIN CALCIUM 40 MG: 40 TABLET, FILM COATED ORAL at 17:25

## 2022-01-01 RX ADMIN — CARVEDILOL 25 MG: 12.5 TABLET, FILM COATED ORAL at 08:09

## 2022-01-01 RX ADMIN — CYANOCOBALAMIN TAB 500 MCG 1000 MCG: 500 TAB at 08:44

## 2022-01-01 RX ADMIN — MECLIZINE HYDROCHLORIDE 25 MG: 25 TABLET ORAL at 12:53

## 2022-01-01 RX ADMIN — APIXABAN 5 MG: 5 TABLET, FILM COATED ORAL at 08:16

## 2022-01-01 RX ADMIN — INSULIN LISPRO 1 UNITS: 100 INJECTION, SOLUTION INTRAVENOUS; SUBCUTANEOUS at 18:10

## 2022-01-01 RX ADMIN — APIXABAN 5 MG: 5 TABLET, FILM COATED ORAL at 08:45

## 2022-01-01 RX ADMIN — INSULIN LISPRO 1 UNITS: 100 INJECTION, SOLUTION INTRAVENOUS; SUBCUTANEOUS at 21:17

## 2022-01-01 RX ADMIN — METOPROLOL TARTRATE 50 MG: 50 TABLET, FILM COATED ORAL at 21:06

## 2022-01-01 RX ADMIN — CEFAZOLIN SODIUM 1000 MG: 1 SOLUTION INTRAVENOUS at 20:30

## 2022-01-01 RX ADMIN — CALCIUM ACETATE 667 MG: 667 CAPSULE ORAL at 17:20

## 2022-01-01 RX ADMIN — SODIUM BICARBONATE 650 MG: 650 TABLET ORAL at 18:56

## 2022-01-01 RX ADMIN — LACTULOSE 10 G: 10 SOLUTION ORAL at 18:23

## 2022-01-01 RX ADMIN — INSULIN LISPRO 5 UNITS: 100 INJECTION, SOLUTION INTRAVENOUS; SUBCUTANEOUS at 18:48

## 2022-01-01 RX ADMIN — ALBUMIN (HUMAN) 25 G: 0.25 INJECTION, SOLUTION INTRAVENOUS at 21:24

## 2022-01-01 RX ADMIN — SODIUM BICARBONATE 650 MG: 650 TABLET ORAL at 14:06

## 2022-01-01 RX ADMIN — ASPIRIN 81 MG CHEWABLE TABLET 81 MG: 81 TABLET CHEWABLE at 10:38

## 2022-01-01 RX ADMIN — AMLODIPINE BESYLATE 5 MG: 5 TABLET ORAL at 08:58

## 2022-01-01 RX ADMIN — SODIUM BICARBONATE 50 MEQ: 84 INJECTION, SOLUTION INTRAVENOUS at 10:01

## 2022-01-01 RX ADMIN — EZETIMIBE 10 MG: 10 TABLET ORAL at 22:03

## 2022-01-01 RX ADMIN — POLYETHYLENE GLYCOL 3350 17 G: 17 POWDER, FOR SOLUTION ORAL at 08:45

## 2022-01-01 RX ADMIN — INSULIN GLARGINE 15 UNITS: 100 INJECTION, SOLUTION SUBCUTANEOUS at 21:54

## 2022-01-01 RX ADMIN — MIDAZOLAM HYDROCHLORIDE 0.5 MG: 1 INJECTION, SOLUTION INTRAMUSCULAR; INTRAVENOUS at 11:29

## 2022-01-01 RX ADMIN — FENTANYL CITRATE 25 MCG: 50 INJECTION INTRAMUSCULAR; INTRAVENOUS at 16:18

## 2022-01-01 RX ADMIN — NYSTATIN: 100000 POWDER TOPICAL at 17:49

## 2022-01-01 RX ADMIN — VANCOMYCIN HYDROCHLORIDE 500 MG: 500 INJECTION, SOLUTION INTRAVENOUS at 11:35

## 2022-01-01 RX ADMIN — SODIUM CHLORIDE 2.5 MG/HR: 0.9 INJECTION, SOLUTION INTRAVENOUS at 06:20

## 2022-01-01 RX ADMIN — ACETAMINOPHEN 975 MG: 325 TABLET, FILM COATED ORAL at 14:18

## 2022-01-01 RX ADMIN — NYSTATIN: 100000 POWDER TOPICAL at 09:19

## 2022-01-01 RX ADMIN — PROPOFOL 50 MCG/KG/MIN: 10 INJECTION, EMULSION INTRAVENOUS at 21:59

## 2022-01-01 RX ADMIN — CYANOCOBALAMIN TAB 500 MCG 1000 MCG: 500 TAB at 09:27

## 2022-01-01 RX ADMIN — OXYCODONE HYDROCHLORIDE 5 MG: 5 TABLET ORAL at 19:41

## 2022-01-01 RX ADMIN — NYSTATIN: 100000 POWDER TOPICAL at 08:46

## 2022-01-01 RX ADMIN — ACETAMINOPHEN 975 MG: 325 TABLET, FILM COATED ORAL at 21:15

## 2022-01-01 RX ADMIN — OXYCODONE HYDROCHLORIDE 2.5 MG: 5 TABLET ORAL at 11:57

## 2022-01-01 RX ADMIN — ATORVASTATIN CALCIUM 40 MG: 40 TABLET, FILM COATED ORAL at 18:37

## 2022-01-01 RX ADMIN — NYSTATIN: 100000 POWDER TOPICAL at 18:26

## 2022-01-01 RX ADMIN — FENTANYL CITRATE 50 MCG: 50 INJECTION, SOLUTION INTRAMUSCULAR; INTRAVENOUS at 11:28

## 2022-01-01 RX ADMIN — CALCIUM ACETATE 667 MG: 667 CAPSULE ORAL at 08:32

## 2022-01-01 RX ADMIN — ACETAMINOPHEN 975 MG: 325 TABLET, FILM COATED ORAL at 21:06

## 2022-01-01 RX ADMIN — INSULIN HUMAN 5 UNITS: 100 INJECTION, SOLUTION PARENTERAL at 15:51

## 2022-01-01 RX ADMIN — METOPROLOL TARTRATE 50 MG: 50 TABLET, FILM COATED ORAL at 08:58

## 2022-01-01 RX ADMIN — HEPARIN SODIUM 11.1 UNITS/KG/HR: 10000 INJECTION, SOLUTION INTRAVENOUS at 16:23

## 2022-01-01 RX ADMIN — EZETIMIBE 10 MG: 10 TABLET ORAL at 21:54

## 2022-01-01 RX ADMIN — NYSTATIN: 100000 POWDER TOPICAL at 08:19

## 2022-01-01 RX ADMIN — EZETIMIBE 10 MG: 10 TABLET ORAL at 21:50

## 2022-01-01 RX ADMIN — EZETIMIBE 10 MG: 10 TABLET ORAL at 23:05

## 2022-01-01 RX ADMIN — ACETAMINOPHEN 975 MG: 325 TABLET, FILM COATED ORAL at 21:34

## 2022-01-01 RX ADMIN — ASPIRIN 81 MG CHEWABLE TABLET 81 MG: 81 TABLET CHEWABLE at 08:36

## 2022-01-01 RX ADMIN — LACTULOSE 10 G: 10 SOLUTION ORAL at 17:25

## 2022-01-01 RX ADMIN — ACETAMINOPHEN 975 MG: 325 TABLET, FILM COATED ORAL at 21:54

## 2022-01-01 RX ADMIN — OXYCODONE HYDROCHLORIDE 5 MG: 5 TABLET ORAL at 00:12

## 2022-01-01 RX ADMIN — CALCIUM ACETATE 667 MG: 667 CAPSULE ORAL at 13:33

## 2022-01-01 RX ADMIN — CHLORHEXIDINE GLUCONATE 15 ML: 1.2 RINSE ORAL at 21:06

## 2022-01-01 RX ADMIN — INSULIN GLARGINE 32 UNITS: 100 INJECTION, SOLUTION SUBCUTANEOUS at 21:51

## 2022-01-01 RX ADMIN — CARVEDILOL 25 MG: 12.5 TABLET, FILM COATED ORAL at 17:30

## 2022-01-01 RX ADMIN — LACTULOSE 10 G: 10 SOLUTION ORAL at 09:26

## 2022-01-01 RX ADMIN — FUROSEMIDE 40 MG: 10 INJECTION, SOLUTION INTRAMUSCULAR; INTRAVENOUS at 11:56

## 2022-01-01 RX ADMIN — CEFAZOLIN SODIUM 1000 MG: 1 SOLUTION INTRAVENOUS at 17:46

## 2022-01-01 RX ADMIN — INSULIN LISPRO 1 UNITS: 100 INJECTION, SOLUTION INTRAVENOUS; SUBCUTANEOUS at 21:53

## 2022-01-01 RX ADMIN — CARVEDILOL 25 MG: 12.5 TABLET, FILM COATED ORAL at 16:28

## 2022-01-01 RX ADMIN — PANTOPRAZOLE SODIUM 40 MG: 40 TABLET, DELAYED RELEASE ORAL at 09:28

## 2022-01-01 RX ADMIN — LIDOCAINE HYDROCHLORIDE 1 APPLICATION: 20 JELLY TOPICAL at 09:29

## 2022-01-01 RX ADMIN — PROPOFOL 50 MCG/KG/MIN: 10 INJECTION, EMULSION INTRAVENOUS at 01:11

## 2022-01-01 RX ADMIN — CYANOCOBALAMIN TAB 500 MCG 1000 MCG: 500 TAB at 08:16

## 2022-01-01 RX ADMIN — NYSTATIN: 100000 POWDER TOPICAL at 17:39

## 2022-01-01 RX ADMIN — LACTULOSE 10 G: 10 SOLUTION ORAL at 17:46

## 2022-01-01 RX ADMIN — Medication 5000 UNITS: at 16:17

## 2022-01-01 RX ADMIN — AMLODIPINE BESYLATE 10 MG: 10 TABLET ORAL at 09:08

## 2022-01-01 RX ADMIN — EPINEPHRINE 1 MG: 0.1 INJECTION, SOLUTION ENDOTRACHEAL; INTRACARDIAC; INTRAVENOUS at 10:00

## 2022-01-01 RX ADMIN — APIXABAN 5 MG: 5 TABLET, FILM COATED ORAL at 08:36

## 2022-01-01 RX ADMIN — PROPOFOL 45 MCG/KG/MIN: 10 INJECTION, EMULSION INTRAVENOUS at 10:30

## 2022-01-01 RX ADMIN — SODIUM BICARBONATE 650 MG: 650 TABLET ORAL at 11:56

## 2022-01-01 RX ADMIN — CEFAZOLIN SODIUM 1000 MG: 1 SOLUTION INTRAVENOUS at 08:45

## 2022-01-01 RX ADMIN — OXYCODONE HYDROCHLORIDE 5 MG: 5 TABLET ORAL at 08:12

## 2022-01-01 RX ADMIN — OXYCODONE HYDROCHLORIDE 5 MG: 5 TABLET ORAL at 01:55

## 2022-01-01 RX ADMIN — ACETAMINOPHEN 975 MG: 325 TABLET, FILM COATED ORAL at 05:15

## 2022-01-01 RX ADMIN — CEFAZOLIN SODIUM 1000 MG: 1 SOLUTION INTRAVENOUS at 17:31

## 2022-01-01 RX ADMIN — DEXTROSE MONOHYDRATE 50 ML: 25 INJECTION, SOLUTION INTRAVENOUS at 15:46

## 2022-01-01 RX ADMIN — FENTANYL CITRATE 25 MCG: 50 INJECTION, SOLUTION INTRAMUSCULAR; INTRAVENOUS at 16:00

## 2022-01-01 RX ADMIN — PANTOPRAZOLE SODIUM 40 MG: 40 TABLET, DELAYED RELEASE ORAL at 08:46

## 2022-01-01 RX ADMIN — EZETIMIBE 10 MG: 10 TABLET ORAL at 22:13

## 2022-01-01 RX ADMIN — ACETAMINOPHEN 975 MG: 325 TABLET, FILM COATED ORAL at 13:41

## 2022-01-01 RX ADMIN — EPINEPHRINE 1 MG: 0.1 INJECTION, SOLUTION ENDOTRACHEAL; INTRACARDIAC; INTRAVENOUS at 10:02

## 2022-01-01 RX ADMIN — ACETAMINOPHEN 975 MG: 325 TABLET, FILM COATED ORAL at 09:26

## 2022-01-01 RX ADMIN — AMLODIPINE BESYLATE 10 MG: 10 TABLET ORAL at 08:36

## 2022-01-01 RX ADMIN — ACETAMINOPHEN 975 MG: 325 TABLET, FILM COATED ORAL at 20:15

## 2022-01-01 RX ADMIN — CEFAZOLIN SODIUM 1000 MG: 1 SOLUTION INTRAVENOUS at 18:52

## 2022-01-01 RX ADMIN — INSULIN LISPRO 5 UNITS: 100 INJECTION, SOLUTION INTRAVENOUS; SUBCUTANEOUS at 09:16

## 2022-01-01 RX ADMIN — INSULIN GLARGINE 32 UNITS: 100 INJECTION, SOLUTION SUBCUTANEOUS at 21:17

## 2022-01-01 RX ADMIN — SODIUM BICARBONATE 650 MG: 650 TABLET ORAL at 18:23

## 2022-01-01 RX ADMIN — CARVEDILOL 25 MG: 12.5 TABLET, FILM COATED ORAL at 17:45

## 2022-01-01 RX ADMIN — HYDRALAZINE HYDROCHLORIDE 10 MG: 20 INJECTION, SOLUTION INTRAMUSCULAR; INTRAVENOUS at 04:13

## 2022-01-01 RX ADMIN — CEFAZOLIN SODIUM 1000 MG: 1 SOLUTION INTRAVENOUS at 17:20

## 2022-01-01 RX ADMIN — INSULIN HUMAN 10 UNITS: 100 INJECTION, SOLUTION PARENTERAL at 10:10

## 2022-01-01 RX ADMIN — LACTULOSE 10 G: 10 SOLUTION ORAL at 17:56

## 2022-01-01 RX ADMIN — CALCIUM ACETATE 667 MG: 667 CAPSULE ORAL at 18:45

## 2022-01-01 RX ADMIN — ATORVASTATIN CALCIUM 40 MG: 40 TABLET, FILM COATED ORAL at 18:23

## 2022-01-01 RX ADMIN — INSULIN LISPRO 2 UNITS: 100 INJECTION, SOLUTION INTRAVENOUS; SUBCUTANEOUS at 18:46

## 2022-01-01 RX ADMIN — PROPOFOL 20 MCG/KG/MIN: 10 INJECTION, EMULSION INTRAVENOUS at 17:00

## 2022-01-01 RX ADMIN — TORSEMIDE 40 MG: 20 TABLET ORAL at 22:05

## 2022-01-01 RX ADMIN — NYSTATIN: 100000 POWDER TOPICAL at 17:21

## 2022-01-01 RX ADMIN — LIDOCAINE HYDROCHLORIDE: 20 JELLY TOPICAL at 22:03

## 2022-01-01 RX ADMIN — PANTOPRAZOLE SODIUM 40 MG: 40 TABLET, DELAYED RELEASE ORAL at 08:15

## 2022-01-01 RX ADMIN — CALCIUM ACETATE 667 MG: 667 CAPSULE ORAL at 12:48

## 2022-01-01 RX ADMIN — SODIUM BICARBONATE 650 MG: 650 TABLET ORAL at 17:37

## 2022-01-01 RX ADMIN — FUROSEMIDE 80 MG: 10 INJECTION, SOLUTION INTRAMUSCULAR; INTRAVENOUS at 17:46

## 2022-01-01 RX ADMIN — AMLODIPINE BESYLATE 5 MG: 5 TABLET ORAL at 09:28

## 2022-01-01 RX ADMIN — INSULIN LISPRO 1 UNITS: 100 INJECTION, SOLUTION INTRAVENOUS; SUBCUTANEOUS at 16:14

## 2022-01-01 RX ADMIN — AMLODIPINE BESYLATE 10 MG: 10 TABLET ORAL at 08:48

## 2022-01-01 RX ADMIN — SODIUM BICARBONATE 650 MG: 650 TABLET ORAL at 17:48

## 2022-01-01 RX ADMIN — SODIUM CHLORIDE 100 ML/HR: 0.9 INJECTION, SOLUTION INTRAVENOUS at 17:28

## 2022-01-01 RX ADMIN — AMLODIPINE BESYLATE 10 MG: 10 TABLET ORAL at 08:12

## 2022-01-01 RX ADMIN — CEFAZOLIN SODIUM 1000 MG: 1 SOLUTION INTRAVENOUS at 17:12

## 2022-01-01 RX ADMIN — NYSTATIN 1 APPLICATION: 100000 POWDER TOPICAL at 09:29

## 2022-01-01 RX ADMIN — DEXTROSE MONOHYDRATE 25 G: 500 INJECTION PARENTERAL at 10:07

## 2022-01-01 RX ADMIN — CARVEDILOL 25 MG: 12.5 TABLET, FILM COATED ORAL at 17:29

## 2022-01-01 RX ADMIN — INSULIN LISPRO 1 UNITS: 100 INJECTION, SOLUTION INTRAVENOUS; SUBCUTANEOUS at 22:08

## 2022-01-01 RX ADMIN — LACTULOSE 10 G: 10 SOLUTION ORAL at 08:49

## 2022-01-01 RX ADMIN — ACETAMINOPHEN 975 MG: 325 TABLET, FILM COATED ORAL at 14:22

## 2022-01-01 RX ADMIN — INSULIN LISPRO 4 UNITS: 100 INJECTION, SOLUTION INTRAVENOUS; SUBCUTANEOUS at 11:53

## 2022-01-01 RX ADMIN — CYANOCOBALAMIN TAB 500 MCG 1000 MCG: 500 TAB at 10:37

## 2022-01-01 RX ADMIN — SODIUM BICARBONATE 650 MG: 650 TABLET ORAL at 12:20

## 2022-01-01 RX ADMIN — PANTOPRAZOLE SODIUM 40 MG: 40 INJECTION, POWDER, FOR SOLUTION INTRAVENOUS at 09:26

## 2022-01-01 RX ADMIN — APIXABAN 5 MG: 5 TABLET, FILM COATED ORAL at 08:32

## 2022-01-01 RX ADMIN — ACETAMINOPHEN 975 MG: 325 TABLET, FILM COATED ORAL at 03:52

## 2022-01-01 RX ADMIN — CARVEDILOL 25 MG: 12.5 TABLET, FILM COATED ORAL at 17:47

## 2022-01-01 RX ADMIN — CYANOCOBALAMIN TAB 500 MCG 1000 MCG: 500 TAB at 08:32

## 2022-01-01 RX ADMIN — CHLORHEXIDINE GLUCONATE 15 ML: 1.2 RINSE ORAL at 09:27

## 2022-01-01 RX ADMIN — ACETAMINOPHEN 975 MG: 325 TABLET, FILM COATED ORAL at 23:04

## 2022-01-01 RX ADMIN — INSULIN LISPRO 1 UNITS: 100 INJECTION, SOLUTION INTRAVENOUS; SUBCUTANEOUS at 12:48

## 2022-01-01 RX ADMIN — CARVEDILOL 25 MG: 12.5 TABLET, FILM COATED ORAL at 17:58

## 2022-01-01 RX ADMIN — NYSTATIN: 100000 POWDER TOPICAL at 08:14

## 2022-01-01 RX ADMIN — PROPOFOL 50 MCG/KG/MIN: 10 INJECTION, EMULSION INTRAVENOUS at 09:29

## 2022-01-01 RX ADMIN — NYSTATIN: 100000 POWDER TOPICAL at 20:28

## 2022-01-01 RX ADMIN — FUROSEMIDE 80 MG: 10 INJECTION, SOLUTION INTRAMUSCULAR; INTRAVENOUS at 14:17

## 2022-01-01 RX ADMIN — HYDROMORPHONE HYDROCHLORIDE 0.5 MG: 1 INJECTION, SOLUTION INTRAMUSCULAR; INTRAVENOUS; SUBCUTANEOUS at 00:02

## 2022-01-01 RX ADMIN — HEPARIN SODIUM 15.1 UNITS/KG/HR: 10000 INJECTION, SOLUTION INTRAVENOUS at 15:42

## 2022-01-01 RX ADMIN — OXYCODONE HYDROCHLORIDE 5 MG: 5 TABLET ORAL at 21:48

## 2022-01-01 RX ADMIN — CYANOCOBALAMIN TAB 500 MCG 1000 MCG: 500 TAB at 09:08

## 2022-01-01 RX ADMIN — FENTANYL CITRATE 25 MCG: 50 INJECTION INTRAMUSCULAR; INTRAVENOUS at 16:00

## 2022-01-01 RX ADMIN — NYSTATIN: 100000 POWDER TOPICAL at 17:31

## 2022-01-01 RX ADMIN — CARVEDILOL 25 MG: 12.5 TABLET, FILM COATED ORAL at 08:44

## 2022-01-01 RX ADMIN — NYSTATIN: 100000 POWDER TOPICAL at 17:47

## 2022-01-01 RX ADMIN — CARVEDILOL 25 MG: 12.5 TABLET, FILM COATED ORAL at 10:37

## 2022-01-01 RX ADMIN — CALCIUM ACETATE 667 MG: 667 CAPSULE ORAL at 09:27

## 2022-01-01 RX ADMIN — CARVEDILOL 25 MG: 12.5 TABLET, FILM COATED ORAL at 08:16

## 2022-01-01 RX ADMIN — INSULIN LISPRO 1 UNITS: 100 INJECTION, SOLUTION INTRAVENOUS; SUBCUTANEOUS at 08:45

## 2022-01-01 RX ADMIN — NYSTATIN: 100000 POWDER TOPICAL at 21:24

## 2022-01-01 NOTE — ED ATTENDING ATTESTATION
1/1/2022  ICorey MD, saw and evaluated the patient  I have discussed the patient with the resident/non-physician practitioner and agree with the resident's/non-physician practitioner's findings, Plan of Care, and MDM as documented in the resident's/non-physician practitioner's note, except where noted  All available labs and Radiology studies were reviewed  I was present for key portions of any procedure(s) performed by the resident/non-physician practitioner and I was immediately available to provide assistance  At this point I agree with the current assessment done in the Emergency Department  I have conducted an independent evaluation of this patient a history and physical is as follows:    79-year-old male presenting for worsening altered mental status for the last 3 days  Patient was recently admitted to the ICU for staff bacteremia, discharged on 6 weeks of IV cefazolin per ID  Patient also was found to have worsening kidney function during his visit  He has an indwelling Robison catheter and a known liver mass with subcapsular hematoma  He require blood transfusion during his prior admission for anemia without clear source of bleeding  He is on Eliquis for a DVT to his left lower extremity diagnosed during this recent hospitalization, and was discharged on 2 L of oxygen by nasal cannula which is new for him from this prior hospitalization  Per patient's wife who accompanies him at bedside, patient has been increasingly confused for the last several days  She also states that he has been very sleepy, sleeping more than usual and it kind seems inattentive when talking to her  He did fall out of bed few days ago and landed on his side, he denies hitting his head or headache  No pain since the incident  Patient denies any complaints other than pain over his sacrum, however on exam has some discomfort in his abdomen  Denies fevers or chills      AMS worsening x2 days    Recent ICU admission for staph bacteremia, DVT on eliquis, decreased UOP from folwy, known liver mass with subscapular hematoma, on 2L of o2 from recent hospital stay    Physical Exam  Constitutional:       Appearance: He is well-developed  He is obese  He is ill-appearing  He is not diaphoretic  HENT:      Head: Normocephalic and atraumatic  Right Ear: External ear normal       Left Ear: External ear normal       Nose: Nose normal    Eyes:      Conjunctiva/sclera: Conjunctivae normal    Cardiovascular:      Rate and Rhythm: Normal rate and regular rhythm  Heart sounds: Normal heart sounds  No murmur heard  No friction rub  No gallop  Pulmonary:      Effort: Pulmonary effort is normal  No respiratory distress  Breath sounds: Rales (scattered) present  No wheezing  Comments: Rattling upper airway sounds with inspiration and expiration  Abdominal:      General: Bowel sounds are normal  There is distension (non-tmpanic)  Palpations: Abdomen is soft  Tenderness: There is abdominal tenderness (diffuse)  There is guarding (voluntary)  Hernia: A hernia (small, umbilical, easily reduces without overlying skin changes) is present  Comments: No peritonitis   Musculoskeletal:         General: No deformity  Normal range of motion  Cervical back: Normal range of motion and neck supple  Right lower leg: Edema (1+) present  Left lower leg: Edema (2+ with associated calf tenderness, compartments soft) present  Skin:     General: Skin is warm and dry  Neurological:      Mental Status: He is alert and oriented to person, place, and time  Motor: No abnormal muscle tone  Comments: No focal weakness, GCS 15 appears fatigued and generally weak   Psychiatric:         Mood and Affect: Mood normal              ED Course  ED Course as of 01/01/22 1716   Sat Jan 01, 2022   1456 Hgb 6 6  Will plan on obtaining hemoccult testing and order blood for transfusion      1502 Patient noted by wife and SNF to be oliguric over the last few days  Cr 7 37 from 4 26, BUN 88 from 67  Suspect AMS is in part due to pt's uremia  K 5 9  Will obtain EKG, temporize with Ca, insulin+dextrose   1558 No evidence of active bleeding on CT of the C/A/P, subcapsular hematoma looks stable  [id number] Resident physician spoke with nephrology  Likely to medically manage the pt at this time rather than initiating emergent dialysis  Patient evaluated by critical care PA at bedside  [id number] [address]nn Norton for admission to [facility] with nephrology following     1654 Ammonia level 48, likely contributing to AMS            Critical Care Time  Procedures

## 2022-01-01 NOTE — QUICK NOTE
[facility] Service Attending Physician Attestation Note - Admission    I have seen and examined Rosa Maria Frye personally and have reviewed the medical record independently  I have reviewed the case with the resident physician including all assessments and the plan of care for each  I agree with the resident physician and offer the following addendum to the below statements by the resident physician:     Date Evaluated: January 1st 2021  Time Evaluated: 6:30 pm      Subjective / HPI:  This is a 62-year-old male recently discharged from this hospice from [facility] on the 29th of December after being treated for bacteremia with presumed lower extremity skin source  He was discharged on IV antibiotics  At the time he was noted to have worsening CKD with a baseline creatinine around 4 however comes in now with a creatinine of more than 7 and reported diminished/no urine output by his wife at nursing facility  Furthermore he has had worsening confusion/lethargy at the nursing home  He is on Eliquis but he comes in with this supratherapeutic INR of more than 6  And also new anemia with the unknown source  He has microscopic hematuria on the urine microscopy but currently there is no urine in his Robison catheter bag for me to assess whether he has gross hematuria  CT scan shows stable renal and hepatic hematomas  Thankfully as per the patient's wife at bedside he he is less confused and closer to his baseline in the ED now after receiving some IV fluids  I did personally discuss with Nephrology who do not feel that he needs urgent dialysis at this time however they have recommended treating for hyperkalemia given that we are giving this patient blood products  We will treat the hyperkalemia and repeat a CMP at 10:00 p m  Along with a CBC          Exam:   Heart sounds regular  Crackles both bases  Diminished breath sounds both bases  Possible hepatojugular reflux  Bilateral lower extremity edema 1+  Left lower extremity pain which is chronic as per his wife and the patient secondary to DVT  Neurologically no focal deficits  Patient is awake and able to answer most questions    Assessment and Plan:  · -anemia multifactorial in the setting of end-stage renal disease/CKD as well as possible acute blood loss via urine or GI  Hemoccults were reportedly negative in the ED will repeat and also follow-up on urine  Additionally will check bladder scans, and also trend CBC every 8 hours  Currently the patient is receiving a blood transfusion  · Supratherapeutic  INR-this is in the setting of normal liver enzymes so this is likely representative of synthetic dysfunction  Possibly secondary to tumor infiltration and lack of normally functioning hepatocytes  Will consult Gastroenterology  At this time as I am unclear of the patient's bleeding status will give fresh frozen plasma as well  ·  DVT- patient has DVT and is on Eliquis for this  Regardless given his unknown bleeding status at this time will give FFP if hemoglobin continues to drop there is concern for hallie bleeding then we will reverse with vitamin K as well  · Worsening renal function/end-stage renal disease- hemodialysis access as per Nephrology  Discussed with Nephrology no need for urgent dialysis overnight they will see tomorrow and re-evaluate  For now they want to hydrate patient is a feel that patient is dry  Will follow-up and repeat labs at 10:00 p m  And if these are worse than overnight team to contact on-call nephrologist  · Bacteremia continue antibiotics as prior however repeat blood cultures  · Encephalopathy-thankfully patient is back to normal or approaching normal   This is possibly secondary to a worsening kidney function  Note that ammonia is 48 and patient has some asterixis  Will start lactulose  Appreciate GI and Nephrology input  · Repeat CT scan at 6:00 a m   Given supratherapeutic INR and altered mental status and lethargy  · Neurochecks-       Education and Discussions with Family / Patient:  Discussed with wife at bedside    Time Spent for Care: 45 minutes  More than 50% of total time spent on counseling and coordination of care as described above  Current Patient Status: Inpatient   Anticipated Length of Stay:  Patient will be admitted on an Inpatient basis with an anticipated length of stay of  more than 2 midnights  Justification for Hospital Stay:  Altered mental status worsening renal disease worsening synthetic function of the liver    Epic / Care Everywhere Records Reviewed Independently: Yes     For detailed history, assessment, and plan of care, please review the statements below by Dr Babs Patel MD

## 2022-01-01 NOTE — CONSULTS
Consultation - Nephrology   Maritza Gray 78 y o  male MRN: [id number]  Unit/Bed#: ED 27 Encounter: [id number]    ASSESSMENT AND PLAN:   78 y o  man PMH DM type 2, CKD G3b (baseline creatinine 1 7 mg/dL, eGFR 35) , hypertension  Recently episode of MULUGETA secondary to retroperitoneal bleeding and hypotension , was discharged with creatinine of 4 2  Now patient presents with altered mental status and worsening creatinine  Nephrology is consulted for MULUGETA    PLAN:    #Recurrent Non-Oliguric KDIGO MULUGETA stage 3 on CKD G 3B     Etiology:  recurrent MULUGETA, this time likely prerenal in the settings of poor oral intake  Recent episode of MULUGETA secondary to ATN in the setting of bleeding   Baseline creatinine:  1 7 mg/dL   Peak creatinine:  Trending   Current creatinine:  7 3 mg/dL   UA:  Hematuria, leukocyturia   Renal imaging :  Unchanged subcapsular and suprarenal hematoma   Treatment:   No indication of emergent dialysis tonight  I anticipate the patient will require dialysis in the next 48 hours if no improvement of kidney function   Maintain MAP:  Over 65 mmHg if possible/avoid hypoperfusion:  Hold parameters on blood pressure medications   Avoid nephrotoxic agents such as NSAIDs, and IV contrast if possible  Avoid opioids    Adjust medications to GFR   Start  mL/hr with close monitor of respiratory status and urinary output   Plan to switch Plasmalyte when  potassium better controlled   Dialysis was discussed with patient and wife, benefits and risks were explained    Wife signed consent in case dialysis is needed   Check C P K    #CKD:  · Baseline creatinine: 1 7 mg/dL, eGFR 35  · Etiology:  Likely secondary to nephrosclerosis in the settings of hypertension     # mixed Acid-base Disorder   vbg: pH 7 33, pCO2 31 3, serum HCO3 19   A corrected by albumin   Metabolic acidosis and respiratory alkalosis   Secondary to MULUGETA   Lactate:   IV fluids   Sodium bicarb 650 mg t i d     #Volume status/hypertension:   Volume:  Hypervolemic   Blood pressure:  Normotensive 149/70mmHg   Recommend:   IV fluids as above    # prerenal azotemia  · BUN 88  · No asterixis  · Some degree of AMS but patient able to answer questions appropriately  · No indication of emergent dialysis tonight    # hyperkalemia  · Serum potassium 5 9  · Recommend medical treatment as blood transfusion pending worsening hyperkalemia    #Anemia:   Current hemoglobin:  6 6 mg/dL trending down   CT showed stable hematoma   Treatment:   Transfuse for hemoglobin less than 7 0 per primary service     Rule any other source of bleeding        HISTORY OF PRESENT ILLNESS:  Requesting Physician: Dalia Kaba MD  Reason for Consult:  Worsening MULUGETA    Yuki Curtis is a 78 y o  male DM type 2, CKD G3b (baseline creatinine 1 7 mg/dL, eGFR 35) , hypertension  Recently discharged after retroperitoneal bleeding complicated with MULUGETA secondary to ATN  Patient was discharged with creatinine of 4 2 mg/dL  Patient and wife report poor oral intake in the last 2 days with no fluid intake at all, poor urinary output  Patient was brought to the ED due to altered mental status   On arrival to the ED patient was noticed to have a worsening kidney function with creatinine of 7 3   A renal consultation is requested today for assistance in the management of MULUGETA    PAST MEDICAL HISTORY:  Past Medical History:   Diagnosis Date    Bell's palsy     Cardiac disease     Cerebellar stroke ([address]     Diabetes mellitus ([address]     Fracture     L hip    Hyperlipidemia     Hypertension     Renal disorder     Stroke ([address] )     2013    Stroke [facility])     [id number]       PAST SURGICAL HISTORY:  Past Surgical History:   Procedure Laterality Date    CORONARY ANGIOPLASTY WITH STENT PLACEMENT      FRACTURE SURGERY      L femur    INCISION AND DRAINAGE OF WOUND Right 9/13/2016    Procedure: INCISION AND DRAINAGE (I&D) EXTREMITY, right lateral ankle;  Surgeon: Misha Mcgregor Simeon Bravo DPM;  [facility];  Service:     IR EMBOLIZATION (SPECIFY VESSEL OR SITE)  12/18/2021    IR TEMPORARY DIALYSIS CATHETER PLACEMENT  12/18/2021    IR TUNNELED CENTRAL LINE PLACEMENT  12/27/2021    WOUND DEBRIDEMENT Right 9/15/2016    Procedure: DEBRIDEMENT WOUND [facility] OUT) ankle wound with closure and FRANCY drain placement;  Surgeon: Gloria Brand DPM;  [facility];  Service:        ALLERGIES:  Allergies   Allergen Reactions    Ace Inhibitors Other (See Comments)    Ciprofloxacin Other (See Comments)    Penicillins Hives    Morphine And Related Anxiety       SOCIAL HISTORY:  Social History     Substance and Sexual Activity   Alcohol Use No     Social History     Substance and Sexual Activity   Drug Use No     Social History     Tobacco Use   Smoking Status Former Smoker    Packs/day: 0 00    Types: Cigarettes    Quit date: 9/3/2006    Years since quitting: 15 3   Smokeless Tobacco Never Used       FAMILY HISTORY:  Family History   Problem Relation Age of Onset    Diabetes Mother     Stroke Mother        MEDICATIONS:    Current Facility-Administered Medications:     sodium chloride 0 9 % infusion, 100 mL/hr, Intravenous, Continuous, Karen Masters MD    Current Outpatient Medications:     acetaminophen (TYLENOL) 325 mg tablet, Take 3 tablets (975 mg total) by mouth every 8 (eight) hours as needed for moderate pain, Disp: 30 tablet, Rfl: 0    amLODIPine (NORVASC) 5 mg tablet, Take 1 tablet (5 mg total) by mouth daily, Disp: 30 tablet, Rfl: 0    apixaban (ELIQUIS) 5 mg, Take 1 tablet (5 mg total) by mouth 2 (two) times a day for 30 doses, Disp: 30 tablet, Rfl: 0    aspirin 81 mg chewable tablet, Chew 81 mg daily  , Disp: , Rfl:     atorvastatin (LIPITOR) 40 mg tablet, Take 1 tablet (40 mg total) by mouth every evening, Disp: 30 tablet, Rfl: 0    ceFAZolin (ANCEF) 1000 mg IVPB, Infuse 50 mL (1,000 mg total) into a venous catheter every 12 (twelve) hours for 18 days, Disp: 1800 mL, Rfl: 0    cyanocobalamin (VITAMIN B-12) 250 MCG tablet, Take 4 tablets (1,000 mcg total) by mouth daily, Disp: , Rfl: 0    ezetimibe (ZETIA) 10 mg tablet, Take 1 tablet (10 mg total) by mouth daily at bedtime, Disp: 30 tablet, Rfl: 0    fenofibrate (TRICOR) 145 mg tablet, Take 145 mg by mouth daily  , Disp: , Rfl:     insulin glargine (LANTUS) 100 units/mL subcutaneous injection, Inject 32 Units under the skin daily at bedtime, Disp: 10 mL, Rfl: 0    insulin lispro (HumaLOG) 100 units/mL injection, Inject 6 Units under the skin 3 (three) times a day with meals, Disp: 10 mL, Rfl: 0    metoprolol tartrate (LOPRESSOR) 50 mg tablet, Take 50 mg by mouth 2 (two) times a day , Disp: , Rfl:     Multiple Vitamins-Minerals (multivitamin with minerals) tablet, Take 1 tablet by mouth daily, Disp: , Rfl:     pantoprazole (PROTONIX) 40 mg tablet, Take 40 mg by mouth daily, Disp: , Rfl:     docusate sodium (COLACE) 100 mg capsule, Take 1 capsule (100 mg total) by mouth 2 (two) times a day as needed for constipation (Patient not taking: Reported on 11/28/2020), Disp: 10 capsule, Rfl: 0    nystatin (MYCOSTATIN) powder, Apply topically 2 (two) times a day, Disp: 60 g, Rfl: 3    REVIEW OF SYSTEMS:  Complete 10 point review of systems were obtained and discussed in length with the patient  Complete review of systems were negative / unremarkable except mentioned in the HPI section       Review of Systems - Psychological ROS: positive for - disorientation  Ophthalmic ROS: negative  ENT ROS: negative  Hematological and Lymphatic ROS: positive for - fatigue  Endocrine ROS: negative  Respiratory ROS: no cough, shortness of breath, or wheezing  Cardiovascular ROS: no chest pain or dyspnea on exertion  Gastrointestinal ROS: no abdominal pain, change in bowel habits, or black or bloody stools  Genito-Urinary ROS: positive for - decreased urinary output  Musculoskeletal ROS: negative  Neurological ROS: no TIA or stroke symptoms  Dermatological ROS: negative     PHYSICAL EXAM:  Current Weight: Weight - Scale: 107 kg (235 lb 14 3 oz)  First Weight: Weight - Scale: 107 kg (235 lb 14 3 oz)  Vitals:    01/01/22 1541   BP: 170/78   Pulse: 65   Resp:    Temp:    SpO2: 99%     No intake or output data in the 24 hours ending 01/01/22 1652  Wt Readings from Last 3 Encounters:   01/01/22 107 kg (235 lb 14 3 oz)   12/29/21 107 kg (236 lb 15 9 oz)   12/01/20 120 kg (263 lb 10 7 oz)     Temp Readings from Last 3 Encounters:   01/01/22 98 7 °F (37 1 °C) (Oral)   12/29/21 98 2 °F (36 8 °C)   02/10/21 99 5 °F (37 5 °C)     BP Readings from Last 3 Encounters:   01/01/22 170/78   12/29/21 145/54   02/11/21 (!) 178/88     Pulse Readings from Last 3 Encounters:   01/01/22 65   12/29/21 65   02/11/21 66        General:  Mild distress  Skin:  No acute rash  Eyes:  No scleral icterus and noninjected  ENT:  Normocephalic, atraumatic, mucous membranes dry  Neck:  Supple, no jugular venous distention,   Chest:  Clear to auscultation percussion, good respiratory effort, no use of accessory respiratory muscles  CVS:  Regular rate and rhythm without a murmur rub   Abdomen:  soft and nontender   Extremities:  No LE edema  Neuro:  No gross focality, no asterixis  Psych:  Alert and oriented      Invasive Devices:   Urethral Catheter Temperature probe 16 Fr   (Active)     Lab Results:   Results from last 7 days   Lab Units 01/01/22  1419 12/29/21  0440 12/28/21  1321 12/28/21  0535 12/27/21  2010 12/27/21  2009 12/27/21  0637 12/26/21  2213 12/26/21  1254   WBC Thousand/uL 11 55* 9 71  --  10 38*  --  10 80*  --   --   --    HEMOGLOBIN g/dL 6 6* 7 8*  --  7 4*  --  6 8* 7 2* 7 0* 7 4*   HEMATOCRIT % 21 7* 25 2*  --  24 6*  --  22 3* 23 4* 22 8* 24 5*   PLATELETS Thousands/uL 502* 464*  --  502*  --  506*  --   --   --    POTASSIUM mmol/L 5 9* 4 9 5 3 5 5* 5 6*  --  5 3  --  5 0   CHLORIDE mmol/L 106 110* 110* 110* 109*  --  109*  --  108   CO2 mmol/L 19* 21 22 21 21  --  20*  --  21   BUN mg/dL 88* 67* 72* 69* 72*  --  72*  --  72*   CREATININE mg/dL 7 37* 4 26* 4 43* 4 37* 4 32*  --  4 34*  --  4 39*   CALCIUM mg/dL 8 6 8 1* 7 8* 8 4 8 5  --  8 5  --  8 4       Other Studies:   CT head without contrast   Final Result by Monico Youssef MD (01/01 1501)      No acute intracranial abnormality  Unchanged encephalomalacia secondary to chronic right cerebellar infarct (series 2 image 7 )               Workstation performed: [id number]         CT chest abdomen pelvis wo contrast   Final Result by Monico Youssef MD (01/01 1543)      Unchanged moderate bilateral water density pleural effusions with associated compressive atelectasis of both lower lobes  No evidence of pneumonia  Unchanged subcapsular and suprarenal hematoma around the right kidney  Suspected infiltrative neoplasm of the liver seen on prior contrast-enhanced CT is not well-visualized on this noncontrast CT  Workstation performed: [id number]             Portions of the record may have been created with voice recognition software  Occasional wrong word or "sound a like" substitutions may have occurred due to the inherent limitations of voice recognition software  Read the chart carefully and recognize, using context, where substitutions have occurred  No

## 2022-01-01 NOTE — ED PROVIDER NOTES
History  Chief Complaint   Patient presents with    Altered Mental Status     Pt presents via EMS, coming from [facility]  Staff reported pt was altered  GCS 15 in triage  Pt has a DVT in left leg & recent staph infection  Krystian Valencia is a 78year old male who was recently hospitalized from December 17th to December 29th for treatment of staph bacteremia, acute kidney injury in the setting of chronic kidney disease stage 3, left lower extremity DVT, anemia in the setting of a right renal subcapsular hematoma, brought here today by EMS from [facility] for evaluation of mental status change  Patient's wife is here to help provide the history, I also called and spoke with his skilled care facility who help provide his history  Patient was released from the hospital on Tuesday, his wife says that on Wednesday the patient seemed to be turning the corner and was closer to his baseline and that he had been during his entire hospitalization  On Thursday however, the patient began to appear more lethargic and he had decreased responsiveness  At times he also appeared confused it did not seem to make sense when he spoke  Thursday evening, the patient had a fall from his bed to the ground, it is unclear home long he was on the ground for he was found  Patient denied any head strike, he does take Eliquis currently for his DVT  He is not complaining of any injuries from the fall  Over the next 2 days into today, the patient has continued with decreased responsiveness and worsening lethargy  Patient's only complaints is left lower extremity pain and right upper quadrant abdominal pain  Of note, the patient also has appeared to have worsening breathing with congestion in his upper airway, this is new since Thursday according to his wife  Patient was placed on 2 L nasal cannula by EMS  She also notes decreased urine production, along with decreased appetite and decreased liquid intake    Patient denies any fevers or chills at this time  He denies any shortness of breath or chest pain  Patient denies any headache or head injury  Patient received his noon antibiotics, he gets his antibiotics twice daily, once at noon and once at midnight  History provided by:  Patient, spouse and nursing home   used: No    Altered Mental Status  Presenting symptoms: confusion, lethargy and partial responsiveness    Severity:  Moderate  Most recent episode: Today  Episode history:  Continuous  Duration:  2 days  Timing:  Constant  Progression:  Worsening  Chronicity:  New  Context: nursing home resident and recent infection    Associated symptoms: abdominal pain, decreased appetite and weakness    Associated symptoms: no fever, no headaches, no light-headedness, no palpitations, no rash, no seizures and no vomiting        Prior to Admission Medications   Prescriptions Last Dose Informant Patient Reported? Taking? Multiple Vitamins-Minerals (multivitamin with minerals) tablet 12/31/2021 at Unknown time  Yes Yes   Sig: Take 1 tablet by mouth daily   acetaminophen (TYLENOL) 325 mg tablet 12/31/2021 at Unknown time  No Yes   Sig: Take 3 tablets (975 mg total) by mouth every 8 (eight) hours as needed for moderate pain   amLODIPine (NORVASC) 5 mg tablet 1/1/2022 at Unknown time  No Yes   Sig: Take 1 tablet (5 mg total) by mouth daily   apixaban (ELIQUIS) 5 mg 1/1/2022 at Unknown time  No Yes   Sig: Take 1 tablet (5 mg total) by mouth 2 (two) times a day for 30 doses   aspirin 81 mg chewable tablet 1/1/2022 at Unknown time Self Yes Yes   Sig: Chew 81 mg daily     atorvastatin (LIPITOR) 40 mg tablet 12/31/2021 at Unknown time  No Yes   Sig: Take 1 tablet (40 mg total) by mouth every evening   ceFAZolin (ANCEF) 1000 mg IVPB 12/31/2021 at Unknown time  No Yes   Sig: Infuse 50 mL (1,000 mg total) into a venous catheter every 12 (twelve) hours for 18 days   cyanocobalamin (VITAMIN B-12) 250 MCG tablet 12/31/2021 at Unknown time No Yes   Sig: Take 4 tablets (1,000 mcg total) by mouth daily   docusate sodium (COLACE) 100 mg capsule   No No   Sig: Take 1 capsule (100 mg total) by mouth 2 (two) times a day as needed for constipation   Patient not taking: Reported on 11/28/2020   ezetimibe (ZETIA) 10 mg tablet 12/31/2021 at Unknown time  No Yes   Sig: Take 1 tablet (10 mg total) by mouth daily at bedtime   fenofibrate (TRICOR) 145 mg tablet 12/31/2021 at Unknown time Self Yes Yes   Sig: Take 145 mg by mouth daily  insulin glargine (LANTUS) 100 units/mL subcutaneous injection   No Yes   Sig: Inject 32 Units under the skin daily at bedtime   insulin lispro (HumaLOG) 100 units/mL injection   No Yes   Sig: Inject 6 Units under the skin 3 (three) times a day with meals   metoprolol tartrate (LOPRESSOR) 50 mg tablet 1/1/2022 at Unknown time Self Yes Yes   Sig: Take 50 mg by mouth 2 (two) times a day     nystatin (MYCOSTATIN) powder   No No   Sig: Apply topically 2 (two) times a day   pantoprazole (PROTONIX) 40 mg tablet 1/1/2022 at Unknown time Self Yes Yes   Sig: Take 40 mg by mouth daily      Facility-Administered Medications: None       Past Medical History:   Diagnosis Date    Bell's palsy     Cardiac disease     [facility] [address] [facility]     Diabetes mellitus [facility] [address] [facility]     Fracture     L hip    Hyperlipidemia     Hypertension     Renal disorder     Stroke ([facility] [address] )     2013    Stroke [facility])     [id number]       Past Surgical History:   Procedure Laterality Date    CORONARY ANGIOPLASTY WITH STENT PLACEMENT      FRACTURE SURGERY      L femur    INCISION AND DRAINAGE OF WOUND Right 9/13/2016    Procedure: INCISION AND DRAINAGE (I&D) EXTREMITY, right lateral ankle;  Surgeon: Kade Rodriguez DPM;  [facility];  Service:     IR EMBOLIZATION (SPECIFY VESSEL OR SITE)  12/18/2021    IR TEMPORARY DIALYSIS CATHETER PLACEMENT  12/18/2021    IR TUNNELED CENTRAL LINE PLACEMENT  12/27/2021    WOUND DEBRIDEMENT Right 9/15/2016    Procedure: DEBRIDEMENT WOUND [facility] OUT) ankle wound with closure and FRANCY drain placement;  Surgeon: Francesca Flores DPM;  [facility];  Service:        Family History   Problem Relation Age of Onset    Diabetes Mother     Stroke Mother      I have reviewed and agree with the history as documented  E-Cigarette/Vaping    E-Cigarette Use Never User      E-Cigarette/Vaping Substances     Social History     Tobacco Use    Smoking status: Former Smoker     Packs/day: 0 00     Types: Cigarettes     Quit date: 9/3/2006     Years since quitting: 15 3    Smokeless tobacco: Never Used   Vaping Use    Vaping Use: Never used   Substance Use Topics    Alcohol use: No    Drug use: No        Review of Systems   Constitutional: Positive for appetite change and decreased appetite  Negative for chills and fever  HENT: Positive for congestion  Negative for ear pain and sore throat  Eyes: Negative for pain and visual disturbance  Respiratory: Negative for cough and shortness of breath  Cardiovascular: Negative for chest pain and palpitations  Gastrointestinal: Positive for abdominal pain  Negative for vomiting  Genitourinary: Positive for decreased urine volume  Negative for dysuria, flank pain and hematuria  Musculoskeletal: Negative for arthralgias and back pain  Skin: Negative for color change and rash  Neurological: Positive for weakness  Negative for dizziness, seizures, syncope, light-headedness, numbness and headaches  Psychiatric/Behavioral: Positive for behavioral problems and confusion  All other systems reviewed and are negative        Physical Exam  ED Triage Vitals [01/01/22 1241]   Temperature Pulse Respirations Blood Pressure SpO2   98 7 °F (37 1 °C) 65 18 149/70 100 %      Temp Source Heart Rate Source Patient Position - Orthostatic VS BP Location FiO2 (%)   Oral Monitor Lying Right arm --      Pain Score       3             Orthostatic Vital Signs  Vitals:    01/01/22 1241 01/01/22 1541   BP: 149/70 170/78   Pulse: 65 65   Patient Position - Orthostatic VS: Lying Lying       Physical Exam  Vitals and nursing note reviewed  Constitutional:       Appearance: Normal appearance  He is obese  He is ill-appearing  Comments: Patient throughout the examination would low all into a stupor/sleep  He appeared very lethargic and was slow to respond, though he did respond appropriately  He appears to be very unhealthy and ill  HENT:      Head: Normocephalic and atraumatic  Right Ear: External ear normal       Left Ear: External ear normal       Mouth/Throat:      Mouth: Mucous membranes are moist       Pharynx: Oropharynx is clear  Eyes:      General: No visual field deficit or scleral icterus  Extraocular Movements: Extraocular movements intact  Conjunctiva/sclera: Conjunctivae normal       Pupils: Pupils are equal, round, and reactive to light  Cardiovascular:      Rate and Rhythm: Normal rate and regular rhythm  Pulses: Normal pulses  Heart sounds: Normal heart sounds  No murmur heard  Pulmonary:      Effort: Pulmonary effort is normal  No tachypnea, accessory muscle usage or respiratory distress  Breath sounds: Transmitted upper airway sounds present  Comments: Patient has a large amount of transmitted upper airway sounds, he sounds like he has significant upper airway congestion  Occasionally he coughed  It was difficult to appreciate any wheezes, rales, rhonchi given the upper airway sounds  Patient is satting at 100% with 2 L nasal cannula, he was place on oxygen by EMS EN route, he does not normally use oxygen  Patient was satting at 96% on room air at rest once the nasal cannula was removed  Abdominal:      General: Abdomen is protuberant  Palpations: Abdomen is soft  Tenderness: There is abdominal tenderness in the right upper quadrant and epigastric area  There is guarding  There is no rebound   Negative signs include Rovsing's sign and McBurney's sign  Hernia: No hernia is present  Comments: Patient has a very protuberant abdomen, he appears to have a moderate amount of ascites  He had tenderness to palpation in the epigastrium and right upper quadrant, he had guarding to palpation of the right upper quadrant  Genitourinary:     Comments: Patient currently has a Robison catheter in place  He has about 100 mL of yellowish urine in his catch bag  Hemoccult test was negative  Patient appeared to have a stage I sacral ulcer developing  Musculoskeletal:         General: No tenderness or signs of injury  Cervical back: Normal range of motion and neck supple  No rigidity  Right lower le+ Pitting Edema present  Left lower le+ Pitting Edema present  Skin:     General: Skin is warm  Coloration: Skin is not jaundiced  Findings: No erythema or rash  Neurological:      General: No focal deficit present  Mental Status: He is lethargic  GCS: GCS eye subscore is 4  GCS verbal subscore is 5  GCS motor subscore is 6  Cranial Nerves: No cranial nerve deficit, dysarthria or facial asymmetry  Psychiatric:         Attention and Perception: He is inattentive  Mood and Affect: Affect is flat  Behavior: Behavior is slowed  Thought Content:  Thought content normal          ED Medications  Medications   sodium chloride 0 9 % infusion (has no administration in time range)   sodium bicarbonate tablet 650 mg (has no administration in time range)   insulin regular (HumuLIN R,NovoLIN R) injection 5 Units (5 Units Intravenous Given 22 1551)   dextrose 50 % IV solution 50 mL (50 mL Intravenous Given 22 1546)       Diagnostic Studies  Results Reviewed     Procedure Component Value Units Date/Time    Urine Microscopic [[id number]]  (Abnormal) Collected: 22 1531    Lab Status: Final result Specimen: Urine, Indwelling Robison Catheter Updated: 22 8347 RBC, UA 10-20 /hpf      WBC, UA 10-20 /hpf      Epithelial Cells Occasional /hpf      Bacteria, UA Occasional /hpf      OTHER OBSERVATIONS Yeast Cells Present    Urine culture [[id number]] Collected: 01/01/22 1531    Lab Status: In process Specimen: Urine, Indwelling Robison Catheter Updated: 01/01/22 1618    Ammonia [[id number]]  (Abnormal) Collected: 01/01/22 1501    Lab Status: Final result Specimen: Blood from Arm, Left Updated: 01/01/22 1612     Ammonia 48 umol/L     COVID/FLU/RSV [[id number]]  (Normal) Collected: 01/01/22 1419    Lab Status: Final result Specimen: Nares from Nose Updated: 01/01/22 1544     SARS-CoV-2 Negative     INFLUENZA A PCR Negative     INFLUENZA B PCR Negative     RSV PCR Negative    Narrative:      FOR PEDIATRIC PATIENTS - copy/paste COVID Guidelines URL to browser: [email or web address]  [email or web address]     This test has been authorized by FDA under an EUA (Emergency Use Assay) for use by authorized laboratories  Clinical caution and judgement should be used with the interpretation of these results with consideration of the clinical impression and other laboratory testing  Testing reported as "Positive" or "Negative" has been proven to be accurate according to standard laboratory validation requirements  All testing is performed with control materials showing appropriate reactivity at standard intervals      UA w Reflex to Microscopic w Reflex to Culture [[id number]]  (Abnormal) Collected: 01/01/22 1531    Lab Status: Final result Specimen: Urine, Indwelling Robison Catheter Updated: 01/01/22 1540     Color, UA Yellow     Clarity, UA Cloudy     Specific [place], UA 1 020     pH, UA 6 0     Leukocytes, UA Moderate     Nitrite, UA Negative     Protein,  (2+) mg/dl      Glucose, UA Negative mg/dl      Ketones, UA Negative mg/dl      Urobilinogen, UA 0 2 E U /dl      Bilirubin, UA Negative     Blood, UA Large    Protime-INR [[id number]]  (Abnormal) Collected: 01/01/22 1419    Lab Status: Final result Specimen: Blood from Arm, Right Updated: 01/01/22 1528     Protime 56 9 seconds      INR 6 78    APTT [[id number]]  (Abnormal) Collected: 01/01/22 1419    Lab Status: Final result Specimen: Blood from Arm, Right Updated: 01/01/22 1528     PTT 82 seconds     Lipase [[id number]]  (Abnormal) Collected: 01/01/22 1419    Lab Status: Final result Specimen: Blood from Arm, Right Updated: 01/01/22 1527     Lipase 626 u/L     NT-BNP PRO [[id number]]  (Abnormal) Collected: 01/01/22 1419    Lab Status: Final result Specimen: Blood from Arm, Right Updated: 01/01/22 1527     NT-proBNP 16,536 pg/mL     CKMB [[id number]]  (Normal) Collected: 01/01/22 1419    Lab Status: Final result Specimen: Blood from Arm, Right Updated: 01/01/22 1527     CK-MB Index <1 0 %      CK-MB 1 1 ng/mL     CK Total with Reflex CKMB [[id number]]  (Normal) Collected: 01/01/22 1419    Lab Status: Final result Specimen: Blood from Arm, Right Updated: 01/01/22 1526     Total  U/L     Blood gas, venous [[id number]]  (Abnormal) Collected: 01/01/22 1501    Lab Status: Final result Specimen: Blood from Arm, Left Updated: 01/01/22 1515     pH, [place] 7 338     pCO2, [place] 31 3 mm Hg      pO2, [place] 159 6 mm Hg      HCO3, [place] 16 4 mmol/L      Base Excess, [place] -8 5 mmol/L      O2 Content, [place] 10 6 ml/dL      O2 HGB, VENOUS 94 2 %     Blood culture #1 [[id number]] Collected: 01/01/22 1501    Lab Status: In process Specimen: Blood from Arm, Left Updated: 01/01/22 1511    High Sensitivity Troponin I Random [[id number]]  (Normal) Collected: 01/01/22 1422    Lab Status: Final result Specimen: Blood from Arm, Left Updated: 01/01/22 1507     HS TnI random 18 ng/L     Lactic acid [[id number]]  (Normal) Collected: 01/01/22 1419    Lab Status: Final result Specimen: Blood from Arm, Right Updated: 01/01/22 1502     LACTIC ACID 0 6 mmol/L     Narrative:      Result may be elevated if tourniquet was used during collection  Comprehensive metabolic panel [[id number]]  (Abnormal) Collected: 01/01/22 1419    Lab Status: Final result Specimen: Blood from Arm, Right Updated: 01/01/22 1459     Sodium 138 mmol/L      Potassium 5 9 mmol/L      Chloride 106 mmol/L      CO2 19 mmol/L      ANION GAP 13 mmol/L      BUN 88 mg/dL      Creatinine 7 37 mg/dL      Glucose 91 mg/dL      Calcium 8 6 mg/dL      Corrected Calcium 10 5 mg/dL      AST 54 U/L      ALT 9 U/L      Alkaline Phosphatase 68 U/L      Total Protein 7 7 g/dL      Albumin 1 6 g/dL      Total Bilirubin 0 46 mg/dL      eGFR 6 ml/min/1 73sq m     Narrative:      National Kidney Disease Foundation guidelines for Chronic Kidney Disease (CKD):     Stage 1 with normal or high GFR (GFR > 90 mL/min/1 73 square meters)    Stage 2 Mild CKD (GFR = 60-89 mL/min/1 73 square meters)    Stage 3A Moderate CKD (GFR = 45-59 mL/min/1 73 square meters)    Stage 3B Moderate CKD (GFR = 30-44 mL/min/1 73 square meters)    Stage 4 Severe CKD (GFR = 15-29 mL/min/1 73 square meters)    Stage 5 End Stage CKD (GFR <15 mL/min/1 73 square meters)  Note: GFR calculation is accurate only with a steady state creatinine    Fingerstick Glucose (POCT) [[id number]]  (Normal) Collected: 01/01/22 1457    Lab Status: Final result Updated: 01/01/22 1458     POC Glucose 109 mg/dl     CBC and differential [[id number]]  (Abnormal) Collected: 01/01/22 1419    Lab Status: Final result Specimen: Blood from Arm, Right Updated: 01/01/22 1451     WBC 11 55 Thousand/uL      RBC 2 23 Million/uL      Hemoglobin 6 6 g/dL      Hematocrit 21 7 %      MCV 97 fL      MCH 29 6 pg      MCHC 30 4 g/dL      RDW 17 9 %      MPV 9 3 fL      Platelets 252 Thousands/uL      nRBC 0 /100 WBCs      Neutrophils Relative 82 %      Immat GRANS % 2 %      Lymphocytes Relative 6 %      Monocytes Relative 6 %      Eosinophils Relative 3 %      Basophils Relative 1 %      Neutrophils Absolute 9 45 Thousands/µL      Immature Grans Absolute 0 22 Thousand/uL Lymphocytes Absolute 0 71 Thousands/µL      Monocytes Absolute 0 74 Thousand/µL      Eosinophils Absolute 0 37 Thousand/µL      Basophils Absolute 0 06 Thousands/µL     Procalcitonin [[id number]] Collected: 01/01/22 1424    Lab Status: In process Specimen: Blood from Arm, Right Updated: 01/01/22 1428    Blood culture #2 [[id number]] Collected: 01/01/22 1419    Lab Status: In process Specimen: Blood from Arm, Right Updated: 01/01/22 1428                 CT head without contrast   Final Result by Chela Hernandez MD (01/01 1501)      No acute intracranial abnormality  Unchanged encephalomalacia secondary to chronic right cerebellar infarct (series 2 image 7 )               Workstation performed: [id number]         CT chest abdomen pelvis wo contrast   Final Result by Chela Hernandez MD (01/01 1543)      Unchanged moderate bilateral water density pleural effusions with associated compressive atelectasis of both lower lobes  No evidence of pneumonia  Unchanged subcapsular and suprarenal hematoma around the right kidney  Suspected infiltrative neoplasm of the liver seen on prior contrast-enhanced CT is not well-visualized on this noncontrast CT        Workstation performed: [id number]               Procedures  ECG 12 Lead Documentation Only    Date/Time: 1/1/2022 4:27 PM  Performed by: Nakita Bautista DO  Authorized by: Nakita Bautista DO     Indications / Diagnosis:  Mental status change, hyperkalemia  ECG reviewed by me, the ED Provider: yes    Patient location:  ED  Previous ECG:     Previous ECG:  Compared to current    Similarity:  No change  Interpretation:     Interpretation: normal    Rate:     ECG rate:  62    ECG rate assessment: normal    Rhythm:     Rhythm: sinus rhythm    Ectopy:     Ectopy: none    QRS:     QRS axis:  Normal    QRS intervals:  Normal  Conduction:     Conduction: normal    ST segments:     ST segments:  Normal  T waves:     T waves: normal    Comments: Normal sinus rhythm, no evidence of any peaked/hyperacute T-waves          ED Course                                       MDM  Number of Diagnoses or Management Options  Anemia: established and worsening  Change in mental status: new and requires workup  Hyperammonemia ([facility] [address] ): new and requires workup  Hyperuricemia: new and requires workup  Stage 3 chronic kidney disease, unspecified whether stage 3a or 3b CKD ([facility] [address] ): established and worsening     Amount and/or Complexity of Data Reviewed  Clinical lab tests: ordered and reviewed  Tests in the radiology section of CPT®: ordered and reviewed  Review and summarize past medical records: yes  Independent visualization of images, tracings, or specimens: yes    Risk of Complications, Morbidity, and/or Mortality  General comments: Lázaro Olivas is a 78year old male who was recently hospitalized from December 17th to December 29th for treatment of staph bacteremia, acute kidney injury in the setting of chronic kidney disease stage 3, left lower extremity DVT, anemia in the setting of a right renal subcapsular hematoma, brought here today by EMS from [address] [place] [address] for evaluation of mental status change  Over the last 2 days, the patient has become more lethargic and unresponsive  Patient's only complaints are right upper quadrant pain and left lower extremity pain where he has current DVT resides  Patient's wife notes decreased urine output over the last 2 days  He has also had decreased oral intake  Patient's vital signs were all unremarkable, he was stable hemodynamically  On exam, the patient did not have any respiratory distress, though he had significant transmitted upper airway sounds, he sounded like he had a large amount of phlegm in his upper airway that he could not particularly clear    Patient was a initially on 2 L nasal cannula, when this was taken away he was satting around 96% on room air at rest   He had right upper quadrant and epigastric pain with palpation, he also had guarding in those areas  Patient had +1 pitting edema of the bilateral lower extremities, there were no signs of cellulitis or any open wounds  Patient appeared to have a stage I sacral ulcer on rectal examination, hemoccult test was negative  Throughout the entire exam, the patient did answer questions appropriately and did not seem confused  Patient did seem to be lethargic though and occasionally nodded off during the exam     Patient had significantly elevated creatinine levels at 7 37, BUN was 88, and GFR was 6  These were significantly elevated in comparison to prior  Patient also had a significant we elevated ammonia level  Patient was also anemic with a hemoglobin level of 6 6  CT of the head did not show any acute abnormalities  There were no signs of any acute bleeds with CT of the chest, abdomen and pelvis  Patient did not appear to have any focal pneumonia today with a CT of the chest     It appeared that the patient's change in mental status was related to worsening kidney failure and hyperuricemia  I reached out to Nephrology for consultation for possible dialysis  After their evaluation, nephrology did not believe patient needed dialysis immediately, they are going to try with conservative treatment 1st with IV fluids and medications  The results critical care further evaluation, they did not believe the patient was a candidate for ICU at this time given that he is not going to immediately receive dialysis, though he could be placed for step-down  Patient to be admitted to step-down 2 for further treatment of this worsening kidney failure and hyperuricemia          Patient Progress  Patient progress: stable      Disposition  Final diagnoses:   Stage 3 chronic kidney disease, unspecified whether stage 3a or 3b CKD ([facility] [address] [facility]   Hyperuricemia   Change in mental status   Anemia   Hyperammonemia ([facility] [address] )     Time reflects when diagnosis was documented in both MDM as applicable and the Disposition within this note     Time User Action Codes Description Comment    1/1/2022  4:14 PM Hernan Tripp Add [N18 30] Stage 3 chronic kidney disease, unspecified whether stage 3a or 3b CKD ([facility] [address] )     1/1/2022  4:14 PM Alvaro Bañuelos Add [E79 0] Hyperuricemia     1/1/2022  4:19 PM Alvaro Bañuelos Add [R41 82] Change in mental status     1/1/2022  4:19 PM Alvaro Bañuelos Add [D64 9] Anemia     1/1/2022  4:59 PM Alvaro Bañuelos Add [E72 20] Hyperammonemia [facility])       ED Disposition     ED Disposition Condition Date/Time Comment    Admit Stable Sat Jan 1, 2022  5:00 PM Case was discussed with Dr Tip De Guzman and the patient's admission status was agreed to be Admission Status: inpatient status to the service of Dr Tip De Guzman  Follow-up Information    None         Patient's Medications   Discharge Prescriptions    No medications on file     No discharge procedures on file  PDMP Review     None           ED Provider  Attending physically available and evaluated Telma Janice  I managed the patient along with the ED Attending      Electronically Signed by         Lane Gonsalves DO  01/01/22 1327

## 2022-01-02 PROBLEM — E87.20 METABOLIC ACIDOSIS: Status: ACTIVE | Noted: 2021-01-01

## 2022-01-02 NOTE — ASSESSMENT & PLAN NOTE
· Noted on UA  · Possible etiology of likely multifactorial anemia if chronic    Plan:   · Urinary retention protocol  · Monitor urine output

## 2022-01-02 NOTE — PROGRESS NOTES
NEPHROLOGY PROGRESS NOTE   Hector Reyes 78 y o  male MRN: [id number]  Unit/Bed#: S -01 Encounter: [id number]  Reason for Consult: MULUGETA    ASSESSMENT AND PLAN:  78 y o  man PMH DM type 2, CKD G3b (baseline creatinine 1 7 mg/dL, eGFR 35) , hypertension  Recently episode of MULUGETA secondary to retroperitoneal bleeding and hypotension , was discharged with creatinine of 4 2  Now patient presents with altered mental status and worsening creatinine  Nephrology is consulted for MULUGETA     PLAN:     #Recurrent Non-Oliguric KDIGO MULUGETA stage 3 on CKD G 3b  · Etiology:  recurrent MULUGETA, this time likely prerenal in the settings of poor oral intake  Recent episode of MULUGETA secondary to ATN in the setting of bleeding  · Baseline creatinine:  1 7 mg/dL  · Peak creatinine:   plateauing at 7 3, not improving with IVF   · Current creatinine:  7 3 mg/dL  · UA:  Hematuria, leukocyturia  · Renal imaging :  Unchanged subcapsular and suprarenal hematoma  · Treatment:  · Patient is more uremic this morning  Plan to start HD today and second HD tomorrow  · Patient will need a temporary HD catheter  · Discussed with primary team   · HD consent signed by wife on 22  · Maintain MAP:  Over 65 mmHg if possible/avoid hypoperfusion:  Hold parameters on blood pressure medications  · Avoid nephrotoxic agents such as NSAIDs, and IV contrast if possible   Avoid opioids   · Adjust medications to GFR  · IVF switched to sodium bicarb 150mEq/L on D5 while waiting for HD   · CPK added to this morning labs      #CKD:  · Baseline creatinine: 1 7 mg/dL, eGFR 35  · Etiology:  Likely secondary to nephrosclerosis in the settings of hypertension     # mixed Acid-base Disorder  · serum HCO3 17mmol/L  · A 5 corrected by albumin  · Metabolic acidosis and respiratory alkalosis  · Secondary to MULUGETA  · IV fluids  · Bicarb drip  · Plan for HD today after line is placed     #Volume status/hypertension:  · Volume:  mildly hypervolemic, third space , hypoalbuminemia · Blood pressure:  hypertensive /80mmHg  · Recommend:    # azotemia  · BUN 86  · Uremic symptoms , AMS worse than yesterday, o rub, no asterixis   · HD as above      # hyperkalemia  · Serum potassium 5 5  · Secondary to MULUGETA  · Will improve with HD      #Acute on chronic Anemia:  · Current hemoglobin:  8 4 s/p blood transfusion, 4 4 overnight   · CT showed stable hematoma  · Treatment:  · Transfuse for hemoglobin less than 7 0 per primary service    · Rule any other source of bleeding      SUBJECTIVE:  Patient seen and examined at bedside  Patient with AMS, lethargic report not feeling well, complains of abdominal pain   Robison with dark orange urine       OBJECTIVE:  Current Weight: Weight - Scale: 107 kg (235 lb 14 3 oz)  Vitals:    01/02/22 0712   BP: 161/80   Pulse: 78   Resp: 18   Temp: 98 4 °F (36 9 °C)   SpO2: 93%       Intake/Output Summary (Last 24 hours) at 1/2/2022 0820  Last data filed at 1/2/2022 0734  Gross per 24 hour   Intake 535 ml   Output 440 ml   Net 95 ml     Wt Readings from Last 3 Encounters:   01/01/22 107 kg (235 lb 14 3 oz)   12/29/21 107 kg (236 lb 15 9 oz)   12/01/20 120 kg (263 lb 10 7 oz)     Temp Readings from Last 3 Encounters:   01/02/22 98 4 °F (36 9 °C)   12/29/21 98 2 °F (36 8 °C)   02/10/21 99 5 °F (37 5 °C)     BP Readings from Last 3 Encounters:   01/02/22 161/80   12/29/21 145/54   02/11/21 (!) 178/88     Pulse Readings from Last 3 Encounters:   01/02/22 78   12/29/21 65   02/11/21 66        General:  Critically ill   Skin:  No acute rash  Eyes:  No scleral icterus and noninjected  ENT:  Normocephalic, atraumatic, mucous membranes dry   Neck:  Supple, no jugular venous distention,   Chest:  Bilateral roncus , no crackles , tachypnea,   CVS:  Regular rate and rhythm without a murmur rub   Abdomen:  Distended, tender, umbilical hernia   Extremities:  LE edema 1+  Neuro:  No gross focality  Psych: Lethargic       Medications:    Current Facility-Administered Medications:    acetaminophen (TYLENOL) tablet 975 mg, 975 mg, Oral, Q8H PRN, Rose Mary Curiel MD    albumin human (FLEXBUMIN) 25 % injection 25 g, 25 g, Intravenous, Q8H, Rose Mary Curiel MD, 25 g at 01/02/22 0407    amLODIPine (NORVASC) tablet 5 mg, 5 mg, Oral, Daily, Rose Mary Curiel MD    aspirin chewable tablet 81 mg, 81 mg, Oral, Daily, Rose Mary Curiel MD    atorvastatin (LIPITOR) tablet 40 mg, 40 mg, Oral, QPM, Rose Mary Curiel MD, 40 mg at 01/01/22 2057    ceFAZolin (ANCEF) IVPB (premix in dextrose) 1,000 mg 50 mL, 1,000 mg, Intravenous, Q12H, Rose Mary Curiel MD, Last Rate: 100 mL/hr at 01/02/22 0634, 1,000 mg at 01/02/22 7598    cyanocobalamin (VITAMIN B-12) tablet 1,000 mcg, 1,000 mcg, Oral, Daily, Rose Mary Curiel MD    docusate sodium (COLACE) capsule 100 mg, 100 mg, Oral, BID PRN, Rose Mary Curiel MD    ezetimibe (ZETIA) tablet 10 mg, 10 mg, Oral, HS, Rose Mary Curiel MD, 10 mg at 01/01/22 2108    insulin glargine (LANTUS) subcutaneous injection 32 Units 0 32 mL, 32 Units, Subcutaneous, HS, Rose Mary Curiel MD, 32 Units at 01/01/22 2132    insulin lispro (HumaLOG) 100 units/mL subcutaneous injection 6 Units, 6 Units, Subcutaneous, TID With Meals, Rose Mary Curiel MD    lactulose oral solution 20 g, 20 g, Oral, BID, Rose Mary Curiel MD, 20 g at 01/01/22 2056    metoprolol tartrate (LOPRESSOR) tablet 50 mg, 50 mg, Oral, BID, Rose Mary Curiel MD, 50 mg at 01/01/22 2106    nystatin (MYCOSTATIN) powder, , Topical, BID, Rose Mary Curiel MD, Given at 01/01/22 2124    pantoprazole (PROTONIX) EC tablet 40 mg, 40 mg, Oral, Daily, Rose Mary Curiel MD    sodium bicarbonate 150 mEq in dextrose 5 % 1,000 mL infusion, 100 mL/hr, Intravenous, Continuous, Karen Campos MD    sodium bicarbonate tablet 650 mg, 650 mg, Oral, TID after meals, Ruy Ferro MD, 650 mg at 01/01/22 1856    Laboratory Results:  Results from last 7 days   Lab Units 01/02/22  0453 01/02/22  0438 01/01/22 2025 01/01/22 2022 01/01/22  1419 12/29/21  0440 12/28/21  1321 12/28/21  0535 12/27/21 2010 12/27/21 2009 12/27/21  0637 12/27/21  0637   WBC Thousand/uL  --   --   --  11 19* 11 55* 9 71  --  10 38*  --  10 80*  --   --    HEMOGLOBIN g/dL  --  8 4*  --  4 2* 6 6* 7 8*  --  7 4*  --  6 8*  --  7 2*   HEMATOCRIT %  --  27 6*  --  14 4* 21 7* 25 2*  --  24 6*  --  22 3*  --  23 4*   PLATELETS Thousands/uL  --   --   --  445* 502* 464*  --  502*  --  506*  --   --    SODIUM mmol/L  --   --  139 138 138 140 138 139 139  --    < > 139   POTASSIUM mmol/L  --   --  5 5* 5 5* 5 9* 4 9 5 3 5 5* 5 6*  --    < > 5 3   CHLORIDE mmol/L  --   --  108 108 106 110* 110* 110* 109*  --    < > 109*   CO2 mmol/L  --   --  17* 18* 19* 21 22 21 21  --    < > 20*   BUN mg/dL  --   --  86* 83* 88* 67* 72* 69* 72*  --    < > 72*   CREATININE mg/dL  --   --  7 37* 7 16* 7 37* 4 26* 4 43* 4 37* 4 32*  --    < > 4 34*   CALCIUM mg/dL  --   --  7 8* 7 9* 8 6 8 1* 7 8* 8 4 8 5  --    < > 8 5   MAGNESIUM mg/dL 2 0  --   --   --   --   --   --   --   --   --   --   --    PHOSPHORUS mg/dL 6 9*  --   --   --   --   --   --   --   --   --   --   --     < > = values in this interval not displayed  CT head without contrast   Final Result by Sabino Cummins MD (01/01 2311)      No acute intracranial abnormality  Unchanged encephalomalacia secondary to chronic right cerebellar infarct (series 2 image 7 )               Workstation performed: [id number]         CT chest abdomen pelvis wo contrast   Final Result by Sabino Cummins MD (01/01 5347)      Unchanged moderate bilateral water density pleural effusions with associated compressive atelectasis of both lower lobes  No evidence of pneumonia  Unchanged subcapsular and suprarenal hematoma around the right kidney  Suspected infiltrative neoplasm of the liver seen on prior contrast-enhanced CT is not well-visualized on this noncontrast CT        Workstation performed: [id number]             Portions of the record may have been created with voice recognition software  Occasional wrong word or "sound a like" substitutions may have occurred due to the inherent limitations of voice recognition software  Read the chart carefully and recognize, using context, where substitutions have occurred

## 2022-01-02 NOTE — PROGRESS NOTES
[facility]  ICU Acceptance Note - Bill Thomas 1955, 78 y o  male MRN: [id number]  Unit/Bed#: S -01 Encounter: [id number]  Primary Care Provider: Caryn Roy MD   Date and time admitted to hospital: 1/1/2022 12:36 PM    Renal failure (ARF), acute on chronic [facility])  Assessment & Plan  Lab Results   Component Value Date    EGFR 7 01/02/2022    EGFR 6 01/01/2022    EGFR 7 01/01/2022    CREATININE 6 99 (H) 01/02/2022    CREATININE 7 37 (H) 01/01/2022    CREATININE 7 16 (H) 01/01/2022     Recent Labs     01/01/22  1419 01/01/22  1419 01/01/22 2022   CREATININE 7 37*  --  7 16*   EGFR 6   < > 7    < > = values in this interval not displayed       Estimated Creatinine Clearance: 11 7 mL/min (A) (by C-G formula based on SCr of 7 16 mg/dL (H))     · POA 7 3; (baseline 4 3-4 4)  · UA: Shows blood, protein and leukocytes  · Likely secondary to long-standing HTN  · Patient is in rapid response on 01/02  · Patient noted to be acidotic, volume overloaded, INR elevated  · Patient evaluated to need emergent dialysis      Plan:  · Pending UA w/ microscopic, Urinary retention protocol, Bladder scan, Daily weights, I/O  · Monitor BMP daily and observe for downward trend of creatinine  · Admit to the ICU for need of placement of hemodialysis line   · Initiate emergent hemodialysis after placement of hemodialysis access        Supratherapeutic INR  Assessment & Plan  · INR on admission 5   · Patient requires dialysis for suspected uremic encephalopathy  · Plan to  · Cristina Vasquez for reversal of INR  · Continue to follow serial BMPs  · Trend lab values  · Re-evaluate levels following dialysis  * Encephalopathy  Assessment & Plan  · Patient presents with altered mental status for the past 2 days per collateral from family at bedside  They report that he has been more lethargic and difficult to arouse    Unknown etiology possibly uremia in setting of end-stage renal disease versus hepatic encephalopathy  · AAOX3 but lethargic on admission  · Ammonia level:  48  · AST: 54   · ALT: 9  · INR: 6 78  · Rapid response on 1/2 required secondary to decrease in mental status  · Patient requires emergent hemodialysis in the setting of increasing electrolyte derangements      Plan:  · Frequent neuro checks  · GI consulted, appreciate reccomendations  · Nephrology following     · Place central line/hemodialysis catheter  Abnormal liver function  Assessment & Plan  · AST: 54   · ALT: 9  · INR: 6 78  · Plan to:   · Lactulose  · Albumin   · GI consulted, appreciate recs    Anemia  Assessment & Plan  · Received 1 unit PRBCs and 1 unit FFP in ED  · Concern on admission for occult bleed vs ESRD  · Plan:  · Monitor H&H q6   · Transfuse if Hb < 7 0  · F/u repeat FOBT   · Consent for transfusion obtained: Yes   · GI consulted, appreciate recs      Type 2 diabetes mellitus, with long-term current use of insulin [facility])  Assessment & Plan  Lab Results   Component Value Date    HGBA1C 12 2 (H) 12/18/2021       Recent Labs     01/01/22  2042 01/02/22  0732 01/02/22  1109 01/02/22  1234   POCGLU 188* 142* 292* 228*       Blood Sugar Average: Last 72 hrs:  (P) 537 [id number]         ----------------------------------------------------------------------------------------  HPI/24hr events:  Patient was admitted to hospital for worsening encephalopathy presents secondary to uremia  Patient was admitted and seen by Nephrology who recommended dialysis for the patient  Patient was a rapid response on 01/02 due to worsening encephalopathy  Patient was placed on BiPAP and his level of care was isolated to the ICU in anticipation for placement of hemodialysis line      Patient appropriate for transfer out of the ICU today?: No  Disposition: Admit to Critical Care   Code Status: Level 1 - Full Code  ---------------------------------------------------------------------------------------  SUBJECTIVE  Patient was increasingly tachypneic and struggling to breathe  Patient was placed on BiPAP for respiratory support  Patient was able to follow commands but unable to speak to me during the rapid response  Review of Systems  Review of systems was reviewed and negative unless stated above in HPI/24-hour events   ---------------------------------------------------------------------------------------  OBJECTIVE    Vitals   Vitals:    22 1239 22 1240 22 1241 22 1248   BP: (!) 178/62      Pulse:   85    Resp:       Temp:       TempSrc:       SpO2:  92% 97% 97%   Weight:       Height:         Temp (24hrs), Av 3 °F (36 8 °C), Min:97 5 °F (36 4 °C), Max:98 9 °F (37 2 °C)  Current: Temperature: 97 5 °F (36 4 °C)          Respiratory:  SpO2: SpO2: 97 %, SpO2 Activity: SpO2 Activity: At Rest, SpO2 Device: O2 Device: None (Room air), Capnography:    Nasal Cannula O2 Flow Rate (L/min): 2 L/min    Invasive/non-invasive ventilation settings   Respiratory  Report   Lab Data (Last 4 hours)    None         O2/Vent Data (Last 4 hours)       124          Non-Invasive Ventilation Mode BiPAP                   Physical Exam  Vitals and nursing note reviewed  Constitutional:       General: He is in acute distress  Appearance: He is obese  He is ill-appearing and toxic-appearing  HENT:      Head: Normocephalic  Right Ear: External ear normal       Left Ear: External ear normal       Nose: Nose normal       Mouth/Throat:      Mouth: Mucous membranes are moist    Eyes:      General:         Right eye: No discharge  Left eye: No discharge  Extraocular Movements: Extraocular movements intact  Neurological:      Mental Status: He is alert           Laboratory and Diagnostics:  Results from last 7 days   Lab Units 22  1249 22  0438 22  1419 21  0440 21  0535 21   WBC Thousand/uL  --   --  11 19* 11 55* 9 71 10 38* 10 80*   HEMOGLOBIN g/dL  --  8 4* 4 2* 6 6* 7 8* 7 4* 6 8*   I STAT HEMOGLOBIN g/dl 7 8*  --   --   --   --   --   --    HEMATOCRIT %  --  27 6* 14 4* 21 7* 25 2* 24 6* 22 3*   HEMATOCRIT, ISTAT % 23*  --   --   --   --   --   --    PLATELETS Thousands/uL  --   --  445* 502* 464* 502* 506*   NEUTROS PCT %  --   --  81* 82*  --   --   --    BANDS PCT %  --   --   --   --  4  --  3   MONOS PCT %  --   --  7 6  --   --   --    MONO PCT %  --   --   --   --  4  --  4     Results from last 7 days   Lab Units 01/02/22  1249 01/02/22  0433 01/01/22 2025 01/01/22 2022 01/01/22  1419 12/29/21  0440 12/28/21  1321 12/28/21  0535 12/28/21  0535 12/27/21 2010 12/27/21 2010   SODIUM mmol/L  --  138 139 138 138 140 138  --  139   < > 139   POTASSIUM mmol/L  --  5 5* 5 5* 5 5* 5 9* 4 9 5 3  --  5 5*   < > 5 6*   CHLORIDE mmol/L  --  100 108 108 106 110* 110*  --  110*   < > 109*   CO2 mmol/L  --  13* 17* 18* 19* 21 22   < > 21   < > 21   CO2, I-STAT mmol/L 20*  --   --   --   --   --   --   --   --   --   --    ANION GAP mmol/L  --  25* 14* 12 13 9 6  --  8   < > 9   BUN mg/dL  --  81* 86* 83* 88* 67* 72*  --  69*   < > 72*   CREATININE mg/dL  --  6 99* 7 37* 7 16* 7 37* 4 26* 4 43*  --  4 37*   < > 4 32*   CALCIUM mg/dL  --  6 9* 7 8* 7 9* 8 6 8 1* 7 8*  --  8 4   < > 8 5   GLUCOSE RANDOM mg/dL  --  106 105 107 91 95 179*  --  150*   < > 209*   ALT U/L  --  28  --  10* 9*  --   --   --   --   --  12   AST U/L  --  45  --  50* 54*  --   --   --   --   --  31   ALK PHOS U/L  --  53  --  57 68  --   --   --   --   --  74   ALBUMIN g/dL  --  5 7*  --  1 5* 1 6*  --   --   --   --   --  1 4*   TOTAL BILIRUBIN mg/dL  --  0 54  --  0 49 0 46  --   --   --   --   --  0 43    < > = values in this interval not displayed       Results from last 7 days   Lab Units 01/02/22  0453   MAGNESIUM mg/dL 2 0   PHOSPHORUS mg/dL 6 9*      Results from last 7 days   Lab Units 01/02/22  0453 01/01/22  1419   INR  5 63* 6 78*   PTT seconds  --  82*          Results from last 7 days   Lab Units 01/01/22  1419   LACTIC ACID mmol/L 0 6     ABG:    VBG:  Results from last 7 days   Lab Units 01/01/22  1501   PH DHARMESH  7 338   PCO2 DHARMESH mm Hg 31 3*   PO2 DHARMESH mm Hg 159 6*   HCO3 DHARMESH mmol/L 16 4*   BASE EXC DHARMESH mmol/L -8 5     Results from last 7 days   Lab Units 01/01/22  1424   PROCALCITONIN ng/ml 1 42*       Micro  Results from last 7 days   Lab Units 01/01/22  1501 01/01/22  1419   BLOOD CULTURE  Received in Microbiology Lab  Culture in Progress  Received in Microbiology Lab  Culture in Progress  EKG: No new tracing to review  Imaging: I have personally reviewed pertinent reports  Intake and Output  I/O       12/31 0701 01/01 0700 01/01 0701 01/02 0700 01/02 0701 01/03 0700    P  O   100     I V  (mL/kg)  155 (1 4)     Blood  280     Total Intake(mL/kg)  535 (5)     Urine (mL/kg/hr)   1104 (1 5)    Total Output   1104    Net  +535 -1104                 Height and Weights   Height: 5' 7" (170 2 cm)  IBW (Ideal Body Weight): 66 1 kg  Body mass index is 36 95 kg/m²  Weight (last 2 days)     Date/Time Weight    01/01/22 1241 107 (235 89)            Nutrition       Diet Orders   (From admission, onward)             Start     Ordered    01/02/22 1334  Diet NPO  Diet effective now        References:    Nutrtion Support Algorithm Enteral vs  Parenteral   Question Answer Comment   Diet Type NPO    RD to adjust diet per protocol?  Yes        01/02/22 1334    01/01/22 1825  Room Service  Once        Question:  Type of Service  Answer:  Room Service - Appropriate with Assistance    01/01/22 1824                  Active Medications  Scheduled Meds:  Current Facility-Administered Medications   Medication Dose Route Frequency Provider Last Rate    acetaminophen  975 mg Oral Q8H PRN Kiesha Hamm MD      amLODIPine  5 mg Oral Daily Kiesha Hamm MD      aspirin  81 mg Oral Daily Kiesha Hamm MD      atorvastatin  40 mg Oral QPM Brayan Courts Naga Hernandez MD      ceFAZolin  1,000 mg Intravenous Q12H Isael Waldrop MD 1,000 mg (01/02/22 8167)    cyanocobalamin  1,000 mcg Oral Daily Isael Waldrop MD      docusate sodium  100 mg Oral BID PRN Isael Waldrop MD      ezetimibe  10 mg Oral HS Isael Waldrop MD      insulin glargine  32 Units Subcutaneous HS Isael Waldrop MD      insulin lispro  1-5 Units Subcutaneous HS Isael Waldrop MD      insulin lispro  1-6 Units Subcutaneous TID [facility] Isael Waldrop MD      lactulose  20 g Oral BID Isael Waldrop MD      metoprolol tartrate  50 mg Oral BID Isael Waldrop MD      nystatin   Topical BID Isael Waldrop MD      pantoprazole  40 mg Oral Daily Isael Waldrop MD      Kcentra (PROTHROMBIN)  1,000 Units Intravenous Once Isael Waldrop MD      sodium bicarbonate  650 mg Oral TID after meals Isael Waldrop MD       Continuous Infusions:     PRN Meds:   acetaminophen, 975 mg, Q8H PRN  docusate sodium, 100 mg, BID PRN        Invasive Devices Review  Invasive Devices  Report    Central Venous Catheter Line            CVC Central Lines 12/27/21 Double Right 6 days          Peripheral Intravenous Line            Peripheral IV 01/01/22 Right Antecubital <1 day          Drain            Urethral Catheter Temperature probe 16 Fr  14 days                Rationale for remaining devices: Continued therapy administration  ---------------------------------------------------------------------------------------  Advance Directive and Living Will:      Power of :    POLST:    ---------------------------------------------------------------------------------------  Care Time Delivered:   Upon my evaluation, this patient had a high probability of imminent or life-threatening deterioration due to respiratory failure, which required my direct attention, intervention, and personal management  I have personally provided 30 minutes (1300 to 1330) of critical care time, exclusive of procedures, teaching, family meetings, and any prior time recorded by providers other than myself  Cat Kaplan MD      Portions of the record may have been created with voice recognition software  Occasional wrong word or "sound a like" substitutions may have occurred due to the inherent limitations of voice recognition software    Read the chart carefully and recognize, using context, where substitutions have occurred

## 2022-01-02 NOTE — ASSESSMENT & PLAN NOTE
Recent Labs     01/01/22  1419   HGB 6 6*   MCV 97     · Received 1 unit PRBCs and 1 unit FFP in ED  · Occult bleed vs ESRD    Plan:   Monitor H&H q6    Transfuse if Hb < 7 0   F/u repeat FOBT    Consent for transfusion obtained:  Yes    GI consulted, appreciate recs

## 2022-01-02 NOTE — ASSESSMENT & PLAN NOTE
Lab Results   Component Value Date    HGBA1C 12 2 (H) 12/18/2021       Recent Labs     01/01/22  1457 01/01/22  1827 01/01/22  2042 01/02/22  0732   POCGLU 109 134 188* 142*       Blood Sugar Average: Last 72 hrs:  (P) 143 25   · Home regimen:  Lantus 32 units at bedtime, lispro 6 units t i d  With meals  · Upon admission, patient's home insulin was ordered  Patient did not get any of his lispro because of normal glucose  · Patient seemed to be lethargic, likely will have poor oral intake as well has MULUGETA  · Will discontinue lispro with meals, continue Lantus at bedtime and SSI  If hyperglycemia in the next few days, resume lispro with meals     · Hypoglycemia protocol  · Diabetic diet

## 2022-01-02 NOTE — NURSING NOTE
Patient noted to have zero output in luz overnight, patient bladder scanned for 440ml, urology notified and advised this nurse to flush luz  Luz flushed with 60ml of sterile water, luz was patent and flushed with ease  At this time was only able to get back the 60mls in output  Will continue to monitor

## 2022-01-02 NOTE — ASSESSMENT & PLAN NOTE
· With Robison catheter, hematuria  Had gross hematuria during the last admission secondary to supratherapeutic INR  Patient was seen by Urology at that time, with recommendations to continue Robison and outpatient follow-up with Urology    · Monitor

## 2022-01-02 NOTE — ASSESSMENT & PLAN NOTE
Recent Labs     01/01/22  1419   K 5 9*       Workup:  · Etiology: CKD    Plan:  · sodium bicarbonate to correct metabolic acidosis  · Check BMP 2200 and continue to trend

## 2022-01-02 NOTE — ASSESSMENT & PLAN NOTE
· Recently hospitalized for Staph bacteremia  · Discharged on IV antibiotics  · Continue IV cefazolin complete remaining 15 day course

## 2022-01-02 NOTE — ASSESSMENT & PLAN NOTE
· Recently hospitalized for Staph bacteremia  · Discharged on IV cefazolin  · Continue IV cefazolin through January 16, 2022

## 2022-01-02 NOTE — ASSESSMENT & PLAN NOTE
· Patient presents with altered mental status for the past 2 days per collateral from family at bedside  They report that he has been more lethargic and difficult to arouse  Unknown etiology possibly uremia in setting of end-stage renal disease versus hepatic encephalopathy  · AAOX3 but lethargic on admission  · Ammonia level:  48  · AST: 54   · ALT: 9  · INR: 6 78  · Rapid response on 1/2 required secondary to decrease in mental status  · Patient requires emergent hemodialysis in the setting of increasing electrolyte derangements      Plan:  · Frequent neuro checks  · GI consulted, appreciate reccomendations  · Nephrology following     · Place central line/hemodialysis catheter

## 2022-01-02 NOTE — PROGRESS NOTES
[facility]  Progress Note - Gabriele Murdockw 1955, 78 y o  male MRN: [id number]  Unit/Bed#: S -[place] Encounter: [id number]  Primary Care Provider: Silvio Plasencia MD   Date and time admitted to hospital: 1/1/2022 12:36 PM    * Encephalopathy  Assessment & Plan  · Patient lethargic per my exam today but easily arousable, responded questions appropriately  · Most likely secondary to uremia versus mild hepatic encephalopathy  · Ammonia level:  48 upon presentation  · Creatinine 6 9, BUN 81    Plan:  · Continue lactulose  · Plan for hemodialysis today    Renal failure (ARF), acute on chronic [facility])  Assessment & Plan    Recent Labs     01/01/22 2022 01/01/22 2022 01/01/22 2025 01/01/22 2025 01/02/22  0433   CREATININE 7 16*  --  7 37*  --  6 99*   EGFR 7   < > 6   < > 7    < > = values in this interval not displayed  Estimated Creatinine Clearance: 12 mL/min (A) (by C-G formula based on SCr of 6 99 mg/dL (H))   Patient's baseline creatinine is 1 7  He was recently admitted in the hospital with acute kidney injury likely secondary to ATN, and discharged with creatinine around 4    Nephrology on board, appreciate recommendations   Unclear etiology for currently acute kidney injury, was thought to be prerenal and was started on IV fluids since admission, per Nephrology  Patient has Robison catheter, that was flushed per nurse, draining appropriately   Patient fluid overload per my exam this morning    Patient on IV bicarb with 100 cc an hour   Status post IV Lasix 40 mg once    Plan:  · Plan for hemodialysis today, because of uremia   Avoid nephrotoxins, hypotension   Monitor electrolytes   Monitor creatinine   Continue IV bicarb per Nephrology      Anemia  Assessment & Plan  Recent Labs     01/01/22  1419 01/01/22 2022 01/02/22  0438   HGB 6 6* 4 2* 8 4*   MCV 97 101*  --      · Etiology likely multifactorial in the setting of chronic kidney disease, hematuria in the setting of supratherapeutic INR  Patient has right subcapsular hematoma that is unchanged compared to the last CT scan  · Received 1 unit of PRBC in ER, repeated hemoglobin was down approximately 2 points after transfusion most likely not accurate  Patient received another PRBC this morning, last hemoglobin 8 4  · FOBT negative    Plan:   Monitor H&H q8   Transfuse if Hb < 7 0   Consent for transfusion obtained: Yes   GI consulted, appreciate recs      Abnormal liver function  Assessment & Plan  · Ammonia 48  · Continue lactulose  · GI consult    Hyperkalemia  Assessment & Plan  Recent Labs     01/01/22 2022 01/01/22 2025 01/02/22  0433   K 5 5* 5 5* 5 5*       · In the setting of acute on chronic kidney disease    Plan:  · Continue sodium bicarb  · Monitor      Hematuria  Assessment & Plan  · With Robison catheter, hematuria  Had gross hematuria during the last admission secondary to supratherapeutic INR  Patient was seen by Urology at that time, with recommendations to continue Robison and outpatient follow-up with Urology  · Monitor    Supratherapeutic INR  Assessment & Plan  Recent Labs     01/01/22  1419 01/02/22  0453   INR 6 78* 5 63*     · History of DVT previously on Coumadin, started on Eliquis 5 mg b i d  during the previous admission for acute DVT  · Unclear etiology for supra therapeutic INR  · Received fresh frozen plasma yesterday and Eliquis was held since admission  · Patient will need dialysis and transitory HD catheter placement  Will order fresh frozen plasma and IV K vitamin once, and recheck INR in a few hours  INR still high, will reorder FFP      Right Liver mass  Assessment & Plan  · Noted on abdominal CT in setting of possibly decreased synthetic liver function appreciated in lab work         Staphylococcus aureus bacteremia  Assessment & Plan  · Recently hospitalized for Staph bacteremia  · Discharged on IV cefazolin  · Continue IV cefazolin through January 16, 6655    Metabolic acidosis  Assessment & Plan  · Secondary to MULUGETA   · Continue bicarb per nephrology       CVA (cerebral vascular accident) [facility])  Assessment & Plan  · Documented in history with no residual deficits  · Continue atorvastatin, aspirin    Hypertension  Assessment & Plan  · Blood pressure acceptable   · Continue amlodipine and metoprolol   · Monitor vital signs     Type 2 diabetes mellitus, with long-term current use of insulin [facility])  Assessment & Plan  Lab Results   Component Value Date    HGBA1C 12 2 (H) 12/18/2021       Recent Labs     01/01/22  1457 01/01/22  1827 01/01/22  2042 01/02/22  0732   POCGLU 109 134 188* 142*       Blood Sugar Average: Last 72 hrs:  (P) 143 25   · Home regimen:  Lantus 32 units at bedtime, lispro 6 units t i d  With meals  · Upon admission, patient's home insulin was ordered  Patient did not get any of his lispro because of normal glucose  · Patient seemed to be lethargic, likely will have poor oral intake as well has MULUGETA  · Will discontinue lispro with meals, continue Lantus at bedtime and SSI  If hyperglycemia in the next few days, resume lispro with meals  · Hypoglycemia protocol  · Diabetic diet          VTE Pharmacologic Prophylaxis: VTE Score: 3 Moderate Risk (Score 3-4) - Pharmacological DVT Prophylaxis Contraindicated  Sequential Compression Devices Ordered  Patient Centered Rounds: I performed bedside rounds with nursing staff today  Discussions with Specialists or Other Care Team Provider: nephrology, nurse    Education and Discussions with Family / Patient: Updated  (wife) via phone  Current Length of Stay: 1 day(s)  Current Patient Status: Inpatient   Discharge Plan: to be determined  Code Status: Level 1 - Full Code    Subjective:   Patient was resting in bed, in no distress  Was lethargic, but arousable  Duaine Lat that has upper abdominal pain and had a bowel movement last night, and offered no other complaint  Asked for iced water  Objective:     Vitals:   Temp (24hrs), Av 3 °F (36 8 °C), Min:97 5 °F (36 4 °C), Max:98 9 °F (37 2 °C)    Temp:  [97 5 °F (36 4 °C)-98 9 °F (37 2 °C)] 97 5 °F (36 4 °C)  HR:  [64-78] 66  Resp:  [18-20] 20  BP: (130-181)/(60-80) 147/69  SpO2:  [89 %-100 %] 94 %  Body mass index is 36 95 kg/m²  Input and Output Summary (last 24 hours): Intake/Output Summary (Last 24 hours) at 2022 1211  Last data filed at 2022 1031  Gross per 24 hour   Intake 535 ml   Output 1104 ml   Net -569 ml       Physical Exam:   Physical Exam  Vitals reviewed  Constitutional:       General: He is not in acute distress  Appearance: He is obese  He is ill-appearing  He is not diaphoretic  HENT:      Head: Normocephalic  Eyes:      General:         Right eye: No discharge  Left eye: No discharge  Cardiovascular:      Rate and Rhythm: Normal rate and regular rhythm  Comments: Very difficult to appreciate, overlapping breath sounds   Pulmonary:      Effort: No respiratory distress  Breath sounds: Rales (bilateral, basal ) present  No wheezing  Abdominal:      General: There is distension (very distended )  Palpations: Abdomen is soft  Tenderness: There is abdominal tenderness (generalized )  There is no rebound  Hernia: A hernia (ombilical ) is present  Genitourinary:     Comments: Robison catheter, hematuria   Musculoskeletal:      Right lower leg: Edema present  Left lower leg: Edema present  Skin:     General: Skin is warm  Coloration: Skin is pale  Comments: Multiple excoriations on upper extremity, tights, abdomen  Neurological:      Mental Status: He is lethargic     Psychiatric:         Attention and Perception: Attention normal          Mood and Affect: Mood normal          Speech: Speech normal          Behavior: Behavior normal          Additional Data:     Labs:  Results from last 7 days   Lab Units 22  0438 22 01/01/22  1419 12/29/21  0440   WBC Thousand/uL  --   --  11 19*   < > 9 71   HEMOGLOBIN g/dL 8 4*   < > 4 2*   < > 7 8*   HEMATOCRIT % 27 6*   < > 14 4*   < > 25 2*   PLATELETS Thousands/uL  --   --  445*   < > 464*   BANDS PCT %  --   --   --   --  4   NEUTROS PCT %  --   --  81*   < >  --    LYMPHS PCT %  --   --  7*   < >  --    LYMPHO PCT %  --   --   --   --  7*   MONOS PCT %  --   --  7   < >  --    MONO PCT %  --   --   --   --  4   EOS PCT %  --   --  3   < > 4    < > = values in this interval not displayed       Results from last 7 days   Lab Units 01/02/22  0433   SODIUM mmol/L 138   POTASSIUM mmol/L 5 5*   CHLORIDE mmol/L 100   CO2 mmol/L 13*   BUN mg/dL 81*   CREATININE mg/dL 6 99*   ANION GAP mmol/L 25*   CALCIUM mg/dL 6 9*   ALBUMIN g/dL 5 7*   TOTAL BILIRUBIN mg/dL 0 54   ALK PHOS U/L 53   ALT U/L 28   AST U/L 45   GLUCOSE RANDOM mg/dL 106     Results from last 7 days   Lab Units 01/02/22  0453   INR  5 63*     Results from last 7 days   Lab Units 01/02/22  1109 01/02/22  0732 01/01/22  2042 01/01/22  1827 01/01/22  1457 12/29/21  1056 12/29/21  0733 12/28/21  2236 12/28/21  1716 12/28/21  1311 12/28/21  1234 12/28/21  0733   POC GLUCOSE mg/dl 292* 142* 188* 134 109 157* 94 124 178* 204* 241* 144*         Results from last 7 days   Lab Units 01/01/22  1424 01/01/22  1419   LACTIC ACID mmol/L  --  0 6   PROCALCITONIN ng/ml 1 42*  --        Lines/Drains:  Invasive Devices  Report    Central Venous Catheter Line            CVC Central Lines 12/27/21 Double Right 5 days          Peripheral Intravenous Line            Peripheral IV 01/01/22 Right Antecubital <1 day          Drain            Urethral Catheter Temperature probe 16 Fr  14 days              Urinary Catheter:  Goal for removal: N/A- Discharging with Robison         Central Line:  Goal for removal: N/A - Discharging with PICC for IV ABX/medications             Imaging: Reviewed radiology reports from this admission including: abdominal/pelvic CT    Recent Cultures (last 7 days):   Results from last 7 days   Lab Units 01/01/22  1501 01/01/22  1419   BLOOD CULTURE  Received in Microbiology Lab  Culture in Progress  Received in Microbiology Lab  Culture in Progress  Last 24 Hours Medication List:   Current Facility-Administered Medications   Medication Dose Route Frequency Provider Last Rate    acetaminophen  975 mg Oral Q8H PRN Markos Newton MD      amLODIPine  5 mg Oral Daily Markos Newton MD      aspirin  81 mg Oral Daily Markos Newton MD      atorvastatin  40 mg Oral QPM Markos Newton MD      ceFAZolin  1,000 mg Intravenous Q12H Markos Newton MD 1,000 mg (01/02/22 0634)    cyanocobalamin  1,000 mcg Oral Daily Markos Newton MD      docusate sodium  100 mg Oral BID PRN Markos Newton MD      ezetimibe  10 mg Oral HS Markos Newton MD      insulin glargine  32 Units Subcutaneous HS Markos Newton MD      insulin lispro  1-5 Units Subcutaneous HS Adriana Cantrell MD      insulin lispro  1-6 Units Subcutaneous TID [facility] Adriana Cantrell MD      lactulose  20 g Oral BID Markos Newton MD      metoprolol tartrate  50 mg Oral BID Markos Newton MD      nystatin   Topical BID Markos Newton MD      pantoprazole  40 mg Oral Daily Markos Newton MD      phytonadione  10 mg Intravenous Once Adriana Cantrell MD 10 mg (01/02/22 1153)    sodium bicarbonate infusion  100 mL/hr Intravenous Continuous Eliazar Rooney  mL/hr (01/02/22 0829)    sodium bicarbonate  650 mg Oral TID after meals Eliazar Rooney MD          Today, Patient Was Seen By: Adriana Cantrell MD    **Please Note: This note may have been constructed using a voice recognition system  **

## 2022-01-02 NOTE — ASSESSMENT & PLAN NOTE
Recent Labs     01/01/22  1419 01/01/22 2022 01/02/22  0438   HGB 6 6* 4 2* 8 4*   MCV 97 101*  --      · Etiology likely multifactorial in the setting of chronic kidney disease, hematuria in the setting of supratherapeutic INR  Patient has right subcapsular hematoma that is unchanged compared to the last CT scan  · Received 1 unit of PRBC in ER, repeated hemoglobin was down approximately 2 points after transfusion most likely not accurate  Patient received another PRBC this morning, last hemoglobin 8 4  · FOBT negative    Plan:   Monitor H&H q8   Transfuse if Hb < 7 0   Consent for transfusion obtained:  Yes   GI consulted, appreciate recs

## 2022-01-02 NOTE — H&P
[facility]  H&P- Marietta Friend 1955, 78 y o  male MRN: [id number]  Unit/Bed#: S -01 Encounter: [id number]  Primary Care Provider: Alfonso Milner MD   Date and time admitted to hospital: 1/1/2022 12:36 PM    Renal failure (ARF), acute on chronic [facility])  Assessment & Plan    Recent Labs     01/01/22  1419 01/01/22  1419 01/01/22 2022   CREATININE 7 37*  --  7 16*   EGFR 6   < > 7    < > = values in this interval not displayed  Estimated Creatinine Clearance: 11 7 mL/min (A) (by C-G formula based on SCr of 7 16 mg/dL (H))   POA 7 3; (baseline 4 3-4 4)   UA: Shows blood, protein and leukocytes   Likely secondary to long-standing HTN    Plan:   UA w/ microscopic, Urinary retention protocol, Bladder scan, Daily weights, I/O   IV Fluids: NS at 100 mL/hr with plan to switch to plasmalyte when K level improves   Monitor BMP daily and observe for downward trend of creatinine   Consideration for dialysis if no improvement in 48 hours per nephrology   Avoid hypoperfusion of the kidneys, minimize nephrotoxins   RAAS Blockers/Diuretics held: None      * Encephalopathy  Assessment & Plan  · Patient presents with altered mental status for the past 2 days per collateral from family at bedside  They report that he has been more lethargic and difficult to arouse  Unknown etiology possibly uremia in setting of end-stage renal disease versus hepatic encephalopathy     · AAOX3 but lethargic on admission  · Ammonia level:  48  · AST: 54   · ALT: 9  · INR: 6 78    Plan:  · Frequent neuro checks  · Address possible hepatorenal syndrome  · Lactulose BID  · Albumin 25% X3 doses  · GI consulted, appreciate reccomendations  · Nephrology following     Abnormal liver function  Assessment & Plan  · AST: 54   · ALT: 9  · INR: 6 78  · Suspicion for hepatorenal syndrome    Plan:   · Lactulose  · Albumin   · GI consulted, appreciate recs    Anemia  Assessment & Plan  Recent Labs 01/01/22  1419   HGB 6 6*   MCV 97     · Received 1 unit PRBCs and 1 unit FFP in ED  · Occult bleed vs ESRD    Plan:   Monitor H&H q6    Transfuse if Hb < 7 0   F/u repeat FOBT    Consent for transfusion obtained: Yes    GI consulted, appreciate recs      Hyperkalemia  Assessment & Plan  Recent Labs     01/01/22  1419   K 5 9*       Workup:  · Etiology: CKD    Plan:  · sodium bicarbonate to correct metabolic acidosis  · Check BMP 2200 and continue to trend      Type 2 diabetes mellitus, with long-term current use of insulin (HCC)  Assessment & Plan  Lab Results   Component Value Date    HGBA1C 12 2 (H) 12/18/2021       Recent Labs     01/01/22  1457 01/01/22  1827   POCGLU 109 134       Blood Sugar Average: Last 72 hrs:  (P) 121 5   · Continue home insulin regimen   · Glucose checks  · Hypoglycemia protocol       Hematuria  Assessment & Plan  · Noted on UA  · Possible etiology of likely multifactorial anemia if chronic    Plan:   · Urinary retention protocol  · Monitor urine output    Supratherapeutic INR  Assessment & Plan  · Patient takes Eliquis 5 mg BID at home, INR noted to be 6 78 on admission     Plan:   · Hold eliquis   · Trend INR daily   · Management of hepatic dysfunction as above      Staphylococcus aureus bacteremia  Assessment & Plan  · Recently hospitalized for Staph bacteremia  · Discharged on IV antibiotics  · Continue IV cefazolin complete remaining 15 day course    CVA (cerebral vascular accident) ([facility] [address] )  Assessment & Plan  · Documented in history with no residual deficits  · Continue neuro checks in setting of encephalopathy    Right Liver mass  Assessment & Plan  · Noted on abdominal CT in setting of possibly decreased synthetic liver function appreciated in lab work  · Management as above    VTE Pharmacologic Prophylaxis: VTE Score: 3 Moderate Risk (Score 3-4) - Pharmacological DVT Prophylaxis Contraindicated  Sequential Compression Devices Ordered    Code Status: Level 1 - Full Code Discussion with family: Updated  (wife) at bedside  Anticipated Length of Stay: Patient will be admitted on an inpatient basis with an anticipated length of stay of greater than 2 midnights secondary to Altered mental status  Chief Complaint:  AMS    History of Present Illness:  Laura Lee is a 78 y o  male with a PMH of CVA type 2 DM CAD recent hospitalization for Staph bacteremia and stage III CKD who presents with altered mental status  Per collateral from patient's spouse, patient has been difficult to arouse and confused intermittently for the past 2 days prior to presentation  Uremic encephalopathy was suspected upon presentation given patient history  Ammonia level was 48   Nephrology was consulted and treatment for metabolic acidosis with IV bicarb and normal saline was elected with plan to initiate dialysis if no improvement observed in 48 hours  Lab work revealed supratherapeutic INR in possible setting of decreased synthetic liver function suggestive of possible hepatorenal syndrome  Patient was also noted to be anemic with hemoglobin of 6 6 and no identified source of bleeding  Possibly multifactorial in setting of stage III CKD  CT abdomen showed hepatic mass  Patient was [facility] to [facility] service for further workup and management  Review of Systems:  Review of Systems   Constitutional: Positive for activity change, appetite change and fatigue  Negative for chills, diaphoresis and fever  Eyes: Negative  Respiratory: Negative  Cardiovascular: Positive for leg swelling  Gastrointestinal: Positive for abdominal distention and constipation  Negative for abdominal pain, blood in stool, nausea and vomiting  Endocrine: Positive for cold intolerance  Genitourinary: Negative  Musculoskeletal: Positive for arthralgias, back pain, gait problem, joint swelling, myalgias, neck pain and neck stiffness  Skin: Positive for color change, pallor, rash and wound  Allergic/Immunologic: Negative  Neurological: Positive for weakness, light-headedness and headaches  Negative for dizziness, tremors, seizures, syncope, speech difficulty and numbness  Hematological: Bruises/bleeds easily  Psychiatric/Behavioral: Positive for confusion and sleep disturbance  Negative for agitation, behavioral problems and decreased concentration  The patient is not nervous/anxious  Past Medical and Surgical History:   Past Medical History:   Diagnosis Date    Bell's palsy     Cardiac disease     [facility] [address] [facility]     Diabetes mellitus ([facility] )     Fracture     L hip    Hyperlipidemia     Hypertension     Renal disorder     Stroke ([facility] )     2013    Stroke [facility])     [id number]       Past Surgical History:   Procedure Laterality Date    CORONARY ANGIOPLASTY WITH STENT PLACEMENT      FRACTURE SURGERY      L femur    INCISION AND DRAINAGE OF WOUND Right 9/13/2016    Procedure: INCISION AND DRAINAGE (I&D) EXTREMITY, right lateral ankle;  Surgeon: Garland Bence, DPM;  [facility];  Service:     IR EMBOLIZATION (SPECIFY VESSEL OR SITE)  12/18/2021    IR TEMPORARY DIALYSIS CATHETER PLACEMENT  12/18/2021    IR TUNNELED CENTRAL LINE PLACEMENT  12/27/2021    WOUND DEBRIDEMENT Right 9/15/2016    Procedure: DEBRIDEMENT WOUND [facility] OUT) ankle wound with closure and FRANCY drain placement;  Surgeon: Garland Bence, DPM;  [facility];  Service:        Meds/Allergies:  Prior to Admission medications    Medication Sig Start Date End Date Taking?  Authorizing Provider   acetaminophen (TYLENOL) 325 mg tablet Take 3 tablets (975 mg total) by mouth every 8 (eight) hours as needed for moderate pain 12/1/20  Yes Eli Mckeon PA-C   amLODIPine (NORVASC) 5 mg tablet Take 1 tablet (5 mg total) by mouth daily 12/1/20  Yes Eli Mckeon PA-C   apixaban (ELIQUIS) 5 mg Take 1 tablet (5 mg total) by mouth 2 (two) times a day for 30 doses 12/29/21 1/13/22 Yes Ne Gifford MD Ike   aspirin 81 mg chewable tablet Chew 81 mg daily  Yes Historical Provider, MD   atorvastatin (LIPITOR) 40 mg tablet Take 1 tablet (40 mg total) by mouth every evening 12/1/20  Yes Eli Mckeon PA-C   ceFAZolin (ANCEF) 1000 mg IVPB Infuse 50 mL (1,000 mg total) into a venous catheter every 12 (twelve) hours for 18 days 12/29/21 1/16/22 Yes Karly Ramires MD   cyanocobalamin (VITAMIN B-12) 250 MCG tablet Take 4 tablets (1,000 mcg total) by mouth daily 12/1/20  Yes Eli Mckeon PA-C   ezetimibe (ZETIA) 10 mg tablet Take 1 tablet (10 mg total) by mouth daily at bedtime 12/1/20  Yes Eli Mckeon PA-C   fenofibrate (TRICOR) 145 mg tablet Take 145 mg by mouth daily  Yes Historical Provider, MD   insulin glargine (LANTUS) 100 units/mL subcutaneous injection Inject 32 Units under the skin daily at bedtime 12/29/21  Yes Ketan Carvalho MD   insulin lispro (HumaLOG) 100 units/mL injection Inject 6 Units under the skin 3 (three) times a day with meals 12/29/21  Yes Ketan Carvalho MD   metoprolol tartrate (LOPRESSOR) 50 mg tablet Take 50 mg by mouth 2 (two) times a day  Yes Historical Provider, MD   Multiple Vitamins-Minerals (multivitamin with minerals) tablet Take 1 tablet by mouth daily   Yes Historical Provider, MD   pantoprazole (PROTONIX) 40 mg tablet Take 40 mg by mouth daily   Yes Historical Provider, MD   docusate sodium (COLACE) 100 mg capsule Take 1 capsule (100 mg total) by mouth 2 (two) times a day as needed for constipation  Patient not taking: Reported on 11/28/2020 10/1/20   Eli Mckeon PA-C   nystatin (MYCOSTATIN) powder Apply topically 2 (two) times a day 2/11/21 3/13/21  Mary Bess MD     I have reviewed home medications with patient personally  Allergies:    Allergies   Allergen Reactions    Ace Inhibitors Other (See Comments)    Ciprofloxacin Other (See Comments)    Penicillins Hives    Morphine And Related Anxiety       Social History:  Marital Status: /Civil Union   Occupation:  Retired  Patient Pre-hospital Living Situation: Home  Patient Pre-hospital Level of Mobility: non-ambulatory/bed bound  Patient Pre-hospital Diet Restrictions:  None  Substance Use History:   Social History     Substance and Sexual Activity   Alcohol Use No     Social History     Tobacco Use   Smoking Status Former Smoker    Packs/day: 0 00    Types: Cigarettes    Quit date: 9/3/2006    Years since quitting: 15 3   Smokeless Tobacco Never Used     Social History     Substance and Sexual Activity   Drug Use No       Family History:  Family History   Problem Relation Age of Onset    Diabetes Mother     Stroke Mother        Physical Exam:     Vitals:   Blood Pressure: (!) 171/68 (01/01/22 2115)  Pulse: 70 (01/01/22 2115)  Temperature: 98 4 °F (36 9 °C) (01/01/22 2115)  Temp Source: Oral (01/01/22 2115)  Respirations: 18 (01/01/22 2115)  Height: 5' 7" (170 2 cm) (01/01/22 1241)  Weight - Scale: 107 kg (235 lb 14 3 oz) (01/01/22 1241)  SpO2: 97 % (01/01/22 2115)    Physical Exam  Vitals and nursing note reviewed  Constitutional:       General: He is not in acute distress  Appearance: He is well-developed  He is obese  He is ill-appearing and toxic-appearing  HENT:      Head: Normocephalic and atraumatic  Eyes:      Conjunctiva/sclera: Conjunctivae normal    Cardiovascular:      Rate and Rhythm: Normal rate  Rhythm irregular  Heart sounds: No murmur heard  Pulmonary:      Effort: Pulmonary effort is normal  No respiratory distress  Breath sounds: Normal breath sounds  Abdominal:      General: There is distension  Palpations: Abdomen is soft  There is no mass  Tenderness: There is no abdominal tenderness  Musculoskeletal:         General: Swelling present  Cervical back: Neck supple  Right lower leg: Edema present  Left lower leg: Edema present  Skin:     General: Skin is warm and dry        Capillary Refill: Capillary refill takes less than 2 seconds  Coloration: Skin is pale  Findings: No erythema or rash  Neurological:      General: No focal deficit present  Mental Status: He is alert  Mental status is at baseline  Cranial Nerves: No cranial nerve deficit  Gait: Gait normal       Comments: Lethargic   Psychiatric:         Mood and Affect: Mood normal           Additional Data:     Lab Results:  Results from last 7 days   Lab Units 01/01/22 2022 01/01/22  1419 12/29/21  0440   WBC Thousand/uL 11 19*   < > 9 71   HEMOGLOBIN g/dL 4 2*   < > 7 8*   HEMATOCRIT % 14 4*   < > 25 2*   PLATELETS Thousands/uL 445*   < > 464*   BANDS PCT %  --   --  4   NEUTROS PCT % 81*   < >  --    LYMPHS PCT % 7*   < >  --    LYMPHO PCT %  --   --  7*   MONOS PCT % 7   < >  --    MONO PCT %  --   --  4   EOS PCT % 3   < > 4    < > = values in this interval not displayed  Results from last 7 days   Lab Units 01/01/22 2025 01/01/22 2022 01/01/22 2022   SODIUM mmol/L 139   < > 138   POTASSIUM mmol/L 5 5*   < > 5 5*   CHLORIDE mmol/L 108   < > 108   CO2 mmol/L 17*   < > 18*   BUN mg/dL 86*   < > 83*   CREATININE mg/dL 7 37*   < > 7 16*   ANION GAP mmol/L 14*   < > 12   CALCIUM mg/dL 7 8*   < > 7 9*   ALBUMIN g/dL  --   --  1 5*   TOTAL BILIRUBIN mg/dL  --   --  0 49   ALK PHOS U/L  --   --  57   ALT U/L  --   --  10*   AST U/L  --   --  50*   GLUCOSE RANDOM mg/dL 105   < > 107    < > = values in this interval not displayed       Results from last 7 days   Lab Units 01/01/22  1419   INR  6 78*     Results from last 7 days   Lab Units 01/01/22 2042 01/01/22  1827 01/01/22  1457 12/29/21  1056 12/29/21  0733 12/28/21  2236 12/28/21  1716 12/28/21  1311 12/28/21  1234 12/28/21  0733 12/27/21  2125 12/27/21  1611   POC GLUCOSE mg/dl 188* 134 109 157* 94 124 178* 204* 241* 144* 220* 214*         Results from last 7 days   Lab Units 01/01/22  1419   LACTIC ACID mmol/L 0 6       Imaging: Reviewed radiology reports from this admission including: abdominal/pelvic CT and CT head  CT head without contrast   Final Result by Jacinto Clemons MD (01/01 1501)      No acute intracranial abnormality  Unchanged encephalomalacia secondary to chronic right cerebellar infarct (series 2 image 7 )               Workstation performed: [id number]         CT chest abdomen pelvis wo contrast   Final Result by Jacinto Clemons MD (01/01 2777)      Unchanged moderate bilateral water density pleural effusions with associated compressive atelectasis of both lower lobes  No evidence of pneumonia  Unchanged subcapsular and suprarenal hematoma around the right kidney  Suspected infiltrative neoplasm of the liver seen on prior contrast-enhanced CT is not well-visualized on this noncontrast CT  Workstation performed: [id number]             EKG and Other Studies Reviewed on Admission:   · EKG: Atrial fibrillation  HR 78     ** Please Note: This note has been constructed using a voice recognition system   **

## 2022-01-02 NOTE — ASSESSMENT & PLAN NOTE
Lab Results   Component Value Date    HGBA1C 12 2 (H) 12/18/2021       Recent Labs     01/01/22  1457 01/01/22  1827   POCGLU 109 134       Blood Sugar Average: Last 72 hrs:  (P) 121 5   · Continue home insulin regimen   · Glucose checks  · Hypoglycemia protocol

## 2022-01-02 NOTE — ASSESSMENT & PLAN NOTE
· Patient lethargic per my exam today but easily arousable, responded questions appropriately  · Most likely secondary to uremia versus mild hepatic encephalopathy     · Ammonia level:  48 upon presentation  · Creatinine 6 9, BUN 81    Plan:  · Continue lactulose  · Plan for hemodialysis today

## 2022-01-02 NOTE — PLAN OF CARE
Goal to run even  Problem: METABOLIC, FLUID AND ELECTROLYTES - ADULT  Goal: Electrolytes maintained within normal limits  Description: INTERVENTIONS:  - Monitor labs and assess patient for signs and symptoms of electrolyte imbalances  - Administer electrolyte replacement as ordered  - Monitor response to electrolyte replacements, including repeat lab results as appropriate  - Instruct patient on fluid and nutrition as appropriate  Outcome: Progressing  Goal: Fluid balance maintained  Description: INTERVENTIONS:  - Monitor labs   - Monitor I/O and WT  - Instruct patient on fluid and nutrition as appropriate  - Assess for signs & symptoms of volume excess or deficit  Outcome: Progressing

## 2022-01-02 NOTE — HEMODIALYSIS
Post-Dialysis RN Treatment Note    Blood Pressure:  Pre  mm/Hg  Post 48/70 62  mmHg   EDW   kg    Weight:  Pre  kg   Post kg Weight to be determined 111 4kg post HD   Mode of weight measurement:    Volume Removed 500   ml    Treatment [id number]  minutes    NS given     Treatment shortened?     Medications given during Rx NA   Estimated Kt/V  NA}   Access type: CVC   Access Issues:  None   Report called to primary nurse   Mia Cedeno RN

## 2022-01-02 NOTE — NURSING NOTE
Patient noted to have increase in generalized edema as well as wheeping from right arm, patient vitals are stable at this time, provider notified of increased edema, will continue to monitor

## 2022-01-02 NOTE — ASSESSMENT & PLAN NOTE
· INR on admission 5   · Patient requires dialysis for suspected uremic encephalopathy  · Plan to  · Jennifer Lobe for reversal of INR  · Continue to follow serial BMPs  · Trend lab values  · Re-evaluate levels following dialysis

## 2022-01-02 NOTE — CONSULTS
Consultation - [facility] Gastroenterology   Bill Martha 78 y o  male MRN: [id number]  Unit/Bed#: S -01 Encounter: [id number]        Inpatient consult to gastroenterology  Consult performed by: NELLY Butterfield  Consult ordered by: Sammi Abbott MD          Reason for Consult / Principal Problem:     Liver dysfunction      ASSESSMENT AND PLAN:      This is a 78 y o  male with history of CVA, DM type 2, CAD with cardiac stent placement, CKD 3 with recent hospitalization for hematuria/right subcapsular/right suprarenal hematoma due coagulopathy from coumadin, LLE cellulitis w/ staph bacteremia, MULUGETA, and LLE DVT now on Eliquis who presented the ED 1/1 from [address] [place] [address] for worsening mental status, reduced urine output, and fluid overload requiring urgent HD  1   Liver dysfunction   Patient was found to have a possible infiltrating neoplasm in the right lower lobe of the liver last admission on CTA 12/18 and question of low attenuation lesion on non contrast CT 12/18  This was not previously seen on CT scan in February, prior CT in 2016 noted low attenuation consistent with fatty liver  AFP normal 0 6  He did have significant elevation AST 4,787 and  on 12/19 last admission which trended down and resolved 12/24, likely due to underlying sepsis or shock liver from ABLA  Acute hepatitis panel was negative, and right upper quadrant ultrasound with Doppler showed hepatomegaly with patent hepatic vasculature and normal directional flow  This admission, AST was noted to be 54 but has normalized  Ammonia was noted to be slightly elevated at 48  INR 6 78 despite stopping Coumadin and being changed to Eliquis last admission  Tylenol level 4, but this was checked after 975 mg dose was administered  Per his wife, he has no underlying history of liver disease, no history alcohol use or drug use  He has never had a colonoscopy       -monitor hepatic function panel, PT INR  -avoid hepatotoxic medications  -can continue lactulose 20g BID, but suspect encephalopathy secondary to worsening renal function   -Recommend dedicated triphasic CT scan of the liver for better characterization when the patient is more stable for further evaluation of lesion seen on CT      2  Iron deficiency Anemia  Baseline hemoglobin 11 prior to recent hospitalization where he had worsening anemia due to hematuria, and right subcapsular/right suprarenal hematoma in the setting of coumadin coagulopathy  Hemoglobin was 7 8 on discharge 12/29  On admission, hemoglobin noted to be 6 8, INR 6 78  This morning, Hgb dropped to 4 2 without any obvious source of blood loss and increased to 8 4 after only 1 unit of blood was transfused  Suspect hemodilution from fluid overload in the setting of renal failure vs coagulopathy  Pt has not yet had a BM this admission  -monitor CBC and PT/INR  -observe for any signs of bleeding  -consider hematology consult due to persistent coagulopathy      Thank you for the consultation  Case will be discussed with Dr Cathryn Lynn  ______________________________________________________________________    HPI: Toshia Acosta is a 78 y o  male with history of CVA, DM type 2, CAD with cardiac stent placement, CKD 3 who [facility] ED [facility] for worsening mental status  Per patient's wife at bedside, she noted decreased urine output in his luz bag at rehab with increasing lethargy and she was notified he fell out of bed Thursday night  Friday and Saturday he was very lethargic and he was brought to the ED for further evaluation  In the ED, he was found have potassium 5 9, BUN 88, creatinine 7 37, albumin 1 6, lipase 626, ammonia 48, pro BNP 16, 536, WBC 11 55, hemoglobin 6 6, INR 6 78   CT head showed no acute changes   CT chest abdomen pelvis w/ enteric contrast showed moderate pleural effusions with no evidence of pneumonia, unchanged subcapsular and suprarenal hematoma around right kidney, and suspected infiltrative neoplasm of the liver seen on prior contrast enhanced CT not well visualized  He was recently admitted 12/17 through 12/29 due to hematuria and right subcapsular/right suprarenal hematoma in the setting of coumadin coagulopathy, LLE cellulitis w/ staph bacteremia, and LLE DVT  During his hospitalization he was followed by Nephrology due to MULUGETA secondary to ATN and a temporary dialysis catheter was placed, but later changed to tunneled central line as he did not require HD and needed to complete 4 weeks of antibiotics  His coumadin was switched to Eliquis  He had CT angiogram of the abdomen which showed no active bleeding so no IR embolization was needed, but did show possible infiltrating neoplasm in the right lobe of the liver and dedicated triphasic CT scan of the liver was recommend for further evaluation  AFP was 0 6 that time and it was recommended he follow-up with gastroenterology outpatient for further imaging  Patient's wife reports patient has no underlying history of liver disease that she is aware of  She denies patient having any alcohol use or drug use, history of hepatitis, or frequent Tylenol use  He has never had EGD or colonoscopy  He was taking Protonix daily for history of GERD  During assessment, patient became restless wanting to change position, his oxygen level started dropping, and his color became dusky  Nursing was notified and rapid response was called  There are plans for reversal of INR, dialysis catheter placement, and urgent HD               REVIEW OF SYSTEMS:    Unable to complete due to mental status    Historical Information   Past Medical History:   Diagnosis Date    Bell's palsy     Cardiac disease     [facility]bellar [facility]     Diabetes mellitus ([facility]     Fracture     L hip    Hyperlipidemia     Hypertension     Renal disorder     Stroke ([facility] )     2013    Stroke [facility])     [id number]     Past Surgical History: Procedure Laterality Date    CORONARY ANGIOPLASTY WITH STENT PLACEMENT      FRACTURE SURGERY      L femur    INCISION AND DRAINAGE OF WOUND Right 9/13/2016    Procedure: INCISION AND DRAINAGE (I&D) EXTREMITY, right lateral ankle;  Surgeon: Siddhartha Mccann DPM;  Location: [facility];  Service:     IR EMBOLIZATION (SPECIFY VESSEL OR SITE)  12/18/2021    IR TEMPORARY DIALYSIS CATHETER PLACEMENT  12/18/2021    IR TUNNELED CENTRAL LINE PLACEMENT  12/27/2021    WOUND DEBRIDEMENT Right 9/15/2016    Procedure: DEBRIDEMENT WOUND [facility] OUT) ankle wound with closure and FRANCY drain placement;  Surgeon: Siddhartha Mccann DPM;  Location[facility];  Service:      Social History   Social History     Substance and Sexual Activity   Alcohol Use No     Social History     Substance and Sexual Activity   Drug Use No     Social History     Tobacco Use   Smoking Status Former Smoker    Packs/day: 0 00    Types: Cigarettes    Quit date: 9/3/2006    Years since quitting: 15 3   Smokeless Tobacco Never Used     Family History   Problem Relation Age of Onset    Diabetes Mother     Stroke Mother        Meds/Allergies     Medications Prior to Admission   Medication    acetaminophen (TYLENOL) 325 mg tablet    amLODIPine (NORVASC) 5 mg tablet    apixaban (ELIQUIS) 5 mg    aspirin 81 mg chewable tablet    atorvastatin (LIPITOR) 40 mg tablet    ceFAZolin (ANCEF) 1000 mg IVPB    cyanocobalamin (VITAMIN B-12) 250 MCG tablet    ezetimibe (ZETIA) 10 mg tablet    fenofibrate (TRICOR) 145 mg tablet    insulin glargine (LANTUS) 100 units/mL subcutaneous injection    insulin lispro (HumaLOG) 100 units/mL injection    metoprolol tartrate (LOPRESSOR) 50 mg tablet    Multiple Vitamins-Minerals (multivitamin with minerals) tablet    pantoprazole (PROTONIX) 40 mg tablet    docusate sodium (COLACE) 100 mg capsule    nystatin (MYCOSTATIN) powder     Current Facility-Administered Medications   Medication Dose Route Frequency  acetaminophen (TYLENOL) tablet 975 mg  975 mg Oral Q8H PRN    amLODIPine (NORVASC) tablet 5 mg  5 mg Oral Daily    aspirin chewable tablet 81 mg  81 mg Oral Daily    atorvastatin (LIPITOR) tablet 40 mg  40 mg Oral QPM    ceFAZolin (ANCEF) IVPB (premix in dextrose) 1,000 mg 50 mL  1,000 mg Intravenous Q12H    cyanocobalamin (VITAMIN B-12) tablet 1,000 mcg  1,000 mcg Oral Daily    docusate sodium (COLACE) capsule 100 mg  100 mg Oral BID PRN    ezetimibe (ZETIA) tablet 10 mg  10 mg Oral HS    insulin glargine (LANTUS) subcutaneous injection 32 Units 0 32 mL  32 Units Subcutaneous HS    insulin lispro (HumaLOG) 100 units/mL subcutaneous injection 1-5 Units  1-5 Units Subcutaneous HS    insulin lispro (HumaLOG) 100 units/mL subcutaneous injection 1-6 Units  1-6 Units Subcutaneous TID AC    lactulose oral solution 20 g  20 g Oral BID    metoprolol tartrate (LOPRESSOR) tablet 50 mg  50 mg Oral BID    nystatin (MYCOSTATIN) powder   Topical BID    pantoprazole (PROTONIX) EC tablet 40 mg  40 mg Oral Daily    prothrombin complex conc human (KCENTRA) 2,500 Units  25 Units/kg Intravenous Once    sodium bicarbonate tablet 650 mg  650 mg Oral TID after meals       Allergies   Allergen Reactions    Ace Inhibitors Other (See Comments)    Ciprofloxacin Other (See Comments)    Penicillins Hives    Morphine And Related Anxiety           Objective     Blood pressure (!) 178/62, pulse 85, temperature 97 5 °F (36 4 °C), resp  rate 20, height 5' 7" (1 702 m), weight 107 kg (235 lb 14 3 oz), SpO2 97 %  Body mass index is 36 95 kg/m²  Intake/Output Summary (Last 24 hours) at 1/2/2022 1326  Last data filed at 1/2/2022 1031  Gross per 24 hour   Intake 535 ml   Output 1104 ml   Net -569 ml         PHYSICAL EXAM:      General Appearance:   Lethargic, in mild respiratory distress; anasarca present   HEENT:   Normocephalic, atraumatic, anicteric       Neck:  Supple, symmetrical, trachea midline   Lungs:   Diminished, coarse bilaterally; on O2 nasal cannula; tachypneic   Heart[de-identified]   Regular rate and rhythm; no murmur, rub, or gallop  Abdomen:   Soft, non-tender, rounded; normal bowel sounds; no masses, no organomegaly   Genitalia:   Deferred    Rectal:   Deferred    Extremities:  No cyanosis, clubbing; bilateral upper extremities/lower extremity edema present with weeping from previous blood sticks   Pulses:  2+ and symmetric all extremities    Skin:  No jaundice, rashes, or lesions; appears pale     Lymph nodes:  No palpable cervical lymphadenopathy        Lab Results:   No results displayed because visit has over 200 results  Imaging Studies: I have personally reviewed pertinent imaging studies

## 2022-01-02 NOTE — PROGRESS NOTES
Patient found in room lethargic and tachynepic  O2 sats 70%  RRT called  Pt placed on non rebreather and oxygen level returned to normal  HR 70s bp 170/90s  MD at bedside to assess patient  Pt now placed on bipap as ordered  Will continue to monitor

## 2022-01-02 NOTE — NURSING NOTE
Luz noted to be draining at this time approx  200ml of red tinged urine in luz bag, provider notified

## 2022-01-02 NOTE — ASSESSMENT & PLAN NOTE
Recent Labs     01/01/22 2022 01/01/22 2022 01/01/22 2025 01/01/22 2025 01/02/22  0433   CREATININE 7 16*  --  7 37*  --  6 99*   EGFR 7   < > 6   < > 7    < > = values in this interval not displayed  Estimated Creatinine Clearance: 12 mL/min (A) (by C-G formula based on SCr of 6 99 mg/dL (H))   Patient's baseline creatinine is 1 7  He was recently admitted in the hospital with acute kidney injury likely secondary to ATN, and discharged with creatinine around 4    Nephrology on board, appreciate recommendations   Unclear etiology for currently acute kidney injury, was thought to be prerenal and was started on IV fluids since admission, per Nephrology  Patient has Robison catheter, that was flushed per nurse, draining appropriately   Patient fluid overload per my exam this morning    Patient on IV bicarb with 100 cc an hour   Status post IV Lasix 40 mg once    Plan:  · Plan for hemodialysis today, because of uremia   Avoid nephrotoxins, hypotension   Monitor electrolytes   Monitor creatinine   Continue IV bicarb per Nephrology

## 2022-01-02 NOTE — RAPID RESPONSE
Rapid Response Note  Rocio Lazo 78 y o  male MRN: [id number]  Unit/Bed#: S -01 Encounter: [id number]    Rapid Response Notification(s):   Response called date/time:  1/2/2022 12:34 PM  Response team arrival date/time:  1/2/2022 12:37 PM  Response end date/time:  1/2/2022 12:50 PM  Rapid response location:  [facility] unit  Primary reason for rapid response call:  Acute change in neuro status    Rapid Response Intervention(s):   Airway:  Positioning  Breathing interventions: Patient placed on BiPAP  Circulation:  None  Fluids administered:  None  Medications administered:  None       Background/Situation:   Rocio Lazo is a 78 y o  male with past medical history of DM 2, chronic renal failure (estimated GFR of 6), who had increasing encephalopathy during evaluation on 1/2 that prompted initiation of a rapid response  He was initially admitted to the hospital the day before for increasing encephalopathy presumed secondary to uremia  Patient has had previous hospitalizations and evaluations by Nephrology  He has been medically managed in past and has not received dialysis in the past   During this hospital admission, it was decided the patient required dialysis on this hospital admission  Patient was waiting for placement hemodialysis catheter and has not received dialysis  Patient's initial laboratory evaluation was notable for creatinine at 6 99, BUN of 81, anion gap 25, INR of 5 63, and potassium of 5 5  Patient was initiated on BiPAP  ABG at time of rapid response was 7 186/49 8/106 0/18  8  Secondary to increasing encephalopathy and now emergent need for dialysis, patient was deemed appropriate for ICU admission in order for placement of central line dialysis catheter  Review of Systems   Unable to perform ROS: Mental status change   All other systems reviewed and are negative        Objective:   Vitals:    01/02/22 1239 01/02/22 1240 01/02/22 1241 01/02/22 1248   BP: (!) 178/62      Pulse:   85 Resp:       Temp:       TempSrc:       SpO2:  92% 97% 97%   Weight:       Height:         Physical Exam  Vitals and nursing note reviewed  Constitutional:       General: He is in acute distress  Appearance: He is well-developed  He is obese  He is ill-appearing and toxic-appearing  HENT:      Head: Normocephalic and atraumatic  Right Ear: External ear normal       Left Ear: External ear normal       Nose: Nose normal       Mouth/Throat:      Mouth: Mucous membranes are moist       Pharynx: Oropharynx is clear  Eyes:      General:         Right eye: No discharge  Left eye: No discharge  Conjunctiva/sclera: Conjunctivae normal    Cardiovascular:      Rate and Rhythm: Normal rate and regular rhythm  Pulses: Normal pulses  Heart sounds: Normal heart sounds  Pulmonary:      Effort: Respiratory distress present  Breath sounds: Normal breath sounds  Abdominal:      General: Abdomen is flat  Palpations: Abdomen is soft  Tenderness: There is no abdominal tenderness  There is no guarding or rebound  Musculoskeletal:         General: No deformity or signs of injury  Cervical back: Neck supple  Skin:     General: Skin is warm and dry  Neurological:      Mental Status: He is alert  He is disoriented  Assessment:   · Exacerbation of Uremic Encephalopathy    Plan:   · Admit to ICU  · Placement of hemodialysis catheter  · Secondary to increased work of breathing and inability to lie flat, anticipated intubation and sedation prior to placement of central line  · After hemodialysis access line is placed, reach out to Nephrology for initiation of dialysis  · We med settings as tolerated over the next 24 hours in anticipation to extubate patient following administration of dialysis       Rapid Response Outcome:   Condition:  In stable condition  Progression:  Unchanged  Transfer:  Transfer to ICU  Primary service notified of transfer: Yes    Handoff report: by phone and in person    Code Status: Level 1 (Full Code)      Family notified of transfer: Yes  Family member contacted: Yes     Portions of the record may have been created with voice recognition software  Occasional wrong word or "sound a like" substitutions may have occurred due to the inherent limitations of voice recognition software  Read the chart carefully and recognize, using context, where substitutions have occurred      Daniel Mane MD

## 2022-01-02 NOTE — ASSESSMENT & PLAN NOTE
Recent Labs     01/01/22 2022 01/01/22 2025 01/02/22  0433   K 5 5* 5 5* 5 5*       · In the setting of acute on chronic kidney disease    Plan:  · Continue sodium bicarb  · Monitor

## 2022-01-02 NOTE — ASSESSMENT & PLAN NOTE
Lab Results   Component Value Date    EGFR 7 01/02/2022    EGFR 6 01/01/2022    EGFR 7 01/01/2022    CREATININE 6 99 (H) 01/02/2022    CREATININE 7 37 (H) 01/01/2022    CREATININE 7 16 (H) 01/01/2022     Recent Labs     01/01/22  1419 01/01/22  1419 01/01/22 2022   CREATININE 7 37*  --  7 16*   EGFR 6   < > 7    < > = values in this interval not displayed       Estimated Creatinine Clearance: 11 7 mL/min (A) (by C-G formula based on SCr of 7 16 mg/dL (H))     · POA 7 3; (baseline 4 3-4 4)  · UA: Shows blood, protein and leukocytes  · Likely secondary to long-standing HTN  · Patient is in rapid response on 01/02  · Patient noted to be acidotic, volume overloaded, INR elevated    · Patient evaluated to need emergent dialysis      Plan:  · Pending UA w/ microscopic, Urinary retention protocol, Bladder scan, Daily weights, I/O  · Monitor BMP daily and observe for downward trend of creatinine  · Admit to the ICU for need of placement of hemodialysis line   · Initiate emergent hemodialysis after placement of hemodialysis access

## 2022-01-02 NOTE — ASSESSMENT & PLAN NOTE
· Received 1 unit PRBCs and 1 unit FFP in ED  · Concern on admission for occult bleed vs ESRD  · Plan:  · Monitor H&H q6   · Transfuse if Hb < 7 0  · F/u repeat FOBT   · Consent for transfusion obtained:  Yes   · GI consulted, appreciate recs

## 2022-01-02 NOTE — ASSESSMENT & PLAN NOTE
· Noted on abdominal CT in setting of possibly decreased synthetic liver function appreciated in lab work

## 2022-01-02 NOTE — ASSESSMENT & PLAN NOTE
· AST: 54   · ALT: 9  · INR: 6 78  · Suspicion for hepatorenal syndrome    Plan:   · Lactulose  · Albumin   · GI consulted, appreciate recs

## 2022-01-02 NOTE — ASSESSMENT & PLAN NOTE
Recent Labs     01/01/22  1419 01/01/22  1419 01/01/22 2022   CREATININE 7 37*  --  7 16*   EGFR 6   < > 7    < > = values in this interval not displayed  Estimated Creatinine Clearance: 11 7 mL/min (A) (by C-G formula based on SCr of 7 16 mg/dL (H))   POA 7 3; (baseline 4 3-4 4)   UA: Shows blood, protein and leukocytes       Likely secondary to long-standing HTN    Plan:   UA w/ microscopic, Urinary retention protocol, Bladder scan, Daily weights, I/O   IV Fluids: NS at 100 mL/hr with plan to switch to plasmalyte when K level improves   Monitor BMP daily and observe for downward trend of creatinine   Consideration for dialysis if no improvement in 48 hours per nephrology   Avoid hypoperfusion of the kidneys, minimize nephrotoxins   RAAS Blockers/Diuretics held: None

## 2022-01-02 NOTE — ASSESSMENT & PLAN NOTE
Recent Labs     01/01/22  1419 01/02/22  0453   INR 6 78* 5 63*     · History of DVT previously on Coumadin, started on Eliquis 5 mg b i d  during the previous admission for acute DVT  · Unclear etiology for supra therapeutic INR  · Received fresh frozen plasma yesterday and Eliquis was held since admission  · Patient will need dialysis and transitory HD catheter placement  Will order fresh frozen plasma and IV K vitamin once, and recheck INR in a few hours    INR still high, will reorder FFP

## 2022-01-02 NOTE — ASSESSMENT & PLAN NOTE
· Patient takes Eliquis 5 mg BID at home, INR noted to be 6 78 on admission     Plan:   · Hold eliquis   · Trend INR daily   · Management of hepatic dysfunction as above

## 2022-01-02 NOTE — ASSESSMENT & PLAN NOTE
Lab Results   Component Value Date    HGBA1C 12 2 (H) 12/18/2021       Recent Labs     01/01/22  2042 01/02/22  0732 01/02/22  1109 01/02/22  1234   POCGLU 188* 142* 292* 228*       Blood Sugar Average: Last 72 hrs:  (P) 139 [id number]

## 2022-01-02 NOTE — ASSESSMENT & PLAN NOTE
· Documented in history with no residual deficits  · Continue neuro checks in setting of encephalopathy

## 2022-01-02 NOTE — ASSESSMENT & PLAN NOTE
· Patient presents with altered mental status for the past 2 days per collateral from family at bedside  They report that he has been more lethargic and difficult to arouse  Unknown etiology possibly uremia in setting of end-stage renal disease versus hepatic encephalopathy     · AAOX3 but lethargic on admission  · Ammonia level:  48  · AST: 54   · ALT: 9  · INR: 6 78    Plan:  · Frequent neuro checks  · Address possible hepatorenal syndrome  · Lactulose BID  · Albumin 25% X3 doses  · GI consulted, appreciate reccomendations  · Nephrology following

## 2022-01-02 NOTE — ASSESSMENT & PLAN NOTE
· Noted on abdominal CT in setting of possibly decreased synthetic liver function appreciated in lab work     · Management as above

## 2022-01-02 NOTE — ASSESSMENT & PLAN NOTE
· AST: 54   · ALT: 9  · INR: 6 78  · Plan to:   · Lactulose  · Albumin   · GI consulted, appreciate recs

## 2022-01-02 NOTE — PROCEDURES
Temporary HD Catheter    Date/Time: 1/2/2022 4:41 PM  Performed by: Cat Kaplan MD  Authorized by: Cat Kaplan MD     Patient location:  Bedside  Other Assisting Provider: Yes (comment) Janis Paniagua    Consent:     Consent obtained:  Written and verbal    Consent given by:  Spouse    Risks discussed:  Incorrect placement, arterial puncture, nerve damage, pneumothorax, infection and bleeding    Alternatives discussed:  No treatment, delayed treatment and observation  Universal protocol:     Procedure explained and questions answered to patient or proxy's satisfaction: yes      Relevant documents present and verified: yes      Test results available and properly labeled: yes      Radiology Images displayed and confirmed  If images not available, report reviewed: yes      Required blood products, implants, devices, and special equipment available: yes      Site/side marked: yes      Immediately prior to procedure, a time out was called: yes      Patient identity confirmed:  Arm band  Pre-procedure details:     Hand hygiene: Hand hygiene performed prior to insertion      Sterile barrier technique: All elements of maximal sterile technique followed      Skin preparation:  ChloraPrep    Skin preparation agent: Skin preparation agent completely dried prior to procedure    Indications:     Central line indications: medications requiring central line and dialysis      Site selection rationale:  Patient had additional line placed in his right IJ  This line was not appropriate for dialysis  Therefore, dialysis line was placed in the left IJ  Sedation:     Sedation type: Anxiolysis  Anesthesia (see MAR for exact dosages):      Anesthesia method:  Local infiltration    Local anesthetic:  Lidocaine 1% w/o epi  Procedure details:     Location:  Left internal jugular    Vessel type: vein      Laterality:  Left    Approach: percutaneous technique used      Patient position:  Flat    Catheter type:  Double lumen    Catheter size:  12 5 Fr    Catheter length:  16 cm    Landmarks identified: yes      Ultrasound guidance: yes      Ultrasound image availability:  Images available in PACS    Sterile ultrasound techniques: Sterile gel and sterile probe covers were used      Number of attempts:  1    Successful placement: yes      Vessel of catheter tip end:  Superior vena cava  Post-procedure details:     Post-procedure:  Dressing applied and line sutured    Assessment:  Blood return through all ports, no pneumothorax on x-ray, placement verified by x-ray and free fluid flow    Post-procedure complications: none      Patient tolerance of procedure:   Tolerated well, no immediate complications

## 2022-01-03 PROBLEM — L03.116 CELLULITIS OF LEFT LOWER EXTREMITY: Status: ACTIVE | Noted: 2022-01-01

## 2022-01-03 NOTE — UTILIZATION REVIEW
Initial Clinical Review    Admission: Date/Time/Statement:   Admission Orders (From admission, onward)     Ordered        01/01/22 1700  Inpatient Admission  Once                      Orders Placed This Encounter   Procedures    Inpatient Admission     Standing Status:   Standing     Number of Occurrences:   1     Order Specific Question:   Level of Care     Answer:   Level 2 Stepdown / HOT [14]     Order Specific Question:   Estimated length of stay     Answer:   More than 2 Midnights     Order Specific Question:   Certification     Answer:   I certify that inpatient services are medically necessary for this patient for a duration of greater than two midnights  See H&P and MD Progress Notes for additional information about the patient's course of treatment  ED Arrival Information     Expected Arrival Acuity    - 1/1/2022 12:35 Urgent         Means of arrival Escorted by Service Admission type    Ambulance [facility]ulance Critical Care/ICU Urgent         Arrival complaint    AMS        Chief Complaint   Patient presents with    Altered Mental Status     Pt presents via EMS, coming from [facility]  Staff reported pt was altered  GCS 15 in triage  Pt has a DVT in left leg & recent staph infection  Initial Presentation:   78  Y O male presents to ED via  EMS from SNF with altered mental status  Per wife, patient has been difficult to arouse and intermittently confused for past 2 days  Labs  Reveal ammonia level   48,  INR   6 78,    Creatinine   7 16,   Hemoglobin   6 6  PMH  Is  CVA,  DM2, CAD, CKD  Stage 3 and recent admission  For  Bacteremia  CT scan shows  Hepatic mass  GCS  15  Admit  IP   ICU LOC  With Acute/chronic renal failure, Encephalopathy,  Anemia, abnormal  LFT's and Hyperkalemia and plan is  GI consult, monitor labs, S/P  1 U  FFP  And 1 U PRBC  In ED,  IV  Albumin X 3 doses, lactulose,  Neuro  Checks  And IVF        Nephrology consult  ( 1/1)     Baseline creatinine 1 7,  Currently  7 3 on admission  No indication for urgent dialysis  Continue   IVF  NS   @  100/hr  Switch to plasmalyte when potassium improves  Date:    1/2       Day 2:   Starting  Dialysis  As patient more uremic  Needs temporary HD  Catheter  IVF  Switched to  Sodium bicarb  Rapid response called  1/2   12  Lethargic, clearly  Encephalopathic  Shallow breaths, rales on exam   3+ B/L LE  Edema  Attempting to get  HD  Access  Patient  Desatted,  Rapid response called  Now will require emergent management,  Volume overloaded, acidotic, encephalopathic  FFP ordered, received   Vit  K  And Kcentra  Started on  Bipap  Temporary  Dialysis catheter inserted  1/2  GI consult  ( 1/2)     Continue to monitor hepatic  Function,  INR  Continue lactulose, suspect encephalopathy  Due to  Worsening renal function  Recommend  Ct liver  Monitor  Hmgb, baseline  hmgb  11  INR  Improved  First  HD   1/2      ED Triage Vitals   Temperature Pulse Respirations Blood Pressure SpO2   01/01/22 1241 01/01/22 1241 01/01/22 1241 01/01/22 1241 01/01/22 1241   98 7 °F (37 1 °C) 65 18 149/70 100 %      Temp Source Heart Rate Source Patient Position - Orthostatic VS BP Location FiO2 (%)   01/01/22 1241 01/01/22 1241 01/01/22 1241 01/01/22 1241 01/02/22 1510   Oral Monitor Lying Right arm 60      Pain Score       01/01/22 1241       3          Wt Readings from Last 1 Encounters:   01/03/22 113 kg (248 lb 10 9 oz)     Additional Vital Signs:   /02/22 07:12:41 98 4 °F (36 9 °C) 78 18 161/80 107 93 % -- -- -- -- -- --   01/02/22 01:49:12 98 2 °F (36 8 °C) 69 18 148/70 96 96 % -- -- -- -- -- --   01/02/22 0125 -- 74 -- --  -- -- -- -- -- -- -- --   01/02/22 0110 -- 71 -- -- -- -- -- -- -- -- -- --   01/02/22 0055 98 4 °F (36 9 °C) 66 18 164/68 100 94 % -- -- -- -- -- --   01/02/22 0040 98 2 °F (36 8 °C) -- 18 154/69 97 -- -- -- -- -- -- --   01/02/22 0025 98 4 °F (36 9 °C) 68 18 143/75 99 97 % -- -- -- -- -- --   01/02/22 00:13:37 98 1 °F (36 7 °C) 69 -- 155/71 99 95 % -- -- -- -- -- --   01/02/22 0013 98 1 °F (36 7 °C) 65 18 155/71 -- -- -- -- -- -- -- --   01/02/22 00:00:55 -- 68 -- 159/70 100 96 % -- -- -- -- -- --   01/01/22 22:23:39 98 3 °F (36 8 °C) 66 18 147/68 94 89 % Abnormal  -- -- -- -- -- --   01/01/22 2200 98 3 °F (36 8 °C) -- 18 147/68 -- 92 % -- -- -- -- -- Lying   01/01/22 2145 98 5 °F (36 9 °C) -- 18 181/70 Abnormal  -- 90 % -- -- -- -- -- --   01/01/22 2130 98 9 °F (37 2 °C) 72 18 175/72 Abnormal  -- 92 % -- -- -- -- -- --   01/01/22 2115 98 4 °F (36 9 °C) 70 18 171/68 Abnormal  -- 97 % -- -- -- -- -- --   01/01/22 2100 98 6 °F (37 °C) 72 18 142/68 -- 94 % -- -- -- None (Room air) -- --   01/01/22 2046 98 2 °F (36 8 °C) 66 18 162/69 73 96 % -- -- -- -- -- Lying   01/01/22 19:30:05 98 °F (36 7 °C) 68 -- 138/60 86 94 % -- -- -- -- -- --   01/01/22 19:17:02 98 2 °F (36 8 °C) 70 -- 130/62 85 93 % -- -- -- None (Room air) -- --   01/01/22 1905 -- -- 18 -- -- -- -- -- -- -- -- --   01/01/22 19:02:36 98 2 °F (36 8 °C) 64 -- 148/66 93 94 % -- -- -- -- -- --   01/01/22 18:18:17 98 5 °F (36 9 °C) 65 18 152/64 93 95 % -- -- -- -- -- --   01/01/22 1800 -- 65 -- 149/64 -- 92 % -- -- -- Nasal cannula -- Sitting   01/01/22 1541 -- 65 -- 170/78 -- 99 % -- 28 2 L/min Nasal cannula -- Lying   01/01/22 1241 98 7 °F (37 1 °C) 65 18 149/70 -- 100 % -- 28 2 L/min Nasal cannula -- Lying       Pertinent Labs/Diagnostic Test Results:   EKG  ( 1/1)   NSR  No peaked/hyperacute T waves      HR  62  CT  Chest/abd/pelvis    ( 1/1)     Unchanged moderate bilateral water density pleural effusions with associated compressive atelectasis of both lower lobes   No evidence of pneumonia       Unchanged subcapsular and suprarenal hematoma around the right kidney        Suspected infiltrative neoplasm of the liver seen on prior contrast-enhanced CT is not well-visualized on this noncontrast CT  Ct  Head  ( 1/1)  No acute intracranial abnormality  CXR  ( 1/2)    Life-support lines as above   No pneumothorax  Suspect mild pulmonary venous congestion and left pleural effusion  Results from last 7 days   Lab Units 01/01/22  1419   SARS-COV-2  Negative     Results from last 7 days   Lab Units 01/03/22 0435 01/02/22 2031 01/02/22  1653 01/02/22  1249 01/02/22 0438 01/01/22 2022 01/01/22 2022 01/01/22 1419 01/01/22  1419 12/29/21  0440 12/29/21 0440 12/28/21  0535 12/27/21 2009   WBC Thousand/uL 7 58 8 60  --   --   --   --  11 19*   < > 11 55*   < > 9 71   < > 10 80*   HEMOGLOBIN g/dL 7 9* 7 9* 6 8*  --  8 4*   < > 4 2*   < > 6 6*   < > 7 8*   < > 6 8*   I STAT HEMOGLOBIN g/dl  --   --   --  7 8*  --   --   --   --   --   --   --   --   --    HEMATOCRIT % 25 6* 25 5* 22 4*  --  27 6*   < > 14 4*   < > 21 7*   < > 25 2*   < > 22 3*   HEMATOCRIT, ISTAT %  --   --   --  23*  --   --   --   --   --   --   --   --   --    PLATELETS Thousands/uL 350 391* 386  --   --    < > 445*   < > 502*   < > 464*   < > 506*   NEUTROS ABS Thousands/µL 5 68  --   --   --   --   --  9 06*  --  9 45*  --   --   --   --    BANDS PCT %  --  9*  --   --   --   --   --   --   --   --  4  --  3    < > = values in this interval not displayed           Results from last 7 days   Lab Units 01/03/22 0435 01/02/22 2031 01/02/22  1249 01/02/22 0453 01/02/22 0433 01/01/22 2025 01/01/22 2022 01/01/22 2022   SODIUM mmol/L 138 137  --   --  138 139  --  138   POTASSIUM mmol/L 4 8 4 9  --   --  5 5* 5 5*  --  5 5*   CHLORIDE mmol/L 105 104  --   --  100 108  --  108   CO2 mmol/L 22 23  --   --  13* 17*   < > 18*   CO2, I-STAT mmol/L  --   --  20*  --   --   --   --   --    ANION GAP mmol/L 11 10  --   --  25* 14*  --  12   BUN mg/dL 69* 68*  --   --  81* 86*  --  83*   CREATININE mg/dL 6 44* 6 15*  --   --  6 99* 7 37*  --  7 16*   EGFR ml/min/1 73sq m 8 8  --   --  7 6  --  7   CALCIUM mg/dL 8 2* 8 2*  --   --  6 9* 7 8*  --  7 9*   MAGNESIUM mg/dL 1 8  --   --  2 0 --   --   --   --    PHOSPHORUS mg/dL 6 0*  --   --  6 9*  --   --   --   --     < > = values in this interval not displayed       Results from last 7 days   Lab Units 01/03/22  0435 01/02/22 2031 01/02/22  0433 01/01/22 2022 01/01/22  1501 01/01/22  1419   AST U/L 38 42 45 50*  --  54*   ALT U/L 9* 10* 28 10*  --  9*   ALK PHOS U/L 53 58 53 57  --  68   TOTAL PROTEIN g/dL 7 1 7 5 10 3* 6 9  --  7 7   ALBUMIN g/dL 2 0* 2 2* 5 7* 1 5*  --  1 6*   TOTAL BILIRUBIN mg/dL 0 60 0 77 0 54 0 49  --  0 46   AMMONIA umol/L  --   --   --   --  48*  --      Results from last 7 days   Lab Units 01/03/22  0521 01/02/22 2036 01/02/22  1633 01/02/22  1234 01/02/22  1109 01/02/22  0732 01/01/22 2042 01/01/22  1827 01/01/22  1457 12/29/21  1056 12/29/21  0733 12/28/21  2236   POC GLUCOSE mg/dl 128 135 179* 228* 292* 142* 188* 134 109 157* 94 124     Results from last 7 days   Lab Units 01/03/22 0435 01/02/22 2031 01/02/22  0433 01/01/22 2025 01/01/22 2022 01/01/22  1419 12/29/21  0440 12/28/21  1321 12/28/21  0535 12/27/21 2010   GLUCOSE RANDOM mg/dL 107 132 106 105 107 91 95 179* 150* 209*              Results from last 7 days   Lab Units 01/01/22  1501   PH DHARMESH  7 338   PCO2 DHARMESH mm Hg 31 3*   PO2 DHARMESH mm Hg 159 6*   HCO3 DHARMESH mmol/L 16 4*   BASE EXC DHARMESH mmol/L -8 5   O2 CONTENT DHARMESH ml/dL 10 6   O2 HGB, VENOUS % 94 2*     Results from last 7 days   Lab Units 01/02/22  1249   I STAT BASE EXC mmol/L -9*   I STAT O2 SAT % 96*   ISTAT PH ART  7 186*   I STAT ART PCO2 mm HG 49 8*   I STAT ART PO2 mm  0   I STAT ART HCO3 mmol/L 18 8*     Results from last 7 days   Lab Units 01/02/22  0433 01/01/22  1419   CK TOTAL U/L 121 165   CK MB INDEX %  --  <1 0   CK MB ng/mL  --  1 1             Results from last 7 days   Lab Units 01/03/22  0435 01/02/22  1653 01/02/22  0453 01/01/22  1419 01/01/22  1419   PROTIME seconds 17 3* 18 9* 49 4*   < > 56 9*   INR  1 43* 1 60* 5 63*   < > 6 78*   PTT seconds  --   --   --   --  82*    < > = values in this interval not displayed  Results from last 7 days   Lab Units 01/02/22  0438 01/01/22  1424   PROCALCITONIN ng/ml 1 34* 1 42*     Results from last 7 days   Lab Units 01/01/22  1419   LACTIC ACID mmol/L 0 6             Results from last 7 days   Lab Units 01/01/22  1419   NT-PRO BNP pg/mL 16,536*     Results from last 7 days   Lab Units 12/28/21  1321   FERRITIN ng/mL 747*         Results from last 7 days   Lab Units 01/01/22  1419   LIPASE u/L 626*                 Results from last 7 days   Lab Units 01/01/22  1531   CLARITY UA  Cloudy   COLOR UA  Yellow   SPEC GRAV UA  1 020   PH UA  6 0   GLUCOSE UA mg/dl Negative   KETONES UA mg/dl Negative   BLOOD UA  Large*   PROTEIN UA mg/dl 100 (2+)*   NITRITE UA  Negative   BILIRUBIN UA  Negative   UROBILINOGEN UA E U /dl 0 2   LEUKOCYTES UA  Moderate*   WBC UA /hpf 10-20*   RBC UA /hpf 10-20*   BACTERIA UA /hpf Occasional   EPITHELIAL CELLS WET PREP /hpf Occasional     Results from last 7 days   Lab Units 01/01/22  1419   INFLUENZA A PCR  Negative   INFLUENZA B PCR  Negative   RSV PCR  Negative             Results from last 7 days   Lab Units 01/01/22  2022   ACETAMINOPHEN LVL ug/mL 4 7*                 Results from last 7 days   Lab Units 01/01/22  1531 01/01/22  1501 01/01/22  1419   BLOOD CULTURE   --  No Growth at 24 hrs  No Growth at 24 hrs     URINE CULTURE  Culture too young- will reincubate  --   --                ED Treatment:   Medication Administration from 01/01/2022 1234 to 01/01/2022 1816       Date/Time Order Dose Route Action Comments     01/01/2022 1543 dextrose 25 % IV solution 50 mL 50 mL Intravenous Not Given D/C'd     01/01/2022 1551 insulin regular (HumuLIN R,NovoLIN R) injection 5 Units 5 Units Intravenous Given      01/01/2022 1546 dextrose 50 % IV solution 50 mL 50 mL Intravenous Given      01/01/2022 1728 sodium chloride 0 9 % infusion 100 mL/hr Intravenous New Bag         Present on Admission:   Anemia   Right Liver mass   CVA (cerebral vascular accident) [facility])      Admitting Diagnosis: Hyperuricemia [E79 0]  Liver dysfunction [K76 89]  Hyperammonemia ([facility] [address] ) [E72 20]  Anemia [D64 9]  Change in mental status [R41 82]  AMS (altered mental status) [R41 82]  Stage 3 chronic kidney disease, unspecified whether stage 3a or 3b CKD ([facility] [address] ) [N18 30]  Age/Sex: 78 y o  male  Admission Orders:  Scheduled Medications:  amLODIPine, 5 mg, Oral, Daily  aspirin, 81 mg, Oral, Daily  atorvastatin, 40 mg, Oral, QPM  ceFAZolin, 1,000 mg, Intravenous, Q12H  cyanocobalamin, 1,000 mcg, Oral, Daily  ezetimibe, 10 mg, Oral, HS  insulin glargine, 32 Units, Subcutaneous, HS  insulin lispro, 1-5 Units, Subcutaneous, HS  insulin lispro, 1-6 Units, Subcutaneous, TID AC  lactulose, 10 g, Oral, BID  lactulose, 20 g, Oral, BID  metoprolol tartrate, 50 mg, Oral, BID  nystatin, , Topical, BID  pantoprazole, 40 mg, Oral, Daily  sodium bicarbonate, 650 mg, Oral, TID after meals      Continuous IV Infusions:  niCARdipine, 1-15 mg/hr, Intravenous, Titrated      PRN Meds:  acetaminophen, 975 mg, Oral, Q8H PRN  docusate sodium, 100 mg, Oral, BID PRN    fall precautions  Dysphagia eval  Neuro checks  q 4 hrs    IP CONSULT TO NEPHROLOGY  IP CONSULT TO GASTROENTEROLOGY  IP CONSULT TO CASE MANAGEMENT    Network Utilization Review Department  ATTENTION: Please call with any questions or concerns to [phone] and carefully listen to the prompts so that you are directed to the right person  All voicemails are confidential   Julifaby Yarelis all requests for admission clinical reviews, approved or denied determinations and any other requests to dedicated fax number below belonging to the campus where the patient is receiving treatment   List of dedicated fax numbers for the Facilities:  [address] [place] [address] DENIALS (Administrative/Medical Necessity) [phone]   [address] (Maternity/NICU/Pediatrics) [phone]   [address] [place] [address] [phone] [address]  [phone]   Manuel Saldana [address] [facility] [address] [id number] [place] [address] [address] [place] [id number] [phone]

## 2022-01-03 NOTE — CONSULTS
Consultation - General Surgery   Zoe Ernandez 78 y o  male MRN: [id number]  Unit/Bed#: ICU 08 Encounter: [id number]    Assessment/Plan     Assessment:  Zoe Ernandez is a 78 y o  male with left leg pain in setting of known acute distal LLE DVT (12/24 LEVD) and recent cellulitis - now r/o NSTI  Plan:  · Low suspicion for NSTI  Suspect pain likely due to DVT in setting of held anticoagulation  · Patient currently not anticoagulated and s/p reversal with KCENTRA given R renal hematoma and ?GI bleed  · Otherwise, recommend compression / elevation and anticoagulation per primary  · If clinical concern for NSTI persists, consider CT imaging of lower extremity  · Please call with questions or concerns  History of Present Illness     HPI:  Zoe Ernandez is a 78 y o  male w/ DM2, CKD and recently [facility] from [facility] (12[facility] and readmitted from nursing facility for altered mental status  At the time of admission he was noted to have an elevated INR and new onset anemia  On the floor he was a rapid response on 1/2 for altered mental status and hypoxia  His INR was elevated and he was subsequently reversed with vitamin K and KCENTRA  Patient transferred to ICU for emergent hemodialysis  Now complaining today of left lower left pain  Patient has no recent fevers, leukocytosis, elevated lactate and scores low on LRINEC  Plain radiographs were performed earlier with, per CC notes, concern for stranding and possible NSTI  Orthopedic surgery was curbsided and recommended General surgery consultation  Inpatient consult to Acute Care Surgery  Consult performed by: Lucas Herring MD  Consult ordered by: Genia Stephens MD          Review of Systems   Constitutional: Negative for chills and fever  HENT: Negative  Eyes: Negative  Cardiovascular: Positive for leg swelling  Gastrointestinal: Negative for nausea and vomiting  Endocrine: Negative  Genitourinary: Negative  Skin: Negative for wound  Allergic/Immunologic: Negative  Neurological: Negative for weakness and numbness  Hematological: Negative  Psychiatric/Behavioral: Negative          Historical Information   Past Medical History:   Diagnosis Date    Bell's palsy     Cardiac disease     Cerebellar stroke ([facility] [address] )     Diabetes mellitus ([facility] [address] )     Fracture     L hip    Hyperlipidemia     Hypertension     Renal disorder     Stroke ([facility] [address] )     2013    Stroke [facility])     [id number]     Past Surgical History:   Procedure Laterality Date    CORONARY ANGIOPLASTY WITH STENT PLACEMENT      FRACTURE SURGERY      L femur    INCISION AND DRAINAGE OF WOUND Right 9/13/2016    Procedure: INCISION AND DRAINAGE (I&D) EXTREMITY, right lateral ankle;  Surgeon: Janice Enriquez DPM;  [facility];  Service:     IR EMBOLIZATION (SPECIFY VESSEL OR SITE)  12/18/2021    IR TEMPORARY DIALYSIS CATHETER PLACEMENT  12/18/2021    IR TUNNELED CENTRAL LINE PLACEMENT  12/27/2021    WOUND DEBRIDEMENT Right 9/15/2016    Procedure: DEBRIDEMENT WOUND [facility] OUT) ankle wound with closure and FRANCY drain placement;  Surgeon: Janice Enriquez DPM;  [facility];  Service:      Social History   Social History     Substance and Sexual Activity   Alcohol Use No     Social History     Substance and Sexual Activity   Drug Use No     E-Cigarette/Vaping    E-Cigarette Use Never User      E-Cigarette/Vaping Substances     Social History     Tobacco Use   Smoking Status Former Smoker    Packs/day: 0 00    Types: Cigarettes    Quit date: 9/3/2006    Years since quitting: 15 3   Smokeless Tobacco Never Used     Family History:   Family History   Problem Relation Age of Onset    Diabetes Mother     Stroke Mother        Meds/Allergies   current meds:   Current Facility-Administered Medications   Medication Dose Route Frequency    acetaminophen (TYLENOL) tablet 975 mg  975 mg Oral Q8H PRN    [START ON 1/4/2022] amLODIPine (NORVASC) tablet 10 mg  10 mg Oral Daily    aspirin chewable tablet 81 mg  81 mg Oral Daily    atorvastatin (LIPITOR) tablet 40 mg  40 mg Oral QPM    carvedilol (COREG) tablet 25 mg  25 mg Oral BID With Meals    ceFAZolin (ANCEF) IVPB (premix in dextrose) 1,000 mg 50 mL  1,000 mg Intravenous Q12H    cyanocobalamin (VITAMIN B-12) tablet 1,000 mcg  1,000 mcg Oral Daily    docusate sodium (COLACE) capsule 100 mg  100 mg Oral BID PRN    ezetimibe (ZETIA) tablet 10 mg  10 mg Oral HS    insulin glargine (LANTUS) subcutaneous injection 32 Units 0 32 mL  32 Units Subcutaneous HS    insulin lispro (HumaLOG) 100 units/mL subcutaneous injection 1-5 Units  1-5 Units Subcutaneous HS    insulin lispro (HumaLOG) 100 units/mL subcutaneous injection 1-6 Units  1-6 Units Subcutaneous TID AC    lactulose oral solution 10 g  10 g Oral BID    niCARdipine (CARDENE) 25 mg (STANDARD CONCENTRATION) in sodium chloride 0 9% 250 mL  1-15 mg/hr Intravenous Titrated    nystatin (MYCOSTATIN) powder   Topical BID    pantoprazole (PROTONIX) EC tablet 40 mg  40 mg Oral Daily    polyethylene glycol (MIRALAX) packet 17 g  17 g Oral Daily    sodium bicarbonate tablet 650 mg  650 mg Oral TID after meals     Allergies   Allergen Reactions    Ace Inhibitors Other (See Comments)    Ciprofloxacin Other (See Comments)    Penicillins Hives    Morphine And Related Anxiety       Objective   First Vitals:   Blood Pressure: 149/70 (01/01/22 1241)  Pulse: 65 (01/01/22 1241)  Temperature: 98 7 °F (37 1 °C) (01/01/22 1241)  Temp Source: Oral (01/01/22 1241)  Respirations: 18 (01/01/22 1241)  Height: 5' 7" (170 2 cm) (01/01/22 1241)  Weight - Scale: 107 kg (235 lb 14 3 oz) (01/01/22 1241)  SpO2: 100 % (01/01/22 1241)    Current Vitals:   Blood Pressure: 137/65 (01/03/22 1500)  Pulse: 63 (01/03/22 1500)  Temperature: 98 °F (36 7 °C) (01/03/22 1151)  Temp Source: Oral (01/03/22 1151)  Respirations: (!) 26 (01/03/22 1500)  Height: 5' 7" (170 2 cm) (01/03/22 1051)  Weight - Scale: 113 kg (248 lb 10 9 oz) (01/03/22 0532)  SpO2: 97 % (01/03/22 1200)      Intake/Output Summary (Last 24 hours) at 1/3/2022 1512  Last data filed at 1/3/2022 1330  Gross per 24 hour   Intake 1581 67 ml   Output 2200 ml   Net -618 33 ml       Invasive Devices  Report    Central Venous Catheter Line            CVC Central Lines 12/27/21 Double Right 7 days          Peripheral Intravenous Line            Peripheral IV 01/01/22 Right Antecubital 2 days          Hemodialysis Catheter            HD Temporary Double Catheter <1 day          Drain            Urethral Catheter Temperature probe 16 Fr  15 days                Physical Exam  GEN: NAD  CV: warm/well perfused  Lung: normal effort  Ab: Soft, NT/ND  Extrem: Bilateral edema  Left lower leg soft, without crepitus, non-erythematous, compartments soft  Mildly tender at distal left thigh  Right lower leg unremarkable  Neuro: awake/alert and answering questions appropriately, motor and sensation grossly intact    Lab Results:   CBC:   Lab Results   Component Value Date    WBC 7 58 01/03/2022    HGB 7 8 (L) 01/03/2022    HCT 25 7 (L) 01/03/2022    MCV 96 01/03/2022     01/03/2022    MCH 29 6 01/03/2022    MCHC 30 9 (L) 01/03/2022    RDW 17 7 (H) 01/03/2022    MPV 8 9 01/03/2022    NRBC 0 01/03/2022   , CMP:   Lab Results   Component Value Date    SODIUM 138 01/03/2022    K 4 8 01/03/2022     01/03/2022    CO2 22 01/03/2022    BUN 69 (H) 01/03/2022    CREATININE 6 44 (H) 01/03/2022    CALCIUM 8 2 (L) 01/03/2022    AST 38 01/03/2022    ALT 9 (L) 01/03/2022    ALKPHOS 53 01/03/2022    EGFR 8 01/03/2022   , Coagulation:   Lab Results   Component Value Date    INR 1 43 (H) 01/03/2022   , Urinalysis: No results found for: Pilar Mohsen, SPECGRAV, PHUR, LEUKOCYTESUR, NITRITE, PROTEINUA, GLUCOSEU, KETONESU, BILIRUBINUR, BLOODU, Lipase: No results found for: LIPASE  Imaging: I have personally reviewed pertinent reports     and I have personally reviewed pertinent films in PACS  XR chest portable   Final Result by Law Mar DO (01/02 2247)      Life-support lines as above  No pneumothorax  Suspect mild pulmonary venous congestion and left pleural effusion  Workstation performed: [id number]         CT head without contrast   Final Result by Sadi Paris MD (01/01 1501)      No acute intracranial abnormality  Unchanged encephalomalacia secondary to chronic right cerebellar infarct (series 2 image 7 )               Workstation performed: [id number]         CT chest abdomen pelvis wo contrast   Final Result by Sadi Paris MD (01/01 1543)      Unchanged moderate bilateral water density pleural effusions with associated compressive atelectasis of both lower lobes  No evidence of pneumonia  Unchanged subcapsular and suprarenal hematoma around the right kidney  Suspected infiltrative neoplasm of the liver seen on prior contrast-enhanced CT is not well-visualized on this noncontrast CT        Workstation performed: [id number]         XR abdomen obstruction series    (Results Pending)   XR knee 1 or 2 vw left    (Results Pending)

## 2022-01-03 NOTE — PLAN OF CARE
Post-Dialysis RN Treatment Note    Blood Pressure:  Pre 145/65 mm/Hg  Post 155/72 mmHg   EDW  tbd kg    Weight:  Pre 113 kg   Post 112 5 kg   Mode of weight measurement: Bed Scale   Volume Removed  1000 ml    Treatment duration 120 minutes    NS given  No    Treatment shortened? No   Medications given during Rx None Reported   Estimated Kt/V  Not Applicable   Access type: Temporary HD catheter   Access Issues: No    Report called to primary nurse   Yes Mayela Hoover RN    Receiving tx as ordered  UF goal 1000cc as tolerated  Serum K 4 8, 2K bath in use  Tolerated 2nd HD tx w/o issues     Problem: METABOLIC, FLUID AND ELECTROLYTES - ADULT  Goal: Electrolytes maintained within normal limits  Description: INTERVENTIONS:  - Monitor labs and assess patient for signs and symptoms of electrolyte imbalances  - Administer electrolyte replacement as ordered  - Monitor response to electrolyte replacements, including repeat lab results as appropriate  - Instruct patient on fluid and nutrition as appropriate  Outcome: Progressing  Goal: Fluid balance maintained  Description: INTERVENTIONS:  - Monitor labs   - Monitor I/O and WT  - Instruct patient on fluid and nutrition as appropriate  - Assess for signs & symptoms of volume excess or deficit  Outcome: Progressing

## 2022-01-03 NOTE — QUICK NOTE
Our ICU team obtained a curbside consult Ortho for possible concern about [facility] in setting of recent hospitalization due to left lower extremity cellulitis  Patient has been complaining of left leg pain since this morning, which significantly worsened in the afternoon  Xray shows possible stranding as per ICU team, pending official read as of 1/2/22 1500  Discussed imaging, case with Ortho  As per Ortho's recs, will reach out to Gen Surg for consult to evaluate for nec fasc

## 2022-01-03 NOTE — CASE MANAGEMENT
Case Management Assessment & Discharge Planning Note    Patient name Vicente Naylor  Location ICU 08/ICU 08 MRN [id number]  : 1955 Date 1/3/2022       Current Admission Date: 2022  Current Admission Diagnosis:Encephalopathy   Patient Active Problem List    Diagnosis Date Noted    Supratherapeutic INR 2022    Hematuria 2022    Hyperkalemia 2022    MULUGETA (acute kidney injury) ([facility] ) 2022    Encephalopathy 2022    Abnormal liver function 2022    Renal failure (ARF), acute on chronic ([facility] ) 2022    Acute deep vein thrombosis (DVT) of popliteal vein of left lower extremity ([place] [address] ) 2021    Right Liver mass     Metabolic acidosis     Acute kidney injury superimposed on chronic kidney disease ([place] [address] ) 2021    Anemia 2021    Staphylococcus aureus bacteremia 2021    Dizziness 2020    Class 3 severe obesity in adult ([facility] ) 2020    Staphylococcus aureus bacteremia 2020    Chronic venous stasis dermatitis of both lower extremities 2018    Hypertension     Hyperlipidemia     CVA (cerebral vascular accident) ([facility] )     History of DVT of lower extremity 10/05/2016    Hypoalbuminemia 2016    Open wound of left lower extremity 2016    Right renal subcapsular hematoma 2016    History () of right cerebellar CVA 2016    Coronary artery disease involving native heart 2016    Diplopia 2016    Type 2 diabetes mellitus, with long-term current use of insulin ([facility] ) 2016    MULUGETA on CKD (chronic kidney disease) stage 3, GFR 30-59 ml/min 2016    Morbid obesity with BMI of 40 0-44 9, adult ([facility] ) 2016    Essential hypertension 2016    MELINDA (obstructive sleep apnea) 2016    Cerebrovascular accident (CVA) due to thrombosis ([facility] ) 2016      LOS (days): 2  Geometric Mean LOS (GMLOS) (days):   Days to GMLOS:     OBJECTIVE:  PATIENT READMITTED TO HOSPITAL  Risk of Unplanned Readmission Score: 32         Current admission status: Inpatient       Preferred Pharmacy:   [address] [facility] [place] - [address] [facility]  [address] [facility]  [address]  Phone: [phone] Fax: [address]  [place], [place] - [address]  [address] [facility] [address]  Phone: [phone] Fax: [phone]    Primary Care Provider: Lucy Larson MD    Primary Insurance: [facility]  Secondary Insurance: [place] [employer]    ASSESSMENT:  Brad Combs [address] Representative - Spouse   Primary Phone: [phone] (Home)                         Readmission Root Cause  30 Day Readmission: Yes  Who directed you to return to the hospital?: Other (comment) ([facility])  Did you understand whom to contact if you had questions or problems?: Yes  Did you get your prescriptions before you left the hospital?: No  Reason[de-identified] Preference for own pharmacy  Were you able to get your prescriptions filled when you left the hospital?: Yes  Did you take your medications as prescribed?: Yes  Were you able to get to your follow-up appointments?: Yes  During previous admission, was a post-acute recommendation made?: Yes  What post-acute resources were offered?: STR (Pt was discharged to [facility])  Patient was readmitted due to: Increase confusion    Patient Information  Admitted from[de-identified] Facility  Mental Status: Confused  During Assessment patient was accompanied by: Not accompanied during assessment  Assessment information provided by[de-identified] Other - please comment  Primary Caregiver: Other (Comment)  Caregiver's Name[de-identified] Rajat Stamp Relationship to Patient[de-identified] Other (Specify)  Caregiver's Telephone Number[de-identified] [phone]  [place] of Residence: [address] [facility] do you live in?: Floral entry access options   Select all that apply : No steps to enter home  Type of Current Residence: Facility  Upon entering residence, is there a bedroom on the main floor (no further steps)?: Yes  Upon entering residence, is there a bathroom on the main floor (no further steps)?: Yes  Living Arrangements: Other (Comment)  Is patient a ?: No    Activities of Daily Living Prior to Admission  Functional Status: Total dependent  Completes ADLs independently?: No  Level of ADL dependence: Total Dependent  Ambulates independently?: No  Level of ambulatory dependence: Total Dependent         Patient Information Continued  Income Source: SSI/SSD  Does patient have prescription coverage?: Yes      DISCHARGE DETAILS:       Freedom of Choice: Yes  Comments - Freedom of Choice: Pt was readmitted from [facility]      Other Referral/Resources/Interventions Provided:  Interventions: SNF  Referral Comments: Referral made to [address] [place] [address] for verification of the Pt's current bed status at their facility           Treatment Team Recommendation: SNF  Discharge Destination Plan[de-identified] SNF  Transport at Discharge : BLS Ambulance

## 2022-01-03 NOTE — PLAN OF CARE
Problem: MOBILITY - ADULT  Goal: Maintain or return to baseline ADL function  Description: INTERVENTIONS:  -  Assess patient's ability to carry out ADLs; assess patient's baseline for ADL function and identify physical deficits which impact ability to perform ADLs (bathing, care of mouth/teeth, toileting, grooming, dressing, etc )  - Assess/evaluate cause of self-care deficits   - Assess range of motion  - Assess patient's mobility; develop plan if impaired  - Assess patient's need for assistive devices and provide as appropriate  - Encourage maximum independence but intervene and supervise when necessary  - Involve family in performance of ADLs  - Assess for home care needs following discharge   - Consider OT consult to assist with ADL evaluation and planning for discharge  - Provide patient education as appropriate  Outcome: Progressing  Goal: Maintains/Returns to pre admission functional level  Description: INTERVENTIONS:  - Perform BMAT or MOVE assessment daily    - Set and communicate daily mobility goal to care team and patient/family/caregiver  - Collaborate with rehabilitation services on mobility goals if consulted  - Perform Range of Motion  times a day  - Reposition patient every  hours    - Dangle patient  times a day  - Stand patient  times a day  - Ambulate patient  times a day  - Out of bed to chair  times a day   - Out of bed for meals  times a day  - Out of bed for toileting  - Record patient progress and toleration of activity level   Outcome: Progressing     Problem: Potential for Falls  Goal: Patient will remain free of falls  Description: INTERVENTIONS:  - Educate patient/family on patient safety including physical limitations  - Instruct patient to call for assistance with activity   - Consult OT/PT to assist with strengthening/mobility   - Keep Call bell within reach  - Keep bed low and locked with side rails adjusted as appropriate  - Keep care items and personal belongings within reach  - Initiate and maintain comfort rounds  - Make Fall Risk Sign visible to staff  - Offer Toileting every  Hours, in advance of need  - Initiate/Maintain alarm  - Obtain necessary fall risk management equipment  - Apply yellow socks and bracelet for high fall risk patients  - Consider moving patient to room near nurses station  Outcome: Progressing     Problem: Prexisting or High Potential for Compromised Skin Integrity  Goal: Skin integrity is maintained or improved  Description: INTERVENTIONS:  - Identify patients at risk for skin breakdown  - Assess and monitor skin integrity  - Assess and monitor nutrition and hydration status  - Monitor labs   - Assess for incontinence   - Turn and reposition patient  - Assist with mobility/ambulation  - Relieve pressure over bony prominences  - Avoid friction and shearing  - Provide appropriate hygiene as needed including keeping skin clean and dry  - Evaluate need for skin moisturizer/barrier cream  - Collaborate with interdisciplinary team   - Patient/family teaching  - Consider wound care consult   Outcome: Progressing     Problem: Nutrition/Hydration-ADULT  Goal: Nutrient/Hydration intake appropriate for improving, restoring or maintaining nutritional needs  Description: Monitor and assess patient's nutrition/hydration status for malnutrition  Collaborate with interdisciplinary team and initiate plan and interventions as ordered  Monitor patient's weight and dietary intake as ordered or per policy  Utilize nutrition screening tool and intervene as necessary  Determine patient's food preferences and provide high-protein, high-caloric foods as appropriate       INTERVENTIONS:  - Monitor oral intake, urinary output, labs, and treatment plans  - Assess nutrition and hydration status and recommend course of action  - Evaluate amount of meals eaten  - Assist patient with eating if necessary   - Allow adequate time for meals  - Recommend/ encourage appropriate diets, oral nutritional supplements, and vitamin/mineral supplements  - Order, calculate, and assess calorie counts as needed  - Recommend, monitor, and adjust tube feedings and TPN/PPN based on assessed needs  - Assess need for intravenous fluids  - Provide specific nutrition/hydration education as appropriate  - Include patient/family/caregiver in decisions related to nutrition  Outcome: Progressing     Problem: METABOLIC, FLUID AND ELECTROLYTES - ADULT  Goal: Electrolytes maintained within normal limits  Description: INTERVENTIONS:  - Monitor labs and assess patient for signs and symptoms of electrolyte imbalances  - Administer electrolyte replacement as ordered  - Monitor response to electrolyte replacements, including repeat lab results as appropriate  - Instruct patient on fluid and nutrition as appropriate  Outcome: Progressing  Goal: Fluid balance maintained  Description: INTERVENTIONS:  - Monitor labs   - Monitor I/O and WT  - Instruct patient on fluid and nutrition as appropriate  - Assess for signs & symptoms of volume excess or deficit  Outcome: Progressing  Goal: Glucose maintained within target range  Description: INTERVENTIONS:  - Monitor Blood Glucose as ordered  - Assess for signs and symptoms of hyperglycemia and hypoglycemia  - Administer ordered medications to maintain glucose within target range  - Assess nutritional intake and initiate nutrition service referral as needed  Outcome: Progressing

## 2022-01-03 NOTE — QUICK NOTE
I spoke to this patient's wife, Ms Ivan Veras, extensively at bedside to provide a comprehensive clinical update  I answered all of her questions and addressed all of her concerns to the best of my ability and to her satisfaction

## 2022-01-03 NOTE — PROGRESS NOTES
NEPHROLOGY PROGRESS NOTE   Hector Reyes 78 y o  male MRN: [id number]  Unit/Bed#: ICU 08 Encounter: [id number]  Reason for Consult: ARF      SUMMARY:    78 y  o  man PMH DM type 2, CKD G3b (baseline creatinine 1 7 mg/dL, eGFR 35) , hypertension   Recently episode of MULUGETA secondary to retroperitoneal bleeding and hypotension , was discharged with creatinine of 4 2   Now patient presents with altered mental status and worsening creatinine   Nephrology is consulted for MULUGETA    ASSESSMENT and PLAN:    Acute renal failure/acute tubular necrosis  --currently dialysis dependent  --currently planning for 2nd dialysis treatment today  --started on dialysis yesterday January 2nd  --creatinine trending up off dialysis  --patient nonoliguric with good urine output  --will continue to monitor for renal recovery  --will plan for 3rd dialysis treatment tomorrow, and then maintain on Tuesday Thursday Saturday schedule while he remains on dialysis  --hemodialysis consent obtained from the patient's wife and sign  --access:  Non tunneled dialysis catheter  --monitor urine output and maintain mean arterial pressure in 65  --avoid contrast studies  --in the setting recurrent episodes of acute kidney injury with has progressed acute tubular necrosis    --renal imaging shows unchanged subscapular and super renal hematoma    Chronic kidney disease stage IIIB  --baseline creatinine 1 5-1 7 mg/dL    Mixed acid-base disorder  --improving  --monitor on hemodialysis    Hyperkalemia--resolved    Hyperphosphatemia  --slowly improving will continue monitor  --low phosphorus diet    Anemia  --acute on chronic  --supratherapeutic INR which has improved to 1 4  --status post 3 unit blood transfusions till now  --hemoglobin stable but low  --low hemoglobin Will slow renal recovery    Blood pressure/volume status  --history of hypertension  --blood pressure running high  --aim for gentle ultrafiltration today  --continue current antihypertensive    SUBJECTIVE / INTERVAL HISTORY:    Complaining of some lower extremity pain  No chest pain or shortness of breath  Wife at the bedside    OBJECTIVE:  Current Weight: Weight - Scale: 113 kg (248 lb 10 9 oz)  Vitals:    01/03/22 1051 01/03/22 1100 01/03/22 1151 01/03/22 1200   BP:  (!) 171/72  148/71   BP Location:       Pulse:  62  64   Resp:  20  (!) 27   Temp:   98 °F (36 7 °C)    TempSrc:   Oral    SpO2:  99%  97%   Weight:       Height: 5' 7" (1 702 m)          Intake/Output Summary (Last 24 hours) at 1/3/2022 1317  Last data filed at 1/3/2022 1200  Gross per 24 hour   Intake 1381 67 ml   Output 2200 ml   Net -818 33 ml       Review of Systems:    12 point ROS has been reviewed  Physical Exam  Vitals and nursing note reviewed  Exam conducted with a chaperone present  Constitutional:       General: He is not in acute distress  Appearance: He is well-developed  HENT:      Head: Normocephalic and atraumatic  Eyes:      General: No scleral icterus  Conjunctiva/sclera: Conjunctivae normal       Pupils: Pupils are equal, round, and reactive to light  Cardiovascular:      Rate and Rhythm: Normal rate and regular rhythm  Heart sounds: S1 normal and S2 normal  No murmur heard  No friction rub  No gallop  Pulmonary:      Effort: Pulmonary effort is normal  No respiratory distress  Breath sounds: Normal breath sounds  No wheezing or rales  Abdominal:      General: Bowel sounds are normal       Palpations: Abdomen is soft  Tenderness: There is no abdominal tenderness  There is no rebound  Musculoskeletal:         General: Normal range of motion  Cervical back: Normal range of motion and neck supple  Skin:     General: Skin is dry  Coloration: Skin is pale  Findings: Erythema present  No rash  Neurological:      Mental Status: He is alert and oriented to person, place, and time     Psychiatric:         Behavior: Behavior normal  Medications:    Current Facility-Administered Medications:     acetaminophen (TYLENOL) tablet 975 mg, 975 mg, Oral, Q8H PRN, Caren Miguel MD  Aaron Pert  [START ON 1/4/2022] amLODIPine (NORVASC) tablet 10 mg, 10 mg, Oral, Daily, NELLY Graves    amLODIPine (NORVASC) tablet 5 mg, 5 mg, Oral, Daily, NELLY Graves, 5 mg at 01/03/22 1201    aspirin chewable tablet 81 mg, 81 mg, Oral, Daily, Caren Miguel MD, 81 mg at 01/03/22 6203    atorvastatin (LIPITOR) tablet 40 mg, 40 mg, Oral, QPM, Caren Miguel MD, 40 mg at 01/01/22 2057    ceFAZolin (ANCEF) IVPB (premix in dextrose) 1,000 mg 50 mL, 1,000 mg, Intravenous, Q12H, Caren Miguel MD, Last Rate: 100 mL/hr at 01/03/22 0634, 1,000 mg at 01/03/22 5025    cyanocobalamin (VITAMIN B-12) tablet 1,000 mcg, 1,000 mcg, Oral, Daily, Caren Miguel MD, 1,000 mcg at 01/03/22 7839    docusate sodium (COLACE) capsule 100 mg, 100 mg, Oral, BID PRN, Caren Miguel MD    ezetimibe (ZETIA) tablet 10 mg, 10 mg, Oral, HS, Caren Miguel MD, 10 mg at 01/01/22 2108    insulin glargine (LANTUS) subcutaneous injection 32 Units 0 32 mL, 32 Units, Subcutaneous, HS, Caren Miguel MD, 32 Units at 01/02/22 2208    insulin lispro (HumaLOG) 100 units/mL subcutaneous injection 1-5 Units, 1-5 Units, Subcutaneous, HS, Caren Miguel MD    insulin lispro (HumaLOG) 100 units/mL subcutaneous injection 1-6 Units, 1-6 Units, Subcutaneous, TID AC, 1 Units at 01/02/22 1642 **AND** Fingerstick Glucose (POCT), , , TID AC, Caren Miguel MD    lactulose oral solution 10 g, 10 g, Oral, BID, Angel Salazar PA-C    metoprolol tartrate (LOPRESSOR) tablet 50 mg, 50 mg, Oral, BID, Caren Miguel MD, 50 mg at 01/03/22 0928    niCARdipine (CARDENE) 25 mg (STANDARD CONCENTRATION) in sodium chloride 0 9% 250 mL, 1-15 mg/hr, Intravenous, Titrated, Batsheva Holloway PA-C, Held at 01/03/22 1207    nystatin (MYCOSTATIN) powder, , Topical, BID, Aliyah Vázquez MD, 1 application at 39/88/39 3459    pantoprazole (PROTONIX) EC tablet 40 mg, 40 mg, Oral, Daily, Aliyah Vázquez MD, 40 mg at 01/03/22 6934    polyethylene glycol (MIRALAX) packet 17 g, 17 g, Oral, Daily, Angel Salazar PA-C    sodium bicarbonate tablet 650 mg, 650 mg, Oral, TID after meals, Aliyah Vázquez MD, 650 mg at 01/03/22 1200    Laboratory Results:  Results from last 7 days   Lab Units 01/03/22  1108 01/03/22  0435 01/02/22  2031 01/02/22  1653 01/02/22  1249 01/02/22  0453 01/02/22  0438 01/02/22  0433 01/01/22 2025 01/01/22 2022 01/01/22  1419 01/01/22  1419 12/29/21  0440 12/29/21  0440 12/28/21  1321 12/28/21  0535 12/27/21 2010 12/27/21 2009   WBC Thousand/uL  --  7 58 8 60  --   --   --   --   --   --  11 19*  --  11 55*  --  9 71  --  10 38*  --  10 80*   HEMOGLOBIN g/dL 7 8* 7 9* 7 9* 6 8*  --   --  8 4*  --   --  4 2*   < > 6 6*   < > 7 8*  --  7 4*   < > 6 8*   I STAT HEMOGLOBIN g/dl  --   --   --   --  7 8*  --   --   --   --   --   --   --   --   --   --   --   --   --    HEMATOCRIT % 25 7* 25 6* 25 5* 22 4*  --   --  27 6*  --   --  14 4*   < > 21 7*   < > 25 2*  --  24 6*   < > 22 3*   HEMATOCRIT, ISTAT %  --   --   --   --  23*  --   --   --   --   --   --   --   --   --   --   --   --   --    PLATELETS Thousands/uL  --  350 391* 386  --   --   --   --   --  445*  --  502*  --  464*  --  502*   < > 506*   POTASSIUM mmol/L  --  4 8 4 9  --   --   --   --  5 5* 5 5* 5 5*  --  5 9*  --  4 9   < > 5 5*   < >  --    CHLORIDE mmol/L  --  105 104  --   --   --   --  100 108 108  --  106  --  110*   < > 110*   < >  --    CO2 mmol/L  --  22 23  --   --   --   --  13* 17* 18*  --  19*   < > 21   < > 21   < >  --    CO2, I-STAT mmol/L  --   --   --   --  20*  --   --   --   --   --   --   --   --   --   --   --   --   -- BUN mg/dL  --  69* 68*  --   --   --   --  81* 86* 83*  --  88*  --  67*   < > 69*   < >  --    CREATININE mg/dL  --  6 44* 6 15*  --   --   --   --  6 99* 7 37* 7 16*  --  7 37*  --  4 26*   < > 4 37*   < >  --    CALCIUM mg/dL  --  8 2* 8 2*  --   --   --   --  6 9* 7 8* 7 9*  --  8 6  --  8 1*   < > 8 4   < >  --    MAGNESIUM mg/dL  --  1 8  --   --   --  2 0  --   --   --   --   --   --   --   --   --   --   --   --    PHOSPHORUS mg/dL  --  6 0*  --   --   --  6 9*  --   --   --   --   --   --   --   --   --   --   --   --    GLUCOSE, ISTAT mg/dl  --   --   --   --  195*  --   --   --   --   --   --   --   --   --   --   --   --   --     < > = values in this interval not displayed

## 2022-01-03 NOTE — PROGRESS NOTES
Progress Note - Utica Bolus 78 y o  male MRN: [id number]    Unit/Bed#: ICU 08 Encounter: [id number]        Subjective:   Patient complains of ongoing pain in his left lower extremity  He reports he is not having bowel movements and is not passing flatus, reports some discomfort in his lower abdomen  Denies any vomiting  Has not had anything to eat or drink this morning at  Objective:     Vitals: Blood pressure 148/67, pulse 67, temperature 98 8 °F (37 1 °C), temperature source Axillary, resp  rate 17, height 5' 7" (1 702 m), weight 113 kg (248 lb 10 9 oz), SpO2 99 %  ,Body mass index is 38 95 kg/m²  Intake/Output Summary (Last 24 hours) at 1/3/2022 1012  Last data filed at 1/3/2022 0800  Gross per 24 hour   Intake 1199 17 ml   Output 2800 ml   Net -1600 83 ml       Physical Exam:   General appearance: alert, chronically ill-appearing, appears stated age and cooperative  Lungs: clear to auscultation bilaterally, no labored breathing/accessory muscle use  Heart: regular rate and rhythm, S1, S2 normal, no murmur, click, rub or gallop  Abdomen: soft, distended, mildly tender in lower abdomen without guarding, tympanic to percussion, decreased bowel sounds; no masses,  no organomegaly  Extremities: no edema    Invasive Devices  Report    Central Venous Catheter Line            CVC Central Lines 12/27/21 Double Right 6 days          Peripheral Intravenous Line            Peripheral IV 01/01/22 Right Antecubital 1 day          Hemodialysis Catheter            HD Temporary Double Catheter <1 day          Drain            Urethral Catheter Temperature probe 16 Fr  15 days                Lab, Imaging and other studies: I have personally reviewed pertinent reports  No results displayed because visit has over 200 results  Results for HECTOR STONER (MRN [id number]) as of 1/3/2022 10:12   Ref   Range 1/3/2022 04:35 1/3/2022 05:21 1/3/2022 05:47   POC Glucose Latest Ref Range: 65 - 140 mg/dl  128    Sodium Latest Ref Range: 136 - 145 mmol/L 138     Potassium Latest Ref Range: 3 5 - 5 3 mmol/L 4 8     Chloride Latest Ref Range: 100 - 108 mmol/L 105     CO2 Latest Ref Range: 21 - 32 mmol/L 22     Anion Gap Latest Ref Range: 4 - 13 mmol/L 11     BUN Latest Ref Range: 5 - 25 mg/dL 69 (H)     Creatinine Latest Ref Range: 0 60 - 1 30 mg/dL 6 44 (H)     Glucose, Random Latest Ref Range: 65 - 140 mg/dL 107     Calcium Latest Ref Range: 8 3 - 10 1 mg/dL 8 2 (L)     CORRECTED CALCIUM Latest Ref Range: 8 3 - 10 1 mg/dL 9 8     AST Latest Ref Range: 5 - 45 U/L 38     ALT Latest Ref Range: 12 - 78 U/L 9 (L)     Alkaline Phosphatase Latest Ref Range: 46 - 116 U/L 53     Total Protein Latest Ref Range: 6 4 - 8 2 g/dL 7 1     Albumin Latest Ref Range: 3 5 - 5 0 g/dL 2 0 (L)     TOTAL BILIRUBIN Latest Ref Range: 0 20 - 1 00 mg/dL 0 60     eGFR Latest Units: ml/min/1 73sq m 8     Phosphorus Latest Ref Range: 2 3 - 4 1 mg/dL 6 0 (H)     Magnesium Latest Ref Range: 1 6 - 2 6 mg/dL 1 8     WBC Latest Ref Range: 4 31 - 10 16 Thousand/uL 7 58     Red Blood Cell Count Latest Ref Range: 3 88 - 5 62 Million/uL 2 67 (L)     Hemoglobin Latest Ref Range: 12 0 - 17 0 g/dL 7 9 (L)     HCT Latest Ref Range: 36 5 - 49 3 % 25 6 (L)     MCV Latest Ref Range: 82 - 98 fL 96     MCH Latest Ref Range: 26 8 - 34 3 pg 29 6     MCHC Latest Ref Range: 31 4 - 37 4 g/dL 30 9 (L)     RDW Latest Ref Range: 11 6 - 15 1 % 17 7 (H)     Platelet Count Latest Ref Range: 149 - 390 Thousands/uL 350     MPV Latest Ref Range: 8 9 - 12 7 fL 8 9     nRBC Latest Units: /100 WBCs 0     Neutrophils % Latest Ref Range: 43 - 75 % 75     Immat GRANS % Latest Ref Range: 0 - 2 % 5 (H)     Lymphocytes Relative Latest Ref Range: 14 - 44 % 7 (L)     Monocytes Relative Latest Ref Range: 4 - 12 % 7     Eosinophils Latest Ref Range: 0 - 6 % 5     Basophils Relative Latest Ref Range: 0 - 1 % 1     Immature Grans Absolute Latest Ref Range: 0 00 - 0 20 Thousand/uL 0 38 (H)     Absolute Neutrophils Latest Ref Range: 1 85 - 7 62 Thousands/µL 5 68     Lymphocytes Absolute Latest Ref Range: 0 60 - 4 47 Thousands/µL 0 50 (L)     Absolute Monocytes Latest Ref Range: 0 17 - 1 22 Thousand/µL 0 55     Absolute Eosinophils Latest Ref Range: 0 00 - 0 61 Thousand/µL 0 41     Basophils Absolute Latest Ref Range: 0 00 - 0 10 Thousands/µL 0 06     Protime Latest Ref Range: 11 6 - 14 5 seconds 17 3 (H)     POCT INR Latest Ref Range: 0 84 - 1 19  1 43 (H)     Crossmatch Unknown   Compatible   Unit ABO Unknown   A   Unit RH Unknown   POS   Unit Number Unknown   [id number]   Unit Dispense Status Unknown   Presumed Trans       Assessment/Plan:    1  Indeterminate hepatic mass noted on noncontrast CT scan; requires further characterization  No obvious hepatic nodularity on imaging and normal platelet count suggests low likelihood of underlying cirrhosis, although he was noted with elevated INR of uncertain etiology (had previously been on Coumadin but was switched to Eliquis during his last hospital admission)  AFP was noted normal recently, hepatitis panel was negative    - recommend contrast enhanced CT scan of the liver with triple phase protocol when patient's renal function improves    - will need colonoscopy as outpatient, as he has never had one done before      2   Constipation with reported absence of flatus, some tympany and lower abdominal tenderness on examination today; rule out developing ileus, or colonic pseudo-obstruction given his renal issues    -I discussed with ICU team regarding this patient's case and plan    -will decrease patient's lactulose dose and start MiraLax daily    -check obstruction series and monitor abdominal exam    -monitor electrolytes and replete as needed    - encourage regular positional changes where possible

## 2022-01-03 NOTE — PROGRESS NOTES
Daily Progress Note - Critical Care   Gilson Bishop 78 y o  male MRN: [id number]  Unit/Bed#: ICU 08 Encounter: [id number]        ----------------------------------------------------------------------------------------  HPI/24hr events: No acute events  ---------------------------------------------------------------------------------------  SUBJECTIVE  Patient complains of pain in right knee  Otherwise, he has no complaints and is able to communicate in short sentences with the face mask and tracks me across the room  Of note, patient's urine continues to be slightly pink in the Robison bag  Review of Systems  Review of systems was reviewed and negative unless stated above in HPI/24-hour events   ---------------------------------------------------------------------------------------  Assessment and Plan:    Neuro:    Diagnosis: Encephalopathy  Patient has had altered mental status for the past 3 days per collateral from family at bedside   They report that he has been more lethargic and difficult to arouse   Unknown etiology possibly uremia in setting of end-stage renal disease versus hepatic encephalopathy  Currently A, Ox2 (person, place), but lethargic on admission   Ammonia: 48 (1/1/22)    CT head 1/1 negative for acute pathology  Chronic right cerebellar infarct noted  Lab Results   Component Value Date    ALT 9 (L) 01/03/2022    AST 38 01/03/2022    ALKPHOS 53 01/03/2022    BILITOT 0 21 09/22/2015     Lab Results   Component Value Date    INR 1 43 (H) 01/03/2022    INR 1 60 (H) 01/02/2022    INR 5 63 (HH) 01/02/2022    PROTIME 17 3 (H) 01/03/2022    PROTIME 18 9 (H) 01/02/2022    PROTIME 49 4 (H) 01/02/2022     · Rapid response on 1/2 required secondary to decrease in mental status     · HD done on 1/2- removed 500mL of fluid (HD catheter placed 1/2)  o Plan: Frequent neuro checks  o GI recs: continue Lactulose 20 mg BID    o Continue ASA, Statin for CVA hx    CV:    Diagnosis: Hypertensive  o Plan: Norvasc 5 mg q24h PO  Give additional doses as needed    o On Nicardipine 7 5    Pulm: 99% sat on BiPaP: 16/8 at 40%  Continue BiPaP  GI:    Diagnosis: High LFTs   CT abdomen 1/1/22: Suspected infiltrative neoplasm of the liver seen on prior contrast-enhanced CT is not well-visualized on this noncontrast CT   o Plan:Lactulose 20 mg BID  o As per GI, dedicated triphasic CT scan of the liver for better characterization when the patient is more stable for further evaluation of lesion seen on CT    :    Of note, patient has a right subscapular, right supra-renal hematoma (stable)  o CT abdomen 12/26, impression:   - KIDNEYS/URETERS:   7 x 4 x 7 8 x 5 3 cm (length X depth X width) right suprarenal hematoma has decreased in size (previously 8 3 x 10 0 x 6 6 cm)  Otherwise similar lateral subcapsular hematoma and hepatic contour, with heterogeneous attenuation  Limited evaluation without IV contrast   Similar perinephric right fatty infiltrative change  Similar nonobstructing, tiny lower pole stone  No hydronephrosis or ureteral stone   Diagnosis: Renal failure, acute on chronic  Lab Results   Component Value Date    EGFR 8 01/03/2022    EGFR 8 01/02/2022    EGFR 7 01/02/2022    CREATININE 6 44 (H) 01/03/2022    CREATININE 6 15 (H) 01/02/2022    CREATININE 6 99 (H) 01/02/2022     Baseline Cr: 4 3, POA: 7 3  UA: Shows blood, protein and leukocytes     Likely secondary to long-standing HTN  Patient is in rapid response on 01/02  o Plan:  Cr trending down on 1/3/21    o Dialyzed on 1/2  Monitor BMP  o Pending urine culture   o Daily weights, monitor I/O  o Bladder scan pending  Heme/Onc:   · Diagnosis: Supratherapeutic INR (5 on admission), currently 1 43 sp Kcentra, 2 units FFP, B12    · S/p dialysis for suspected uremic encephalopathy- done on 1/2/21    o Plan: GI consulted for possible bleed   Diagnosis: Anemia, Hgb 7 9 currently (stable)   Hgb POA: 4 2  S/p 3 units PRBCs as of 1/3/21      FOBT 1/2/22 negative    o Plan: GI on board, appreciate recs  o Monitor H&H q6   Transfuse if Hb < 7 0  o observe for any signs of bleeding, consider Heme consult  · Diagnosis: DVT hx during last admission   · 12/24 VAS: LEFT LOWER LIMB:Evidence of acute deep vein thrombosis in the popliteal and gastrocnemius veins  o Plan: Consider re-starting anticoagulation now that Hgb has stabilized  o Monitor for calf tenderness, respiratory status  Chest CT 1/2 no concern for PE  Endo:    Diagnosis: T2DM   Lab Results   Component Value Date    HGBA1C 12 2 (H) 12/18/2021     Lab Results   Component Value Date    GLUC 107 01/03/2022    GLUC 132 01/02/2022    GLUC 106 01/02/2022     o Plan: Continue SSI  Avoid hypoglycemia  ID:   · Diagnosis: patient discharged on 12/29 for sepsis, bacteremia on IVAbx via central line placement with IR on 12/27  Suspected source: LLE cellulitis  o Plan: Continue IV Cefazolin 1000 mg q12h  until Jan 12, 2022  Patient appropriate for transfer out of the ICU today?: No  Disposition: Continue Critical Care   Code Status: Level 1 - Full Code  ---------------------------------------------------------------------------------------  ICU CORE MEASURES    Prophylaxis   VTE Pharmacologic Prophylaxis: None due to high INR, low Hgb  Stress Ulcer Prophylaxis: Pantoprazole PO    ABCDE Protocol (if indicated)  Plan to perform spontaneous awakening trial today? Not applicable  Plan to perform spontaneous breathing trial today? Not applicable  Obvious barriers to extubation?  Not applicable    Invasive Devices Review  Invasive Devices  Report    Central Venous Catheter Line            CVC Central Lines 12/27/21 Double Right 6 days          Peripheral Intravenous Line            Peripheral IV 01/01/22 Right Antecubital 1 day          Hemodialysis Catheter            HD Temporary Double Catheter <1 day          Drain            Urethral Catheter Temperature probe 16 Fr  15 days              Can any invasive devices be discontinued today? No  ---------------------------------------------------------------------------------------  OBJECTIVE    Vitals   Vitals:    22 0900 22 1000 22 1051 22 1100   BP: 148/67 150/69  (!) 171/72   BP Location:       Pulse: 67 72  62   Resp: 17 (!) 25  20   Temp:       TempSrc:       SpO2: 99% 98%  99%   Weight:       Height:   5' 7" (1 702 m)      Temp (24hrs), Av 1 °F (36 7 °C), Min:97 5 °F (36 4 °C), Max:98 8 °F (37 1 °C)  Current: Temperature: 98 8 °F (37 1 °C)    Respiratory:  SpO2: SpO2: 99 %, SpO2 Activity: SpO2 Activity: At Rest, SpO2 Device: O2 Device: BiPAP, Capnography:    Nasal Cannula O2 Flow Rate (L/min): 2 L/min    Invasive/non-invasive ventilation settings   Respiratory  Report   Lab Data (Last 4 hours)    None         O2/Vent Data (Last 4 hours)       0808          Non-Invasive Ventilation Mode BiPAP                 PHYSICAL EXAMINATION  Temp:  [97 5 °F (36 4 °C)-98 8 °F (37 1 °C)] 98 8 °F (37 1 °C)  HR:  [58-86] 62  Resp:  [3-26] 20  BP: (142-213)/(62-83) 171/72  FiO2 (%):  [] 60   General Examination: Ill appearing, lying in bed  HEENT: Normocephalic, Atraumatic  Moist mucus membranes  Extraocular movements intact, pupils equally round and reactive to light and accommodation bilaterally  CVS: Regular rate and rhythm  S1 S2 noted  Lungs: 99% sat on BiPaP: 16/8 at 40%  Continue BiPaP  Abdomen: Bowel sounds positive  Non- tender  Non-distended  Robison bag with slightly pink urine  Ext: left patella tender to touch  No erythema noted  Patient is unable to lift up left foot  Able to wiggle toes  Psych: Thought content - No VH/AH  No delusions  Though Process - logical    Neuro: A, Ox2- person, year  Follows commands, tracks me across room       Laboratory and Diagnostics:  Results from last 7 days   Lab Units 22  0435 22  1653 22  1249 22  0438 22  1419 12/29/21 0440 12/29/21 0440 12/28/21 0535 12/28/21 0535 12/27/21 2009 12/27/21 2009   WBC Thousand/uL 7 58 8 60  --   --   --  11 19* 11 55*  --  9 71  --  10 38*  --  10 80*   HEMOGLOBIN g/dL 7 9* 7 9* 6 8*  --  8 4* 4 2* 6 6*   < > 7 8*   < > 7 4*   < > 6 8*   I STAT HEMOGLOBIN g/dl  --   --   --  7 8*  --   --   --   --   --   --   --   --   --    HEMATOCRIT % 25 6* 25 5* 22 4*  --  27 6* 14 4* 21 7*   < > 25 2*   < > 24 6*   < > 22 3*   HEMATOCRIT, ISTAT %  --   --   --  23*  --   --   --   --   --   --   --   --   --    PLATELETS Thousands/uL 350 391* 386  --   --  445* 502*  --  464*  --  502*   < > 506*   NEUTROS PCT % 75  --   --   --   --  81* 82*  --   --   --   --   --   --    BANDS PCT %  --  9*  --   --   --   --   --   --  4  --   --   --  3   MONOS PCT % 7  --   --   --   --  7 6  --   --   --   --   --   --    MONO PCT %  --  1*  --   --   --   --   --   --  4  --   --   --  4    < > = values in this interval not displayed       Results from last 7 days   Lab Units 01/03/22 0435 01/02/22 2031 01/02/22  1249 01/02/22 0433 01/01/22 2025 01/01/22 2022 01/01/22  1419 12/29/21 0440 12/29/21 0440 12/28/21 0535 12/27/21 2010   SODIUM mmol/L 138 137  --  138 139 138 138  --  140   < > 139   POTASSIUM mmol/L 4 8 4 9  --  5 5* 5 5* 5 5* 5 9*  --  4 9   < > 5 6*   CHLORIDE mmol/L 105 104  --  100 108 108 106  --  110*   < > 109*   CO2 mmol/L 22 23  --  13* 17* 18* 19*   < > 21   < > 21   CO2, I-STAT mmol/L  --   --  20*  --   --   --   --   --   --   --   --    ANION GAP mmol/L 11 10  --  25* 14* 12 13  --  9   < > 9   BUN mg/dL 69* 68*  --  81* 86* 83* 88*  --  67*   < > 72*   CREATININE mg/dL 6 44* 6 15*  --  6 99* 7 37* 7 16* 7 37*  --  4 26*   < > 4 32*   CALCIUM mg/dL 8 2* 8 2*  --  6 9* 7 8* 7 9* 8 6  --  8 1*   < > 8 5   GLUCOSE RANDOM mg/dL 107 132  --  106 105 107 91  --  95   < > 209*   ALT U/L 9* 10*  --  28  --  10* 9*  --   --   --  12   AST U/L 38 42  --  45  --  50* 54*  --   -- --  31   ALK PHOS U/L 53 58  --  53  --  57 68  --   --   --  74   ALBUMIN g/dL 2 0* 2 2*  --  5 7*  --  1 5* 1 6*  --   --   --  1 4*   TOTAL BILIRUBIN mg/dL 0 60 0 77  --  0 54  --  0 49 0 46  --   --   --  0 43    < > = values in this interval not displayed  Results from last 7 days   Lab Units 22  0435 22  0453   MAGNESIUM mg/dL 1 8 2 0   PHOSPHORUS mg/dL 6 0* 6 9*      Results from last 7 days   Lab Units 22  0435 22  1653 22  0453 22  1419   INR  1 43* 1 60* 5 63* 6 78*   PTT seconds  --   --   --  82*          Results from last 7 days   Lab Units 22  1419   LACTIC ACID mmol/L 0 6     ABG:    VBG:  Results from last 7 days   Lab Units 22  1501   PH DHARMESH  7 338   PCO2 DHARMESH mm Hg 31 3*   PO2 DHARMESH mm Hg 159 6*   HCO3 DHARMESH mmol/L 16 4*   BASE EXC DHARMESH mmol/L -8 5     Results from last 7 days   Lab Units 22  0438 22  1424   PROCALCITONIN ng/ml 1 34* 1 42*       Micro  Results from last 7 days   Lab Units 22  1531 22  1501 22  1419   BLOOD CULTURE   --  No Growth at 24 hrs  No Growth at 24 hrs  URINE CULTURE  Culture too young- will reincubate  --   --        EK/1: NSR    Imaging:  I have personally reviewed pertinent reports  XR chest portable   Final Result by Bernardo Nloand DO ([id number])      Life-support lines as above  No pneumothorax  Suspect mild pulmonary venous congestion and left pleural effusion  Workstation performed: [id number]         CT head without contrast   Final Result by Chela Hernandez MD ( 150)      No acute intracranial abnormality        Unchanged encephalomalacia secondary to chronic right cerebellar infarct (series 2 image 7 )               Workstation performed: [id number]         CT chest abdomen pelvis wo contrast   Final Result by Chela Hernandez MD ( [id number])      Unchanged moderate bilateral water density pleural effusions with associated compressive atelectasis of both lower lobes  No evidence of pneumonia  Unchanged subcapsular and suprarenal hematoma around the right kidney  Suspected infiltrative neoplasm of the liver seen on prior contrast-enhanced CT is not well-visualized on this noncontrast CT  Workstation performed: [id number]         XR abdomen obstruction series    (Results Pending)       Intake and Output  I/O       01/01 0701 01/02 0700 01/02 0701  01/03 0700 01/03 0701 01/04 0700    P  O  100      I V  (mL/kg) 155 (1 4) 500 (4 4)     Blood 280 40     Other  500     IV Piggyback  100     Total Intake(mL/kg) 535 (5) 1140 (10 1)     Urine (mL/kg/hr)  2404 (0 9)     Other  500     Total Output  2904     Net +535 -1764                Height and Weights   Height: 5' 7" (170 2 cm)  IBW (Ideal Body Weight): 66 1 kg  Body mass index is 38 95 kg/m²  Weight (last 2 days)     Date/Time Weight    01/03/22 0532 113 (248 68)    01/03/22 0339 113 (248 68)    01/01/22 1241 107 (235 89)        Nutrition       Diet Orders   (From admission, onward)             Start     Ordered    01/02/22 1334  Diet NPO  Diet effective now        References:    Nutrtion Support Algorithm Enteral vs  Parenteral   Question Answer Comment   Diet Type NPO    RD to adjust diet per protocol?  Yes        01/02/22 1334    01/01/22 1825  Room Service  Once        Question:  Type of Service  Answer:  Room Service - Appropriate with Assistance    01/01/22 1824              Active Medications  Scheduled Meds:  Current Facility-Administered Medications   Medication Dose Route Frequency Provider Last Rate    acetaminophen  975 mg Oral Q8H PRN Freddy Trivedi MD      amLODIPine  5 mg Oral Daily Freddy Trivedi MD      aspirin  81 mg Oral Daily Freddy Trivedi MD      atorvastatin  40 mg Oral QPM Freddy Trivedi MD      ceFAZolin  1,000 mg Intravenous Q12H Freddy Trivedi MD 1,000 mg (01/03/22 6129)   Jose Garza cyanocobalamin  1,000 mcg Oral Daily China Tadeo MD      docusate sodium  100 mg Oral BID PRN China Tadeo MD      ezetimibe  10 mg Oral HS China Tadeo MD      insulin glargine  32 Units Subcutaneous HS China Tadeo MD      insulin lispro  1-5 Units Subcutaneous HS China Tadeo MD      insulin lispro  1-6 Units Subcutaneous TID [facility] China Tadeo MD      lactulose  10 g Oral BID Miri Nroth PA-C      metoprolol tartrate  50 mg Oral BID China Tadeo MD      niCARdipine  1-15 mg/hr Intravenous Titrated Morgan Escamilla PA-C 2 5 mg/hr (01/03/22 1034)    nystatin   Topical BID China Tadeo MD      pantoprazole  40 mg Oral Daily China Tadeo MD      polyethylene glycol  17 g Oral Daily Angel Salazar PA-C      sodium bicarbonate  650 mg Oral TID after meals China Tadeo MD       Continuous Infusions:  niCARdipine, 1-15 mg/hr, Last Rate: 2 5 mg/hr (01/03/22 1034)      PRN Meds:   acetaminophen, 975 mg, Q8H PRN  docusate sodium, 100 mg, BID PRN      Allergies   Allergies   Allergen Reactions    Ace Inhibitors Other (See Comments)    Ciprofloxacin Other (See Comments)    Penicillins Hives    Morphine And Related Anxiety     ---------------------------------------------------------------------------------------  Advance Directive and Living Will:      Power of :    POLST:    ---------------------------------------------------------------------------------------  Care Time Delivered:   Upon my evaluation, this patient had a high probability of imminent or life-threatening deterioration due to encephalopathy, HD instability, uremia, which required my direct attention, intervention, and personal management    I have personally provided 30 minutes (730 to 800) of critical care time, exclusive of procedures, teaching, family meetings, and any prior time recorded by providers other than myself  Leslie Guillen MD      Portions of the record may have been created with voice recognition software  Occasional wrong word or "sound a like" substitutions may have occurred due to the inherent limitations of voice recognition software    Read the chart carefully and recognize, using context, where substitutions have occurred

## 2022-01-04 NOTE — PROCEDURES
NEPHROLOGY DIALYSIS PROCEDURE NOTE      Patient seen and examined on Hemodialysis, tolerating procedure well  All documentation, labs, medications were reviewed by myself, and the treatment plan was reviewed with nurse and patient  Seen on Dialysis at : 1:42 PM  Dialysis Access: Left IJ non tunneled dialysis catheter  Vitals:  105/54  Dialysis time: 2 5 Hours  Dialyzer: F160  Sodium bath: 138  Potassium bath: 2 K+  Bicarbonate bath: 30  Calcium bath: 2 5  Ultrafiltration: 2-2 5 L  Blood flow rate: 250 cc/min  Dialysis flow rate: 1 5 X Qb  Dialysis temperature: 35 5C  Medications given on HD:  None    Assessment/Plan:    78 y  o  man PMH DM type 2, CKD G3b (baseline creatinine 1 7 mg/dL, eGFR 35) , hypertension   Recently episode of MULUGETA secondary to retroperitoneal bleeding and hypotension , was discharged with creatinine of 4 2   Now patient presents with altered mental status and worsening creatinine   Nephrology is consulted for MULUGETA     ASSESSMENT and PLAN:     Acute renal failure/acute tubular necrosis  --currently dialysis dependent  --started dialysis on January 2nd  --creatinine trending up off dialysis  --patient nonoliguric with good urine output  --will continue to monitor for renal recovery  --completed 3 consecutive treatments of dialysis  --hemodialysis consent obtained from the patient's wife and sign  --access:  Non tunneled dialysis catheter, will need conversion to a PermCath  --monitor urine output and maintain mean arterial pressure in 65  --avoid contrast studies  --in the setting recurrent episodes of acute kidney injury with has progressed acute tubular necrosis    --renal imaging shows unchanged subscapular and super renal hematoma  --at this time no evidence of renal recovery  --will plan to continue Tuesday Thursday Saturday dialysis schedule plan      Chronic kidney disease stage IIIB  --baseline creatinine 1 5-1 7 mg/dL     Mixed acid-base disorder  --improving  --monitor on hemodialysis     Hyperkalemia--resolved     Hyperphosphatemia  --slowly improving will continue monitor  --low phosphorus diet     Anemia  --acute on chronic  --supratherapeutic INR which has improved to 1 4  --status post 3 unit blood transfusions till now  --hemoglobin stable but low  --low hemoglobin Will slow renal recovery     Blood pressure/volume status  --history of hypertension  --volume overloaded  --increase ultrafiltration goals    Review of Systems: The entire 12 point ROS has been reviewed      Physical Exam:    General:  Deconditioned   Skin:  No rashes no lesions  CVS:  S1-S2 appreciated  Lungs:  Clear  Abdomen:  Nontender  Access:  Left IJ  Extremities:  Positive edema  Neuro:  No asterixis          Current Facility-Administered Medications:     acetaminophen (TYLENOL) tablet 975 mg, 975 mg, Oral, Q8H PRN, NELLY Graves    amLODIPine (NORVASC) tablet 10 mg, 10 mg, Oral, Daily, NELLY Graves, 10 mg at 01/04/22 0844    aspirin chewable tablet 81 mg, 81 mg, Oral, Daily, NELLY Graves, 81 mg at 01/04/22 0844    atorvastatin (LIPITOR) tablet 40 mg, 40 mg, Oral, QPM, NELLY Graves, 40 mg at 01/03/22 1746    carvedilol (COREG) tablet 25 mg, 25 mg, Oral, BID With Meals, NELLY Graves, 25 mg at 01/04/22 0844    ceFAZolin (ANCEF) IVPB (premix in dextrose) 1,000 mg 50 mL, 1,000 mg, Intravenous, Q12H, NELLY Graves, Last Rate: 100 mL/hr at 01/04/22 0845, 1,000 mg at 01/04/22 0845    cyanocobalamin (VITAMIN B-12) tablet 1,000 mcg, 1,000 mcg, Oral, Daily, NELLY Graves, 1,000 mcg at 01/04/22 0844    docusate sodium (COLACE) capsule 100 mg, 100 mg, Oral, BID PRN, NELLY Read    ezetimibe (ZETIA) tablet 10 mg, 10 mg, Oral, HS, NELLY Graves, 10 mg at 01/03/22 2105    insulin glargine (LANTUS) subcutaneous injection 32 Units 0 32 mL, 32 Units, Subcutaneous, , NELLY Graves, 32 Units at 01/03/22 2105    insulin lispro (HumaLOG) 100 units/mL subcutaneous injection 1-5 Units, 1-5 Units, Subcutaneous, HS, NELLY Graves, 1 Units at 01/03/22 2105    insulin lispro (HumaLOG) 100 units/mL subcutaneous injection 1-6 Units, 1-6 Units, Subcutaneous, TID AC, 2 Units at 01/04/22 1157 **AND** Fingerstick Glucose (POCT), , , TID AC, NELLY Graves    lactulose oral solution 10 g, 10 g, Oral, BID, NELLY Graves, 10 g at 01/04/22 0844    niCARdipine (CARDENE) 25 mg (STANDARD CONCENTRATION) in sodium chloride 0 9% 250 mL, 1-15 mg/hr, Intravenous, Titrated, NELLY Graves, Held at 01/03/22 1207    nystatin (MYCOSTATIN) powder, , Topical, BID, NELLY Graves, Given at 01/04/22 0846    oxyCODONE (ROXICODONE) IR tablet 2 5 mg, 2 5 mg, Oral, Q6H PRN, NELLY Graves, 2 5 mg at 01/04/22 1157    oxyCODONE (ROXICODONE) IR tablet 5 mg, 5 mg, Oral, Q6H PRN, NELLY Graves, 5 mg at 01/04/22 0844    pantoprazole (PROTONIX) EC tablet 40 mg, 40 mg, Oral, Daily, NELLY Graves, 40 mg at 01/04/22 0846    polyethylene glycol (MIRALAX) packet 17 g, 17 g, Oral, Daily, NELLY Graves, 17 g at 01/04/22 0845    sodium bicarbonate tablet 650 mg, 650 mg, Oral, TID after meals, NELLY Graves, 650 mg at 01/04/22 1219

## 2022-01-04 NOTE — PLAN OF CARE
Post-Dialysis RN Treatment Note    Blood Pressure:  Pre 156/54  mm/Hg  Post 148/62 mmHg   EDW  TBD kg    Weight:  Pre 112 kg   Post 110 kg   Mode of weight measurement: Bed Scale   Volume Removed  2000 ml    Treatment duration 120 minutes    NS given  No    Treatment shortened?  No   Medications given during Rx Not Applicable   Estimated Kt/V  Not Applicable   Access type: Temporary HD catheter   Access Issues: No    Report called to primary nurse   Yes / Maya Pearl RN      1050-9220 ml as tolerated, 2 hrs, 2K bath  Problem: METABOLIC, FLUID AND ELECTROLYTES - ADULT  Goal: Electrolytes maintained within normal limits  Description: INTERVENTIONS:  - Monitor labs and assess patient for signs and symptoms of electrolyte imbalances  - Administer electrolyte replacement as ordered  - Monitor response to electrolyte replacements, including repeat lab results as appropriate  - Instruct patient on fluid and nutrition as appropriate  Outcome: Progressing  Goal: Fluid balance maintained  Description: INTERVENTIONS:  - Monitor labs   - Monitor I/O and WT  - Instruct patient on fluid and nutrition as appropriate  - Assess for signs & symptoms of volume excess or deficit  Outcome: Progressing

## 2022-01-04 NOTE — QUICK NOTE
I spoke to this patient's wife, Mrs Nic Butler, extensively over the phone to provide a comprehensive clinical update  I answered all of her questions and addressed all of her concerns to the best of my ability and to her satisfaction

## 2022-01-04 NOTE — PROGRESS NOTES
Daily Progress Note - Critical Care   Marietta Pantoja 78 y o  male MRN: [id number]  Unit/Bed#: ICU 08 Encounter: [id number]        ----------------------------------------------------------------------------------------  HPI/24hr events: No acute events  Nursing tells me he complained of left leg pain overnight and was given 1 dose of 5mg of Oxy      ---------------------------------------------------------------------------------------  SUBJECTIVE  Patient complains of ongoing pain in left knee  Otherwise, he has no complaints and is able to communicate in full sentences with the NC and tracks me across the room  Of note, patient's urine continues to be slightly pink in the Robison bag  Also, there is blood noted on patient's blanket but he is unable to tell me where this came from  Review of Systems  Review of systems was reviewed and negative unless stated above in HPI/24-hour events   ---------------------------------------------------------------------------------------  Assessment and Plan:    Neuro:   Diagnosis: Encephalopathy, resolved    Patient had altered mental status for the past 3 days prior to presentation per family reported to be more lethargic and difficult to arouse   Etiology most likely uremia in setting of ESRD   Noted to be lethargic on admission  Rapid response on 1/2 due to worsening mental status  Currently alert oriented x3  Workup:  · Ammonia: 48 (1/1/22)    repeat ammonia 14 on 01/04/2022  · LFTs high upon admission (01/01/2022)  WNL 01/03/2022  · CT head 1/1 negative for acute pathology  Chronic right cerebellar infarct noted  · HD done on 1/2, 1/3- removed 500mL of fluid each time (HD catheter placed 1/2)    Etiology most likely to be multifactorial: uremic encephalopathy, hepatic encephalopathy  Low suspicion for bleed given negative CT findings, improved mental status after dialysis x2, nonfocal neurologic exam, INR trending down (01/03/2022)       Plan:   · Frequent neuro checks  · Lactulose 10 mg BID    · Continue ASA, Statin for CVA hx    · If patient does not improve, consider checking thyroid labs  CV:   Diagnosis:  Hypertension history, currently normotensive  Plan:   · Norvasc 10 mg q24h PO    · On Nicardipine 2 5 mg/hr- stopped 1/3/22  · Coreg 25 mg b i d  (changed from metoprolol tartrate 50 mg b i d )    Pulm:   1/1 Chest CT: Mild compressive atelectasis of both lower lobes due to pleural effusions  No evidence of pneumonia  There is no tracheal or endobronchial lesion  1/2 CXR: No pneumothorax  Suspect mild pulmonary venous congestion and left pleural effusion  Plan:   · Was on BiPaP: 16/8 at 40%  Switched to nasal cannula 2L/min saturatin 98%  · Continue to wean NC as tolerated  GI:   Diagnosis: High AST (54 on 1/1), low ALT (9 on 1/1)  AST WNL on 1/2, 1/3   CT abdomen 1/1/22: Suspected infiltrative neoplasm of the liver seen on prior contrast-enhanced CT is not well-visualized on this noncontrast CT  Plan:  Lactulose 10 mg BID  · As per GI, dedicated triphasic CT scan of the liver for better characterization when the patient is more stable for further evaluation of lesion seen on CT    Diagnosis: Constipation with abdominal distension on exam  Concern for pseudoobstruction in setting of long hospital stay, recent hospitalization  Abdominal x-ray 01/03/2022: pending official read, no colonic dilation noted on my read  · Plan: MiraLax  Patient had 2 bowel movements on 01/03/2022 after being administered MiraLax    No signs of bleeding    :    Diagnosis: Renal failure, acute on chronic  Lab Results   Component Value Date    EGFR 8 01/04/2022    EGFR 8 01/03/2022    EGFR 8 01/02/2022    CREATININE 5 96 (H) 01/04/2022    CREATININE 6 44 (H) 01/03/2022    CREATININE 6 15 (H) 01/02/2022     CKD G3b (baseline creatinine 1 7 mg/dL, eGFR 35   POA: 7 3   UA 01/01/2022: Shows blood, protein and leukocytes     Urine culture 01/03/2022: Contaminated  Likely secondary to long-standing HTN  Dialyzed on 1/2, 1/3 500 cc removed each time        Plan:    · Daily BMPs: Cr trending down on 01/04/2021  · Daily weights, monitor I/O    Renal hematoma (stable)  Less likely to cause bleeding, but noted to cause gross hematuria during last admission  CT abdomen 12/26, impression: KIDNEYS/URETERS: 7 x 4 x 7 8 x 5 3 cm (length X depth X width) right suprarenal hematoma has decreased in size (previously 8 3 x 10 0 x 6 6 cm)  Otherwise similar lateral subcapsular hematoma and hepatic contour, with heterogeneous attenuation  Limited evaluation without IV contrast   Similar perinephric right fatty infiltrative change  Similar nonobstructing, tiny lower pole stone  No hydronephrosis or ureteral stone  Patient complains of RUQ tenderness, right flank tenderness  This is most likely due to renal hematoma  Heme/Onc:   Blood noted on patient's blanket, he is unaware of where this blood came from  Diagnosis: Supratherapeutic INR (5 on admission in setting of Eliquis use at home), currently 1 43 (01/03/2022) sp Kcentra, 3 units FFP, B12     S/p dialysis for suspected uremic encephalopathy- done on 1/2/21, 1/3/22    · Plan:  monitor H&H Q 6 H  look for signs of GI bleeding, new bruises, bloody urine in Robison  Diagnosis: Anemia, Hgb 7 9 yesterday, 7 6 (2 readings 6 hours apart on 01/04/2022  Hgb POA: 4 2  S/p 3 units PRBCs as of 1/3/21  FOBT 1/2/22 negative  · Plan: GI on board, appreciate recs  Low suspicion for GI bleed     · Monitor H&H q6   Transfuse if Hb < 7 0  · observe for any signs of bleeding especially since there was blood noted on patient's blanket  No blood or lesions in nares or mouth seen on exam       Diagnosis: DVT hx during last admission, dc'd on Eliquis  12/24 VAS: LEFT LOWER LIMB:Evidence of acute deep vein thrombosis in the popliteal and gastrocnemius veins    Chest CT 1/2 no concern for PE   1/3/22 left lower limb VAS showed no new thrombii   however, there was a lot of edema seen in the left calf      · Plan: Consider re-starting anticoagulation now that Hgb has stabilized  · Monitor for calf tenderness, respiratory status  · Subcutaneous ultrasound to evaluate for infection  Endo:    Diagnosis: T2DM   Lab Results   Component Value Date    HGBA1C 12 2 (H) 12/18/2021     Lab Results   Component Value Date    GLUC 121 01/04/2022    GLUC 107 01/03/2022    GLUC 132 01/02/2022     o Plan: Continue SSI  Avoid hypoglycemia  ID:   · Diagnosis: patient discharged on 12/29 for sepsis, bacteremia on IVAbx via central line placement with IR on 12/27  Suspected source: LLE cellulitis  o Plan: Continue IV Cefazolin 1000 mg q12h  until Jan 12, 2022  MSK:  Left leg pain  May be secondary to DVT, it worsening of cellulitis (including [place]), gout  01/03/2022 left leg x-ray: lateral stranding seen on my read  Official read pending  01/03/2022 left leg bedside ultrasound:  Chronic DVT visualized, significant edema and calf  Evaluated by Ortho (jean consult), surgery:  Low suspicion for nec fasc    Plan:  · MSK ultrasound of left calf       Patient appropriate for transfer out of the ICU today?: No  Disposition: Continue Critical Care   Code Status: Level 1 - Full Code  ---------------------------------------------------------------------------------------  ICU CORE MEASURES    Prophylaxis   VTE Pharmacologic Prophylaxis: None due to high INR, low Hgb  Stress Ulcer Prophylaxis: Pantoprazole PO    ABCDE Protocol (if indicated)  Plan to perform spontaneous awakening trial today? Not applicable  Plan to perform spontaneous breathing trial today? Not applicable  Obvious barriers to extubation?  Not applicable    Invasive Devices Review  Invasive Devices  Report    Central Venous Catheter Line            CVC Central Lines 12/27/21 Double Right 7 days          Peripheral Intravenous Line            Peripheral IV 01/01/22 Right Antecubital 2 days          Hemodialysis Catheter            HD Temporary Double Catheter 1 day          Drain            Urethral Catheter Temperature probe 16 Fr  16 days              Can any invasive devices be discontinued today? No  ---------------------------------------------------------------------------------------  OBJECTIVE    Vitals   Vitals:    22 0200 22 0300 22 0325 22 0700   BP: 166/71 163/69  131/61   BP Location:    Left arm   Pulse: 69 66  66   Resp:  (!)      Temp:   97 9 °F (36 6 °C) 98 °F (36 7 °C)   TempSrc:   Oral Oral   SpO2: 95% 95%  94%   Weight:   112 kg (246 lb 14 6 oz)    Height:         Temp (24hrs), Av 1 °F (36 7 °C), Min:97 9 °F (36 6 °C), Max:98 7 °F (37 1 °C)  Current: Temperature: 98 °F (36 7 °C)    Respiratory:  SpO2: SpO2: 94 %, SpO2 Activity: SpO2 Activity: At Rest, SpO2 Device: O2 Device: Nasal cannula, Capnography:    Nasal Cannula O2 Flow Rate (L/min): 2 L/min    Invasive/non-invasive ventilation settings   Respiratory  Report   Lab Data (Last 4 hours)    None         O2/Vent Data (Last 4 hours)    None              PHYSICAL EXAMINATION  Temp:  [97 9 °F (36 6 °C)-98 7 °F (37 1 °C)] 98 °F (36 7 °C)  HR:  [60-72] 66  Resp:  [16-27] 27  BP: (131-171)/(61-74) 131/61   General Examination: Ill appearing, lying in bed  Blood on patient's blanket  HEENT: Normocephalic, Atraumatic  Moist mucus membranes  Extraocular movements intact, pupils equally round and reactive to light and accommodation bilaterally  No bladder lesions seen in nasal nares or mouth  CVS: Regular rate and rhythm  S1 S2 noted  Lungs: 98% sat on 2L NC   Abdomen: Bowel sounds positive  Right flank, RUQ tenderness  Robison bag with slightly pink urine (no worse than yesterday)  Ext: left patella tender to touch  No erythema noted  Patient is unable to lift up left foot  Able to wiggle toes  Psych: Thought content - No VH/AH  No delusions   Though Process - logical    Neuro: KATHERINE, Ox3  Follows commands, tracks me across room, able to communicate in full sentences appropriately  Laboratory and Diagnostics:  Results from last 7 days   Lab Units 01/04/22  0505 01/04/22  0044 01/03/22  1758 01/03/22  1108 01/03/22 0435 01/02/22 2031 01/02/22  1653 01/02/22 0438 01/01/22 2022 01/01/22 1419 01/01/22  1419 12/29/21  0440 12/29/21  0440   WBC Thousand/uL 7 48  --   --   --  7 58 8 60  --   --  11 19*  --  11 55*  --  9 71   HEMOGLOBIN g/dL 7 6* 7 6* 8 0* 7 8* 7 9* 7 9* 6 8*   < > 4 2*   < > 6 6*   < > 7 8*   I STAT HEMOGLOBIN   --   --   --   --   --   --   --    < >  --   --   --   --   --    HEMATOCRIT % 24 3* 24 9* 26 2* 25 7* 25 6* 25 5* 22 4*   < > 14 4*   < > 21 7*   < > 25 2*   HEMATOCRIT, ISTAT   --   --   --   --   --   --   --    < >  --   --   --   --   --    PLATELETS Thousands/uL 338  --   --   --  350 391* 386  --  445*  --  502*  --  464*   NEUTROS PCT %  --   --   --   --  75  --   --   --  81*  --  82*  --   --    BANDS PCT %  --   --   --   --   --  9*  --   --   --   --   --   --  4   MONOS PCT %  --   --   --   --  7  --   --   --  7  --  6  --   --    MONO PCT %  --   --   --   --   --  1*  --   --   --   --   --   --  4    < > = values in this interval not displayed       Results from last 7 days   Lab Units 01/04/22  0505 01/03/22 0435 01/02/22 2031 01/02/22  1249 01/02/22  0433 01/01/22 2025 01/01/22 2022 01/01/22  1419 01/01/22  1419   SODIUM mmol/L 134* 138 137  --  138 139 138  --  138   POTASSIUM mmol/L 4 4 4 8 4 9  --  5 5* 5 5* 5 5*  --  5 9*   CHLORIDE mmol/L 101 105 104  --  100 108 108  --  106   CO2 mmol/L 22 22 23  --  13* 17* 18*   < > 19*   CO2, I-STAT mmol/L  --   --   --  20*  --   --   --   --   --    ANION GAP mmol/L 11 11 10  --  25* 14* 12  --  13   BUN mg/dL 63* 69* 68*  --  81* 86* 83*  --  88*   CREATININE mg/dL 5 96* 6 44* 6 15*  --  6 99* 7 37* 7 16*  --  7 37*   CALCIUM mg/dL 8 0* 8 2* 8 2*  --  6 9* 7 8* 7 9*  --  8 6   GLUCOSE RANDOM mg/dL 121 107 132  --  106 105 107  --  91   ALT U/L  --  9* 10*  --  28  --  10*  --  9*   AST U/L  --  38 42  --  45  --  50*  --  54*   ALK PHOS U/L  --  53 58  --  53  --  57  --  68   ALBUMIN g/dL  --  2 0* 2 2*  --  5 7*  --  1 5*  --  1 6*   TOTAL BILIRUBIN mg/dL  --  0 60 0 77  --  0 54  --  0 49  --  0 46    < > = values in this interval not displayed  Results from last 7 days   Lab Units 22  0435 22  0453   MAGNESIUM mg/dL 1 8 2 0   PHOSPHORUS mg/dL 6 0* 6 9*      Results from last 7 days   Lab Units 22  0435 22  1653 22  0453 22  1419   INR  1 43* 1 60* 5 63* 6 78*   PTT seconds  --   --   --  82*          Results from last 7 days   Lab Units 22  1419   LACTIC ACID mmol/L 0 6     ABG:    VBG:  Results from last 7 days   Lab Units 22  1501   PH DHARMESH  7 338   PCO2 DHARMESH mm Hg 31 3*   PO2 DHARMESH mm Hg 159 6*   HCO3 DHARMESH mmol/L 16 4*   BASE EXC DHARMESH mmol/L -8 5     Results from last 7 days   Lab Units 22  0438 22  1424   PROCALCITONIN ng/ml 1 34* 1 42*       Micro  Results from last 7 days   Lab Units 22  1531 22  1501 22  1419   BLOOD CULTURE   --  No Growth at 48 hrs  No Growth at 48 hrs  URINE CULTURE  >100,000 cfu/ml   --   --        EK/1: NSR    Imaging:  I have personally reviewed pertinent reports  XR chest portable   Final Result by Frank Evans DO ([id number])      Life-support lines as above  No pneumothorax  Suspect mild pulmonary venous congestion and left pleural effusion  Workstation performed: [id number]         CT head without contrast   Final Result by Robson Brandon MD ( 150)      No acute intracranial abnormality        Unchanged encephalomalacia secondary to chronic right cerebellar infarct (series 2 image 7 )               Workstation performed: [id number]         CT chest abdomen pelvis wo contrast   Final Result by Robson Brandon MD ( [id number])      Unchanged moderate bilateral water density pleural effusions with associated compressive atelectasis of both lower lobes  No evidence of pneumonia  Unchanged subcapsular and suprarenal hematoma around the right kidney  Suspected infiltrative neoplasm of the liver seen on prior contrast-enhanced CT is not well-visualized on this noncontrast CT  Workstation performed: [id number]         XR abdomen obstruction series    (Results Pending)   XR knee 1 or 2 vw left    (Results Pending)   US extremity soft tissue    (Results Pending)       Intake and Output  I/O       01/01 0701 01/02 0700 01/02 0701  01/03 0700 01/03 0701  01/04 0700    P  O  100      I V  (mL/kg) 155 (1 4) 500 (4 4)     Blood 280 40     Other  500     IV Piggyback  100     Total Intake(mL/kg) 535 (5) 1140 (10 1)     Urine (mL/kg/hr)  2404 (0 9)     Other  500     Total Output  2904     Net +535 -1764                Height and Weights   Height: 5' 7" (170 2 cm)  IBW (Ideal Body Weight): 66 1 kg  Body mass index is 38 67 kg/m²  Weight (last 2 days)     Date/Time Weight    01/04/22 0325 112 (246 92)    01/03/22 0532 113 (248 68)    01/03/22 0339 113 (248 68)        Nutrition       Diet Orders   (From admission, onward)             Start     Ordered    01/03/22 1204  Diet Renal; Renal Restrictive; No; No  Diet effective now        References:    Nutrtion Support Algorithm Enteral vs  Parenteral   Question Answer Comment   Diet Type Renal    Renal Renal Restrictive    Should patient have a fluid restriction? No    Should patient have a protein modifier? No    RD to adjust diet per protocol?  Yes        01/03/22 1203    01/01/22 1825  Room Service  Once        Question:  Type of Service  Answer:  Room Service - Appropriate with Assistance    01/01/22 1824              Active Medications  Scheduled Meds:  Current Facility-Administered Medications   Medication Dose Route Frequency Provider Last Rate    acetaminophen  975 mg Oral Q8H PRN Domi L NELLY Concepcion      amLODIPine  10 mg Oral Daily NELLY Apodaca      aspirin  81 mg Oral Daily Ba Guy MD      atorvastatin  40 mg Oral QPM Ba Guy MD      carvedilol  25 mg Oral BID With Meals Akhil Wu MD      ceFAZolin  1,000 mg Intravenous Q12H Ba Guy MD 1,000 mg (01/04/22 0845)   Idaniasonia Robert cyanocobalamin  1,000 mcg Oral Daily Ba Guy MD      docusate sodium  100 mg Oral BID PRN Ba Guy MD      ezetimibe  10 mg Oral HS Ba Guy MD      insulin glargine  32 Units Subcutaneous HS Ba Guy MD      insulin lispro  1-5 Units Subcutaneous HS Ba Guy MD      insulin lispro  1-6 Units Subcutaneous TID [facility] Ba Guy MD      lactulose  10 g Oral BID Semaj Dc PA-C      niCARdipine  1-15 mg/hr Intravenous Titrated Maricruz Schultz PA-C Stopped (01/03/22 1207)    nystatin   Topical BID Ba Guy MD      oxyCODONE  2 5 mg Oral Q6H PRN NELLY Apodaca      oxyCODONE  5 mg Oral Q6H PRN Justen Ho, NELLY      pantoprazole  40 mg Oral Daily Ba Guy MD      polyethylene glycol  17 g Oral Daily Angel Salazar PA-C      sodium bicarbonate  650 mg Oral TID after meals Ba Guy MD       Continuous Infusions:  niCARdipine, 1-15 mg/hr, Last Rate: Stopped (01/03/22 1207)      PRN Meds:   acetaminophen, 975 mg, Q8H PRN  docusate sodium, 100 mg, BID PRN  oxyCODONE, 2 5 mg, Q6H PRN  oxyCODONE, 5 mg, Q6H PRN      Allergies   Allergies   Allergen Reactions    Ace Inhibitors Other (See Comments)    Ciprofloxacin Other (See Comments)    Penicillins Hives    Morphine And Related Anxiety     ---------------------------------------------------------------------------------------  Advance Directive and Living Will:      Power of :    POLST: ---------------------------------------------------------------------------------------  Care Time Delivered:   Upon my evaluation, this patient had a high probability of imminent or life-threatening deterioration due to encephalopathy, HD instability, uremia, which required my direct attention, intervention, and personal management  I have personally provided 30 minutes (730 to 800) of critical care time, exclusive of procedures, teaching, family meetings, and any prior time recorded by providers other than myself  Calli Bravo MD      Portions of the record may have been created with voice recognition software  Occasional wrong word or "sound a like" substitutions may have occurred due to the inherent limitations of voice recognition software    Read the chart carefully and recognize, using context, where substitutions have occurred

## 2022-01-04 NOTE — QUICK NOTE
Patient's wife, Mrs Odessa Lopez, is very concerned about him being on oxycodone for pain  She does not want him to be oxycodone unless absolutely necessary  She says that last time he was on oxycodone while in the hospital he became extremely constipated       I informed her that her concerns will be conveyed to both nursing as well medicine team who will be taking over care from the ICU team

## 2022-01-05 PROBLEM — E87.5 HYPERKALEMIA: Status: RESOLVED | Noted: 2022-01-01 | Resolved: 2022-01-01

## 2022-01-05 PROBLEM — G89.29 CHRONIC PAIN OF LEFT LOWER EXTREMITY: Status: ACTIVE | Noted: 2022-01-01

## 2022-01-05 PROBLEM — M79.605 ACUTE PAIN OF LEFT LOWER EXTREMITY: Status: ACTIVE | Noted: 2022-01-01

## 2022-01-05 NOTE — ASSESSMENT & PLAN NOTE
Patient with complaint of left lower leg pain since previous admission from 12/17-12/29  Patient this morning notably distressed secondary to pain  Given 1 dose of dilaudid      Plan  · Scheduled Tylenol 975 q8  · Will keep Oxycodone as PRN for now, until family comes to discussion regarding this pain med option  · May use dilaudid for breakthrough pain only

## 2022-01-05 NOTE — ASSESSMENT & PLAN NOTE
Recent Labs     01/03/22  0435 01/04/22  0505 01/05/22  0515   K 4 8 4 4 4 4       In the setting of acute on chronic kidney disease  Has normalized, this am 4 4      Plan:  · Continue sodium bicarb  · Now resolved

## 2022-01-05 NOTE — ASSESSMENT & PLAN NOTE
Patient with complaint of left lower leg pain since previous admission from 12/17-12/29  Patient this morning notably distressed secondary to pain  Given 1 dose of dilaudid      Plan  · Scheduled Tylenol 975 q8  · Per discussion with family, requesting Oxycodone PRN be administered only overnight for severe pain

## 2022-01-05 NOTE — ASSESSMENT & PLAN NOTE
Diagnosis: patient discharged on 12/29 for sepsis, bacteremia on IV Abx via central line placement with IR on 12/27   Suspected source: LLE cellulitis    Plan  ·  Curbside consult with ID (Dr Bhandari) rosa isela to d/c PICC line as patient must transition to dialysis dosing Ancef  · Will adjust IV Cefazolin 1000 mg q24 during hospitalization, with the same end date of Jan 16, 2022  · 24 hours prior to discharge will transition patient then to Ancef 2 g/2 g/3 g for TTHSa dosing schedule  · Resident to [facility] ID team to make them aware of discharge so that coordination between ID office and dialysis center may be completed  · Per discussion with IR ([address]) order for removal of PICC line will be placed and primary team will reach out closer to discharge

## 2022-01-05 NOTE — ASSESSMENT & PLAN NOTE
Recent Labs     01/02/22  1653 01/03/22  0435 01/04/22  1613   INR 1 60* 1 43* 1 38*     History of DVT previously on Coumadin, started on Eliquis 5 mg b i d  during the previous admission for acute DVT  Patient now with normal INR originally supratherapeutic to 6 78 s/p 1 unit of FFP on 1/1 and 2 doses of [address] on 1/2 for supratherapuetic INR      Plan  · [facility]  · Will monitor INR  · May consider transition back to Eliquis in the next 24-48 hours if Hg stable

## 2022-01-05 NOTE — ASSESSMENT & PLAN NOTE
Date of Service: Jan 27, 2021    Chief Complaint: Post operative follow up.     Date of Surgery: 6/12/2020    Procedure Performed: Left wrist distal ulna resection bone culture of left distal radius, Open reduction left distal radius nonunion with distal ulna autograft    Interval events: Kimberly Chau is a 81 year old female who presents today for a postoperative follow up. The patient reports that she has been doing well.  She has had no pain.  She does feels like her left wrist is little weaker than her right.  Overall she is happy with her procedure and has no concerns today.    Patient was found to have a 5th metacarpal fracture on radiographs today.  The patient denies any injury, or falls to the area.  She denies any pain into the hand.  She states that she has deformed hands due to arthritis but fortunately has minimal pain in her hands.    The past medical history was reviewed updated in the EMR. This includes medications, surgeries, social history, and review of systems.    Physical examination:  Well-developed, well-nourished and in no acute distress.  Alert and oriented to surroundings.  On examination of the  left wrist, incision is well-healed. There is no erythema, drainage, or dehiscence.  She has swelling over the fifth metacarpal.  Tenderness to palpation over the fifth metacarpal fracture.  She is nontender throughout remaining fingers, hand, and wrist.  She is nontender over prior distal radius fracture.  Arthritic deformities throughout the hand. Sensation is intact in median, radial and ulnar nerve distributions.  The patient is able to make a full composite fist. Fingers are warm and well-perfused. Radial pulse is palpable.  Full supination and pronation, stiffness of the wrist with flexion and extension.    Radiographs: Three views of the left wrist were obtained and reviewed. These demonstrate ongoing healing of prior comminuted distal radius fracture, status post revision ORIF and darrach.   Recent Labs     01/03/22  0435 01/03/22  0435 01/04/22  0505 01/04/22  0505 01/05/22  0515   CREATININE 6 44*  --  5 96*  --  5 84*   EGFR 8   < > 8   < > 9    < > = values in this interval not displayed  Estimated Creatinine Clearance: 14 6 mL/min (A) (by C-G formula based on SCr of 5 84 mg/dL (H))   Patient's baseline creatinine is 1 7  He was recently admitted in the hospital with acute kidney injury likely secondary to ATN, and discharged with creatinine around 4    Nephrology on board, appreciate recommendations   Unclear etiology for currently acute kidney injury, was thought to be prerenal and was started on IV fluids since admission, per Nephrology  Patient has Robison catheter, that was flushed per nurse, draining appropriately   Patient fluid overload per my exam this morning    Patient on IV bicarb with 100 cc an hour   Status post IV Lasix 40 mg once    Plan:  · Nephrology planning for dialysis tomorrow  · NPO at midnight for placement of PermCath on 1/5 with IR   Avoid nephrotoxins, hypotension   Monitor electrolytes    Monitor creatinine   Continue bicarb The volar plate and screws in place with stable alignment.  Osteopenic bones.  Degenerative joint changes in the first CMC. She also appears to have a new fifth metacarpal neck fracture with mild angulation.    Assessment: 81 year old female s/p the above procedures, now with new fifth metacarpal fracture.    Plan: Discussed with the patient that in regards to her distal radius fracture, her x-rays show continued healing.  Patient did mention feeling like her left wrist was stiff and slightly weaker than her right.  I did offer hand therapy for range of motion and strengthening.  Patient would like to wait until the Covid status improves in the community prior to attending therapy.    Patient was found to have 1/5 metacarpal fracture on x-rays today, it is ounknown when the fracture occurred.  Discussed with the patient that sometimes these fractures are treated with surgery, however given her history, her lack of pain, and her osteopenic bones, would recommend nonoperative treatment.  Recommended a Velcro removable wrist brace to wear for the next 4 weeks.  She will follow-up at that time for repeat x-rays and evaluation.    All questions were answered and the patient was in agreement with this plan.    CARO ROBERTSON PA-C  January 27, 2021 9:33 AM   Orthopaedic Surgery

## 2022-01-05 NOTE — ASSESSMENT & PLAN NOTE
Diagnosis: patient discharged on 12/29 for sepsis, bacteremia on IVAbx via central line placement with IR on 12/27  Suspected source: LLE cellulitis  ? Plan: Continue IV Cefazolin 1000 mg q12h  until Jan 12, 2022

## 2022-01-05 NOTE — PLAN OF CARE
Problem: MOBILITY - ADULT  Goal: Maintain or return to baseline ADL function  Description: INTERVENTIONS:  -  Assess patient's ability to carry out ADLs; assess patient's baseline for ADL function and identify physical deficits which impact ability to perform ADLs (bathing, care of mouth/teeth, toileting, grooming, dressing, etc )  - Assess/evaluate cause of self-care deficits   - Assess range of motion  - Assess patient's mobility; develop plan if impaired  - Assess patient's need for assistive devices and provide as appropriate  - Encourage maximum independence but intervene and supervise when necessary  - Involve family in performance of ADLs  - Assess for home care needs following discharge   - Consider OT consult to assist with ADL evaluation and planning for discharge  - Provide patient education as appropriate  Outcome: Progressing  Goal: Maintains/Returns to pre admission functional level  Description: INTERVENTIONS:  - Perform BMAT or MOVE assessment daily    - Set and communicate daily mobility goal to care team and patient/family/caregiver  - Collaborate with rehabilitation services on mobility goals if consulted  - Reposition patient every 2 hours    - Out of bed for meals 3 times a day  - Out of bed for toileting  - Record patient progress and toleration of activity level   Outcome: Progressing     Problem: Potential for Falls  Goal: Patient will remain free of falls  Description: INTERVENTIONS:  - Educate patient/family on patient safety including physical limitations  - Instruct patient to call for assistance with activity   - Consult OT/PT to assist with strengthening/mobility   - Keep Call bell within reach  - Keep bed low and locked with side rails adjusted as appropriate  - Keep care items and personal belongings within reach  - Initiate and maintain comfort rounds  - Make Fall Risk Sign visible to staff  - Initiate/Maintain bed alarm  - Obtain necessary fall risk management equipment  - Apply yellow socks and bracelet for high fall risk patients  - Consider moving patient to room near nurses station  Outcome: Progressing     Problem: Prexisting or High Potential for Compromised Skin Integrity  Goal: Skin integrity is maintained or improved  Description: INTERVENTIONS:  - Identify patients at risk for skin breakdown  - Assess and monitor skin integrity  - Assess and monitor nutrition and hydration status  - Monitor labs   - Assess for incontinence   - Turn and reposition patient  - Assist with mobility/ambulation  - Relieve pressure over bony prominences  - Avoid friction and shearing  - Provide appropriate hygiene as needed including keeping skin clean and dry  - Evaluate need for skin moisturizer/barrier cream  - Collaborate with interdisciplinary team   - Patient/family teaching  - Consider wound care consult   Outcome: Progressing     Problem: Nutrition/Hydration-ADULT  Goal: Nutrient/Hydration intake appropriate for improving, restoring or maintaining nutritional needs  Description: Monitor and assess patient's nutrition/hydration status for malnutrition  Collaborate with interdisciplinary team and initiate plan and interventions as ordered  Monitor patient's weight and dietary intake as ordered or per policy  Utilize nutrition screening tool and intervene as necessary  Determine patient's food preferences and provide high-protein, high-caloric foods as appropriate       INTERVENTIONS:  - Monitor oral intake, urinary output, labs, and treatment plans  - Assess nutrition and hydration status and recommend course of action  - Evaluate amount of meals eaten  - Assist patient with eating if necessary   - Allow adequate time for meals  - Recommend/ encourage appropriate diets, oral nutritional supplements, and vitamin/mineral supplements  - Order, calculate, and assess calorie counts as needed  - Recommend, monitor, and adjust tube feedings and TPN/PPN based on assessed needs  - Assess need for intravenous fluids  - Provide specific nutrition/hydration education as appropriate  - Include patient/family/caregiver in decisions related to nutrition  Outcome: Progressing     Problem: METABOLIC, FLUID AND ELECTROLYTES - ADULT  Goal: Electrolytes maintained within normal limits  Description: INTERVENTIONS:  - Monitor labs and assess patient for signs and symptoms of electrolyte imbalances  - Administer electrolyte replacement as ordered  - Monitor response to electrolyte replacements, including repeat lab results as appropriate  - Instruct patient on fluid and nutrition as appropriate  Outcome: Progressing  Goal: Fluid balance maintained  Description: INTERVENTIONS:  - Monitor labs   - Monitor I/O and WT  - Instruct patient on fluid and nutrition as appropriate  - Assess for signs & symptoms of volume excess or deficit  Outcome: Progressing  Goal: Glucose maintained within target range  Description: INTERVENTIONS:  - Monitor Blood Glucose as ordered  - Assess for signs and symptoms of hyperglycemia and hypoglycemia  - Administer ordered medications to maintain glucose within target range  - Assess nutritional intake and initiate nutrition service referral as needed  Outcome: Progressing

## 2022-01-05 NOTE — QUICK NOTE
Hbg trending down 7 1-->6 7  Repeat Hgb 6 2  Pt is hemodynamically stable with no new complaints  Most recent /70  Will transfuse one unit PRBC tonight  Continue H& H monitoring  Hold heparin drip for now  May consider repeat CT abd/pelvis r/o worsening hematoma if repeat Hgb drops again

## 2022-01-05 NOTE — ASSESSMENT & PLAN NOTE
Lab Results   Component Value Date    HGBA1C 12 2 (H) 12/18/2021       Recent Labs     01/04/22  1610 01/04/22  2108 01/05/22  0757 01/05/22  1125   POCGLU 183* 231* 144* 134       Blood Sugar Average: Last 72 hrs:  (P) 169 1875   · Home regimen:  Lantus 32 units at bedtime, lispro 6 units t i d  With meals  · Upon admission, patient's home insulin was ordered  Patient did not get any of his lispro because of normal glucose  · Patient seemed to be lethargic, likely will have poor oral intake as well has MULUGETA  · Will discontinue lispro with meals, continue Lantus at bedtime and SSI  If hyperglycemia in the next few days, resume lispro with meals     · Hypoglycemia protocol  · Diabetic diet

## 2022-01-05 NOTE — PLAN OF CARE
Problem: MOBILITY - ADULT  Goal: Maintain or return to baseline ADL function  Description: INTERVENTIONS:  -  Assess patient's ability to carry out ADLs; assess patient's baseline for ADL function and identify physical deficits which impact ability to perform ADLs (bathing, care of mouth/teeth, toileting, grooming, dressing, etc )  - Assess/evaluate cause of self-care deficits   - Assess range of motion  - Assess patient's mobility; develop plan if impaired  - Assess patient's need for assistive devices and provide as appropriate  - Encourage maximum independence but intervene and supervise when necessary  - Involve family in performance of ADLs  - Assess for home care needs following discharge   - Consider OT consult to assist with ADL evaluation and planning for discharge  - Provide patient education as appropriate  Outcome: Progressing  Goal: Maintains/Returns to pre admission functional level  Description: INTERVENTIONS:  - Perform BMAT or MOVE assessment daily    - Set and communicate daily mobility goal to care team and patient/family/caregiver  - Collaborate with rehabilitation services on mobility goals if consulted  - Perform Range of Motion  times a day  - Reposition patient every  hours    - Dangle patient  times a day  - Stand patient  times a day  - Ambulate patient  times a day  - Out of bed to chair  times a day   - Out of bed for meals  times a day  - Out of bed for toileting  - Record patient progress and toleration of activity level   Outcome: Progressing     Problem: Potential for Falls  Goal: Patient will remain free of falls  Description: INTERVENTIONS:  - Educate patient/family on patient safety including physical limitations  - Instruct patient to call for assistance with activity   - Consult OT/PT to assist with strengthening/mobility   - Keep Call bell within reach  - Keep bed low and locked with side rails adjusted as appropriate  - Keep care items and personal belongings within reach  - Initiate and maintain comfort rounds  - Make Fall Risk Sign visible to staff  - Offer Toileting every  Hours, in advance of need  - Initiate/Maintain alarm  - Obtain necessary fall risk management equipmen  - Apply yellow socks and bracelet for high fall risk patients  - Consider moving patient to room near nurses station  Outcome: Progressing     Problem: Prexisting or High Potential for Compromised Skin Integrity  Goal: Skin integrity is maintained or improved  Description: INTERVENTIONS:  - Identify patients at risk for skin breakdown  - Assess and monitor skin integrity  - Assess and monitor nutrition and hydration status  - Monitor labs   - Assess for incontinence   - Turn and reposition patient  - Assist with mobility/ambulation  - Relieve pressure over bony prominences  - Avoid friction and shearing  - Provide appropriate hygiene as needed including keeping skin clean and dry  - Evaluate need for skin moisturizer/barrier cream  - Collaborate with interdisciplinary team   - Patient/family teaching  - Consider wound care consult   Outcome: Progressing     Problem: Nutrition/Hydration-ADULT  Goal: Nutrient/Hydration intake appropriate for improving, restoring or maintaining nutritional needs  Description: Monitor and assess patient's nutrition/hydration status for malnutrition  Collaborate with interdisciplinary team and initiate plan and interventions as ordered  Monitor patient's weight and dietary intake as ordered or per policy  Utilize nutrition screening tool and intervene as necessary  Determine patient's food preferences and provide high-protein, high-caloric foods as appropriate       INTERVENTIONS:  - Monitor oral intake, urinary output, labs, and treatment plans  - Assess nutrition and hydration status and recommend course of action  - Evaluate amount of meals eaten  - Assist patient with eating if necessary   - Allow adequate time for meals  - Recommend/ encourage appropriate diets, oral nutritional supplements, and vitamin/mineral supplements  - Order, calculate, and assess calorie counts as needed  - Recommend, monitor, and adjust tube feedings and TPN/PPN based on assessed needs  - Assess need for intravenous fluids  - Provide specific nutrition/hydration education as appropriate  - Include patient/family/caregiver in decisions related to nutrition  Outcome: Progressing     Problem: METABOLIC, FLUID AND ELECTROLYTES - ADULT  Goal: Electrolytes maintained within normal limits  Description: INTERVENTIONS:  - Monitor labs and assess patient for signs and symptoms of electrolyte imbalances  - Administer electrolyte replacement as ordered  - Monitor response to electrolyte replacements, including repeat lab results as appropriate  - Instruct patient on fluid and nutrition as appropriate  Outcome: Progressing  Goal: Fluid balance maintained  Description: INTERVENTIONS:  - Monitor labs   - Monitor I/O and WT  - Instruct patient on fluid and nutrition as appropriate  - Assess for signs & symptoms of volume excess or deficit  Outcome: Progressing  Goal: Glucose maintained within target range  Description: INTERVENTIONS:  - Monitor Blood Glucose as ordered  - Assess for signs and symptoms of hyperglycemia and hypoglycemia  - Administer ordered medications to maintain glucose within target range  - Assess nutritional intake and initiate nutrition service referral as needed  Outcome: Progressing

## 2022-01-05 NOTE — ASSESSMENT & PLAN NOTE
Recent Labs     01/04/22  1613 01/05/22  0004 01/05/22  0515 01/05/22  1224 01/06/22  1948 01/07/22  0347 01/07/22  1137   HGB 7 6*   < > 7 2*   < > 8 2* 14 0 8 2*   MCV 96  --  96  --   --   --   --     < > = values in this interval not displayed  · Etiology likely multifactorial in the setting of chronic kidney disease, hematuria in the setting of supratherapeutic INR  Patient has right subcapsular hematoma that is unchanged compared to the last CT scan  · Patient s/p 2 units of PRBC on 1/1 ( had formed antibodies during last admission to blood products)  · FOBT negative    Plan:  · Hemoglobin remains stable ~ 8  · Will monitor H/H with daily CBC at this time   Transfuse if Hb < 7 0   Consent for transfusion obtained:  Yes   GI consulted, appreciate recs

## 2022-01-05 NOTE — PROGRESS NOTES
[facility]  Progress Note - Hector Reeys 1955, 78 y o  male MRN: [id number]  Unit/Bed#: S -01 Encounter: [id number]  Primary Care Provider: Dominguez Carcamo MD   Date and time admitted to hospital: 1/1/2022 12:36 PM    * Encephalopathy  Assessment & Plan  POA: Patient lethargic but easily arousable, responded questions appropriately  Most likely Uremic encephalopathy given elevaated ammonia level of 48 Creatinine 6 9, BUN 81  Patient now alert and oriented to self this morning during examination  Plan:  · Continue lactulose 10mg daily  · Close monitoring of mental status  · Continue with plan as below for ARF    Renal failure (ARF), acute on chronic [facility])  Assessment & Plan    Recent Labs     01/03/22  0435 01/03/22  0435 01/04/22  0505 01/04/22  0505 01/05/22  0515   CREATININE 6 44*  --  5 96*  --  5 84*   EGFR 8   < > 8   < > 9    < > = values in this interval not displayed  Estimated Creatinine Clearance: 14 6 mL/min (A) (by C-G formula based on SCr of 5 84 mg/dL (H))   Patient's baseline creatinine is 1 7  He was recently admitted in the hospital with acute kidney injury likely secondary to ATN, and discharged with creatinine around 4    Nephrology on board, appreciate recommendations   Unclear etiology for currently acute kidney injury, was thought to be prerenal and was started on IV fluids since admission, per Nephrology  Patient has Robison catheter, that was flushed per nurse, draining appropriately   Patient fluid overload per my exam this morning    Patient on IV bicarb with 100 cc an hour   Status post IV Lasix 40 mg once    Plan:  · Nephrology planning for dialysis tomorrow  · NPO at midnight for placement of PermCath on 1/5 with IR   Avoid nephrotoxins, hypotension   Monitor electrolytes    Monitor creatinine   Continue bicarb      Chronic pain of left lower extremity  Assessment & Plan  Patient with complaint of left lower leg pain since previous admission from 12/17-12/29  Patient this morning notably distressed secondary to pain  Given 1 dose of dilaudid  Plan  · Scheduled Tylenol 975 q8  · Will keep Oxycodone as PRN for now, until family comes to discussion regarding this pain med option  · May use dilaudid for breakthrough pain only    Cellulitis of left lower extremity  Assessment & Plan  Diagnosis: patient discharged on 12/29 for sepsis, bacteremia on IV Abx via central line placement with IR on 12/27  Suspected source: LLE cellulitis    Plan  ·  Continue IV Cefazolin 1000 mg q12h  until Jan 12, 2022      Anemia  Assessment & Plan  Recent Labs     01/04/22  0505 01/04/22  0505 01/04/22  1613 01/04/22  1613 01/05/22  0004 01/05/22  0515 01/05/22  1224   HGB 7 6*   < > 7 6*   < > 7 0* 7 2* 7 1*   MCV 95  --  96  --   --  96  --     < > = values in this interval not displayed  · Etiology likely multifactorial in the setting of chronic kidney disease, hematuria in the setting of supratherapeutic INR  Patient has right subcapsular hematoma that is unchanged compared to the last CT scan  · Patient s/p 2 units of PRBC on 1/1 ( had formed antibodies during last admission to blood products)  · FOBT negative    Plan:   Last H/H at 12:20pm at 7 1, discussed during sign out to follow up H/H this evening as patient may require transfusion   Monitor H&H q8   Transfuse if Hb < 7 0   Consent for transfusion obtained: Yes   GI consulted, appreciate recs      Type 2 diabetes mellitus, with long-term current use of insulin [facility])  Assessment & Plan  Lab Results   Component Value Date    HGBA1C 12 2 (H) 12/18/2021       Recent Labs     01/04/22  1610 01/04/22  2108 01/05/22  0757 01/05/22  1125   POCGLU 183* 231* 144* 134       Blood Sugar Average: Last 72 hrs:  (P) 169 1875   · Home regimen:  Lantus 32 units at bedtime, lispro 6 units t i d  With meals  · Upon admission, patient's home insulin was ordered    Patient did not get any of his lispro because of normal glucose  · Patient seemed to be lethargic, likely will have poor oral intake as well has MULUGETA  · Will discontinue lispro with meals, continue Lantus at bedtime and SSI  If hyperglycemia in the next few days, resume lispro with meals  · Hypoglycemia protocol  · Diabetic diet      CVA (cerebral vascular accident) [facility])  Assessment & Plan  · Documented in history with no residual deficits  · Continue atorvastatin, aspirin    Hypertension  Assessment & Plan  · Blood pressure acceptable   · Continue amlodipine and metoprolol   · Monitor vital signs     Supratherapeutic INR  Assessment & Plan  Recent Labs     01/02/22  1653 01/03/22  0435 01/04/22  1613   INR 1 60* 1 43* 1 38*     History of DVT previously on Coumadin, started on Eliquis 5 mg b i d  during the previous admission for acute DVT  Patient now with normal INR originally supratherapeutic to 6 78 s/p 1 unit of FFP on 1/1 and 2 doses of [address] on 1/2 for supratherapuetic INR  Plan  · Continue on heparin drip for [facility] for now  · Will monitor INR  · May consider transition back to Eliquis in the next 24-48 hours if Hg stable      Right Liver mass  Assessment & Plan  · Noted on abdominal CT in setting of possibly decreased synthetic liver function appreciated in lab work  Staphylococcus aureus bacteremia  Assessment & Plan  · Recently hospitalized for Staph bacteremia  · Discharged on IV cefazolin  · Continue IV cefazolin through January 16, 4937    Metabolic acidosis  Assessment & Plan  · Continue sodium bicarb 650mg TID      VTE Pharmacologic Prophylaxis:   VTE Score: 3 Moderate Risk (Score 3-4) - Pharmacological DVT Prophylaxis Ordered: Heparin Drip  Mechanical VTE Prophylaxis in Place: No    Patient Centered Rounds: I have performed bedside rounds with nursing staff today      Discussions with Specialists or Other Care Team Provider: Nephrology, IR    Education and Discussions with Family / Patient: Updated  (wife) via phone  Current Length of Stay: 4 day(s)    Current Patient Status: Inpatient     Discharge Plan / Estimated Discharge Date: TBD    Code Status: Level 1 - Full Code      Subjective:   Patient laying in bed complaining of pain this morning  Objective:     Vitals:   Temp (24hrs), Av 4 °F (36 9 °C), Min:97 9 °F (36 6 °C), Max:99 °F (37 2 °C)    Temp:  [97 9 °F (36 6 °C)-99 °F (37 2 °C)] 98 2 °F (36 8 °C)  HR:  [57-64] 57  Resp:  [18] 18  BP: (124-141)/(55-63) 130/60  SpO2:  [95 %-99 %] 95 %  Body mass index is 38 33 kg/m²  Input and Output Summary (last 24 hours): Intake/Output Summary (Last 24 hours) at 2022 1628  Last data filed at 2022 2256  Gross per 24 hour   Intake 36 17 ml   Output 100 ml   Net -63 83 ml       Physical Exam:     Physical Exam  Constitutional:       Appearance: Normal appearance  HENT:      Head: Normocephalic and atraumatic  Right Ear: External ear normal       Left Ear: External ear normal    Eyes:      Conjunctiva/sclera: Conjunctivae normal    Cardiovascular:      Rate and Rhythm: Normal rate and regular rhythm  Pulmonary:      Effort: Pulmonary effort is normal       Comments: 2 L NC  Abdominal:      General: Abdomen is flat  Bowel sounds are normal    Musculoskeletal:      Right lower leg: No edema  Left lower leg: Edema present  Comments: Multiple abrasions noted on the lower ext b/l   Neurological:      Mental Status: He is alert        Comments: Alert and oriented to self          Additional Data:     Labs:  Results from last 7 days   Lab Units 22  1224 22  0515 22  0515 22  1108 22  0435   WBC Thousand/uL  --   --  8 59   < > 7 58   HEMOGLOBIN g/dL 7 1*   < > 7 2*   < > 7 9*   HEMATOCRIT % 23 3*   < > 23 7*   < > 25 6*   PLATELETS Thousands/uL  --   --  315   < > 350   BANDS PCT %  --   --  11*  --   --    NEUTROS PCT %  --   --   --   --  75   LYMPHS PCT %  --   --   --   --  7*   LYMPHO PCT %  --   --  10* --   --    MONOS PCT %  --   --   --   --  7   MONO PCT %  --   --  6  --   --    EOS PCT %  --   --  1  --  5    < > = values in this interval not displayed  Results from last 7 days   Lab Units 01/05/22  0515   SODIUM mmol/L 132*   POTASSIUM mmol/L 4 4   CHLORIDE mmol/L 100   CO2 mmol/L 23   BUN mg/dL 58*   CREATININE mg/dL 5 84*   ANION GAP mmol/L 9   CALCIUM mg/dL 7 9*   ALBUMIN g/dL 1 8*   TOTAL BILIRUBIN mg/dL 0 30   ALK PHOS U/L 52   ALT U/L <6*   AST U/L 29   GLUCOSE RANDOM mg/dL 139     Results from last 7 days   Lab Units 01/04/22  1613   INR  1 38*     Results from last 7 days   Lab Units 01/05/22  1125 01/05/22  0757 01/04/22  2108 01/04/22  1610 01/04/22  1132 01/04/22  0754 01/03/22  2047 01/03/22  2028 01/03/22  1602 01/03/22  1154 01/03/22  0521 01/02/22  2036   POC GLUCOSE mg/dl 134 144* 231* 183* 214* 130 177* 168* 107 115 128 135         Results from last 7 days   Lab Units 01/02/22  0438 01/01/22  1424 01/01/22  1419   LACTIC ACID mmol/L  --   --  0 6   PROCALCITONIN ng/ml 1 34* 1 42*  --        Imaging: Reviewed radiology reports from this admission including: xray(s) and ultrasound(s)    Recent Cultures (last 7 days):     Results from last 7 days   Lab Units 01/01/22  1531 01/01/22  1501 01/01/22  1419   BLOOD CULTURE   --  No Growth at 72 hrs  No Growth at 72 hrs     URINE CULTURE  >100,000 cfu/ml   --   --        Lines/Drains:  Invasive Devices  Report    Central Venous Catheter Line            CVC Central Lines 12/27/21 Double Right 9 days          Peripheral Intravenous Line            Peripheral IV 01/01/22 Right Antecubital 4 days          Hemodialysis Catheter            HD Temporary Double Catheter 2 days          Drain            Urethral Catheter Temperature probe 16 Fr  17 days                Telemetry:        Last 24 Hours Medication List:   Current Facility-Administered Medications   Medication Dose Route Frequency Provider Last Rate    acetaminophen  975 mg Oral Q8H PRN Domi Ny Zavala, NELLY      amLODIPine  10 mg Oral Daily Avinash Fry, NELLY      aspirin  81 mg Oral Daily Domi Zavala, CRNP      atorvastatin  40 mg Oral QPM Domi Zavala, NELLY      carvedilol  25 mg Oral BID With Meals Avinash Fry, NELLY      ceFAZolin  1,000 mg Intravenous Q12H Avinash Fry, JESSENP 1,000 mg (01/05/22 0630)    cyanocobalamin  1,000 mcg Oral Daily Domi Howard, NELLY      docusate sodium  100 mg Oral BID PRN Avinash Fry, NELLY      ezetimibe  10 mg Oral HS Domisonia Howard, CRNP      heparin (porcine)  3-20 Units/kg/hr (Order-Specific) Intravenous Titrated NELLY Parsons 15 1 Units/kg/hr (01/05/22 1542)    insulin glargine  32 Units Subcutaneous HS Domi LEYLA Howard, CRNP      insulin lispro  1-5 Units Subcutaneous HS Domisonia Howard, CRNP      insulin lispro  1-6 Units Subcutaneous TID AC Domi Howard, NELLY      lactulose  10 g Oral BID [facility], CRNP      nystatin   Topical BID Mobile Ang, NELLY      oxyCODONE  5 mg Oral Q6H PRN Avinash Fry, NELLY      pantoprazole  40 mg Oral Daily Domikiara Howard, CRNP      polyethylene glycol  17 g Oral Daily Domikiara Howard, CRNP      sodium bicarbonate  650 mg Oral TID after meals NELLY Parsons          Today, Patient Was Seen By: Bhumika Niño MD    ** Please Note: This note has been constructed using a voice recognition system   **

## 2022-01-05 NOTE — ASSESSMENT & PLAN NOTE
Recent Labs     01/04/22  0505 01/04/22  0505 01/04/22  1613 01/04/22  1613 01/05/22  0004 01/05/22  0515 01/05/22  1224   HGB 7 6*   < > 7 6*   < > 7 0* 7 2* 7 1*   MCV 95  --  96  --   --  96  --     < > = values in this interval not displayed  · Etiology likely multifactorial in the setting of chronic kidney disease, hematuria in the setting of supratherapeutic INR  Patient has right subcapsular hematoma that is unchanged compared to the last CT scan  · Patient s/p 2 units of PRBC on 1/1 ( had formed antibodies during last admission to blood products)  · FOBT negative    Plan:   Monitor H&H q8   Transfuse if Hb < 7 0   Consent for transfusion obtained:  Yes   GI consulted, appreciate recs

## 2022-01-05 NOTE — ASSESSMENT & PLAN NOTE
POA: Patient lethargic but easily arousable, responded questions appropriately  Most likely Uremic encephalopathy given elevaated ammonia level of 48 Creatinine 6 9, BUN 81  Patient now alert and oriented to self this morning during examination      Plan:  · Continue lactulose 10mg daily  · Close monitoring of mental status  · Continue with plan as below for ARF

## 2022-01-05 NOTE — CASE MANAGEMENT
Case Management Progress Note    Patient name Roberto Oliver  Location S /S -31 MRN [id number]  : 1955 Date 2022       LOS (days): 4  Geometric Mean LOS (GMLOS) (days): 4 30  Days to GMLOS:0 4        OBJECTIVE:        Current admission status: Inpatient  Preferred Pharmacy:   [address] [facility], [address] [facility], [address]  [address] [facility]  [address] [facility]  Phone: [phone] Fax: [phone]  [facility], [address] [facility],[address] [facility]  [address] [facility] [address]  Phone: [phone] Fax: [phone]    Primary Care Provider: Ana Monae MD    Primary Insurance: MARITZA Mejia 98 REP  Secondary Insurance: [place] [employer]    PROGRESS NOTE:    Cm contacted patient's room to discuss dc planning  Patient's wife answered the phone  Cm mentioned that potential of patient needing long term dialysis  Wife explained that she is only aware of needing dialysis tomorrow and Saturday  Cm will need to follow up with medical team about patient's need to long term dialysis  Cm also confirmed that wife was not happy with the care at [address]  She is aware that CM will need to send referral to all facilities in the [facility] and [facility]   Cm will send referral

## 2022-01-05 NOTE — TREATMENT PLAN
Nephrology    Patient underwent hemodialysis yesterday  Labs remains stable  No evidence at this time of renal recovery    Currently has a non tunneled dialysis catheter, will need to proceed with placement of a PermCath, and should work on outpatient dialysis placement where we can monitor for renal recovery as an outpatient  Will consult IR for transition of the non tunneled dialysis catheter to a PermCath  Plan for next dialysis tomorrow

## 2022-01-05 NOTE — ASSESSMENT & PLAN NOTE
Recent Labs     01/03/22  0435 01/03/22  0435 01/04/22  0505 01/04/22  0505 01/05/22  0515   CREATININE 6 44*  --  5 96*  --  5 84*   EGFR 8   < > 8   < > 9    < > = values in this interval not displayed  Estimated Creatinine Clearance: 14 6 mL/min (A) (by C-G formula based on SCr of 5 84 mg/dL (H))  Patient's baseline creatinine is 1 7  He was recently admitted in the hospital with acute kidney injury likely secondary to ATN, and discharged with creatinine around 4  Unclear etiology for currently acute kidney injury, was thought to be prerenal     Plan:  · Nephrology following appreciate recommendations  · Patient s/p PermCath placement with IR on 1/6 and subsequent dialysis tx   · Continue to monitor UOP and creatinine   Avoid nephrotoxins, hypotension   Continue bicarb

## 2022-01-05 NOTE — ASSESSMENT & PLAN NOTE
Recent Labs     01/02/22  1653 01/03/22  0435 01/04/22  1613   INR 1 60* 1 43* 1 38*     History of DVT previously on Coumadin, started on Eliquis 5 mg b i d  during the previous admission for acute DVT  Patient now with normal INR originally supratherapeutic to 6 78 s/p 1 unit of FFP on 1/1 and 2 doses of [address] on 1/2 for supratherapuetic INR      Plan  · Per discussion with Nephrology, okay to resume Eliquis 5mg BID  · Will monitor INR  · Monitor H/H closely

## 2022-01-06 NOTE — DISCHARGE INSTRUCTIONS
Perma-cath Placement   WHAT YOU NEED TO KNOW:   A perma-cath is a catheter placed through a vein into or near your right atrium  Your right atrium is the right upper chamber of your heart  A perma-cath is used for dialysis in an emergency or until a long-term device is ready to use  After your procedure, you will have some pain and swelling on your chest and neck  You may have some bruises on your chest and neck  You may also have 2 dressings, one on your chest and one on your neck  DISCHARGE INSTRUCTIONS:   Call 911 for any of the following:   · You feel lightheaded, short of breath, and have chest pain  · Your catheter comes out   Contact Interventional Radiology at [phone] Travis PATIENTS: Contact Interventional Radiology at [phone]) Agapito Burns PATIENTS: Contact Interventional Radiology at [phone]) if:  · Blood soaks through your bandage  · You have new swelling in your arm, neck, face, or chest on your right side  · Your catheter gets wet  · Your bruises or pain get worse  · You have a fever or chills  · Persistent nausea or vomiting  · Your incision is red, swollen, or draining pus  · You have questions or concerns about your condition or care  Self-care:       · Resume your normal diet  · Keep your dressings dry  Do not take a shower or swim  You may take a tub bath, but do not get your dressings wet  Water in your wound can cause bacteria to grow and cause an infection  If your dressing gets wet, dry it off and cover it with dry sterile gauze  Call your healthcare provider  Do not use soaps or ointments  · Do not change your dressings  Your healthcare provider or dialysis nurse will change your dressings  Your dressings should stay in place until your healthcare provider removes them  The dressing on your chest will stay as long as you have the catheter in place  The dressing prevents infection  · Do not remove the red and blue caps from the end of your catheter   The caps prevent air from getting into your catheter    Follow up with your healthcare provider as directed: Write down your questions so you remember to ask them during your visits

## 2022-01-06 NOTE — PLAN OF CARE
Problem: MOBILITY - ADULT  Goal: Maintain or return to baseline ADL function  Description: INTERVENTIONS:  -  Assess patient's ability to carry out ADLs; assess patient's baseline for ADL function and identify physical deficits which impact ability to perform ADLs (bathing, care of mouth/teeth, toileting, grooming, dressing, etc )  - Assess/evaluate cause of self-care deficits   - Assess range of motion  - Assess patient's mobility; develop plan if impaired  - Assess patient's need for assistive devices and provide as appropriate  - Encourage maximum independence but intervene and supervise when necessary  - Involve family in performance of ADLs  - Assess for home care needs following discharge   - Consider OT consult to assist with ADL evaluation and planning for discharge  - Provide patient education as appropriate  Outcome: Progressing  Goal: Maintains/Returns to pre admission functional level  Description: INTERVENTIONS:  - Perform BMAT or MOVE assessment daily    - Set and communicate daily mobility goal to care team and patient/family/caregiver     - Collaborate with rehabilitation services on mobility goals if consulted  - Out of bed for toileting  - Record patient progress and toleration of activity level   Outcome: Progressing     Problem: Potential for Falls  Goal: Patient will remain free of falls  Description: INTERVENTIONS:  - Educate patient/family on patient safety including physical limitations  - Instruct patient to call for assistance with activity   - Consult OT/PT to assist with strengthening/mobility   - Keep Call bell within reach  - Keep bed low and locked with side rails adjusted as appropriate  - Keep care items and personal belongings within reach  - Initiate and maintain comfort rounds  - Make Fall Risk Sign visible to staff  - Apply yellow socks and bracelet for high fall risk patients  - Consider moving patient to room near nurses station  Outcome: Progressing     Problem: Prexisting or High Potential for Compromised Skin Integrity  Goal: Skin integrity is maintained or improved  Description: INTERVENTIONS:  - Identify patients at risk for skin breakdown  - Assess and monitor skin integrity  - Assess and monitor nutrition and hydration status  - Monitor labs   - Assess for incontinence   - Turn and reposition patient  - Assist with mobility/ambulation  - Relieve pressure over bony prominences  - Avoid friction and shearing  - Provide appropriate hygiene as needed including keeping skin clean and dry  - Evaluate need for skin moisturizer/barrier cream  - Collaborate with interdisciplinary team   - Patient/family teaching  - Consider wound care consult   Outcome: Progressing     Problem: Nutrition/Hydration-ADULT  Goal: Nutrient/Hydration intake appropriate for improving, restoring or maintaining nutritional needs  Description: Monitor and assess patient's nutrition/hydration status for malnutrition  Collaborate with interdisciplinary team and initiate plan and interventions as ordered  Monitor patient's weight and dietary intake as ordered or per policy  Utilize nutrition screening tool and intervene as necessary  Determine patient's food preferences and provide high-protein, high-caloric foods as appropriate       INTERVENTIONS:  - Monitor oral intake, urinary output, labs, and treatment plans  - Assess nutrition and hydration status and recommend course of action  - Evaluate amount of meals eaten  - Assist patient with eating if necessary   - Allow adequate time for meals  - Recommend/ encourage appropriate diets, oral nutritional supplements, and vitamin/mineral supplements  - Order, calculate, and assess calorie counts as needed  - Recommend, monitor, and adjust tube feedings and TPN/PPN based on assessed needs  - Assess need for intravenous fluids  - Provide specific nutrition/hydration education as appropriate  - Include patient/family/caregiver in decisions related to nutrition  Outcome: Progressing     Problem: METABOLIC, FLUID AND ELECTROLYTES - ADULT  Goal: Electrolytes maintained within normal limits  Description: INTERVENTIONS:  - Monitor labs and assess patient for signs and symptoms of electrolyte imbalances  - Administer electrolyte replacement as ordered  - Monitor response to electrolyte replacements, including repeat lab results as appropriate  - Instruct patient on fluid and nutrition as appropriate  Outcome: Progressing  Goal: Fluid balance maintained  Description: INTERVENTIONS:  - Monitor labs   - Monitor I/O and WT  - Instruct patient on fluid and nutrition as appropriate  - Assess for signs & symptoms of volume excess or deficit  Outcome: Progressing  Goal: Glucose maintained within target range  Description: INTERVENTIONS:  - Monitor Blood Glucose as ordered  - Assess for signs and symptoms of hyperglycemia and hypoglycemia  - Administer ordered medications to maintain glucose within target range  - Assess nutritional intake and initiate nutrition service referral as needed  Outcome: Progressing

## 2022-01-06 NOTE — CASE MANAGEMENT
Case Management Progress Note    Patient name Miracle Garcia  Location S /S -01 MRN [id number]  : 1955 Date 2022       LOS (days): 5  Geometric Mean LOS (GMLOS) (days): 4 30  Days to GMLOS:-0 6        OBJECTIVE:        Current admission status: Inpatient  Preferred Pharmacy:   [address], [address] [facility], [address]  [address] [facility]  [address]  Phone: [phone] Fax: [phone]  [facility], [address] [facility],[address] [place] [address]  Phone: [phone] Fax: [phone]    Primary Care Provider: Dianna Willis MD    Primary Insurance: MARITZA Mejia 98 REP  Secondary Insurance: [place] [employer]    PROGRESS NOTE:    Per rounds, patient will be needing long-term dialysis  Patient currently receiving dialysis, so call made to patient's spouse, Rich Bonilla, to discuss d/c plan  Rich Bonilla states that patient has never followed with a nephrologist in the community, but states that he usually goes to [facility] for all of his care  [place] of choice discussed re: dialysis facilities ([facility] vs [facility]) and affiliation of [place] [address] nephrology team with [facility] and [facility] with [facility]  At this time, spouse requesting referral to [facility] as patient is only known to [place] [address] nephrology group  Also discussed rehab plan at d/c  Spouse aware that rehab referrals have been made but there are no bed offers at this time  [facility] confirmed that they are able to accept patient back, if insurance auth and bed available at time of discharge  Spouse reports that she was not happy with the care patient received at [facility], and relays preference for new facility  Explained that referrals are out, and will be in touch re: bed offers/denials  Listing offered for her to review; accepting of same and requesting for list of SNFs be left at patient's bedside as she will be returning this evening       Referral sent via [facility] [address] to Keren Melton; awaiting response

## 2022-01-06 NOTE — PROGRESS NOTES
Renal    Spoke to wife Honey Leigh over the phone  Discussed case at length, including long term expectations, and outlook in regards to renal recovery  Updated her on current plan  All questions were answered

## 2022-01-06 NOTE — PROGRESS NOTES
NEPHROLOGY PROGRESS NOTE   Barbi Rm 78 y o  male MRN: [id number]  Unit/Bed#: S -01 Encounter: [id number]  Reason for Consult: ARF      SUMMARY:    78 y  o  man PMH DM type 2, CKD G3b (baseline creatinine 1 7 mg/dL, eGFR 35) , hypertension   Recently episode of MULUGETA secondary to retroperitoneal bleeding and hypotension , was discharged with creatinine of 4 2   Now patient presents with altered mental status and worsening creatinine   Nephrology is consulted for MULUGETA     ASSESSMENT and PLAN:     Acute renal failure/acute tubular necrosis  --currently dialysis dependent  --started dialysis on January 2nd  --creatinine trending up off dialysis  --patient nonoliguric with good urine output  --will continue to monitor for renal recovery  --hemodialysis consent obtained from the patient's wife and sign  --access:  Temporary dialysis catheter converted to a PermCath on January 6 by intervention Radiology  --monitor urine output and maintain mean arterial pressure in 65  --avoid contrast studies  --in the setting recurrent episodes of acute kidney injury with has progressed acute tubular necrosis    --renal imaging shows unchanged subscapular and super renal hematoma  --at this time no evidence of renal recovery  --discussed at length with the wife Gerard Chan at the bedside  --needs outpatient hemodialysis placement, which can be coordinated with rehab  --continue Tuesday Thursday Saturday dialysis schedule  --plan for dialysis today  --once outpatient dialysis and rehab establish stable from renal standpoint for discharge with monitoring for renal recovery as an outpatient     Chronic kidney disease stage IIIB  --baseline creatinine 1 5-1 7 mg/dL     Mixed acid-base disorder--resolved     Hyperkalemia--resolved     Hyperphosphatemia  --slowly improving will continue monitor  --low phosphorus diet  --check a phosphorus tomorrow     Anemia  --acute on chronic  --supratherapeutic INR which has improved  --status post 3 unit blood transfusions till now  --hemoglobin stable but low  --low hemoglobin Will slow renal recovery     Blood pressure/volume status  --history of hypertension  --volume overloaded  --continue ultrafiltration with dialysis      SUBJECTIVE / INTERVAL HISTORY:    Feels terrible    OBJECTIVE:  Current Weight: Weight - Scale: 111 kg (244 lb 11 4 oz)  Vitals:    01/06/22 1140 01/06/22 1144 01/06/22 1149 01/06/22 1156   BP:  165/72 163/64    BP Location:       Pulse: 58 57 58 58   Resp:       Temp:       TempSrc:       SpO2: 92% 91% 90%    Weight:       Height:           Intake/Output Summary (Last 24 hours) at 1/6/2022 1245  Last data filed at 1/6/2022 0526  Gross per 24 hour   Intake 350 ml   Output 325 ml   Net 25 ml       Review of Systems:    12 point ROS has been reviewed  Physical Exam  Vitals and nursing note reviewed  Exam conducted with a chaperone present  Constitutional:       General: He is not in acute distress  Appearance: He is well-developed  He is ill-appearing  He is not diaphoretic  HENT:      Head: Normocephalic and atraumatic  Eyes:      General: No scleral icterus  Pupils: Pupils are equal, round, and reactive to light  Cardiovascular:      Rate and Rhythm: Normal rate and regular rhythm  Heart sounds: Normal heart sounds  No murmur heard  No friction rub  No gallop  Pulmonary:      Effort: Pulmonary effort is normal  No respiratory distress  Breath sounds: Normal breath sounds  No wheezing or rales  Chest:      Chest wall: No tenderness  Abdominal:      General: Bowel sounds are normal  There is distension  Palpations: Abdomen is soft  Tenderness: There is no abdominal tenderness  There is no rebound  Musculoskeletal:         General: Normal range of motion  Cervical back: Normal range of motion and neck supple  Right lower leg: Edema present  Left lower leg: Edema present  Skin:     General: Skin is dry        Coloration: Skin is pale       Findings: No rash  Neurological:      Mental Status: He is alert and oriented to person, place, and time           Medications:    Current Facility-Administered Medications:     acetaminophen (TYLENOL) tablet 975 mg, 975 mg, Oral, Q8H [facility], Chanell Montalvo MD, 975 mg at 01/06/22 0523    amLODIPine (NORVASC) tablet 10 mg, 10 mg, Oral, Daily, Dom Rodriguez MD, 10 mg at 01/05/22 0908    aspirin chewable tablet 81 mg, 81 mg, Oral, Daily, Dom Rodriguez MD, 81 mg at 01/05/22 0908    atorvastatin (LIPITOR) tablet 40 mg, 40 mg, Oral, QPM, Dom Rodriguez MD, 40 mg at 01/05/22 1725    carvedilol (COREG) tablet 25 mg, 25 mg, Oral, BID With Meals, Dom Rodriguez MD, 25 mg at 01/05/22 1725    ceFAZolin (ANCEF) IVPB (premix in dextrose) 1,000 mg 50 mL, 1,000 mg, Intravenous, Q12H, Dom Rodriguez MD, Last Rate: 100 mL/hr at 01/05/22 1852, 1,000 mg at 01/05/22 1852    cyanocobalamin (VITAMIN B-12) tablet 1,000 mcg, 1,000 mcg, Oral, Daily, Dom Rodriguez MD, 1,000 mcg at 01/05/22 0908    docusate sodium (COLACE) capsule 100 mg, 100 mg, Oral, BID PRN, Dom Rodriguez MD    ezetimibe (ZETIA) tablet 10 mg, 10 mg, Oral, HS, Dom Rodriguez MD, 10 mg at 01/05/22 2150    insulin glargine (LANTUS) subcutaneous injection 32 Units 0 32 mL, 32 Units, Subcutaneous, HS, Dom Rodriguez MD, 32 Units at 01/05/22 2151    insulin lispro (HumaLOG) 100 units/mL subcutaneous injection 1-5 Units, 1-5 Units, Subcutaneous, HS, Dom Rodriguez MD, 1 Units at 01/05/22 2151    insulin lispro (HumaLOG) 100 units/mL subcutaneous injection 1-6 Units, 1-6 Units, Subcutaneous, TID AC, 1 Units at 01/05/22 1810 **AND** [CANCELED] Fingerstick Glucose (POCT), , , TID AC, Dom Rodriguez MD    lactulose oral solution 10 g, 10 g, Oral, BID, Dom Rodriguez MD, 10 g at 01/05/22 1725    nystatin (MYCOSTATIN) powder, , Topical, BID, Dom Rodriguez MD, Given at 01/06/22 1023    oxyCODONE (ROXICODONE) IR tablet 5 mg, 5 mg, Oral, Q6H PRN, Jayy Mcallister MD, 5 mg at 01/06/22 0013    pantoprazole (PROTONIX) EC tablet 40 mg, 40 mg, Oral, Daily, Jayy Mcallister MD, 40 mg at 01/05/22 0909    polyethylene glycol (MIRALAX) packet 17 g, 17 g, Oral, Daily, Jayy Mcallister MD, 17 g at 01/05/22 0908    sodium bicarbonate tablet 650 mg, 650 mg, Oral, TID after meals, Jayy Mcallister MD, 650 mg at 01/05/22 1725    Laboratory Results:  Results from last 7 days   Lab Units 01/06/22  1218 01/06/22  0336 01/06/22  0329 01/05/22  1720 01/05/22  1622 01/05/22  1224 01/05/22  0515 01/05/22  0004 01/04/22  1613 01/04/22  1613 01/04/22  0505 01/04/22  0505 01/03/22  1108 01/03/22  0435 01/02/22 2031 01/02/22 2031 01/02/22  1653 01/02/22  1653 01/02/22  1249 01/02/22  0453 01/02/22  0438 01/02/22  0433 01/01/22 2025 01/01/22 2025 01/01/22 2022 01/01/22 2022 01/01/22  1419 01/01/22  1419   WBC [place]/uL  --   --   --   --   --   --  8 59  --   --  8 25  --  7 48  --  7 58  --  8 60  --   --   --   --   --   --   --   --   --  11 19*  --  11 55*   HEMOGLOBIN g/dL 8 5*  --  8 0* 6 2* 6 7* 7 1* 7 2* 7 0*   < > 7 6*   < > 7 6*   < > 7 9*   < > 7 9*   < > 6 8*  --   --    < >  --   --   --   --  4 2*   < > 6 6*   I STAT HEMOGLOBIN g/dl  --   --   --   --   --   --   --   --   --   --   --   --   --   --   --   --   --   --  7 8*  --   --   --   --   --   --   --   --   --    HEMATOCRIT % 26 6*  --  25 4* 20 6* 21 5* 23 3* 23 7* 23 2*   < > 24 8*   < > 24 3*   < > 25 6*   < > 25 5*   < > 22 4*  --   --    < >  --   --   --   --  14 4*   < > 21 7*   HEMATOCRIT, ISTAT %  --   --   --   --   --   --   --   --   --   --   --   --   --   --   --   --   --   --  23*  --   --   --   --   --   --   --   --   --    PLATELETS [place]/uL  --   --   --   --   --   --  315  --   --  315  --  338  --  350  --  391*  --  386  --   --   --   --   --   --   --  445*   < > 502*   POTASSIUM mmol/L  --  4 6  --   --   --   --  4 4  --   --   --   --  4 4  --  4 8  -- 4 9  --   --   --   --   --  5 5*  --  5 5*   < > 5 5*   < > 5 9*   CHLORIDE mmol/L  --  98*  --   --   --   --  100  --   --   --   --  101  --  105  --  104  --   --   --   --   --  100  --  108   < > 108   < > 106   CO2 mmol/L  --  24  --   --   --   --  23  --   --   --   --  22  --  22  --  23  --   --   --   --   --  13*   < > 17*   < > 18*   < > 19*   CO2, I-STAT mmol/L  --   --   --   --   --   --   --   --   --   --   --   --   --   --   --   --   --   --  20*  --   --   --   --   --   --   --   --   --    BUN mg/dL  --  68*  --   --   --   --  58*  --   --   --   --  63*  --  69*  --  68*  --   --   --   --   --  81*  --  86*   < > 83*   < > 88*   CREATININE mg/dL  --  6 58*  --   --   --   --  5 84*  --   --   --   --  5 96*  --  6 44*  --  6 15*  --   --   --   --   --  6 99*  --  7 37*   < > 7 16*   < > 7 37*   CALCIUM mg/dL  --  8 0*  --   --   --   --  7 9*  --   --   --   --  8 0*  --  8 2*  --  8 2*  --   --   --   --   --  6 9*  --  7 8*   < > 7 9*   < > 8 6   MAGNESIUM mg/dL  --   --   --   --   --   --   --   --   --   --   --   --   --  1 8  --   --   --   --   --  2 0  --   --   --   --   --   --   --   --    PHOSPHORUS mg/dL  --   --   --   --   --   --   --   --   --   --   --   --   --  6 0*  --   --   --   --   --  6 9*  --   --   --   --   --   --   --   --    GLUCOSE, ISTAT mg/dl  --   --   --   --   --   --   --   --   --   --   --   --   --   --   --   --   --   --  195*  --   --   --   --   --   --   --   --   --     < > = values in this interval not displayed

## 2022-01-06 NOTE — PLAN OF CARE
Problem: Potential for Falls  Goal: Patient will remain free of falls  Description: INTERVENTIONS:  - Educate patient/family on patient safety including physical limitations  - Instruct patient to call for assistance with activity   - Consult OT/PT to assist with strengthening/mobility   - Keep Call bell within reach  - Keep bed low and locked with side rails adjusted as appropriate  - Keep care items and personal belongings within reach  - Initiate and maintain comfort rounds  - Make Fall Risk Sign visible to staff  - Apply yellow socks and bracelet for high fall risk patients  - Consider moving patient to room near nurses station  Outcome: Progressing     Problem: Prexisting or High Potential for Compromised Skin Integrity  Goal: Skin integrity is maintained or improved  Description: INTERVENTIONS:  - Identify patients at risk for skin breakdown  - Assess and monitor skin integrity  - Assess and monitor nutrition and hydration status  - Monitor labs   - Assess for incontinence   - Turn and reposition patient  - Assist with mobility/ambulation  - Relieve pressure over bony prominences  - Avoid friction and shearing  - Provide appropriate hygiene as needed including keeping skin clean and dry  - Evaluate need for skin moisturizer/barrier cream  - Collaborate with interdisciplinary team   - Patient/family teaching  - Consider wound care consult   Outcome: Progressing     Problem: Nutrition/Hydration-ADULT  Goal: Nutrient/Hydration intake appropriate for improving, restoring or maintaining nutritional needs  Description: Monitor and assess patient's nutrition/hydration status for malnutrition  Collaborate with interdisciplinary team and initiate plan and interventions as ordered  Monitor patient's weight and dietary intake as ordered or per policy  Utilize nutrition screening tool and intervene as necessary  Determine patient's food preferences and provide high-protein, high-caloric foods as appropriate  INTERVENTIONS:  - Monitor oral intake, urinary output, labs, and treatment plans  - Assess nutrition and hydration status and recommend course of action  - Evaluate amount of meals eaten  - Assist patient with eating if necessary   - Allow adequate time for meals  - Recommend/ encourage appropriate diets, oral nutritional supplements, and vitamin/mineral supplements  - Order, calculate, and assess calorie counts as needed  - Recommend, monitor, and adjust tube feedings and TPN/PPN based on assessed needs  - Assess need for intravenous fluids  - Provide specific nutrition/hydration education as appropriate  - Include patient/family/caregiver in decisions related to nutrition  Outcome: Progressing

## 2022-01-06 NOTE — PLAN OF CARE
Hemodialysis treatment planned for 150 minutes using a 2 K+ bath for potassium 4 6 this morning    Fluid goal 2000 ml as ordered by Dr Rich Gibson           Post-Dialysis RN Treatment Note    Blood Pressure:  Pre 159/74 mm/Hg  Post 166/77 mmHg   EDW:  TBD   Weight:  Pre 111 3 kg   Post 109 1 kg   Mode of weight measurement:   Bed scale   Volume Removed  2000 ml    Treatment duration 150 minutes    NS given:  none   Treatment shortened:  no   Medications given during Rx:  none   Estimated Kt/V:  none   Access type:   LIJ Permacath   Access Issues:   none   Report called to primary nurse:  Verbal to [employer] RN        Problem: METABOLIC, FLUID AND ELECTROLYTES - ADULT  Goal: Electrolytes maintained within normal limits  Description: INTERVENTIONS:  - Monitor labs and assess patient for signs and symptoms of electrolyte imbalances  - Administer electrolyte replacement as ordered  - Monitor response to electrolyte replacements, including repeat lab results as appropriate  - Instruct patient on fluid and nutrition as appropriate  Outcome: Progressing  Goal: Fluid balance maintained  Description: INTERVENTIONS:  - Monitor labs   - Monitor I/O and WT  - Instruct patient on fluid and nutrition as appropriate  - Assess for signs & symptoms of volume excess or deficit  Outcome: Progressing

## 2022-01-06 NOTE — TREATMENT PLAN
Contacted by primary service to briefly review the patient's antibiotics unfortunately he has now been transitioned to dialysis  Reportedly he has permcatheter in place today  The plans are for dialysis with Tuesday/Thursday/Saturday treatments  Patient as of yet does not have secured placement  Team does not suspect that he will be discharged in the next 24-48 hours  At this time I recommended continuing Ancef 1 g Q 24, which was adjusted to be dosed in the evening to avoid HD  This can be continued while the patient is admitted in the hospital   Closer to discharge would transition the patient then to Ancef 2 g/2 g/3 g for TTHSa dosing schedule  Prior ID notes reviewed  And duration of therapy remains through 1/16  Continue weekly labs with HD with CBCD only  Will message office staff with the above updates  Patient will ultimately then not require his PICC line at discharge  Would recommend discussing continued antibiotics with Nephrology to assure that antibiotics are arranged at dialysis center  Additional questions answered  Above was discussed in detail with the primary service resident  Please call if any additional questions or concerns  Can provide formal consult if/when needed

## 2022-01-06 NOTE — PROCEDURES
Central Line    Date/Time: 1/6/2022 12:01 PM  Performed by: Angel Nicole MD  Authorized by: Angel Nicole MD     Patient location:  IR  Consent:     Consent obtained:  Written    Consent given by:  Patient    Risks discussed:  Arterial puncture, bleeding and infection    Alternatives discussed:  No treatment and delayed treatment  Universal protocol:     Procedure explained and questions answered to patient or proxy's satisfaction: yes      Relevant documents present and verified: yes      Test results available and properly labeled: yes      Radiology Images displayed and confirmed  If images not available, report reviewed: yes      Required blood products, implants, devices, and special equipment available: yes      Site/side marked: yes      Immediately prior to procedure, a time out was called: yes      Patient identity confirmed:  Verbally with patient and arm band  Pre-procedure details:     Hand hygiene: Hand hygiene performed prior to insertion      Sterile barrier technique: All elements of maximal sterile technique followed      Skin preparation:  2% chlorhexidine    Skin preparation agent: Skin preparation agent completely dried prior to procedure    Indications:     Central line indications: dialysis    Sedation:     Sedation type: Moderate (conscious) sedation  Anesthesia (see MAR for exact dosages):      Anesthesia method:  Local infiltration    Local anesthetic:  Lidocaine 1% WITH epi  Procedure details:     Location:  Left internal jugular    Vessel type: vein      Laterality:  Left    Approach: percutaneous technique used      Patient position:  Flat    Catheter type:  Double lumen    Catheter size:  15 5 Fr    Landmarks identified: yes      Ultrasound guidance: yes      Sterile ultrasound techniques: Sterile gel and sterile probe covers were used      Manometry confirmation: no      Number of attempts:  1    Successful placement: yes      Vessel of catheter tip end:  Ra  Post-procedure details:     Post-procedure:  Dressing applied and line sutured    Assessment:  Blood return through all ports and placement verified by x-ray    Post-procedure complications: none      Patient tolerance of procedure:   Tolerated well, no immediate complications

## 2022-01-06 NOTE — PROGRESS NOTES
[facility]  Progress Note - Hector Reyes 1955, 78 y o  male MRN: [id number]  Unit/Bed#: S -01 Encounter: [id number]  Primary Care Provider: Dominguez Carcamo MD   Date and time admitted to hospital: 1/1/2022 12:36 PM    * Encephalopathy  Assessment & Plan  POA: Patient lethargic but easily arousable, responded questions appropriately  Most likely Uremic encephalopathy given elevaated ammonia level of 48 Creatinine 6 9, BUN 81  Patient now alert and oriented to self this morning during examination  Plan:  · Continue lactulose 10mg daily  · Close monitoring of mental status  · Continue with plan as below for ARF    Renal failure (ARF), acute on chronic [facility])  Assessment & Plan    Recent Labs     01/03/22  0435 01/03/22  0435 01/04/22  0505 01/04/22  0505 01/05/22  0515   CREATININE 6 44*  --  5 96*  --  5 84*   EGFR 8   < > 8   < > 9    < > = values in this interval not displayed  Estimated Creatinine Clearance: 14 6 mL/min (A) (by C-G formula based on SCr of 5 84 mg/dL (H))  Patient's baseline creatinine is 1 7  He was recently admitted in the hospital with acute kidney injury likely secondary to ATN, and discharged with creatinine around 4  Unclear etiology for currently acute kidney injury, was thought to be prerenal     Plan:  · Nephrology following appreciate recommendations- Dialysis today  · Patient s/p PermCath placement with IR  · Continue to monitor UOP and creatinine   Avoid nephrotoxins, hypotension   Continue bicarb      Chronic pain of left lower extremity  Assessment & Plan  Patient with complaint of left lower leg pain since previous admission from 12/17-12/29  Patient this morning notably distressed secondary to pain  Given 1 dose of dilaudid      Plan  · Scheduled Tylenol 975 q8  · Per discussion with family, requesting Oxycodone PRN be administered only overnight for severe pain      Cellulitis of left lower extremity  Assessment & Plan  Diagnosis: patient discharged on 12/29 for sepsis, bacteremia on IV Abx via central line placement with IR on 12/27  Suspected source: LLE cellulitis    Plan  ·  Curbside consult with ID (Dr Bhandari) rosa isela to d/c PICC line as patient must transition to dialysis dosing Ancef  · Will adjust IV Cefazolin 1000 mg q24 during hospitalization, with the same end date of Jan 16, 2022  · 24 hours prior to discharge will transition patient then to Ancef 2 g/2 g/3 g for TTHSa dosing schedule  · Resident to [facility] ID team to make them aware of discharge so that coordination between ID office and dialysis center may be completed      Anemia  Assessment & Plan  Recent Labs     01/04/22  0505 01/04/22  0505 01/04/22  1613 01/05/22  0004 01/05/22  0515 01/05/22  1224 01/05/22  1720 01/06/22  0329 01/06/22  1218   HGB 7 6*   < > 7 6*   < > 7 2*   < > 6 2* 8 0* 8 5*   MCV 95  --  96  --  96  --   --   --   --     < > = values in this interval not displayed  · Etiology likely multifactorial in the setting of chronic kidney disease, hematuria in the setting of supratherapeutic INR  Patient has right subcapsular hematoma that is unchanged compared to the last CT scan  · Patient s/p 2 units of PRBC on 1/1 ( had formed antibodies during last admission to blood products)  · FOBT negative    Plan:   Patient s/p 1 unit of PRBC overnight   Will obtain CT abdomen pelvis wo contrast to ensure renal subcapsular hematoma remains stable in the setting of decreasing H/H   Monitor H&H q8   Transfuse if Hb < 7 0   Consent for transfusion obtained:  Yes   GI consulted, appreciate recs      Type 2 diabetes mellitus, with long-term current use of insulin [facility])  Assessment & Plan  Lab Results   Component Value Date    HGBA1C 12 2 (H) 12/18/2021       Recent Labs     01/04/22  1610 01/04/22  2108 01/05/22  0757 01/05/22  1125   POCGLU 183* 231* 144* 134       Blood Sugar Average: Last 72 hrs:  (P) 169 1875   · Home regimen:  Lantus 32 units at bedtime, lispro 6 units t i d  With meals  · Upon admission, patient's home insulin was ordered  Patient did not get any of his lispro because of normal glucose  · Patient seemed to be lethargic, likely will have poor oral intake as well has MULUGETA  · Will discontinue lispro with meals, continue Lantus at bedtime and SSI  If hyperglycemia in the next few days, resume lispro with meals  · Hypoglycemia protocol  · Diabetic diet      CVA (cerebral vascular accident) [facility])  Assessment & Plan  · Documented in history with no residual deficits  · Continue atorvastatin, aspirin    Hypertension  Assessment & Plan  · Blood pressure acceptable   · Continue amlodipine and metoprolol   · Monitor vital signs     Supratherapeutic INR  Assessment & Plan  Recent Labs     01/02/22  1653 01/03/22  0435 01/04/22  1613   INR 1 60* 1 43* 1 38*     History of DVT previously on Coumadin, started on Eliquis 5 mg b i d  during the previous admission for acute DVT  Patient now with normal INR originally supratherapeutic to 6 78 s/p 1 unit of FFP on 1/1 and 2 doses of [address] on 1/2 for supratherapuetic INR  Plan  · AC being held in setting for concern of unstable renal hematoma, will obtain imaging  · Will monitor INR  · May consider transition back to Eliquis in the next 24 hours if Hg stable      Right Liver mass  Assessment & Plan  · Noted on abdominal CT in setting of possibly decreased synthetic liver function appreciated in lab work  Staphylococcus aureus bacteremia  Assessment & Plan  · Recently hospitalized for Staph bacteremia  · Discharged on IV cefazolin  · Continue IV cefazolin through January 16, 6241    Metabolic acidosis  Assessment & Plan  · Continue sodium bicarb 650mg TID      VTE Pharmacologic Prophylaxis:   VTE Score: 3 Moderate Risk (Score 3-4) - Pharmacological DVT Prophylaxis Ordered: Heparin Drip      Mechanical VTE Prophylaxis in Place: No    Patient Centered Rounds: I have performed bedside rounds with nursing staff today     Discussions with Specialists or Other Care Team Provider: Nephrology, IR    Education and Discussions with Family / Patient: Will update wife    Current Length of Stay: 5 day(s)    Current Patient Status: Inpatient     Discharge Plan / Estimated Discharge Date: TBD    Code Status: Level 1 - Full Code      Subjective:   Patient reporting worsening pain this morning, will continue discussions with family regarding pain regimen and improving pain control  Objective:     Vitals:   Temp (24hrs), Av 5 °F (36 9 °C), Min:97 9 °F (36 6 °C), Max:98 9 °F (37 2 °C)    Temp:  [97 9 °F (36 6 °C)-98 9 °F (37 2 °C)] 98 °F (36 7 °C)  HR:  [57-62] 58  Resp:  [16-22] 16  BP: (111-169)/(41-74) 139/68  SpO2:  [87 %-97 %] 90 %  Body mass index is 38 33 kg/m²  Input and Output Summary (last 24 hours): Intake/Output Summary (Last 24 hours) at 2022 1522  Last data filed at 2022 1430  Gross per 24 hour   Intake 550 ml   Output 325 ml   Net 225 ml       Physical Exam:     Physical Exam  Constitutional:       Appearance: Normal appearance  HENT:      Head: Normocephalic and atraumatic  Right Ear: External ear normal       Left Ear: External ear normal    Eyes:      Conjunctiva/sclera: Conjunctivae normal    Cardiovascular:      Rate and Rhythm: Normal rate and regular rhythm  Pulmonary:      Effort: Pulmonary effort is normal       Comments: 2 L NC  Abdominal:      General: Abdomen is flat  Bowel sounds are normal    Musculoskeletal:      Right lower leg: No edema  Left lower leg: Edema present  Comments: Multiple abrasions noted on the lower ext b/l   Neurological:      Mental Status: He is alert        Comments: Alert and oriented to self          Additional Data:     Labs:  Results from last 7 days   Lab Units 22  1218 22  1224 22  0515 22  1108 22  0435   WBC Thousand/uL  --   --  8 59   < > 7 58   HEMOGLOBIN g/dL 8 5*   < > 7 2*   < > 7 9*   HEMATOCRIT % 26 6*   < > 23 7*   < > 25 6*   PLATELETS Thousands/uL  --   --  315   < > 350   BANDS PCT %  --   --  11*  --   --    NEUTROS PCT %  --   --   --   --  75   LYMPHS PCT %  --   --   --   --  7*   LYMPHO PCT %  --   --  10*  --   --    MONOS PCT %  --   --   --   --  7   MONO PCT %  --   --  6  --   --    EOS PCT %  --   --  1  --  5    < > = values in this interval not displayed  Results from last 7 days   Lab Units 01/06/22  0336 01/05/22  0515 01/05/22  0515   SODIUM mmol/L 131*   < > 132*   POTASSIUM mmol/L 4 6   < > 4 4   CHLORIDE mmol/L 98*   < > 100   CO2 mmol/L 24   < > 23   BUN mg/dL 68*   < > 58*   CREATININE mg/dL 6 58*   < > 5 84*   ANION GAP mmol/L 9   < > 9   CALCIUM mg/dL 8 0*   < > 7 9*   ALBUMIN g/dL  --   --  1 8*   TOTAL BILIRUBIN mg/dL  --   --  0 30   ALK PHOS U/L  --   --  52   ALT U/L  --   --  <6*   AST U/L  --   --  29   GLUCOSE RANDOM mg/dL 103   < > 139    < > = values in this interval not displayed  Results from last 7 days   Lab Units 01/06/22  0336   INR  1 51*     Results from last 7 days   Lab Units 01/06/22  1217 01/06/22  0735 01/05/22  2120 01/05/22  1809 01/05/22  1652 01/05/22  1125 01/05/22  0757 01/04/22  2108 01/04/22  1610 01/04/22  1132 01/04/22  0754 01/03/22  2047   POC GLUCOSE mg/dl 102 98 183* 166* 160* 134 144* 231* 183* 214* 130 177*         Results from last 7 days   Lab Units 01/02/22  0438 01/01/22  1424 01/01/22  1419   LACTIC ACID mmol/L  --   --  0 6   PROCALCITONIN ng/ml 1 34* 1 42*  --        Imaging: No pertinent imaging reviewed  Recent Cultures (last 7 days):     Results from last 7 days   Lab Units 01/01/22  1531 01/01/22  1501 01/01/22  1419   BLOOD CULTURE   --  No Growth After 4 Days  No Growth After 4 Days     URINE CULTURE  >100,000 cfu/ml   --   --        Lines/Drains:  Invasive Devices  Report    Central Venous Catheter Line            CVC Central Lines 12/27/21 Double Right 10 days    CVC Central Lines 01/06/22 Double <1 day Hemodialysis Catheter            HD Permanent Double Catheter <1 day          Drain            Urethral Catheter Temperature probe 16 Fr  18 days                Telemetry:        Last 24 Hours Medication List:   Current Facility-Administered Medications   Medication Dose Route Frequency Provider Last Rate    acetaminophen  975 mg Oral Q8H [address] Issa Bahena MD      amLODIPine  10 mg Oral Daily Radha Cates MD      aspirin  81 mg Oral Daily Radha Cates MD      atorvastatin  40 mg Oral QPM Radha Cates MD      carvedilol  25 mg Oral BID With Meals Radha Cates MD      ceFAZolin  1,000 mg Intravenous Q24H Darshan Newman MD      cyanocobalamin  1,000 mcg Oral Daily Radha Cates MD      docusate sodium  100 mg Oral BID PRN Radha Cates MD      ezetimibe  10 mg Oral HS Radha Cates MD      insulin glargine  32 Units Subcutaneous HS Radha Cates MD      insulin lispro  1-5 Units Subcutaneous HS Radha Cates MD      insulin lispro  1-6 Units Subcutaneous TID [facility] [place] Radha Cates MD      lactulose  10 g Oral BID Radha Cates MD      nystatin   Topical BID Radha Cates MD      oxyCODONE  5 mg Oral Q6H PRN Radha Cates MD      pantoprazole  40 mg Oral Daily Radha Cates MD      polyethylene glycol  17 g Oral Daily Radha Cates MD      sodium bicarbonate  650 mg Oral TID after meals Radha Cates MD          Today, Patient Was Seen By: Raffi Stallings MD    ** Please Note: This note has been constructed using a voice recognition system   **

## 2022-01-07 PROBLEM — I82.4Z2 ACUTE DEEP VEIN THROMBOSIS (DVT) OF DISTAL VEIN OF LEFT LOWER EXTREMITY (HCC): Status: ACTIVE | Noted: 2022-01-01

## 2022-01-07 PROBLEM — E87.2 METABOLIC ACIDOSIS: Status: RESOLVED | Noted: 2021-01-01 | Resolved: 2022-01-01

## 2022-01-07 PROBLEM — I82.5Z2 CHRONIC DEEP VEIN THROMBOSIS (DVT) OF DISTAL VEIN OF LEFT LOWER EXTREMITY (HCC): Status: ACTIVE | Noted: 2022-01-01

## 2022-01-07 PROBLEM — E87.20 METABOLIC ACIDOSIS: Status: RESOLVED | Noted: 2021-01-01 | Resolved: 2022-01-01

## 2022-01-07 NOTE — PROGRESS NOTES
NEPHROLOGY PROGRESS NOTE   Hector Reyes 78 y o  male MRN: [id number]  Unit/Bed#: S -01 Encounter: [id number]  Reason for Consult: MULUGETA      SUMMARY:    78 y  o  man PMH DM type 2, CKD G3b (baseline creatinine 1 7 mg/dL, eGFR 35) , hypertension   Recently episode of MULUGETA secondary to retroperitoneal bleeding and hypotension , was discharged with creatinine of 4 2   Now patient presents with altered mental status and worsening creatinine   Nephrology is consulted for MULUGETA     ASSESSMENT and PLAN:     Acute renal failure/acute tubular necrosis  --currently dialysis dependent  --started dialysis on January 2nd  --creatinine trending up off dialysis  --patient nonoliguric with good urine output  --will continue to monitor for renal recovery  --hemodialysis consent obtained from the patient's wife and sign  --access:  PermCath placed on January 6th  --monitor urine output and maintain mean arterial pressure in 65  --avoid contrast studies  --in the setting recurrent episodes of acute kidney injury with has progressed acute tubular necrosis    --renal imaging shows unchanged subscapular and super renal hematoma  --patient urine output has started to improve  --discussed at length with the wife Quincy Jacobo at the bedside  --needs outpatient hemodialysis placement, which can be coordinated with rehab  --continue Tuesday Thursday Saturday dialysis schedule  --plan for next dialysis tomorrow  --continue to monitor for renal recovery     Chronic kidney disease stage IIIB  --baseline creatinine 1 5-1 7 mg/dL  --anticipate he will be at a higher baseline creatinine if he comes off dialysis     Mixed acid-base disorder--resolved     Hyperkalemia--resolved     Hyperphosphatemia  --slowly improving will continue monitor  --low phosphorus diet     Anemia  --acute on chronic  --supratherapeutic INR which has improved  --status post 3 unit blood transfusions till now  --hemoglobin stable but low  --low hemoglobin Will slow renal recovery  --hemoglobin stable and I anticipate a 14 was a lab error low     Blood pressure/volume status  --history of hypertension  --volume overloaded  --continue ultrafiltration with dialysis  --urine output is starting to improve  --possibly course of diuretics on Sunday or Monday      SUBJECTIVE / INTERVAL HISTORY:    Feels terrible    OBJECTIVE:  Current Weight: Weight - Scale: 111 kg (244 lb 11 4 oz)  Vitals:    01/06/22 2209 01/07/22 0250 01/07/22 0734 01/07/22 1051   BP: 143/67 164/64 170/63 149/67   BP Location:       Pulse: 58 60 60 60   Resp: 18 20 18 16   Temp: 98 °F (36 7 °C) 98 3 °F (36 8 °C) 98 7 °F (37 1 °C) 98 7 °F (37 1 °C)   TempSrc:       SpO2: 96% 94% 95% 94%   Weight:       Height:           Intake/Output Summary (Last 24 hours) at 1/7/2022 1412  Last data filed at 1/7/2022 0500  Gross per 24 hour   Intake 620 ml   Output 2960 ml   Net -2340 ml       Review of Systems:    12 point ROS has been reviewed  Physical Exam  Vitals and nursing note reviewed  Exam conducted with a chaperone present  Constitutional:       General: He is not in acute distress  Appearance: He is well-developed  He is obese  He is ill-appearing  HENT:      Head: Normocephalic and atraumatic  Eyes:      General: No scleral icterus  Conjunctiva/sclera: Conjunctivae normal       Pupils: Pupils are equal, round, and reactive to light  Cardiovascular:      Rate and Rhythm: Normal rate and regular rhythm  Heart sounds: S1 normal and S2 normal  No murmur heard  No friction rub  No gallop  Pulmonary:      Effort: Pulmonary effort is normal  No respiratory distress  Breath sounds: Normal breath sounds  No wheezing or rales  Abdominal:      General: Bowel sounds are normal       Palpations: Abdomen is soft  Tenderness: There is no abdominal tenderness  There is no rebound  Musculoskeletal:         General: Normal range of motion  Cervical back: Normal range of motion and neck supple  Right lower leg: Edema present  Left lower leg: Edema present  Skin:     Findings: No rash  Neurological:      Mental Status: He is alert     Psychiatric:         Behavior: Behavior normal          Medications:    Current Facility-Administered Medications:     acetaminophen (TYLENOL) tablet 975 mg, 975 mg, Oral, Q8H [facility], Cynthia Cisneros MD, 975 mg at 01/07/22 1339    amLODIPine (NORVASC) tablet 10 mg, 10 mg, Oral, Daily, Yesenia España MD, 10 mg at 01/07/22 7108    apixaban (ELIQUIS) tablet 5 mg, 5 mg, Oral, BID, Maame Irving MD    aspirin chewable tablet 81 mg, 81 mg, Oral, Daily, Yesenia España MD, 81 mg at 01/07/22 0928    atorvastatin (LIPITOR) tablet 40 mg, 40 mg, Oral, QPM, Yesenia España MD, 40 mg at 01/06/22 1737    carvedilol (COREG) tablet 25 mg, 25 mg, Oral, BID With Meals, Yesenia España MD, 25 mg at 01/07/22 0928    ceFAZolin (ANCEF) IVPB (premix in dextrose) 1,000 mg 50 mL, 1,000 mg, Intravenous, Q24H, Jean Paul Beltre MD, Last Rate: 100 mL/hr at 01/06/22 1724, 1,000 mg at 01/06/22 1724    cyanocobalamin (VITAMIN B-12) tablet 1,000 mcg, 1,000 mcg, Oral, Daily, Yesenia España MD, 1,000 mcg at 01/07/22 2418    docusate sodium (COLACE) capsule 100 mg, 100 mg, Oral, BID PRN, Yesenia España MD, 100 mg at 01/06/22 1738    ezetimibe (ZETIA) tablet 10 mg, 10 mg, Oral, HS, Yesenia España MD, 10 mg at 01/06/22 2305    insulin glargine (LANTUS) subcutaneous injection 32 Units 0 32 mL, 32 Units, Subcutaneous, HS, Yesenia España MD, 32 Units at 01/06/22 2305    insulin lispro (HumaLOG) 100 units/mL subcutaneous injection 1-5 Units, 1-5 Units, Subcutaneous, HS, Yesenia España MD, 1 Units at 01/05/22 2151    insulin lispro (HumaLOG) 100 units/mL subcutaneous injection 1-6 Units, 1-6 Units, Subcutaneous, TID AC, 2 Units at 01/07/22 1137 **AND** [CANCELED] Fingerstick Glucose (POCT), , , TID AC, Yesenia España MD    lactulose oral solution 10 g, 10 g, Oral, BID, Shonna Coronado MD, 10 g at 01/07/22 0928    nystatin (MYCOSTATIN) powder, , Topical, BID, Shonna Coronado MD, Given at 01/07/22 0929    oxyCODONE (ROXICODONE) IR tablet 5 mg, 5 mg, Oral, Q6H PRN, Shonna Coronado MD, 5 mg at 01/06/22 0013    pantoprazole (PROTONIX) EC tablet 40 mg, 40 mg, Oral, Daily, Shonna Coronado MD, 40 mg at 01/07/22 0928    polyethylene glycol (MIRALAX) packet 17 g, 17 g, Oral, Daily, Shonna Coronado MD, 17 g at 01/05/22 0908    sodium bicarbonate tablet 650 mg, 650 mg, Oral, TID after meals, Shonna Coronado MD, 650 mg at 01/07/22 1137    Laboratory Results:  Results from last 7 days   Lab Units 01/07/22  1137 01/07/22  0448 01/07/22  0347 01/06/22  1948 01/06/22  1218 01/06/22  0336 01/06/22  0329 01/05/22  1720 01/05/22  1622 01/05/22  1224 01/05/22  0515 01/05/22  0004 01/04/22  1613 01/04/22  0505 01/04/22  0505 01/03/22  1108 01/03/22  0435 01/02/22 2031 01/02/22  2031 01/02/22  1653 01/02/22  1249 01/02/22  0453 01/02/22  0438 01/02/22  0433 01/01/22  2025 01/01/22 2022 01/01/22  1419 01/01/22  1419   WBC Thousand/uL  --   --   --   --   --   --   --   --   --   --  8 59  --  8 25  --  7 48  --  7 58  --  8 60  --   --   --   --   --   --  11 19*  --  11 55*   HEMOGLOBIN g/dL 8 2*  --  14 0 8 2* 8 5*  --  8 0* 6 2* 6 7*   < > 7 2*   < > 7 6*   < > 7 6*   < > 7 9*   < > 7 9* 6 8*  --   --    < >   < >   < > 4 2*   < > 6 6*   I STAT HEMOGLOBIN g/dl  --   --   --   --   --   --   --   --   --   --   --   --   --   --   --   --   --   --   --   --  7 8*  --   --   --   --   --   --   --    HEMATOCRIT % 25 9*  --  44 2 26 0* 26 6*  --  25 4* 20 6* 21 5*   < > 23 7*   < > 24 8*   < > 24 3*   < > 25 6*   < > 25 5* 22 4*  --   --    < >   < >   < > 14 4*   < > 21 7*   HEMATOCRIT, ISTAT %  --   --   --   --   --   --   --   --   --   --   --   --   --   --   --   --   --   --   --   --  23*  --   --   --   --   --   --   --    PLATELETS Thousands/uL  --   --   --   --   --   --   -- --   --   --  315  --  315  --  338  --  350  --  391* 386  --   --   --   --   --  445*   < > 502*   POTASSIUM mmol/L 4 8 4 4  --   --   --  4 6  --   --   --   --  4 4  --   --   --  4 4  --  4 8  --  4 9  --   --   --   --    < >   < > 5 5*   < > 5 9*   CHLORIDE mmol/L 98* 97*  --   --   --  98*  --   --   --   --  100  --   --   --  101  --  105  --  104  --   --   --   --    < >   < > 108   < > 106   CO2 mmol/L 24 25  --   --   --  24  --   --   --   --  23  --   --   --  22  --  22  --  23  --   --   --   --    < >   < > 18*   < > 19*   CO2, I-STAT mmol/L  --   --   --   --   --   --   --   --   --   --   --   --   --   --   --   --   --   --   --   --  20*  --   --   --   --   --   --   --    BUN mg/dL 51* 49*  --   --   --  68*  --   --   --   --  58*  --   --   --  63*  --  69*  --  68*  --   --   --   --    < >   < > 83*   < > 88*   CREATININE mg/dL 5 34* 5 33*  --   --   --  6 58*  --   --   --   --  5 84*  --   --   --  5 96*  --  6 44*  --  6 15*  --   --   --   --    < >   < > 7 16*   < > 7 37*   CALCIUM mg/dL 7 5* 8 0*  --   --   --  8 0*  --   --   --   --  7 9*  --   --   --  8 0*  --  8 2*  --  8 2*  --   --   --   --    < >   < > 7 9*   < > 8 6   MAGNESIUM mg/dL  --   --   --   --   --   --   --   --   --   --   --   --   --   --   --   --  1 8  --   --   --   --  2 0  --   --   --   --   --   --    PHOSPHORUS mg/dL  --   --   --   --   --   --   --   --   --   --   --   --   --   --   --   --  6 0*  --   --   --   --  6 9*  --   --   --   --   --   --    GLUCOSE, ISTAT mg/dl  --   --   --   --   --   --   --   --   --   --   --   --   --   --   --   --   --   --   --   --  195*  --   --   --   --   --   --   --     < > = values in this interval not displayed

## 2022-01-07 NOTE — CASE MANAGEMENT
Case Management Progress Note    Patient name Lio Valentin  Location S /S -01 MRN [id number]  : 1955 Date 2022       LOS (days): 6  Geometric Mean LOS (GMLOS) (days): 4 30  Days to GMLOS:-1 7        OBJECTIVE:        Current admission status: Inpatient  Preferred Pharmacy:   [facility], [address] [facility], [address]  [facility]  Phone: [phone] Fax: [phone]  [facility], [address] [facility],[address] [place] [address]  Phone: [phone] Fax: [phone]    Primary Care Provider: Yobany Morillo MD    Primary Insurance: MARITZA Mejia 98 REP  Secondary Insurance: [place] [employer]    PROGRESS NOTE:        CM followed up with [facility] referral in [facility]  The questions they had regarding patient were answered  CM followed up with a call to [address] [place] [address]  CM spoke with Delvis Richardson who reported that they were additionally waiting on Hep Panel results  CM sent an updated [facility] referral with this attached to them  Once they have the lab results they are able to secure a final chair time with the [address] [place] [address] location  Additionally, CM followed up with the [facility] referrals as patient's wife had requested additional referrals be made  CM specifically followed up with [facility] to see if they had an onsite HD bed available for patient  CM department will continue to follow to assist with discharge coordination

## 2022-01-07 NOTE — PROGRESS NOTES
[facility]  Progress Note - Oleg Joyner 1955, 78 y o  male MRN: [id number]  Unit/Bed#: S -01 Encounter: [id number]  Primary Care Provider: Neri Saavedra MD   Date and time admitted to hospital: 1/1/2022 12:36 PM    * Encephalopathy  Assessment & Plan  POA: Patient lethargic but easily arousable, responded questions appropriately  Most likely Uremic encephalopathy given elevaated ammonia level of 48 Creatinine 6 9, BUN 81  Patient now alert and oriented to self this morning during examination  Plan:  · Continue lactulose 10mg daily  · Close monitoring of mental status  · Continue with plan as below for ARF    Renal failure (ARF), acute on chronic [facility])  Assessment & Plan    Recent Labs     01/03/22  0435 01/03/22  0435 01/04/22  0505 01/04/22  0505 01/05/22  0515   CREATININE 6 44*  --  5 96*  --  5 84*   EGFR 8   < > 8   < > 9    < > = values in this interval not displayed  Estimated Creatinine Clearance: 14 6 mL/min (A) (by C-G formula based on SCr of 5 84 mg/dL (H))  Patient's baseline creatinine is 1 7  He was recently admitted in the hospital with acute kidney injury likely secondary to ATN, and discharged with creatinine around 4  Unclear etiology for currently acute kidney injury, was thought to be prerenal     Plan:  · Nephrology following appreciate recommendations  · Patient s/p PermCath placement with IR on 1/6 and subsequent dialysis tx   · Continue to monitor UOP and creatinine   Avoid nephrotoxins, hypotension   Continue bicarb      Chronic pain of left lower extremity  Assessment & Plan  Patient with complaint of left lower leg pain since previous admission from 12/17-12/29  Patient this morning notably distressed secondary to pain  Given 1 dose of dilaudid      Plan  · Scheduled Tylenol 975 q8  · Per discussion with family, requesting Oxycodone PRN be administered only overnight for severe pain      Cellulitis of left lower extremity  Assessment & Plan  Diagnosis: patient discharged on 12/29 for sepsis, bacteremia on IV Abx via central line placement with IR on 12/27  Suspected source: LLE cellulitis    Plan  ·  Curbside consult with ID (Dr Bhandari) okay to d/c PICC line as patient must transition to dialysis dosing Ancef  · Will adjust IV Cefazolin 1000 mg q24 during hospitalization, with the same end date of Jan 16, 2022  · 24 hours prior to discharge will transition patient then to Ancef 2 g/2 g/3 g for TTHSa dosing schedule  · Resident to [facility] ID team to make them aware of discharge so that coordination between ID office and dialysis center may be completed  · Per discussion with IR ([address]) order for removal of PICC line will be placed and primary team will reach out closer to discharge      Anemia  Assessment & Plan  Recent Labs     01/04/22  1613 01/05/22  0004 01/05/22  0515 01/05/22  1224 01/06/22  1948 01/07/22  0347 01/07/22  1137   HGB 7 6*   < > 7 2*   < > 8 2* 14 0 8 2*   MCV 96  --  96  --   --   --   --     < > = values in this interval not displayed  · Etiology likely multifactorial in the setting of chronic kidney disease, hematuria in the setting of supratherapeutic INR  Patient has right subcapsular hematoma that is unchanged compared to the last CT scan  · Patient s/p 2 units of PRBC on 1/1 ( had formed antibodies during last admission to blood products)  · FOBT negative    Plan:  · Hemoglobin remains stable ~ 8  · Will monitor H/H with daily CBC at this time   Transfuse if Hb < 7 0   Consent for transfusion obtained:  Yes   GI consulted, appreciate recs      Type 2 diabetes mellitus, with long-term current use of insulin [facility])  Assessment & Plan  Lab Results   Component Value Date    HGBA1C 12 2 (H) 12/18/2021       Recent Labs     01/04/22  1610 01/04/22  2108 01/05/22  0757 01/05/22  1125   POCGLU 183* 231* 144* 134       Blood Sugar Average: Last 72 hrs:  (P) 169 1875   · Home regimen:  Lantus 32 units at bedtime, lispro 6 units t i d  With meals  · Upon admission, patient's home insulin was ordered  Patient did not get any of his lispro because of normal glucose  · Patient seemed to be lethargic, likely will have poor oral intake as well has MULUGETA  · Will discontinue lispro with meals, continue Lantus at bedtime and SSI  If hyperglycemia in the next few days, resume lispro with meals  · Hypoglycemia protocol  · Diabetic diet      CVA (cerebral vascular accident) [facility])  Assessment & Plan  · Documented in history with no residual deficits  · Continue atorvastatin, aspirin    Hypertension  Assessment & Plan  · Blood pressure acceptable   · Continue amlodipine and metoprolol   · Monitor vital signs     Chronic deep vein thrombosis (DVT) of distal vein of left lower extremity [facility])  Assessment & Plan  Recent Labs     01/02/22  1653 01/03/22  0435 01/04/22  1613   INR 1 60* 1 43* 1 38*     History of DVT previously on Coumadin, started on Eliquis 5 mg b i d  during the previous admission for acute DVT  Patient now with normal INR originally supratherapeutic to 6 78 s/p 1 unit of FFP on 1/1 and 2 doses of [facility] on 1/2 for supratherapuetic INR  Plan  · Per discussion with Nephrology, okay to resume Eliquis 5mg BID  · Will monitor INR  · Monitor H/H closely      Right Liver mass  Assessment & Plan  · Noted on abdominal CT in setting of possibly decreased synthetic liver function appreciated in lab work  Staphylococcus aureus bacteremia  Assessment & Plan  · Recently hospitalized for Staph bacteremia  · Discharged on IV cefazolin  · Continue IV cefazolin through January 16, 0166    Metabolic acidosis-resolved as of 1/7/2022  Assessment & Plan  · Continue sodium bicarb 650mg TID      VTE Pharmacologic Prophylaxis:   VTE Score: 3 Moderate Risk (Score 3-4) - Pharmacological DVT Prophylaxis Ordered: Apixaban (Eliquis)      Mechanical VTE Prophylaxis in Place: No    Patient Centered Rounds: I have performed bedside rounds with nursing staff today  Discussions with Specialists or Other Care Team Provider: Nephrology, IR    Education and Discussions with Family / Patient: Updated  (wife) via phone  Current Length of Stay: 6 day(s)    Current Patient Status: Inpatient     Discharge Plan / Estimated Discharge Date: TBD pending placement    Code Status: Level 1 - Full Code      Subjective:   Patient laying in bed this morning, no overnight complaints  Objective:     Vitals:   Temp (24hrs), Av 4 °F (36 9 °C), Min:98 °F (36 7 °C), Max:98 7 °F (37 1 °C)    Temp:  [98 °F (36 7 °C)-98 7 °F (37 1 °C)] 98 7 °F (37 1 °C)  HR:  [53-61] 60  Resp:  [16-20] 16  BP: (139-170)/(63-77) 149/67  SpO2:  [94 %-96 %] 94 %  Body mass index is 38 33 kg/m²  Input and Output Summary (last 24 hours): Intake/Output Summary (Last 24 hours) at 2022 1340  Last data filed at 2022 0500  Gross per 24 hour   Intake 620 ml   Output 2960 ml   Net -2340 ml       Physical Exam:     Physical Exam  Constitutional:       Appearance: Normal appearance  HENT:      Head: Normocephalic and atraumatic  Right Ear: External ear normal       Left Ear: External ear normal    Eyes:      Conjunctiva/sclera: Conjunctivae normal    Cardiovascular:      Rate and Rhythm: Normal rate and regular rhythm  Pulmonary:      Effort: Pulmonary effort is normal       Comments: 2 L NC  Abdominal:      General: Abdomen is flat  Bowel sounds are normal    Musculoskeletal:      Right lower leg: No edema  Left lower leg: Edema present  Comments: Multiple abrasions noted on the lower ext b/l   Neurological:      Mental Status: He is alert        Comments: Alert and oriented to self          Additional Data:     Labs:  Results from last 7 days   Lab Units 22  1137 22  1224 22  0515 22  1108 22  0435   WBC Thousand/uL  --   --  8 59   < > 7 58   HEMOGLOBIN g/dL 8 2*   < > 7 2*   < > 7 9*   HEMATOCRIT % 25 9*   < > 23 7* < > 25 6*   PLATELETS Thousands/uL  --   --  315   < > 350   BANDS PCT %  --   --  11*  --   --    NEUTROS PCT %  --   --   --   --  75   LYMPHS PCT %  --   --   --   --  7*   LYMPHO PCT %  --   --  10*  --   --    MONOS PCT %  --   --   --   --  7   MONO PCT %  --   --  6  --   --    EOS PCT %  --   --  1  --  5    < > = values in this interval not displayed  Results from last 7 days   Lab Units 01/07/22  1137 01/06/22  0336 01/05/22  0515   SODIUM mmol/L 131*   < > 132*   POTASSIUM mmol/L 4 8   < > 4 4   CHLORIDE mmol/L 98*   < > 100   CO2 mmol/L 24   < > 23   BUN mg/dL 51*   < > 58*   CREATININE mg/dL 5 34*   < > 5 84*   ANION GAP mmol/L 9   < > 9   CALCIUM mg/dL 7 5*   < > 7 9*   ALBUMIN g/dL  --   --  1 8*   TOTAL BILIRUBIN mg/dL  --   --  0 30   ALK PHOS U/L  --   --  52   ALT U/L  --   --  <6*   AST U/L  --   --  29   GLUCOSE RANDOM mg/dL 152*   < > 139    < > = values in this interval not displayed  Results from last 7 days   Lab Units 01/06/22  0336   INR  1 51*     Results from last 7 days   Lab Units 01/07/22  1051 01/07/22  0735 01/06/22  2038 01/06/22  1603 01/06/22  1217 01/06/22  0735 01/05/22  2120 01/05/22  1809 01/05/22  1652 01/05/22  1125 01/05/22  0757 01/04/22  2108   POC GLUCOSE mg/dl 215* 136 152* 119 102 98 183* 166* 160* 134 144* 231*         Results from last 7 days   Lab Units 01/02/22  0438 01/01/22  1424 01/01/22  1419   LACTIC ACID mmol/L  --   --  0 6   PROCALCITONIN ng/ml 1 34* 1 42*  --        Imaging: No pertinent imaging reviewed  Recent Cultures (last 7 days):     Results from last 7 days   Lab Units 01/01/22  1531 01/01/22  1501 01/01/22  1419   BLOOD CULTURE   --  No Growth After 5 Days  No Growth After 5 Days     URINE CULTURE  >100,000 cfu/ml   --   --        Lines/Drains:  Invasive Devices  Report    Central Venous Catheter Line            CVC Central Lines 12/27/21 Double Right 11 days          Hemodialysis Catheter            HD Permanent Double Catheter 1 day Drain            Urethral Catheter 16 Fr  <1 day                Telemetry:        Last 24 Hours Medication List:   Current Facility-Administered Medications   Medication Dose Route Frequency Provider Last Rate    acetaminophen  975 mg Oral Q8H Albert Youngblood MD      amLODIPine  10 mg Oral Daily Dayanna Castellano MD      apixaban  5 mg Oral BID Avni Gomez MD      aspirin  81 mg Oral Daily Dayanna Castellano MD      atorvastatin  40 mg Oral QPM Dayanna Castellano MD      carvedilol  25 mg Oral BID With Meals Dayanna Castellano MD      ceFAZolin  1,000 mg Intravenous Q24H Julianne Denny MD 1,000 mg (01/06/22 7574)    cyanocobalamin  1,000 mcg Oral Daily Dayanna Castellano MD      docusate sodium  100 mg Oral BID PRN Dayanna Castellano MD      ezetimibe  10 mg Oral HS Dayanna Castellano MD      insulin glargine  32 Units Subcutaneous HS Dayanna Castellano MD      insulin lispro  1-5 Units Subcutaneous HS Dayanna Castellano MD      insulin lispro  1-6 Units Subcutaneous TID [facility] Dayanna Castellano MD      lactulose  10 g Oral BID Dayanna Castellano MD      nystatin   Topical BID Dayanna Castellano MD      oxyCODONE  5 mg Oral Q6H PRN Dayanna Castellano MD      pantoprazole  40 mg Oral Daily Dayanna Castellano MD      polyethylene glycol  17 g Oral Daily Dayanna Castellano MD      sodium bicarbonate  650 mg Oral TID after meals Dayanna Castellano MD          Today, Patient Was Seen By: Avni Gomez MD    ** Please Note: This note has been constructed using a voice recognition system   **

## 2022-01-07 NOTE — QUICK NOTE
Discussed with wife in detail plan of care regarding resuming anticoagulation with Eliquis, she did express concern due to patient's continued low H/H  Reassured patient and assured her that will continue to monitor for any s/sx of bleed  Also discussed with wife, that given patient is now on dialysis there is no longer need for PICC line, as patient can receive IV abx via permcath  Relayed my concern that this may just be another source of infection and that it should be removed

## 2022-01-08 PROBLEM — G93.40 ENCEPHALOPATHY: Status: RESOLVED | Noted: 2022-01-01 | Resolved: 2022-01-01

## 2022-01-08 PROBLEM — N28.89 RENAL HEMORRHAGE, RIGHT: Status: ACTIVE | Noted: 2021-01-01

## 2022-01-08 NOTE — QUICK NOTE
Pt complained of dizziness with room spinning sensation during HD this afternoon  He described vertigo as non positional with no associated nausea or vomiting  Noted vertigo actually worsens when he closed his eyes  Also noted hypoxic  BP stable at 156/60  BG normal  Will check CXR although doubt volume issues as pt has received multiple HD sessions  Pt has been afebrile and denies productive cough  Will obtain CT head r/o bleed  If negative will consider MRI brain r/o central causes/CVA given his risk factors  His Eliquis was resumed last 24 hours after repeat CT abd confirmed improvement of subcapsular hematoma and stable H&H  Updated Pt's wife at bedside

## 2022-01-08 NOTE — PROGRESS NOTES
[facility]  Progress Note - Nyla Mueller 1955, 78 y o  male MRN: [id number]  Unit/Bed#: S -01 Encounter: [id number]  Primary Care Provider: Lucy Larson MD   Date and time admitted to hospital: 1/1/2022 12:36 PM    Renal failure (ARF), acute on chronic [facility])  Assessment & Plan    Recent Labs     01/07/22  0448 01/07/22  0448 01/07/22  1137 01/07/22  1137 01/08/22  0508   CREATININE 5 33*  --  5 34*  --  5 40*   EGFR 10   < > 10   < > 10    < > = values in this interval not displayed  Estimated Creatinine Clearance: 15 7 mL/min (A) (by C-G formula based on SCr of 5 4 mg/dL (H))  Patient's baseline creatinine is 1 7  He was recently admitted in the hospital with acute kidney injury likely secondary to ATN, and discharged with creatinine around 4  Unclear etiology for currently acute kidney injury, was thought to be prerenal     Plan:  · Nephrology following appreciate recommendations  · Patient s/p PermCath placement with IR on 1/6 and subsequent dialysis tx   · Continue to monitor UOP and creatinine   Avoid nephrotoxins, hypotension   Continue bicarb      Renal hemorrhage, right  Assessment & Plan  Recent Labs     01/07/22  0347 01/07/22  1137 01/08/22  0508   HGB 14 0 8 2* 7 9*   MCV  --   --  95     · Etiology likely multifactorial in the setting of chronic kidney disease, hematuria in the setting of supratherapeutic INR  Patient has right subcapsular hematoma that is unchanged compared to the last CT scan  · Patient s/p 2 units of PRBC on 1/1 ( had formed antibodies during last admission to blood products)  · FOBT negative    Plan:  · Hemoglobin remains stable ~ 8  · Will monitor H/H with daily CBC at this time   Transfuse if Hb < 7 0   Consent for transfusion obtained:  Yes    Repeat CT abdomen showed some improvement      Cellulitis of left lower extremity  Assessment & Plan  Diagnosis: patient discharged on 12/29 for sepsis, bacteremia on IV Abx via central line placement with IR on 12/27  Suspected source: LLE cellulitis    Plan  ·  Alannah consult with ID (Dr Bhandari) okay to d/c PICC line as patient must transition to dialysis dosing Ancef  · Adjust IV Cefazolin 1000 mg q24 during hospitalization, with the same end date of Jan 16, 2022  · 24 hours prior to discharge will transition patient then to Ancef 2 g/2 g/3 g for TTHSa dosing schedule  · Resident to [facility] ID team to make them aware of discharge so that coordination between ID office and dialysis center may be completed  · Per discussion with IR (Azalia Fleming) order for removal of PICC line will be placed and primary team will reach out closer to discharge      Chronic deep vein thrombosis (DVT) of distal vein of left lower extremity [facility])  Assessment & Plan  Recent Labs     01/06/22  0336   INR 1 51*     History of DVT previously on Coumadin, started on Eliquis 5 mg b i d  during the previous admission for acute DVT  Patient now with normal INR originally supratherapeutic to 6 78 s/p 1 unit of FFP on 1/1 and 2 doses of [address] on 1/2 for supratherapuetic INR  Plan  · Resumed Eliquis 5mg BID  · Will monitor INR  · Monitor H/H closely      Chronic pain of left lower extremity  Assessment & Plan  Patient with complaint of left lower leg pain since previous admission from 12/17-12/29  Patient this morning notably distressed secondary to pain  Given 1 dose of dilaudid  Plan  · Scheduled Tylenol 975 q8  · Per discussion with family, requesting Oxycodone PRN be administered only overnight for severe pain      Right Liver mass  Assessment & Plan  · Noted on abdominal CT in setting of possibly decreased synthetic liver function appreciated in lab work         Staphylococcus aureus bacteremia  Assessment & Plan  · Recently hospitalized for Staph bacteremia  · Discharged on IV cefazolin  · Continue IV cefazolin through January 16, 2022    CVA (cerebral vascular accident) [facility])  Assessment & Plan  · Documented in history with no residual deficits  · Continue atorvastatin, aspirin    Type 2 diabetes mellitus, with long-term current use of insulin [facility])  Assessment & Plan  Lab Results   Component Value Date    HGBA1C 12 2 (H) 12/18/2021       Recent Labs     01/08/22  1042 01/08/22  1227 01/08/22  1323 01/08/22  1438   POCGLU 173* 178* 142* 118       Blood Sugar Average: Last 72 hrs:  (P) 147 [id number]   · Home regimen:  Lantus 32 units at bedtime, lispro 6 units t i d  With meals  · Upon admission, patient's home insulin was ordered  Patient did not get any of his lispro because of normal glucose  · Patient seemed to be lethargic, likely will have poor oral intake as well has MULUGETA  · Will discontinue lispro with meals, continue Lantus at bedtime and SSI  If hyperglycemia in the next few days, resume lispro with meals  · Hypoglycemia protocol  · Diabetic diet      * Encephalopathy-resolved as of 1/8/2022  Assessment & Plan  POA: Patient lethargic but easily arousable, responded questions appropriately  Most likely Uremic encephalopathy given elevaated ammonia level of 48 Creatinine 6 9, BUN 81  Patient now alert and oriented to self this morning during examination  Plan:  · Continue lactulose 10mg daily  · Close monitoring of mental status  · Continue with plan as below for ARF    VTE Pharmacologic Prophylaxis:   Pharmacologic: Apixaban (Eliquis)  Mechanical VTE Prophylaxis in Place: Yes    Patient Centered Rounds: I have performed bedside rounds with nursing staff today  Discussions with Specialists or Other Care Team Provider:     Education and Discussions with Family / Patient:     Time Spent for Care: 20 minutes  More than 50% of total time spent on counseling and coordination of care as described above      Current Length of Stay: 7 day(s)    Current Patient Status: Inpatient   Certification Statement: The patient will continue to require additional inpatient hospital stay due to above    Discharge Plan: Pending rehab    Code Status: Level 1 - Full Code      Subjective:   No overnight events  Pt will be getting     Objective:     Vitals:   Temp (24hrs), Av 9 °F (36 6 °C), Min:97 4 °F (36 3 °C), Max:98 5 °F (36 9 °C)    Temp:  [97 4 °F (36 3 °C)-98 5 °F (36 9 °C)] 97 8 °F (36 6 °C)  HR:  [53-63] 63  Resp:  [18-28] 18  BP: (139-178)/(61-75) 161/75  SpO2:  [90 %-100 %] 90 %  Body mass index is 37 98 kg/m²  Input and Output Summary (last 24 hours): Intake/Output Summary (Last 24 hours) at 2022 1559  Last data filed at 2022 1516  Gross per 24 hour   Intake 1480 ml   Output 1100 ml   Net 380 ml       Physical Exam:     Physical Exam  Constitutional:       General: He is not in acute distress  Appearance: He is not ill-appearing, toxic-appearing or diaphoretic  Eyes:      General:         Right eye: No discharge  Left eye: No discharge  Cardiovascular:      Rate and Rhythm: Normal rate  Rhythm irregular  Pulses: Normal pulses  Pulmonary:      Effort: Pulmonary effort is normal  No respiratory distress  Breath sounds: No wheezing  Abdominal:      General: Bowel sounds are normal    Musculoskeletal:         General: Swelling present  Skin:     General: Skin is warm  Psychiatric:         Mood and Affect: Mood normal          Behavior: Behavior normal          Additional Data:     Labs:    Results from last 7 days   Lab Units 22  0508 22  1108 22  0435   WBC Thousand/uL 10 14   < > 7 58   HEMOGLOBIN g/dL 7 9*   < > 7 9*   HEMATOCRIT % 25 4*   < > 25 6*   PLATELETS Thousands/uL 273   < > 350   NEUTROS PCT %  --   --  75   LYMPHS PCT %  --   --  7*   LYMPHO PCT % 10*   < >  --    MONOS PCT %  --   --  7   MONO PCT % 10   < >  --    EOS PCT % 4   < > 5    < > = values in this interval not displayed       Results from last 7 days   Lab Units 22  0508 22  0336 22  0515 22  20322  1249   POTASSIUM mmol/L 4 4   < > 4 4   < >  -- CHLORIDE mmol/L 97*   < > 100   < >  --    CO2 mmol/L 24   < > 23   < >  --    CO2, I-STAT mmol/L  --   --   --   --  20*   BUN mg/dL 55*   < > 58*   < >  --    CREATININE mg/dL 5 40*   < > 5 84*   < >  --    CALCIUM mg/dL 7 9*   < > 7 9*   < >  --    ALK PHOS U/L  --   --  52   < >  --    ALT U/L  --   --  <6*   < >  --    AST U/L  --   --  29   < >  --    GLUCOSE, ISTAT mg/dl  --   --   --   --  195*    < > = values in this interval not displayed       Results from last 7 days   Lab Units 01/06/22  0336   INR  1 51*         Recent Cultures (last 7 days):           Last 24 Hours Medication List:   Current Facility-Administered Medications   Medication Dose Route Frequency Provider Last Rate    acetaminophen  975 mg Oral Q8H [address] Damien Taylor MD      amLODIPine  10 mg Oral Daily ROBBINula Him, MD      apixaban  5 mg Oral BID Garrett Camacho MD      aspirin  81 mg Oral Daily June River, MD      atorvastatin  40 mg Oral QPM June River MD      carvedilol  25 mg Oral BID With Meals [facility], MD      ceFAZolin  1,000 mg Intravenous Q24H Edyta Lauren MD 1,000 mg (01/07/22 8161)    cyanocobalamin  1,000 mcg Oral Daily June River, MD      docusate sodium  100 mg Oral BID PRN June Him, MD      ezetimibe  10 mg Oral HS June River MD      insulin glargine  32 Units Subcutaneous HS June River MD      insulin lispro  1-5 Units Subcutaneous HS June River MD      insulin lispro  1-6 Units Subcutaneous TID [facility] June River, MD      lactulose  10 g Oral BID June River MD      nystatin   Topical BID June River, MD      oxyCODONE  5 mg Oral Q6H PRN June River MD      pantoprazole  40 mg Oral Daily June River MD      polyethylene glycol  17 g Oral Daily June River MD      sodium bicarbonate  650 mg Oral TID after meals June River MD          Today, Patient Was Seen By: Damien Taylor MD    ** Please Note: Dictation voice to text software may have been used in the creation of this document   **

## 2022-01-08 NOTE — ASSESSMENT & PLAN NOTE
Diagnosis: patient discharged on 12/29 for sepsis, bacteremia on IV Abx via central line placement with IR on 12/27   Suspected source: LLE cellulitis    Plan  ·  Curbside consult with ID (Dr Bhandari) rosa isela to d/c PICC line as patient must transition to dialysis dosing Ancef  · Adjust IV Cefazolin 1000 mg q24 during hospitalization, with the same end date of Jan 16, 2022  · 24 hours prior to discharge will transition patient then to Ancef 2 g/2 g/3 g for TTHSa dosing schedule  · Resident to [facility] ID team to make them aware of discharge so that coordination between ID office and dialysis center may be completed  · Per discussion with IR (Trey Amezquita) order for removal of PICC line will be placed and primary team will reach out closer to discharge

## 2022-01-08 NOTE — PLAN OF CARE
Post-Dialysis RN Treatment Note    Blood Pressure:  Pre: 165/67 mm/Hg  Post: 161/75 mmHg   EDW: TBD    Weight:  Pre: 111 5 kg   Post: 111 0  kg   Mode of weight measurement: Bed Scale   Volume Removed: 500 ml    Treatment duration: 90 minutes    NS given  No    Treatment shortened? Yes, describe: See below   Medications given during Rx None Reported   Estimated Kt/V  Not Applicable   Access type: Permacath/TDC   Access Issues: No    Report called to primary nurse   Yes, Dea Moran    At 5565, patient began c/o dizziness and SOB, UF off, Hospitalist called by primary RNEthan Text sent to Dr Louise Kunz who stated to continue tx at least 1 5 hours  Patient continued with complaints, and treatment was terminated at 33 64 74, patient being transported to CT Scan post treatment      Problem: METABOLIC, FLUID AND ELECTROLYTES - ADULT  Goal: Electrolytes maintained within normal limits  Description: INTERVENTIONS:  - Monitor labs and assess patient for signs and symptoms of electrolyte imbalances  - Administer electrolyte replacement as ordered  - Monitor response to electrolyte replacements, including repeat lab results as appropriate  - Instruct patient on fluid and nutrition as appropriate  Outcome: Progressing  Goal: Fluid balance maintained  Description: INTERVENTIONS:  - Monitor labs   - Monitor I/O and WT  - Instruct patient on fluid and nutrition as appropriate  - Assess for signs & symptoms of volume excess or deficit  Outcome: Progressing

## 2022-01-08 NOTE — PHYSICAL THERAPY NOTE
Physical Therapy Cancellation Note         01/08/22 1514   PT Last Visit   PT Visit Date 01/08/22   Note Type   Note type Cancelled Session   Cancel Reasons Patient off floor/test   Additional Comments PT orders noted and chart reviewed  Pt currently being taken off unit for CT  Will continue to follow and evaluate as appropriate          Jose Mujica, PT,DPT

## 2022-01-08 NOTE — ASSESSMENT & PLAN NOTE
Recent Labs     01/07/22  0448 01/07/22  0448 01/07/22  1137 01/07/22  1137 01/08/22  0508   CREATININE 5 33*  --  5 34*  --  5 40*   EGFR 10   < > 10   < > 10    < > = values in this interval not displayed  Estimated Creatinine Clearance: 15 7 mL/min (A) (by C-G formula based on SCr of 5 4 mg/dL (H))  Patient's baseline creatinine is 1 7  He was recently admitted in the hospital with acute kidney injury likely secondary to ATN, and discharged with creatinine around 4  Unclear etiology for currently acute kidney injury, was thought to be prerenal     Plan:  · Nephrology following appreciate recommendations  · Patient s/p PermCath placement with IR on 1/6 and subsequent dialysis tx   · Continue to monitor UOP and creatinine   Avoid nephrotoxins, hypotension   Continue bicarb

## 2022-01-08 NOTE — ASSESSMENT & PLAN NOTE
Lab Results   Component Value Date    HGBA1C 12 2 (H) 12/18/2021       Recent Labs     01/08/22  1042 01/08/22  1227 01/08/22  1323 01/08/22  1438   POCGLU 173* 178* 142* 118       Blood Sugar Average: Last 72 hrs:  (P) 147 [id number]   · Home regimen:  Lantus 32 units at bedtime, lispro 6 units t i d  With meals  · Upon admission, patient's home insulin was ordered  Patient did not get any of his lispro because of normal glucose  · Patient seemed to be lethargic, likely will have poor oral intake as well has MULUGETA  · Will discontinue lispro with meals, continue Lantus at bedtime and SSI  If hyperglycemia in the next few days, resume lispro with meals     · Hypoglycemia protocol  · Diabetic diet

## 2022-01-08 NOTE — ASSESSMENT & PLAN NOTE
Recent Labs     01/06/22  0336   INR 1 51*     History of DVT previously on Coumadin, started on Eliquis 5 mg b i d  during the previous admission for acute DVT  Patient now with normal INR originally supratherapeutic to 6 78 s/p 1 unit of FFP on 1/1 and 2 doses of [address] on 1/2 for supratherapuetic INR      Plan  · Resumed Eliquis 5mg BID  · Will monitor INR  · Monitor H/H closely

## 2022-01-08 NOTE — PROGRESS NOTES
NEPHROLOGY PROGRESS NOTE   Barbi Rm 78 y o  male MRN: [id number]  Unit/Bed#: S -01 Encounter: [id number]  Reason for Consult: ARF      SUMMARY:    78 y  o  man PMH DM type 2, CKD G3b (baseline creatinine 1 7 mg/dL, eGFR 35) , hypertension   Recently episode of MULUGETA secondary to retroperitoneal bleeding and hypotension , was discharged with creatinine of 4 2   Now patient presents with altered mental status and worsening creatinine   Nephrology is consulted for MULUGETA     ASSESSMENT and PLAN:     Acute renal failure/acute tubular necrosis  --currently dialysis dependent  --started dialysis on January 2nd  --creatinine trending up off dialysis  --patient nonoliguric with good urine output  --will continue to monitor for renal recovery  --hemodialysis consent obtained from the patient's wife and sign  --access:  PermCath placed on January 6th  --monitor urine output and maintain mean arterial pressure in 65  --avoid contrast studies  --in the setting recurrent episodes of acute kidney injury with has progressed acute tubular necrosis    --renal imaging shows unchanged subscapular and super renal hematoma  --patient urine output has started to improve  --needs outpatient hemodialysis placement, which can be coordinated with rehab  --continue Tuesday Thursday Saturday dialysis schedule  --plan for next dialysis today  --continue to monitor for renal recovery     Chronic kidney disease stage IIIB  --baseline creatinine 1 5-1 7 mg/dL  --anticipate he will be at a higher baseline creatinine if he comes off dialysis     Mixed acid-base disorder--resolved     Hyperkalemia--resolved     Hyperphosphatemia  --slowly improving will continue monitor  --low phosphorus diet     Anemia  --acute on chronic  --supratherapeutic INR which has improved  --status post 3 unit blood transfusions till now  --hemoglobin stable but low  --low hemoglobin Will slow renal recovery  --hemoglobin stable and I anticipate a 14 was a lab error low     Blood pressure/volume status  --history of hypertension  --volume overloaded  --continue ultrafiltration with dialysis  --urine output is starting to improve  --possibly course of diuretics on Sunday or Monday         SUBJECTIVE / INTERVAL HISTORY:    Feels terrible    OBJECTIVE:  Current Weight: Weight - Scale: 110 kg (242 lb 8 1 oz)  Vitals:    01/07/22 2230 01/08/22 0234 01/08/22 0600 01/08/22 0720   BP: 141/71 141/65  139/65   BP Location:    Right arm   Pulse: 57 56  55   Resp:    18   Temp: 98 5 °F (36 9 °C) 98 2 °F (36 8 °C)  97 7 °F (36 5 °C)   TempSrc:    Oral   SpO2: 92% 93%  91%   Weight:   110 kg (242 lb 8 1 oz)    Height:           Intake/Output Summary (Last 24 hours) at 1/8/2022 9643  Last data filed at 1/7/2022 1901  Gross per 24 hour   Intake --   Output 600 ml   Net -600 ml       Review of Systems:    12 point ROS has been reviewed  Physical Exam  Vitals and nursing note reviewed  Constitutional:       General: He is not in acute distress  Appearance: He is well-developed  He is obese  He is not diaphoretic  HENT:      Head: Normocephalic and atraumatic  Eyes:      General: No scleral icterus  Pupils: Pupils are equal, round, and reactive to light  Cardiovascular:      Rate and Rhythm: Normal rate and regular rhythm  Heart sounds: Normal heart sounds  No murmur heard  No friction rub  No gallop  Pulmonary:      Effort: Pulmonary effort is normal  No respiratory distress  Breath sounds: Normal breath sounds  No wheezing or rales  Chest:      Chest wall: No tenderness  Abdominal:      General: Bowel sounds are normal  There is no distension  Palpations: Abdomen is soft  Tenderness: There is no abdominal tenderness  There is no rebound  Genitourinary:     Comments: Enriqueta  Musculoskeletal:         General: Swelling present  Normal range of motion  Cervical back: Normal range of motion and neck supple  Right lower leg: Edema present  Left lower leg: Edema present  Skin:     Findings: No rash  Neurological:      Mental Status: He is alert and oriented to person, place, and time           Medications:    Current Facility-Administered Medications:     acetaminophen (TYLENOL) tablet 975 mg, 975 mg, Oral, Q8H [facility], Linda Morales MD, 975 mg at 01/08/22 0515    amLODIPine (NORVASC) tablet 10 mg, 10 mg, Oral, Daily, Dayanna Castellano MD, 10 mg at 01/07/22 0578    apixaban (ELIQUIS) tablet 5 mg, 5 mg, Oral, BID, Avni Gomez MD, 5 mg at 01/07/22 1748    aspirin chewable tablet 81 mg, 81 mg, Oral, Daily, Dayanna Castellano MD, 81 mg at 01/07/22 0928    atorvastatin (LIPITOR) tablet 40 mg, 40 mg, Oral, QPM, Dayanna Castellano MD, 40 mg at 01/07/22 1748    carvedilol (COREG) tablet 25 mg, 25 mg, Oral, BID With Meals, Dayanna Castellano MD, 25 mg at 01/07/22 1748    ceFAZolin (ANCEF) IVPB (premix in dextrose) 1,000 mg 50 mL, 1,000 mg, Intravenous, Q24H, Julianne Denny MD, Last Rate: 100 mL/hr at 01/07/22 1748, 1,000 mg at 01/07/22 1748    cyanocobalamin (VITAMIN B-12) tablet 1,000 mcg, 1,000 mcg, Oral, Daily, Dayanna Castellano MD, 1,000 mcg at 01/07/22 7156    docusate sodium (COLACE) capsule 100 mg, 100 mg, Oral, BID PRN, Dayanna Castellano MD, 100 mg at 01/06/22 1738    ezetimibe (ZETIA) tablet 10 mg, 10 mg, Oral, HS, Dayanna Castellano MD, 10 mg at 01/07/22 2115    insulin glargine (LANTUS) subcutaneous injection 32 Units 0 32 mL, 32 Units, Subcutaneous, HS, Dayanna Castellano MD, 32 Units at 01/07/22 2117    insulin lispro (HumaLOG) 100 units/mL subcutaneous injection 1-5 Units, 1-5 Units, Subcutaneous, HS, Dayanna Castellano MD, 1 Units at 01/07/22 2117    insulin lispro (HumaLOG) 100 units/mL subcutaneous injection 1-6 Units, 1-6 Units, Subcutaneous, TID AC, 1 Units at 01/07/22 1750 **AND** [CANCELED] Fingerstick Glucose (POCT), , , TID AC, Dayanna Castellano MD    lactulose oral solution 10 g, 10 g, Oral, BID, Dayanna Castellano MD, 10 g at 01/07/22 1748    nystatin (MYCOSTATIN) powder, , Topical, BID, Jeffrey Nickerson MD, Given at 01/07/22 1749    oxyCODONE (ROXICODONE) IR tablet 5 mg, 5 mg, Oral, Q6H PRN, Jeffrey Nickerson MD, 5 mg at 01/08/22 0152    pantoprazole (PROTONIX) EC tablet 40 mg, 40 mg, Oral, Daily, Jeffrey Nickerson MD, 40 mg at 01/07/22 0928    polyethylene glycol (MIRALAX) packet 17 g, 17 g, Oral, Daily, Jeffrey Nickerson MD, 17 g at 01/05/22 0908    sodium bicarbonate tablet 650 mg, 650 mg, Oral, TID after meals, Jeffrey Nickerson MD, 650 mg at 01/07/22 1748    Laboratory Results:  Results from last 7 days   Lab Units 01/08/22  0508 01/07/22  1137 01/07/22  0448 01/07/22  0347 01/06/22  1948 01/06/22  1218 01/06/22  0336 01/06/22  0329 01/05/22  1720 01/05/22  1224 01/05/22  0515 01/05/22  0004 01/04/22  1613 01/04/22  0505 01/04/22  0505 01/03/22  1108 01/03/22  0435 01/02/22  2031 01/02/22  2031 01/02/22  1653 01/02/22  1249 01/02/22  0453 01/02/22  0433 01/01/22 2025 01/01/22 2022 01/01/22 2022   WBC Thousand/uL 10 14  --   --   --   --   --   --   --   --   --  8 59  --  8 25  --  7 48  --  7 58  --  8 60  --   --   --   --   --   --  11 19*   HEMOGLOBIN g/dL 7 9* 8 2*  --  14 0 8 2* 8 5*  --  8 0* 6 2*   < > 7 2*   < > 7 6*   < > 7 6*   < > 7 9*   < > 7 9* 6 8*  --   --    < >   < >   < > 4 2*   I STAT HEMOGLOBIN g/dl  --   --   --   --   --   --   --   --   --   --   --   --   --   --   --   --   --   --   --   --  7 8*  --   --   --   --   --    HEMATOCRIT % 25 4* 25 9*  --  44 2 26 0* 26 6*  --  25 4* 20 6*   < > 23 7*   < > 24 8*   < > 24 3*   < > 25 6*   < > 25 5* 22 4*  --   --    < >   < >   < > 14 4*   HEMATOCRIT, ISTAT %  --   --   --   --   --   --   --   --   --   --   --   --   --   --   --   --   --   --   --   --  23*  --   --   --   --   --    PLATELETS Thousands/uL 273  --   --   --   --   --   --   --   --   --  315  --  315  --  338  --  350  --  391* 386  --   --   --   --    < > 445*   POTASSIUM mmol/L 4 4 4 8 4 4  --   --   --  4 6  --   --   --  4 4  --   --   --  4 4  --  4 8   < > 4 9  --   --   --    < >   < >  --  5 5*   CHLORIDE mmol/L 97* 98* 97*  --   --   --  98*  --   --   --  100  --   --   --  101  --  105   < > 104  --   --   --    < >   < >  --  108   CO2 mmol/L 24 24 25  --   --   --  24  --   --   --  23  --   --   --  22  --  22   < > 23  --   --   --    < >   < >  --  18*   CO2, I-STAT mmol/L  --   --   --   --   --   --   --   --   --   --   --   --   --   --   --   --   --   --   --   --  20*  --   --   --   --   --    BUN mg/dL 55* 51* 49*  --   --   --  68*  --   --   --  58*  --   --   --  63*  --  69*   < > 68*  --   --   --    < >   < >  --  83*   CREATININE mg/dL 5 40* 5 34* 5 33*  --   --   --  6 58*  --   --   --  5 84*  --   --   --  5 96*  --  6 44*   < > 6 15*  --   --   --    < >   < >  --  7 16*   CALCIUM mg/dL 7 9* 7 5* 8 0*  --   --   --  8 0*  --   --   --  7 9*  --   --   --  8 0*  --  8 2*   < > 8 2*  --   --   --    < >   < >  --  7 9*   MAGNESIUM mg/dL  --   --   --   --   --   --   --   --   --   --   --   --   --   --   --   --  1 8  --   --   --   --  2 0  --   --   --   --    PHOSPHORUS mg/dL  --   --   --   --   --   --   --   --   --   --   --   --   --   --   --   --  6 0*  --   --   --   --  6 9*  --   --   --   --    GLUCOSE, ISTAT mg/dl  --   --   --   --   --   --   --   --   --   --   --   --   --   --   --   --   --   --   --   --  195*  --   --   --   --   --     < > = values in this interval not displayed

## 2022-01-08 NOTE — ASSESSMENT & PLAN NOTE
Recent Labs     01/07/22  0347 01/07/22  1137 01/08/22  0508   HGB 14 0 8 2* 7 9*   MCV  --   --  95     · Etiology likely multifactorial in the setting of chronic kidney disease, hematuria in the setting of supratherapeutic INR  Patient has right subcapsular hematoma that is unchanged compared to the last CT scan  · Patient s/p 2 units of PRBC on 1/1 ( had formed antibodies during last admission to blood products)  · FOBT negative    Plan:  · Hemoglobin remains stable ~ 8  · Will monitor H/H with daily CBC at this time   Transfuse if Hb < 7 0   Consent for transfusion obtained:  Yes    Repeat CT abdomen showed some improvement

## 2022-01-09 NOTE — TREATMENT PLAN
Nephrology    Patient underwent dialysis yesterday  Discussed with  still awaiting placement      Tentatively plan for next dialysis on Tuesday    Plan for next follow-up tomorrow

## 2022-01-09 NOTE — PLAN OF CARE
Problem: MOBILITY - ADULT  Goal: Maintain or return to baseline ADL function  Description: INTERVENTIONS:  -  Assess patient's ability to carry out ADLs; assess patient's baseline for ADL function and identify physical deficits which impact ability to perform ADLs (bathing, care of mouth/teeth, toileting, grooming, dressing, etc )  - Assess/evaluate cause of self-care deficits   - Assess range of motion  - Assess patient's mobility; develop plan if impaired  - Assess patient's need for assistive devices and provide as appropriate  - Encourage maximum independence but intervene and supervise when necessary  - Involve family in performance of ADLs  - Assess for home care needs following discharge   - Consider OT consult to assist with ADL evaluation and planning for discharge  - Provide patient education as appropriate  Outcome: Progressing  Goal: Maintains/Returns to pre admission functional level  Description: INTERVENTIONS:  - Perform BMAT or MOVE assessment daily    - Set and communicate daily mobility goal to care team and patient/family/caregiver  - Collaborate with rehabilitation services on mobility goals if consulted  - Perform Range of Motion 4 times a day  - Reposition patient every 2 hours    - Dangle patient 4 times a day  - Stand patient 4 times a day  - Ambulate patient 4 times a day  - Out of bed to chair 4 times a day   - Out of bed for meals 4  Problem: Potential for Falls  Goal: Patient will remain free of falls  Description: INTERVENTIONS:  - Educate patient/family on patient safety including physical limitations  - Instruct patient to call for assistance with activity   - Consult OT/PT to assist with strengthening/mobility   - Keep Call bell within reach  - Keep bed low and locked with side rails adjusted as appropriate  - Keep care items and personal belongings within reach  - Initiate and maintain comfort rounds  - Make Fall Risk Sign visible to staff  - Offer Toileting every 2 Hours, in advance of need  - Initiate/Maintain bed alarm  - Obtain necessary fall risk management equipment:  bed alarm  - Apply yellow socks and bracelet for high fall risk patients  - Consider moving patient to room near nurses station  Outcome: Progressing     Problem: Prexisting or High Potential for Compromised Skin Integrity  Goal: Skin integrity is maintained or improved  Description: INTERVENTIONS:  - Identify patients at risk for skin breakdown  - Assess and monitor skin integrity  - Assess and monitor nutrition and hydration status  - Monitor labs   - Assess for incontinence   - Turn and reposition patient  - Assist with mobility/ambulation  - Relieve pressure over bony prominences  - Avoid friction and shearing  - Provide appropriate hygiene as needed including keeping skin clean and dry  - Evaluate need for skin moisturizer/barrier cream  - Collaborate with interdisciplinary team   - Patient/family teaching  - Consider wound care consult   Outcome: Progressing     Problem: Nutrition/Hydration-ADULT  Goal: Nutrient/Hydration intake appropriate for improving, restoring or maintaining nutritional needs  Description: Monitor and assess patient's nutrition/hydration status for malnutrition  Collaborate with interdisciplinary team and initiate plan and interventions as ordered  Monitor patient's weight and dietary intake as ordered or per policy  Utilize nutrition screening tool and intervene as necessary  Determine patient's food preferences and provide high-protein, high-caloric foods as appropriate       INTERVENTIONS:  - Monitor oral intake, urinary output, labs, and treatment plans  - Assess nutrition and hydration status and recommend course of action  - Evaluate amount of meals eaten  - Assist patient with eating if necessary   - Allow adequate time for meals  - Recommend/ encourage appropriate diets, oral nutritional supplements, and vitamin/mineral supplements  - Order, calculate, and assess calorie counts as needed  - Recommend, monitor, and adjust tube feedings and TPN/PPN based on assessed needs  - Assess need for intravenous fluids  - Provide specific nutrition/hydration education as appropriate  - Include patient/family/caregiver in decisions related to nutrition  Outcome: Progressing     Problem: METABOLIC, FLUID AND ELECTROLYTES - ADULT  Goal: Electrolytes maintained within normal limits  Description: INTERVENTIONS:  - Monitor labs and assess patient for signs and symptoms of electrolyte imbalances  - Administer electrolyte replacement as ordered  - Monitor response to electrolyte replacements, including repeat lab results as appropriate  - Instruct patient on fluid and nutrition as appropriate  Outcome: Progressing  Goal: Fluid balance maintained  Description: INTERVENTIONS:  - Monitor labs   - Monitor I/O and WT  - Instruct patient on fluid and nutrition as appropriate  - Assess for signs & symptoms of volume excess or deficit  Outcome: Progressing  Goal: Glucose maintained within target range  Description: INTERVENTIONS:  - Monitor Blood Glucose as ordered  - Assess for signs and symptoms of hyperglycemia and hypoglycemia  - Administer ordered medications to maintain glucose within target range  - Assess nutritional intake and initiate nutrition service referral as needed  Outcome: Progressing    times a day  - Out of bed for toileting  - Record patient progress and toleration of activity level   Outcome: Progressing

## 2022-01-09 NOTE — ASSESSMENT & PLAN NOTE
Lab Results   Component Value Date    HGBA1C 12 2 (H) 12/18/2021       Recent Labs     01/08/22  1743 01/08/22  2057 01/09/22  0730 01/09/22  1102   POCGLU 107 197* 138 162*       Blood Sugar Average: Last 72 hrs:  (P) 145 [id number]   · Home regimen:  Lantus 32 units at bedtime, lispro 6 units t i d   With meals  · Continue basal insulin with SSI  · Hypoglycemia protocol  · Diabetic diet

## 2022-01-09 NOTE — ASSESSMENT & PLAN NOTE
No results for input(s): INR in the last 72 hours  History of DVT previously on Coumadin, started on Eliquis 5 mg b i d  during the previous admission for acute DVT  Patient now with normal INR originally supratherapeutic to 6 78 s/p 1 unit of FFP on 1/1 and 2 doses of [address] on 1/2 for supratherapuetic INR      Plan  · Continue Eliquis 5mg BID  · Monitor H/H closely

## 2022-01-09 NOTE — ASSESSMENT & PLAN NOTE
Diagnosis: patient discharged on 12/29 for sepsis, bacteremia on IV Abx via central line placement with IR on 12/27   Suspected source: LLE cellulitis    Plan  · Will hold off on removal of tunneled catheter line for now in setting of possible renal recovery  · Adjust IV Cefazolin 1000 mg q24 during hospitalization, with the same end date of Jan 16, 2022  · Resident to [facility] ID team with plans of dc in the next 24-48 hour pending placement, ID aware that will maintain PICC line   · Patient to follow up outpatient for removal of PICC line

## 2022-01-09 NOTE — ASSESSMENT & PLAN NOTE
Recent Labs     01/07/22  1137 01/07/22  1137 01/08/22  0508 01/08/22  0508 01/09/22  0522   CREATININE 5 34*  --  5 40*  --  4 47*   EGFR 10   < > 10   < > 12    < > = values in this interval not displayed  Estimated Creatinine Clearance: 19 mL/min (A) (by C-G formula based on SCr of 4 47 mg/dL (H))  Patient's baseline creatinine is 1 7  He was recently admitted in the hospital with acute kidney injury likely secondary to ATN, and discharged with creatinine around 4   Patient s/p PermCath placement with IR on 1/6 and subsequent dialysis tx     Plan:  · Nephrology following appreciate recommendations- hold off HD again for today (1/12) in setting of good urine output and renal reovery  · Will maintain PermCath in case further dialysis is required, close follow-up with Nephrology outpatient   · Discussed with Nephrology team that patient should continue on Torsemide 40mg BID upon discharge  · Continue T/TH/Sat schedule, continuing working with CM on dialysis center coordination with discharge planning  · Continue to monitor UOP and creatinine   Avoid nephrotoxins, hypotension

## 2022-01-09 NOTE — PROGRESS NOTES
[facility]  Progress Note - Mati Mcpherson 1955, 78 y o  male MRN: [id number]  Unit/Bed#: S -01 Encounter: [id number]  Primary Care Provider: Primo Lieberman MD   Date and time admitted to hospital: 1/1/2022 12:36 PM    Renal failure (ARF), acute on chronic [facility])  Assessment & Plan    Recent Labs     01/07/22  1137 01/07/22  1137 01/08/22  0508 01/08/22  0508 01/09/22  0522   CREATININE 5 34*  --  5 40*  --  4 47*   EGFR 10   < > 10   < > 12    < > = values in this interval not displayed  Estimated Creatinine Clearance: 19 mL/min (A) (by C-G formula based on SCr of 4 47 mg/dL (H))  Patient's baseline creatinine is 1 7  He was recently admitted in the hospital with acute kidney injury likely secondary to ATN, and discharged with creatinine around 4  Unclear etiology for currently acute kidney injury, was thought to be prerenal     Plan:  · Nephrology following appreciate recommendations- continue T/TH/Sat schedule  · Patient s/p PermCath placement with IR on 1/6 and subsequent dialysis tx   · Continue to monitor UOP and creatinine   Avoid nephrotoxins, hypotension   Continue bicarb      Chronic pain of left lower extremity  Assessment & Plan  Patient with complaint of left lower leg pain since previous admission from 12/17-12/29  Patient this morning notably distressed secondary to pain  Given 1 dose of dilaudid  Plan  · Scheduled Tylenol 975 q8  · Per discussion with family, requesting Oxycodone PRN be administered only overnight for severe pain      Cellulitis of left lower extremity  Assessment & Plan  Diagnosis: patient discharged on 12/29 for sepsis, bacteremia on IV Abx via central line placement with IR on 12/27   Suspected source: LLE cellulitis    Plan  ·  Curbside consult with ID (Dr Bhandari) okay to d/c PICC line as patient must transition to dialysis dosing Ancef  · Adjust IV Cefazolin 1000 mg q24 during hospitalization, with the same end date of Jan 16, 2022  · 24 hours prior to discharge will transition patient then to Ancef 2 g/2 g/3 g for TTHSa dosing schedule  · Resident to [facility] ID team to make them aware of discharge so that coordination between ID office and dialysis center may be completed  · Per discussion with IR (Lilia Valladares) order for removal of PICC line will be placed and primary team will reach out closer to discharge  Hoping for removal of PICC in the next 24 hours  Renal hemorrhage, right  Assessment & Plan  Recent Labs     01/07/22  1137 01/08/22  0508 01/09/22  0522   HGB 8 2* 7 9* 7 8*   MCV  --  95 96     · Etiology likely multifactorial in the setting of chronic kidney disease, hematuria in the setting of supratherapeutic INR  Patient has right subcapsular hematoma that is unchanged compared to the last CT scan  · Patient s/p 2 units of PRBC on 1/1 ( had formed antibodies during last admission to blood products)  · FOBT negative    Plan:  · Hemoglobin remains stable  · Will monitor H/H with daily CBC at this time   Transfuse if Hb < 7 0   Consent for transfusion obtained: Yes    Repeat CT abdomen showed some improvement      Type 2 diabetes mellitus, with long-term current use of insulin [facility])  Assessment & Plan  Lab Results   Component Value Date    HGBA1C 12 2 (H) 12/18/2021       Recent Labs     01/08/22  1743 01/08/22  2057 01/09/22  0730 01/09/22  1102   POCGLU 107 197* 138 162*       Blood Sugar Average: Last 72 hrs:  (P) 145 [id number]   · Home regimen:  Lantus 32 units at bedtime, lispro 6 units t i d  With meals  · Continue basal insulin with SSI  · Hypoglycemia protocol  · Diabetic diet      CVA (cerebral vascular accident) [facility])  Assessment & Plan  Patient with notable episode on 1/8 dizziness with room spinning sensation  CT head of the head was unremarkable for acute intracranial abnl   Similar right inferior cerebellar encephalomalacia, nonspecific, mild patchy periventricular and subcortical white matter lucencies most commonly related to changes of microangiopathy and vascular calcification  Plan  · Follow-up MRI to r/o bleed  · Documented in history with no residual deficits  · Continue atorvastatin, aspirin      Hypertension  Assessment & Plan  · Blood pressure acceptable   · Continue amlodipine and metoprolol   · Monitor vital signs     Chronic deep vein thrombosis (DVT) of distal vein of left lower extremity (HCC)  Assessment & Plan  No results for input(s): INR in the last 72 hours  History of DVT previously on Coumadin, started on Eliquis 5 mg b i d  during the previous admission for acute DVT  Patient now with normal INR originally supratherapeutic to 6 78 s/p 1 unit of FFP on 1/1 and 2 doses of [facility] on 1/2 for supratherapuetic INR  Plan  · Resumed Eliquis 5mg BID  · Will monitor INR  · Monitor H/H closely      Right Liver mass  Assessment & Plan  · Noted on abdominal CT in setting of possibly decreased synthetic liver function appreciated in lab work  Staphylococcus aureus bacteremia  Assessment & Plan  · Recently hospitalized for Staph bacteremia  · Discharged on IV cefazolin  · Continue IV cefazolin through January 16, 2022      VTE Pharmacologic Prophylaxis:   VTE Score: 3 Moderate Risk (Score 3-4) - Pharmacological DVT Prophylaxis Ordered: Apixaban (Eliquis)  Mechanical VTE Prophylaxis in Place: No    Patient Centered Rounds: I have performed bedside rounds with nursing staff today  Discussions with Specialists or Other Care Team Provider: Nephrology, IR    Education and Discussions with Family / Patient: Updated  (wife) via phone  Current Length of Stay: 8 day(s)    Current Patient Status: Inpatient     Discharge Plan / Estimated Discharge Date: TBD pending placement    Code Status: Level 1 - Full Code      Subjective:   Patient laying bed in bed, denies any further episodes of dizziness overnight  Only complains of some mild pain in his left back this morning      Objective: Vitals:   Temp (24hrs), Av 1 °F (36 7 °C), Min:97 8 °F (36 6 °C), Max:98 4 °F (36 9 °C)    Temp:  [97 8 °F (36 6 °C)-98 4 °F (36 9 °C)] 98 2 °F (36 8 °C)  HR:  [53-64] 55  Resp:  [18-28] 18  BP: (134-167)/(60-75) 145/60  SpO2:  [90 %-100 %] 94 %  Body mass index is 37 98 kg/m²  Input and Output Summary (last 24 hours): Intake/Output Summary (Last 24 hours) at 2022 1303  Last data filed at 2022 1201  Gross per 24 hour   Intake 1320 ml   Output 2050 ml   Net -730 ml       Physical Exam:     Physical Exam  Constitutional:       Appearance: Normal appearance  HENT:      Head: Normocephalic and atraumatic  Right Ear: External ear normal       Left Ear: External ear normal    Eyes:      Conjunctiva/sclera: Conjunctivae normal    Cardiovascular:      Rate and Rhythm: Normal rate and regular rhythm  Pulmonary:      Effort: Pulmonary effort is normal       Comments: 2 L NC  Abdominal:      General: Abdomen is flat  Bowel sounds are normal    Musculoskeletal:      Right lower leg: No edema  Left lower leg: Edema present  Comments: Multiple abrasions noted on the lower ext b/l   Neurological:      Mental Status: He is alert  Comments: Alert and oriented to self          Additional Data:     Labs:  Results from last 7 days   Lab Units 22  0522 22  0508 22  0508 22  1108 22  0435   WBC Thousand/uL 8 83   < > 10 14   < > 7 58   HEMOGLOBIN g/dL 7 8*   < > 7 9*   < > 7 9*   HEMATOCRIT % 25 6*   < > 25 4*   < > 25 6*   PLATELETS Thousands/uL 251   < > 273   < > 350   BANDS PCT %  --   --  4   < >  --    NEUTROS PCT %  --   --   --   --  75   LYMPHS PCT %  --   --   --   --  7*   LYMPHO PCT %  --   --  10*   < >  --    MONOS PCT %  --   --   --   --  7   MONO PCT %  --   --  10   < >  --    EOS PCT %  --   --  4   < > 5    < > = values in this interval not displayed       Results from last 7 days   Lab Units 22  0522   SODIUM mmol/L 129*   POTASSIUM mmol/L 4 4   CHLORIDE mmol/L 95*   CO2 mmol/L 25   BUN mg/dL 50*   CREATININE mg/dL 4 47*   ANION GAP mmol/L 9   CALCIUM mg/dL 7 7*   ALBUMIN g/dL 1 7*   TOTAL BILIRUBIN mg/dL 0 21   ALK PHOS U/L 52   ALT U/L <6*   AST U/L 24   GLUCOSE RANDOM mg/dL 101     Results from last 7 days   Lab Units 01/06/22  0336   INR  1 51*     Results from last 7 days   Lab Units 01/09/22  1102 01/09/22  0730 01/08/22  2057 01/08/22  1743 01/08/22  1438 01/08/22  1323 01/08/22  1227 01/08/22  1042 01/08/22  0723 01/07/22  2117 01/07/22  1716 01/07/22  1051   POC GLUCOSE mg/dl 162* 138 197* 107 118 142* 178* 173* 60* 208* 162* 215*               Imaging: CT head wo contrast    Recent Cultures (last 7 days):           Lines/Drains:  Invasive Devices  Report    Central Venous Catheter Line            CVC Central Lines 12/27/21 Double Right 12 days          Peripheral Intravenous Line            Peripheral IV 01/08/22 Left Forearm 1 day          Hemodialysis Catheter            HD Permanent Double Catheter 3 days          Drain            Urethral Catheter 16 Fr  2 days                Telemetry:        Last 24 Hours Medication List:   Current Facility-Administered Medications   Medication Dose Route Frequency Provider Last Rate    acetaminophen  975 mg Oral Q8H [facility] Antonella Kay MD      amLODIPine  10 mg Oral Daily Genia Stephens MD      apixaban  5 mg Oral BID Ramirez Le MD      aspirin  81 mg Oral Daily Genia Stephens MD      atorvastatin  40 mg Oral QPM Genia Stephens MD      carvedilol  25 mg Oral BID With Meals Genia Stephens MD      ceFAZolin  1,000 mg Intravenous Q24H Kwaku Nur MD 1,000 mg (01/08/22 1427)    cyanocobalamin  1,000 mcg Oral Daily Genia Stephens MD      docusate sodium  100 mg Oral BID PRN Genia Stephens MD      ezetimibe  10 mg Oral HS Genia Stephens MD      insulin glargine  32 Units Subcutaneous HS Genia Stephens MD      insulin lispro  1-5 Units Subcutaneous HS Renita Yony Sylvester MD      insulin lispro  1-6 Units Subcutaneous TID [facility] Saurab[facility]      lactulose  10 g Oral BID Saurabh Joiner MD      LORazepam  1 mg Intravenous Once PRN Deshawn Michelle MD      nystatin   Topical BID Saurabh Joiner MD      oxyCODONE  5 mg Oral Q6H PRN Saurabh Joiner MD      pantoprazole  40 mg Oral Daily Saurabh Joiner MD      polyethylene glycol  17 g Oral Daily Saurabh Joiner MD      sodium bicarbonate  650 mg Oral TID after meals Saurabh Joiner MD          Today, Patient Was Seen By: Ulysses Bowens, MD    ** Please Note: This note has been constructed using a voice recognition system   **

## 2022-01-09 NOTE — NURSING NOTE
Patient offered complaint of dizziness, SOB and chest pain shortly after starting dialysis this afternoon, vital signs 144/56, 59, 87% on 2L NC, Blood glucose 142, provider notified and asked to assess at bedside

## 2022-01-09 NOTE — ASSESSMENT & PLAN NOTE
Patient with notable episode on 1/8 dizziness with room spinning sensation  CT head of the head was unremarkable for acute intracranial abnl  Similar right inferior cerebellar encephalomalacia, nonspecific, mild patchy periventricular and subcortical white matter lucencies most commonly related to changes of microangiopathy and vascular calcification  1/9/22 MRI head notable for small chronic left external capsule  0 2 to 0 3 cm new foci of restricted diffusion in both the right and left frontal periventricular white matter and in the right frontal corona radiata/centrum          Plan  · Neurology consulted appreciate recs as below:  - Continue Eliquis 5mg BID and ASA  - Continue Atorvastatin 40mg BID daily  -  1/10 Carotid US- There is <50% stenosis noted in the right internal carotid artery  - Maintain glucose < 200

## 2022-01-09 NOTE — ASSESSMENT & PLAN NOTE
Recent Labs     01/08/22  0508 01/09/22  0522 01/10/22  0503   HGB 7 9* 7 8* 7 8*   MCV 95 96 97     · Etiology likely multifactorial in the setting of chronic kidney disease, hematuria in the setting of supratherapeutic INR  Patient has right subcapsular hematoma that is unchanged compared to the last CT scan  · Patient s/p 2 units of PRBC on 1/1 ( had formed antibodies during last admission to blood products)  · FOBT negative    Plan:  · Hemoglobin remains stable  · Will monitor H/H with daily CBC at this time   Transfuse if Hb < 7 0   Consent for transfusion obtained:  Yes

## 2022-01-09 NOTE — NURSING NOTE
Provider at bedside, EKG ordered and obtained, patient placed on 4L NC and now o2 sat 91%, CT and chest xray ordered  Will continue to monitor and carry out all new orders

## 2022-01-09 NOTE — ASSESSMENT & PLAN NOTE
Patient with complaint of left lower leg pain since previous admission from 12/17-12/29  Patient this morning notably distressed secondary to pain  Given 1 dose of dilaudid  CT lower ext without contrast 1/11- Subchondral osseous resorption in the medial and lateral compartments  While this pattern can be seen with disuse osteopenia, given the presence of a small joint effusion, septic arthritis and osteomyelitis are not excluded      Plan  · Continue to monitor for now, will start application of lidocaine gel TID for pain to the left knee  · Scheduled Tylenol 975 q8  · Per discussion with family, requesting Oxycodone PRN be administered only overnight for severe pain

## 2022-01-10 NOTE — PLAN OF CARE
Problem: PHYSICAL THERAPY ADULT  Goal: Performs mobility at highest level of function for planned discharge setting  See evaluation for individualized goals  Description: Treatment/Interventions: LE strengthening/ROM,Therapeutic exercise,Endurance training,Cognitive reorientation,Patient/family training,Equipment eval/education,Bed mobility          See flowsheet documentation for full assessment, interventions and recommendations  1/10/2022 1238 by Yusuf Goins PT  Note: Prognosis: Fair  Problem List: Decreased strength,Decreased endurance,Impaired balance,Decreased mobility,Decreased cognition,Impaired judgement,Obesity,Pain,Decreased skin integrity  Assessment: Maritza Gray is a 78 y o  Male who [facility] from [facility] w/ c/o AMS and diagnosis of renal failure, LLE pain and cellulitis, CVA  Orders for PT eval and treat received, w/ activity orders of up w/ A and fall precautions  Pt presents w/ comorbidities of DMII, hx of CVA, HTN, ESRD on HD, MELINDA, HTN   At baseline, pt is very sedentary and ambulates w/ no AD, and reports 0 falls in the last 6 months  Upon evaluation, pt presents w/ the following deficits: weakness, edema of extremities, impaired balance, impaired hearing, decreased endurance and pain limiting functional mobility  Pt requires max A x 2 for bed mobility, unable to assess transfers at this time due to pt's decreased sitting tolerance  Pt's clinical presentation is unstable/unpredictable due to need for assist w/ all phases of mobility when usually mobilizing independently, pain impacting overall mobility status, need for input for mobility technique/safety, recent drastic decline in mobility compared to baseline and recent readmission (within 30 days)  Pt is at an increased risk of falls due to physical and cognitive deficits  Given the above findings, discharge recommendation is for Post-acute inpatient rehabilitation   During this admission, pt would benefit from continued skilled inpatient PT in the acute care setting in order to address the abovementioned deficits to maximize function and mobility before DC from acute care  PT Discharge Recommendation: Post acute rehabilitation services          See flowsheet documentation for full assessment

## 2022-01-10 NOTE — CONSULTS
NEUROLOGY RESIDENCY CONSULT NOTE     Name: Zoe Ernandez   Age & Sex: 78 y o  male   MRN: [id number]  Unit/Bed#: S -01   Encounter: [id number]  Length of Stay: [address] [facility]     CVA (cerebral vascular accident) [facility])  Assessment & Plan  Assessment:   Zoe Ernandez is a 78 y o  male with a pertinent history of prior stroke (right cerebellum and left external capsule), type 2 DM, CAD,, DVT (on Eliquis 5 mg b i d ) hypertension, CKD stage 3, who initially presented on 01/01/2022 with altered mental status  Patient was admitted for uremic encephalopathy  F/u exam:  Patient's mental status is back to baseline  BP over last 24 hours: Blood Pressure: 139/66  current BP: Blood Pressure: 139/66    Lab Results   Component Value Date    HGBA1C 12 2 (H) 12/18/2021    CHOLESTEROL 98 01/10/2022    LDLCALC 45 01/10/2022    TRIG 141 01/10/2022    INR 1 51 (H) 01/06/2022        Imaging:  No acute intracranial abnormality  Nonemergent findings above  01/09/2022:  MRI brain:Evidence of recent, tiny lacunar infarcts in the frontal lobes  Other chronic and nonemergent findings above  Echo on recent prior admission:LV:  Normal, EF:  60%, LN:  Normal      Impression:  Bilateral new frontal strokes likely in the setting of patient being off of Eliquis, given his underlying right subcapsular hematoma his AC was on hold  Appears AC was restarted on 1/7  MRI brain shows evidence of tiny infarcts on bilateral frontal lobes, left appears to be more subacute  Possibly strokes happened during time that Eliquis was on hold, in that case would not be Eliquis failure  On-going work up per primary team to rule out occult malignancy, in the event there is underlying malignancy, may consider oncology input regarding DOAC      Plan:  Continue Eliquis 5 mg Continue statin 40 mg b i d  daily   Will obtain Carotid US, given patient creatinine function he will likely not be able to receive contrast for CTA vs MRA neck  A1c/lipid panel as noted above  Maintain glucose below 200  MRI as noted above  Continue Neuro checks  Monitor on telemetry  Medical management as per primary team appreciated  PT/OT/Speech/PMR consults appreciated when able      Type 2 diabetes mellitus, with long-term current use of insulin [facility])  Assessment & Plan  Lab Results   Component Value Date    HGBA1C 12 2 (H) 12/18/2021       Recent Labs     01/09/22  1623 01/09/22  2122 01/10/22  0619 01/10/22  1138   POCGLU 198* 188* 289* 90       Blood Sugar Average: Last 72 hrs:  (P) 953 [id number]      Lio Valentin will need follow up in in 6 weeks with neurovascular Attending or EP  He will not require outpatient neurological testing  SUBJECTIVE     Reason for Consult / Principal Problem:  Stroke  Hx and PE limited by:  None    HPI: Lio Valentin is a 78 y o   male with a pertinent history of prior stroke (right cerebellum and left external capsule), type 2 DM, CAD,, DVT (on Eliquis 5 mg b i d ) hypertension, CKD stage 3, who initially presented on 01/01/2022 with altered mental status  Patient was admitted for uremic encephalopathy presents with     On 1/8/2022, patient had reported dizziness described as vertigo during HD  He under went CT head which was negative from acute intracranial pathology  He had MRI which showed bilateral frontal strokes  His ELiquis which had been on hold for his right subcapsular hematoma and kidney was resumed on 1/7/2022  Neurology was consulted for recent bilateral frontal strokes noted on MRI  During my evaluation patient is back to baseline in terms of mental status  He reports left leg weakness  To review patient was previously seen by the inpatient Neurology Team on 12/01/2020  Previously patient had been on Coumadin for several years for his bilateral DVTs  He was also recently admitted from 12/17-12/29 for hematuria and the right subcapsular hematoma   At that time he was on Coumadin which during that hospitalization was transitioned to Eliquis  He is continuing on-going work up regarding Further work up for his liver  The liver demonstrates cirrhotic morphology  No gross mass on noncontrast study  The previously described suspected infiltrative neoplasm in the right lobe is not well seen on current study  Pertinent imagin2022:  MRI brain:Evidence of recent, tiny lacunar infarcts in the frontal lobes  Other chronic and nonemergent findings above  2020:  MRA head: Moderate to severe focal stenosis mid left posterior cerebral artery P1 segment  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  This is unchanged from prior CTA from 2013  No intracranial aneurysm identified  MRA carotids No flow in the right vertebral artery  This finding was described on prior CTA from 2013  No significant carotid stenosis  Left vertebral artery patent throughout its course      Inpatient consult to Neurology  Consult performed by: Fox Dowling MD  Consult ordered by: Nova Franklin MD          Historical Information   Past Medical History:   Diagnosis Date    Bell's palsy     Cardiac disease     [facility] [address] [facility]     Diabetes mellitus [facility] [address] [facility]     Fracture     L hip    Hyperlipidemia     Hypertension     Renal disorder     Stroke ([facility] [address] [facility]     2013    Stroke [facility])     [id number]     Past Surgical History:   Procedure Laterality Date    CORONARY ANGIOPLASTY WITH STENT PLACEMENT      FRACTURE SURGERY      L femur    INCISION AND DRAINAGE OF WOUND Right 2016    Procedure: INCISION AND DRAINAGE (I&D) EXTREMITY, right lateral ankle;  Surgeon: Janice Enriquez DPM;  [facility];  Service:     IR EMBOLIZATION (SPECIFY VESSEL OR SITE)  2021    IR TEMPORARY DIALYSIS CATHETER PLACEMENT  2021    IR TUNNELED CENTRAL LINE PLACEMENT  2021    IR TUNNELED DIALYSIS CATHETER PLACEMENT  2022    WOUND DEBRIDEMENT Right 9/15/2016    Procedure: DEBRIDEMENT WOUND [facility]) ankle wound with closure and FRANCY drain placement;  Surgeon: Edie Melendrez DPM;  [facility];  Service:      Social History   Social History     Substance and Sexual Activity   Alcohol Use No     Social History     Substance and Sexual Activity   Drug Use No     E-Cigarette/Vaping    E-Cigarette Use Never User      E-Cigarette/Vaping Substances     Social History     Tobacco Use   Smoking Status Former Smoker    Packs/day: 0 00    Types: Cigarettes    Quit date: 9/3/2006    Years since quitting: 15 3   Smokeless Tobacco Never Used     Family History:   Family History   Problem Relation Age of Onset    Diabetes Mother     Stroke Mother      Meds/Allergies   all current active meds have been reviewed, current meds:   Current Facility-Administered Medications   Medication Dose Route Frequency    acetaminophen (TYLENOL) tablet 975 mg  975 mg Oral Q8H [address]    amLODIPine (NORVASC) tablet 10 mg  10 mg Oral Daily    apixaban (ELIQUIS) tablet 5 mg  5 mg Oral BID    aspirin chewable tablet 81 mg  81 mg Oral Daily    atorvastatin (LIPITOR) tablet 40 mg  40 mg Oral QPM    carvedilol (COREG) tablet 25 mg  25 mg Oral BID With Meals    ceFAZolin (ANCEF) IVPB (premix in dextrose) 1,000 mg 50 mL  1,000 mg Intravenous Q24H    cyanocobalamin (VITAMIN B-12) tablet 1,000 mcg  1,000 mcg Oral Daily    docusate sodium (COLACE) capsule 100 mg  100 mg Oral BID PRN    ezetimibe (ZETIA) tablet 10 mg  10 mg Oral HS    insulin glargine (LANTUS) subcutaneous injection 32 Units 0 32 mL  32 Units Subcutaneous HS    insulin lispro (HumaLOG) 100 units/mL subcutaneous injection 1-5 Units  1-5 Units Subcutaneous HS    insulin lispro (HumaLOG) 100 units/mL subcutaneous injection 1-6 Units  1-6 Units Subcutaneous TID AC    lactulose oral solution 10 g  10 g Oral BID    LORazepam (ATIVAN) injection 1 mg  1 mg Intravenous Once PRN    meclizine (ANTIVERT) tablet 25 mg  25 mg Oral Q8H PRN    nystatin (MYCOSTATIN) powder   Topical BID    oxyCODONE (ROXICODONE) IR tablet 5 mg  5 mg Oral Q6H PRN    pantoprazole (PROTONIX) EC tablet 40 mg  40 mg Oral Daily    polyethylene glycol (MIRALAX) packet 17 g  17 g Oral Daily    sodium bicarbonate tablet 650 mg  650 mg Oral TID after meals    and PTA meds:   Prior to Admission Medications   Prescriptions Last Dose Informant Patient Reported? Taking? Multiple Vitamins-Minerals (multivitamin with minerals) tablet 12/31/2021 at Unknown time  Yes Yes   Sig: Take 1 tablet by mouth daily   acetaminophen (TYLENOL) 325 mg tablet 12/31/2021 at Unknown time  No Yes   Sig: Take 3 tablets (975 mg total) by mouth every 8 (eight) hours as needed for moderate pain   amLODIPine (NORVASC) 5 mg tablet 1/1/2022 at Unknown time  No Yes   Sig: Take 1 tablet (5 mg total) by mouth daily   apixaban (ELIQUIS) 5 mg 1/1/2022 at Unknown time  No Yes   Sig: Take 1 tablet (5 mg total) by mouth 2 (two) times a day for 30 doses   aspirin 81 mg chewable tablet 1/1/2022 at Unknown time Self Yes Yes   Sig: Chew 81 mg daily  atorvastatin (LIPITOR) 40 mg tablet 12/31/2021 at Unknown time  No Yes   Sig: Take 1 tablet (40 mg total) by mouth every evening   ceFAZolin (ANCEF) 1000 mg IVPB 12/31/2021 at Unknown time  No Yes   Sig: Infuse 50 mL (1,000 mg total) into a venous catheter every 12 (twelve) hours for 18 days   cyanocobalamin (VITAMIN B-12) 250 MCG tablet 12/31/2021 at Unknown time  No Yes   Sig: Take 4 tablets (1,000 mcg total) by mouth daily   docusate sodium (COLACE) 100 mg capsule   No No   Sig: Take 1 capsule (100 mg total) by mouth 2 (two) times a day as needed for constipation   Patient not taking: Reported on 11/28/2020   ezetimibe (ZETIA) 10 mg tablet 12/31/2021 at Unknown time  No Yes   Sig: Take 1 tablet (10 mg total) by mouth daily at bedtime   fenofibrate (TRICOR) 145 mg tablet 12/31/2021 at Unknown time Self Yes Yes   Sig: Take 145 mg by mouth daily     insulin glargine (LANTUS) 100 units/mL subcutaneous injection   No Yes   Sig: Inject 32 Units under the skin daily at bedtime   insulin lispro (HumaLOG) 100 units/mL injection   No Yes   Sig: Inject 6 Units under the skin 3 (three) times a day with meals   metoprolol tartrate (LOPRESSOR) 50 mg tablet 2022 at Unknown time Self Yes Yes   Sig: Take 50 mg by mouth 2 (two) times a day  nystatin (MYCOSTATIN) powder   No No   Sig: Apply topically 2 (two) times a day   pantoprazole (PROTONIX) 40 mg tablet 2022 at Unknown time Self Yes Yes   Sig: Take 40 mg by mouth daily      Facility-Administered Medications: None     Allergies   Allergen Reactions    Ace Inhibitors Other (See Comments)    Ciprofloxacin Other (See Comments)    Penicillins Hives    Morphine And Related Anxiety       Review of previous medical records was  completed  Review of Systems   Constitutional: Negative for activity change  Eyes: Negative for photophobia, pain, discharge, redness, itching and visual disturbance  Gastrointestinal: Negative for nausea and vomiting  Musculoskeletal: Positive for back pain  Neurological: Positive for weakness (Left lower extremity)  Negative for dizziness, tremors, seizures, syncope, facial asymmetry, speech difficulty, light-headedness, numbness and headaches  All other systems reviewed and are negative  OBJECTIVE     Patient ID: Oleg Joyner is a 78 y o  male  Vitals:   Vitals:    01/10/22 0619 01/10/22 0700 01/10/22 1138 01/10/22 1247   BP: 122/58  123/53 139/66   Pulse: 57  56 57   Resp:       Temp: 99 1 °F (37 3 °C)  98 4 °F (36 9 °C)    TempSrc:       SpO2: 93% 90% 90% 92%   Weight:       Height:          Body mass index is 38 4 kg/m²       Intake/Output Summary (Last 24 hours) at 1/10/2022 1332  Last data filed at 1/10/2022 1001  Gross per 24 hour   Intake 410 ml   Output 575 ml   Net -165 ml       Temperature:   Temp (24hrs), Av °F (37 2 °C), Min:98 4 °F (36 9 °C), Max:99 4 °F (37 4 °C)    Temperature: 98 4 °F (36 9 °C)    Invasive Devices: Invasive Devices  Report    Central Venous Catheter Line            CVC Central Lines 12/27/21 Double Right 14 days          Peripheral Intravenous Line            Peripheral IV 01/08/22 Left Forearm 2 days          Hemodialysis Catheter            HD Permanent Double Catheter 4 days          Drain            Urethral Catheter 16 Fr  3 days                Physical Exam  Vitals and nursing note reviewed  Constitutional:       Appearance: Normal appearance  HENT:      Head: Normocephalic and atraumatic  Nose: Nose normal    Eyes:      Extraocular Movements: Extraocular movements intact and EOM normal       Pupils: Pupils are equal, round, and reactive to light  Neurological:      Mental Status: He is alert and oriented to person, place, and time  Coordination: Finger-Nose-Finger Test normal    Psychiatric:         Speech: Speech normal           Neurologic Exam     Mental Status   Oriented to person, place, and time  Follows 1 step commands  Speech: speech is normal   Able to name object  Able to repeat  Patient is alert and oriented  Appears to be slightly irritable     Cranial Nerves     CN II   Visual fields full to confrontation  CN III, IV, VI   Pupils are equal, round, and reactive to light  Extraocular motions are normal      CN V   Facial sensation intact  CN VII   Facial expression full, symmetric       Motor Exam   Muscle bulk: normal  Overall muscle tone: normalSlight drift on left upper extremity,    Overall full strength on bilateral upper extremity  Left lower extremity:  Able to wiggle toes, dorsiflexion/plantar flexion 4-/5  Unable to lift leg at the hip, unable to bend leg:  Some weakness since on pain due to underlying DVT    Right lower extremity:  5-/5     Sensory Exam   Light touch normal      Gait, Coordination, and Reflexes     Coordination   Finger to nose coordination: normal    Tremor   Resting tremor: absent  Intention tremor: absent  Action tremor: absent         LABORATORY DATA     Labs: I have personally reviewed pertinent reports  Results from last 7 days   Lab Units 01/10/22  0503 01/09/22  0522 01/08/22  0508 01/05/22  1224 01/05/22  0515   WBC Thousand/uL 9 09 8 83 10 14  --  8 59   HEMOGLOBIN g/dL 7 8* 7 8* 7 9*   < > 7 2*   HEMATOCRIT % 25 7* 25 6* 25 4*   < > 23 7*   PLATELETS Thousands/uL 262 251 273  --  315   NEUTROS PCT % 63  --   --   --   --    MONOS PCT % 8  --   --   --   --    MONO PCT %  --   --  10  --  6    < > = values in this interval not displayed  Results from last 7 days   Lab Units 01/10/22  0503 01/09/22  0522 01/08/22  0508 01/06/22  0336 01/05/22  0515   POTASSIUM mmol/L 4 7 4 4 4 4   < > 4 4   CHLORIDE mmol/L 95* 95* 97*   < > 100   CO2 mmol/L 25 25 24   < > 23   BUN mg/dL 53* 50* 55*   < > 58*   CREATININE mg/dL 4 49* 4 47* 5 40*   < > 5 84*   CALCIUM mg/dL 7 7* 7 7* 7 9*   < > 7 9*   ALK PHOS U/L  --  52  --   --  52   ALT U/L  --  <6*  --   --  <6*   AST U/L  --  24  --   --  29    < > = values in this interval not displayed  Results from last 7 days   Lab Units 01/06/22  0336 01/05/22  1223 01/05/22  0515 01/04/22  2219 01/04/22  1613 01/04/22  1613   INR  1 51*  --   --   --   --  1 38*   PTT seconds  --  55* 62* 55*   < > 40*    < > = values in this interval not displayed  IMAGING & DIAGNOSTIC TESTING     Radiology Results: I have personally reviewed pertinent reports  MRI brain wo contrast   Final Result by Dasha Almendarez MD (01/09 1716)      Evidence of recent, tiny lacunar infarcts in the frontal lobes  Other chronic and nonemergent findings above  Workstation performed: [id number]         CT head wo contrast   Final Result by Dasha Almendarez MD (01/08 0738)      No acute intracranial abnormality  Nonemergent findings above           Workstation performed: [id number]         XR chest portable   Final Result by Winifred Barriga MD (01/08 1745)      Small effusions and bibasilar atelectasis  Workstation performed: [id number]         CT abdomen pelvis wo contrast   Final Result by Imelda Vickers MD (01/06 2102)      1  Bladder is distended and the Robison catheter is retracted into the prostatic urethra  2   Similar appearance of right subcapsular hematoma with slightly decreased size of a right suprarenal components  3   Poorly visualized infiltrative lesion in the right hepatic lobe  The study was marked in [facility] for immediate notification  Workstation performed: [id number]         IR tunneled dialysis catheter placement   Final Result by George Mg MD (01/06 1213)   Impression:   1  Successful ultrasound and fluoroscopically guided placement of a 32cm Titan cath via the left internal jugular vein  The tip of the catheter is in the right atrium and may be used immediately  Workstation performed: [id number]         US extremity soft tissue   Final Result by Elza Starr MD (01/04 1411)   Left medial calf extensive subcutaneous edema indicating nonspecific cellulitis without drainable abscess collection  Underlying musculature appears intact  Workstation performed: [id number]         XR knee 1 or 2 vw left   Final Result by Marlon Stover MD (01/04 0930)      No acute osseous abnormality  Workstation performed: [id number]         XR abdomen obstruction series   Final Result by Marlon Stover MD (01/04 3005)      No obstruction or free air  Small effusions  Vascular congestion and pulmonary edema  Workstation performed: [id number]         XR chest portable   Final Result by Jorje Napoles DO (01/02 2245)      Life-support lines as above  No pneumothorax  Suspect mild pulmonary venous congestion and left pleural effusion                    Workstation performed: [id number]         CT head without contrast   Final Result by Narciso Galeano MD (01/01 1501)      No acute intracranial abnormality  Unchanged encephalomalacia secondary to chronic right cerebellar infarct (series 2 image 7 )               Workstation performed: [id number]         CT chest abdomen pelvis wo contrast   Final Result by Sam Hebert MD (01/01 9143)      Unchanged moderate bilateral water density pleural effusions with associated compressive atelectasis of both lower lobes  No evidence of pneumonia  Unchanged subcapsular and suprarenal hematoma around the right kidney  Suspected infiltrative neoplasm of the liver seen on prior contrast-enhanced CT is not well-visualized on this noncontrast CT  Workstation performed: [id number]         IR tunneled central line removal    (Results Pending)   VAS carotid complete study    (Results Pending)       Other Diagnostic Testing: I have personally reviewed pertinent reports        ACTIVE MEDICATIONS     Current Facility-Administered Medications   Medication Dose Route Frequency    acetaminophen (TYLENOL) tablet 975 mg  975 mg Oral Q8H [address]    amLODIPine (NORVASC) tablet 10 mg  10 mg Oral Daily    apixaban (ELIQUIS) tablet 5 mg  5 mg Oral BID    aspirin chewable tablet 81 mg  81 mg Oral Daily    atorvastatin (LIPITOR) tablet 40 mg  40 mg Oral QPM    carvedilol (COREG) tablet 25 mg  25 mg Oral BID With Meals    ceFAZolin (ANCEF) IVPB (premix in dextrose) 1,000 mg 50 mL  1,000 mg Intravenous Q24H    cyanocobalamin (VITAMIN B-12) tablet 1,000 mcg  1,000 mcg Oral Daily    docusate sodium (COLACE) capsule 100 mg  100 mg Oral BID PRN    ezetimibe (ZETIA) tablet 10 mg  10 mg Oral HS    insulin glargine (LANTUS) subcutaneous injection 32 Units 0 32 mL  32 Units Subcutaneous HS    insulin lispro (HumaLOG) 100 units/mL subcutaneous injection 1-5 Units  1-5 Units Subcutaneous HS    insulin lispro (HumaLOG) 100 units/mL subcutaneous injection 1-6 Units  1-6 Units Subcutaneous TID AC    lactulose oral solution 10 g  10 g Oral BID    LORazepam (ATIVAN) injection 1 mg  1 mg Intravenous Once PRN    meclizine (ANTIVERT) tablet 25 mg  25 mg Oral Q8H PRN    nystatin (MYCOSTATIN) powder   Topical BID    oxyCODONE (ROXICODONE) IR tablet 5 mg  5 mg Oral Q6H PRN    pantoprazole (PROTONIX) EC tablet 40 mg  40 mg Oral Daily    polyethylene glycol (MIRALAX) packet 17 g  17 g Oral Daily    sodium bicarbonate tablet 650 mg  650 mg Oral TID after meals       Prior to Admission medications    Medication Sig Start Date End Date Taking? Authorizing Provider   acetaminophen (TYLENOL) 325 mg tablet Take 3 tablets (975 mg total) by mouth every 8 (eight) hours as needed for moderate pain 12/1/20  Yes Eli Mckeon PA-C   amLODIPine (NORVASC) 5 mg tablet Take 1 tablet (5 mg total) by mouth daily 12/1/20  Yes Eli Mckeon PA-C   apixaban (ELIQUIS) 5 mg Take 1 tablet (5 mg total) by mouth 2 (two) times a day for 30 doses 12/29/21 1/13/22 Yes Milagros Ramires MD   aspirin 81 mg chewable tablet Chew 81 mg daily  Yes Historical Provider, MD   atorvastatin (LIPITOR) 40 mg tablet Take 1 tablet (40 mg total) by mouth every evening 12/1/20  Yes Eli Mckeon PA-C   ceFAZolin (ANCEF) 1000 mg IVPB Infuse 50 mL (1,000 mg total) into a venous catheter every 12 (twelve) hours for 18 days 12/29/21 1/16/22 Yes Ailyn Ramires MD   cyanocobalamin (VITAMIN B-12) 250 MCG tablet Take 4 tablets (1,000 mcg total) by mouth daily 12/1/20  Yes Eli Mckeon PA-C   ezetimibe (ZETIA) 10 mg tablet Take 1 tablet (10 mg total) by mouth daily at bedtime 12/1/20  Yes Eli Mckeon PA-C   fenofibrate (TRICOR) 145 mg tablet Take 145 mg by mouth daily     Yes Historical Provider, MD   insulin glargine (LANTUS) 100 units/mL subcutaneous injection Inject 32 Units under the skin daily at bedtime 12/29/21  Yes Fabiola Mitchell MD   insulin lispro (HumaLOG) 100 units/mL injection Inject 6 Units under the skin 3 (three) times a day with meals 12/29/21  Yes Ailyn Iglesias MD Ike   metoprolol tartrate (LOPRESSOR) 50 mg tablet Take 50 mg by mouth 2 (two) times a day     Yes Historical Provider, MD   Multiple Vitamins-Minerals (multivitamin with minerals) tablet Take 1 tablet by mouth daily   Yes Historical Provider, MD   pantoprazole (PROTONIX) 40 mg tablet Take 40 mg by mouth daily   Yes Historical Provider, MD   docusate sodium (COLACE) 100 mg capsule Take 1 capsule (100 mg total) by mouth 2 (two) times a day as needed for constipation  Patient not taking: Reported on 11/28/2020 10/1/20   Jeffery Mayo PA-C   nystatin (MYCOSTATIN) powder Apply topically 2 (two) times a day 2/11/21 3/13/21  Fidel Muniz MD         CODE STATUS & ADVANCED DIRECTIVES     Code Status: Level 1 - Full Code  Advance Directive and Living Will:      Power of :    POLST:        VTE Pharmacologic Prophylaxis: Apixaban (Eliquis)  VTE Mechanical Prophylaxis: sequential compression device    ==  MD Casper Longo's Neurology Residency, PGY-3

## 2022-01-10 NOTE — CASE MANAGEMENT
Case Management Progress Note    Patient name Santiago Rucker  Location S /S -01 MRN [id number]  : 1955 Date 1/10/2022       LOS (days): 9  Geometric Mean LOS (GMLOS) (days): 4 30  Days to GMLOS:-4 6        OBJECTIVE:        Current admission status: Inpatient  Preferred Pharmacy:   [address], [address] [facility], [address]  [address] [facility]  [address]  Phone: [phone] Fax: [phone]  [facility], [address] [facility],[address] [place] [address]  Phone: [phone] Fax: [phone]    Primary Care Provider: Rajiv Silva MD    Primary Insurance: MARITZA Mejia 98 REP  Secondary Insurance: [place] [employer]    PROGRESS NOTE:          CM followed up with [facility]missions on HD chair confirmation  CM was told that the Hep Panel clinical sent didn't have the needed information  CM uploaded via [address] and sent the requested information  CM heard back again from admission team that this lab was missing the hep b surface antigen result  CM reached out to Nephrology team and requested this be ordered  In the mean time, CM followed up with referrals made to Acute rehabs all of which have stated they do not have a bed or patient is not appropriate for Acute level of rehab  CM also followed up with SNF level rehabs and additionally made a referral to [facility] and [facility] where they are able to do HD on site, as [facility] responded that they are unable to offer a bed  CM met with patient and his wife at bedside to review the above information  Family was not thrilled with care he received at [address] [place] [address]  CM requested the building/liaison so reach out to patient's wife to discuss her concerns as this is one of the only facilities offering a bed at this time  The other option is [facility] which CM reviewed with patient wife    This location is the closest to [place] and would be the most financially affordable if they would need to pay for transportation  Patient's wife is going to discuss the above with patient's sister later today and will relay the conversation with CM  CM department will continue to follow to assist with discharge coordination

## 2022-01-10 NOTE — PLAN OF CARE
Problem: OCCUPATIONAL THERAPY ADULT  Goal: Performs self-care activities at highest level of function for planned discharge setting  See evaluation for individualized goals  Description: Treatment Interventions: ADL retraining,Functional transfer training,UE strengthening/ROM,Endurance training,Cognitive reorientation,Patient/family training,Equipment evaluation/education,Compensatory technique education,Activityengagement,Energy conservation          See flowsheet documentation for full assessment, interventions and recommendations  Note: Limitation: Decreased ADL status,Decreased UE strength,Decreased Safe judgement during ADL,Decreased cognition,Decreased endurance,Decreased self-care trans,Decreased high-level ADLs  Prognosis: Good  Assessment: Pt is a 78 y o  male seen for OT evaluation s/p admission to [address] [place] [address] on 1/1/2022 due to change in mental status  Pt diagnosed with Encephalopathy  Pt with 1 recent admissions in the last 2 months  Pt has a significant PMH impacting occupational performance including: DM II, HTN, CVA, DVT, cellulitis of LLE, chronic pain  Pt with active OT evaluation and treatment orders and activity orders for Up with assistance  PTA pt was admitted from [facility], pt requires (A) with ADLs and IADLs at baseline Pt is motivated to return to home  Personal and environmental factors supporting pt at time of IE include social support  Personal and environmental factors inhibiting engagement in occupations include current habits and behavioral patterns, lifestyle patterns, inaccessible home environment, difficulty completing ADLs and difficulty completing IADLs  During evaluation pt performed as is outlined above in flowsheet  Pt required VC for safety, VC for attention to task and seated rest breaks  Standardized assessments used to assist in identifying performance deficits include AMPAC 6-Clicks and Barthel ADL Index   Performance deficits that affect the pts occupational performance during the initial evaluation include impaired balance, functional mobility, endurance, activity tolerance, trunk control, functional standing tolerance and overall strength, attention to task, direction following, communication and social skills, safety awareness, insight into deficits and problem solving, interpersonal skills and use of coping skills  Based on pts functional performance and deficits the following occupations will be addressed in OT treatments in order to maximize pts independence and overall occupational performance: grooming, bathing/showering, toileting and toilet hygiene, dressing and functional mobility  Goals are listed below  Upon discharge from acute care setting recommend d/c to post-acute rehabilitation services  This evaluation required an extensive review of medical and/or therapy records and additional review of physical, cognitive and psychosocial history related to functional performance  Based upon functional performance deficits and assessments, this evaluation has been identified as a high complexity evaluation       OT Discharge Recommendation: Post acute rehabilitation services

## 2022-01-10 NOTE — ASSESSMENT & PLAN NOTE
Assessment:   Ayde Mcdonald is a 78 y o  male with a pertinent history of prior stroke (right cerebellum and left external capsule), type 2 DM, CAD,, DVT (on Eliquis 5 mg b i d ) hypertension, CKD stage 3, who initially presented on 01/01/2022 with altered mental status  Patient was admitted for uremic encephalopathy   F/u exam:  Patient's mental status is back to baseline   BP over last 24 hours: Blood Pressure: 139/66  current BP: Blood Pressure: 139/66    Lab Results   Component Value Date    HGBA1C 12 2 (H) 12/18/2021    CHOLESTEROL 98 01/10/2022    LDLCALC 45 01/10/2022    TRIG 141 01/10/2022    INR 1 51 (H) 01/06/2022        Imaging:  · No acute intracranial abnormality  Nonemergent findings above   01/09/2022:  MRI brain:Evidence of recent, tiny lacunar infarcts in the frontal lobes  Other chronic and nonemergent findings above   Echo on recent prior admission:LV:  Normal, EF:  60%, LN:  Normal      Impression:  Bilateral new frontal strokes likely in the setting of patient being off of Eliquis, given his underlying right subcapsular hematoma his AC was on hold  Appears AC was restarted on 1/7  MRI brain shows evidence of tiny infarcts on bilateral frontal lobes, left appears to be more subacute  Possibly strokes happened during time that Eliquis was on hold, in that case would not be Eliquis failure  On-going work up per primary team to rule out occult malignancy, in the event there is underlying malignancy, may consider oncology input regarding DOAC      Plan:   Continue Eliquis 5 mg Continue statin 40 mg b i d  daily    Will obtain Carotid US, given patient creatinine function he will likely not be able to receive contrast for CTA vs MRA neck   A1c/lipid panel as noted above   Maintain glucose below 200   MRI as noted above   Continue Neuro checks   Monitor on telemetry   Medical management as per primary team appreciated   PT/OT/Speech/PMR consults appreciated when able

## 2022-01-10 NOTE — PLAN OF CARE
Problem: MOBILITY - ADULT  Goal: Maintain or return to baseline ADL function  Description: INTERVENTIONS:  -  Assess patient's ability to carry out ADLs; assess patient's baseline for ADL function and identify physical deficits which impact ability to perform ADLs (bathing, care of mouth/teeth, toileting, grooming, dressing, etc )  - Assess/evaluate cause of self-care deficits   - Assess range of motion  - Assess patient's mobility; develop plan if impaired  - Assess patient's need for assistive devices and provide as appropriate  - Encourage maximum independence but intervene and supervise when necessary  - Involve family in performance of ADLs  - Assess for home care needs following discharge   - Consider OT consult to assist with ADL evaluation and planning for discharge  - Provide patient education as appropriate  Outcome: Progressing  Goal: Maintains/Returns to pre admission functional level  Description: INTERVENTIONS:  - Perform BMAT or MOVE assessment daily    - Set and communicate daily mobility goal to care team and patient/family/caregiver  - Collaborate with rehabilitation services on mobility goals if consulted  - Perform Range of Motion 4 times a day  - Reposition patient every 2 hours    - Dangle patient 4 times a day  - Stand patient 4 times a day  - Ambulate patient 4 times a day  - Out of bed to chair 4 times a day   - Out of bed for meals 4 times a day  - Out of bed for toileting  - Record patient progress and toleration of activity level   Outcome: Progressing     Problem: Potential for Falls  Goal: Patient will remain free of falls  Description: INTERVENTIONS:  - Educate patient/family on patient safety including physical limitations  - Instruct patient to call for assistance with activity   - Consult OT/PT to assist with strengthening/mobility   - Keep Call bell within reach  - Keep bed low and locked with side rails adjusted as appropriate  - Keep care items and personal belongings within reach  - Initiate and maintain comfort rounds  - Make Fall Risk Sign visible to staff  - Offer Toileting every 2 Hours, in advance of need  - Initiate/Maintain bed alarm  - Obtain necessary fall risk management equipment:  bed alarm  - Apply yellow socks and bracelet for high fall risk patients  - Consider moving patient to room near nurses station  Outcome: Progressing     Problem: Prexisting or High Potential for Compromised Skin Integrity  Goal: Skin integrity is maintained or improved  Description: INTERVENTIONS:  - Identify patients at risk for skin breakdown  - Assess and monitor skin integrity  - Assess and monitor nutrition and hydration status  - Monitor labs   - Assess for incontinence   - Turn and reposition patient  - Assist with mobility/ambulation  - Relieve pressure over bony prominences  - Avoid friction and shearing  - Provide appropriate hygiene as needed including keeping skin clean and dry  - Evaluate need for skin moisturizer/barrier cream  - Collaborate with interdisciplinary team   - Patient/family teaching  - Consider wound care consult   Outcome: Progressing     Problem: Nutrition/Hydration-ADULT  Goal: Nutrient/Hydration intake appropriate for improving, restoring or maintaining nutritional needs  Description: Monitor and assess patient's nutrition/hydration status for malnutrition  Collaborate with interdisciplinary team and initiate plan and interventions as ordered  Monitor patient's weight and dietary intake as ordered or per policy  Utilize nutrition screening tool and intervene as necessary  Determine patient's food preferences and provide high-protein, high-caloric foods as appropriate       INTERVENTIONS:  - Monitor oral intake, urinary output, labs, and treatment plans  - Assess nutrition and hydration status and recommend course of action  - Evaluate amount of meals eaten  - Assist patient with eating if necessary   - Allow adequate time for meals  - Recommend/ encourage appropriate diets, oral nutritional supplements, and vitamin/mineral supplements  - Order, calculate, and assess calorie counts as needed  - Recommend, monitor, and adjust tube feedings and TPN/PPN based on assessed needs  - Assess need for intravenous fluids  - Provide specific nutrition/hydration education as appropriate  - Include patient/family/caregiver in decisions related to nutrition  Outcome: Progressing     Problem: METABOLIC, FLUID AND ELECTROLYTES - ADULT  Goal: Electrolytes maintained within normal limits  Description: INTERVENTIONS:  - Monitor labs and assess patient for signs and symptoms of electrolyte imbalances  - Administer electrolyte replacement as ordered  - Monitor response to electrolyte replacements, including repeat lab results as appropriate  - Instruct patient on fluid and nutrition as appropriate  Outcome: Progressing  Goal: Fluid balance maintained  Description: INTERVENTIONS:  - Monitor labs   - Monitor I/O and WT  - Instruct patient on fluid and nutrition as appropriate  - Assess for signs & symptoms of volume excess or deficit  Outcome: Progressing  Goal: Glucose maintained within target range  Description: INTERVENTIONS:  - Monitor Blood Glucose as ordered  - Assess for signs and symptoms of hyperglycemia and hypoglycemia  - Administer ordered medications to maintain glucose within target range  - Assess nutritional intake and initiate nutrition service referral as needed  Outcome: Progressing

## 2022-01-10 NOTE — PHYSICAL THERAPY NOTE
PHYSICAL THERAPY EVALUATION NOTE    Patient Name: Rosa Maria Frye  CLFMU'G Date: 1/10/2022     AGE:   78 y o  Mrn:   [id number]  ADMIT DX:  Hyperuricemia [E79 0]  Liver dysfunction [K76 89]  Hyperammonemia ([facility] [address] ) [E72 20]  Anemia [D64 9]  Change in mental status [R41 82]  AMS (altered mental status) [R41 82]  Stage 3 chronic kidney disease, unspecified whether stage 3a or 3b CKD ([facility] [address] ) [N18 30]    Past Medical History:   Diagnosis Date    Bell's palsy     Cardiac disease     C[facility]bellar stroke ([facility] )     Diabetes mellitus ([facility] )     Fracture     L hip    Hyperlipidemia     Hypertension     Renal disorder     Stroke ([place] [address] )         Stroke [facility])     [id number]     Length Of Stay: 9  PHYSICAL THERAPY EVALUATION :   Patient's identity confirmed via 2 patient identifiers (full name and ) at start of session       01/10/22 0947   PT Last Visit   PT Visit Date 01/10/22   Note Type   Note type Evaluation   Pain Assessment   Pain Assessment Tool 0-10   Pain Score 10 - Worst Possible Pain   Pain Location/Orientation Orientation: Left; Location: Knee   Pain Radiating Towards L knee cap to ankle   Restrictions/Precautions   Weight Bearing Precautions Per Order No   Other Precautions Cognitive; Bed Alarm;Multiple lines;Pain; Fall Risk   Home Living   Type of Home SNF   Home Equipment   (No AD previously)   Additional Comments Pt [facility] from [facility] where he's been for STR  At baseline, pt resides in a 2 SH w/ his wife, no bathroom on the first floor  Pt sleeps in his recliner and urinates in jugs while sitting in his recliner, reports he only ever leaves the recliner to have a BM    Prior Function   Lives With   (wife works full time at home)   Jose Jeffries From Personal care attendant; Family   ADL Assistance Needs assistance   IADLs Needs assistance   General   Additional Pertinent History MRI of brain from 2022: "Evidence of recent, tiny lacunar infarcts in the frontal lobes "   Family/Caregiver Present No   Cognition   Overall Cognitive Status Impaired   Arousal/Participation Responsive   Orientation Level Oriented to person;Oriented to place   Following Commands Follows one step commands with increased time or repetition   Comments Pt w/ flat affect, short responses at times  Agreeaable to participate in PT evaluation w/ motivation and encouragement   RLE Assessment   RLE Assessment WFL   Strength RLE   RLE Overall Strength 3+/5   LLE Assessment   LLE Assessment WFL   Strength LLE   LLE Overall Strength 3+/5  (+ c/o pain w/ movement)   Bed Mobility   Rolling R 2  Maximal assistance   Additional items Assist x 2; Increased time required;Verbal cues   Rolling L 2  Maximal assistance   Additional items Assist x 2; Increased time required;Verbal cues   Supine to Sit 2  Maximal assistance   Additional items Assist x 2; Increased time required;Verbal cues   Sit to Supine 2  Maximal assistance   Additional items Assist x 2; Increased time required;Verbal cues   Additional Comments Pt sat EOB ~5 min w/ min-mod A x 1  Pt w/ limited sitting tolerance due to both dizziness and LLE pain  When rolling, placed pillow between knees for comfort   Transfers   Sit to Stand Unable to assess   Balance   Static Sitting Poor +   Static Standing   (JUANJO)   Activity Tolerance   Activity Tolerance Patient limited by fatigue;Patient limited by pain   Medical Staff [place] coordination w/ OT Batool Alvarez to [facility] [address] to ADITI Lerma   Assessment   Prognosis Fair   Problem List Decreased strength;Decreased endurance; Impaired balance;Decreased mobility; Decreased cognition; Impaired judgement;Obesity;Pain;Decreased skin integrity   Assessment Bismark Arnold is a 78 y o  Male who [facility] from [facility] STR w/ c/o AMS and diagnosis of renal failure, LLE pain and cellulitis, CVA   Orders for PT eval and treat received, w/ activity orders of up w/ A and fall precautions  Pt presents w/ comorbidities of DMII, hx of CVA, HTN, ESRD on HD, MELINDA, HTN   At baseline, pt is very sedentary and ambulates w/ no AD, and reports 0 falls in the last 6 months  Upon evaluation, pt presents w/ the following deficits: weakness, edema of extremities, impaired balance, impaired hearing, decreased endurance and pain limiting functional mobility  Pt requires max A x 2 for bed mobility, unable to assess transfers at this time due to pt's decreased sitting tolerance  Pt's clinical presentation is unstable/unpredictable due to need for assist w/ all phases of mobility when usually mobilizing independently, pain impacting overall mobility status, need for input for mobility technique/safety, recent drastic decline in mobility compared to baseline and recent readmission (within 30 days)  Pt is at an increased risk of falls due to physical and cognitive deficits  Given the above findings, discharge recommendation is for Post-acute inpatient rehabilitation  During this admission, pt would benefit from continued skilled inpatient PT in the acute care setting in order to address the abovementioned deficits to maximize function and mobility before DC from acute care  Goals   Patient Goals to have less pain   STG Expiration Date 01/20/22   Short Term Goal #1 Patient will: Increase bilateral LE strength 1/2 grade to facilitate independent mobility, Perform all bed mobility tasks w/ maxx1 to decrease fall risk factors, Increase all balance 1/2 grade to decrease risk for falls and Tolerate seated at EOB at least 20 minutes w/ supervision to facilitate functional task performance, perform lateral scooting at EOB w/ A x 1 in preparation for STS transfers, PT to see for transfers when appropriate   PT Treatment Day 0   Plan   Treatment/Interventions LE strengthening/ROM; Therapeutic exercise; Endurance training;Cognitive reorientation;Patient/family training;Equipment eval/education; Bed mobility   PT Frequency 3-5x/wk   Recommendation   PT Discharge Recommendation Post acute rehabilitation services   Additional Comments PT to see for continued evaluation   AM-PAC Basic Mobility Inpatient   Turning in Bed Without Bedrails 1   Lying on Back to Sitting on Edge of Flat Bed 1   Moving Bed to Chair 1   Standing Up From Chair 1   Walk in Room 1   Climb 3-5 Stairs 1   Basic Mobility Inpatient Raw Score 6   Turning Head Towards Sound 3   Follow Simple Instructions 3   Low Function Basic Mobility Raw Score 12   Low Function Basic Mobility Standardized Score 18 33   Highest Level Of Mobility   -HL Goal 2: Bed activities/Dependent transfer   JH-HLM Highest Level of Mobility 3: Sit at edge of bed   JH-HLM Goal Achieved Yes   End of Consult   Patient Position at End of Consult Supine;Bed/Chair alarm activated; All needs within reach       The patient's AM-PAC Basic Mobility Inpatient Short Form Raw Score is 6, Standardized Score is    A standardized score less than 38 32 (raw score of 16) suggests the patient may benefit from discharge to post-acute rehabilitation services which may not coincide with above PT recommendations  However please refer to therapist recommendation for discharge planning given other factors that may influence destination      Pt would benefit from skilled inpatient PT during this admission in order to facilitate progress towards goals to maximize functional independence    Yissel Patino, PT, DPT

## 2022-01-10 NOTE — OCCUPATIONAL THERAPY NOTE
Occupational Therapy Evaluation     Patient Name: Shanna Faulkner  LNHQT'O Date: 1/10/2022  Problem List  Active Problems:    Type 2 diabetes mellitus, with long-term current use of insulin ([facility] )    Hypertension    CVA (cerebral vascular accident) ([facility] )    Renal hemorrhage, right    Staphylococcus aureus bacteremia    Right Liver mass    Chronic deep vein thrombosis (DVT) of distal vein of left lower extremity (HCC)    Renal failure (ARF), acute on chronic (HCC)    Cellulitis of left lower extremity    Chronic pain of left lower extremity    Past Medical History  Past Medical History:   Diagnosis Date    Bell's palsy     Cardiac disease     Cerebellar stroke ([facility] )     Diabetes mellitus ([facility] )     Fracture     L hip    Hyperlipidemia     Hypertension     Renal disorder     Stroke ([place] [address] )     2013    Stroke [facility])     [id number]     Past Surgical History  Past Surgical History:   Procedure Laterality Date    CORONARY ANGIOPLASTY WITH STENT PLACEMENT      FRACTURE SURGERY      L femur    INCISION AND DRAINAGE OF WOUND Right 9/13/2016    Procedure: INCISION AND DRAINAGE (I&D) EXTREMITY, right lateral ankle;  Surgeon: Zainab Ashley DPM;  Location: [facility];  Service:     IR EMBOLIZATION (SPECIFY VESSEL OR SITE)  12/18/2021    IR TEMPORARY DIALYSIS CATHETER PLACEMENT  12/18/2021    IR TUNNELED CENTRAL LINE PLACEMENT  12/27/2021    IR TUNNELED DIALYSIS CATHETER PLACEMENT  1/6/2022    WOUND DEBRIDEMENT Right 9/15/2016    Procedure: DEBRIDEMENT WOUND [facility] OUT) ankle wound with closure and FRANCY drain placement;  Surgeon: Zainab Ashley DPM;  Location: [facility];  Service:            01/10/22 0958   OT Last Visit   OT Visit Date 01/10/22   Note Type   Note type Evaluation   Restrictions/Precautions   Weight Bearing Precautions Per Order No   Other Precautions Cognitive; Bed Alarm; Fall Risk;Pain;Multiple lines   Pain Assessment   Pain Assessment Tool 0-10   Pain Score 10 - Worst Possible Pain   Pain Location/Orientation Orientation: Left; Location: Knee   Home Living   Type of Home SNF  (Admission for SNF-OO for STR)   Additional Comments At baseline, pt resides in a 2 SH w/ his wife, no bathroom on the first floor  Pt sleeps in his recliner and urinates in jugs while sitting in his recliner, reports he only ever leaves the recliner to have a BM    Prior Function   Receives Help From Personal care attendant; Family   ADL Assistance Needs assistance   IADLs Needs assistance   Falls in the last 6 months 0   Vocational Retired   Lifestyle   Autonomy PTA pt was admitted from [address] [place] [address], pt requires (A) with ADLs and IADLs at baseline   Reciprocal Relationships supportive wife works during the day   Service to Others retired   Intrinsic Gratification enjoys watching tv   Subjective   Subjective "My leg hurts, you don't understand how much"   ADL   Eating Assistance 5  Supervision/Setup   Grooming Assistance 5  Supervision/Setup   UB Bathing Assistance 2  Maximal Assistance   LB [address] 2  Maximal Assistance   [address] 2  Maximal Assistance   LB Dressing Assistance 1  Total Assistance   LB Dressing Deficit Don/doff R sock; Don/doff L sock   Toileting Assistance  1  Total Assistance   Toileting Deficit   (incontinent of urine)   Bed Mobility   Rolling R 2  Maximal assistance   Additional items Assist x 2; Increased time required;Verbal cues;LE management   Rolling L 2  Maximal assistance   Additional items Assist x 2; Increased time required;Verbal cues;LE management   Supine to Sit 2  Maximal assistance   Additional items Assist x 2; Increased time required;Verbal cues;LE management   Sit to Supine 2  Maximal assistance   Additional items Assist x 2; Increased time required;Verbal cues;LE management   Additional Comments Able to sit EOB ~ 5 min with min-mod A x1   Transfers   Sit to Stand Unable to assess   Functional Mobility   Additional Comments unable to assess   Balance   Static Sitting Poor + Dynamic Sitting Poor -   Activity Tolerance   Activity Tolerance Patient limited by fatigue;Patient limited by pain   Medical Staff Made Aware PT ADITI [place] CM Orlinda Combs Assessment   RUE Assessment WFL   LUE Assessment   LUE Assessment WFL   Hand Function   Gross Motor Coordination Functional   Fine Motor Coordination Functional   Cognition   Overall Cognitive Status Impaired   Arousal/Participation Alert; Cooperative   Attention Attends with cues to redirect   Orientation Level Oriented to person;Oriented to place;Oriented to situation;Disoriented to time   Memory Decreased recall of recent events;Decreased short term memory;Decreased recall of precautions   Following Commands Follows one step commands with increased time or repetition   Comments lfat affect, requiring motivation to participate but ultimately agreeable   Assessment   Limitation Decreased ADL status; Decreased UE strength;Decreased Safe judgement during ADL;Decreased cognition;Decreased endurance;Decreased self-care trans;Decreased high-level ADLs   Prognosis Good   Assessment Pt is a 78 y o  male seen for OT evaluation s/p admission to [address] [place] [address] on 1/1/2022 due to change in mental status  Pt diagnosed with Encephalopathy  Pt with 1 recent admissions in the last 2 months  Pt has a significant PMH impacting occupational performance including: DM II, HTN, CVA, DVT, cellulitis of LLE, chronic pain  Pt with active OT evaluation and treatment orders and activity orders for Up with assistance  PTA pt was admitted from [address] [place] [address], pt requires (A) with ADLs and IADLs at baseline Pt is motivated to return to home  Personal and environmental factors supporting pt at time of IE include social support  Personal and environmental factors inhibiting engagement in occupations include current habits and behavioral patterns, lifestyle patterns, inaccessible home environment, difficulty completing ADLs and difficulty completing IADLs   During evaluation pt performed as is outlined above in flowsheet  Pt required VC for safety, VC for attention to task and seated rest breaks  Standardized assessments used to assist in identifying performance deficits include AMPAC 6-Clicks and Barthel ADL Index  Performance deficits that affect the pts occupational performance during the initial evaluation include impaired balance, functional mobility, endurance, activity tolerance, trunk control, functional standing tolerance and overall strength, attention to task, direction following, communication and social skills, safety awareness, insight into deficits and problem solving, interpersonal skills and use of coping skills  Based on pts functional performance and deficits the following occupations will be addressed in OT treatments in order to maximize pts independence and overall occupational performance: grooming, bathing/showering, toileting and toilet hygiene, dressing and functional mobility  Goals are listed below  Upon discharge from acute care setting recommend d/c to post-acute rehabilitation services  This evaluation required an extensive review of medical and/or therapy records and additional review of physical, cognitive and psychosocial history related to functional performance  Based upon functional performance deficits and assessments, this evaluation has been identified as a high complexity evaluation  Goals   Patient Goals to have less pain   LTG Time Frame 10-14   Long Term Goal see goals listed below   Plan   Treatment Interventions ADL retraining;Functional transfer training;UE strengthening/ROM; Endurance training;Cognitive reorientation;Patient/family training;Equipment evaluation/education; Compensatory technique education; Activityengagement; Energy conservation   Goal Expiration Date 01/20/22   OT Treatment Day 0   OT Frequency 2-3x/wk   Recommendation   OT Discharge Recommendation Post acute rehabilitation services   AM-PAC Daily Activity Inpatient   Lower Body Dressing 1   Bathing 2   Toileting 1   Upper Body Dressing 2   Grooming 3   Eating 3   Daily Activity Raw Score 12   Daily Activity Standardized Score (Calc for Raw Score >=11) 30 6   AM-[facility] Applied Cognition Inpatient   Following a Speech/Presentation 2   Understanding Ordinary Conversation 3   Taking Medications 2   Remembering Where Things Are Placed or Put Away 1   Remembering List of 4-5 Errands 1   Taking Care of Complicated Tasks 1   Applied Cognition Raw Score 10   Applied Cognition Standardized Score 24 98   Barthel Index   Feeding 5   Bathing 0   Grooming Score 5   Dressing Score 5   Bladder Score 5   Bowels Score 10   Toilet Use Score 0   Transfers (Bed/Chair) Score 5   Mobility (Level Surface) Score 0   Stairs Score 0   Barthel Index Score 35     GOALS:       -Patient will perform grooming tasks sitting at the sink with overall mod I     -Patient will be mod A for bathing tasks while sitting at the sink in order to increase (I) with self care    -Patient will be mod I with UB dressing while sitting EOB in order to  increased (I) with self care    -Patient will be mod A with LB dressing while in sitting in order to increase (I) with self care    -Patient will be mod A with all toileting tasks including clothing management and hygiene    -Patient will demonstrate min A with bed mobility for ability to manage own comfort and initiate OOB tasks      -Patient will demonstrate sitting EOB for 10 min with min A in order to increase active participation in functional activities    -Patient will engage in ongoing cognitive assessment in order to assist with safe discharge planning/recommendations  Patient will follow multi-step instructions with no VC in order to safely complete functional tasks             The patient's raw score on the AM-PAC Daily Activity inpatient short form is 12, standardized score is 30 6, less than 39 4   Patients at this level are likely to benefit from discharge to post-acute rehabilitation services  Please refer to the recommendation of the Occupational Therapist for safe discharge planning  This session, pt required and most appropriately benefited from skilled OT/PT co-eval due to extensive physical assistance of SKILLED therapists and significant regression from functional baseline  OT and PT goals were addressed separately as seen in documentation       At the end of the session, all needs met and pt supine in bed, bed alarm activated, LEs elevated , HOB elevated and call bell within reach    [facility], OTR/L

## 2022-01-10 NOTE — PROGRESS NOTES
[facility]  Progress Note - Ayde Days 1955, 78 y o  male MRN: [id number]  Unit/Bed#: S -01 Encounter: [id number]  Primary Care Provider: Alyssa Vann MD   Date and time admitted to hospital: 1/1/2022 12:36 PM    Renal failure (ARF), acute on chronic [facility])  Assessment & Plan    Recent Labs     01/07/22  1137 01/07/22  1137 01/08/22  0508 01/08/22  0508 01/09/22  0522   CREATININE 5 34*  --  5 40*  --  4 47*   EGFR 10   < > 10   < > 12    < > = values in this interval not displayed  Estimated Creatinine Clearance: 19 mL/min (A) (by C-G formula based on SCr of 4 47 mg/dL (H))  Patient's baseline creatinine is 1 7  He was recently admitted in the hospital with acute kidney injury likely secondary to ATN, and discharged with creatinine around 4  Unclear etiology for currently acute kidney injury, was thought to be prerenal     Plan:  · Nephrology following appreciate recommendations- continue T/TH/Sat schedule  · Patient s/p PermCath placement with IR on 1/6 and subsequent dialysis tx   · Continue to monitor UOP and creatinine   Avoid nephrotoxins, hypotension   Continue bicarb      Chronic pain of left lower extremity  Assessment & Plan  Patient with complaint of left lower leg pain since previous admission from 12/17-12/29  Patient this morning notably distressed secondary to pain  Given 1 dose of dilaudid  Plan  · Scheduled Tylenol 975 q8  · Per discussion with family, requesting Oxycodone PRN be administered only overnight for severe pain      Cellulitis of left lower extremity  Assessment & Plan  Diagnosis: patient discharged on 12/29 for sepsis, bacteremia on IV Abx via central line placement with IR on 12/27   Suspected source: LLE cellulitis    Plan  ·  Curbside consult with ID (Dr Bhandari) okay to d/c PICC line as patient must transition to dialysis dosing Ancef  · Adjust IV Cefazolin 1000 mg q24 during hospitalization, with the same end date of Jan 16, 2022  · 24 hours prior to discharge will transition patient then to Ancef 2 g/2 g/3 g for TTHSa dosing schedule  · Resident to TT ID team to make them aware of discharge so that coordination between ID office and dialysis center may be completed  · Per discussion with IR (Fredy Bro) order for removal of PICC line will be placed and primary team will reach out closer to discharge- Hoping for removal of PICC with IR  Renal hemorrhage, right  Assessment & Plan  Recent Labs     01/08/22  0508 01/09/22  0522 01/10/22  0503   HGB 7 9* 7 8* 7 8*   MCV 95 96 97     · Etiology likely multifactorial in the setting of chronic kidney disease, hematuria in the setting of supratherapeutic INR  Patient has right subcapsular hematoma that is unchanged compared to the last CT scan  · Patient s/p 2 units of PRBC on 1/1 ( had formed antibodies during last admission to blood products)  · FOBT negative    Plan:  · Hemoglobin remains stable  · Will monitor H/H with daily CBC at this time   Transfuse if Hb < 7 0   Consent for transfusion obtained: Yes         Type 2 diabetes mellitus, with long-term current use of insulin [facility])  Assessment & Plan  Lab Results   Component Value Date    HGBA1C 12 2 (H) 12/18/2021       Recent Labs     01/08/22  1743 01/08/22  2057 01/09/22  0730 01/09/22  1102   POCGLU 107 197* 138 162*       Blood Sugar Average: Last 72 hrs:  (P) 145 [id number]   · Home regimen:  Lantus 32 units at bedtime, lispro 6 units t i d  With meals  · Continue basal insulin with SSI  · Hypoglycemia protocol  · Diabetic diet      CVA (cerebral vascular accident) [facility])  Assessment & Plan  Patient with notable episode on 1/8 dizziness with room spinning sensation  CT head of the head was unremarkable for acute intracranial abnl   Similar right inferior cerebellar encephalomalacia, nonspecific, mild patchy periventricular and subcortical white matter lucencies most commonly related to changes of microangiopathy and vascular calcification  1/9/22 MRI head notable for small chronic left external capsule  0 2 to 0 3 cm new foci of restricted diffusion in both the right and left frontal periventricular white matter and in the right frontal corona radiata/centrum  Plan  · Patient on stroke pathway  · Neurology consulted appreciate recs as below:  - Continue Eliquis 5mg BID and ASA  - Continue Atorvastatin 40mg BID daily  - Obtain Carotid US   - Maintain glucose < 200        Hypertension  Assessment & Plan  · Blood pressure acceptable   · Continue amlodipine and metoprolol   · Monitor vital signs     Chronic deep vein thrombosis (DVT) of distal vein of left lower extremity (HCC)  Assessment & Plan  No results for input(s): INR in the last 72 hours  History of DVT previously on Coumadin, started on Eliquis 5 mg b i d  during the previous admission for acute DVT  Patient now with normal INR originally supratherapeutic to 6 78 s/p 1 unit of FFP on 1/1 and 2 doses of [address] on 1/2 for supratherapuetic INR  Plan  · Resumed Eliquis 5mg BID  · Will monitor INR  · Monitor H/H closely      Right Liver mass  Assessment & Plan  · Noted on abdominal CT in setting of possibly decreased synthetic liver function appreciated in lab work  Staphylococcus aureus bacteremia  Assessment & Plan  · Recently hospitalized for Staph bacteremia  · Discharged on IV cefazolin  · Continue IV cefazolin through January 16, 2022      VTE Pharmacologic Prophylaxis:   VTE Score: 3 Moderate Risk (Score 3-4) - Pharmacological DVT Prophylaxis Ordered: Apixaban (Eliquis)  Mechanical VTE Prophylaxis in Place: No    Patient Centered Rounds: I have performed bedside rounds with nursing staff today  Discussions with Specialists or Other Care Team Provider: Nephrology, IR    Education and Discussions with Family / Patient: Updated  (wife) via phone      Current Length of Stay: 9 day(s)    Current Patient Status: Inpatient     Discharge Plan / Estimated Discharge Date: TBD pending placement    Code Status: Level 1 - Full Code      Subjective:   Patient reports he is in significant pain this morning  States the pain is a 12/10 and states that pain medication given earlier did not relieve his pain  Objective:     Vitals:   Temp (24hrs), Av °F (37 2 °C), Min:98 4 °F (36 9 °C), Max:99 4 °F (37 4 °C)    Temp:  [98 4 °F (36 9 °C)-99 4 °F (37 4 °C)] 98 4 °F (36 9 °C)  HR:  [56-65] 57  Resp:  [18] 18  BP: (122-141)/(48-67) 139/66  SpO2:  [90 %-93 %] 92 %  Body mass index is 38 4 kg/m²  Input and Output Summary (last 24 hours): Intake/Output Summary (Last 24 hours) at 1/10/2022 1447  Last data filed at 1/10/2022 1001  Gross per 24 hour   Intake 410 ml   Output 575 ml   Net -165 ml       Physical Exam:     Physical Exam  Constitutional:       Appearance: Normal appearance  HENT:      Head: Normocephalic and atraumatic  Right Ear: External ear normal       Left Ear: External ear normal    Eyes:      Conjunctiva/sclera: Conjunctivae normal    Cardiovascular:      Rate and Rhythm: Normal rate and regular rhythm  Pulmonary:      Effort: Pulmonary effort is normal       Comments: 3 L NC  Abdominal:      General: Abdomen is flat  Bowel sounds are normal    Musculoskeletal:      Cervical back: Normal range of motion and neck supple  Right lower leg: No edema  Left lower leg: Edema present  Comments: Multiple abrasions noted on the lower ext b/l   Neurological:      Mental Status: He is alert        Comments: Alert and oriented to self          Additional Data:     Labs:  Results from last 7 days   Lab Units 01/10/22  0503 22  0522 22  0508   WBC Thousand/uL 9 09   < > 10 14   HEMOGLOBIN g/dL 7 8*   < > 7 9*   HEMATOCRIT % 25 7*   < > 25 4*   PLATELETS Thousands/uL 262   < > 273   BANDS PCT %  --   --  4   NEUTROS PCT % 63  --   --    LYMPHS PCT % 8*  --   --    LYMPHO PCT %  --   --  10*   MONOS PCT % 8  --   -- MONO PCT %  --   --  10   EOS PCT % 6  --  4    < > = values in this interval not displayed  Results from last 7 days   Lab Units 01/10/22  0503 01/09/22  0522 01/09/22  0522   SODIUM mmol/L 129*   < > 129*   POTASSIUM mmol/L 4 7   < > 4 4   CHLORIDE mmol/L 95*   < > 95*   CO2 mmol/L 25   < > 25   BUN mg/dL 53*   < > 50*   CREATININE mg/dL 4 49*   < > 4 47*   ANION GAP mmol/L 9   < > 9   CALCIUM mg/dL 7 7*   < > 7 7*   ALBUMIN g/dL  --   --  1 7*   TOTAL BILIRUBIN mg/dL  --   --  0 21   ALK PHOS U/L  --   --  52   ALT U/L  --   --  <6*   AST U/L  --   --  24   GLUCOSE RANDOM mg/dL 128   < > 101    < > = values in this interval not displayed       Results from last 7 days   Lab Units 01/06/22  0336   INR  1 51*     Results from last 7 days   Lab Units 01/10/22  1138 01/10/22  0619 01/09/22  2122 01/09/22  1623 01/09/22  1102 01/09/22  0730 01/08/22  2057 01/08/22  1743 01/08/22  1438 01/08/22  1323 01/08/22  1227 01/08/22  1042   POC GLUCOSE mg/dl 90 289* 188* 198* 162* 138 197* 107 118 142* 178* 173*               Imaging: CT head wo contrast    Recent Cultures (last 7 days):           Lines/Drains:  Invasive Devices  Report    Central Venous Catheter Line            CVC Central Lines 12/27/21 Double Right 14 days          Peripheral Intravenous Line            Peripheral IV 01/08/22 Left Forearm 2 days          Hemodialysis Catheter            HD Permanent Double Catheter 4 days          Drain            Urethral Catheter 16 Fr  3 days                Telemetry:   Telemetry Orders (From admission, onward)             48 Hour Telemetry Monitoring  Continuous x 48 hours        References:    Telemetry Guidelines   Question:  Reason for 48 Hour Telemetry  Answer:  Acute CVA (<24 hrs old, hemispheric strokes, selected brainstem strokes, cardiac arrhythmias)                    Last 24 Hours Medication List:   Current Facility-Administered Medications   Medication Dose Route Frequency Provider Last Rate    acetaminophen  975 mg Oral Q8H [facility] Oskar Rowe MD      amLODIPine  10 mg Oral Daily Dominique Anne MD      apixaban  5 mg Oral BID Bonnie Harvey MD      aspirin  81 mg Oral Daily Dominique Anne MD      atorvastatin  40 mg Oral QPM Dominique Anne MD      carvedilol  25 mg Oral BID With Meals Dominique Anne MD      ceFAZolin  1,000 mg Intravenous Q24H Ivett Ramey MD Stopped (01/09/22 2001)    cyanocobalamin  1,000 mcg Oral Daily Dominique Anne MD      docusate sodium  100 mg Oral BID PRN Dominique Anne MD      ezetimibe  10 mg Oral HS Dominique Anne MD      insulin glargine  32 Units Subcutaneous HS Dominique Anne MD      insulin lispro  1-5 Units Subcutaneous HS Dominique Anne MD      insulin lispro  1-6 Units Subcutaneous TID [facility] Dominique Anne MD      lactulose  10 g Oral BID Dominique Anne MD      LORazepam  1 mg Intravenous Once PRN Oskar Rowe MD      meclizine  25 mg Oral Q8H PRN Carey Barakat MD      nystatin   Topical BID Dominique Anne MD      oxyCODONE  5 mg Oral Q6H PRN Dominique Anne MD      pantoprazole  40 mg Oral Daily Dominique Anne MD      polyethylene glycol  17 g Oral Daily Dominique Anne MD      sodium bicarbonate  650 mg Oral TID after meals Dominique Anne MD          Today, Patient Was Seen By: Bonnie Harvey MD    ** Please Note: This note has been constructed using a voice recognition system   **

## 2022-01-10 NOTE — SPEECH THERAPY NOTE
Speech Language/Pathology  Speech/Language Pathology  Assessment    Patient Name: Toshia Acosta  [facility] Date: 1/10/2022     Problem List  Active Problems:    Type 2 diabetes mellitus, with long-term current use of insulin ([address] )    Hypertension    CVA (cerebral vascular accident) ([facility] )    Renal hemorrhage, right    Staphylococcus aureus bacteremia    Right Liver mass    Chronic deep vein thrombosis (DVT) of distal vein of left lower extremity (HCC)    Renal failure (ARF), acute on chronic (HCC)    Cellulitis of left lower extremity    Chronic pain of left lower extremity    Pt is a 79 yo gentleman admitted to [facility] 1/1/22 for uremic encephalopathy  On 1/8/2022, patient had reported dizziness described as vertigo during HD  He under went CT head which was negative from acute intracranial pathology  He had MRI which showed bilateral frontal strokes  His ELiquis which had been on hold for his right subcapsular hematoma and kidney was resumed on 1/7/2022  Neurology was consulted for recent bilateral frontal strokes noted on MRI  During neuro evaluation, pt is back to baseline in terms of mental status  He reports left leg weakness  Consult received for speech/swallow eval on stroke pathway  Pt passed nsg swallow screen; tolerating regular diet w/o s/s dysphagia or aspiration  Observed pt taking several whole pills w/ water given by nsg, pt refuses to have HOB elevated which increases aspiration risk  Pt is aware and cont to refuse  No speech/language deficits reported  MRI: 1/9/22: Evidence of recent, tiny lacunar infarcts in the frontal lobes  No need for formal speech/swallow eval at this time  Reconsult if needed      Ady Fry CCC-SLP  Speech Pathologist  Available via  tiger text

## 2022-01-10 NOTE — ASSESSMENT & PLAN NOTE
Lab Results   Component Value Date    HGBA1C 12 2 (H) 12/18/2021       Recent Labs     01/09/22  1623 01/09/22  2122 01/10/22  0619 01/10/22  1138   POCGLU 198* 188* 289* 90       Blood Sugar Average: Last 72 hrs:  (P) 798 [id number]

## 2022-01-10 NOTE — PROGRESS NOTES
NEPHROLOGY HOSPITAL PROGRESS NOTE   Shanna Faulkner 78 y o  male MRN: [id number]  Unit/Bed#: S -01 Encounter: [id number]  Reason for Consult: MULUGETA    ASSESSMENT and PLAN:  1  Acute kidney injury:  · Due to ATN  · HD dependent since 1/2/22 but now with signs of renal recovery  · Creatinine stable today at 4 49  · Next HD is scheduled for tomorrow  · Will reevaluate HD needs tomorrow - if crea stable, may hold HD  · Give Furosemide 80 mg IV x 1 to enhance UO  2  Chronic kidney disease, stage III  · Baseline creatinine 1 5 to 1 7 from 2020  · No prior renal follow up  3  Access: right IJ PermCath  4  Anemia:  · Hgb is stable  · Not on epogen  5  Hypertension:   · BP is acceptable  · Continue Amlodipine 10 mg OD, Carvedilol 25 mg BID  · No changes  6  Metabolic acidosis:  · CO2 is 25  · Stop sodium bicarbonate for now  7  Leg cellulitis: on abx  8  R subcapsular renal hematoma    DISPOSITION:  · Furosemide 80 mg IV x 1   · Check AM labs and reevaluate HD needs  · Stop sodium bicarbonate  SUBJECTIVE / 24H INTERVAL HISTORY:  No CP or SOB  Making more urine  Significant other at bedside  OBJECTIVE:  Current Weight: Weight - Scale: 111 kg (245 lb 2 4 oz)  Vitals:    01/10/22 0700 01/10/22 1138 01/10/22 1247 01/10/22 1550   BP:  123/53 139/66 136/60   Pulse:  56 57 58   Resp:    19   Temp:  98 4 °F (36 9 °C)  98 2 °F (36 8 °C)   TempSrc:       SpO2: 90% 90% 92% 94%   Weight:       Height:           Intake/Output Summary (Last 24 hours) at 1/10/2022 1613  Last data filed at 1/10/2022 1001  Gross per 24 hour   Intake 410 ml   Output 575 ml   Net -165 ml     General: conscious, cooperative, no distress  Skin: dry  Eyes: pink conjunctivae  ENT: dry mucous membranes  Chest/Lungs: equal chest expansion, decreased in bases     CVS: distinct heart sounds, normal rate, regular rhythm, no rub  Abdomen: soft, non tender, non distended, normal bowel sounds  Extremities: (+) edema of legs - mild  : (+) luz catheter  Neuro: awake, alert     Psych: appropriate affect    Medications:    Current Facility-Administered Medications:     acetaminophen (TYLENOL) tablet 975 mg, 975 mg, Oral, Q8H [address], Reed Cuellar MD, 975 mg at 01/10/22 1338    amLODIPine (NORVASC) tablet 10 mg, 10 mg, Oral, Daily, Camilla Boggs MD, 10 mg at 01/10/22 0836    apixaban (ELIQUIS) tablet 5 mg, 5 mg, Oral, BID, Ketan Carvalho MD, 5 mg at 01/10/22 0836    aspirin chewable tablet 81 mg, 81 mg, Oral, Daily, Camilla Boggs MD, 81 mg at 01/10/22 0836    atorvastatin (LIPITOR) tablet 40 mg, 40 mg, Oral, QPM, Camilla Boggs MD, 40 mg at 01/09/22 1731    carvedilol (COREG) tablet 25 mg, 25 mg, Oral, BID With Meals, Camilla Boggs MD, 25 mg at 01/10/22 0836    ceFAZolin (ANCEF) IVPB (premix in dextrose) 1,000 mg 50 mL, 1,000 mg, Intravenous, Q24H, Sangita Ocampo MD, Stopped at 01/09/22 2001    cyanocobalamin (VITAMIN B-12) tablet 1,000 mcg, 1,000 mcg, Oral, Daily, Camilla Boggs MD, 1,000 mcg at 01/10/22 0836    docusate sodium (COLACE) capsule 100 mg, 100 mg, Oral, BID PRN, Camilla Boggs MD, 100 mg at 01/08/22 1038    ezetimibe (ZETIA) tablet 10 mg, 10 mg, Oral, HS, Camilla Boggs MD, 10 mg at 01/09/22 2151    insulin glargine (LANTUS) subcutaneous injection 32 Units 0 32 mL, 32 Units, Subcutaneous, HS, Camilla Boggs MD, 32 Units at 01/09/22 2151    insulin lispro (HumaLOG) 100 units/mL subcutaneous injection 1-5 Units, 1-5 Units, Subcutaneous, HS, Camilla Boggs MD, 1 Units at 01/09/22 2153    insulin lispro (HumaLOG) 100 units/mL subcutaneous injection 1-6 Units, 1-6 Units, Subcutaneous, TID AC, 4 Units at 01/10/22 0838 **AND** [CANCELED] Fingerstick Glucose (POCT), , , TID AC, Camilla Boggs MD    lactulose oral solution 10 g, 10 g, Oral, BID, Camilla Boggs MD, 10 g at 01/10/22 0836    LORazepam (ATIVAN) injection 1 mg, 1 mg, Intravenous, Once PRN, Reed Cuellar, MD    meclizine (ANTIVERT) tablet 25 mg, 25 mg, Oral, Q8H PRN, Shorty Berry MD, 25 mg at 01/10/22 1253    nystatin (MYCOSTATIN) powder, , Topical, BID, Irene Greene MD, Given at 01/10/22 0840    oxyCODONE (ROXICODONE) IR tablet 5 mg, 5 mg, Oral, Q6H PRN, Irene Greene MD, 5 mg at 01/10/22 0155    pantoprazole (PROTONIX) EC tablet 40 mg, 40 mg, Oral, Daily, Irene Greene MD, 40 mg at 01/10/22 0836    polyethylene glycol (MIRALAX) packet 17 g, 17 g, Oral, Daily, Irene Greene MD, 17 g at 01/09/22 0815    sodium bicarbonate tablet 650 mg, 650 mg, Oral, TID after meals, Irene Greene MD, 650 mg at 01/10/22 1220    Laboratory Results:  Results from last 7 days   Lab Units 01/10/22  0503 01/09/22  0522 01/08/22  0508 01/07/22  1137 01/07/22  0448 01/07/22  0347 01/06/22  1948 01/06/22  1218 01/06/22  0336 01/06/22  0329 01/05/22  1224 01/05/22  0515 01/05/22  0004 01/04/22  1613 01/04/22  0505 01/04/22  0505   WBC Thousand/uL 9 09 8 83 10 14  --   --   --   --   --   --   --   --  8 59  --  8 25  --  7 48   HEMOGLOBIN g/dL 7 8* 7 8* 7 9* 8 2*  --  14 0 8 2* 8 5*  --    < >   < > 7 2*   < > 7 6*   < > 7 6*   HEMATOCRIT % 25 7* 25 6* 25 4* 25 9*  --  44 2 26 0* 26 6*  --    < >   < > 23 7*   < > 24 8*   < > 24 3*   PLATELETS Thousands/uL 262 251 273  --   --   --   --   --   --   --   --  315  --  315  --  338   POTASSIUM mmol/L 4 7 4 4 4 4 4 8 4 4  --   --   --  4 6  --   --  4 4  --   --    < > 4 4   CHLORIDE mmol/L 95* 95* 97* 98* 97*  --   --   --  98*  --   --  100  --   --    < > 101   CO2 mmol/L 25 25 24 24 25  --   --   --  24  --   --  23  --   --    < > 22   BUN mg/dL 53* 50* 55* 51* 49*  --   --   --  68*  --   --  58*  --   --    < > 63*   CREATININE mg/dL 4 49* 4 47* 5 40* 5 34* 5 33*  --   --   --  6 58*  --   --  5 84*  --   --    < > 5 96*   CALCIUM mg/dL 7 7* 7 7* 7 9* 7 5* 8 0*  --   --   --  8 0*  --   --  7 9*  --   --    < > 8 0*    < > = values in this interval not displayed

## 2022-01-11 NOTE — PROGRESS NOTES
[facility]  Progress Note - Rosa Maria Frye 1955, 78 y o  male MRN: [id number]  Unit/Bed#: S -01 Encounter: [id number]  Primary Care Provider: Neymar Reich MD   Date and time admitted to hospital: 1/1/2022 12:36 PM    Renal failure (ARF), acute on chronic [facility])  Assessment & Plan    Recent Labs     01/07/22  1137 01/07/22  1137 01/08/22  0508 01/08/22  0508 01/09/22  0522   CREATININE 5 34*  --  5 40*  --  4 47*   EGFR 10   < > 10   < > 12    < > = values in this interval not displayed  Estimated Creatinine Clearance: 19 mL/min (A) (by C-G formula based on SCr of 4 47 mg/dL (H))  Patient's baseline creatinine is 1 7  He was recently admitted in the hospital with acute kidney injury likely secondary to ATN, and discharged with creatinine around 4  Unclear etiology for currently acute kidney injury, was thought to be prerenal     Plan:  · Nephrology following appreciate recommendations- hold on HD for today in setting of good urine output and stable renal function  · Continue T/TH/Sat schedule, continuing working with CM on dialysis center coordination with discharge planning  · Patient s/p PermCath placement with IR on 1/6 and subsequent dialysis tx   · Continue to monitor UOP and creatinine   Avoid nephrotoxins, hypotension   Discontinue bicarb      Chronic pain of left lower extremity  Assessment & Plan  Patient with complaint of left lower leg pain since previous admission from 12/17-12/29  Patient this morning notably distressed secondary to pain  Given 1 dose of dilaudid  CT lower ext without contrast 1/11- Subchondral osseous resorption in the medial and lateral compartments  While this pattern can be seen with disuse osteopenia, given the presence of a small joint effusion, septic arthritis and osteomyelitis are not excluded      Plan  · Per discussion with ID (Dr Bhandari), less likely septic arthritis/osetomyletitis especially in the setting of coverage with Ancef unless component of resistance was present  Continue to monitor and if acutely worsens or notable effusion appears consider Orthopedic consult for possible tap  · Trend CBC and fever curves  · Scheduled Tylenol 975 q8  · Per discussion with family, requesting Oxycodone PRN be administered only overnight for severe pain      Cellulitis of left lower extremity  Assessment & Plan  Diagnosis: patient discharged on 12/29 for sepsis, bacteremia on IV Abx via central line placement with IR on 12/27  Suspected source: LLE cellulitis    Plan  · Will hold off on removal of tunneled catheter line for now in setting of possible renal recovery  · Adjust IV Cefazolin 1000 mg q24 during hospitalization, with the same end date of Jan 16, 2022  · 24 hours prior to discharge will transition patient then to Ancef 2 g/2 g/3 g for TTHSa dosing schedule  · Resident to TT ID team to make them aware of discharge so that coordination between ID office and dialysis center may be completed  · Per discussion with IR (Curtis Nino) order for removal of PICC line will be placed and primary team will reach out closer to discharge    Renal hemorrhage, right  Assessment & Plan  Recent Labs     01/08/22  0508 01/09/22  0522 01/10/22  0503   HGB 7 9* 7 8* 7 8*   MCV 95 96 97     · Etiology likely multifactorial in the setting of chronic kidney disease, hematuria in the setting of supratherapeutic INR    Patient has right subcapsular hematoma that is unchanged compared to the last CT scan  · Patient s/p 2 units of PRBC on 1/1 ( had formed antibodies during last admission to blood products)  · FOBT negative    Plan:  · Hemoglobin remains stable  · Will monitor H/H with daily CBC at this time   Transfuse if Hb < 7 0   Consent for transfusion obtained: Yes         Type 2 diabetes mellitus, with long-term current use of insulin [facility])  Assessment & Plan  Lab Results   Component Value Date    HGBA1C 12 2 (H) 12/18/2021       Recent Labs 01/08/22  1743 01/08/22  2057 01/09/22  0730 01/09/22  1102   POCGLU 107 197* 138 162*       Blood Sugar Average: Last 72 hrs:  (P) 145 [id number]   · Home regimen:  Lantus 32 units at bedtime, lispro 6 units t i d  With meals  · Continue basal insulin with SSI  · Hypoglycemia protocol  · Diabetic diet      CVA (cerebral vascular accident) [facility])  Assessment & Plan  Patient with notable episode on 1/8 dizziness with room spinning sensation  CT head of the head was unremarkable for acute intracranial abnl  Similar right inferior cerebellar encephalomalacia, nonspecific, mild patchy periventricular and subcortical white matter lucencies most commonly related to changes of microangiopathy and vascular calcification  1/9/22 MRI head notable for small chronic left external capsule  0 2 to 0 3 cm new foci of restricted diffusion in both the right and left frontal periventricular white matter and in the right frontal corona radiata/centrum  Plan  · Neurology consulted appreciate recs as below:  - Continue Eliquis 5mg BID and ASA  - Continue Atorvastatin 40mg BID daily  -  1/10 Carotid US- There is <50% stenosis noted in the right internal carotid artery  - Maintain glucose < 200        Hypertension  Assessment & Plan  · Blood pressure acceptable   · Continue amlodipine and metoprolol   · Monitor vital signs     Chronic deep vein thrombosis (DVT) of distal vein of left lower extremity ([address] )  Assessment & Plan  No results for input(s): INR in the last 72 hours  History of DVT previously on Coumadin, started on Eliquis 5 mg b i d  during the previous admission for acute DVT  Patient now with normal INR originally supratherapeutic to 6 78 s/p 1 unit of FFP on 1/1 and 2 doses of [address] on 1/2 for supratherapuetic INR      Plan  · Continue Eliquis 5mg BID  · Will monitor INR  · Monitor H/H closely      Right Liver mass  Assessment & Plan  · Noted on abdominal CT in setting of possibly decreased synthetic liver function appreciated in lab work  Staphylococcus aureus bacteremia  Assessment & Plan  · Recently hospitalized for Staph bacteremia  · Continue IV cefazolin through 2022      VTE Pharmacologic Prophylaxis:   VTE Score: 3 Moderate Risk (Score 3-4) - Pharmacological DVT Prophylaxis Ordered: Apixaban (Eliquis)  Mechanical VTE Prophylaxis in Place: No    Patient Centered Rounds: I have performed bedside rounds with nursing staff today  Discussions with Specialists or Other Care Team Provider: Nephrology, IR    Education and Discussions with Family / Patient: Updated  (wife) via phone  Current Length of Stay: 10 day(s)    Current Patient Status: Inpatient     Discharge Plan / Estimated Discharge Date: TBD pending placement    Code Status: Level 1 - Full Code      Subjective:   Patient reports worsening pain at his knee cap this morning  No new erythema or effusions noted  Objective:     Vitals:   Temp (24hrs), Av 1 °F (36 7 °C), Min:97 5 °F (36 4 °C), Max:98 6 °F (37 °C)    Temp:  [97 5 °F (36 4 °C)-98 6 °F (37 °C)] 98 °F (36 7 °C)  HR:  [53-61] 53  Resp:  [16-19] 18  BP: (117-140)/(56-67) 117/56  SpO2:  [93 %-97 %] 93 %  Body mass index is 37 98 kg/m²  Input and Output Summary (last 24 hours): Intake/Output Summary (Last 24 hours) at 2022 1418  Last data filed at 2022 1030  Gross per 24 hour   Intake --   Output 2150 ml   Net -2150 ml       Physical Exam:     Physical Exam  Constitutional:       Appearance: Normal appearance  HENT:      Head: Normocephalic and atraumatic  Right Ear: External ear normal       Left Ear: External ear normal    Eyes:      Conjunctiva/sclera: Conjunctivae normal    Cardiovascular:      Rate and Rhythm: Normal rate and regular rhythm  Pulmonary:      Effort: Pulmonary effort is normal    Abdominal:      General: Abdomen is flat   Bowel sounds are normal    Musculoskeletal:      Cervical back: Normal range of motion and neck supple  Right lower leg: No edema  Left lower leg: Edema present  Comments: Multiple abrasions noted on the lower ext b/l  Tenderness to palpation at the left knee cap, no hallie effusion noted  No increased warmth or erythema    Neurological:      Mental Status: He is alert and oriented to person, place, and time  Comments: Alert and oriented to self   Psychiatric:         Mood and Affect: Mood normal          Behavior: Behavior normal           Additional Data:     Labs:  Results from last 7 days   Lab Units 01/11/22  0430 01/10/22  0503 01/10/22  0503 01/09/22  0522 01/08/22  0508   WBC Thousand/uL 8 85   < > 9 09   < > 10 14   HEMOGLOBIN g/dL 7 9*   < > 7 8*   < > 7 9*   HEMATOCRIT % 25 5*   < > 25 7*   < > 25 4*   PLATELETS Thousands/uL 271   < > 262   < > 273   BANDS PCT %  --   --   --   --  4   NEUTROS PCT %  --   --  63  --   --    LYMPHS PCT %  --   --  8*  --   --    LYMPHO PCT %  --   --   --   --  10*   MONOS PCT %  --   --  8  --   --    MONO PCT %  --   --   --   --  10   EOS PCT %  --   --  6  --  4    < > = values in this interval not displayed  Results from last 7 days   Lab Units 01/11/22  0430 01/10/22  0503 01/09/22  0522   SODIUM mmol/L 129*   < > 129*   POTASSIUM mmol/L 4 9   < > 4 4   CHLORIDE mmol/L 97*   < > 95*   CO2 mmol/L 25   < > 25   BUN mg/dL 58*   < > 50*   CREATININE mg/dL 4 52*   < > 4 47*   ANION GAP mmol/L 7   < > 9   CALCIUM mg/dL 8 1*   < > 7 7*   ALBUMIN g/dL  --   --  1 7*   TOTAL BILIRUBIN mg/dL  --   --  0 21   ALK PHOS U/L  --   --  52   ALT U/L  --   --  <6*   AST U/L  --   --  24   GLUCOSE RANDOM mg/dL 159*   < > 101    < > = values in this interval not displayed       Results from last 7 days   Lab Units 01/06/22  0336   INR  1 51*     Results from last 7 days   Lab Units 01/11/22  1053 01/11/22  0727 01/10/22  2101 01/10/22  1551 01/10/22  1138 01/10/22  6768 01/09/22  2122 01/09/22  1623 01/09/22  1102 01/09/22  0730 01/08/22 2057 01/08/22  1743   POC GLUCOSE mg/dl 164* 160* 115 130 90 289* 188* 198* 162* 138 197* 107     Results from last 7 days   Lab Units 01/10/22  0503   HEMOGLOBIN A1C % 7 1*           Imaging: CT head wo contrast    Recent Cultures (last 7 days):           Lines/Drains:  Invasive Devices  Report    Central Venous Catheter Line            CVC Central Lines 12/27/21 Double Right 15 days          Peripheral Intravenous Line            Peripheral IV 01/08/22 Left Forearm 3 days          Hemodialysis Catheter            HD Permanent Double Catheter 5 days          Drain            Urethral Catheter 16 Fr  4 days                Telemetry:   Telemetry Orders (From admission, onward)             48 Hour Telemetry Monitoring  Continuous x 48 hours        Expiring   References:    Telemetry Guidelines   Question:  Reason for 48 Hour Telemetry  Answer:  Acute CVA (<24 hrs old, hemispheric strokes, selected brainstem strokes, cardiac arrhythmias)                    Last 24 Hours Medication List:   Current Facility-Administered Medications   Medication Dose Route Frequency Provider Last Rate    acetaminophen  975 mg Oral Q8H [address] Radha Munguia MD      [START ON 1/12/2022] amLODIPine  10 mg Oral Daily HUGO Larson      apixaban  5 mg Oral BID Beulah Funes MD      aspirin  81 mg Oral Daily Verne Spatz, MD      atorvastatin  40 mg Oral QPM Verne Spatz, MD      carvedilol  25 mg Oral BID With Meals Verne Spatz, MD      ceFAZolin  1,000 mg Intravenous Q24H Sally Patrick MD 1,000 mg (01/10/22 1746)    cyanocobalamin  1,000 mcg Oral Daily Verne Spatz, MD      docusate sodium  100 mg Oral BID PRN Verne Spatz, MD      ezetimibe  10 mg Oral HS Verne Spatz, MD      furosemide  80 mg Intravenous Once [place], [place]      insulin glargine  15 Units Subcutaneous HS Kaiden Almaraz MD      insulin lispro  1-5 Units Subcutaneous HS Verne Spatz, MD      insulin lispro  1-6 Units Subcutaneous TID [facility] Cally Villarreal MD      lactulose  10 g Oral BID Cally Villarreal MD      LORazepam  1 mg Intravenous Once PRN Desire Mead MD      meclizine  25 mg Oral Q8H PRN Betty Manjarrez MD      nystatin   Topical BID Cally Villarreal MD      oxyCODONE  5 mg Oral Q6H PRN Cally Villarreal MD      pantoprazole  40 mg Oral Daily Cally Villarreal MD      polyethylene glycol  17 g Oral Daily Cally Villarreal MD          Today, Patient Was Seen By: Kuldip Angel MD    ** Please Note: This note has been constructed using a voice recognition system   **

## 2022-01-11 NOTE — CASE MANAGEMENT
Case Management Progress Note    Patient name Duong Rodriguez  Location S /S -01 MRN [id number]  : 1955 Date 2022       LOS (days): 10  Geometric Mean LOS (GMLOS) (days): 4 30  Days to GMLOS:-5 4        OBJECTIVE:        Current admission status: Inpatient  Preferred Pharmacy:   [facility], [address] [place] [address], [address]  [address] [address]  Phone: [phone] Fax: [phone]  [place], [place] - [facility] [address] [place] [address]  Phone: [phone] Fax: [phone]    Primary Care Provider: Kiara Crandall MD    Primary Insurance: [facility]  Secondary Insurance: [place] [employer]    PROGRESS NOTE:      CM heard back from [address] [place] [address] with the following update:  "We have a confirmed schedule at [facility] with a regular chair time of TTS shift 2 @ 10:45 AM  The patient can start  @ 10:30 AM for intake  Please ask your patient to bring two forms of ID, their insurance card, and a list of medications to their first appointment "    CM provided update to nephrology team and SLIM  CM still needs to confirm STR placement and obtain insurance authorization  CM heard back from [facility] that HD at [facility] is currently on hold due to [place] therefore not accepting any new patients  CM department will continue to follow to assist with discharge coordination

## 2022-01-11 NOTE — PROGRESS NOTES
NEPHROLOGY PROGRESS NOTE   Telma Camacho 78 y o  male MRN: [id number]  Unit/Bed#: S -01 Encounter: [id number]  Reason for Consult: MULUGETA    ASSESSMENT/PLAN:  1  Acute Kidney Injury- secondary to ATN  - HD dependent since 1/2/22  - hold HD today: creatinine stable, good urine output  - daily evaluation for HD needs  - lasix 80mg IV on 1/10 and 1/11  - urine output last 24 hours 1400ml  - outpatient dialysis unit confirmed at [facility] on TTS 2  2  Chronic Kidney Disease stage III- baseline creatinine 1 5-1 7 from 2020  Needs nephrology follow up on discharge  3  Access- right IJ perm cath  4  Anemia- hgb below goal but stable  - not on SORIN, history of stroke  - iron stores acceptable  5  Hypertension- BP acceptable  - hold parameters placed on amlodipine  6  Metabolic Acidosis- off bicarbonate tablets, continue to monitor  7  Hyponatremia- fluid restriction of <1200ml/day  8  Leg cellulitis- on cefazolin  9  Right Subscapular Renal Hematoma- see above, hgb stable    Disposition:  Hold HD today  Evaluate daily for HD needs  SUBJECTIVE:  Patient is lethargic  Denies SOB  Mouth breathing      OBJECTIVE:  Current Weight: Weight - Scale: 110 kg (242 lb 8 1 oz)  Vitals:    01/11/22 0318 01/11/22 0600 01/11/22 0728 01/11/22 1112   BP: 135/67  134/60 117/56   BP Location:       Pulse: 61  56 (!) 53   Resp:   18 18   Temp: 98 1 °F (36 7 °C)  97 5 °F (36 4 °C) 98 °F (36 7 °C)   TempSrc:       SpO2: 93%  97% 93%   Weight:  110 kg (242 lb 8 1 oz)     Height:           Intake/Output Summary (Last 24 hours) at 1/11/2022 1405  Last data filed at 1/11/2022 1030  Gross per 24 hour   Intake --   Output 2150 ml   Net -2150 ml     General: NAD  Skin: no rash  Eyes: anicteric  ENMT: mm moist  Neck: no masses  Respiratory: coarse breath sounds  Cardiac: RRR  Extremities: no edema  GI: soft nt nd  Neuro: alert awake  Psych: mood and affect appropriate    Medications:    Current Facility-Administered Medications:    acetaminophen (TYLENOL) tablet 975 mg, 975 mg, Oral, Q8H [facility], Burgess Marion MD, 975 mg at 01/11/22 0523    [START ON 1/12/2022] amLODIPine (NORVASC) tablet 10 mg, 10 mg, Oral, Daily, Henry Dawkins PA-C    apixaban (ELIQUIS) tablet 5 mg, 5 mg, Oral, BID, Tanya Harmon MD, 5 mg at 01/11/22 0845    aspirin chewable tablet 81 mg, 81 mg, Oral, Daily, Bety Muniz MD, 81 mg at 01/11/22 0848    atorvastatin (LIPITOR) tablet 40 mg, 40 mg, Oral, QPM, Bety Muniz MD, 40 mg at 01/10/22 1746    carvedilol (COREG) tablet 25 mg, 25 mg, Oral, BID With Meals, Bety Muniz MD, 25 mg at 01/11/22 0849    ceFAZolin (ANCEF) IVPB (premix in dextrose) 1,000 mg 50 mL, 1,000 mg, Intravenous, Q24H, Kristen Munoz MD, Last Rate: 100 mL/hr at 01/10/22 1746, 1,000 mg at 01/10/22 1746    cyanocobalamin (VITAMIN B-12) tablet 1,000 mcg, 1,000 mcg, Oral, Daily, Bety Muniz MD, 1,000 mcg at 01/11/22 0848    docusate sodium (COLACE) capsule 100 mg, 100 mg, Oral, BID PRN, Bety Muniz MD, 100 mg at 01/08/22 1038    ezetimibe (ZETIA) tablet 10 mg, 10 mg, Oral, HS, Bety Muniz MD, 10 mg at 01/10/22 2134    furosemide (LASIX) injection 80 mg, 80 mg, Intravenous, Once, Henry Dawkins PA-C    insulin glargine (LANTUS) subcutaneous injection 15 Units 0 15 mL, 15 Units, Subcutaneous, HS, Manjinder Vick MD    insulin lispro (HumaLOG) 100 units/mL subcutaneous injection 1-5 Units, 1-5 Units, Subcutaneous, HS, Bety Muniz MD, 1 Units at 01/09/22 2153    insulin lispro (HumaLOG) 100 units/mL subcutaneous injection 1-6 Units, 1-6 Units, Subcutaneous, TID AC, 1 Units at 01/11/22 1248 **AND** [CANCELED] Fingerstick Glucose (POCT), , , TID AC, Bety Muniz MD    lactulose oral solution 10 g, 10 g, Oral, BID, Bety Muniz MD, 10 g at 01/11/22 0849    LORazepam (ATIVAN) injection 1 mg, 1 mg, Intravenous, Once PRN, Burgess Marion MD    meclizine (ANTIVERT) tablet 25 mg, 25 mg, Oral, Q8H PRN, Kelly Cough MD Jose, 25 mg at 01/10/22 1253    nystatin (MYCOSTATIN) powder, , Topical, BID, Bjorn Willard MD, Given at 01/11/22 0850    oxyCODONE (ROXICODONE) IR tablet 5 mg, 5 mg, Oral, Q6H PRN, Bjorn Willard MD, 5 mg at 01/10/22 2300    pantoprazole (PROTONIX) EC tablet 40 mg, 40 mg, Oral, Daily, Bjorn Willard MD, 40 mg at 01/11/22 0848    polyethylene glycol (MIRALAX) packet 17 g, 17 g, Oral, Daily, Bjorn Willard MD, 17 g at 01/09/22 0815    Laboratory Results:  Results from last 7 days   Lab Units 01/11/22  0430 01/10/22  0503 01/09/22  0522 01/08/22  0508 01/07/22  1137 01/07/22  0448 01/07/22  0347 01/06/22  1948 01/06/22  1218 01/06/22  0336 01/05/22  1224 01/05/22  0515 01/05/22  0004 01/04/22  1613   WBC Thousand/uL 8 85 9 09 8 83 10 14  --   --   --   --   --   --   --  8 59  --  8 25   HEMOGLOBIN g/dL 7 9* 7 8* 7 8* 7 9* 8 2*  --  14 0 8 2*   < >  --    < > 7 2*   < > 7 6*   HEMATOCRIT % 25 5* 25 7* 25 6* 25 4* 25 9*  --  44 2 26 0*   < >  --    < > 23 7*   < > 24 8*   PLATELETS Thousands/uL 271 262 251 273  --   --   --   --   --   --   --  315  --  315   POTASSIUM mmol/L 4 9 4 7 4 4 4 4 4 8 4 4  --   --   --  4 6  --  4 4   < >  --    CHLORIDE mmol/L 97* 95* 95* 97* 98* 97*  --   --   --  98*  --  100   < >  --    CO2 mmol/L 25 25 25 24 24 25  --   --   --  24  --  23   < >  --    BUN mg/dL 58* 53* 50* 55* 51* 49*  --   --   --  68*  --  58*   < >  --    CREATININE mg/dL 4 52* 4 49* 4 47* 5 40* 5 34* 5 33*  --   --   --  6 58*  --  5 84*   < >  --    CALCIUM mg/dL 8 1* 7 7* 7 7* 7 9* 7 5* 8 0*  --   --   --  8 0*  --  7 9*   < >  --    PHOSPHORUS mg/dL 7 9*  --   --   --   --   --   --   --   --   --   --   --   --   --     < > = values in this interval not displayed  I have personally reviewed the blood work as stated above and in my note  I have personally reviewed last renal note

## 2022-01-12 NOTE — PLAN OF CARE
Problem: PHYSICAL THERAPY ADULT  Goal: Performs mobility at highest level of function for planned discharge setting  See evaluation for individualized goals  Description: Treatment/Interventions: LE strengthening/ROM,Therapeutic exercise,Endurance training,Cognitive reorientation,Patient/family training,Equipment eval/education,Bed mobility          See flowsheet documentation for full assessment, interventions and recommendations  Note: Prognosis: Fair  Problem List: Decreased strength,Decreased endurance,Impaired balance,Decreased mobility,Decreased cognition,Impaired judgement,Obesity,Pain,Decreased skin integrity  Assessment: Pt agreeable to participate in PT intervention  Pt continues to have impaired cognition and somewhat lethargic, which impacts session  Pt continues to require max A x 2 for all bed mobility, can only tolerate sitting EOB for 2 minutes due to overall fatigue and pain in LLE despite encouragement and motivation to maintain sitting for longer           PT Discharge Recommendation: Post acute rehabilitation services          See flowsheet documentation for full assessment

## 2022-01-12 NOTE — CASE MANAGEMENT
Case Management Discharge Planning Note    Patient name Miracle Garcia  Location S /S -78 MRN [id number]  : 1955 Date 2022       Current Admission Date: 2022  Current Admission Diagnosis:Renal hemorrhage, right   Patient Active Problem List    Diagnosis Date Noted    Chronic pain of left lower extremity 2022    Cellulitis of left lower extremity 2022    Chronic deep vein thrombosis (DVT) of distal vein of left lower extremity ([facility] ) 2022    MULUGETA (acute kidney injury) ([facility] ) 2022    Renal failure (ARF), acute on chronic ([place] [address] ) 2022    Acute deep vein thrombosis (DVT) of popliteal vein of left lower extremity ([place] [address] ) 2021    Right Liver mass 2021    Acute kidney injury superimposed on chronic kidney disease ([place] [address] ) 2021    Renal hemorrhage, right 2021    Staphylococcus aureus bacteremia 2021    Dizziness 2020    Class 3 severe obesity in adult ([place] [address] ) 2020    Staphylococcus aureus bacteremia 2020    Chronic venous stasis dermatitis of both lower extremities 2018    Hypertension     Hyperlipidemia     CVA (cerebral vascular accident) ([place] [address] )     History of DVT of lower extremity 10/05/2016    Hypoalbuminemia 2016    Open wound of left lower extremity 2016    Right renal subcapsular hematoma 2016    History () of right cerebellar CVA 2016    Coronary artery disease involving native heart 2016    Diplopia 2016    Type 2 diabetes mellitus, with long-term current use of insulin ([place] [address] ) 2016    MULUGETA on CKD (chronic kidney disease) stage 3, GFR 30-59 ml/min 2016    Morbid obesity with BMI of 40 0-44 9, adult ([place] [address] ) 2016    Essential hypertension 2016    MELINDA (obstructive sleep apnea) 2016    Cerebrovascular accident (CVA) due to thrombosis ([place] [address] ) 2016      LOS (days): 11  Geometric Mean LOS (GMLOS) (days): 4 30  Days to GMLOS:-6 6     OBJECTIVE:  Risk of Unplanned Readmission Score: 30         Current admission status: Inpatient   Preferred Pharmacy:   [facility] [address] [facility]  [address] [facility]  [address]  Phone: [phone] Fax: [phone]  [facility] [address]  [address] [facility] [address] [address]  Phone: [phone] Fax: [phone]    Primary Care Provider: Mundo Hirsch MD    Primary Insurance: [facility] [address] [facility]  Secondary Insurance: [place] [employer]    DISCHARGE DETAILS:    Discharge planning discussed with[de-identified] Patient and wife Kristofer Sandhu of Choice: Yes  Comments - Freedom of Choice: Discussed STR options with patient and wife  CM contacted family/caregiver?: Yes  Were Treatment Team discharge recommendations reviewed with patient/caregiver?: Yes  Did patient/caregiver verbalize understanding of patient care needs?: Yes  Were patient/caregiver advised of the risks associated with not following Treatment Team discharge recommendations?: Yes    Contacts  Patient Contacts: Patient and wife Georgie Nuñez  Relationship to Patient[de-identified] Family  Contact Method: In Person  Reason/Outcome: Discharge Planning,Continuity of Care,Emergency Contact              Other Referral/Resources/Interventions Provided:  Interventions: Short Term Rehab  Referral Comments: CM met with patient and his wife at bedside to see what the family had decided on STR  At this time [facility] is the only facility able to offer a bed  Family is okay with this as they've had a chance to talke with [facility] administration regarding their concerns           Treatment Team Recommendation: Short Term Rehab  Discharge Destination Plan[de-identified] Short Term Rehab  Transport at Discharge : BLS Ambulance                             IMM Given (Date):: 01/12/22  IMM Given to[de-identified] Patient (and wife at bedside)     Additional Comments: Jennifer Herrera liaison submitted from prior authorization for patient  CM was later notiifed that prior authorization was received and patient could admit tomorrow 1/13/22 after 2pm                 CM department will continue to follow to assist with discharge coordination

## 2022-01-12 NOTE — PROGRESS NOTES
NEPHROLOGY PROGRESS NOTE   Lady Meraz 78 y o  male MRN: [id number]  Unit/Bed#: S -01 Encounter: [id number]  Reason for Consult: MULUGETA    ASSESSMENT/PLAN:  1  Acute Kidney Injury- secondary to ATN  - HD dependent since 1/2/22  - hold HD today: creatinine stable, good urine output (1200ml past two days)  - plan for discharge off HD but keep dialysis catheter in place for now, will discontinue as outpatient if labs continue to improve  - creatinine improving slowly to 4 4 today  - lasix 80mg IV on 1/10 and 1/11  - transition to oral torsemide 40mg BID  - continue to monitor urine output  - luz catheter in place  - outpatient dialysis unit confirmed at [facility] on TTS 2 (keep chair time until next week, unit aware)  2  Chronic Kidney Disease stage III- baseline creatinine 1 5-1 7 from 2020  Needs nephrology follow up on discharge  3  Access- right IJ perm cath  4  Anemia- hgb below goal but stable  - not on SORIN, history of stroke  - iron stores acceptable  5  Hypertension- BP acceptable  - hold parameters placed on amlodipine  6  Metabolic Acidosis- off bicarbonate tablets, continue to monitor  7  Hyponatremia- fluid restriction of <1200ml/day  8  Leg cellulitis- on cefazolin  9  Right Subscapular Renal Hematoma- see above, hgb stable    Disposition:  Plan to transition to oral torsemide and discharge with perm cath in place  Will repeat BMP on Monday and determine dialysis needs / perm cath removal   Discussed with case management and SLIM resident  Office follow up 1/18 with BMP 1/17  SUBJECTIVE:  Patient denies SOB  Complains of left lower leg pain      OBJECTIVE:  Current Weight: Weight - Scale: 110 kg (242 lb 15 2 oz)  Vitals:    01/11/22 2236 01/12/22 0231 01/12/22 0533 01/12/22 0658   BP: 143/60 137/57  150/64   Pulse: 60 60  61   Resp:       Temp: 99 4 °F (37 4 °C) 98 9 °F (37 2 °C)  98 2 °F (36 8 °C)   TempSrc:       SpO2: 92% 93%  91%   Weight:   110 kg (242 lb 15 2 oz)    Height: Intake/Output Summary (Last 24 hours) at 1/12/2022 1152  Last data filed at 1/12/2022 0819  Gross per 24 hour   Intake 540 ml   Output 225 ml   Net 315 ml     General: NAD  Skin: no rash  Eyes: anicteric  ENMT: mm moist  Neck: no masses  Respiratory: coarse  Cardiac: RRR  Extremities: no edema  GI: soft nt nd   Neuro: alert awake  Psych: mood and affect appropriate    Medications:    Current Facility-Administered Medications:     acetaminophen (TYLENOL) tablet 975 mg, 975 mg, Oral, Q8H [facility], Damien Taylor MD, 975 mg at 01/12/22 0538    amLODIPine (NORVASC) tablet 10 mg, 10 mg, Oral, Daily, [facility], 10 mg at 01/12/22 8134    apixaban (ELIQUIS) tablet 5 mg, 5 mg, Oral, BID, Garrett Camacho MD, 5 mg at 01/12/22 8302    aspirin chewable tablet 81 mg, 81 mg, Oral, Daily, June River MD, 81 mg at 01/12/22 0809    atorvastatin (LIPITOR) tablet 40 mg, 40 mg, Oral, QPM, June River MD, 40 mg at 01/11/22 1713    calcium acetate (PHOSLO) capsule 667 mg, 667 mg, Oral, TID With Meals, Mannie Phalen, MD, 667 mg at 01/12/22 9165    carvedilol (COREG) tablet 25 mg, 25 mg, Oral, BID With Meals, June River MD, 25 mg at 01/12/22 0809    ceFAZolin (ANCEF) IVPB (premix in dextrose) 1,000 mg 50 mL, 1,000 mg, Intravenous, Q24H, Edyta Lauren MD, Last Rate: 100 mL/hr at 01/11/22 1712, 1,000 mg at 01/11/22 1712    cyanocobalamin (VITAMIN B-12) tablet 1,000 mcg, 1,000 mcg, Oral, Daily, June River MD, 1,000 mcg at 01/12/22 0809    docusate sodium (COLACE) capsule 100 mg, 100 mg, Oral, BID PRN, June River MD, 100 mg at 01/08/22 1038    ezetimibe (ZETIA) tablet 10 mg, 10 mg, Oral, HS, June River MD, 10 mg at 01/11/22 2154    insulin glargine (LANTUS) subcutaneous injection 15 Units 0 15 mL, 15 Units, Subcutaneous, HS, Ina Avitia MD, 15 Units at 01/11/22 2154    insulin lispro (HumaLOG) 100 units/mL subcutaneous injection 1-5 Units, 1-5 Units, Subcutaneous, HSRenita Shankar Grimm MD, 2 Units at 01/11/22 2155    insulin lispro (HumaLOG) 100 units/mL subcutaneous injection 1-6 Units, 1-6 Units, Subcutaneous, TID AC, 2 Units at 01/12/22 0813 **AND** [CANCELED] Fingerstick Glucose (POCT), , , TID AC, Shonna Coronado MD    lactulose oral solution 10 g, 10 g, Oral, BID, Shonna Coronado MD, 10 g at 01/12/22 0664    LORazepam (ATIVAN) injection 1 mg, 1 mg, Intravenous, Once PRN, Blas Jackson MD    meclizine (ANTIVERT) tablet 25 mg, 25 mg, Oral, Q8H PRN, Corine Hall MD, 25 mg at 01/10/22 1253    nystatin (MYCOSTATIN) powder, , Topical, BID, Shonna Coronado MD, Given at 01/12/22 0814    oxyCODONE (ROXICODONE) IR tablet 5 mg, 5 mg, Oral, Q6H PRN, Shonna Coronado MD, 5 mg at 01/12/22 1533    pantoprazole (PROTONIX) EC tablet 40 mg, 40 mg, Oral, Daily, Shonna Coronado MD, 40 mg at 01/12/22 1890    polyethylene glycol (MIRALAX) packet 17 g, 17 g, Oral, Daily, Shonna Coronado MD, 17 g at 01/09/22 0815    torsemide (DEMADEX) tablet 40 mg, 40 mg, Oral, BID, Tianna Hoover PA-C    Laboratory Results:  Results from last 7 days   Lab Units 01/12/22  0543 01/11/22  0430 01/10/22  0503 01/09/22  0522 01/08/22  3455 01/07/22  1137 01/07/22  0448 01/07/22  0347 01/06/22  0336   WBC Thousand/uL 9 91 8 85 9 09 8 83 10 14  --   --   --   --    HEMOGLOBIN g/dL 7 9* 7 9* 7 8* 7 8* 7 9* 8 2*  --  14 0  --    HEMATOCRIT % 25 5* 25 5* 25 7* 25 6* 25 4* 25 9*  --  44 2  --    PLATELETS Thousands/uL 296 271 262 251 273  --   --   --   --    POTASSIUM mmol/L 4 9 4 9 4 7 4 4 4 4 4 8 4 4  --    < >   CHLORIDE mmol/L 97* 97* 95* 95* 97* 98* 97*  --    < >   CO2 mmol/L 25 25 25 25 24 24 25  --    < >   BUN mg/dL 68* 58* 53* 50* 55* 51* 49*  --    < >   CREATININE mg/dL 4 37* 4 52* 4 49* 4 47* 5 40* 5 34* 5 33*  --    < >   CALCIUM mg/dL 8 2* 8 1* 7 7* 7 7* 7 9* 7 5* 8 0*  --    < >   PHOSPHORUS mg/dL 7 5* 7 9*  --   --   --   --   --   --   --     < > = values in this interval not displayed  I have personally reviewed the blood work as stated above and in my note

## 2022-01-12 NOTE — OCCUPATIONAL THERAPY NOTE
Occupational Therapy Progress Note     Patient Name: Violet Rangel  [id number] Date: 1/12/2022  Problem List  Active Problems:    Type 2 diabetes mellitus, with long-term current use of insulin ([facility] )    Hypertension    CVA (cerebral vascular accident) [facility])    Renal hemorrhage, right    Staphylococcus aureus bacteremia    Right Liver mass    Chronic deep vein thrombosis (DVT) of distal vein of left lower extremity (HCC)    Renal failure (ARF), acute on chronic (HCC)    Cellulitis of left lower extremity    Chronic pain of left lower extremity              01/12/22 1022   OT Last Visit   OT Visit Date 01/12/22   Note Type   Note Type Treatment for insurance authorization   Restrictions/Precautions   Weight Bearing Precautions Per Order No   Other Precautions Cognitive; Chair Alarm; Bed Alarm; Fall Risk;O2;Multiple lines;Pain   General   Response to Previous Treatment Patient with no complaints from previous session   Pain Assessment   Pain Assessment Tool 0-10   Pain Score 10 - Worst Possible Pain   Pain Location/Orientation Orientation: Left; Location: Knee   ADL   Where Assessed Edge of bed   UB Dressing Assistance 2  Maximal Assistance   UB Dressing Deficit Verbal cueing;Supervision/safety; Increased time to complete   UB Dressing Comments threading UE into gown and pulling up over shoulders   LB Dressing Assistance 1  Total Assistance   LB Dressing Deficit Don/doff R sock; Don/doff L sock   Bed Mobility   Rolling R 2  Maximal assistance   Additional items Assist x 2; Increased time required;Verbal cues;LE management   Supine to Sit 2  Maximal assistance   Additional items Assist x 2; Increased time required;Verbal cues;LE management   Sit to Supine 2  Maximal assistance   Additional items Assist x 2; Increased time required;Verbal cues;LE management   Additional Comments Able to sit EOB ~2 min, limited by pain   Transfers   Sit to Stand Unable to assess   Functional Mobility   Additional Comments unable to assess Cognition   Overall Cognitive Status Impaired   Arousal/Participation Alert; Cooperative   Attention Attends with cues to redirect   Orientation Level Oriented X4   Memory Decreased recall of recent events;Decreased short term memory;Decreased recall of precautions   Following Commands Follows one step commands with increased time or repetition   Comments flat affect, limited safety awareness and insight into deficits   Activity Tolerance   Activity Tolerance Patient limited by pain   Medical Staff Made Aware PT ADITI [place]   Assessment   Assessment Pt seen on this date for OT treatment session  At start of session pt supine in bed  With much encouragement pt willing to participate in session  Pt able to roll with max A x2 and tolerate sitting EOB for ~ 2 min with mod A x1 for sitting balance  Able to don gown and socks  Pt limited by functional reach, sitting balance and required use of UE for sitting balance, increased pain and overall activity tolerance  Pt provided with education on importance of participation in therapy  Pt noted to make progress with speed of functional transfer and willingness to actively participate  Pt would continue to benefit from skilled OT treatment sessions in order to address remaining deficits and return to least restrictive environment at the highest level of function     Plan   Goal Expiration Date 01/20/22   OT Treatment Day 1   OT Frequency 2-3x/wk   Recommendation   OT Discharge Recommendation Post acute rehabilitation services   AM-PAC Daily Activity Inpatient   Lower Body Dressing 1   Bathing 2   Toileting 1   Upper Body Dressing 2   Grooming 3   Eating 3   Daily Activity Raw Score 12   Daily Activity Standardized Score (Calc for Raw Score >=11) 30 6   AM-PAC Applied Cognition Inpatient   Following a Speech/Presentation 2   Understanding Ordinary Conversation 3   Taking Medications 2   Remembering Where Things Are Placed or Put Away 1   Remembering List of 4-5 Errands 1 Taking Care of Complicated Tasks 1   Applied Cognition Raw Score 10   Applied Cognition Standardized Score 24 98       The patient's raw score on the AM-PAC Daily Activity inpatient short form is 12, standardized score is 30 6, less than 39 4  Patients at this level are likely to benefit from discharge to post-acute rehabilitation services  Please refer to the recommendation of the Occupational Therapist for safe discharge planning  This session, pt required and most appropriately benefited from skilled OT/PT co-treat due to extensive physical assistance of SKILLED therapists, significant regression from functional baseline and decreased activity tolerance  OT and PT goals were addressed separately as seen in documentation       At the end of the session, all needs met and pt supine in bed, bed alarm activated, HOB elevated, call bell within reach and green wedges placed on L side and pillow under LLE    Ary Feliz, OTR/L

## 2022-01-12 NOTE — PHYSICAL THERAPY NOTE
PHYSICAL THERAPY TREATMENT NOTE    Patient Name: Duong Rodriguez  YRXAU'T Date: 1/12/2022 01/12/22 1014   PT Last Visit   PT Visit Date 01/12/22   Note Type   Note Type Treatment for insurance authorization   Pain Assessment   Pain Assessment Tool 0-10   Pain Score No Pain   Pain Location/Orientation Orientation: Left; Location: Knee   Restrictions/Precautions   Weight Bearing Precautions Per Order No   Other Precautions Cognitive; Bed Alarm; Fall Risk;Pain   General   Chart Reviewed Yes   Response to Previous Treatment Patient reporting fatigue but able to participate  Family/Caregiver Present No   Cognition   Overall Cognitive Status Impaired   Arousal/Participation Responsive   Orientation Level Oriented X4   Memory Decreased recall of recent events;Decreased short term memory;Decreased recall of precautions   Following Commands Follows one step commands with increased time or repetition   Comments Pt w/ flat affect, however intermittently tearful during session  Decreased insight into current deficits  Bed Mobility   Rolling R 2  Maximal assistance   Additional items Assist x 2; Increased time required;Verbal cues   Supine to Sit 2  Maximal assistance   Additional items Assist x 2; Increased time required;Verbal cues;LE management   Sit to Supine 2  Maximal assistance   Additional items Assist x 2; Increased time required;Verbal cues;LE management   Additional Comments Pt sat EOB ~ 2 min, limited due to reported pain in LLE  Transfers   Sit to Stand Unable to assess   Balance   Static Sitting Poor +   Activity Tolerance   Activity Tolerance Patient limited by fatigue;Patient limited by pain   Medical Staff Made Aware FRANCESCA Olivier to [facility] [address] to ADITI Maxwell   Assessment   Prognosis Fair   Problem List Decreased strength;Decreased endurance; Impaired balance;Decreased mobility; Decreased cognition; Impaired judgement;Obesity;Pain;Decreased skin integrity   Assessment Pt agreeable to participate in PT intervention  Pt continues to have impaired cognition and somewhat lethargic, which impacts session  Pt continues to require max A x 2 for all bed mobility, can only tolerate sitting EOB for 2 minutes due to overall fatigue and pain in LLE despite encouragement and motivation to maintain sitting for longer   Goals   Patient Goals less pain   STG Expiration Date 01/20/22   Short Term Goal #1 Patient will: Increase bilateral LE strength 1/2 grade to facilitate independent mobility, Perform all bed mobility tasks w/ maxx1 to decrease fall risk factors, Increase all balance 1/2 grade to decrease risk for falls and Tolerate seated at EOB at least 20 minutes w/ supervision to facilitate functional task performance, perform lateral scooting at EOB w/ A x 1 in preparation for STS transfers, PT to see for transfers when appropriate   PT Treatment Day 1   Plan   Treatment/Interventions LE strengthening/ROM; Endurance training; Therapeutic exercise;Cognitive reorientation;Patient/family training;Equipment eval/education; Bed mobility   Progress Slow progress, decreased activity tolerance   PT Frequency 3-5x/wk   Recommendation   PT Discharge Recommendation Post acute rehabilitation services   Additional Comments Pt requires chi lift for OOB at this time   AM-PAC Basic Mobility Inpatient   Turning in Bed Without Bedrails 1   Lying on Back to Sitting on Edge of Flat Bed 1   Moving Bed to Chair 1   Standing Up From Chair 1   Walk in Room 1   Climb 3-5 Stairs 1   Basic Mobility Inpatient Raw Score 6   Turning Head Towards Sound 3   Follow Simple Instructions 3   Low Function Basic Mobility Raw Score 12   Low Function Basic Mobility Standardized Score 18 33   Highest Level Of Mobility   JH-HL Goal 2: Bed activities/Dependent transfer   JH-HLM Highest Level of Mobility 3: Sit at edge of bed   JH-HLM Goal Achieved Yes End of Consult   Patient Position at End of Consult Supine;Bed/Chair alarm activated; All needs within reach     Patient requires PT/OT co-treat due to signficant assistance with mobility, cognitive-behavioral impairments, and to challenge activity tolerance  Both PT and OT goals were addressed separately during this session  The patient's AM-PAC Basic Mobility Inpatient Short Form Raw Score is 6  A Raw score of less than or equal to 16 suggests the patient may benefit from discharge to post-acute rehabilitation services  Please also refer to the recommendation of the Physical Therapist for safe discharge planning  Pt would continue to benefit from skilled PT during this admission in order to progress patient towards goals to decrease risk of falls and maximize independence       Scooter Ayala, PT, DPT

## 2022-01-12 NOTE — PROGRESS NOTES
[facility]  Progress Note - Maritza Gray 1955, 78 y o  male MRN: [id number]  Unit/Bed#: S -01 Encounter: [id number]  Primary Care Provider: Yumi Whitaker MD   Date and time admitted to hospital: 1/1/2022 12:36 PM    Renal failure (ARF), acute on chronic [facility])  Assessment & Plan    Recent Labs     01/07/22  1137 01/07/22  1137 01/08/22  0508 01/08/22  0508 01/09/22  0522   CREATININE 5 34*  --  5 40*  --  4 47*   EGFR 10   < > 10   < > 12    < > = values in this interval not displayed  Estimated Creatinine Clearance: 19 mL/min (A) (by C-G formula based on SCr of 4 47 mg/dL (H))  Patient's baseline creatinine is 1 7  He was recently admitted in the hospital with acute kidney injury likely secondary to ATN, and discharged with creatinine around 4  Patient s/p PermCath placement with IR on 1/6 and subsequent dialysis tx     Plan:  · Nephrology following appreciate recommendations- hold off HD again for today (1/12) in setting of good urine output and renal reovery  · Will maintain PermCath in case further dialysis is required, close follow-up with Nephrology outpatient   · Per Nephrology start patient on Torsemide 40mg BID to enhance UO  · Continue T/TH/Sat schedule, continuing working with CM on dialysis center coordination with discharge planning  · Continue to monitor UOP and creatinine   Avoid nephrotoxins, hypotension        Chronic pain of left lower extremity  Assessment & Plan  Patient with complaint of left lower leg pain since previous admission from 12/17-12/29  Patient this morning notably distressed secondary to pain  Given 1 dose of dilaudid  CT lower ext without contrast 1/11- Subchondral osseous resorption in the medial and lateral compartments  While this pattern can be seen with disuse osteopenia, given the presence of a small joint effusion, septic arthritis and osteomyelitis are not excluded      Plan  · Continue to monitor for now, will start application of lidocaine gel TID for pain to the left knee  · Scheduled Tylenol 975 q8  · Per discussion with family, requesting Oxycodone PRN be administered only overnight for severe pain      Cellulitis of left lower extremity  Assessment & Plan  Diagnosis: patient discharged on 12/29 for sepsis, bacteremia on IV Abx via central line placement with IR on 12/27  Suspected source: LLE cellulitis    Plan  · Will hold off on removal of tunneled catheter line for now in setting of possible renal recovery  · Adjust IV Cefazolin 1000 mg q24 during hospitalization, with the same end date of Jan 16, 2022  · Resident to [facility] ID team with plans of dc in the next 24-48 hour pending placement, ID aware that will maintain PICC line   · Patient to follow up outpatient for removal of PICC line    Renal hemorrhage, right  Assessment & Plan  Recent Labs     01/08/22  0508 01/09/22  0522 01/10/22  0503   HGB 7 9* 7 8* 7 8*   MCV 95 96 97     · Etiology likely multifactorial in the setting of chronic kidney disease, hematuria in the setting of supratherapeutic INR  Patient has right subcapsular hematoma that is unchanged compared to the last CT scan  · Patient s/p 2 units of PRBC on 1/1 ( had formed antibodies during last admission to blood products)  · FOBT negative    Plan:  · Hemoglobin remains stable  · Will monitor H/H with daily CBC at this time   Transfuse if Hb < 7 0   Consent for transfusion obtained: Yes         Type 2 diabetes mellitus, with long-term current use of insulin [facility])  Assessment & Plan  Lab Results   Component Value Date    HGBA1C 12 2 (H) 12/18/2021       Recent Labs     01/08/22  1743 01/08/22  2057 01/09/22  0730 01/09/22  1102   POCGLU 107 197* 138 162*       Blood Sugar Average: Last 72 hrs:  (P) 145 [id number]   · Home regimen:  Lantus 32 units at bedtime, lispro 6 units t i d   With meals  · Continue basal insulin with SSI  · Hypoglycemia protocol  · Diabetic diet      CVA (cerebral vascular accident) [facility])  [address] [place] [address]  Patient with notable episode on 1/8 dizziness with room spinning sensation  CT head of the head was unremarkable for acute intracranial abnl  Similar right inferior cerebellar encephalomalacia, nonspecific, mild patchy periventricular and subcortical white matter lucencies most commonly related to changes of microangiopathy and vascular calcification  1/9/22 MRI head notable for small chronic left external capsule  0 2 to 0 3 cm new foci of restricted diffusion in both the right and left frontal periventricular white matter and in the right frontal corona radiata/centrum  Plan  · Neurology consulted appreciate recs as below:  - Continue Eliquis 5mg BID and ASA  - Continue Atorvastatin 40mg BID daily  -  1/10 Carotid US- There is <50% stenosis noted in the right internal carotid artery  - Maintain glucose < 200        Hypertension  Assessment & Plan  · Blood pressure acceptable   · Continue amlodipine and metoprolol   · Monitor vital signs     Chronic deep vein thrombosis (DVT) of distal vein of left lower extremity ([address] )  Assessment & Plan  No results for input(s): INR in the last 72 hours  History of DVT previously on Coumadin, started on Eliquis 5 mg b i d  during the previous admission for acute DVT  Patient now with normal INR originally supratherapeutic to 6 78 s/p 1 unit of FFP on 1/1 and 2 doses of [address] on 1/2 for supratherapuetic INR  Plan  · Continue Eliquis 5mg BID  · Monitor H/H closely      Right Liver mass  Assessment & Plan  · Noted on abdominal CT in setting of possibly decreased synthetic liver function appreciated in lab work  Staphylococcus aureus bacteremia  Assessment & Plan  · Recently hospitalized for Staph bacteremia  · Continue IV cefazolin through January 16, 2022      VTE Pharmacologic Prophylaxis:   VTE Score: 3 Moderate Risk (Score 3-4) - Pharmacological DVT Prophylaxis Ordered: Apixaban (Eliquis)      Mechanical VTE Prophylaxis in Place: No    Patient Centered Rounds: I have performed bedside rounds with nursing staff today  Discussions with Specialists or Other Care Team Provider: Nephrology, ID    Education and Discussions with Family / Patient: Will update family    Current Length of Stay: 6 day(s)    Current Patient Status: Inpatient     Discharge Plan / Estimated Discharge Date: Anticipate discharge in 48 hrs to rehab facility  Code Status: Level 1 - Full Code      Subjective:   Patient reports his left knee pain is an 11/10 pain this morning  Objective:     Vitals:   Temp (24hrs), Av 6 °F (37 °C), Min:98 °F (36 7 °C), Max:99 4 °F (37 4 °C)    Temp:  [98 °F (36 7 °C)-99 4 °F (37 4 °C)] 98 2 °F (36 8 °C)  HR:  [55-61] 58  Resp:  [16] 16  BP: (127-150)/(56-64) 127/57  SpO2:  [91 %-96 %] 96 %  Body mass index is 38 05 kg/m²  Input and Output Summary (last 24 hours): Intake/Output Summary (Last 24 hours) at 2022 1409  Last data filed at 2022 1345  Gross per 24 hour   Intake 540 ml   Output 500 ml   Net 40 ml       Physical Exam:     Physical Exam  Constitutional:       Appearance: Normal appearance  HENT:      Head: Normocephalic and atraumatic  Right Ear: External ear normal       Left Ear: External ear normal    Eyes:      Conjunctiva/sclera: Conjunctivae normal    Cardiovascular:      Rate and Rhythm: Normal rate and regular rhythm  Pulmonary:      Effort: Pulmonary effort is normal    Abdominal:      General: Abdomen is flat  Bowel sounds are normal    Musculoskeletal:      Cervical back: Normal range of motion and neck supple  Right lower leg: No edema  Left lower leg: Edema present  Comments: Multiple abrasions noted on the lower ext b/l  Tenderness to palpation at the left knee cap, no hallie effusion noted  No increased warmth or erythema    Neurological:      Mental Status: He is alert and oriented to person, place, and time        Comments: Alert and oriented to self Psychiatric:         Mood and Affect: Mood normal          Behavior: Behavior normal           Additional Data:     Labs:  Results from last 7 days   Lab Units 01/12/22  0543 01/11/22  0430 01/10/22  0503 01/09/22  0522 01/08/22  0508   WBC Thousand/uL 9 91   < > 9 09   < > 10 14   HEMOGLOBIN g/dL 7 9*   < > 7 8*   < > 7 9*   HEMATOCRIT % 25 5*   < > 25 7*   < > 25 4*   PLATELETS Thousands/uL 296   < > 262   < > 273   BANDS PCT %  --   --   --   --  4   NEUTROS PCT %  --   --  63  --   --    LYMPHS PCT %  --   --  8*  --   --    LYMPHO PCT %  --   --   --   --  10*   MONOS PCT %  --   --  8  --   --    MONO PCT %  --   --   --   --  10   EOS PCT %  --   --  6  --  4    < > = values in this interval not displayed  Results from last 7 days   Lab Units 01/12/22  0543 01/10/22  0503 01/09/22  0522   SODIUM mmol/L 131*   < > 129*   POTASSIUM mmol/L 4 9   < > 4 4   CHLORIDE mmol/L 97*   < > 95*   CO2 mmol/L 25   < > 25   BUN mg/dL 68*   < > 50*   CREATININE mg/dL 4 37*   < > 4 47*   ANION GAP mmol/L 9   < > 9   CALCIUM mg/dL 8 2*   < > 7 7*   ALBUMIN g/dL 1 9*  --  1 7*   TOTAL BILIRUBIN mg/dL  --   --  0 21   ALK PHOS U/L  --   --  52   ALT U/L  --   --  <6*   AST U/L  --   --  24   GLUCOSE RANDOM mg/dL 130   < > 101    < > = values in this interval not displayed       Results from last 7 days   Lab Units 01/06/22  0336   INR  1 51*     Results from last 7 days   Lab Units 01/12/22  1048 01/12/22  0656 01/11/22  2103 01/11/22  1602 01/11/22  1053 01/11/22  0727 01/10/22  2101 01/10/22  1551 01/10/22  1138 01/10/22  0619 01/09/22  2122 01/09/22  1623   POC GLUCOSE mg/dl 195* 227* 262* 209* 164* 160* 115 130 90 289* 188* 198*     Results from last 7 days   Lab Units 01/10/22  0503   HEMOGLOBIN A1C % 7 1*           Imaging: CT head wo contrast    Recent Cultures (last 7 days):           Lines/Drains:  Invasive Devices  Report    Central Venous Catheter Line            CVC Central Lines 12/27/21 Double Right 16 days Peripheral Intravenous Line            Peripheral IV 01/08/22 Left Forearm 4 days          Hemodialysis Catheter            HD Permanent Double Catheter 6 days          Drain            Urethral Catheter 16 Fr  5 days                Telemetry:        Last 24 Hours Medication List:   Current Facility-Administered Medications   Medication Dose Route Frequency Provider Last Rate    acetaminophen  975 mg Oral Q8H [address] Antonella Kay MD      amLODIPine  10 mg Oral Daily HUGO Larson      apixaban  5 mg Oral BID Ramirez Le MD      aspirin  81 mg Oral Daily Genia Stephens MD      atorvastatin  40 mg Oral QPM Genia Stephens MD      calcium acetate  667 mg Oral TID With Meals Karolina Ulloa MD      carvedilol  25 mg Oral BID With Meals Genia Stephens MD      ceFAZolin  1,000 mg Intravenous Q24H Kwaku Nur MD 1,000 mg (01/11/22 1712)    cyanocobalamin  1,000 mcg Oral Daily Genia Stephens MD      docusate sodium  100 mg Oral BID PRN Genia Stephens MD      ezetimibe  10 mg Oral HS Genia Stephens MD      insulin glargine  15 Units Subcutaneous HS MD Carmenza Murphy insulin lispro  1-5 Units Subcutaneous HS Genia Stephens MD      insulin lispro  1-6 Units Subcutaneous TID [facility] Genia Stephens MD      lactulose  10 g Oral BID Genia Stephens MD      lidocaine   Topical TID Ramirez Le MD      LORazepam  1 mg Intravenous Once PRN Antonella Kay MD      meclizine  25 mg Oral Q8H PRN Sarai Thomas MD      nystatin   Topical BID Genia Stephens MD      oxyCODONE  5 mg Oral Q6H PRN Genia Stephens MD      pantoprazole  40 mg Oral Daily Genia Stephens MD      polyethylene glycol  17 g Oral Daily Genia Stephens MD      torsemide  40 mg Oral BID HUGO Larson          Today, Patient Was Seen By: Ramirez Le MD    ** Please Note: This note has been constructed using a voice recognition system   **

## 2022-01-13 PROBLEM — I60.9 SUBARACHNOID HEMORRHAGE (HCC): Status: ACTIVE | Noted: 2022-01-01

## 2022-01-13 PROBLEM — N28.89 RENAL HEMORRHAGE, RIGHT: Status: RESOLVED | Noted: 2021-01-01 | Resolved: 2022-01-01

## 2022-01-13 PROBLEM — I46.9 CARDIAC ARREST (HCC): Status: ACTIVE | Noted: 2022-01-01

## 2022-01-13 NOTE — PROGRESS NOTES
[facility]  Progress Note - Nyla Mueller 1955, 78 y o  male MRN: [id number]  Unit/Bed#: ICU 11 Encounter: [id number]  Primary Care Provider: Lucy Larson MD   Date and time admitted to hospital: 1/1/2022 12:36 PM    * Cardiac arrest [facility])  Assessment & Plan  This morning upon my examination at 8:00am patient was AAOx3, and we discussed plan for disharge to rehab  Patient understood plan of care and agreed  Unfortunately, patient found unresponsive this morning around 9:50 am by nursing staff and a Code Blue was called  ICU team arrived and immediately started CPR, patient was ultimately intubated and transferred to ICU  Family was called during event and notified to come to the hospital given above  SLIM provider met with family at 10:45 am to discuss Code Blue answered all questions at that time  ICU team made aware and patient family to be updated on prognosis moving forward  Renal failure (ARF), acute on chronic [facility])  Assessment & Plan    Recent Labs     01/07/22  1137 01/07/22  1137 01/08/22  0508 01/08/22  0508 01/09/22  0522   CREATININE 5 34*  --  5 40*  --  4 47*   EGFR 10   < > 10   < > 12    < > = values in this interval not displayed  Estimated Creatinine Clearance: 19 mL/min (A) (by C-G formula based on SCr of 4 47 mg/dL (H))  Patient's baseline creatinine is 1 7  He was recently admitted in the hospital with acute kidney injury likely secondary to ATN, and discharged with creatinine around 4   Patient s/p PermCath placement with IR on 1/6 and subsequent dialysis tx     Plan:  · Nephrology following appreciate recommendations- hold off HD again for today (1/12) in setting of good urine output and renal reovery  · Will maintain PermCath in case further dialysis is required, close follow-up with Nephrology outpatient   · Discussed with Nephrology team that patient should continue on Torsemide 40mg BID upon discharge  · Continue T/TH/Sat schedule, continuing working with CM on dialysis center coordination with discharge planning  · Continue to monitor UOP and creatinine   Avoid nephrotoxins, hypotension        Chronic pain of left lower extremity  Assessment & Plan  Patient with complaint of left lower leg pain since previous admission from 12/17-12/29  Patient this morning notably distressed secondary to pain  Given 1 dose of dilaudid  CT lower ext without contrast 1/11- Subchondral osseous resorption in the medial and lateral compartments  While this pattern can be seen with disuse osteopenia, given the presence of a small joint effusion, septic arthritis and osteomyelitis are not excluded  Plan  · Continue to monitor for now, will start application of lidocaine gel TID for pain to the left knee  · Scheduled Tylenol 975 q8  · Per discussion with family, requesting Oxycodone PRN be administered only overnight for severe pain      Cellulitis of left lower extremity  Assessment & Plan  Diagnosis: patient discharged on 12/29 for sepsis, bacteremia on IV Abx via central line placement with IR on 12/27  Suspected source: LLE cellulitis    Plan  · Will hold off on removal of tunneled catheter line for now in setting of possible renal recovery  · Adjust IV Cefazolin 1000 mg q24 during hospitalization, with the same end date of Jan 16, 2022  · Resident to [facility] ID team with plans of dc in the next 24-48 hour pending placement, ID aware that will maintain PICC line   · Patient to follow up outpatient for removal of PICC line    Type 2 diabetes mellitus, with long-term current use of insulin [facility])  Assessment & Plan  Lab Results   Component Value Date    HGBA1C 12 2 (H) 12/18/2021       Recent Labs     01/08/22  1743 01/08/22  2057 01/09/22  0730 01/09/22  1102   POCGLU 107 197* 138 162*       Blood Sugar Average: Last 72 hrs:  (P) 145 [id number]   · Home regimen:  Lantus 32 units at bedtime, lispro 6 units t i d   With meals  · Continue basal insulin with SSI  · Hypoglycemia protocol  · Diabetic diet      Renal hemorrhage, right-resolved as of 1/13/2022  Assessment & Plan  Recent Labs     01/08/22  0508 01/09/22  0522 01/10/22  0503   HGB 7 9* 7 8* 7 8*   MCV 95 96 97     · Etiology likely multifactorial in the setting of chronic kidney disease, hematuria in the setting of supratherapeutic INR  Patient has right subcapsular hematoma that is unchanged compared to the last CT scan  · Patient s/p 2 units of PRBC on 1/1 ( had formed antibodies during last admission to blood products)  · FOBT negative    Plan:  · Hemoglobin remains stable  · Will monitor H/H with daily CBC at this time   Transfuse if Hb < 7 0   Consent for transfusion obtained: Yes         CVA (cerebral vascular accident) [facility])  Assessment & Plan  Patient with notable episode on 1/8 dizziness with room spinning sensation  CT head of the head was unremarkable for acute intracranial abnl  Similar right inferior cerebellar encephalomalacia, nonspecific, mild patchy periventricular and subcortical white matter lucencies most commonly related to changes of microangiopathy and vascular calcification  1/9/22 MRI head notable for small chronic left external capsule  0 2 to 0 3 cm new foci of restricted diffusion in both the right and left frontal periventricular white matter and in the right frontal corona radiata/centrum  Plan  · Neurology consulted appreciate recs as below:  - Continue Eliquis 5mg BID and ASA  - Continue Atorvastatin 40mg BID daily  -  1/10 Carotid US- There is <50% stenosis noted in the right internal carotid artery  - Maintain glucose < 200        Hypertension  Assessment & Plan  · Blood pressure acceptable   · Continue amlodipine and metoprolol   · Monitor vital signs     Chronic deep vein thrombosis (DVT) of distal vein of left lower extremity ([address] )  Assessment & Plan  No results for input(s): INR in the last 72 hours    History of DVT previously on Coumadin, started on Eliquis 5 mg b i d  during the previous admission for acute DVT  Patient now with normal INR originally supratherapeutic to 6 78 s/p 1 unit of FFP on  and 2 doses of [address] on  for supratherapuetic INR  Plan  · Continue Eliquis 5mg BID  · Monitor H/H closely      Right Liver mass  Assessment & Plan  · Noted on abdominal CT in setting of possibly decreased synthetic liver function appreciated in lab work  Staphylococcus aureus bacteremia  Assessment & Plan  · Recently hospitalized for Staph bacteremia  · Continue IV cefazolin through 2022      VTE Pharmacologic Prophylaxis:   VTE Score: 3 Moderate Risk (Score 3-4) - Pharmacological DVT Prophylaxis Ordered: Apixaban (Eliquis)  Mechanical VTE Prophylaxis in Place: No    Patient Centered Rounds: I have performed bedside rounds with nursing staff today  Discussions with Specialists or Other Care Team Provider: Nephrology, ID    Education and Discussions with Family / Patient: Patient family updated in ICU waiting room by SLIM service    Current Length of Stay: 12 day(s)    Current Patient Status: Inpatient     Discharge Plan / Estimated Discharge Date: Anticipate discharge in 48 hrs to rehab facility  Code Status: Level 2 - DNAR: but accepts endotracheal intubation      Subjective:   Patient did complain of some left knee pain this morning  Provider discussed plan of care that he would be discharging to rehab today, patient was amenable to above  Objective:     Vitals:   Temp (24hrs), Av 4 °F (36 9 °C), Min:97 9 °F (36 6 °C), Max:98 8 °F (37 1 °C)    Temp:  [97 9 °F (36 6 °C)-98 8 °F (37 1 °C)] 98 6 °F (37 °C)  HR:  [51-91] 58  Resp:  [16-28] 20  BP: (125-167)/(57-72) 148/65  SpO2:  [92 %-100 %] 100 %  Body mass index is 37 59 kg/m²  Input and Output Summary (last 24 hours):        Intake/Output Summary (Last 24 hours) at 2022 7732  Last data filed at 2022 9853  Gross per 24 hour   Intake --   Output 350 ml   Net -350 ml Physical Exam:     Physical Exam  Constitutional:       Appearance: Normal appearance  HENT:      Head: Normocephalic and atraumatic  Right Ear: External ear normal       Left Ear: External ear normal    Eyes:      Conjunctiva/sclera: Conjunctivae normal    Cardiovascular:      Rate and Rhythm: Normal rate and regular rhythm  Pulmonary:      Effort: Pulmonary effort is normal    Abdominal:      General: Abdomen is flat  Bowel sounds are normal    Musculoskeletal:      Cervical back: Normal range of motion and neck supple  Right lower leg: No edema  Left lower leg: Edema present  Comments: Multiple abrasions noted on the lower ext b/l  Tenderness to palpation at the left knee cap, no hallie effusion noted  No increased warmth or erythema    Neurological:      Mental Status: He is alert and oriented to person, place, and time  Comments: Alert and oriented to self   Psychiatric:         Mood and Affect: Mood normal          Behavior: Behavior normal           Additional Data:     Labs:  Results from last 7 days   Lab Units 01/13/22  1148 01/13/22  1141 01/13/22  1025 01/13/22  1004 01/13/22  0313 01/11/22  0430 01/10/22  0503   WBC Thousand/uL  --   --  8 71  --  8 70   < > 9 09   HEMOGLOBIN g/dL  --   --  7 8*  --  7 4*   < > 7 8*   I STAT HEMOGLOBIN g/dl 7 5*   < >  --    < >  --   --   --    HEMATOCRIT %  --   --  25 7*  --  24 0*   < > 25 7*   HEMATOCRIT, ISTAT % 22*   < >  --    < >  --   --   --    PLATELETS Thousands/uL  --   --  331  --  272   < > 262   BANDS PCT %  --   --   --   --  2  --   --    NEUTROS PCT %  --   --   --   --   --   --  63   LYMPHS PCT %  --   --   --   --   --   --  8*   LYMPHO PCT %  --   --   --   --  3*  --   --    MONOS PCT %  --   --   --   --   --   --  8   MONO PCT %  --   --   --   --  0*  --   --    EOS PCT %  --   --   --   --  4  --  6    < > = values in this interval not displayed       Results from last 7 days   Lab Units 01/13/22  1148 01/13/22  1141 01/13/22  1025   SODIUM mmol/L  --   --  137   POTASSIUM mmol/L  --   --  5 0   CHLORIDE mmol/L  --   --  98*   CO2 mmol/L  --   --  24   CO2, I-STAT mmol/L 29   < >  --    BUN mg/dL  --   --  73*   CREATININE mg/dL  --   --  4 61*   ANION GAP mmol/L  --   --  15*   CALCIUM mg/dL  --   --  10 2*   ALBUMIN g/dL  --   --  1 8*   TOTAL BILIRUBIN mg/dL  --   --  0 18*   ALK PHOS U/L  --   --  85   ALT U/L  --   --  7*   AST U/L  --   --  31   GLUCOSE RANDOM mg/dL  --   --  298*    < > = values in this interval not displayed       Results from last 7 days   Lab Units 01/13/22  1025   INR  2 80*     Results from last 7 days   Lab Units 01/13/22  1200 01/13/22  0814 01/12/22  2128 01/12/22  1528 01/12/22  1048 01/12/22  0656 01/11/22  2103 01/11/22  1602 01/11/22  1053 01/11/22  0727 01/10/22  2101 01/10/22  1551   POC GLUCOSE mg/dl 190* 139 157* 216* 195* 227* 262* 209* 164* 160* 115 130     Results from last 7 days   Lab Units 01/10/22  0503   HEMOGLOBIN A1C % 7 1*     Results from last 7 days   Lab Units 01/13/22  1334 01/13/22  1025   LACTIC ACID mmol/L 1 3 6 0*       Imaging: CT head wo contrast    Recent Cultures (last 7 days):           Lines/Drains:  Invasive Devices  Report    Central Venous Catheter Line            CVC Central Lines 12/27/21 Double Right 17 days          Hemodialysis Catheter            HD Permanent Double Catheter 7 days          Drain            Urethral Catheter 16 Fr  6 days          Airway            ETT  7 mm <1 day                Telemetry:        Last 24 Hours Medication List:   Current Facility-Administered Medications   Medication Dose Route Frequency Provider Last Rate    acetaminophen  975 mg Oral Q8H [address] Niyakar Vickers PA-C      aspirin  81 mg Oral Daily Karen Oakley PA-C      atorvastatin  40 mg Oral QPM Leif Vickers PA-C      calcium acetate  667 mg Oral TID With Meals Karen Oakley PA-C      ceFAZolin  1,000 mg Intravenous Q24H Leif Diaz HUGO Vickers 1,000 mg (01/12/22 1720)    chlorhexidine  15 mL Mouth/Throat Q12H [facility] Yvonne Vickers PA-C      cyanocobalamin  1,000 mcg Oral Daily Sarahi Tierney PA-C      docusate sodium  100 mg Oral BID PRN Sarahi Tierney PA-C      epinephrine  1-20 mcg/min Intravenous Titrated Sarahi Tierney PA-C Stopped (01/13/22 1343)    ezetimibe  10 mg Oral HS Yvonne Vickers PA-C      insulin lispro  1-6 Units Subcutaneous Q6H [facility] Niya Vickers PA-C      lactulose  10 g Oral BID Sarahi Tierney PA-C      lidocaine   Topical TID Sarahi Tierney PA-C      meclizine  25 mg Oral Q8H PRN Sarahi Tierney PA-C      nystatin   Topical BID Sarahi Tierney PA-C      pantoprazole  40 mg Intravenous Q24H [facility] Niya Vickers PA-C      polyethylene glycol  17 g Oral Daily Sarahi Tierney PA-C          Today, Patient Was Seen By: Godfrey Vera MD    ** Please Note: This note has been constructed using a voice recognition system   **

## 2022-01-13 NOTE — DISCHARGE INSTR - AVS FIRST PAGE
Dear Miracle Garcia,     It was our pleasure to care for you here at [facility]  It is our hope that we were always able to exceed the expected standards for your care during your stay  You were hospitalized due to Acute Renal Failure  You were cared for on the 2nd floor by Tanya Harmon MD under the service of Kayleigh Dunn DO with the [facility] who covers for your primary care physician (PCP), Dianna Willis MD, while you were hospitalized  If you have any questions or concerns related to this hospitalization, you may contact us at [phone]  For follow up as well as any medication refills, we recommend that you follow up with your primary care physician  A registered nurse will reach out to you by phone within a few days after your discharge to answer any additional questions that you may have after going home  However, at this time we provide for you here, the most important instructions / recommendations at discharge:     Notable Medication Adjustments -   Continue torsemide 40mg BID  Testing Required after Discharge -   BMP on Monday and close follow up on 1/18/22 with Nephrology appointment at 11am, please arrive by 10:45 am  Important follow up information -   Patient to follow up with Infectious Disease for completion of IV antibiotics through 1/16/22 and subsequent planning for removal of tunneled PICC line with Interventional Radiology   Other Instructions -    If left knee pain continues to worsen or there is swelling/inflammation would consider outpatient evaluation with Orthopedic Surgery for consideration of arthrocentesis  Please review this entire after visit summary as additional general instructions including medication list, appointments, activity, diet, any pertinent wound care, and other additional recommendations from your care team that may be provided for you        Sincerely,     Tanya Harmon MD

## 2022-01-13 NOTE — PROCEDURES
Arterial Line Insertion    Date/Time: 1/13/2022 2:48 PM  Performed by: Corine Hopson MD  Authorized by: Corine Hopson MD     Patient location:  Bedside  Other Assisting Provider: Yes (comment) (Dr Dodie Freed)    Consent:     Consent obtained:  Emergent situation  Universal protocol:     Patient identity confirmed:  Arm band  Indications:     Indications: hemodynamic monitoring, multiple ABGs, continuous blood pressure monitoring and frequent labs / infusion    Pre-procedure details:     Skin preparation:  Chlorhexidine    Preparation: Patient was prepped and draped in sterile fashion    Sedation:     Sedation type: Patient intubated and unresponsive  Anesthesia (see MAR for exact dosages): Anesthesia method:  None  Procedure details:     Location / Tip of Catheter:  Radial    Laterality:  Left    Almas's test performed: no      Needle gauge:  18 G    Placement technique:  Ultrasound guided    Ultrasound image availability:  Not saved    Sterile ultrasound techniques: Sterile gel and sterile probe covers were used      Number of attempts:  1    Successful placement: yes      Transducer: waveform confirmed    Post-procedure details:     Post-procedure:  Sterile dressing applied and sutured    CMS:  Unable to assess    Patient tolerance of procedure:   Tolerated well, no immediate complications

## 2022-01-13 NOTE — ASSESSMENT & PLAN NOTE
Lab Results   Component Value Date    EGFR 12 01/13/2022    EGFR 13 01/13/2022    EGFR 13 01/12/2022    CREATININE 4 61 (H) 01/13/2022    CREATININE 4 36 (H) 01/13/2022    CREATININE 4 37 (H) 01/12/2022     Recent Labs     01/01/22  1419 01/01/22  1419 01/01/22 2022   CREATININE 7 37*  --  7 16*   EGFR 6   < > 7    < > = values in this interval not displayed       Estimated Creatinine Clearance: 11 7 mL/min (A) (by C-G formula based on SCr of 7 16 mg/dL (H))     · POA 7 3; (baseline 4 3-4 4)  · UA: Shows blood, protein and leukocytes  · Likely secondary to long-standing HTN  · Patient is in rapid response on 01/02  · Patient noted to be acidotic, volume overloaded, INR elevated    · Patient evaluated to need emergent dialysis      Plan:  · Urinary retention protocol, Bladder scan, Daily weights, I/O  · Monitor BMP daily and observe for downward trend of creatinine  · Patient evaluated on the floors by Nephrology and renal values had been improving  · Permacath still present  · Will continue in the setting of increased access following Code Blue on 1/13

## 2022-01-13 NOTE — NURSING NOTE
Patient found unresponsive with no pulse by this nurse  Copious secretions from mouth  CPR initiated

## 2022-01-13 NOTE — ASSESSMENT & PLAN NOTE
· Patient currently being treated for a cellulitis secondary to Stapy Aureus  · Plan to  · Continue Ancef Q12  · Trend fever curve  · Trend WBC   · F/u cultures

## 2022-01-13 NOTE — ASSESSMENT & PLAN NOTE
Lab Results   Component Value Date    HGBA1C 7 1 (H) 01/10/2022       Recent Labs     01/12/22  1528 01/12/22  2128 01/13/22  0814 01/13/22  1200   POCGLU 216* 157* 139 190*       Blood Sugar Average: Last 72 hrs:  (P) 181 [id number]     · Plan to:  · Trend BS  · Administer ISS as needed  Consider Gtts if blood sugars still uncontrolled

## 2022-01-13 NOTE — QUICK NOTE
I was contacted by Nelly regarding patient had small subarachnoid hemorrhage following cardiac arrest   He is on [facility] [place] was given  Per provider, GCS 3 and intubated with upward gaze, no reflexes and myoclonic  Plan:   1  Repeat CT head without contrast after 6 hours  2  Close neuromonitoring- stat CT head if GCS drops 2 point/1 H, over with new neuro deficit  3  HOB >30-45 degrees  4  SBP<160  5  Neurology eval  6  No AC/AP or pharmacological DVT prophylaxis for now  7  Call with question or concern  8   Full consult to follow tomorrow morning

## 2022-01-13 NOTE — PROGRESS NOTES
NEPHROLOGY PROGRESS NOTE   Roman Bowen 78 y o  male MRN: [id number]  Unit/Bed#: ICU 11 Encounter: [id number]  Reason for Consult: MULUGETA/CKD    ASSESSMENT/PLAN:  1  Acute Kidney Injury- secondary to ATN  - HD dependent since 1/2/22  - hold HD today: creatinine stable, good urine output  - plan for discharge off HD but keep dialysis catheter in place for now, will discontinue as outpatient if labs continue to improve  - creatinine remains stable at 4 4 since 1/9/22  - lasix 80mg IV on 1/10 and 1/11  - transition to oral torsemide 40mg BID on 1/12/22  - continue to monitor urine output  - luz catheter in place  - outpatient dialysis unit confirmed at [facility] on TTS 2 (keep chair time until next week, unit aware)  2  Chronic Kidney Disease stage III- baseline creatinine 1 5-1 7 from 2020  [facility] nephrology follow up on discharge  3  Access- right IJ perm cath  4  Anemia- hgb below goal but stable  - not on SORIN, history of stroke  - iron stores acceptable  5  Hypertension- BP acceptable  - hold parameters placed on amlodipine  6  Metabolic Acidosis- off bicarbonate tablets, continue to monitor  7  Hyponatremia- fluid restriction of <1200ml/day  8  Leg cellulitis- on cefazolin  9  Right Subscapular Renal Hematoma- see above, hgb stable    Disposition: Plan for discharge to rehab today with office follow up and BMP early next week  Addendum: patient coded and is now in the ICU  Attending to evaluate later today  SUBJECTIVE:  Patient feeling well  He continues to complain of leg pain  He appears more alert this morning  He understands plan for discharge with plan for follow up in the office and determination of dialysis needs as an outpatient      OBJECTIVE:  Current Weight: Weight - Scale: 109 kg (240 lb 8 4 oz)  Vitals:    01/13/22 1026 01/13/22 1030 01/13/22 1040 01/13/22 1051   BP:  148/67 142/63 141/63   BP Location:       Pulse:  63 63 58   Resp:  (!) 28 (!) 28 (!) 28   Temp:       TempSrc: SpO2: 99% 100% 100% 100%   Weight:       Height:           Intake/Output Summary (Last 24 hours) at 1/13/2022 1229  Last data filed at 1/13/2022 2026  Gross per 24 hour   Intake --   Output 625 ml   Net -625 ml     General: NAD  Skin: no rash  Eyes: anicteric  ENMT: mm moist  Neck: no masses  Respiratory: CTAB  Cardiac: RRR  Extremities: no edema  GI: soft nt nd  Neuro: alert awake  Psych: mood and affect appropriate    Medications:    Current Facility-Administered Medications:     acetaminophen (TYLENOL) tablet 975 mg, 975 mg, Oral, Q8H [address], Niya R Rasyaquelinon, PA-C, 975 mg at 01/13/22 0510    aspirin chewable tablet 81 mg, 81 mg, Oral, Daily, Miguel Vickers PA-C, 81 mg at 01/13/22 4946    atorvastatin (LIPITOR) tablet 40 mg, 40 mg, Oral, QPM, Niya Vickers PA-C, 40 mg at 01/12/22 1719    calcium acetate (PHOSLO) capsule 667 mg, 667 mg, Oral, TID With Meals, Bg Bal, PA-C, 667 mg at 01/13/22 9510    ceFAZolin (ANCEF) IVPB (premix in dextrose) 1,000 mg 50 mL, 1,000 mg, Intravenous, Q24H, Niya MARITZA Vickers, PA-C, Last Rate: 100 mL/hr at 01/12/22 1720, 1,000 mg at 01/12/22 1720    chlorhexidine (PERIDEX) 0 12 % oral rinse 15 mL, 15 mL, Mouth/Throat, Q12H [address], Niya MARITZA Rasmuson, PA-C    cyanocobalamin (VITAMIN B-12) tablet 1,000 mcg, 1,000 mcg, Oral, Daily, Miguel Vickers PA-C, 1,000 mcg at 01/13/22 1321    docusate sodium (COLACE) capsule 100 mg, 100 mg, Oral, BID PRN, Miguel Vickers PA-C, 100 mg at 01/08/22 1038    ezetimibe (ZETIA) tablet 10 mg, 10 mg, Oral, HS, Niya R Rasmuson, PA-C, 10 mg at 01/12/22 2203    insulin lispro (HumaLOG) 100 units/mL subcutaneous injection 1-6 Units, 1-6 Units, Subcutaneous, Q6H [address] **AND** Fingerstick Glucose (POCT), , , Q6H, Niya R Rancho, PA-C    lactulose oral solution 10 g, 10 g, Oral, BID, Niya R Rancho, PA-C, 10 g at 01/13/22 4322    lidocaine (XYLOCAINE) 2 % topical gel, , Topical, TID, Miguel Vickers PA-C, Given at 01/13/22 0835    meclizine (ANTIVERT) tablet 25 mg, 25 mg, Oral, Q8H PRN, Yovani ESDRAS Cook-CHERRY, 25 mg at 01/10/22 1253    nystatin (MYCOSTATIN) powder, , Topical, BID, Ginny Ayan, HUGO, Given at 01/13/22 0835    pantoprazole (PROTONIX) injection 40 mg, 40 mg, Intravenous, Q24H [facility], Docarlette William Vickers PA-C    polyethylene glycol (MIRALAX) packet 17 g, 17 g, Oral, Daily, Yovani Roallison Vickers PA-C, 17 g at 01/09/22 0815    Laboratory Results:  Results from last 7 days   Lab Units 01/13/22  1148 01/13/22  1141 01/13/22  1025 01/13/22  1016 01/13/22  1004 01/13/22  0313 01/12/22  0543 01/11/22  0430 01/11/22  0430 01/10/22  0503 01/10/22  0503 01/09/22  0522 01/09/22  0522 01/08/22  0508 01/08/22  0508   WBC Thousand/uL  --   --  8 71  --   --  8 70 9 91  --  8 85  --  9 09  --  8 83  --  10 14   HEMOGLOBIN g/dL  --   --  7 8*  --   --  7 4* 7 9*   < > 7 9*   < > 7 8*   < > 7 8*   < > 7 9*   I STAT HEMOGLOBIN g/dl 7 5* 17 7*  --  7 8* 5 8*  --   --   --   --   --   --   --   --   --   --    HEMATOCRIT %  --   --  25 7*  --   --  24 0* 25 5*   < > 25 5*   < > 25 7*   < > 25 6*   < > 25 4*   HEMATOCRIT, ISTAT % 22* 52*  --  23* 17*  --   --   --   --   --   --   --   --   --   --    PLATELETS Thousands/uL  --   --  331  --   --  272 296  --  271  --  262  --  251  --  273   POTASSIUM mmol/L  --   --  5 0  --   --  5 2 4 9  --  4 9  --  4 7  --  4 4  --  4 4   CHLORIDE mmol/L  --   --  98*  --   --  98* 97*  --  97*  --  95*  --  95*  --  97*   CO2 mmol/L  --   --  24  --   --  27 25  --  25  --  25   < > 25   < > 24   CO2, I-STAT mmol/L 29 28  --   --   --   --   --   --   --   --   --   --   --   --   --    BUN mg/dL  --   --  73*  --   --  69* 68*  --  58*  --  53*  --  50*  --  55*   CREATININE mg/dL  --   --  4 61*  --   --  4 36* 4 37*  --  4 52*  --  4 49*  --  4 47*  --  5 40*   CALCIUM mg/dL  --   --  10 2*  --   --  7 6* 8 2*  --  8 1*  --  7 7*  --  7 7*  --  7 9*   MAGNESIUM mg/dL  --   -- 1 8  --   --   --   --   --   --   --   --   --   --   --   --    PHOSPHORUS mg/dL  --   --  9 9*  --   --  7 9* 7 5*  --  7 9*  --   --   --   --   --   --    GLUCOSE, ISTAT mg/dl 192* 195*  --  369* 185*  --   --   --   --   --   --   --   --   --   --     < > = values in this interval not displayed  I have personally reviewed the blood work as stated above and in my note

## 2022-01-13 NOTE — ASSESSMENT & PLAN NOTE
· 1/13-Sandoval was subjective a code blue  · Events of code can be found in the med code narrated  · Repeated dosages of bicarbonate and insulin were administered secondary to concern for hyperkalemia  · Patient had been previously seen normal by by the nurse who then return to see the patient pulseless, unresponsive, and with copious secretions  · Malcolm Nephew was able be achieved after 15 minutes CPR  · Patient was intubated during the code  · Patient was then admitted to the ICU for continued care  · Patient was noted to be bradycardic with elevated systolic pressures in the setting of epinephrine a drip  · Patient was administered a dosage of atropine which improved heart rate, and epinephrine was weaned down  · Concern for BRASH syndrome v  Aspirational Event v PE  · Patient was evaluated at the bedside to have persistent upward gaze, and episodes of myoclonic jerking  · Plan to:  · CT of the head and chest for evaluation of intracranial or intrathoracic pathology  · Secondary to myoclonic jerking and sluggish pupillary reflex neurological exam, spot EEG to be ordered for evaluation of seizure-like activity/anoxic brain injury  · Formal echo ordered for evaluation of RV/LV status  · Prophylactically patient started on heparin infusion  · Continue to trend serial CMPs for evaluation of electrolyte status  · Repeat chest imaging and ECG evaluations  · Continue neurological evaluation secondary to patient not requiring any sedation to remain on ventilator support  · Maintain mechanical ventilation at this time for continued respiratory support    · Wean FiO2 as tolerated  · Wean respiratory rate as tolerated

## 2022-01-13 NOTE — PROCEDURES
Intubation    Date/Time: 1/13/2022 9:45 AM  Performed by: Ute Oden DO  Authorized by: Ute Oden DO     Patient location:  Bedside  Consent:     Consent obtained:  Emergent situation  Pre-procedure details:     Patient status:  Unresponsive    Pretreatment medications:  None    Paralytics:  None  Indications:     Indications for intubation: respiratory failure    Procedure details:     Preoxygenation:  Bag valve mask    CPR in progress: yes      Intubation method:  Oral    Oral intubation technique:  Direct and glidescope    Laryngoscope blade: Mac 3    Tube size (mm):  7 0    Tube type:  Cuffed and hi-lo    Number of attempts:  3 or more    Ventilation between attempts: yes      Cricoid pressure: no      Tube visualized through cords: yes    Placement assessment:     ETT to lip:  26    ETT to teeth:  25    Tube secured with:  ETT rivera    Breath sounds:  Equal and absent over the epigastrium    Placement verification: chest rise, CXR verification, equal breath sounds and ETCO2 detector      CXR findings:  ETT in proper place    Ventilator settings:  AC/VC 26, Vt 500 1 0/8P  Post-procedure details:     Patient tolerance of procedure: Tolerated well, no immediate complications  Comments:      3 looks by Dr Chloe Perez with MAC and [place] Zara blades, poor visualization, I attempted 2 looks with MAC blade with no visualization of cords with significant redundant tissue  Sauer view of cords, not able to pass 7  5ETT with redundant tissue, utilized bougie through cords and ETT placement

## 2022-01-13 NOTE — CASE MANAGEMENT
Case Management Progress Note    Patient name Bismark Bachelor  Location ICU 11/ICU 11 MRN [id number]  : 1955 Date 2022       LOS (days): 12  Geometric Mean LOS (GMLOS) (days): 4 30  Days to GMLOS:-7 6        OBJECTIVE:        Current admission status: Inpatient  Preferred Pharmacy:   [address], [address] [facility], [address]  [address] [facility]  [address]  Phone: [phone] Fax: [phone]  [facility], [address] [facility],[address] [address]  Phone: [phone] Fax: [phone]    Primary Care Provider: Scott Norton MD    Primary Insurance: [facility]  Secondary Insurance: [place] [employer]    PROGRESS NOTE:        CM had received a 1:30 PM BLS transport time this morning for patient's discharge to [facility]  While  was notifying the appropriate providers patient was a Code Blue  ROSC was achieved and patient was intubated and transferred to the ICU  CM contacted [facility] and cancelled the transportation as well as notifying [place] patient would not being discharging to them today  Provider team was reaching out to patient's wife and sister  CM department will continue to follow to assist with discharge coordination

## 2022-01-13 NOTE — ASSESSMENT & PLAN NOTE
· History of HTN  · Patient subject to Code blue on 1/13  · Patient was initiated on Epi  · Patient noted to be persistently michael in the setting of BP in 072Q systolic  · Patient responded to a dosage of atropine and Epi was weaned    · Plan to  · Trend BP values    · Holding Amlodipine, Carvedilol, and Demedex

## 2022-01-13 NOTE — NURSING NOTE
Patient refused to sit up while eating breakfast this am  Nurse educated importance of sitting HOB up while eating  Nurse attempted to lock bed at 45 degrees while pt eating, pt yelled and demanded HOB down after multiple attempts of educating  Nurse removed food tray due to the safety issue

## 2022-01-13 NOTE — ASSESSMENT & PLAN NOTE
· INR on admission 5   · Patient requires dialysis for suspected uremic encephalopathy during ICU (1/3)  · Plan to  · Continue to follow serial BMPs  · Trend lab values  · Continue with Heparin Drip  · Hold Eliquis

## 2022-01-13 NOTE — ASSESSMENT & PLAN NOTE
· Plan to  · Continue PRN medications for pain  · Re-evaluate pain and pain medication requirements post-extubation

## 2022-01-13 NOTE — CONSULTS
2018 [facility] for Transfer to ICU- Marietta Friend 1955, 78 y o  male MRN: [id number]  Unit/Bed#: ICU 11 Encounter: [id number]  Primary Care Provider: Alfonso Milner MD   Date and time admitted to hospital: 1/1/2022 12:36 PM    Consults    * Cardiac arrest [facility])  Assessment & Plan  · 1/13-Sandoval was subjective a code blue  · Events of code can be found in the med code narrated  · Repeated dosages of bicarbonate and insulin were administered secondary to concern for hyperkalemia  · Patient had been previously seen normal by by the nurse who then return to see the patient pulseless, unresponsive, and with copious secretions  · Jorje Sid was able be achieved after 15 minutes CPR  · Patient was intubated during the code  · Patient was then admitted to the ICU for continued care  · Patient was noted to be bradycardic with elevated systolic pressures in the setting of epinephrine a drip  · Patient was administered a dosage of atropine which improved heart rate, and epinephrine was weaned down  · Concern for BRASH syndrome v  Aspirational Event v PE  · Patient was evaluated at the bedside to have persistent upward gaze, and episodes of myoclonic jerking  · Plan to:  · CT of the head and chest for evaluation of intracranial or intrathoracic pathology  · Secondary to myoclonic jerking and sluggish pupillary reflex neurological exam, spot EEG to be ordered for evaluation of seizure-like activity/anoxic brain injury  · Formal echo ordered for evaluation of RV/LV status  · Prophylactically patient started on heparin infusion  · Continue to trend serial CMPs for evaluation of electrolyte status  · Repeat chest imaging and ECG evaluations  · Continue neurological evaluation secondary to patient not requiring any sedation to remain on ventilator support  · Maintain mechanical ventilation at this time for continued respiratory support    · Wean FiO2 as tolerated  · Wean respiratory rate as tolerated    Renal failure (ARF), acute on chronic [facility])  Assessment & Plan  Lab Results   Component Value Date    EGFR 12 01/13/2022    EGFR 13 01/13/2022    EGFR 13 01/12/2022    CREATININE 4 61 (H) 01/13/2022    CREATININE 4 36 (H) 01/13/2022    CREATININE 4 37 (H) 01/12/2022     Recent Labs     01/01/22  1419 01/01/22  1419 01/01/22 2022   CREATININE 7 37*  --  7 16*   EGFR 6   < > 7    < > = values in this interval not displayed       Estimated Creatinine Clearance: 11 7 mL/min (A) (by C-G formula based on SCr of 7 16 mg/dL (H))     · POA 7 3; (baseline 4 3-4 4)  · UA: Shows blood, protein and leukocytes  · Likely secondary to long-standing HTN  · Patient is in rapid response on 01/02  · Patient noted to be acidotic, volume overloaded, INR elevated  · Patient evaluated to need emergent dialysis      Plan:  · Urinary retention protocol, Bladder scan, Daily weights, I/O  · Monitor BMP daily and observe for downward trend of creatinine  · Patient evaluated on the floors by Nephrology and renal values had been improving  · Permacath still present  · Will continue in the setting of increased access following Code Blue on 1/13         Chronic deep vein thrombosis (DVT) of distal vein of left lower extremity (HCC)  Assessment & Plan  · INR on admission 5   · Patient requires dialysis for suspected uremic encephalopathy during ICU (1/3)  · Plan to  · Continue to follow serial BMPs  · Trend lab values  · Continue with Heparin Drip  · Hold Eliquis    CVA (cerebral vascular accident) [facility])  Assessment & Plan  Patient with notable episode on 1/8 dizziness with room spinning sensation  CT head of the head was unremarkable for acute intracranial abnl   Similar right inferior cerebellar encephalomalacia, nonspecific, mild patchy periventricular and subcortical white matter lucencies most commonly related to changes of microangiopathy and vascular calcification      1/9/22 MRI head notable for small chronic left external capsule  0 2 to 0 3 cm new foci of restricted diffusion in both the right and left frontal periventricular white matter and in the right frontal corona radiata/centrum  Neurology following patient prior to ICU admission    1/10 Carotid US- There is <50% stenosis noted in the right internal carotid artery    · Plan  · Neurology following patient prior to ICU admission  Recommendations:  · Continue Eliquis 5mg BID and ASA  · Continue Atorvastatin 40mg BID daily  · Maintain glucose 140 - 180    Chronic pain of left lower extremity  Assessment & Plan  · Plan to  · Continue PRN medications for pain  · Re-evaluate pain and pain medication requirements post-extubation  Cellulitis of left lower extremity  Assessment & Plan  · See plan for Staph Aureus bactermia    Right Liver mass  Assessment & Plan  · History of Liver mass noted on imaging  · Plan to  · Trend LFTs  · Outpatient follow up    Staphylococcus aureus bacteremia  Assessment & Plan  · Patient currently being treated for a cellulitis secondary to Stapy Aureus  · Plan to  · Continue Ancef Q12  · Trend fever curve  · Trend WBC   · F/u cultures    Hypertension  Assessment & Plan  · History of HTN  · Patient subject to Code blue on 1/13  · Patient was initiated on Epi  · Patient noted to be persistently michael in the setting of BP in 297G systolic  · Patient responded to a dosage of atropine and Epi was weaned  · Plan to  · Trend BP values    · Holding Amlodipine, Carvedilol, and Demedex    Type 2 diabetes mellitus, with long-term current use of insulin [facility])  Assessment & Plan  Lab Results   Component Value Date    HGBA1C 7 1 (H) 01/10/2022       Recent Labs     01/12/22  1528 01/12/22  2128 01/13/22  0814 01/13/22  1200   POCGLU 216* 157* 139 190*       Blood Sugar Average: Last 72 hrs:  (P) 181 [id number]     · Plan to:  · Trend BS  · Administer ISS as needed   Consider Gtts if blood sugars still uncontrolled  -------------------------------------------------------------------------------------------------------------  Chief Complaint: Code Blue     History of Present Illness   HX and PE limited by:  Unresponsiveness/intubated  Yael Hardy is a 78 y o  male  With PMH of CVA, Type 2 DM, CAD, CKD III who presented to the [facility] ED on 1/1 for AMS  He was discharged from the [facility] on the 29th of December after being treated for bacteremia with presumed lower extremity skin source  He was discharged on IV antibiotics   At the time he was noted to have worsening CKD with a baseline creatinine around 4  He had a creatinine of more than 7 on admission  Reported diminished/no urine output by his wife at nursing facility      Furthermore he has had worsening confusion/lethargy at the nursing home  Laly Cash is on Eliquis but came with a supratherapeutic INR of more than 6  And also new anemia with the unknown source  Laly Cash had microscopic hematuria on the urine microscopy  CT scan shows stable renal and hepatic hematomas      Responded well to fluids in the ED during admission  Nephrology did not feel that he needed urgent dialysis at admission however they have recommended treating for hyperkalemia given that we are giving this patient blood products  Patient was subject to a rapid response on 1/2 secondary to acute mental status change  Patient was scheduled to receive a dialysis line via IR that day and had not been dialyzed  After RR, Patient was initiated on BiPAP  ABG at time of rapid response was 7 186/49 8/106 0/18  8  Secondary to increasing encephalopathy and emergent need for dialysis, patient was deemed appropriate for ICU admission in order for placement of central line dialysis catheter  Patient had a central line placed and was initiated on dialysis  Patient improved in mental status and was downgraded from the ICU on 1/4 after control of elevated blood pressures and weaning of nicardipine drip  During evaluation by medicine team after downgrade, patient receive continued therapy and medications for staph bacteremia, control of LLE pain, and repeated dialysis  Patient resumed on Eliquis after her noted improvement in subcapsular hematoma on CT scan imaging  On 01/13, patient's renal function was noted to be improving, conversation regarding coming back finger discussed going and discharge planning was via paired  However, patient was found by nursing staff to be unresponsive, with increased/copious amounts of secretions, her ease  Code Blue was initiated  Details of code can be found in the "Med code narrator"  Notable during code which lasted for 15 minutes, multiple infusions of bicarbonate and insulin were administered secondary to concerns for hyperkalemia  Patient ws intubated and transferred to the ICU for continued management secondary to mechanical ventilation requirements, vasopressor support and continue telemetry evaluation for etiology of cardiac arrest   Differential included but not limited to BRASH syndrome vs  aspiration vs PE  History obtained from chart review  -------------------------------------------------------------------------------------------------------------  Dispo: Admit to Critical Care     Code Status: Level 2 - DNAR: but accepts endotracheal intubation  --------------------------------------------------------------------------------------------------------------  Review of Systems   Unable to perform ROS: Intubated       Review of systems was unable to be performed secondary to Unresponsiveness, patient currently intubated  Physical Exam  Vitals and nursing note reviewed  Constitutional:       General: He is in acute distress  Appearance: He is ill-appearing and toxic-appearing  Comments: Unresponsive  HENT:      Head: Normocephalic and atraumatic        Right Ear: External ear normal       Left Ear: External ear normal       Nose: Nose normal       Mouth/Throat:      Comments: Intubated  Eyes:      Comments: Persistent for days and sluggish pupillary reflex  Cardiovascular:      Rate and Rhythm: Normal rate and regular rhythm  Pulmonary:      Comments: Mechanically ventilated  Abdominal:      General: There is distension  Palpations: Abdomen is soft  Musculoskeletal:         General: No tenderness, deformity or signs of injury  Skin:     General: Skin is warm and dry  Neurological:      Comments: Unresponsive  Patient not on any sedating medications        --------------------------------------------------------------------------------------------------------------  Vitals:   Vitals:    01/13/22 1250 01/13/22 1300 01/13/22 1310 01/13/22 1320   BP: 150/71 152/72 152/70 148/65   BP Location:       Pulse: 59 58 59 58   Resp: (!) 28 (!) 25 20 20   Temp:       TempSrc:       SpO2: 100% 100% 100% 100%   Weight:    109 kg (240 lb)   Height:    5' 7" (1 702 m)     Temp  Min: 97 4 °F (36 3 °C)  Max: 99 4 °F (37 4 °C)  IBW (Ideal Body Weight): 66 1 kg  Height: 5' 7" (170 2 cm)  Body mass index is 37 59 kg/m²      Laboratory and Diagnostics:  Results from last 7 days   Lab Units 01/13/22  1148 01/13/22  1141 01/13/22  1025 01/13/22  1016 01/13/22  1004 01/13/22  0313 01/12/22  0543 01/11/22  0430 01/11/22  0430 01/10/22  0503 01/10/22  0503 01/09/22  0522 01/09/22  0522 01/08/22  0508 01/08/22  0508   WBC Thousand/uL  --   --  8 71  --   --  8 70 9 91  --  8 85  --  9 09  --  8 83  --  10 14   HEMOGLOBIN g/dL  --   --  7 8*  --   --  7 4* 7 9*   < > 7 9*   < > 7 8*   < > 7 8*   < > 7 9*   I STAT HEMOGLOBIN g/dl 7 5* 17 7*  --  7 8* 5 8*  --   --   --   --   --   --   --   --   --   --    HEMATOCRIT %  --   --  25 7*  --   --  24 0* 25 5*   < > 25 5*   < > 25 7*   < > 25 6*   < > 25 4*   HEMATOCRIT, ISTAT % 22* 52*  --  23* 17*  --   --   --   --   --   --   --   --   --   --    PLATELETS Thousands/uL  --   --  331  --   --  324 296  --  851 --  262  --  251  --  273   NEUTROS PCT %  --   --   --   --   --   --   --   --   --   --  63  --   --   --   --    BANDS PCT %  --   --   --   --   --  2  --   --   --   --   --   --   --   --  4   MONOS PCT %  --   --   --   --   --   --   --   --   --   --  8  --   --   --   --    MONO PCT %  --   --   --   --   --  0*  --   --   --   --   --   --   --   --  10    < > = values in this interval not displayed  Results from last 7 days   Lab Units 01/13/22  1148 01/13/22  1141 01/13/22  1025 01/13/22  0313 01/12/22  0543 01/11/22  0430 01/10/22  0503 01/09/22  0522 01/09/22  0522 01/08/22  0508 01/08/22  0508   SODIUM mmol/L  --   --  137 132* 131* 129* 129*  --  129*  --  131*   POTASSIUM mmol/L  --   --  5 0 5 2 4 9 4 9 4 7  --  4 4  --  4 4   CHLORIDE mmol/L  --   --  98* 98* 97* 97* 95*  --  95*  --  97*   CO2 mmol/L  --   --  24 27 25 25 25   < > 25   < > 24   CO2, I-STAT mmol/L 29 28  --   --   --   --   --   --   --   --   --    ANION GAP mmol/L  --   --  15* 7 9 7 9  --  9  --  10   BUN mg/dL  --   --  73* 69* 68* 58* 53*  --  50*  --  55*   CREATININE mg/dL  --   --  4 61* 4 36* 4 37* 4 52* 4 49*  --  4 47*  --  5 40*   CALCIUM mg/dL  --   --  10 2* 7 6* 8 2* 8 1* 7 7*  --  7 7*  --  7 9*   GLUCOSE RANDOM mg/dL  --   --  298* 156* 130 159* 128  --  101  --  79   ALT U/L  --   --  7*  --   --   --   --   --  <6*  --   --    AST U/L  --   --  31  --   --   --   --   --  24  --   --    ALK PHOS U/L  --   --  85  --   --   --   --   --  52  --   --    ALBUMIN g/dL  --   --  1 8* 1 9* 1 9*  --   --   --  1 7*  --   --    TOTAL BILIRUBIN mg/dL  --   --  0 18*  --   --   --   --   --  0 21  --   --     < > = values in this interval not displayed       Results from last 7 days   Lab Units 01/13/22  1025 01/13/22  0313 01/12/22  0543 01/11/22  0430   MAGNESIUM mg/dL 1 8  --   --   --    PHOSPHORUS mg/dL 9 9* 7 9* 7 5* 7 9*      Results from last 7 days   Lab Units 01/13/22  1025   INR  2 80*          Results from last 7 days   Lab Units 01/13/22  1334 01/13/22  1025   LACTIC ACID mmol/L 1 3 6 0*     ABG:    VBG:          Micro:        EKG:  Multiple post-arrest tracings  One notable for sinus bradycardia  One tracing notable for sinus rhythm with premature atrial complexes  Imaging: I have personally reviewed pertinent reports     and I have personally reviewed pertinent films in PACS      Historical Information   Past Medical History:   Diagnosis Date    Bell's palsy     Cardiac disease     [facility]bellar [facility] [address] [facility]     Diabetes mellitus ([facility] )     Fracture     L hip    Hyperlipidemia     Hypertension     Renal disorder     [facility]    Stroke [facility])     [id number]     Past Surgical History:   Procedure Laterality Date    CORONARY ANGIOPLASTY WITH STENT PLACEMENT      FRACTURE SURGERY      L femur    INCISION AND DRAINAGE OF WOUND Right 9/13/2016    Procedure: INCISION AND DRAINAGE (I&D) EXTREMITY, right lateral ankle;  Surgeon: Zainab Ashley DPM;  [facility];  Service:     IR EMBOLIZATION (SPECIFY VESSEL OR SITE)  12/18/2021    IR TEMPORARY DIALYSIS CATHETER PLACEMENT  12/18/2021    IR TUNNELED CENTRAL LINE PLACEMENT  12/27/2021    IR TUNNELED DIALYSIS CATHETER PLACEMENT  1/6/2022    WOUND DEBRIDEMENT Right 9/15/2016    Procedure: DEBRIDEMENT WOUND [facility] OUT) ankle wound with closure and FRNACY drain placement;  Surgeon: Zainab Ashley DPM;  [facility];  Service:      Social History   Social History     Substance and Sexual Activity   Alcohol Use No     Social History     Substance and Sexual Activity   Drug Use No     Social History     Tobacco Use   Smoking Status Former Smoker    Packs/day: 0 00    Types: Cigarettes    Quit date: 9/3/2006    Years since quitting: 15 3   Smokeless Tobacco Never Used     Exercise History: N/a  Family History:   Family History   Problem Relation Age of Onset    Diabetes Mother     Stroke Mother      I have reviewed this patient's family history and commented on sigificant items within the HPI      Medications:  Current Facility-Administered Medications   Medication Dose Route Frequency    acetaminophen (TYLENOL) tablet 975 mg  975 mg Oral Q8H [address]    aspirin chewable tablet 81 mg  81 mg Oral Daily    atorvastatin (LIPITOR) tablet 40 mg  40 mg Oral QPM    calcium acetate (PHOSLO) capsule 667 mg  667 mg Oral TID With Meals    ceFAZolin (ANCEF) IVPB (premix in dextrose) 1,000 mg 50 mL  1,000 mg Intravenous Q24H    chlorhexidine (PERIDEX) 0 12 % oral rinse 15 mL  15 mL Mouth/Throat Q12H JAYLIN    cyanocobalamin (VITAMIN B-12) tablet 1,000 mcg  1,000 mcg Oral Daily    docusate sodium (COLACE) capsule 100 mg  100 mg Oral BID PRN    EPINEPHrine 3000 mcg (STANDARD CONCENTRATION) IV in sodium chloride 0 9% 250 mL  1-20 mcg/min Intravenous Titrated    ezetimibe (ZETIA) tablet 10 mg  10 mg Oral HS    insulin lispro (HumaLOG) 100 units/mL subcutaneous injection 1-6 Units  1-6 Units Subcutaneous Q6H [address]    lactulose oral solution 10 g  10 g Oral BID    lidocaine (XYLOCAINE) 2 % topical gel   Topical TID    meclizine (ANTIVERT) tablet 25 mg  25 mg Oral Q8H PRN    nystatin (MYCOSTATIN) powder   Topical BID    pantoprazole (PROTONIX) injection 40 mg  40 mg Intravenous Q24H JAYLIN    polyethylene glycol (MIRALAX) packet 17 g  17 g Oral Daily     Home medications:  Prior to Admission Medications   Prescriptions Last Dose Informant Patient Reported? Taking?    Multiple Vitamins-Minerals (multivitamin with minerals) tablet 12/31/2021 at Unknown time  Yes Yes   Sig: Take 1 tablet by mouth daily   acetaminophen (TYLENOL) 325 mg tablet 12/31/2021 at Unknown time  No Yes   Sig: Take 3 tablets (975 mg total) by mouth every 8 (eight) hours as needed for moderate pain   amLODIPine (NORVASC) 5 mg tablet 1/1/2022 at Unknown time  No Yes   Sig: Take 1 tablet (5 mg total) by mouth daily   apixaban (ELIQUIS) 5 mg 1/1/2022 at Unknown time  No Yes   Sig: Take 1 tablet (5 mg total) by mouth 2 (two) times a day for 30 doses   aspirin 81 mg chewable tablet 1/1/2022 at Unknown time Self Yes Yes   Sig: Chew 81 mg daily  atorvastatin (LIPITOR) 40 mg tablet 12/31/2021 at Unknown time  No Yes   Sig: Take 1 tablet (40 mg total) by mouth every evening   ceFAZolin (ANCEF) 1000 mg IVPB 12/31/2021 at Unknown time  No Yes   Sig: Infuse 50 mL (1,000 mg total) into a venous catheter every 12 (twelve) hours for 18 days   cyanocobalamin (VITAMIN B-12) 250 MCG tablet 12/31/2021 at Unknown time  No Yes   Sig: Take 4 tablets (1,000 mcg total) by mouth daily   docusate sodium (COLACE) 100 mg capsule   No No   Sig: Take 1 capsule (100 mg total) by mouth 2 (two) times a day as needed for constipation   Patient not taking: Reported on 11/28/2020   ezetimibe (ZETIA) 10 mg tablet 12/31/2021 at Unknown time  No Yes   Sig: Take 1 tablet (10 mg total) by mouth daily at bedtime   fenofibrate (TRICOR) 145 mg tablet 12/31/2021 at Unknown time Self Yes Yes   Sig: Take 145 mg by mouth daily  insulin glargine (LANTUS) 100 units/mL subcutaneous injection   No Yes   Sig: Inject 32 Units under the skin daily at bedtime   insulin lispro (HumaLOG) 100 units/mL injection   No Yes   Sig: Inject 6 Units under the skin 3 (three) times a day with meals   metoprolol tartrate (LOPRESSOR) 50 mg tablet 1/1/2022 at Unknown time Self Yes Yes   Sig: Take 50 mg by mouth 2 (two) times a day  nystatin (MYCOSTATIN) powder   No No   Sig: Apply topically 2 (two) times a day   pantoprazole (PROTONIX) 40 mg tablet 1/1/2022 at Unknown time Self Yes Yes   Sig: Take 40 mg by mouth daily      Facility-Administered Medications: None     Allergies:   Allergies   Allergen Reactions    Ace Inhibitors Other (See Comments)    Ciprofloxacin Other (See Comments)    Penicillins Hives    Morphine And Related Anxiety ------------------------------------------------------------------------------------------------------------  Advance Directive and Living Will:      Power of :    POLST:    ------------------------------------------------------------------------------------------------------------  Care Time Delivered:   Upon my evaluation, this patient had a high probability of imminent or life-threatening deterioration due to Cardiac arrest/cardiac failure, which required my direct attention, intervention, and personal management  I have personally provided 40 minutes (1200 to 1240) of critical care time, exclusive of procedures, teaching, family meetings, and any prior time recorded by providers other than myself  Canelo Lynn MD        Portions of the record may have been created with voice recognition software  Occasional wrong word or "sound a like" substitutions may have occurred due to the inherent limitations of voice recognition software    Read the chart carefully and recognize, using context, where substitutions have occurred

## 2022-01-13 NOTE — ASSESSMENT & PLAN NOTE
Patient with notable episode on 1/8 dizziness with room spinning sensation  CT head of the head was unremarkable for acute intracranial abnl  Similar right inferior cerebellar encephalomalacia, nonspecific, mild patchy periventricular and subcortical white matter lucencies most commonly related to changes of microangiopathy and vascular calcification      1/9/22 MRI head notable for small chronic left external capsule  0 2 to 0 3 cm new foci of restricted diffusion in both the right and left frontal periventricular white matter and in the right frontal corona radiata/centrum  Neurology following patient prior to ICU admission    1/10 Carotid US- There is <50% stenosis noted in the right internal carotid artery    · Plan  · Neurology following patient prior to ICU admission   Recommendations:  · Continue Eliquis 5mg BID and ASA  · Continue Atorvastatin 40mg BID daily  · Maintain glucose 140 - 180

## 2022-01-13 NOTE — ASSESSMENT & PLAN NOTE
This morning upon my examination at 8:00am patient was AAOx3, and we discussed plan for disharge to rehab  Patient understood plan of care and agreed  Unfortunately, patient found unresponsive this morning around 9:50 am by nursing staff and a Code Blue was called  ICU team arrived and immediately started CPR, patient was ultimately intubated and transferred to ICU  Family was called during event and notified to come to the hospital given above  SLIM provider met with family at 10:45 am to discuss Code Blue answered all questions at that time  ICU team made aware and patient family to be updated on prognosis moving forward

## 2022-01-14 NOTE — CONSULTS
Consultation - Neurosurgery   Barib Park 78 y o  male MRN: [id number]  Unit/Bed#: ICU 11 Encounter: [id number]    Images reviewed at morning rounds on 01/14/2022 at 7:00 a m  Patient was seen and examined on 10/14/2022 at 7: 45 am    Inpatient consult to Neurosurgery  Consult performed by: Cyn Rushing PA-C  Consult ordered by: Sirisha Quiñonez PA-C          Assessment/Plan               Assessment:  1  SAH post cardiac arrest  2  On Eliquis      Plan:   · Exam: GCVS 3T, PERRL, 2 mm conj bilaterally, fixated, soft restrained at wrists, brisk movement on stroking bottom of feet bilaterally, DTR depressed  Other wise exam limited  · Imagings reviewed personally and by attendings  Findings as follows:  · CT head without contrast on 01/13/2022 demonstrates interval development of small focus of acute subarachnoid hemorrhage within the right posterior frontal vertex  · Repeat CT head on 01/13/2022 demonstrates unchanged trace subarachnoid hemorrhage in right superior frontal sulcus posteriorly  No new acute intracranial abnormality  · Pain control:  Per primary team  · DVT ppx: SCDs bilateral legs  Okay to start on pharmacological DVT prophylaxis  · Seizure ppx:  None  · Activity:  Patient intubated  · PT/OT evaluation & treatment  · Medical Mx:  Per primary team  · Neurocheck: close neuromonitoring  · HOB>30-45 degrees  · sBP<160  · Saw the patient with Dr Danny Gibson this morning  Patient remains intubated  Discussed with critical team resident Dr Ackerman  From NSx perspective, no procedure or regular follow up is required  Okay to start on Pharm DVT ppx  NO AC/AP for 1-2 weeks  Will sign off  Call with question or concern  History of Present Illness     HPI: Barbi Park is a 78 y o  male with PMH x of Bell's palsy, cardiac disease, cerebral stroke, diabetes mellitus, hyperlipidemia, hypertension, renal disorder, acute kidney injury, recently admitted for bacteremia due to lower extremity skin issues  EMR record also shows patient had worsening chronic kidney disease with baseline creatinine of for and had also worsening confusion and lethargy while at nursing home  At 1 time his INR was 6 on Eliquis  Microscopic hematuria and also CT shows hepatic hematoma  Neurosurgery was consulted for a small subarachnoid hemorrhage following cardiac arrest   Was on Eliquis  The patient this morning and he was intubated with GCS 3 T  otherwise, information and exam are limited      Image findings as described in the assessment section above      Review of Systems   Constitutional:        Limited information   HENT:        Limited informed   Eyes:        Limited information   Respiratory:        Limited informed   Gastrointestinal:        Limited information   Neurological:        Limited informed   Psychiatric/Behavioral:        Limited information      Limited information  Historical Information   Past Medical History:   Diagnosis Date    Bell's palsy     Cardiac disease     [facility]erebellar [facility] [address] [facility]     Diabetes mellitus ([facility] [address] [facility]     Fracture     L hip    Hyperlipidemia     Hypertension     Renal disorder     Stroke ([facility] [address] )     2013    Stroke [facility])     [id number]     Past Surgical History:   Procedure Laterality Date    CORONARY ANGIOPLASTY WITH STENT PLACEMENT      FRACTURE SURGERY      L femur    INCISION AND DRAINAGE OF WOUND Right 9/13/2016    Procedure: INCISION AND DRAINAGE (I&D) EXTREMITY, right lateral ankle;  Surgeon: Janice Enriquez DPM;  [facility];  Service:     IR EMBOLIZATION (SPECIFY VESSEL OR SITE)  12/18/2021    IR TEMPORARY DIALYSIS CATHETER PLACEMENT  12/18/2021    IR TUNNELED CENTRAL LINE PLACEMENT  12/27/2021    IR TUNNELED DIALYSIS CATHETER PLACEMENT  1/6/2022    WOUND DEBRIDEMENT Right 9/15/2016    Procedure: DEBRIDEMENT WOUND [facility] OUT) ankle wound with closure and FRANCY drain placement;  Surgeon: Janice Enriquez DPM;  [facility];  Service:      Social History Substance and Sexual Activity   Alcohol Use No     Social History     Substance and Sexual Activity   Drug Use No     Social History     Tobacco Use   Smoking Status Former Smoker    Packs/day: 0 00    Types: Cigarettes    Quit date: 9/3/2006    Years since quitting: 15 3   Smokeless Tobacco Never Used     Family History   Problem Relation Age of Onset    Diabetes Mother     Stroke Mother        Meds/Allergies   all current active meds have been reviewed, current meds:   Current Facility-Administered Medications   Medication Dose Route Frequency    acetaminophen (TYLENOL) tablet 975 mg  975 mg Oral Q8H [address]    atorvastatin (LIPITOR) tablet 40 mg  40 mg Oral QPM    calcium acetate (PHOSLO) capsule 667 mg  667 mg Oral TID With Meals    ceFAZolin (ANCEF) IVPB (premix in dextrose) 1,000 mg 50 mL  1,000 mg Intravenous Q24H    chlorhexidine (PERIDEX) 0 12 % oral rinse 15 mL  15 mL Mouth/Throat Q12H [address]    cyanocobalamin (VITAMIN B-12) tablet 1,000 mcg  1,000 mcg Oral Daily    docusate sodium (COLACE) capsule 100 mg  100 mg Oral BID PRN    EPINEPHrine 3000 mcg (STANDARD CONCENTRATION) IV in sodium chloride 0 9% 250 mL  1-20 mcg/min Intravenous Titrated    ezetimibe (ZETIA) tablet 10 mg  10 mg Oral HS    insulin lispro (HumaLOG) 100 units/mL subcutaneous injection 1-6 Units  1-6 Units Subcutaneous Q6H [address]    lactulose oral solution 10 g  10 g Oral BID    lidocaine (XYLOCAINE) 2 % topical gel   Topical TID    magnesium sulfate 2 g/50 mL IVPB (premix) 2 g  2 g Intravenous Once    meclizine (ANTIVERT) tablet 25 mg  25 mg Oral Q8H PRN    nystatin (MYCOSTATIN) powder   Topical BID    pantoprazole (PROTONIX) injection 40 mg  40 mg Intravenous Q24H JAYLIN    polyethylene glycol (MIRALAX) packet 17 g  17 g Oral Daily    propofol (DIPRIVAN) 1000 mg in 100 mL infusion (premix)  5-50 mcg/kg/min Intravenous Titrated    and PTA meds:   Prior to Admission Medications   Prescriptions Last Dose Informant Patient Reported? Taking? Multiple Vitamins-Minerals (multivitamin with minerals) tablet 12/31/2021 at Unknown time  Yes Yes   Sig: Take 1 tablet by mouth daily   acetaminophen (TYLENOL) 325 mg tablet 12/31/2021 at Unknown time  No Yes   Sig: Take 3 tablets (975 mg total) by mouth every 8 (eight) hours as needed for moderate pain   amLODIPine (NORVASC) 5 mg tablet 1/1/2022 at Unknown time  No Yes   Sig: Take 1 tablet (5 mg total) by mouth daily   apixaban (ELIQUIS) 5 mg 1/1/2022 at Unknown time  No Yes   Sig: Take 1 tablet (5 mg total) by mouth 2 (two) times a day for 30 doses   aspirin 81 mg chewable tablet 1/1/2022 at Unknown time Self Yes Yes   Sig: Chew 81 mg daily  atorvastatin (LIPITOR) 40 mg tablet 12/31/2021 at Unknown time  No Yes   Sig: Take 1 tablet (40 mg total) by mouth every evening   ceFAZolin (ANCEF) 1000 mg IVPB 12/31/2021 at Unknown time  No Yes   Sig: Infuse 50 mL (1,000 mg total) into a venous catheter every 12 (twelve) hours for 18 days   cyanocobalamin (VITAMIN B-12) 250 MCG tablet 12/31/2021 at Unknown time  No Yes   Sig: Take 4 tablets (1,000 mcg total) by mouth daily   docusate sodium (COLACE) 100 mg capsule   No No   Sig: Take 1 capsule (100 mg total) by mouth 2 (two) times a day as needed for constipation   Patient not taking: Reported on 11/28/2020   ezetimibe (ZETIA) 10 mg tablet 12/31/2021 at Unknown time  No Yes   Sig: Take 1 tablet (10 mg total) by mouth daily at bedtime   fenofibrate (TRICOR) 145 mg tablet 12/31/2021 at Unknown time Self Yes Yes   Sig: Take 145 mg by mouth daily  insulin glargine (LANTUS) 100 units/mL subcutaneous injection   No Yes   Sig: Inject 32 Units under the skin daily at bedtime   insulin lispro (HumaLOG) 100 units/mL injection   No Yes   Sig: Inject 6 Units under the skin 3 (three) times a day with meals   metoprolol tartrate (LOPRESSOR) 50 mg tablet 1/1/2022 at Unknown time Self Yes Yes   Sig: Take 50 mg by mouth 2 (two) times a day     nystatin (MYCOSTATIN) powder   No No   Sig: Apply topically 2 (two) times a day   pantoprazole (PROTONIX) 40 mg tablet 1/1/2022 at Unknown time Self Yes Yes   Sig: Take 40 mg by mouth daily      Facility-Administered Medications: None     Allergies   Allergen Reactions    Ace Inhibitors Other (See Comments)    Ciprofloxacin Other (See Comments)    Penicillins Hives    Morphine And Related Anxiety       Objective   I/O       01/12 0701  01/13 0700 01/13 0701 01/14 0700    P  O  240     I V  (mL/kg)  412 8 (3 9)    IV Piggyback  50    Total Intake(mL/kg) 240 (2 2) 462 8 (4 4)    Urine (mL/kg/hr) 625 (0 2) 1250 (0 5)    Total Output 625 1250    Net -385 -504 2                Physical Exam  Constitutional:       Comments: Patient intubated with GCS 3 T   Neurological:      GCS: GCS eye subscore is 1  GCS verbal subscore is 1  GCS motor subscore is 1  Deep Tendon Reflexes:      Reflex Scores:       Tricep reflexes are 1+ on the right side and 1+ on the left side  Bicep reflexes are 1+ on the right side and 1+ on the left side  Brachioradialis reflexes are 1+ on the right side and 1+ on the left side  Patellar reflexes are 1+ on the right side and 1+ on the left side  Achilles reflexes are 1+ on the right side and 1+ on the left side  Comments: Limited exam       Neurologic Exam     Mental Status   Limited exam     Cranial Nerves     CN III, IV, VI   Right pupil: Size: 2 mm  Shape: regular  Reactivity: brisk  Left pupil: Size: 2 mm  Shape: regular  Reactivity: brisk     Limited exam     Motor Exam   Muscle bulk: normal  Overall muscle tone: normal  Right arm tone: normal  Left arm tone: normal  Right leg tone: normal  Left leg tone: normalExam limited     Sensory Exam   Limited exam     Gait, Coordination, and Reflexes     Reflexes   Right brachioradialis: 1+  Left brachioradialis: 1+  Right biceps: 1+  Left biceps: 1+  Right triceps: 1+  Left triceps: 1+  Right patellar: 1+  Left patellar: 1+  Right achilles: 1+  Left achilles: 1+  Right Alejandro: absent  Left Alejandro: absent  Right ankle clonus: absent  Left pendular knee jerk: absentLimited exam       Vitals:Blood pressure 159/70, pulse 61, temperature 99 7 °F (37 6 °C), temperature source Axillary, resp  rate 16, height 5' 7" (1 702 m), weight 106 kg (233 lb 4 oz), SpO2 99 %  ,Body mass index is 36 53 kg/m²  Lab Results:   Results from last 7 days   Lab Units 01/14/22 0444 01/13/22  1148 01/13/22  1141 01/13/22  1025 01/13/22  1016 01/13/22  1004 01/13/22  0313 01/11/22  0430 01/10/22  0503   WBC Thousand/uL 13 34*  --   --  8 71  --   --  8 70   < > 9 09   HEMOGLOBIN g/dL 7 1*  --   --  7 8*  --   --  7 4*   < > 7 8*   I STAT HEMOGLOBIN g/dl  --  7 5* 17 7*  --    < >   < >  --   --   --    HEMATOCRIT % 22 0*  --   --  25 7*  --   --  24 0*   < > 25 7*   HEMATOCRIT, ISTAT %  --  22* 52*  --    < >   < >  --   --   --    PLATELETS Thousands/uL 310  --   --  331  --   --  272   < > 262   NEUTROS PCT % 87*  --   --   --   --   --   --   --  63   MONOS PCT % 6  --   --   --   --   --   --   --  8   MONO PCT %  --   --   --   --   --   --  0*  --   --     < > = values in this interval not displayed       Results from last 7 days   Lab Units 01/14/22 0443 01/13/22  1148 01/13/22  1141 01/13/22  1025 01/13/22  1016 01/13/22  1004 01/13/22  0313 01/13/22  0313 01/12/22  0543 01/12/22  0543 01/10/22  0503 01/09/22  0522   POTASSIUM mmol/L  --   --   --  5 0  --   --   --  5 2  --  4 9   < > 4 4   CHLORIDE mmol/L  --   --   --  98*  --   --   --  98*  --  97*   < > 95*   CO2 mmol/L  --   --   --  24  --   --    < > 27   < > 25   < > 25   CO2, I-STAT mmol/L  --  29 28  --   --   --   --   --   --   --   --   --    BUN mg/dL  --   --   --  73*  --   --   --  69*  --  68*   < > 50*   CREATININE mg/dL  --   --   --  4 61*  --   --   --  4 36*  --  4 37*   < > 4 47*   CALCIUM mg/dL  --   --   --  10 2*  --   --   --  7 6*  --  8 2*   < > 7 7*   ALK PHOS U/L 75  --   -- 85  --   --   --   --   --   --   --  52   ALT U/L 8*  --   --  7*  --   --   --   --   --   --   --  <6*   AST U/L 51*  --   --  31  --   --   --   --   --   --   --  24   GLUCOSE, ISTAT mg/dl  --  192* 195*  --  369*   < >  --   --   --   --   --   --     < > = values in this interval not displayed  Results from last 7 days   Lab Units 01/14/22  0443 01/13/22  1025   MAGNESIUM mg/dL 1 4* 1 8     Results from last 7 days   Lab Units 01/14/22  0443 01/13/22  1025 01/13/22  0313   PHOSPHORUS mg/dL 5 1* 9 9* 7 9*     Results from last 7 days   Lab Units 01/14/22  0443 01/13/22  1025   INR  1 79* 2 80*     No results found for: TROPONINT  ABG:  Lab Results   Component Value Date    PHART 7 535 (H) 01/14/2022    RQI6GEF 30 0 (L) 01/14/2022    PO2ART 116 1 01/14/2022    NII1FST 24 8 01/14/2022    BEART 2 3 01/14/2022    SOURCE Line, Arterial 01/14/2022       Imaging Studies: I have personally reviewed pertinent reports   , I have personally reviewed pertinent films in PACS and I have personally reviewed pertinent films in PACS with a Radiologist     EKG, Pathology, and Other Studies: I have personally reviewed pertinent reports   , I have personally reviewed pertinent films in PACS and I have personally reviewed pertinent films in PACS with a Radiologist     VTE Prophylaxis: Sequential compression device (Venodyne)     Code Status: Level 2 - DNAR: but accepts endotracheal intubation  Advance Directive and Living Will:      Power of :    POLST:      Counseling / Coordination of Care  I spent 20 minutes with the patient

## 2022-01-14 NOTE — ASSESSMENT & PLAN NOTE
· Subarachnoid bleed noted on CT head imaging of all/13  Repeat CT head as per neurosurgery recommendations was evaluated to be stable/not increasing in size  · Plan to  · Follow-up of neurosurgery evaluation on 01/14  · Anticipate goals of care discussion on 01/14 with family

## 2022-01-14 NOTE — DISCHARGE SUMMARY
Discharge Summary - Santiago Rucker 78 y o  male MRN: [id number]    Unit/Bed#: ICU 11 Encounter: [id number] PCP: Rajiv Silva MD    Admission Date:   Admission Orders (From admission, onward)     Ordered        01/01/22 1700  Inpatient Admission  Once                        Admitting Diagnosis: Hyperuricemia [E79 0]  Liver dysfunction [K76 89]  Hyperammonemia ([address] ) [E72 20]  Anemia [D64 9]  Change in mental status [R41 82]  AMS (altered mental status) [R41 82]  Stage 3 chronic kidney disease, unspecified whether stage 3a or 3b CKD ([facility] [address] ) [N18 30]    HPI: Santiago Rucker is a 78 y o  male  With PMH of CVA, Type 2 DM, CAD, CKD III who presented to the [facility] ED on 1/1 for AMS  He was previously discharged from the hospital on the 29th of December after being treated for bacteremia with presumed lower extremity skin source  He was discharged on IV antibiotics   At the time he was noted to have worsening CKD with a baseline creatinine around 4  He had a creatinine of more than 7 on admission  Reported diminished/no urine output by his wife at nursing facility      Furthermore he has had worsening confusion/lethargy at the nursing home  Heriberto Nesbitt is on Eliquis but came with a supratherapeutic INR of more than 6  And also new anemia with the unknown source  Heriberto Nesbitt had microscopic hematuria on the urine microscopy  CT scan shows stable renal and hepatic hematomas  Procedures Performed:   Orders Placed This Encounter   Procedures   [address]    ED ECG Documentation Only    Intubation    Temporary HD Catheter       Summary of Hospital Course:      Responded well to fluids in the ED during admission  Nephrology did not feel that he needed urgent dialysis at admission however they have recommended treating for hyperkalemia given that we are giving this patient blood products      Patient was subject to a rapid response on 1/2 secondary to acute mental status change   Patient was scheduled to receive a dialysis line via IR that day and had not been dialyzed  After RR, Patient was initiated on BiPAP  ABG at time of rapid response was 7 186/49 8/106 0/18  8  Secondary to increasing encephalopathy and emergent need for dialysis, patient was deemed appropriate for ICU admission in order for placement of central line dialysis catheter  Patient had a central line placed and was initiated on dialysis  Patient improved in mental status and was downgraded from the ICU on 1/4 after control of elevated blood pressures and weaning of nicardipine drip       During evaluation by medicine team after downgrade, patient receive continued therapy and medications for staph bacteremia, control of LLE pain, and repeated dialysis  Patient resumed on Eliquis after her noted improvement in subcapsular hematoma on CT scan imaging      On 01/13, patient's renal function was noted to be improving, conversation regarding coming back finger discussed going and discharge planning was via paired  However, patient was found by nursing staff to be unresponsive, with increased/copious amounts of secretions, her ease  Code Blue was initiated  Details of code can be found in the "Med code narrator"  Notable during code which lasted for 15 minutes, multiple infusions of bicarbonate and insulin were administered secondary to concerns for hyperkalemia      Patient ws intubated and transferred to the ICU for continued management secondary to mechanical ventilation requirements, vasopressor support and continue telemetry evaluation for etiology of cardiac arrest   Differential included but not limited to BRASH syndrome vs  aspiration vs PE  Patient had CT scans of the head that had findings consistent with a SAH  Neurosurgery was consulted  Repeat CT scan showed no change in the [facility] after 8 hours  Secondary to stability and size of the [facility], Neurosurgery stated that there was no intervention  Patient was sedated on propofol to suppress myoclonus   EEG testing showed generalized background suppression with no epileptiform discharges  Patient's prognosis was discussed with the family at the bedside and it was decided to pursue comfort care  Patient was weaned off ventilator support and passed  Significant Findings, Care, Treatment and Services Provided:   ·  COVID negative, Flu/RSV negative   ·  CT Head: No acute intracranial abnormality  Unchanged encephalomalacia secondary to chronic right cerebellar infarct   ·  CT C/A/P: Unchanged moderate bilateral water density pleural effusions with associated compressive atelectasis of both lower lobes  No evidence of pneumonia  Unchanged subcapsular and suprarenal hematoma around the right kidney  ·  - Transferred to the ICU for increasing encephalopathy  ·  L IJ Temp HD catheter - iHD - 500mL  ·  1 unit pRBC's  · 1/3:   mL  ·  IHD done  ·  - Transferred from the ICU to the General Medicine Floor  ·  Cardiac arrest, ETT  ·  Transferred to the ICU for ventilator support and vasopressor needs  ·  CTH: SAH    Complications: Death    Disposition:      Final Diagnosis: Death secondary to anoxic brain injury      Medical Problems             Resolved Problems  Date Reviewed: 2022          Resolved    Metabolic acidosis      Resolved by  Jacinta Shin MD    Renal hemorrhage, right 2022     Resolved by  Jacinta Shin MD    Hyperkalemia 2022     Resolved by  Jacinta Shin MD    Encephalopathy 2022     Resolved by  Jacinta Shin MD                Date, Time and Cause of Death    Date of Death: 22  Time of Death:  5:20 PM  Preliminary Cause of Death: Anoxic brain injury ([facility] [address] )  Entered by: Armond Ch PA-C[JZ1 1]     Attribution     JZ1 1 Armond Ch PA-C 22 17:22          Death Note:    INPATIENT DEATH NOTE  Sahnna Faulkner 78 y o  male MRN: [id number]  Unit/Bed#: ICU 11 Encounter: [id number]    Date, Time and Cause of Death Date of Death: 22  Time of Death:  5:20 PM  Preliminary Cause of Death: Anoxic brain injury ([facility] [address] )  Entered by: Deniz Maloney PA-C[JZ1 1]     Attribution     JZ1 1 Deniz Maloney PA-C 22 17:22           Patient's Information  Pronounced by: Deb Jameson  Did the patient's death occur in the ED?: No  Did the patient's death occur in the OR?: No  Did the patient's death occur less than 10 days post-op?: No  Did the patient's death occur within 24 hours of admission?: No  Was code status DNR at the time of death?: Yes    PHYSICAL EXAM:  Unresponsive to noxious stimuli, Spontaneous respirations absent, Breath sounds absent, Carotid pulse absent, Heart sounds absent, Pupillary light reflex absent and Corneal blink reflex absent    Medical Examiner notification criteria:  NONE APPLICABLE   Medical Examiner's office notified?:  No, does not meet ME notification criteria   Medical Examiner accepted case?:  No  Name of Medical Examiner: None applicable  Family Notification  Was the family notified?: Yes  Date Notified: 22  Time Notified: [id number]  Notified by: Deb Jameson   Relationship to Patient: Spouse  Family Notification Route: At bedside  Was the family told to contact a  home?: Yes    Autopsy Options:  Post-mortem examination declined by next of kin    Primary Service Attending Physician notified?:  yes - Attending:  David Coker, DO    Physician/Resident responsible for completing Discharge Summary:  Deb Jameson

## 2022-01-14 NOTE — UTILIZATION REVIEW
Continued Stay Review    Date: 1/14    Current Patient Class: INpatient   Current Level of Care:CRITICAL care  HPI:66 y o  male initially admitted on 1/1    Assessment/Plan:  Remains intubated  Neuro status unchanged  Follow serial BMPs  Continue with IV abx  Trend Bp    Neurosurgery: GCS 3T  2 mm conj bilaterally, fixated, soft restrained at wrists, brisk movement on stroking bottom of feet bilaterally, DTR depressed  CT head without contrast on 01/13/2022 demonstrates interval development of small focus of acute subarachnoid hemorrhage within the right posterior frontal vertex  Repeat CT head on 01/13/2022 demonstrates unchanged trace subarachnoid hemorrhage in right superior frontal sulcus posteriorly  No new acute intracranial abnormality  Can start pharmacological DVT prophylaxis  Continue with SCDS  HOB > 30-45 degrees  1/13 UPDate:  Found unresponsive, frothing at the mouth in PEA arrest   CPR started  Given Epi x5, bicarb, x 3, calcium x 2   Intubated + ROSC after 15 minutes CPR  Developed myoclonus, CT head shows SAH  Given [facility]  Was on Eliquis  To ICU  Bradycardic and started on epinephrine drip weaned down after dose of atropine  Concern for BRASH syndrome v  Aspirational Event v PE  NOted to have upward gaze, myoclonic jerking  Wean Fio2 as tolerated         PROCEDURE NOTE:  1/13/22  Arterial line to left radial  Vital Signs:   Date/Time Temp Pulse Resp BP MAP (mmHg) Arterial Line BP MAP SpO2 FiO2 (%) Calculated FIO2 (%) - Nasal Cannula Nasal Cannula O2 Flow Rate (L/min) O2 Device Patient Position - Orthostatic VS   01/14/22 1200 -- 61 21 -- -- 111/37 55 mmHg 98 % -- -- -- -- --   01/14/22 1100 100 4 °F (38 °C) 58 16 110/53 76 97/36 51 mmHg 99 % -- -- -- Ventilator Lying   01/14/22 1000 -- 60 16 130/62 89 107/38 54 mmHg 99 % -- -- -- -- --   01/14/22 0700 100 5 °F (38 1 °C) 62 17 137/63 90 134/37 59 mmHg 99 % -- -- -- Ventilator Lying   01/14/22 0600 -- 61 16 -- -- 139/38 61 mmHg 99 % -- -- -- -- --   01/14/22 0500 -- 59 20 -- -- 144/39 63 mmHg 99 % -- -- -- -- --   01/14/22 0400 -- 59 20 -- -- 156/40 66 mmHg 99 % -- -- -- -- --   01/14/22 0350 -- -- -- -- -- -- -- 98 % -- -- -- -- --   01/14/22 0300 -- 61 20 159/70 101 156/40 66 mmHg 99 % -- -- -- -- --   01/14/22 0251 99 7 °F (37 6 °C) 62 20 159/70 101 -- -- 98 % -- -- -- Ventilator Lying   01/14/22 0100 -- 60 20 -- -- 165/43 70 mmHg 99 % -- -- -- -- --   01/14/22 0000 -- 60 26 Abnormal  -- -- 166/42 69 mmHg 99 % -- -- -- -- --   01/13/22 2300 -- 58 20 -- -- 162/41 68 mmHg 99 % -- -- -- -- Lying   01/13/22 2100 -- 68 23 Abnormal  172/72 Abnormal  103 187/44 74 mmHg 99 % -- -- -- -- --   01/13/22 2056 -- -- -- -- -- -- -- 100 % -- -- -- -- --   01/13/22 2000 -- 63 13 163/67 97 173/43 70 mmHg 99             Pertinent Labs/Diagnostic Results:   1/13 CXR:  The tip of the endotracheal tube projects 4 cm above the valarie  1/13 CT head 1523: Interval development of a small focus of acute subarachnoid hemorrhage within the right posterior frontal vertex, see series 2 images 34 through 36    1/13 CT Head: Moderate effusions with moderate bibasilar atelectasis  Mild interstitial edema with mild patch bilateral groundglass opacity which could be due to alveolar edema or aspiration  Pulmonary artery enlargement which can be seen with pulmonary hypertension  1/13 EEG: EEG Interpretation: This Routine EEG recorded in an intubated and ventilated patient  Frequent clinical myoclonic jerks were seen, associated with diffuse muscle artifact, but no change in baseline EEG, suggesting these events are not epileptic seizures  Background activities of generalized, unreactive suppression indicates severe diffuse cerebral dysfunction of nonspecific etiology         Results from last 7 days   Lab Units 01/14/22  0444 01/13/22  1148 01/13/22  1141 01/13/22  1025 01/13/22  1016 01/13/22  1004 01/13/22  0313 01/11/22  0430 01/10/22  0503 01/09/22  0522 01/08/22  0508   WBC Thousand/uL 13 34*  --   --  8 71  --   --  8 70   < > 9 09   < > 10 14   HEMOGLOBIN g/dL 7 1*  --   --  7 8*  --   --  7 4*   < > 7 8*   < > 7 9*   I STAT HEMOGLOBIN g/dl  --  7 5* 17 7*  --  7 8*   < >  --   --   --   --   --    HEMATOCRIT % 22 0*  --   --  25 7*  --   --  24 0*   < > 25 7*   < > 25 4*   HEMATOCRIT, ISTAT %  --  22* 52*  --  23*   < >  --   --   --   --   --    PLATELETS Thousands/uL 310  --   --  331  --   --  272   < > 262   < > 273   NEUTROS ABS Thousands/µL 11 51*  --   --   --   --   --   --   --  5 83  --   --    BANDS PCT %  --   --   --   --   --   --  2  --   --   --  4    < > = values in this interval not displayed  Results from last 7 days   Lab Units 01/14/22  0443 01/13/22  1148 01/13/22  1141 01/13/22  1025 01/13/22  0313 01/12/22  0543 01/12/22  0543 01/11/22  0430 01/11/22  0430   SODIUM mmol/L 134*  --   --  137 132*  --  131*  --  129*   POTASSIUM mmol/L 4 7  --   --  5 0 5 2  --  4 9  --  4 9   CHLORIDE mmol/L 100  --   --  98* 98*  --  97*  --  97*   CO2 mmol/L 25  --   --  24 27   < > 25   < > 25   CO2, I-STAT mmol/L  --  29 28  --   --   --   --   --   --    ANION GAP mmol/L 9  --   --  15* 7  --  9  --  7   BUN mg/dL 77*  --   --  73* 69*  --  68*  --  58*   CREATININE mg/dL 4 10*  --   --  4 61* 4 36*  --  4 37*  --  4 52*   EGFR ml/min/1 73sq m 14  --   --  12 13  --  13  --  12   CALCIUM mg/dL 8 4  --   --  10 2* 7 6*  --  8 2*  --  8 1*   CALCIUM, IONIZED mmol/L  --   --   --  1 29  --   --   --   --   --    MAGNESIUM mg/dL 1 4*  --   --  1 8  --   --   --   --   --    PHOSPHORUS mg/dL 5 1*  --   --  9 9* 7 9*  --  7 5*  --  7 9*    < > = values in this interval not displayed       Results from last 7 days   Lab Units 01/14/22  0443 01/13/22  1025 01/13/22  0313 01/12/22  0543 01/09/22  0522   AST U/L 51* 31  --   --  24   ALT U/L 8* 7*  --   --  <6*   ALK PHOS U/L 75 85  --   --  52   TOTAL PROTEIN g/dL 7 3 7 5  --   --  7 3   ALBUMIN g/dL 1 8* 1 8* 1 9* 1 9* 1 7*   TOTAL BILIRUBIN mg/dL 0 38 0 18*  --   --  0 21   BILIRUBIN DIRECT mg/dL 0 18 0 08  --   --   --      Results from last 7 days   Lab Units 01/14/22  1125 01/14/22  0815 01/13/22  2330 01/13/22  1843 01/13/22  1200 01/13/22  0814 01/12/22  2128 01/12/22  1528 01/12/22  1048 01/12/22  0656 01/11/22  2103 01/11/22  1602   POC GLUCOSE mg/dl 142* 138 109 168* 190* 139 157* 216* 195* 227* 262* 209*     Results from last 7 days   Lab Units 01/14/22  0443 01/13/22  1025 01/13/22  0313 01/12/22  0543 01/11/22  0430 01/10/22  0503 01/09/22  0522 01/08/22  0508   GLUCOSE RANDOM mg/dL 123 298* 156* 130 159* 128 101 79         Results from last 7 days   Lab Units 01/10/22  0503   HEMOGLOBIN A1C % 7 1*   EAG mg/dl 157     BETA-HYDROXYBUTYRATE   Date Value Ref Range Status   12/17/2021 1 1 (H) <0 6 mmol/L Final      Results from last 7 days   Lab Units 01/14/22  0444 01/13/22  1851   PH ART  7 535* 7 459*   PCO2 ART mm Hg 30 0* 39 7   PO2 ART mm Hg 116 1 81 3   HCO3 ART mmol/L 24 8 27 5   BASE EXC ART mmol/L 2 3 3 4   O2 CONTENT ART mL/dL 11 0* 11 4*   O2 HGB, ARTERIAL % 98 5* 95 4   ABG SOURCE  Line, Arterial  --          Results from last 7 days   Lab Units 01/13/22  1148 01/13/22  1141 01/13/22  1016   I STAT BASE EXC mmol/L 5* 0  --    I STAT O2 SAT % 100* 72  --    ISTAT PH ART  7 510* 7 329* 6 995*   I STAT ART PCO2 mm HG 35 3* 51 1* >103 0*   I STAT ART PO2 mm  0* 41 0* 45 0*   I STAT ART HCO3 mmol/L 28 2* 26 8  --        Results from last 7 days   Lab Units 01/14/22  0443 01/13/22  1025   PROTIME seconds 20 6* 28 9*   INR  1 79* 2 80*       Results from last 7 days   Lab Units 01/13/22  1334 01/13/22  1025   LACTIC ACID mmol/L 1 3 6 0*       Medications:   Scheduled Medications:  acetaminophen, 975 mg, Oral, Q8H JAYLIN  atorvastatin, 40 mg, Oral, QPM  ceFAZolin, 1,000 mg, Intravenous, Q24H  chlorhexidine, 15 mL, Mouth/Throat, Q12H JAYLIN  cyanocobalamin, 1,000 mcg, Oral, Daily  ezetimibe, 10 mg, Oral, HS  insulin lispro, 1-6 Units, Subcutaneous, Q6H JAYLIN  lactulose, 10 g, Oral, BID  lidocaine, , Topical, TID  nystatin, , Topical, BID  pantoprazole, 40 mg, Intravenous, Q24H JAYLIN  polyethylene glycol, 17 g, Oral, Daily      Continuous IV Infusions:  epinephrine, 1-20 mcg/min, Intravenous, Titrated  propofol, 5-50 mcg/kg/min, Intravenous, Titrated      PRN Meds:  docusate sodium, 100 mg, Oral, BID PRN  meclizine, 25 mg, Oral, Q8H PRN        Discharge Plan: [facility]  Network Utilization Review Department  ATTENTION: Please call with any questions or concerns to [phone] and carefully listen to the prompts so that you are directed to the right person  All voicemails are confidential   Arya Gould all requests for admission clinical reviews, approved or denied determinations and any other requests to dedicated fax number below belonging to the campus where the patient is receiving treatment   List of dedicated fax numbers for the Facilities:  [address] DENIALS (Administrative/Medical Necessity) [phone]   [address] (Maternity/NICU/Pediatrics) [phone]   [address] [place] [address] [address] [place] [address]  [address] [address] [place] [address] [address] [facility] [phone]

## 2022-01-14 NOTE — ASSESSMENT & PLAN NOTE
Lab Results   Component Value Date    HGBA1C 7 1 (H) 01/10/2022       Recent Labs     01/13/22  0814 01/13/22  1200 01/13/22  1843 01/13/22  2330   POCGLU 139 190* 168* 109       Blood Sugar Average: Last 72 hrs:  (P) 183     · Plan to:  · Trend BS  · Administer ISS as needed  Consider Gtts if blood sugars still uncontrolled

## 2022-01-14 NOTE — ASSESSMENT & PLAN NOTE
Lab Results   Component Value Date    EGFR 14 01/14/2022    EGFR 12 01/13/2022    EGFR 13 01/13/2022    CREATININE 4 10 (H) 01/14/2022    CREATININE 4 61 (H) 01/13/2022    CREATININE 4 36 (H) 01/13/2022     Recent Labs     01/01/22  1419 01/01/22  1419 01/01/22 2022   CREATININE 7 37*  --  7 16*   EGFR 6   < > 7    < > = values in this interval not displayed       Estimated Creatinine Clearance: 11 7 mL/min (A) (by C-G formula based on SCr of 7 16 mg/dL (H))     · POA 7 3; (baseline 4 3-4 4)  · UA: Shows blood, protein and leukocytes  · Likely secondary to long-standing HTN  · Patient is in rapid response on 01/02  · Patient noted to be acidotic, volume overloaded, INR elevated    · Patient evaluated to need emergent dialysis      Plan:  · Urinary retention protocol, Bladder scan, Daily weights, I/O  · Monitor BMP daily and observe for downward trend of creatinine  · Patient evaluated on the floors by Nephrology and renal values had been improving  · Permacath still present  · Will continue in the setting of increased access following Code Blue on 1/13

## 2022-01-14 NOTE — PHYSICAL THERAPY NOTE
Physical Therapy Discontinue Note      Order received to discontinue PT  Please reconsult as appropriate      Myrene Killer, PT

## 2022-01-14 NOTE — ASSESSMENT & PLAN NOTE
· INR on admission 5   · Patient requires dialysis for suspected uremic encephalopathy during ICU (1/3)  · Plan to  · Continue to follow serial BMPs  · Trend lab values  · Hold anticoagulation in the setting of intracranial bleed

## 2022-01-14 NOTE — ASSESSMENT & PLAN NOTE
· History of HTN  · Patient subject to Code blue on 1/13  · Patient was initiated on Epi  · Patient noted to be persistently michael in the setting of BP in 855M systolic  · Patient responded to a dosage of atropine and Epi was weaned    · Plan to  · Trend BP values    · Holding Amlodipine, Carvedilol, and Demedex

## 2022-01-14 NOTE — PROGRESS NOTES
[facility]  Progress Note - Yuki Curtis 1955, 78 y o  male MRN: [id number]  Unit/Bed#: ICU 11 Encounter: [id number]  Primary Care Provider: Josh Zendejas MD   Date and time admitted to hospital: 1/1/2022 12:36 PM    * Cardiac arrest [facility])  Assessment & Plan  · 1/13-Sandoval was subjective a code blue  · Events of code can be found in the med code narrated  · Repeated dosages of bicarbonate and insulin were administered secondary to concern for hyperkalemia  · Patient had been previously seen normal by by the nurse who then return to see the patient pulseless, unresponsive, and with copious secretions  · Kirit Geovanny was able be achieved after 15 minutes CPR  · Patient was intubated during the code  · Patient was then admitted to the ICU for continued care  · Patient was noted to be bradycardic with elevated systolic pressures in the setting of epinephrine a drip  · Patient was administered a dosage of atropine which improved heart rate, and epinephrine was weaned down  · Concern for BRASH syndrome v  Aspirational Event v PE  · Patient was evaluated at the bedside to have persistent upward gaze, and episodes of myoclonic jerking  · Plan to:  · CT of the head and chest for evaluation of intracranial or intrathoracic pathology  · Secondary to myoclonic jerking and sluggish pupillary reflex neurological exam, spot EEG to be ordered for evaluation of seizure-like activity/anoxic brain injury  · Formal echo ordered for evaluation of RV/LV status  · Prophylactically patient started on heparin infusion  · Continue to trend serial CMPs for evaluation of electrolyte status  · Repeat chest imaging and ECG evaluations  · Continue neurological evaluation secondary to patient not requiring any sedation to remain on ventilator support  · Maintain mechanical ventilation at this time for continued respiratory support    · Wean FiO2 as tolerated  · Wean respiratory rate as tolerated  · Propofol for sedation  · SBT as tolerated  · MAP goal above 65  · SBP less than 160    Renal failure (ARF), acute on chronic [facility])  Assessment & Plan  Lab Results   Component Value Date    EGFR 14 01/14/2022    EGFR 12 01/13/2022    EGFR 13 01/13/2022    CREATININE 4 10 (H) 01/14/2022    CREATININE 4 61 (H) 01/13/2022    CREATININE 4 36 (H) 01/13/2022     Recent Labs     01/01/22  1419 01/01/22  1419 01/01/22 2022   CREATININE 7 37*  --  7 16*   EGFR 6   < > 7    < > = values in this interval not displayed       Estimated Creatinine Clearance: 11 7 mL/min (A) (by C-G formula based on SCr of 7 16 mg/dL (H))     · POA 7 3; (baseline 4 3-4 4)  · UA: Shows blood, protein and leukocytes  · Likely secondary to long-standing HTN  · Patient is in rapid response on 01/02  · Patient noted to be acidotic, volume overloaded, INR elevated  · Patient evaluated to need emergent dialysis      Plan:  · Urinary retention protocol, Bladder scan, Daily weights, I/O  · Monitor BMP daily and observe for downward trend of creatinine  · Patient evaluated on the floors by Nephrology and renal values had been improving  · Permacath still present  · Will continue in the setting of increased access following Code Blue on 1/13         Chronic deep vein thrombosis (DVT) of distal vein of left lower extremity (HCC)  Assessment & Plan  · INR on admission 5   · Patient requires dialysis for suspected uremic encephalopathy during ICU (1/3)  · Plan to  · Continue to follow serial BMPs  · Trend lab values  · Hold anticoagulation in the setting of intracranial bleed  CVA (cerebral vascular accident) [facility])  Assessment & Plan  Patient with notable episode on 1/8 dizziness with room spinning sensation  CT head of the head was unremarkable for acute intracranial abnl   Similar right inferior cerebellar encephalomalacia, nonspecific, mild patchy periventricular and subcortical white matter lucencies most commonly related to changes of microangiopathy and vascular calcification      1/9/22 MRI head notable for small chronic left external capsule  0 2 to 0 3 cm new foci of restricted diffusion in both the right and left frontal periventricular white matter and in the right frontal corona radiata/centrum  Neurology following patient prior to ICU admission    1/10 Carotid US- There is <50% stenosis noted in the right internal carotid artery    · Plan  · Neurology following patient prior to ICU admission  Recommendations:  · Stopped Eliquis in setting of subarachnoid bleed on Ct imaging  · Continue holding statin  · Maintain glucose 140 - 180    Subarachnoid hemorrhage (HCC)  Assessment & Plan  · See plan for AMS    Altered mental status  Assessment & Plan  · Subarachnoid bleed noted on CT head imaging of all/13  Repeat CT head as per neurosurgery recommendations was evaluated to be stable/not increasing in size  · Plan to  · Follow-up of neurosurgery evaluation on 01/14  · Anticipate goals of care discussion on 01/14 with family  Chronic pain of left lower extremity  Assessment & Plan  · Plan to  · Continue PRN medications for pain  · Re-evaluate pain and pain medication requirements post-extubation  Cellulitis of left lower extremity  Assessment & Plan  · See plan for Staph Aureus bactermia    Right Liver mass  Assessment & Plan  · History of Liver mass noted on imaging  · Plan to  · Trend LFTs  · Outpatient follow up    Staphylococcus aureus bacteremia  Assessment & Plan  · Patient currently being treated for a cellulitis secondary to Stapy Aureus  · Plan to  · Continue Ancef Q12  · Trend fever curve  · Trend WBC   · F/u cultures    Hypertension  Assessment & Plan  · History of HTN  · Patient subject to Code blue on 1/13  · Patient was initiated on Epi  · Patient noted to be persistently michael in the setting of BP in 428Y systolic  · Patient responded to a dosage of atropine and Epi was weaned    · Plan to  · Trend BP values    · Holding Amlodipine, Carvedilol, and Demedex    Type 2 diabetes mellitus, with long-term current use of insulin [facility])  Assessment & Plan  Lab Results   Component Value Date    HGBA1C 7 1 (H) 01/10/2022       Recent Labs     22  0814 22  1200 22  1843 22  2330   POCGLU 139 190* 168* 109       Blood Sugar Average: Last 72 hrs:  (P) 183     · Plan to:  · Trend BS  · Administer ISS as needed  Consider Gtts if blood sugars still uncontrolled  ----------------------------------------------------------------------------------------  HPI/24hr events: No acute change in patient status overnight, patient's mental status remains unchanged  Ventilator settings remain unchanged  Patient appropriate for transfer out of the ICU today?: No  Disposition: Continue Critical Care   Code Status: Level 2 - DNAR: but accepts endotracheal intubation  ---------------------------------------------------------------------------------------  SUBJECTIVE  Intubated and sedated  Not responsive to commands this AM     Review of Systems   Unable to perform ROS: Intubated   All other systems reviewed and are negative      Review of systems was unable to be performed secondary to Sedated/intubated  ---------------------------------------------------------------------------------------  OBJECTIVE    Vitals   Vitals:    22 0500 22 0600 22 0700 22 0728   BP:   137/63    BP Location:   Right arm    Pulse: 59 61 62    Resp: 20 16 17    Temp:   100 5 °F (38 1 °C)    TempSrc:   Axillary    SpO2: 99% 99% 99% 99%   Weight:       Height:         Temp (24hrs), Av 6 °F (37 6 °C), Min:98 6 °F (37 °C), Max:100 5 °F (38 1 °C)  Current: Temperature: 100 5 °F (38 1 °C)  Arterial Line BP: 139/38  Arterial Line MAP (mmHg): 61 mmHg    Respiratory:  SpO2: SpO2: 99 %, SpO2 Activity: SpO2 Activity: At Rest, SpO2 Device: O2 Device: Ventilator, Capnography:    Nasal Cannula O2 Flow Rate (L/min): 4 L/min    Invasive/non-invasive ventilation settings   Respiratory  Report   Lab Data (Last 4 hours)      01/14 0444            pH, Arterial       7 535             pCO2, Arterial       30 0             pO2, Arterial       116 1             HCO3, Arterial       24 8             Base Excess, Arterial       2 3                  O2/Vent Data       01/14 0350   Most Recent         Vent Mode AC/VC  AC/VC      Resp Rate (BPM) (BPM) 20  16      Vt (mL) (mL) 500  500      FIO2 (%) (%) 50  40      PEEP (cmH2O) (cmH2O) 8  6      Patient safety screen outcome:   Failed      MV 15 2  8 29                  Physical Exam  Vitals and nursing note reviewed  Constitutional:       General: He is in acute distress  Appearance: He is ill-appearing and toxic-appearing  Comments: Sedated   HENT:      Head: Normocephalic and atraumatic  Mouth/Throat:      Comments: Intubated  Cardiovascular:      Rate and Rhythm: Normal rate and regular rhythm  Pulses: Normal pulses  Pulmonary:      Comments: Mechanically ventilated  Abdominal:      Palpations: Abdomen is soft  Musculoskeletal:         General: No deformity or signs of injury  Skin:     General: Skin is warm and dry  Neurological:      Mental Status: He is alert        Comments: Not following commands/sedated         Laboratory and Diagnostics:  Results from last 7 days   Lab Units 01/14/22  0444 01/13/22  1148 01/13/22  1141 01/13/22  1025 01/13/22  1016 01/13/22  1004 01/13/22  0313 01/12/22  0543 01/12/22  0543 01/11/22  0430 01/11/22  0430 01/10/22  0503 01/10/22  0503 01/09/22  0522 01/09/22  0522 01/08/22  0508 01/08/22  0508   WBC Thousand/uL 13 34*  --   --  8 71  --   --  8 70  --  9 91  --  8 85  --  9 09  --  8 83   < > 10 14   HEMOGLOBIN g/dL 7 1*  --   --  7 8*  --   --  7 4*   < > 7 9*   < > 7 9*   < > 7 8*   < > 7 8*   < > 7 9*   I STAT HEMOGLOBIN g/dl  --  7 5* 17 7*  --  7 8* 5 8*  --   --   --   --   --   --   --   --   --   --   --    HEMATOCRIT % 22 0*  --   -- 25 7*  --   --  24 0*   < > 25 5*   < > 25 5*   < > 25 7*   < > 25 6*   < > 25 4*   HEMATOCRIT, ISTAT %  --  22* 52*  --  23* 17*  --   --   --   --   --   --   --   --   --   --   --    PLATELETS Thousands/uL 310  --   --  331  --   --  272  --  296  --  271  --  262  --  251   < > 273   NEUTROS PCT % 87*  --   --   --   --   --   --   --   --   --   --   --  63  --   --   --   --    BANDS PCT %  --   --   --   --   --   --  2  --   --   --   --   --   --   --   --   --  4   MONOS PCT % 6  --   --   --   --   --   --   --   --   --   --   --  8  --   --   --   --    MONO PCT %  --   --   --   --   --   --  0*  --   --   --   --   --   --   --   --   --  10    < > = values in this interval not displayed       Results from last 7 days   Lab Units 01/14/22  0443 01/13/22  1148 01/13/22  1141 01/13/22  1025 01/13/22  0313 01/12/22  0543 01/11/22  0430 01/10/22  0503 01/10/22  0503 01/09/22  0522 01/09/22  0522   SODIUM mmol/L 134*  --   --  137 132* 131* 129*  --  129*  --  129*   POTASSIUM mmol/L 4 7  --   --  5 0 5 2 4 9 4 9  --  4 7  --  4 4   CHLORIDE mmol/L 100  --   --  98* 98* 97* 97*  --  95*  --  95*   CO2 mmol/L 25  --   --  24 27 25 25   < > 25   < > 25   CO2, I-STAT mmol/L  --  29 28  --   --   --   --   --   --   --   --    ANION GAP mmol/L 9  --   --  15* 7 9 7  --  9  --  9   BUN mg/dL 77*  --   --  73* 69* 68* 58*  --  53*  --  50*   CREATININE mg/dL 4 10*  --   --  4 61* 4 36* 4 37* 4 52*  --  4 49*  --  4 47*   CALCIUM mg/dL 8 4  --   --  10 2* 7 6* 8 2* 8 1*  --  7 7*  --  7 7*   GLUCOSE RANDOM mg/dL 123  --   --  298* 156* 130 159*  --  128  --  101   ALT U/L 8*  --   --  7*  --   --   --   --   --   --  <6*   AST U/L 51*  --   --  31  --   --   --   --   --   --  24   ALK PHOS U/L 75  --   --  85  --   --   --   --   --   --  52   ALBUMIN g/dL 1 8*  --   --  1 8* 1 9* 1 9*  --   --   --   --  1 7*   TOTAL BILIRUBIN mg/dL 0 38  --   --  0 18*  --   --   --   --   --   --  0 21    < > = values in this interval not displayed  Results from last 7 days   Lab Units 01/14/22  0443 01/13/22  1025 01/13/22  0313 01/12/22  0543 01/11/22  0430   MAGNESIUM mg/dL 1 4* 1 8  --   --   --    PHOSPHORUS mg/dL 5 1* 9 9* 7 9* 7 5* 7 9*      Results from last 7 days   Lab Units 01/14/22  0443 01/13/22  1025   INR  1 79* 2 80*          Results from last 7 days   Lab Units 01/13/22  1334 01/13/22  1025   LACTIC ACID mmol/L 1 3 6 0*     ABG:  Results from last 7 days   Lab Units 01/14/22  0444   PH ART  7 535*   PCO2 ART mm Hg 30 0*   PO2 ART mm Hg 116 1   HCO3 ART mmol/L 24 8   BASE EXC ART mmol/L 2 3   ABG SOURCE  Line, Arterial     VBG:  Results from last 7 days   Lab Units 01/14/22  0444   ABG SOURCE  Line, Arterial           Micro        EKG: No new tracing to review  Imaging: I have personally reviewed pertinent reports  Intake and Output  I/O       01/12 0701  01/13 0700 01/13 0701  01/14 0700 01/14 0701  01/15 0700    P  O  240      I V  (mL/kg)  412 8 (3 9)     IV Piggyback  50     Total Intake(mL/kg) 240 (2 2) 462 8 (4 4)     Urine (mL/kg/hr) 625 (0 2) 1250 (0 5)     Total Output 625 1250     Net -407 -809 2                  Height and Weights   Height: 5' 7" (170 2 cm)  IBW (Ideal Body Weight): 66 1 kg  Body mass index is 36 53 kg/m²  Weight (last 2 days)     Date/Time Weight    01/14/22 0251 106 (233 25)    01/13/22 1320 109 (240)    01/13/22 0600 109 (240 52)    01/12/22 0533 110 (242 95)            Nutrition       Diet Orders   (From admission, onward)             Start     Ordered    01/13/22 1022  Diet NPO; Sips with meds  Diet effective now        References:    Nutrtion Support Algorithm Enteral vs  Parenteral   Question Answer Comment   Diet Type NPO    NPO Except: Sips with meds    RD to adjust diet per protocol?  Yes        01/13/22 1022    01/01/22 1825  Room Service  Once        Question:  Type of Service  Answer:  Room Service - Appropriate with Assistance    01/01/22 1824                  Active Medications  Scheduled Meds:  Current Facility-Administered Medications   Medication Dose Route Frequency Provider Last Rate    acetaminophen  975 mg Oral [facility] Fly Raygoza PA-C      atorvastatin  40 mg Oral QPM Melissa Vickers PA-C      calcium acetate  667 mg Oral TID With Meals Fly Raygoza PA-C      ceFAZolin  1,000 mg Intravenous Q24H Melissa Vickers PA-C 1,000 mg (01/13/22 1651)    chlorhexidine  15 mL Mouth/Throat Q12H [address] Niyakar Vickers PA-C      cyanocobalamin  1,000 mcg Oral Daily Fly Raygoza PA-C      docusate sodium  100 mg Oral BID PRN Fly Raygoza PA-C      epinephrine  1-20 mcg/min Intravenous Titrated Fly Raygoza PA-C Stopped (01/13/22 1343)    ezetimibe  10 mg Oral HS Melissa Vickers PA-C      insulin lispro  1-6 Units Subcutaneous Q6H [address] Niyarajwinder Vickers PA-C      lactulose  10 g Oral BID Fly Raygoza PA-C      lidocaine   Topical TID Fly Raygoza PA-C      meclizine  25 mg Oral Q8H PRN Fly Raygoza PA-C      nystatin   Topical BID Fly Raygoza PA-C      pantoprazole  40 mg Intravenous Q24H [address] Niyakar Vickers PA-C      polyethylene glycol  17 g Oral Daily Fly Raygoza PA-C      propofol  5-50 mcg/kg/min Intravenous Titrated Clement Mortensen MD 50 mcg/kg/min (01/14/22 0356)     Continuous Infusions:  epinephrine, 1-20 mcg/min, Last Rate: Stopped (01/13/22 1343)  propofol, 5-50 mcg/kg/min, Last Rate: 50 mcg/kg/min (01/14/22 0356)      PRN Meds:   docusate sodium, 100 mg, BID PRN  meclizine, 25 mg, Q8H PRN        Invasive Devices Review  Invasive Devices  Report    Central Venous Catheter Line            CVC Central Lines 12/27/21 Double Right 17 days          Arterial Line            Arterial Line 01/13/22 Radial <1 day          Hemodialysis Catheter            HD Permanent Double Catheter 7 days          Drain            Urethral Catheter 16 Fr  7 days          Airway ETT  7 mm <1 day                Rationale for remaining devices: Continued therapy administration  ---------------------------------------------------------------------------------------  Advance Directive and Living Will:      Power of :    POLST:    ---------------------------------------------------------------------------------------  Care Time Delivered:   Upon my evaluation, this patient had a high probability of imminent or life-threatening deterioration due to Respiratory failure, which required my direct attention, intervention, and personal management  I have personally provided 20 minutes ([phone]) of critical care time, exclusive of procedures, teaching, family meetings, and any prior time recorded by providers other than myself  Canelo Lynn MD      Portions of the record may have been created with voice recognition software  Occasional wrong word or "sound a like" substitutions may have occurred due to the inherent limitations of voice recognition software    Read the chart carefully and recognize, using context, where substitutions have occurred

## 2022-01-14 NOTE — PROGRESS NOTES
NEPHROLOGY HOSPITAL PROGRESS NOTE   Bismark Bachelor 78 y o  male MRN: [id number]  Unit/Bed#: ICU 11 Encounter: [id number]  Reason for Consult: MULUGETA    ASSESSMENT and PLAN:  1  Acute kidney injury:  · Due to ATN  · MULUGETA noted from previous admission - discharged with Creatinine of 4 26 on 12/29/21 and readmitted with creatinine of 7 37 on 1/1/22  · HD dependent since 1/2/22 and last HD was done on 1/8/22  · Showing renal recovery  · Creatinine down to 4 10 today  · Non oliguric  2  Chronic kidney disease, stage III  · Baseline creatinine 1 5 to 1 7 from 2020  · No prior renal follow up  3  Access: right IJ PermCath  4  Cardiac arrest:  · S/p ROSC  · Unfortunately, neurological outlook is poor  · Critical care will be discussing with family  5  Anemia:  · Hgb is drifting down  · Not on epogen  6  Hypertension:   · BP is acceptable  · Not on meds now since cardiac arrest      7  Leg cellulitis  8  R subcapsular renal hematoma    DISPOSITION:  · Stable renal function and non oliguric  · No new recommendations from renal standpoint  · Critical care will be discussing with family regarding goals of care given poor neuro outlook  · Will be signing off if patient is made comfort care  SUBJECTIVE / 24H INTERVAL HISTORY:  Remains vent dependent  He is off vasopressors  Per my discussion with critical care, neurological outlook is poor  UO 1250 cc yesterday without diuretics  Discussed with Dr Jose Tong       OBJECTIVE:  Current Weight: Weight - Scale: 106 kg (233 lb 4 oz)  Vitals:    01/14/22 0900 01/14/22 1000 01/14/22 1100 01/14/22 1200   BP:  130/62 110/53    BP Location:   Right arm    Pulse: 62 60 58 61   Resp: 16 16 16 21   Temp:   100 4 °F (38 °C)    TempSrc:   Axillary    SpO2: 98% 99% 99% 98%   Weight:       Height:           Intake/Output Summary (Last 24 hours) at 1/14/2022 1359  Last data filed at 1/14/2022 1153  Gross per 24 hour   Intake 690 07 ml   Output 1700 ml   Net -1009 93 ml     General: critically ill appearing on the mechanical ventilator, comfortable  Skin: warm, dry, good turgor  Eyes: closed  ENT: ETT in place  Neck: supple  Chest/Lungs: equal chest expansion, coarse breath sounds  CVS: distinct heart sounds, normal rate, regular rhythm, no rub  Abdomen: soft, non-distended,   Extremities: (+) leg edema  : (+) luz catheter  Neuro: sedated on the mechanical ventilator  Psych: could not evaluate        Medications:    Current Facility-Administered Medications:     acetaminophen (TYLENOL) tablet 975 mg, 975 mg, Oral, Q8H [address], Niya R Rasyaquelinon, PA-C, 975 mg at 01/14/22 0926    atorvastatin (LIPITOR) tablet 40 mg, 40 mg, Oral, QPM, Niya Vickers PA-C, 40 mg at 01/13/22 1837    ceFAZolin (ANCEF) IVPB (premix in dextrose) 1,000 mg 50 mL, 1,000 mg, Intravenous, Q24H, ESDRAS Muse-C, Last Rate: 100 mL/hr at 01/13/22 1651, 1,000 mg at 01/13/22 1651    chlorhexidine (PERIDEX) 0 12 % oral rinse 15 mL, 15 mL, Mouth/Throat, Q12H [address], Niya R Josieon, PA-C, 15 mL at 01/14/22 4953    cyanocobalamin (VITAMIN B-12) tablet 1,000 mcg, 1,000 mcg, Oral, Daily, Thony Vickers PA-C, 1,000 mcg at 01/14/22 0079    docusate sodium (COLACE) capsule 100 mg, 100 mg, Oral, BID PRN, ESDRAS Carlson-C, 100 mg at 01/08/22 1038    EPINEPHrine 3000 mcg (STANDARD CONCENTRATION) IV in sodium chloride 0 9% 250 mL, 1-20 mcg/min, Intravenous, Titrated, João Fitzpatrick PA-C, Held at 01/13/22 1343    ezetimibe (ZETIA) tablet 10 mg, 10 mg, Oral, HS, ESDRAS Muse-C, 10 mg at 01/13/22 2106    insulin lispro (HumaLOG) 100 units/mL subcutaneous injection 1-6 Units, 1-6 Units, Subcutaneous, Q6H [address], 1 Units at 01/13/22 1845 **AND** Fingerstick Glucose (POCT), , , Q6H, Niya MARITZA Rasfredo, PA-C    lactulose oral solution 10 g, 10 g, Oral, BID, Niya MARITZA Rasyaquelinon, PA-C, 10 g at 01/14/22 6205    lidocaine (XYLOCAINE) 2 % topical gel, , Topical, TID, Elereal Beckford PA-C, 1 application at 09/06/63 2286    meclizine (ANTIVERT) tablet 25 mg, 25 mg, Oral, Q8H PRN, Mayaromelia Vickers, PA-C, 25 mg at 01/10/22 1253    nystatin (MYCOSTATIN) powder, , Topical, BID, Alis Vickers, PA-C, 1 application at 39/19/15 0929    pantoprazole (PROTONIX) injection 40 mg, 40 mg, Intravenous, Q24H [address], Mayaromelia Vickers, PA-C, 40 mg at 01/14/22 0926    polyethylene glycol (MIRALAX) packet 17 g, 17 g, Oral, Daily, Mayaromelia Vickers, PA-C, 17 g at 01/14/22 8546    propofol (DIPRIVAN) 1000 mg in 100 mL infusion (premix), 5-50 mcg/kg/min, Intravenous, Titrated, Hugh Tabor MD, Last Rate: 13 1 mL/hr at 01/14/22 1152, 20 mcg/kg/min at 01/14/22 1152    Laboratory Results:  Results from last 7 days   Lab Units 01/14/22  0444 01/14/22  0443 01/13/22  1148 01/13/22  1141 01/13/22  1025 01/13/22  1016 01/13/22  1004 01/13/22  0313 01/12/22  0543 01/12/22  0543 01/11/22  0430 01/11/22  0430 01/10/22  0503 01/10/22  0503 01/09/22  0522 01/09/22  0522   WBC Thousand/uL 13 34*  --   --   --  8 71  --   --  8 70  --  9 91  --  8 85  --  9 09  --  8 83   HEMOGLOBIN g/dL 7 1*  --   --   --  7 8*  --   --  7 4*   < > 7 9*   < > 7 9*   < > 7 8*   < > 7 8*   I STAT HEMOGLOBIN g/dl  --   --  7 5* 17 7*  --  7 8* 5 8*  --   --   --   --   --   --   --   --   --    HEMATOCRIT % 22 0*  --   --   --  25 7*  --   --  24 0*   < > 25 5*   < > 25 5*   < > 25 7*   < > 25 6*   HEMATOCRIT, ISTAT %  --   --  22* 52*  --  23* 17*  --   --   --   --   --   --   --   --   --    PLATELETS Thousands/uL 310  --   --   --  331  --   --  272  --  296  --  271  --  262  --  251   POTASSIUM mmol/L  --  4 7  --   --  5 0  --   --  5 2  --  4 9  --  4 9  --  4 7  --  4 4   CHLORIDE mmol/L  --  100  --   --  98*  --   --  98*  --  97*  --  97*  --  95*  --  95*   CO2 mmol/L  --  25  --   --  24  --   --  27  --  25  --  25   < > 25   < > 25   CO2, I-STAT mmol/L  --   --  29 28  -- --   --   --   --   --   --   --   --   --   --   --    BUN mg/dL  --  77*  --   --  73*  --   --  69*  --  68*  --  58*  --  53*  --  50*   CREATININE mg/dL  --  4 10*  --   --  4 61*  --   --  4 36*  --  4 37*  --  4 52*  --  4 49*  --  4 47*   CALCIUM mg/dL  --  8 4  --   --  10 2*  --   --  7 6*  --  8 2*  --  8 1*  --  7 7*  --  7 7*   MAGNESIUM mg/dL  --  1 4*  --   --  1 8  --   --   --   --   --   --   --   --   --   --   --    PHOSPHORUS mg/dL  --  5 1*  --   --  9 9*  --   --  7 9*  --  7 5*  --  7 9*  --   --   --   --    GLUCOSE, ISTAT mg/dl  --   --  192* 195*  --  369* 185*  --   --   --   --   --   --   --   --   --     < > = values in this interval not displayed

## 2022-01-14 NOTE — DEATH NOTE
INPATIENT DEATH NOTE  Gilson Bishop 78 y o  male MRN: [id number]  Unit/Bed#: ICU 11 Encounter: [id number]    Date, Time and Cause of Death    Date of Death: 22  Time of Death:  5:20 PM  Preliminary Cause of Death: Anoxic brain injury ([facility] [address] )  Entered by: Emily Ferris PA-C[JZ1 1]     Attribution     JZ1 1 Emily Ferris PA-C 22 17:22           Patient's Information  Pronounced by: Kassandra Hatfield  Did the patient's death occur in the ED?: No  Did the patient's death occur in the OR?: No  Did the patient's death occur less than 10 days post-op?: No  Did the patient's death occur within 24 hours of admission?: No  Was code status DNR at the time of death?: Yes    PHYSICAL EXAM:  Unresponsive to noxious stimuli, Spontaneous respirations absent, Breath sounds absent, Carotid pulse absent, Heart sounds absent, Pupillary light reflex absent and Corneal blink reflex absent    Medical Examiner notification criteria:  NONE APPLICABLE   Medical Examiner's office notified?:  No, does not meet ME notification criteria   Medical Examiner accepted case?:  No  Name of Medical Examiner: None applicable  Family Notification  Was the family notified?: Yes  Date Notified: 22  Time Notified: 1354  Notified by: Kassandra Hatfield   Relationship to Patient: Spouse  Family Notification Route: At bedside  Was the family told to contact a  home?: Yes    Autopsy Options:  Post-mortem examination declined by next of kin    Primary Service Attending Physician notified?:  yes - Attending:  Elder Quintero,     Physician/Resident responsible for completing Discharge Summary:  Kassandra Hatfield

## 2022-01-14 NOTE — ASSESSMENT & PLAN NOTE
· 1/13-Sandoval was subjective a code blue  · Events of code can be found in the med code narrated  · Repeated dosages of bicarbonate and insulin were administered secondary to concern for hyperkalemia  · Patient had been previously seen normal by by the nurse who then return to see the patient pulseless, unresponsive, and with copious secretions  · Synetta Chard was able be achieved after 15 minutes CPR  · Patient was intubated during the code  · Patient was then admitted to the ICU for continued care  · Patient was noted to be bradycardic with elevated systolic pressures in the setting of epinephrine a drip  · Patient was administered a dosage of atropine which improved heart rate, and epinephrine was weaned down  · Concern for BRASH syndrome v  Aspirational Event v PE  · Patient was evaluated at the bedside to have persistent upward gaze, and episodes of myoclonic jerking  · Plan to:  · CT of the head and chest for evaluation of intracranial or intrathoracic pathology  · Secondary to myoclonic jerking and sluggish pupillary reflex neurological exam, spot EEG to be ordered for evaluation of seizure-like activity/anoxic brain injury  · Formal echo ordered for evaluation of RV/LV status  · Prophylactically patient started on heparin infusion  · Continue to trend serial CMPs for evaluation of electrolyte status  · Repeat chest imaging and ECG evaluations  · Continue neurological evaluation secondary to patient not requiring any sedation to remain on ventilator support  · Maintain mechanical ventilation at this time for continued respiratory support    · Wean FiO2 as tolerated  · Wean respiratory rate as tolerated  · Propofol for sedation  · SBT as tolerated  · MAP goal above 65  · SBP less than 160

## 2022-01-17 NOTE — UTILIZATION REVIEW
Notification of Discharge   This is a Notification of Discharge from our facility [address]  Please be advised that this patient has been discharge from our facility  Below you will find the admission and discharge date and time including the patients disposition  UTILIZATION REVIEW CONTACT:  Jazmine Gore  Utilization   Network Utilization Review Department  Phone: [phone] x carefully listen to the prompts  All voicemails are confidential   Email: [email or web address]     PHYSICIAN ADVISORY SERVICES:  FOR [id number] REVIEW - MEDICAL NECESSITY DENIAL  Phone: [phone]  Fax: [phone]  Email: [email or web address]     PRESENTATION DATE: 2022 12:36 PM  OBERVATION ADMISSION DATE:   INPATIENT ADMISSION DATE: 22  5:00 PM   DISCHARGE DATE: 2022  9:03 PM  DISPOSITION:        IMPORTANT INFORMATION:  Send all requests for admission clinical reviews, approved or denied determinations and any other requests to dedicated fax number below belonging to the campus where the patient is receiving treatment   List of dedicated fax numbers:  [address] DENIALS (Administrative/Medical Necessity) [phone]   [address] (Maternity/NICU/Pediatrics) [phone]   [facility] [phone]   [address] [facility] [phone]   [address] [place] [address] [phone]    [address] [phone]   [facility]  [phone]   [address] [facility] [address]  [address] [facility] [address] [facility] [phone]

## 2022-01-18 LAB
ATRIAL RATE: 54 BPM
P AXIS: 34 DEGREES
PR INTERVAL: 154 MS
QRS AXIS: 79 DEGREES
QRSD INTERVAL: 102 MS
QT INTERVAL: 414 MS
QTC INTERVAL: 392 MS
T WAVE AXIS: 35 DEGREES
VENTRICULAR RATE: 54 BPM

## 2022-08-29 NOTE — PLAN OF CARE
Nutrition/Hydration-ADULT     Nutrient/Hydration intake appropriate for improving, restoring or maintaining nutritional needs Progressing        Potential for Falls     Patient will remain free of falls Progressing        Prexisting or High Potential for Compromised Skin Integrity     Skin integrity is maintained or improved Progressing Rt ankle ranges limited by pain, other joints WFL/deficits as listed below

## 2024-08-29 NOTE — ASSESSMENT & PLAN NOTE
· Patient currently being treated for a cellulitis secondary to Stapy Aureus  · Plan to  · Continue Ancef Q12  · Trend fever curve  · Trend WBC   · F/u cultures dosing weight